# Patient Record
Sex: FEMALE | Race: WHITE | Employment: UNEMPLOYED | URBAN - METROPOLITAN AREA
[De-identification: names, ages, dates, MRNs, and addresses within clinical notes are randomized per-mention and may not be internally consistent; named-entity substitution may affect disease eponyms.]

---

## 2017-07-26 ENCOUNTER — TELEPHONE (OUTPATIENT)
Dept: ORTHOPEDIC SURGERY | Age: 32
End: 2017-07-26

## 2017-07-26 ENCOUNTER — OFFICE VISIT (OUTPATIENT)
Dept: ORTHOPEDIC SURGERY | Age: 32
End: 2017-07-26

## 2017-07-26 VITALS
SYSTOLIC BLOOD PRESSURE: 125 MMHG | HEART RATE: 102 BPM | HEIGHT: 68 IN | BODY MASS INDEX: 44.41 KG/M2 | DIASTOLIC BLOOD PRESSURE: 75 MMHG | WEIGHT: 293 LBS

## 2017-07-26 DIAGNOSIS — M75.21 BICEPS TENDONITIS ON RIGHT: Primary | ICD-10-CM

## 2017-07-26 DIAGNOSIS — S46.911A RIGHT SHOULDER STRAIN, INITIAL ENCOUNTER: ICD-10-CM

## 2017-07-26 PROCEDURE — 99204 OFFICE O/P NEW MOD 45 MIN: CPT | Performed by: ORTHOPAEDIC SURGERY

## 2017-07-26 RX ORDER — PREDNISONE 10 MG/1
TABLET ORAL
Qty: 14 TABLET | Refills: 0 | Status: SHIPPED | OUTPATIENT
Start: 2017-07-26 | End: 2017-08-12 | Stop reason: ALTCHOICE

## 2017-08-03 ENCOUNTER — HOSPITAL ENCOUNTER (OUTPATIENT)
Dept: PHYSICAL THERAPY | Age: 32
Discharge: HOME | End: 2017-08-03
Admitting: ORTHOPAEDIC SURGERY

## 2017-08-03 ENCOUNTER — HOSPITAL ENCOUNTER (OUTPATIENT)
Dept: PHYSICAL THERAPY | Age: 32
Discharge: HOME | End: 2017-07-31
Admitting: ORTHOPAEDIC SURGERY

## 2017-08-09 DIAGNOSIS — S46.911D RIGHT SHOULDER STRAIN, SUBSEQUENT ENCOUNTER: Primary | ICD-10-CM

## 2017-08-10 ENCOUNTER — HOSPITAL ENCOUNTER (OUTPATIENT)
Dept: PHYSICAL THERAPY | Age: 32
Discharge: HOME | End: 2017-08-10
Admitting: ORTHOPAEDIC SURGERY

## 2017-08-21 ENCOUNTER — HOSPITAL ENCOUNTER (OUTPATIENT)
Dept: MRI IMAGING | Age: 32
Discharge: HOME | End: 2017-08-21
Attending: ORTHOPAEDIC SURGERY | Admitting: ORTHOPAEDIC SURGERY

## 2017-08-21 DIAGNOSIS — S46.911D STRAIN OF UNSPECIFIED MUSCLE, FASCIA AND TENDON AT SHOULDER AND UPPER ARM LEVEL, RIGHT ARM, SUBSEQUENT ENCOUNTER: ICD-10-CM

## 2017-08-21 DIAGNOSIS — S46.911D RIGHT SHOULDER STRAIN, SUBSEQUENT ENCOUNTER: ICD-10-CM

## 2017-08-24 ENCOUNTER — HOSPITAL ENCOUNTER (OUTPATIENT)
Dept: PHYSICAL THERAPY | Age: 32
Discharge: HOME | End: 2017-08-24
Admitting: ORTHOPAEDIC SURGERY

## 2017-08-29 ENCOUNTER — TELEPHONE (OUTPATIENT)
Dept: ORTHOPEDIC SURGERY | Age: 32
End: 2017-08-29

## 2017-09-07 ENCOUNTER — HOSPITAL ENCOUNTER (OUTPATIENT)
Dept: OTHER | Age: 32
Discharge: HOME | End: 2017-09-07
Attending: FAMILY MEDICINE | Admitting: FAMILY MEDICINE

## 2017-09-07 DIAGNOSIS — M79.642 LEFT HAND PAIN: ICD-10-CM

## 2018-10-16 ENCOUNTER — APPOINTMENT (OUTPATIENT)
Dept: GENERAL RADIOLOGY | Age: 33
End: 2018-10-16
Payer: MEDICAID

## 2018-10-16 ENCOUNTER — HOSPITAL ENCOUNTER (EMERGENCY)
Age: 33
Discharge: HOME | End: 2018-10-17
Attending: EMERGENCY MEDICINE
Payer: MEDICAID

## 2018-10-16 VITALS
RESPIRATION RATE: 16 BRPM | DIASTOLIC BLOOD PRESSURE: 77 MMHG | HEART RATE: 107 BPM | OXYGEN SATURATION: 97 % | BODY MASS INDEX: 44.41 KG/M2 | SYSTOLIC BLOOD PRESSURE: 147 MMHG | WEIGHT: 293 LBS | TEMPERATURE: 97.4 F | HEIGHT: 68 IN

## 2018-10-16 DIAGNOSIS — N30.00 ACUTE CYSTITIS WITHOUT HEMATURIA: ICD-10-CM

## 2018-10-16 DIAGNOSIS — M54.6 ACUTE MIDLINE THORACIC BACK PAIN: Primary | ICD-10-CM

## 2018-10-16 LAB
BILIRUBIN URINE: NEGATIVE
BLOOD, URINE: ABNORMAL
CLARITY: ABNORMAL
COLOR: YELLOW
GLUCOSE URINE: NEGATIVE MG/DL
HCG(URINE) PREGNANCY TEST: NEGATIVE
KETONES, URINE: NEGATIVE MG/DL
LEUKOCYTE ESTERASE, URINE: ABNORMAL
MICROSCOPIC EXAMINATION: YES
NITRITE, URINE: NEGATIVE
PH UA: 6
PROTEIN UA: NEGATIVE MG/DL
SPECIFIC GRAVITY UA: >=1.03
URINE TYPE: ABNORMAL
UROBILINOGEN, URINE: 0.2 E.U./DL

## 2018-10-16 PROCEDURE — 84703 CHORIONIC GONADOTROPIN ASSAY: CPT

## 2018-10-16 PROCEDURE — 99283 EMERGENCY DEPT VISIT LOW MDM: CPT

## 2018-10-16 PROCEDURE — 96372 THER/PROPH/DIAG INJ SC/IM: CPT

## 2018-10-16 PROCEDURE — 6360000002 HC RX W HCPCS: Performed by: NURSE PRACTITIONER

## 2018-10-16 PROCEDURE — 81001 URINALYSIS AUTO W/SCOPE: CPT

## 2018-10-16 PROCEDURE — 6370000000 HC RX 637 (ALT 250 FOR IP): Performed by: NURSE PRACTITIONER

## 2018-10-16 PROCEDURE — 72072 X-RAY EXAM THORAC SPINE 3VWS: CPT

## 2018-10-16 RX ORDER — LIDOCAINE 50 MG/G
1 PATCH TOPICAL ONCE
Status: DISCONTINUED | OUTPATIENT
Start: 2018-10-16 | End: 2018-10-17 | Stop reason: HOSPADM

## 2018-10-16 RX ORDER — KETOROLAC TROMETHAMINE 30 MG/ML
30 INJECTION, SOLUTION INTRAMUSCULAR; INTRAVENOUS ONCE
Status: COMPLETED | OUTPATIENT
Start: 2018-10-16 | End: 2018-10-16

## 2018-10-16 RX ADMIN — KETOROLAC TROMETHAMINE 30 MG: 30 INJECTION, SOLUTION INTRAMUSCULAR at 23:45

## 2018-10-16 ASSESSMENT — PAIN DESCRIPTION - ORIENTATION: ORIENTATION: MID

## 2018-10-16 ASSESSMENT — PAIN SCALES - GENERAL
PAINLEVEL_OUTOF10: 8
PAINLEVEL_OUTOF10: 8

## 2018-10-16 ASSESSMENT — PAIN DESCRIPTION - LOCATION: LOCATION: BACK

## 2018-10-16 ASSESSMENT — ENCOUNTER SYMPTOMS
SHORTNESS OF BREATH: 0
COUGH: 0
ABDOMINAL PAIN: 0
BACK PAIN: 1

## 2018-10-16 ASSESSMENT — PAIN DESCRIPTION - PAIN TYPE: TYPE: ACUTE PAIN

## 2018-10-17 LAB
BACTERIA: ABNORMAL /HPF
EPITHELIAL CELLS, UA: ABNORMAL /HPF
RBC UA: ABNORMAL /HPF (ref 0–2)
WBC UA: ABNORMAL /HPF (ref 0–5)

## 2018-10-17 RX ORDER — LIDOCAINE 50 MG/G
1 PATCH TOPICAL DAILY
Qty: 30 PATCH | Refills: 0 | Status: SHIPPED | OUTPATIENT
Start: 2018-10-17 | End: 2019-05-04

## 2018-10-17 RX ORDER — METHOCARBAMOL 750 MG/1
750 TABLET, FILM COATED ORAL 4 TIMES DAILY
Qty: 40 TABLET | Refills: 0 | Status: SHIPPED | OUTPATIENT
Start: 2018-10-17 | End: 2018-10-27

## 2018-10-17 RX ORDER — NITROFURANTOIN 25; 75 MG/1; MG/1
100 CAPSULE ORAL 2 TIMES DAILY
Qty: 10 CAPSULE | Refills: 0 | Status: SHIPPED | OUTPATIENT
Start: 2018-10-17 | End: 2018-10-22

## 2018-10-17 NOTE — ED PROVIDER NOTES
activity: Yes     Partners: Male     Other Topics Concern    None     Social History Narrative    None       SCREENINGS             PHYSICAL EXAM    (up to 7 for level 4, 8 or more for level 5)     ED Triage Vitals [10/16/18 2305]   BP Temp Temp Source Pulse Resp SpO2 Height Weight   (!) 147/77 97.4 °F (36.3 °C) Oral 107 16 97 % 5' 8\" (1.727 m) (!) 385 lb (174.6 kg)       Physical Exam   Constitutional: She appears well-developed and well-nourished. HENT:   Head: Normocephalic and atraumatic. Right Ear: External ear normal.   Left Ear: External ear normal.   Nose: Nose normal.   Eyes: Conjunctivae are normal.   Neck: Normal range of motion. Cardiovascular: Normal rate and regular rhythm. Pulmonary/Chest: Effort normal and breath sounds normal.   Abdominal: She exhibits no distension. Musculoskeletal:        Thoracic back: She exhibits tenderness, bony tenderness and pain. She exhibits normal range of motion, no spasm and normal pulse. Back:    Neurological: She is alert. She has normal strength. No sensory deficit. Reflex Scores:       Patellar reflexes are 2+ on the right side and 2+ on the left side. Skin: Skin is warm and dry. Psychiatric: She has a normal mood and affect. Her behavior is normal.   Nursing note and vitals reviewed.       DIAGNOSTIC RESULTS   LABS:    Labs Reviewed   URINALYSIS - Abnormal; Notable for the following:        Result Value    Clarity, UA CLOUDY (*)     Blood, Urine SMALL (*)     Leukocyte Esterase, Urine SMALL (*)     All other components within normal limits    Narrative:     Performed at:  [facility] [address],  [place], [facility]   Phone [phone]   MICROSCOPIC URINALYSIS - Abnormal; Notable for the following:     WBC, UA 6-10 (*)     RBC, UA 3-5 (*)     Bacteria, UA 1+ (*)     All other components within normal limits    Narrative:     Performed at:  [facility] [address] [facility],  [place], [facility]   Phone [phone]   PREGNANCY, URINE    Narrative:     Performed at:  [facility]) [facility]  [address],  [place], [facility]   Phone [phone]       All other labs were within normal range or not returned as of this dictation. EKG: All EKG's are interpreted by the Emergency Department Physician who either signs orCo-signs this chart in the absence of a cardiologist.  Please see their note for interpretation of EKG. RADIOLOGY:   Non-plain film images such as CT, Ultrasound and MRI are read by the radiologist. Plain radiographic images are visualized andpreliminarily interpreted by the  ED Provider with the below findings:    Interpretation per theRadiologist below, if available at the time of this note:    XR THORACIC SPINE (3 VIEWS)   Final Result   No definite evidence for fracture. No results found. PROCEDURES   Unless otherwise noted below, none     Procedures    CRITICAL CARE TIME   N/A    CONSULTS:  None      EMERGENCY DEPARTMENT COURSE and DIFFERENTIALDIAGNOSIS/MDM:   Vitals:    Vitals:    10/16/18 2305   BP: (!) 147/77   Pulse: 107   Resp: 16   Temp: 97.4 °F (36.3 °C)   TempSrc: Oral   SpO2: 97%   Weight: (!) 385 lb (174.6 kg)   Height: 5' 8\" (1.727 m)       Patient was given thefollowing medications:  Medications   lidocaine (LIDODERM) 5 % 1 patch (1 patch Transdermal Patch Applied 10/16/18 2345)   ketorolac (TORADOL) injection 30 mg (30 mg Intramuscular Given 10/16/18 2345)       Patient presented to the emergency department with complaints of thoracic back pain that started about 2 PM when she was getting up from a sitting position. She also reported some urinary urgency. Physical exam did reveal bony tenderness to her thoracic spine. She had no red flag symptoms. X-ray T-spine was obtained and revealed no definite evidence for fracture.   UA was also obtained and showed 6-10 to be PVCs, 3-5 RBCs, 5-10 epithelial

## 2019-03-26 ENCOUNTER — HOSPITAL ENCOUNTER (EMERGENCY)
Age: 34
Discharge: HOME | End: 2019-03-26
Payer: MEDICAID

## 2019-03-26 VITALS
HEART RATE: 89 BPM | OXYGEN SATURATION: 98 % | SYSTOLIC BLOOD PRESSURE: 138 MMHG | HEIGHT: 67 IN | RESPIRATION RATE: 16 BRPM | WEIGHT: 293 LBS | DIASTOLIC BLOOD PRESSURE: 70 MMHG | BODY MASS INDEX: 45.99 KG/M2 | TEMPERATURE: 97.5 F

## 2019-03-26 DIAGNOSIS — L02.91 ABSCESS: Primary | ICD-10-CM

## 2019-03-26 PROCEDURE — 6370000000 HC RX 637 (ALT 250 FOR IP): Performed by: NURSE PRACTITIONER

## 2019-03-26 PROCEDURE — 99283 EMERGENCY DEPT VISIT LOW MDM: CPT

## 2019-03-26 PROCEDURE — 4500000023 HC ED LEVEL 3 PROCEDURE

## 2019-03-26 RX ORDER — DOXYCYCLINE HYCLATE 100 MG
100 TABLET ORAL ONCE
Status: COMPLETED | OUTPATIENT
Start: 2019-03-26 | End: 2019-03-26

## 2019-03-26 RX ORDER — DOXYCYCLINE 100 MG/1
100 TABLET ORAL 2 TIMES DAILY
Qty: 20 TABLET | Refills: 0 | Status: SHIPPED | OUTPATIENT
Start: 2019-03-26 | End: 2019-04-05

## 2019-03-26 RX ADMIN — DOXYCYCLINE HYCLATE 100 MG: 100 TABLET, COATED ORAL at 16:47

## 2019-03-26 ASSESSMENT — PAIN DESCRIPTION - LOCATION: LOCATION: ABDOMEN

## 2019-03-26 ASSESSMENT — ENCOUNTER SYMPTOMS
SHORTNESS OF BREATH: 0
ABDOMINAL PAIN: 0

## 2019-03-26 ASSESSMENT — PAIN DESCRIPTION - FREQUENCY: FREQUENCY: CONTINUOUS

## 2019-03-26 ASSESSMENT — PAIN DESCRIPTION - ORIENTATION: ORIENTATION: RIGHT;LOWER

## 2019-03-26 ASSESSMENT — PAIN DESCRIPTION - DESCRIPTORS: DESCRIPTORS: ACHING

## 2019-03-26 ASSESSMENT — PAIN SCALES - GENERAL: PAINLEVEL_OUTOF10: 4

## 2019-05-04 ENCOUNTER — HOSPITAL ENCOUNTER (EMERGENCY)
Age: 34
Discharge: HOME | End: 2019-05-04
Payer: MEDICAID

## 2019-05-04 ENCOUNTER — APPOINTMENT (OUTPATIENT)
Dept: CT IMAGING | Age: 34
End: 2019-05-04
Payer: MEDICAID

## 2019-05-04 VITALS
SYSTOLIC BLOOD PRESSURE: 126 MMHG | OXYGEN SATURATION: 95 % | TEMPERATURE: 98.2 F | WEIGHT: 293 LBS | HEART RATE: 96 BPM | BODY MASS INDEX: 45.99 KG/M2 | RESPIRATION RATE: 16 BRPM | HEIGHT: 67 IN | DIASTOLIC BLOOD PRESSURE: 76 MMHG

## 2019-05-04 DIAGNOSIS — K76.0 FATTY LIVER: ICD-10-CM

## 2019-05-04 DIAGNOSIS — R10.9 RIGHT SIDED ABDOMINAL PAIN: Primary | ICD-10-CM

## 2019-05-04 LAB
A/G RATIO: 1.4 (ref 1.1–2.2)
ALBUMIN SERPL-MCNC: 4.3 G/DL (ref 3.4–5)
ALP BLD-CCNC: 66 U/L (ref 40–129)
ALT SERPL-CCNC: 49 U/L (ref 10–40)
ANION GAP SERPL CALCULATED.3IONS-SCNC: 12 MMOL/L (ref 3–16)
AST SERPL-CCNC: 40 U/L (ref 15–37)
BASOPHILS ABSOLUTE: 0.1 K/UL (ref 0–0.2)
BASOPHILS RELATIVE PERCENT: 1 %
BILIRUB SERPL-MCNC: <0.2 MG/DL (ref 0–1)
BILIRUBIN URINE: NEGATIVE
BLOOD, URINE: ABNORMAL
BUN BLDV-MCNC: 11 MG/DL (ref 7–20)
CALCIUM SERPL-MCNC: 9.3 MG/DL (ref 8.3–10.6)
CHLORIDE BLD-SCNC: 104 MMOL/L (ref 99–110)
CLARITY: ABNORMAL
CO2: 25 MMOL/L (ref 21–32)
COLOR: YELLOW
CREAT SERPL-MCNC: 0.9 MG/DL (ref 0.6–1.1)
EOSINOPHILS ABSOLUTE: 0.2 K/UL (ref 0–0.6)
EOSINOPHILS RELATIVE PERCENT: 2.1 %
EPITHELIAL CELLS, UA: ABNORMAL /HPF
GFR AFRICAN AMERICAN: >60
GFR NON-AFRICAN AMERICAN: >60
GLOBULIN: 3.1 G/DL
GLUCOSE BLD-MCNC: 96 MG/DL (ref 70–99)
GLUCOSE URINE: NEGATIVE MG/DL
HCG(URINE) PREGNANCY TEST: NEGATIVE
HCT VFR BLD CALC: 40.3 % (ref 36–48)
HEMOGLOBIN: 13.6 G/DL (ref 12–16)
KETONES, URINE: NEGATIVE MG/DL
LEUKOCYTE ESTERASE, URINE: NEGATIVE
LIPASE: 47 U/L (ref 13–60)
LYMPHOCYTES ABSOLUTE: 3 K/UL (ref 1–5.1)
LYMPHOCYTES RELATIVE PERCENT: 28.9 %
MCH RBC QN AUTO: 31.5 PG (ref 26–34)
MCHC RBC AUTO-ENTMCNC: 33.8 G/DL (ref 31–36)
MCV RBC AUTO: 93 FL (ref 80–100)
MICROSCOPIC EXAMINATION: YES
MONOCYTES ABSOLUTE: 0.8 K/UL (ref 0–1.3)
MONOCYTES RELATIVE PERCENT: 8.3 %
NEUTROPHILS ABSOLUTE: 6.1 K/UL (ref 1.7–7.7)
NEUTROPHILS RELATIVE PERCENT: 59.7 %
NITRITE, URINE: NEGATIVE
PDW BLD-RTO: 14.4 % (ref 12.4–15.4)
PH UA: 6 (ref 5–8)
PLATELET # BLD: 281 K/UL (ref 135–450)
PMV BLD AUTO: 9.5 FL (ref 5–10.5)
POTASSIUM SERPL-SCNC: 4.3 MMOL/L (ref 3.5–5.1)
PROTEIN UA: ABNORMAL MG/DL
RBC # BLD: 4.33 M/UL (ref 4–5.2)
RBC UA: ABNORMAL /HPF (ref 0–2)
SODIUM BLD-SCNC: 141 MMOL/L (ref 136–145)
SPECIFIC GRAVITY UA: 1.02 (ref 1–1.03)
TOTAL PROTEIN: 7.4 G/DL (ref 6.4–8.2)
URINE TYPE: ABNORMAL
UROBILINOGEN, URINE: 0.2 E.U./DL
WBC # BLD: 10.2 K/UL (ref 4–11)
WBC UA: ABNORMAL /HPF (ref 0–5)

## 2019-05-04 PROCEDURE — 80053 COMPREHEN METABOLIC PANEL: CPT

## 2019-05-04 PROCEDURE — 81001 URINALYSIS AUTO W/SCOPE: CPT

## 2019-05-04 PROCEDURE — 99284 EMERGENCY DEPT VISIT MOD MDM: CPT

## 2019-05-04 PROCEDURE — 6360000002 HC RX W HCPCS: Performed by: PHYSICIAN ASSISTANT

## 2019-05-04 PROCEDURE — 6360000004 HC RX CONTRAST MEDICATION: Performed by: PHYSICIAN ASSISTANT

## 2019-05-04 PROCEDURE — 2580000003 HC RX 258: Performed by: PHYSICIAN ASSISTANT

## 2019-05-04 PROCEDURE — 83690 ASSAY OF LIPASE: CPT

## 2019-05-04 PROCEDURE — 96374 THER/PROPH/DIAG INJ IV PUSH: CPT

## 2019-05-04 PROCEDURE — 96375 TX/PRO/DX INJ NEW DRUG ADDON: CPT

## 2019-05-04 PROCEDURE — 74177 CT ABD & PELVIS W/CONTRAST: CPT

## 2019-05-04 PROCEDURE — 85025 COMPLETE CBC W/AUTO DIFF WBC: CPT

## 2019-05-04 PROCEDURE — 96361 HYDRATE IV INFUSION ADD-ON: CPT

## 2019-05-04 PROCEDURE — 84703 CHORIONIC GONADOTROPIN ASSAY: CPT

## 2019-05-04 RX ORDER — ONDANSETRON 2 MG/ML
4 INJECTION INTRAMUSCULAR; INTRAVENOUS EVERY 6 HOURS PRN
Status: DISCONTINUED | OUTPATIENT
Start: 2019-05-04 | End: 2019-05-04 | Stop reason: HOSPADM

## 2019-05-04 RX ORDER — KETOROLAC TROMETHAMINE 30 MG/ML
30 INJECTION, SOLUTION INTRAMUSCULAR; INTRAVENOUS EVERY 6 HOURS PRN
Status: DISCONTINUED | OUTPATIENT
Start: 2019-05-04 | End: 2019-05-04 | Stop reason: HOSPADM

## 2019-05-04 RX ORDER — ONDANSETRON 4 MG/1
4 TABLET, ORALLY DISINTEGRATING ORAL EVERY 8 HOURS PRN
Qty: 20 TABLET | Refills: 0 | Status: SHIPPED | OUTPATIENT
Start: 2019-05-04 | End: 2019-08-12 | Stop reason: ALTCHOICE

## 2019-05-04 RX ORDER — 0.9 % SODIUM CHLORIDE 0.9 %
1000 INTRAVENOUS SOLUTION INTRAVENOUS ONCE
Status: COMPLETED | OUTPATIENT
Start: 2019-05-04 | End: 2019-05-04

## 2019-05-04 RX ORDER — FAMOTIDINE 20 MG/1
20 TABLET, FILM COATED ORAL 2 TIMES DAILY
Qty: 30 TABLET | Refills: 0 | Status: SHIPPED | OUTPATIENT
Start: 2019-05-04 | End: 2019-08-12 | Stop reason: ALTCHOICE

## 2019-05-04 RX ORDER — METHOCARBAMOL 500 MG/1
500 TABLET, FILM COATED ORAL 3 TIMES DAILY
Qty: 15 TABLET | Refills: 0 | Status: SHIPPED | OUTPATIENT
Start: 2019-05-04 | End: 2019-08-12 | Stop reason: ALTCHOICE

## 2019-05-04 RX ADMIN — KETOROLAC TROMETHAMINE 30 MG: 30 INJECTION, SOLUTION INTRAMUSCULAR at 17:48

## 2019-05-04 RX ADMIN — SODIUM CHLORIDE 1000 ML: 9 INJECTION, SOLUTION INTRAVENOUS at 17:45

## 2019-05-04 RX ADMIN — ONDANSETRON 4 MG: 2 INJECTION INTRAMUSCULAR; INTRAVENOUS at 17:45

## 2019-05-04 RX ADMIN — IOPAMIDOL 80 ML: 755 INJECTION, SOLUTION INTRAVENOUS at 18:29

## 2019-05-04 ASSESSMENT — PAIN SCALES - GENERAL: PAINLEVEL_OUTOF10: 5

## 2019-05-04 ASSESSMENT — PAIN DESCRIPTION - DESCRIPTORS: DESCRIPTORS: BURNING;STABBING

## 2019-05-04 ASSESSMENT — PAIN DESCRIPTION - ORIENTATION: ORIENTATION: RIGHT

## 2019-05-04 ASSESSMENT — PAIN DESCRIPTION - LOCATION: LOCATION: FLANK

## 2019-05-04 NOTE — ED PROVIDER NOTES
[facility] Emergency Department    CHIEF COMPLAINT  Flank Pain (right side. hurts when breaths. pt states that it hurts after she poops and wipes. pain in side burning and stabbing. pain for the past few weeks); Diarrhea; Emesis; and Vaginal Bleeding (on period )      HISTORY OF PRESENT ILLNESS  Anne-Marie Martines is a 34 y.o. female who presents to the ED complaining of one-week history of right-sided flank pain. Patient observed lying in bed, appears nontoxic and in no acute distress at this time. She drove herself in today for evaluation. Patient reports a constant right-sided flank pain rated at a 5 out of 10 at rest.  Reports that with movement pain become sharp and stabbing and increases to 8-9 out of 10. Alleviated with anti-inflammatories. Has had nausea without vomiting. No diarrhea or constipation. She denies any chest pain or shortness of breath. Nonsmoker. No cough or congestion. No headaches, lightheadedness, dizziness or confusion. History of PCOS currently on period. Has had increased urinary frequency without dysuria. No fevers or chills. No other complaints, modifying factors or associated symptoms. Nursing notes reviewed. Past Medical History:   Diagnosis Date    Asthma     Bipolar 1 disorder ([facility]     Depression     Diabetes mellitus ([facility]     Obesity     OCD (obsessive compulsive disorder)     PCOS (polycystic ovarian syndrome)      Past Surgical History:   Procedure Laterality Date    ANKLE SURGERY      MOUTH SURGERY      OTHER SURGICAL HISTORY Left 12/10/14    Excision of Cyst Left Upper Posterior Ankle    OTHER SURGICAL HISTORY Right 12/29/2016    Laparotomy with Right SO    OVARY REMOVAL  12/28/2016    unsure of side     History reviewed. No pertinent family history.   Social History     Socioeconomic History    Marital status:      Spouse name: Not on file    Number of children: Not on file    Years of education: Not on file    Highest education level: Not on file   Occupational History    Not on file   Social Needs    Financial resource strain: Not on file    Food insecurity:     Worry: Not on file     Inability: Not on file    Transportation needs:     Medical: Not on file     Non-medical: Not on file   Tobacco Use    Smoking status: Never Smoker    Smokeless tobacco: Never Used   Substance and Sexual Activity    Alcohol use:  Yes     Alcohol/week: 0.6 oz     Types: 1 Glasses of wine per week     Comment: social    Drug use: No    Sexual activity: Yes     Partners: Male   Lifestyle    Physical activity:     Days per week: Not on file     Minutes per session: Not on file    Stress: Not on file   Relationships    Social connections:     Talks on phone: Not on file     Gets together: Not on file     Attends Hoahaoism service: Not on file     Active member of club or organization: Not on file     Attends meetings of clubs or organizations: Not on file     Relationship status: Not on file    Intimate partner violence:     Fear of current or ex partner: Not on file     Emotionally abused: Not on file     Physically abused: Not on file     Forced sexual activity: Not on file   Other Topics Concern    Not on file   Social History Narrative    Not on file     Current Facility-Administered Medications   Medication Dose Route Frequency Provider Last Rate Last Dose    0.9 % sodium chloride bolus  1,000 mL Intravenous Once Sim Burgos        ondansetron [facility]) injection 4 mg  4 mg Intravenous Q6H PRN ANAY Burgos        ketorolac (TORADOL) injection 30 mg  30 mg Intravenous Q6H PRN ANAY Burgos         Current Outpatient Medications   Medication Sig Dispense Refill    Cholecalciferol (VITAMIN D PO) Take by mouth      Levothyroxine Sodium (SYNTHROID PO) Take by mouth      ondansetron (ZOFRAN ODT) 4 MG disintegrating tablet Take 1 tablet by mouth every 8 hours as needed for Nausea 15 tablet 0    5/4/2019  EXAMINATION: CT OF THE ABDOMEN AND PELVIS WITH CONTRAST 5/4/2019 6:27 pm TECHNIQUE: CT of the abdomen and pelvis was performed with the administration of intravenous contrast. Multiplanar reformatted images are provided for review. Dose modulation, iterative reconstruction, and/or weight based adjustment of the mA/kV was utilized to reduce the radiation dose to as low as reasonably achievable. COMPARISON: May 5, 2016 HISTORY: ORDERING SYSTEM PROVIDED HISTORY: right abd pain TECHNOLOGIST PROVIDED HISTORY: Additional Contrast?->None Ordering Physician Provided Reason for Exam: rt side abdo pain Acuity: Acute Type of Exam: Ongoing Additional signs and symptoms: pt c/o rt side abdo pain for a few weeds, diarrhea, n/v preg=neg FINDINGS: Lower Chest: Unremarkable Organs: Fatty liver. Gallbladder, pancreas, and spleen are unremarkable. GI/Bowel: No bowel obstruction. Appendix is normal. Pelvis: No bladder stone. No pelvic fluid collection. No adenopathy. Peritoneum/Retroperitoneum: No abdominal aortic aneurysm. Adrenal glands unremarkable. Kidneys are unremarkable. Bones/Soft Tissues: No acute bony abnormality. No acute findings. Fatty liver. ED COURSE   I have evaluated this patient. Attending physician was available for consultation. Patient received Toradol and Zofran IV for pain and nausea, with good relief. Triage vitals stable. Given a 1 L IV fluid bolus. Urine pregnancy was negative. Urinalysis with large blood although patient is again currently. .  No evidence for infectious process. CBC is without leukocytosis or anemia. CMP unremarkable. Mildly elevated ALT and AST. Lipase normal.    CT abdomen and pelvis without acute findings demonstrated. Incidentally noted was what appeared to be findings consistent with fatty liver disease.   This was discussed with patient who was provided with GI referral.  Majority of pain appears to be musculoskeletal in nature given reproducible right thoracic back pain which may involve some thoracic radiculopathy given radiation around abdominal wall and no other acute findings otherwise. Will be discharged with recommendations for continuation of anti-inflammatories with additional muscle relaxant. Also be sent with antiemetics. .  I discussed return precautions as well and patient in agreement and comfortable discharge. A discussion was had with  Mrs. Magallanes regarding   Flank pain, nausea, ED findings, recommendations for follow-up. All questions were answered. Patient will follow up with    PCP and GI within 1 week for further evaluation/treatment. Patient will return to ED for new/worsening symptoms. Patient was sent home with a prescription for  Zofran, Pepcid, and Robaxin.     MDM  Results for orders placed or performed during the hospital encounter of 05/04/19   CBC auto differential   Result Value Ref Range    WBC 10.2 4.0 - 11.0 K/uL    RBC 4.33 4.00 - 5.20 M/uL    Hemoglobin 13.6 12.0 - 16.0 g/dL    Hematocrit 40.3 36.0 - 48.0 %    MCV 93.0 80.0 - 100.0 fL    MCH 31.5 26.0 - 34.0 pg    MCHC 33.8 31.0 - 36.0 g/dL    RDW 14.4 12.4 - 15.4 %    Platelets 574 099 - 549 K/uL    MPV 9.5 5.0 - 10.5 fL    Neutrophils % 59.7 %    Lymphocytes % 28.9 %    Monocytes % 8.3 %    Eosinophils % 2.1 %    Basophils % 1.0 %    Neutrophils # 6.1 1.7 - 7.7 K/uL    Lymphocytes # 3.0 1.0 - 5.1 K/uL    Monocytes # 0.8 0.0 - 1.3 K/uL    Eosinophils # 0.2 0.0 - 0.6 K/uL    Basophils # 0.1 0.0 - 0.2 K/uL   Comprehensive metabolic panel   Result Value Ref Range    Sodium 141 136 - 145 mmol/L    Potassium 4.3 3.5 - 5.1 mmol/L    Chloride 104 99 - 110 mmol/L    CO2 25 21 - 32 mmol/L    Anion Gap 12 3 - 16    Glucose 96 70 - 99 mg/dL    BUN 11 7 - 20 mg/dL    CREATININE 0.9 0.6 - 1.1 mg/dL    GFR Non-African American >60 >60    GFR African American >60 >60    Calcium 9.3 8.3 - 10.6 mg/dL    Total Protein 7.4 6.4 - 8.2 g/dL    Alb 4.3 3.4 - 5.0 g/dL    Albumin/Globulin

## 2019-05-21 ENCOUNTER — TELEPHONE (OUTPATIENT)
Dept: BARIATRICS/WEIGHT MGMT | Age: 34
End: 2019-05-21

## 2019-05-28 ENCOUNTER — OFFICE VISIT (OUTPATIENT)
Dept: BARIATRICS/WEIGHT MGMT | Age: 34
End: 2019-05-28
Payer: MEDICAID

## 2019-05-28 VITALS
RESPIRATION RATE: 18 BRPM | DIASTOLIC BLOOD PRESSURE: 76 MMHG | HEIGHT: 67 IN | HEART RATE: 97 BPM | BODY MASS INDEX: 45.99 KG/M2 | SYSTOLIC BLOOD PRESSURE: 122 MMHG | WEIGHT: 293 LBS | OXYGEN SATURATION: 98 %

## 2019-05-28 DIAGNOSIS — E66.9 DIABETES MELLITUS TYPE 2 IN OBESE (HCC): ICD-10-CM

## 2019-05-28 DIAGNOSIS — E66.01 MORBID OBESITY WITH BMI OF 60.0-69.9, ADULT (HCC): Primary | ICD-10-CM

## 2019-05-28 DIAGNOSIS — E11.69 DIABETES MELLITUS TYPE 2 IN OBESE (HCC): ICD-10-CM

## 2019-05-28 DIAGNOSIS — R06.83 SNORING: ICD-10-CM

## 2019-05-28 DIAGNOSIS — K21.9 CHRONIC GERD: ICD-10-CM

## 2019-05-28 DIAGNOSIS — Z01.818 PREOPERATIVE CLEARANCE: ICD-10-CM

## 2019-05-28 DIAGNOSIS — K76.0 FATTY LIVER: ICD-10-CM

## 2019-05-28 PROCEDURE — 2022F DILAT RTA XM EVC RTNOPTHY: CPT | Performed by: SURGERY

## 2019-05-28 PROCEDURE — 3046F HEMOGLOBIN A1C LEVEL >9.0%: CPT | Performed by: SURGERY

## 2019-05-28 PROCEDURE — 99205 OFFICE O/P NEW HI 60 MIN: CPT | Performed by: SURGERY

## 2019-05-28 PROCEDURE — 1036F TOBACCO NON-USER: CPT | Performed by: SURGERY

## 2019-05-28 PROCEDURE — G8427 DOCREV CUR MEDS BY ELIG CLIN: HCPCS | Performed by: SURGERY

## 2019-05-28 PROCEDURE — G8417 CALC BMI ABV UP PARAM F/U: HCPCS | Performed by: SURGERY

## 2019-05-28 NOTE — LETTER
[address] [facility] [employer]  [address] [place] [address]  [address] [address] [place]. [address]  Phone: [phone]  Fax: [phone]    Domitila Sanford MD        May 28, 2019     Isidro Bradshaw[address] [place] [address]    Patient: Otis Paris  MR Number: [id number]  YOB: 1985  Date of Visit: 5/28/2019    Dear Dr. Isidro Bradshaw: Thank you for the request for consultation for Marcelino Mclaughlin to me for the evaluation of obesity. Below are the relevant portions of my assessment and plan of care. Patient Instructions   Patient received dietary handouts and education. Labs ordered. Psych Evaluation. Sleep Study & CPAP Compliance. EGD (Upper Endoscopy). Support Group. PCP Letter. Quit Smoking,  Alcohol, Caffeine and Carbonated Drinks  Weight History 2 yrs. F/U in 4 weeks. Psychiatrist /Therapist Clearance. Patient advised that its their responsibility to follow up for studies, referrals and/or labs ordered today. If you have questions, please do not hesitate to call me. I look forward to following [place] along with you.     Sincerely,        Domitila Sanford MD

## 2019-05-28 NOTE — PATIENT INSTRUCTIONS
Patient received dietary handouts and education. Labs ordered. Psych Evaluation. Sleep Study & CPAP Compliance. EGD (Upper Endoscopy). Support Group. PCP Letter. Quit Smoking,  Alcohol, Caffeine and Carbonated Drinks  Weight History 2 yrs. F/U in 4 weeks. Psychiatrist /Therapist Clearance. Patient advised that its their responsibility to follow up for studies, referrals and/or labs ordered today.

## 2019-05-28 NOTE — PROGRESS NOTES
[facility]) Physicians   [facility]  Carlos Cross MD, [facility], [address] [place] [address]    [id number] . Phone: [phone]  Fax: [phone]       Chief Complaint   Patient presents with    Bariatric, Initial Visit     NP Kristin Naylor           HPI:    Parisa Lee is a very pleasant 34 y.o. obese female ,   Body mass index is 62.02 kg/m². And multiple medical problems who is presenting for weight loss surgery evaluation and consultation by Dr. Elva Olivia. Patient has been struggling for several years now with obesity. Patient feels the weight is an obstacle to achieve and perform things in daily living as well risk on health. Tries to diet, and exercise but can't keep the weight off. Patient tried Atkins Diet, low carb, low fat, and kcal restriction and other regimens, but with no sustainable weight loss. Patient  is very determined to lose weight and be healthy, and is interested in surgical weight loss to achieve this goal.    Otherwise patient denies any nausea, vomiting, fevers, chills, shortness of breath, chest pain, constipation or urinary symptoms.         Pain Assessment   Denies any abdominal pain     Past Medical History:   Diagnosis Date    Asthma     Back pain     Bipolar 1 disorder (HCC)     Depression     Diabetes mellitus (HCC)     Liver disease     fatty liver    Obesity     OCD (obsessive compulsive disorder)     PCOS (polycystic ovarian syndrome)     PCOS (polycystic ovarian syndrome)      Past Surgical History:   Procedure Laterality Date    ANKLE SURGERY      MOUTH SURGERY      OTHER SURGICAL HISTORY Left 12/10/14    Excision of Cyst Left Upper Posterior Ankle    OTHER SURGICAL HISTORY Right 12/29/2016    Laparotomy with Right SO    OVARY REMOVAL  12/28/2016    unsure of side     Family History   Problem Relation Age of Onset    Asthma Mother     Arthritis Mother     Hypertension Father     Elevated Lipids Father    [place] ROS: negative  Neurological ROS: negative  ENT ROS: negative  Allergy and Immunology ROS: negative  Hematological and Lymphatic ROS: negative  Endocrine ROS: overweight  Breast ROS: negative  Respiratory ROS: negative  Cardiovascular ROS: negative  Gastrointestinal ROS:negative  Genito-Urinary ROS: negative  Musculoskeletal ROS: weight effects on joints  Skin ROS: negative    Physical Exam   Constitutional: Patient is oriented to person, place, and time. Vital signs are normal. Patient  appears well-developed and well-nourished. Patient  is active and cooperative. Non-toxic appearance. No distress. HENT:   Head: Normocephalic and atraumatic. Head is without laceration. Right Ear: External ear normal. No lacerations. No drainage, swelling or tenderness. Left Ear: External ear normal. No lacerations. No drainage, swelling or tenderness. Nose: Nose normal. No nose lacerations or nasal deformity. Mouth/Throat: Uvula is midline, oropharynx is clear and moist and mucous membranes are normal. No oropharyngeal exudate. Eyes: Conjunctivae, EOM and lids are normal. Pupils are equal, round, and reactive to light. Right eye exhibits no discharge. No foreign body present in the right eye. Left eye exhibits no discharge. No foreign body present in the left eye. No scleral icterus. Neck: Trachea normal and normal range of motion. Neck supple. No JVD present. No tracheal tenderness present. Carotid bruit is not present. No rigidity. No tracheal deviation and no edema present. No thyromegaly present. Cardiovascular: Normal rate, regular rhythm, normal heart sounds, intact distal pulses and normal pulses. Pulmonary/Chest: Effort normal and breath sounds normal. No stridor. No respiratory distress. Patient  has no wheezes. Patient has no rales. Patient exhibits no tenderness and no crepitus. Abdominal: Soft.  Normal appearance and bowel sounds are normal. Patient exhibits no distension, no abdominal bruit, no ascites and no mass. There is no hepatosplenomegaly. There is no tenderness. There is no rigidity, no rebound, no guarding and no CVA tenderness. No hernia. Hernia confirmed negative in the ventral area. Musculoskeletal: Normal range of motion. Patient exhibits no edema or tenderness. Lymphadenopathy:        Head (right side): No submental, no submandibular, no preauricular, no posterior auricular and no occipital adenopathy present. Head (left side): No submental, no submandibular, no preauricular, no posterior auricular and no occipital adenopathy present. Patient  has no cervical adenopathy. Right: No supraclavicular adenopathy present. Left: No supraclavicular adenopathy present. Neurological: Patient is alert and oriented to person, place, and time. Patient has normal strength. Coordination and gait normal. GCS eye subscore is 4. GCS verbal subscore is 5. GCS motor subscore is 6. Skin: Skin is warm and dry. No abrasion and no rash noted. Patient  is not diaphoretic. No cyanosis or erythema. Psychiatric: Patient has a normal mood and affect. speech is normal and behavior is normal. Cognition and memory are normal.         La Nena was seen today for bariatric, initial visit. Diagnoses and all orders for this visit:    Morbid obesity with BMI of 60.0-69.9, adult ([facility] [address]  -     Cancel: Ambulatory referral to Cardiology  -     CBC Auto Differential; Future  -     Comprehensive Metabolic Panel; Future  -     Hemoglobin A1C; Future  -     Iron and TIBC; Future  -     Lipid Panel; Future  -     TSH with Reflex; Future  -     Cancel: US Gallbladder Ruq; Future  -     Vitamin A; Future  -     Vitamin B1, Whole Blood; Future  -     Vitamin B12 & Folate; Future  -     Vitamin D 25 Hydroxy;  Future  -     Vitamin E; Future  -     Vitamin K1; Future  -     Ambulatory referral to Sleep Medicine    Preoperative clearance  -     Cancel: Ambulatory referral to Cardiology  -     CBC Auto Differential; Future  -     Comprehensive Metabolic Panel; Future  -     Hemoglobin A1C; Future  -     Iron and TIBC; Future  -     Lipid Panel; Future  -     TSH with Reflex; Future  -     Cancel: US Gallbladder Ruq; Future  -     Vitamin A; Future  -     Vitamin B1, Whole Blood; Future  -     Vitamin B12 & Folate; Future  -     Vitamin D 25 Hydroxy; Future  -     Vitamin E; Future  -     Vitamin K1; Future  -     Ambulatory referral to Sleep Medicine    Diabetes mellitus type 2 in obese ([address]  -     Cancel: Ambulatory referral to Cardiology  -     CBC Auto Differential; Future  -     Comprehensive Metabolic Panel; Future  -     Hemoglobin A1C; Future  -     Iron and TIBC; Future  -     Lipid Panel; Future  -     TSH with Reflex; Future  -     Cancel: US Gallbladder Ruq; Future  -     Vitamin A; Future  -     Vitamin B1, Whole Blood; Future  -     Vitamin B12 & Folate; Future  -     Vitamin D 25 Hydroxy; Future  -     Vitamin E; Future  -     Vitamin K1; Future  -     Ambulatory referral to Sleep Medicine    Chronic GERD  -     Cancel: Ambulatory referral to Cardiology  -     CBC Auto Differential; Future  -     Comprehensive Metabolic Panel; Future  -     Hemoglobin A1C; Future  -     Iron and TIBC; Future  -     Lipid Panel; Future  -     TSH with Reflex; Future  -     Cancel: US Gallbladder Ruq; Future  -     Vitamin A; Future  -     Vitamin B1, Whole Blood; Future  -     Vitamin B12 & Folate; Future  -     Vitamin D 25 Hydroxy; Future  -     Vitamin E; Future  -     Vitamin K1; Future  -     Ambulatory referral to Sleep Medicine    Snoring  -     Cancel: Ambulatory referral to Cardiology  -     CBC Auto Differential; Future  -     Comprehensive Metabolic Panel; Future  -     Hemoglobin A1C; Future  -     Iron and TIBC; Future  -     Lipid Panel; Future  -     TSH with Reflex; Future  -     Cancel: US Gallbladder Ruq; Future  -     Vitamin A; Future  -     Vitamin B1, Whole Blood;  Future  -     Vitamin B12 & Folate; Future  -     Vitamin D 25 Hydroxy; Future  -     Vitamin E; Future  -     Vitamin K1; Future  -     Ambulatory referral to Sleep Medicine    Fatty liver  -     Cancel: Ambulatory referral to Cardiology  -     CBC Auto Differential; Future  -     Comprehensive Metabolic Panel; Future  -     Hemoglobin A1C; Future  -     Iron and TIBC; Future  -     Lipid Panel; Future  -     TSH with Reflex; Future  -     Cancel: US Gallbladder Ruq; Future  -     Vitamin A; Future  -     Vitamin B1, Whole Blood; Future  -     Vitamin B12 & Folate; Future  -     Vitamin D 25 Hydroxy; Future  -     Vitamin E; Future  -     Vitamin K1; Future  -     Ambulatory referral to Sleep Medicine          A/P  Logan David is a very pleasant 34 y.o. female with Obesity,  Body mass index is 62.02 kg/m². and multiple obesity related co-morbidities. Logan David is very motivated to lose weight and being more healthy. We discussed how her weight affects her overall health including:  Patient Active Problem List   Diagnosis    Pelvic mass    Biceps tendonitis on right    Right shoulder strain    Preoperative clearance    Morbid obesity with BMI of 60.0-69.9, adult ([address]    Diabetes mellitus type 2 in obese ([facility] [address]    Chronic GERD    Snoring      The patient underwent 30 minutes of dietary counseling. I have reviewed, discussed and agree with the dietary plan. Medical weight loss and different surgical options were discussed in details with patient. La Nena is interested in surgical weight loss. Patient is interested in Laparoscopic Sleeve Gastrectomy, which I believe is an excellent option. We will proceed with pre-operative work up labs and studies. Will also petition patient's  insurance for approval for this procedure. I advised the patient that we can't guarantee final insurance approval.    Patient received dietary handouts and education.    Patient advised that its their responsibility to follow up for studies, referrals and/or labs ordered today. Also discussed in details the importance of follow up, as well following the recommendations and completing the whole program to improve outcomes when it comes to healthier lifestyle as well weight loss. Patient also advised about risks and benefits being on a strict dietary regimen as well using supplements. Patient agrees and wants to proceed with weight loss planning       Patient Instructions   Patient received dietary handouts and education. Labs ordered. Psych Evaluation. Sleep Study & CPAP Compliance. EGD (Upper Endoscopy). Support Group. PCP Letter. Quit Smoking,  Alcohol, Caffeine and Carbonated Drinks  Weight History 2 yrs. F/U in 4 weeks. Psychiatrist /Therapist Clearance. Patient advised that its their responsibility to follow up for studies, referrals and/or labs ordered today.

## 2019-05-28 NOTE — PROGRESS NOTES
Gunjan Conroy is a 34 y.o. female with a date of birth of 1985. Vitals:    05/28/19 1128   BP: 122/76   Pulse: 97   Resp: 18   SpO2: 98%    BMI: Body mass index is 62.02 kg/m². Obesity Classification: Class III    Weight History: Wt Readings from Last 3 Encounters:   05/28/19 (!) 396 lb (179.6 kg)   05/04/19 (!) 395 lb (179.2 kg)   03/26/19 (!) 390 lb (176.9 kg)       Patient's lowest adult weight was 280 lbs at age 27. Patient's highest adult weight was 396 lbs at age 34. Patient has participated in the following weight loss programs: Atkins Diet, low carb, low fat, and kcal restriction. Patient has not participated in meal replacement/liquid diets. Patient has not participated in weight loss medications. Patient is  lactose intolerant - MOST LACTOSE CONTAINING FOODS. Patient does not have Yarsanism/cultural food concerns. Patient does not have food allergies. Does patient tolerate artificial sweeteners Yes. Patient dines out to a sit down restaurant 1 times per month. Patient dines out to a fast food restaurant 3 times per week. 24 hour recall/food frequency chart: PT STATES SHE OFTEN FORGETS TO EAT  Breakfast: no. Skips most of the time OR toast OR fruit  Snack: no.   Lunch: yes. Fruit OR leftovers OR McDonalds - 2 McDoubles, 1 large suero, 1 large sweet tea OR skips  Snack: no.   Dinner: Chicken OR hot pockets/pizza rolls OR jeanie sticks OR spaghetti with meat balls and sauce   Snack: no. OR hot pocket x 2   Drinks throughout the day: soda but not much, sweet tea, gatorade, water, [place] rx energy  Do you drink alcohol? Yes. How often/how much alcohol do you drink: 1-2 rumchata  per month. Patient does meet the criteria for binge eating disorder. Patient does have grazing. Patient does not have night eating. Patient does have a history of emotional eating or eating out of boredom. It does take an unusually large amount of food for the patient to feel comfortably full. Most days the patient eats until she is satisfied. Patient describes level of activity as light - has started using treadmill every other day - 60 min on treadmill or Just Dance. Surgery  Patient's greatest concern about having surgery is: none. Patient describes the motivation for weight loss surgery to be: better health and QOL. Patient does feel confident in her ability to make these changes. The patient's expectations of post-surgical eating habits realistic. Patient states she does understand the consequences of not complying with post-op food guidelines. Patient states she understands the long term changes in food intake that will be necessary for all occasions after surgery for the rest of her life. she does understand the long term food changes. Patient is deemed nutritionally appropriate to proceed. Goals  Weight: 250  Health Improvement: DM under control for good, better health for good    Assessment  Nutritional Needs: RMR=(9.99 x 179.6) + (6.25 x 170.2) - (4.92 x 33 y.o.) -161= 2544 kcal x 1.4 (sedentary activity factor)= 3562 kcal - 1000 (for 2 lb weight loss/week)= 2562 kcal.    Plan  Surgical procedure desired: Sleeve    Plan/Recommendations: General weight loss/lifestyle modification strategies discussed (elicit support from others; identify saboteurs; non-food rewards, etc). Goals:   -Eat 4-5 times daily  -Avoid high fat and high sugar foods  -Include protein with all meals and snacks  -Avoid carbonation and caffeine  -Avoid calorie containing beverages  -Increase physical activity as tolerated    PES Statement:  Overweight/Obesity related to lack of exercise, sedentary lifestyle, unhealthy eating habits, and unsuccessful diet attempts as evidenced by BMI. Body mass index is 62.02 kg/m². .    Will follow up as necessary.     Morris Ramirez

## 2019-05-29 ENCOUNTER — HOSPITAL ENCOUNTER (OUTPATIENT)
Age: 34
Discharge: HOME | End: 2019-05-29
Payer: MEDICAID

## 2019-05-29 DIAGNOSIS — E66.9 DIABETES MELLITUS TYPE 2 IN OBESE (HCC): ICD-10-CM

## 2019-05-29 DIAGNOSIS — E11.69 DIABETES MELLITUS TYPE 2 IN OBESE (HCC): ICD-10-CM

## 2019-05-29 DIAGNOSIS — E66.01 MORBID OBESITY WITH BMI OF 60.0-69.9, ADULT (HCC): ICD-10-CM

## 2019-05-29 DIAGNOSIS — K76.0 FATTY LIVER: ICD-10-CM

## 2019-05-29 DIAGNOSIS — R06.83 SNORING: ICD-10-CM

## 2019-05-29 DIAGNOSIS — K21.9 CHRONIC GERD: ICD-10-CM

## 2019-05-29 DIAGNOSIS — Z01.818 PREOPERATIVE CLEARANCE: ICD-10-CM

## 2019-05-29 LAB
A/G RATIO: 1.2 (ref 1.1–2.2)
ALBUMIN SERPL-MCNC: 4.1 G/DL (ref 3.4–5)
ALP BLD-CCNC: 80 U/L (ref 40–129)
ALT SERPL-CCNC: 37 U/L (ref 10–40)
ANION GAP SERPL CALCULATED.3IONS-SCNC: 13 MMOL/L (ref 3–16)
AST SERPL-CCNC: 37 U/L (ref 15–37)
BASOPHILS ABSOLUTE: 0 K/UL (ref 0–0.2)
BASOPHILS RELATIVE PERCENT: 0.5 %
BILIRUB SERPL-MCNC: 0.7 MG/DL (ref 0–1)
BUN BLDV-MCNC: 7 MG/DL (ref 7–20)
CALCIUM SERPL-MCNC: 9 MG/DL (ref 8.3–10.6)
CHLORIDE BLD-SCNC: 101 MMOL/L (ref 99–110)
CHOLESTEROL, TOTAL: 136 MG/DL (ref 0–199)
CO2: 25 MMOL/L (ref 21–32)
CREAT SERPL-MCNC: 0.7 MG/DL (ref 0.6–1.1)
EOSINOPHILS ABSOLUTE: 0.1 K/UL (ref 0–0.6)
EOSINOPHILS RELATIVE PERCENT: 0.9 %
FOLATE: >20 NG/ML (ref 4.78–24.2)
GFR AFRICAN AMERICAN: >60
GFR NON-AFRICAN AMERICAN: >60
GLOBULIN: 3.4 G/DL
GLUCOSE BLD-MCNC: 101 MG/DL (ref 70–99)
HCT VFR BLD CALC: 41.3 % (ref 36–48)
HDLC SERPL-MCNC: 39 MG/DL (ref 40–60)
HEMOGLOBIN: 13.8 G/DL (ref 12–16)
IRON SATURATION: 42 % (ref 15–50)
IRON: 117 UG/DL (ref 37–145)
LDL CHOLESTEROL CALCULATED: 80 MG/DL
LYMPHOCYTES ABSOLUTE: 1.6 K/UL (ref 1–5.1)
LYMPHOCYTES RELATIVE PERCENT: 23.9 %
MCH RBC QN AUTO: 31.2 PG (ref 26–34)
MCHC RBC AUTO-ENTMCNC: 33.5 G/DL (ref 31–36)
MCV RBC AUTO: 93.2 FL (ref 80–100)
MONOCYTES ABSOLUTE: 0.5 K/UL (ref 0–1.3)
MONOCYTES RELATIVE PERCENT: 8.1 %
NEUTROPHILS ABSOLUTE: 4.5 K/UL (ref 1.7–7.7)
NEUTROPHILS RELATIVE PERCENT: 66.6 %
PDW BLD-RTO: 14.2 % (ref 12.4–15.4)
PLATELET # BLD: 258 K/UL (ref 135–450)
PMV BLD AUTO: 10 FL (ref 5–10.5)
POTASSIUM SERPL-SCNC: 4.1 MMOL/L (ref 3.5–5.1)
RBC # BLD: 4.43 M/UL (ref 4–5.2)
SODIUM BLD-SCNC: 139 MMOL/L (ref 136–145)
TOTAL IRON BINDING CAPACITY: 278 UG/DL (ref 260–445)
TOTAL PROTEIN: 7.5 G/DL (ref 6.4–8.2)
TRIGL SERPL-MCNC: 84 MG/DL (ref 0–150)
TSH REFLEX: 1.1 UIU/ML (ref 0.27–4.2)
VITAMIN B-12: 442 PG/ML (ref 211–911)
VITAMIN D 25-HYDROXY: 36.3 NG/ML
VLDLC SERPL CALC-MCNC: 17 MG/DL
WBC # BLD: 6.7 K/UL (ref 4–11)

## 2019-05-29 PROCEDURE — 84443 ASSAY THYROID STIM HORMONE: CPT

## 2019-05-29 PROCEDURE — 83550 IRON BINDING TEST: CPT

## 2019-05-29 PROCEDURE — 84597 ASSAY OF VITAMIN K: CPT

## 2019-05-29 PROCEDURE — 36415 COLL VENOUS BLD VENIPUNCTURE: CPT

## 2019-05-29 PROCEDURE — 82607 VITAMIN B-12: CPT

## 2019-05-29 PROCEDURE — 84446 ASSAY OF VITAMIN E: CPT

## 2019-05-29 PROCEDURE — 84425 ASSAY OF VITAMIN B-1: CPT

## 2019-05-29 PROCEDURE — 82306 VITAMIN D 25 HYDROXY: CPT

## 2019-05-29 PROCEDURE — 83036 HEMOGLOBIN GLYCOSYLATED A1C: CPT

## 2019-05-29 PROCEDURE — 85025 COMPLETE CBC W/AUTO DIFF WBC: CPT

## 2019-05-29 PROCEDURE — 83540 ASSAY OF IRON: CPT

## 2019-05-29 PROCEDURE — 80061 LIPID PANEL: CPT

## 2019-05-29 PROCEDURE — 84590 ASSAY OF VITAMIN A: CPT

## 2019-05-29 PROCEDURE — 80053 COMPREHEN METABOLIC PANEL: CPT

## 2019-05-29 PROCEDURE — 82746 ASSAY OF FOLIC ACID SERUM: CPT

## 2019-05-30 LAB
ESTIMATED AVERAGE GLUCOSE: 131.2 MG/DL
HBA1C MFR BLD: 6.2 %

## 2019-06-01 LAB
ALPHA-TOCOPHEROL: 7 MG/L (ref 5.5–18)
GAMMA-TOCOPHEROL: 2.7 MG/L (ref 0–6)
RETINYL PALMITATE: <0.02 MG/L (ref 0–0.1)
VITAMIN A LEVEL: 0.25 MG/L (ref 0.3–1.2)
VITAMIN A, INTERP: ABNORMAL
VITAMIN B1 WHOLE BLOOD: 118 NMOL/L (ref 70–180)
VITAMIN K1: 0.57 NMOL/L (ref 0.22–4.88)

## 2019-06-13 ENCOUNTER — OFFICE VISIT (OUTPATIENT)
Dept: BARIATRICS/WEIGHT MGMT | Age: 34
End: 2019-06-13
Payer: MEDICAID

## 2019-06-13 VITALS
WEIGHT: 293 LBS | BODY MASS INDEX: 45.99 KG/M2 | HEART RATE: 94 BPM | RESPIRATION RATE: 12 BRPM | HEIGHT: 67 IN | DIASTOLIC BLOOD PRESSURE: 76 MMHG | OXYGEN SATURATION: 99 % | SYSTOLIC BLOOD PRESSURE: 116 MMHG

## 2019-06-13 DIAGNOSIS — R06.83 SNORING: ICD-10-CM

## 2019-06-13 DIAGNOSIS — E66.9 DIABETES MELLITUS TYPE 2 IN OBESE (HCC): ICD-10-CM

## 2019-06-13 DIAGNOSIS — E11.69 DIABETES MELLITUS TYPE 2 IN OBESE (HCC): ICD-10-CM

## 2019-06-13 DIAGNOSIS — E66.01 MORBID OBESITY WITH BMI OF 50.0-59.9, ADULT (HCC): Primary | ICD-10-CM

## 2019-06-13 DIAGNOSIS — K21.9 CHRONIC GERD: ICD-10-CM

## 2019-06-13 PROCEDURE — G8417 CALC BMI ABV UP PARAM F/U: HCPCS | Performed by: NURSE PRACTITIONER

## 2019-06-13 PROCEDURE — G8427 DOCREV CUR MEDS BY ELIG CLIN: HCPCS | Performed by: NURSE PRACTITIONER

## 2019-06-13 PROCEDURE — 3044F HG A1C LEVEL LT 7.0%: CPT | Performed by: NURSE PRACTITIONER

## 2019-06-13 PROCEDURE — 2022F DILAT RTA XM EVC RTNOPTHY: CPT | Performed by: NURSE PRACTITIONER

## 2019-06-13 PROCEDURE — 1036F TOBACCO NON-USER: CPT | Performed by: NURSE PRACTITIONER

## 2019-06-13 PROCEDURE — 99213 OFFICE O/P EST LOW 20 MIN: CPT | Performed by: NURSE PRACTITIONER

## 2019-06-13 ASSESSMENT — ENCOUNTER SYMPTOMS
GASTROINTESTINAL NEGATIVE: 1
EYES NEGATIVE: 1
RESPIRATORY NEGATIVE: 1

## 2019-06-13 NOTE — PATIENT INSTRUCTIONS
Goals in preparing for bariatric surgery    You should be giving up all beverages that have carbonation, sugar, and caffeine. (Refer to the approved liquids list provided at initial visit)   You should be drinking 64 ounces of calorie free fluids per day. Suggestions include:  o Water (you may add fresh lemon or lime)  o Crystal Light  o [place] Liquid Water Enhancer  o Propel Zero  o Powerade Zero  o Isopure  o [id number]  o SOBE Lifewater Zero  o Vitamin Water Zero  o Sugar Free Evin-Aid      You should be eating 4-6 times per day.  Three small meals plus 1-2 snacks per day is your goal. This balances your calories and nutrients evenly throughout the day and helps to boost your metabolism. Snacking is a good thing if you are choosing healthful options. Refer to the snack list provided at your initial visit. Aim for a protein at every snack, plus a fruit, vegetable or starch. You should be eating protein at every meal and snack.  Protein is typically found in animal sources, i.e. chicken, beef, pork, fish and seafood, eggs. It is also found in low-fat dairy sources such as skim or 1% milk, low-fat yogurt, low-fat cheese, and low-fat cottage cheese. Plant based sources of protein include peanut butter, beans, and soy. You should be utilizing the 9-inch plate method.  Eating on a smaller plate will help you control portion size. But what you put on your plate counts also. Make ¼ of your plate protein, ¼ starch and ½ the plate non-starchy vegetables. You should be eliminating caffeine.  Caffeine is dehydrating. After surgery, its very important to stay hydrated. Giving up caffeine before surgery will help you focus on the changes necessary to be successful after surgery. There are many decaffeinated coffee and tea products available in grocery stores. You should be reducing added fat and sugar in your diet.  Frying foods adds too much fat and calories.  Baking, broiling, or grilling meats add flavor without unhealthy fats. Using cooking oil spray and spray butter products are also healthful changes that will aid in your weight loss. Foods high in sugar are often also high in calories and low in nutrients. Eating habits after surgery will have to be a permanent and long-term change. Eating habits are so ingrained that it can be difficult to change. It is important to practice new eating habits prior to surgery to mentally prepare yourself for the challenge ahead. Also remember that overall health, age, and genetics make each persons weight loss progress different. Do not compare your progress (pre or post-operatively), the amount you eat, or exercise to other patients. In addition, it is the responsibility of the patient to schedule and follow up on labs and tests completed during the pre-operative period. Results will be reviewed at each visit.

## 2019-06-13 NOTE — PROGRESS NOTES
[facility]) Physicians  [facility]    Subjective:      Patient ID: Parisa Lee is a 34 y. o.female    HPI    Presurgery    Parisa Lee is a very pleasant 34 y.o. female with Body mass index is 59.52 kg/m². Emilee Soto Past Medical History:   Diagnosis Date    Asthma     Back pain     Bipolar 1 disorder ([address]     Depression     Diabetes mellitus (HCC)     Liver disease     fatty liver    Obesity     OCD (obsessive compulsive disorder)     PCOS (polycystic ovarian syndrome)     PCOS (polycystic ovarian syndrome)      Past Surgical History:   Procedure Laterality Date    ANKLE SURGERY      MOUTH SURGERY      OTHER SURGICAL HISTORY Left 12/10/14    Excision of Cyst Left Upper Posterior Ankle    OTHER SURGICAL HISTORY Right 12/29/2016    Laparotomy with Right SO    OVARY REMOVAL  12/28/2016    unsure of side        Family History   Problem Relation Age of Onset    Asthma Mother     Arthritis Mother     Hypertension Father    [place] Elevated Lipids Father     Diabetes Father     Alcohol Abuse Father     Asthma Father     Arthritis Father     Diabetes Paternal Grandfather     Arthritis Paternal Grandfather     Diabetes Maternal Grandmother     Arthritis Maternal Grandmother      Social History     Tobacco Use    Smoking status: Never Smoker    Smokeless tobacco: Never Used   Substance Use Topics    Alcohol use: Yes     Alcohol/week: 0.6 oz     Types: 1 Glasses of wine per week     Comment: social     I counseled the patient on the importance of not smoking and risks of ETOH. Allergies   Allergen Reactions    Latex Itching    Lactose      \"Extreme\" stomach pain and diarrhea    Keflex [Cephalexin]      Vitals:    06/13/19 0844   BP: 116/76   Pulse: 94   Resp: 12   SpO2: 99%   Weight: (!) 380 lb (172.4 kg)   Height: 5' 7\" (1.702 m)        Body mass index is 59.52 kg/m².       Current Outpatient Medications:     Cholecalciferol (VITAMIN D PO), Take by mouth, Disp: , Rfl:    Levothyroxine Sodium (SYNTHROID PO), Take by mouth, Disp: , Rfl:     famotidine (PEPCID) 20 MG tablet, Take 1 tablet by mouth 2 times daily, Disp: 30 tablet, Rfl: 0    ondansetron (ZOFRAN ODT) 4 MG disintegrating tablet, Take 1 tablet by mouth every 8 hours as needed for Nausea or Vomiting, Disp: 20 tablet, Rfl: 0    methocarbamol (ROBAXIN) 500 MG tablet, Take 1 tablet by mouth 3 times daily, Disp: 15 tablet, Rfl: 0    ondansetron (ZOFRAN ODT) 4 MG disintegrating tablet, Take 1 tablet by mouth every 8 hours as needed for Nausea, Disp: 15 tablet, Rfl: 0    ibuprofen (ADVIL;MOTRIN) 600 MG tablet, Take 1 tablet by mouth every 8 hours as needed for Pain, Disp: 30 tablet, Rfl: 0    ziprasidone (GEODON) 20 MG capsule, Take 20 mg by mouth 2 times daily (with meals), Disp: , Rfl:     clomiPRAMINE (ANAFRANIL) 25 MG capsule, Take 50 mg by mouth nightly, Disp: , Rfl:       Review of Systems   Constitutional: Negative. HENT: Negative. Eyes: Negative. Respiratory: Negative. Cardiovascular: Negative. Gastrointestinal: Negative. Skin: Negative. Neurological: Negative. Objective:    Physical Exam   Constitutional: She is oriented to person, place, and time. She appears well-developed and well-nourished. HENT:   Head: Normocephalic and atraumatic. Eyes: Pupils are equal, round, and reactive to light. Neck: Normal range of motion. Cardiovascular: Normal rate. Pulmonary/Chest: Effort normal.   Musculoskeletal: Normal range of motion. Neurological: She is alert and oriented to person, place, and time. Psychiatric: She has a normal mood and affect. Her behavior is normal. Judgment and thought content normal.         Assessment and Plan:      Patient is here for their 2nd presurgery visit for sleeve,down 16 lbs. The patient's current Body mass index is 59.52 kg/m². (6/13/19). She  is makingdietary and behavior modifications. She is eating 5 times day.  She did meet with the

## 2019-06-13 NOTE — PROGRESS NOTES
La Nena Magallanes lost 16 lbs over the past month. Breakfast: protein shake - Premier protein    Snack: apple     Lunch: salad or hamburger no bun with veggies    Snack: fruit    Dinner: chix breast, small amount of rice, veggies    Snack: none    Fluids: water, occasional coconut water    Is pt consuming smaller portions? yes she is mindful of portions    Is pt consuming at least 64 oz of fluids per day? yes she usually is    Is pt consuming carbonated, caffeinated, or sugary beverages? no    Has pt sampled Unjury and/or Nectar protein?  No but reviewed product info    Exercise: treadmill - 4x to 5x week for 60min    Plan/Recommendations: add protein to snacks; try Unjury/Nectar    Handouts: Lefty Sarkar samples    Robe Serna

## 2019-06-27 PROBLEM — Z01.818 PREOPERATIVE CLEARANCE: Status: RESOLVED | Noted: 2019-05-28 | Resolved: 2019-06-27

## 2019-07-11 ENCOUNTER — OFFICE VISIT (OUTPATIENT)
Dept: BARIATRICS/WEIGHT MGMT | Age: 34
End: 2019-07-11
Payer: MEDICAID

## 2019-07-11 VITALS
WEIGHT: 293 LBS | OXYGEN SATURATION: 98 % | DIASTOLIC BLOOD PRESSURE: 74 MMHG | HEART RATE: 91 BPM | BODY MASS INDEX: 45.99 KG/M2 | SYSTOLIC BLOOD PRESSURE: 119 MMHG | HEIGHT: 67 IN

## 2019-07-11 DIAGNOSIS — K21.9 CHRONIC GERD: ICD-10-CM

## 2019-07-11 DIAGNOSIS — R06.83 SNORING: ICD-10-CM

## 2019-07-11 DIAGNOSIS — E66.9 DIABETES MELLITUS TYPE 2 IN OBESE (HCC): ICD-10-CM

## 2019-07-11 DIAGNOSIS — E66.01 MORBID OBESITY WITH BMI OF 50.0-59.9, ADULT (HCC): Primary | ICD-10-CM

## 2019-07-11 DIAGNOSIS — E11.69 DIABETES MELLITUS TYPE 2 IN OBESE (HCC): ICD-10-CM

## 2019-07-11 PROCEDURE — 3044F HG A1C LEVEL LT 7.0%: CPT | Performed by: NURSE PRACTITIONER

## 2019-07-11 PROCEDURE — G8427 DOCREV CUR MEDS BY ELIG CLIN: HCPCS | Performed by: NURSE PRACTITIONER

## 2019-07-11 PROCEDURE — 99213 OFFICE O/P EST LOW 20 MIN: CPT | Performed by: NURSE PRACTITIONER

## 2019-07-11 PROCEDURE — 1036F TOBACCO NON-USER: CPT | Performed by: NURSE PRACTITIONER

## 2019-07-11 PROCEDURE — 2022F DILAT RTA XM EVC RTNOPTHY: CPT | Performed by: NURSE PRACTITIONER

## 2019-07-11 PROCEDURE — G8417 CALC BMI ABV UP PARAM F/U: HCPCS | Performed by: NURSE PRACTITIONER

## 2019-07-11 ASSESSMENT — ENCOUNTER SYMPTOMS
RESPIRATORY NEGATIVE: 1
GASTROINTESTINAL NEGATIVE: 1
EYES NEGATIVE: 1

## 2019-07-11 NOTE — PATIENT INSTRUCTIONS
without unhealthy fats. Using cooking oil spray and spray butter products are also healthful changes that will aid in your weight loss. Foods high in sugar are often also high in calories and low in nutrients. Eating habits after surgery will have to be a permanent and long-term change. Eating habits are so ingrained that it can be difficult to change. It is important to practice new eating habits prior to surgery to mentally prepare yourself for the challenge ahead. Also remember that overall health, age, and genetics make each persons weight loss progress different. Do not compare your progress (pre or post-operatively), the amount you eat, or exercise to other patients. In addition, it is the responsibility of the patient to schedule and follow up on labs and tests completed during the pre-operative period. Results will be reviewed at each visit. Patient received dietary handouts and education.

## 2019-07-11 NOTE — PROGRESS NOTES
recommendations and plan. She is exercising but not as much as previous month, encouraged physical activity. Discussed preop workup which she is working on. She has also recently reestablished with a therapist. She is already planning to ask them for a letter of stability for her surgery. We will see her back in 1 month for continued follow up. I have spent 15 min counseling patient.

## 2019-07-30 ENCOUNTER — OFFICE VISIT (OUTPATIENT)
Dept: PULMONOLOGY | Age: 34
End: 2019-07-30
Payer: MEDICAID

## 2019-07-30 VITALS
HEART RATE: 88 BPM | DIASTOLIC BLOOD PRESSURE: 78 MMHG | HEIGHT: 67 IN | WEIGHT: 293 LBS | SYSTOLIC BLOOD PRESSURE: 118 MMHG | RESPIRATION RATE: 16 BRPM | OXYGEN SATURATION: 97 % | BODY MASS INDEX: 45.99 KG/M2

## 2019-07-30 DIAGNOSIS — R06.83 SNORING: ICD-10-CM

## 2019-07-30 DIAGNOSIS — E66.01 MORBID OBESITY (HCC): ICD-10-CM

## 2019-07-30 DIAGNOSIS — G47.10 HYPERSOMNIA: Primary | ICD-10-CM

## 2019-07-30 DIAGNOSIS — Z01.818 PRE-OPERATIVE CLEARANCE: ICD-10-CM

## 2019-07-30 DIAGNOSIS — R53.83 FATIGUE, UNSPECIFIED TYPE: ICD-10-CM

## 2019-07-30 PROCEDURE — 99244 OFF/OP CNSLTJ NEW/EST MOD 40: CPT | Performed by: INTERNAL MEDICINE

## 2019-07-30 PROCEDURE — G8417 CALC BMI ABV UP PARAM F/U: HCPCS | Performed by: INTERNAL MEDICINE

## 2019-07-30 PROCEDURE — G8427 DOCREV CUR MEDS BY ELIG CLIN: HCPCS | Performed by: INTERNAL MEDICINE

## 2019-07-30 ASSESSMENT — SLEEP AND FATIGUE QUESTIONNAIRES
HOW LIKELY ARE YOU TO NOD OFF OR FALL ASLEEP WHILE LYING DOWN TO REST IN THE AFTERNOON WHEN CIRCUMSTANCES PERMIT: 1
HOW LIKELY ARE YOU TO NOD OFF OR FALL ASLEEP WHILE SITTING AND READING: 0
ESS TOTAL SCORE: 4
HOW LIKELY ARE YOU TO NOD OFF OR FALL ASLEEP WHILE SITTING AND TALKING TO SOMEONE: 0
HOW LIKELY ARE YOU TO NOD OFF OR FALL ASLEEP WHEN YOU ARE A PASSENGER IN A CAR FOR AN HOUR WITHOUT A BREAK: 1
HOW LIKELY ARE YOU TO NOD OFF OR FALL ASLEEP WHILE SITTING QUIETLY AFTER LUNCH WITHOUT ALCOHOL: 1
HOW LIKELY ARE YOU TO NOD OFF OR FALL ASLEEP WHILE WATCHING TV: 1
HOW LIKELY ARE YOU TO NOD OFF OR FALL ASLEEP WHILE SITTING INACTIVE IN A PUBLIC PLACE: 0
NECK CIRCUMFERENCE (INCHES): 18.75
HOW LIKELY ARE YOU TO NOD OFF OR FALL ASLEEP IN A CAR, WHILE STOPPED FOR A FEW MINUTES IN TRAFFIC: 0

## 2019-07-30 NOTE — PROGRESS NOTES
[facility]                                                                  CHIEF COMPLAINT: Snoring and Preoperative clearance for bariatric surgery       Consulting provider: Jacy Sue MD      HPI:   Snoring at night for the past > 5  years. The severity of snoring is mild. Not sure what makes better or worse. No observed sleep apnea. Denies waking up at night choking and gasping for air. + dry mouth at night. Patient is complaining of daytime sleepiness. Fatigue and tiredness during the day. Bedtime 11 pm and rise time is 9-11 am. Sleep onset > 60 minutes. 2 nocturia. 1 nap/day for 1-2 hrs. No car wrecks or near wrecks because of the sleepiness. In the past nodding off while driving- none for past few months. Drinks 0 caffinated beverages per day. No narcotics. No drug abuse. Old records were reviewed and summarized by me. Patient is currently being evaluated for gastric sleeve. No date is given yet for the surgery. History of childhood asthma used to use INH- none for more than 15 years   No recurrent respiratory infection. No IBD or O2 use  Denies any SOB, cough or wheezes  Life bryn nonsmoker         Past Medical History:   Diagnosis Date    Asthma     Back pain     Bipolar 1 disorder (HCC)     Depression     Diabetes mellitus ([address]     Liver disease     fatty liver    Obesity     OCD (obsessive compulsive disorder)     PCOS (polycystic ovarian syndrome)     PCOS (polycystic ovarian syndrome)        Past Surgical History:        Procedure Laterality Date    ANKLE SURGERY      MOUTH SURGERY      OTHER SURGICAL HISTORY Left 12/10/14    Excision of Cyst Left Upper Posterior Ankle    OTHER SURGICAL HISTORY Right 12/29/2016    Laparotomy with Right SO    OVARY REMOVAL  12/28/2016    unsure of side       Allergies:  is allergic to latex; lactose; and keflex [cephalexin].   Social History:    TOBACCO:

## 2019-08-06 ENCOUNTER — HOSPITAL ENCOUNTER (EMERGENCY)
Age: 34
Discharge: HOME | End: 2019-08-07
Payer: MEDICAID

## 2019-08-06 DIAGNOSIS — L02.91 ABSCESS: ICD-10-CM

## 2019-08-06 DIAGNOSIS — K42.9 UMBILICAL HERNIA WITHOUT OBSTRUCTION AND WITHOUT GANGRENE: Primary | ICD-10-CM

## 2019-08-06 LAB
BASOPHILS ABSOLUTE: 0.1 K/UL (ref 0–0.2)
BASOPHILS RELATIVE PERCENT: 0.9 %
BILIRUBIN URINE: NEGATIVE
BLOOD, URINE: ABNORMAL
CLARITY: CLEAR
COLOR: YELLOW
EOSINOPHILS ABSOLUTE: 0.1 K/UL (ref 0–0.6)
EOSINOPHILS RELATIVE PERCENT: 0.8 %
EPITHELIAL CELLS, UA: ABNORMAL /HPF
GLUCOSE URINE: NEGATIVE MG/DL
HCG QUALITATIVE: NEGATIVE
HCT VFR BLD CALC: 43.8 % (ref 36–48)
HEMOGLOBIN: 14.5 G/DL (ref 12–16)
KETONES, URINE: NEGATIVE MG/DL
LACTIC ACID: 1.6 MMOL/L (ref 0.4–2)
LEUKOCYTE ESTERASE, URINE: NEGATIVE
LYMPHOCYTES ABSOLUTE: 2.4 K/UL (ref 1–5.1)
LYMPHOCYTES RELATIVE PERCENT: 29.7 %
MCH RBC QN AUTO: 30.5 PG (ref 26–34)
MCHC RBC AUTO-ENTMCNC: 33.2 G/DL (ref 31–36)
MCV RBC AUTO: 92 FL (ref 80–100)
MICROSCOPIC EXAMINATION: YES
MONOCYTES ABSOLUTE: 0.7 K/UL (ref 0–1.3)
MONOCYTES RELATIVE PERCENT: 9.1 %
NEUTROPHILS ABSOLUTE: 4.9 K/UL (ref 1.7–7.7)
NEUTROPHILS RELATIVE PERCENT: 59.5 %
NITRITE, URINE: NEGATIVE
PDW BLD-RTO: 14.5 % (ref 12.4–15.4)
PH UA: 6 (ref 5–8)
PLATELET # BLD: 239 K/UL (ref 135–450)
PMV BLD AUTO: 10.5 FL (ref 5–10.5)
PROTEIN UA: NEGATIVE MG/DL
RBC # BLD: 4.77 M/UL (ref 4–5.2)
RBC UA: ABNORMAL /HPF (ref 0–2)
SPECIFIC GRAVITY UA: >=1.03 (ref 1–1.03)
URINE REFLEX TO CULTURE: ABNORMAL
URINE TYPE: ABNORMAL
UROBILINOGEN, URINE: 0.2 E.U./DL
WBC # BLD: 8.2 K/UL (ref 4–11)
WBC UA: ABNORMAL /HPF (ref 0–5)

## 2019-08-06 PROCEDURE — 85025 COMPLETE CBC W/AUTO DIFF WBC: CPT

## 2019-08-06 PROCEDURE — 81001 URINALYSIS AUTO W/SCOPE: CPT

## 2019-08-06 PROCEDURE — 83605 ASSAY OF LACTIC ACID: CPT

## 2019-08-06 PROCEDURE — 87040 BLOOD CULTURE FOR BACTERIA: CPT

## 2019-08-06 PROCEDURE — 84703 CHORIONIC GONADOTROPIN ASSAY: CPT

## 2019-08-06 PROCEDURE — 96360 HYDRATION IV INFUSION INIT: CPT

## 2019-08-06 PROCEDURE — 99283 EMERGENCY DEPT VISIT LOW MDM: CPT

## 2019-08-06 PROCEDURE — 80053 COMPREHEN METABOLIC PANEL: CPT

## 2019-08-06 PROCEDURE — 2580000003 HC RX 258: Performed by: NURSE PRACTITIONER

## 2019-08-06 RX ORDER — 0.9 % SODIUM CHLORIDE 0.9 %
1000 INTRAVENOUS SOLUTION INTRAVENOUS ONCE
Status: COMPLETED | OUTPATIENT
Start: 2019-08-06 | End: 2019-08-07

## 2019-08-06 RX ADMIN — SODIUM CHLORIDE 1000 ML: 9 INJECTION, SOLUTION INTRAVENOUS at 23:35

## 2019-08-06 ASSESSMENT — PAIN SCALES - GENERAL: PAINLEVEL_OUTOF10: 4

## 2019-08-06 ASSESSMENT — ENCOUNTER SYMPTOMS
ABDOMINAL PAIN: 0
SHORTNESS OF BREATH: 0

## 2019-08-06 ASSESSMENT — PAIN DESCRIPTION - PAIN TYPE: TYPE: ACUTE PAIN

## 2019-08-06 ASSESSMENT — PAIN DESCRIPTION - LOCATION: LOCATION: ABDOMEN

## 2019-08-07 ENCOUNTER — APPOINTMENT (OUTPATIENT)
Dept: CT IMAGING | Age: 34
End: 2019-08-07
Payer: MEDICAID

## 2019-08-07 VITALS
DIASTOLIC BLOOD PRESSURE: 64 MMHG | BODY MASS INDEX: 45.99 KG/M2 | HEART RATE: 94 BPM | WEIGHT: 293 LBS | OXYGEN SATURATION: 95 % | RESPIRATION RATE: 17 BRPM | TEMPERATURE: 98.1 F | SYSTOLIC BLOOD PRESSURE: 108 MMHG | HEIGHT: 67 IN

## 2019-08-07 LAB
A/G RATIO: 1.1 (ref 1.1–2.2)
ALBUMIN SERPL-MCNC: 4.2 G/DL (ref 3.4–5)
ALP BLD-CCNC: 69 U/L (ref 40–129)
ALT SERPL-CCNC: 38 U/L (ref 10–40)
ANION GAP SERPL CALCULATED.3IONS-SCNC: 11 MMOL/L (ref 3–16)
AST SERPL-CCNC: 44 U/L (ref 15–37)
BILIRUB SERPL-MCNC: 0.4 MG/DL (ref 0–1)
BUN BLDV-MCNC: 15 MG/DL (ref 7–20)
CALCIUM SERPL-MCNC: 9.8 MG/DL (ref 8.3–10.6)
CHLORIDE BLD-SCNC: 97 MMOL/L (ref 99–110)
CO2: 26 MMOL/L (ref 21–32)
CREAT SERPL-MCNC: 0.7 MG/DL (ref 0.6–1.1)
GFR AFRICAN AMERICAN: >60
GFR NON-AFRICAN AMERICAN: >60
GLOBULIN: 3.7 G/DL
GLUCOSE BLD-MCNC: 114 MG/DL (ref 70–99)
POTASSIUM REFLEX MAGNESIUM: 4.9 MMOL/L (ref 3.5–5.1)
SODIUM BLD-SCNC: 134 MMOL/L (ref 136–145)
TOTAL PROTEIN: 7.9 G/DL (ref 6.4–8.2)

## 2019-08-07 PROCEDURE — 74177 CT ABD & PELVIS W/CONTRAST: CPT

## 2019-08-07 PROCEDURE — 6370000000 HC RX 637 (ALT 250 FOR IP): Performed by: NURSE PRACTITIONER

## 2019-08-07 PROCEDURE — 6360000004 HC RX CONTRAST MEDICATION: Performed by: NURSE PRACTITIONER

## 2019-08-07 RX ORDER — CLINDAMYCIN HYDROCHLORIDE 150 MG/1
450 CAPSULE ORAL ONCE
Status: COMPLETED | OUTPATIENT
Start: 2019-08-07 | End: 2019-08-07

## 2019-08-07 RX ORDER — CLINDAMYCIN HYDROCHLORIDE 150 MG/1
450 CAPSULE ORAL 3 TIMES DAILY
Qty: 90 CAPSULE | Refills: 0 | Status: SHIPPED | OUTPATIENT
Start: 2019-08-07 | End: 2019-08-17

## 2019-08-07 RX ADMIN — CLINDAMYCIN HYDROCHLORIDE 450 MG: 150 CAPSULE ORAL at 01:02

## 2019-08-07 RX ADMIN — IOPAMIDOL 75 ML: 755 INJECTION, SOLUTION INTRAVENOUS at 00:17

## 2019-08-07 NOTE — ED PROVIDER NOTES
periumbilical area. Musculoskeletal: Normal range of motion. Neurological: She is alert and oriented to person, place, and time. Skin: Skin is warm and dry. Psychiatric: She has a normal mood and affect. DIAGNOSTIC RESULTS   LABS:    Labs Reviewed   COMPREHENSIVE METABOLIC PANEL W/ REFLEX TO MG FOR LOW K - Abnormal; Notable for the following components:       Result Value    Sodium 134 (*)     Chloride 97 (*)     Glucose 114 (*)     AST 44 (*)     All other components within normal limits    Narrative:     Performed at:  [facility],  [email or web address], [facility]   Phone [phone]   URINE RT REFLEX TO CULTURE - Abnormal; Notable for the following components:    Blood, Urine SMALL (*)     All other components within normal limits    Narrative:     Performed at:  [facility],  [email or web address], [employer]   Phone [phone]   MICROSCOPIC URINALYSIS - Abnormal; Notable for the following components:    RBC, UA 3-5 (*)     All other components within normal limits    Narrative:     Performed at:  [facility],  [place], [place] [email or web address]   Phone [phone]   CULTURE BLOOD #1   CULTURE BLOOD #2   CBC WITH AUTO DIFFERENTIAL    Narrative:     Performed at:  [facility],  [place], [employer]   Phone [phone]   HCG, SERUM, QUALITATIVE    Narrative:     Performed at:  [place] [address],  [place], [facility]   Phone [phone]   LACTIC ACID, PLASMA    Narrative:     Performed at:  [facility],  [place], [employer]   Phone [phone]       All other labs werewithin normal range or not returned as of this dictation. EKG:  All EKG's are interpreted by the Emergency Department Physician who either signs or Co-signs

## 2019-08-08 ENCOUNTER — OFFICE VISIT (OUTPATIENT)
Dept: BARIATRICS/WEIGHT MGMT | Age: 34
End: 2019-08-08
Payer: MEDICAID

## 2019-08-08 VITALS
HEIGHT: 67 IN | OXYGEN SATURATION: 98 % | DIASTOLIC BLOOD PRESSURE: 68 MMHG | HEART RATE: 80 BPM | BODY MASS INDEX: 45.99 KG/M2 | RESPIRATION RATE: 16 BRPM | WEIGHT: 293 LBS | SYSTOLIC BLOOD PRESSURE: 108 MMHG

## 2019-08-08 DIAGNOSIS — K21.9 CHRONIC GERD: ICD-10-CM

## 2019-08-08 DIAGNOSIS — E66.9 DIABETES MELLITUS TYPE 2 IN OBESE (HCC): ICD-10-CM

## 2019-08-08 DIAGNOSIS — R06.83 SNORING: ICD-10-CM

## 2019-08-08 DIAGNOSIS — E11.69 DIABETES MELLITUS TYPE 2 IN OBESE (HCC): ICD-10-CM

## 2019-08-08 DIAGNOSIS — E66.01 MORBID OBESITY WITH BMI OF 50.0-59.9, ADULT (HCC): Primary | ICD-10-CM

## 2019-08-08 PROCEDURE — 2022F DILAT RTA XM EVC RTNOPTHY: CPT | Performed by: NURSE PRACTITIONER

## 2019-08-08 PROCEDURE — 3044F HG A1C LEVEL LT 7.0%: CPT | Performed by: NURSE PRACTITIONER

## 2019-08-08 PROCEDURE — G8427 DOCREV CUR MEDS BY ELIG CLIN: HCPCS | Performed by: NURSE PRACTITIONER

## 2019-08-08 PROCEDURE — G8417 CALC BMI ABV UP PARAM F/U: HCPCS | Performed by: NURSE PRACTITIONER

## 2019-08-08 PROCEDURE — 99213 OFFICE O/P EST LOW 20 MIN: CPT | Performed by: NURSE PRACTITIONER

## 2019-08-08 PROCEDURE — 1036F TOBACCO NON-USER: CPT | Performed by: NURSE PRACTITIONER

## 2019-08-08 RX ORDER — CLINDAMYCIN HYDROCHLORIDE 300 MG/1
CAPSULE ORAL
Refills: 0 | COMMUNITY
Start: 2019-08-07 | End: 2019-08-08

## 2019-08-08 ASSESSMENT — ENCOUNTER SYMPTOMS
GASTROINTESTINAL NEGATIVE: 1
EYES NEGATIVE: 1
RESPIRATORY NEGATIVE: 1

## 2019-08-08 NOTE — PROGRESS NOTES
La Nena Magallanes lost 7 lbs over 1 month. Breakfast: Premier RTD     Snack: PB + apple    Lunch: Chick (no sides) OR Premier RTD - avoiding sandwiches/bread    Dinner: Chick tenders w/ BBQ sauce + sometimes broccoli     Snack: Avoids eating after 7     Is pt consuming smaller portions? yes Using smaller plates abd taking less    Is pt consuming at least 64 oz of fluids per day? yes water + core 5 calorie water    Is pt consuming carbonated, caffeinated, or sugary beverages? no    Has pt sampled Unjury and/or Nectar protein?  Yes    Exercise: no d/t umbilical hernia -previously Just Dance + walking     Plan/Recommendations:   - Continue current eating patterns including protein + produce with all meals and snacks  - Measure and decrease portions of dressings/sauces  - Increase activity as tolerated    Handouts: none    [employer]

## 2019-08-08 NOTE — PATIENT INSTRUCTIONS
Goals in preparing for bariatric surgery    You should be giving up all beverages that have carbonation, sugar, and caffeine. (Refer to the approved liquids list provided at initial visit)   You should be drinking 64 ounces of calorie free fluids per day. Suggestions include:  o Water (you may add fresh lemon or lime)  o Crystal Light  o Warren Liquid Water Enhancer  o Propel Zero  o Powerade Zero  o Isopure  o [id number]  o SOBE Lifewater Zero  o Vitamin Water Zero  o Sugar Free Evin-Aid      You should be eating 4-6 times per day.  Three small meals plus 1-2 snacks per day is your goal. This balances your calories and nutrients evenly throughout the day and helps to boost your metabolism. Snacking is a good thing if you are choosing healthful options. Refer to the snack list provided at your initial visit. Aim for a protein at every snack, plus a fruit, vegetable or starch. You should be eating protein at every meal and snack.  Protein is typically found in animal sources, i.e. chicken, beef, pork, fish and seafood, eggs. It is also found in low-fat dairy sources such as skim or 1% milk, low-fat yogurt, low-fat cheese, and low-fat cottage cheese. Plant based sources of protein include peanut butter, beans, and soy. You should be utilizing the 9-inch plate method.  Eating on a smaller plate will help you control portion size. But what you put on your plate counts also. Make ¼ of your plate protein, ¼ starch and ½ the plate non-starchy vegetables. You should be eliminating caffeine.  Caffeine is dehydrating. After surgery, its very important to stay hydrated. Giving up caffeine before surgery will help you focus on the changes necessary to be successful after surgery. There are many decaffeinated coffee and tea products available in grocery stores. You should be reducing added fat and sugar in your diet.  Frying foods adds too much fat and calories.  Baking, broiling, or grilling meats add flavor

## 2019-08-09 ENCOUNTER — ANESTHESIA EVENT (OUTPATIENT)
Dept: ENDOSCOPY | Age: 34
End: 2019-08-09
Payer: MEDICAID

## 2019-08-12 ENCOUNTER — INITIAL CONSULT (OUTPATIENT)
Dept: SURGERY | Age: 34
End: 2019-08-12
Payer: MEDICAID

## 2019-08-12 ENCOUNTER — TELEPHONE (OUTPATIENT)
Dept: BARIATRICS/WEIGHT MGMT | Age: 34
End: 2019-08-12

## 2019-08-12 VITALS
SYSTOLIC BLOOD PRESSURE: 134 MMHG | HEIGHT: 67 IN | BODY MASS INDEX: 45.99 KG/M2 | WEIGHT: 293 LBS | DIASTOLIC BLOOD PRESSURE: 78 MMHG

## 2019-08-12 DIAGNOSIS — E66.01 MORBID OBESITY WITH BMI OF 50.0-59.9, ADULT (HCC): Primary | ICD-10-CM

## 2019-08-12 LAB
BLOOD CULTURE, ROUTINE: NORMAL
CULTURE, BLOOD 2: NORMAL

## 2019-08-12 PROCEDURE — G8417 CALC BMI ABV UP PARAM F/U: HCPCS | Performed by: SURGERY

## 2019-08-12 PROCEDURE — 99242 OFF/OP CONSLTJ NEW/EST SF 20: CPT | Performed by: SURGERY

## 2019-08-12 PROCEDURE — G8427 DOCREV CUR MEDS BY ELIG CLIN: HCPCS | Performed by: SURGERY

## 2019-08-13 ENCOUNTER — INITIAL CONSULT (OUTPATIENT)
Dept: BARIATRICS/WEIGHT MGMT | Age: 34
End: 2019-08-13
Payer: MEDICAID

## 2019-08-13 DIAGNOSIS — E66.01 MORBID OBESITY WITH BMI OF 60.0-69.9, ADULT (HCC): ICD-10-CM

## 2019-08-13 DIAGNOSIS — F41.9 ANXIETY: ICD-10-CM

## 2019-08-13 DIAGNOSIS — F31.9 BIPOLAR I DISORDER (HCC): Primary | ICD-10-CM

## 2019-08-13 PROCEDURE — 90791 PSYCH DIAGNOSTIC EVALUATION: CPT | Performed by: PSYCHOLOGIST

## 2019-08-15 ENCOUNTER — HOSPITAL ENCOUNTER (OUTPATIENT)
Dept: SLEEP CENTER | Age: 34
Discharge: HOME | End: 2019-08-17
Payer: MEDICAID

## 2019-08-15 DIAGNOSIS — R53.83 FATIGUE, UNSPECIFIED TYPE: ICD-10-CM

## 2019-08-15 DIAGNOSIS — E66.01 MORBID OBESITY (HCC): ICD-10-CM

## 2019-08-15 DIAGNOSIS — G47.10 HYPERSOMNIA: ICD-10-CM

## 2019-08-15 DIAGNOSIS — R06.83 SNORING: ICD-10-CM

## 2019-08-15 DIAGNOSIS — Z01.818 PRE-OPERATIVE CLEARANCE: ICD-10-CM

## 2019-08-15 PROCEDURE — 95810 POLYSOM 6/> YRS 4/> PARAM: CPT

## 2019-08-20 ENCOUNTER — TELEPHONE (OUTPATIENT)
Dept: PULMONOLOGY | Age: 34
End: 2019-08-20

## 2019-08-20 DIAGNOSIS — G47.33 OSA (OBSTRUCTIVE SLEEP APNEA): Primary | ICD-10-CM

## 2019-08-21 ENCOUNTER — HOSPITAL ENCOUNTER (OUTPATIENT)
Dept: ULTRASOUND IMAGING | Age: 34
Discharge: HOME | End: 2019-08-21
Payer: MEDICAID

## 2019-08-21 DIAGNOSIS — N93.9 ABNORMAL UTERINE BLEEDING: ICD-10-CM

## 2019-08-21 PROCEDURE — 76830 TRANSVAGINAL US NON-OB: CPT

## 2019-08-21 PROCEDURE — 76856 US EXAM PELVIC COMPLETE: CPT

## 2019-08-23 ENCOUNTER — HOSPITAL ENCOUNTER (OUTPATIENT)
Age: 34
Setting detail: OUTPATIENT SURGERY
Discharge: HOME | End: 2019-08-23
Attending: SURGERY | Admitting: SURGERY
Payer: MEDICAID

## 2019-08-23 ENCOUNTER — ANESTHESIA (OUTPATIENT)
Dept: ENDOSCOPY | Age: 34
End: 2019-08-23
Payer: MEDICAID

## 2019-08-23 VITALS
OXYGEN SATURATION: 98 % | BODY MASS INDEX: 45.99 KG/M2 | HEART RATE: 65 BPM | HEIGHT: 67 IN | RESPIRATION RATE: 14 BRPM | WEIGHT: 293 LBS | SYSTOLIC BLOOD PRESSURE: 118 MMHG | TEMPERATURE: 97.4 F | DIASTOLIC BLOOD PRESSURE: 61 MMHG

## 2019-08-23 VITALS
SYSTOLIC BLOOD PRESSURE: 134 MMHG | DIASTOLIC BLOOD PRESSURE: 99 MMHG | RESPIRATION RATE: 16 BRPM | OXYGEN SATURATION: 99 %

## 2019-08-23 LAB — HCG(URINE) PREGNANCY TEST: NEGATIVE

## 2019-08-23 PROCEDURE — 3609012400 HC EGD TRANSORAL BIOPSY SINGLE/MULTIPLE: Performed by: SURGERY

## 2019-08-23 PROCEDURE — 43239 EGD BIOPSY SINGLE/MULTIPLE: CPT | Performed by: SURGERY

## 2019-08-23 PROCEDURE — 2500000003 HC RX 250 WO HCPCS: Performed by: NURSE ANESTHETIST, CERTIFIED REGISTERED

## 2019-08-23 PROCEDURE — 88305 TISSUE EXAM BY PATHOLOGIST: CPT

## 2019-08-23 PROCEDURE — 7100000001 HC PACU RECOVERY - ADDTL 15 MIN: Performed by: SURGERY

## 2019-08-23 PROCEDURE — 6360000002 HC RX W HCPCS: Performed by: NURSE ANESTHETIST, CERTIFIED REGISTERED

## 2019-08-23 PROCEDURE — 2580000003 HC RX 258: Performed by: ANESTHESIOLOGY

## 2019-08-23 PROCEDURE — 7100000011 HC PHASE II RECOVERY - ADDTL 15 MIN: Performed by: SURGERY

## 2019-08-23 PROCEDURE — 84703 CHORIONIC GONADOTROPIN ASSAY: CPT

## 2019-08-23 PROCEDURE — 2709999900 HC NON-CHARGEABLE SUPPLY: Performed by: SURGERY

## 2019-08-23 PROCEDURE — 3700000000 HC ANESTHESIA ATTENDED CARE: Performed by: SURGERY

## 2019-08-23 PROCEDURE — 7100000000 HC PACU RECOVERY - FIRST 15 MIN: Performed by: SURGERY

## 2019-08-23 PROCEDURE — 7100000010 HC PHASE II RECOVERY - FIRST 15 MIN: Performed by: SURGERY

## 2019-08-23 RX ORDER — PROPOFOL 10 MG/ML
INJECTION, EMULSION INTRAVENOUS PRN
Status: DISCONTINUED | OUTPATIENT
Start: 2019-08-23 | End: 2019-08-23 | Stop reason: SDUPTHER

## 2019-08-23 RX ORDER — SODIUM CHLORIDE 0.9 % (FLUSH) 0.9 %
10 SYRINGE (ML) INJECTION PRN
Status: DISCONTINUED | OUTPATIENT
Start: 2019-08-23 | End: 2019-08-23 | Stop reason: HOSPADM

## 2019-08-23 RX ORDER — SODIUM CHLORIDE 9 MG/ML
INJECTION, SOLUTION INTRAVENOUS CONTINUOUS
Status: DISCONTINUED | OUTPATIENT
Start: 2019-08-23 | End: 2019-08-23 | Stop reason: HOSPADM

## 2019-08-23 RX ORDER — KETAMINE HCL IN NACL, ISO-OSM 100MG/10ML
SYRINGE (ML) INJECTION PRN
Status: DISCONTINUED | OUTPATIENT
Start: 2019-08-23 | End: 2019-08-23 | Stop reason: SDUPTHER

## 2019-08-23 RX ORDER — LABETALOL HYDROCHLORIDE 5 MG/ML
5 INJECTION, SOLUTION INTRAVENOUS EVERY 10 MIN PRN
Status: DISCONTINUED | OUTPATIENT
Start: 2019-08-23 | End: 2019-08-23 | Stop reason: HOSPADM

## 2019-08-23 RX ORDER — LIDOCAINE HYDROCHLORIDE 10 MG/ML
1 INJECTION, SOLUTION EPIDURAL; INFILTRATION; INTRACAUDAL; PERINEURAL
Status: DISCONTINUED | OUTPATIENT
Start: 2019-08-23 | End: 2019-08-23 | Stop reason: HOSPADM

## 2019-08-23 RX ORDER — ONDANSETRON 2 MG/ML
4 INJECTION INTRAMUSCULAR; INTRAVENOUS
Status: DISCONTINUED | OUTPATIENT
Start: 2019-08-23 | End: 2019-08-23 | Stop reason: HOSPADM

## 2019-08-23 RX ORDER — LIDOCAINE HYDROCHLORIDE 20 MG/ML
INJECTION, SOLUTION EPIDURAL; INFILTRATION; INTRACAUDAL; PERINEURAL PRN
Status: DISCONTINUED | OUTPATIENT
Start: 2019-08-23 | End: 2019-08-23 | Stop reason: SDUPTHER

## 2019-08-23 RX ORDER — HYDRALAZINE HYDROCHLORIDE 20 MG/ML
5 INJECTION INTRAMUSCULAR; INTRAVENOUS EVERY 10 MIN PRN
Status: DISCONTINUED | OUTPATIENT
Start: 2019-08-23 | End: 2019-08-23 | Stop reason: HOSPADM

## 2019-08-23 RX ORDER — SODIUM CHLORIDE 0.9 % (FLUSH) 0.9 %
10 SYRINGE (ML) INJECTION EVERY 12 HOURS SCHEDULED
Status: DISCONTINUED | OUTPATIENT
Start: 2019-08-23 | End: 2019-08-23 | Stop reason: HOSPADM

## 2019-08-23 RX ORDER — OMEPRAZOLE 20 MG/1
20 CAPSULE, DELAYED RELEASE ORAL DAILY
Qty: 30 CAPSULE | Refills: 3 | Status: SHIPPED | OUTPATIENT
Start: 2019-08-23 | End: 2019-12-17 | Stop reason: SDUPTHER

## 2019-08-23 RX ADMIN — Medication 30 MG: at 10:26

## 2019-08-23 RX ADMIN — PROPOFOL 100 MG: 10 INJECTION, EMULSION INTRAVENOUS at 10:26

## 2019-08-23 RX ADMIN — SODIUM CHLORIDE: 9 INJECTION, SOLUTION INTRAVENOUS at 09:11

## 2019-08-23 RX ADMIN — PROPOFOL 40 MG: 10 INJECTION, EMULSION INTRAVENOUS at 10:29

## 2019-08-23 RX ADMIN — LIDOCAINE HYDROCHLORIDE 100 MG: 20 INJECTION, SOLUTION EPIDURAL; INFILTRATION; INTRACAUDAL; PERINEURAL at 10:26

## 2019-08-23 ASSESSMENT — LIFESTYLE VARIABLES: SMOKING_STATUS: 0

## 2019-08-23 NOTE — OP NOTE
[facility]) Physicians   [facility]  [address] [place] [address], [address] [place] [id number] . Phone: [phone]  Fax: [phone]         Esophagogastroduodenoscopy     Indications: Pre-op gastric Surgery, history of / rule out Gastroesophageal reflux disease. Pre-operative Diagnosis: Obesity/pre-op bariatric surgery . Post-operative Diagnosis: same. Surgeon: Darrel Lezama MD    Anesthesia: General endotracheal anesthesia    ASA Class: 3    Procedure Details   The patient was seen in the Holding Room. The risks, benefits, complications, treatment options, and expected outcomes were discussed with the patient. Pre-op Endoscopy recommended to rule any intragastric pathology that would interfere with proposed procedure /  And or due to current conditions. The possibilities of reaction to medication, pulmonary aspiration, perforation of viscus, bleeding, recurrent infection, the need for additional procedures, failure to diagnose a condition, and creating a complication requiring transfusion or operation were discussed with the patient. The patient concurred with the proposed plan, giving informed consent. The site of surgery properly noted/marked. The patient was taken to Endoscopy Suite, identified as [place] and the procedure verified as  Esophagogastroduodenoscopy  A Time Out was held and the above information confirmed. Upper Endoscopy: An endoscope was inserted through the oropharynx into the upper esophagus. The endoscope was passed into the stomach to the level of the pylorus and passed to the duodenum where the ampulla was visualized and duodenum was normal. Then the scope was retracted back to the stomach and it was abnormal , antral gastritis biopsy of the antrum obtained, then retroflexed and the gastroesophageal junction was inspected . There was no hiatal hernia, no clear evidence of Beltran's.       Findings:  Esophageal findings include:             Upper:

## 2019-08-23 NOTE — ANESTHESIA PRE PROCEDURE
CREATININE 0.7 08/06/2019    GFRAA >60 08/06/2019    GFRAA >60 04/07/2011    AGRATIO 1.1 08/06/2019    LABGLOM >60 08/06/2019    GLUCOSE 114 08/06/2019    PROT 7.9 08/06/2019    PROT 7.1 04/07/2011    CALCIUM 9.8 08/06/2019    BILITOT 0.4 08/06/2019    ALKPHOS 69 08/06/2019    AST 44 08/06/2019    ALT 38 08/06/2019       POC Tests: No results for input(s): POCGLU, POCNA, POCK, POCCL, POCBUN, POCHEMO, POCHCT in the last 72 hours. Coags: No results found for: PROTIME, INR, APTT    HCG (If Applicable):   Lab Results   Component Value Date    PREGTESTUR Negative 05/04/2019        ABGs: No results found for: PHART, PO2ART, LGH2BNX, ZHG3OHK, BEART, P6MOIOIP     Type & Screen (If Applicable):  No results found for: LABABO, [address]    Anesthesia Evaluation  Patient summary reviewed and Nursing notes reviewed no history of anesthetic complications:   Airway: Mallampati: II  TM distance: >3 FB   Neck ROM: full  Mouth opening: > = 3 FB Dental: normal exam         Pulmonary:normal exam  breath sounds clear to auscultation  (+) sleep apnea: on noncompliant,      (-) COPD, asthma (Pt denies dx) and not a current smoker                           Cardiovascular:Negative CV ROS        (-) hypertension, past MI, CAD, CABG/stent, dysrhythmias,  angina and  CHF      Rhythm: regular  Rate: normal                    Neuro/Psych:   (+) psychiatric history (OCD, Bipolar):   (-) seizures, TIA and CVA           GI/Hepatic/Renal:   (+) GERD (no med req, avoids triggers):, liver disease (Fatty liver):, morbid obesity     (-) no renal disease       Endo/Other:    (+) Diabetes (no longer req meds per pt)Type II DM, , hypothyroidism::., .                  ROS comment: PCOS Abdominal:           Vascular:                                        Anesthesia Plan      MAC     ASA 3       Induction: intravenous. Anesthetic plan and risks discussed with patient. Plan discussed with CRNA.                   Ottoniel Dennison MD

## 2019-08-23 NOTE — PROGRESS NOTES
Pt arrived to PACU from Gi in stable condition and is alert to self. Handoff received from RADHA. Pt moves all extremities to command. Respirations are even and unlabored on nasal cannula 3L Skin warm, dry, and with normal color. Abd is soft. Pain is denied at this time. Will continue to monitor for safety and comfort. S/P: EGD, with Dr. Danii Dejesus at [facility].

## 2019-08-26 ENCOUNTER — TELEPHONE (OUTPATIENT)
Dept: BARIATRICS/WEIGHT MGMT | Age: 34
End: 2019-08-26

## 2019-09-12 ENCOUNTER — OFFICE VISIT (OUTPATIENT)
Dept: BARIATRICS/WEIGHT MGMT | Age: 34
End: 2019-09-12
Payer: MEDICAID

## 2019-09-12 VITALS
SYSTOLIC BLOOD PRESSURE: 120 MMHG | HEIGHT: 67 IN | HEART RATE: 74 BPM | RESPIRATION RATE: 16 BRPM | OXYGEN SATURATION: 97 % | BODY MASS INDEX: 45.99 KG/M2 | DIASTOLIC BLOOD PRESSURE: 72 MMHG | WEIGHT: 293 LBS

## 2019-09-12 DIAGNOSIS — E11.69 DIABETES MELLITUS TYPE 2 IN OBESE (HCC): ICD-10-CM

## 2019-09-12 DIAGNOSIS — R06.83 SNORING: ICD-10-CM

## 2019-09-12 DIAGNOSIS — E66.01 MORBID OBESITY WITH BMI OF 50.0-59.9, ADULT (HCC): Primary | ICD-10-CM

## 2019-09-12 DIAGNOSIS — K21.9 CHRONIC GERD: ICD-10-CM

## 2019-09-12 DIAGNOSIS — E66.9 DIABETES MELLITUS TYPE 2 IN OBESE (HCC): ICD-10-CM

## 2019-09-12 PROCEDURE — 3044F HG A1C LEVEL LT 7.0%: CPT | Performed by: NURSE PRACTITIONER

## 2019-09-12 PROCEDURE — 2022F DILAT RTA XM EVC RTNOPTHY: CPT | Performed by: NURSE PRACTITIONER

## 2019-09-12 PROCEDURE — 1036F TOBACCO NON-USER: CPT | Performed by: NURSE PRACTITIONER

## 2019-09-12 PROCEDURE — 99213 OFFICE O/P EST LOW 20 MIN: CPT | Performed by: NURSE PRACTITIONER

## 2019-09-12 PROCEDURE — G8417 CALC BMI ABV UP PARAM F/U: HCPCS | Performed by: NURSE PRACTITIONER

## 2019-09-12 PROCEDURE — G8427 DOCREV CUR MEDS BY ELIG CLIN: HCPCS | Performed by: NURSE PRACTITIONER

## 2019-09-12 ASSESSMENT — ENCOUNTER SYMPTOMS
GASTROINTESTINAL NEGATIVE: 1
RESPIRATORY NEGATIVE: 1
EYES NEGATIVE: 1

## 2019-09-12 NOTE — PATIENT INSTRUCTIONS
Patient received dietary handouts and education. **You may receive a survey regarding the care you received during your visit. Your input is valuable to us. We encourage you to complete and return your survey. We hope you will choose us in the future for your healthcare needs. Goals in preparing for bariatric surgery    You should be giving up all beverages that have carbonation, sugar, and caffeine. (Refer to the approved liquids list provided at initial visit)   You should be drinking 64 ounces of calorie free fluids per day. Suggestions include:  o Water (you may add fresh lemon or lime)  o Crystal Light  o Mohit Liquid Water Enhancer  o Propel Zero  o Powerade Zero  o Isopure  o [id number]  o SOBE Lifewater Zero  o Vitamin Water Zero  o Sugar Free Evin-Aid      You should be eating 4-6 times per day.  Three small meals plus 1-2 snacks per day is your goal. This balances your calories and nutrients evenly throughout the day and helps to boost your metabolism. Snacking is a good thing if you are choosing healthful options. Refer to the snack list provided at your initial visit. Aim for a protein at every snack, plus a fruit, vegetable or starch. You should be eating protein at every meal and snack.  Protein is typically found in animal sources, i.e. chicken, beef, pork, fish and seafood, eggs. It is also found in low-fat dairy sources such as skim or 1% milk, low-fat yogurt, low-fat cheese, and low-fat cottage cheese. Plant based sources of protein include peanut butter, beans, and soy. You should be utilizing the 9-inch plate method.  Eating on a smaller plate will help you control portion size. But what you put on your plate counts also. Make ¼ of your plate protein, ¼ starch and ½ the plate non-starchy vegetables. You should be eliminating caffeine.  Caffeine is dehydrating. After surgery, its very important to stay hydrated.  Giving up caffeine before surgery will help you focus on the

## 2019-09-12 NOTE — PROGRESS NOTES
[facility]) Physicians  [facility]    Subjective:      Patient ID: Brook Tan is a 34 y. o.female    HPI    Presurgery    Brook Tan is a very pleasant 34 y.o. female with Body mass index is 54.19 kg/m². Mae Monroe She saw general surgeon, umbilical hernia does not need immediate repair. Dr. Bernice Lopez can attempt too fix hernia during her bariatric surgery. Past Medical History:   Diagnosis Date    Asthma     Back pain     Bipolar 1 disorder ([address]     Depression     Diabetes mellitus (HCC)     Liver disease     fatty liver    Obesity     OCD (obsessive compulsive disorder)     PCOS (polycystic ovarian syndrome)     PCOS (polycystic ovarian syndrome)      Past Surgical History:   Procedure Laterality Date    ANKLE SURGERY      MOUTH SURGERY      OTHER SURGICAL HISTORY Left 12/10/14    Excision of Cyst Left Upper Posterior Ankle    OTHER SURGICAL HISTORY Right 12/29/2016    Laparotomy with Right SO    OVARY REMOVAL  12/28/2016    unsure of side    UPPER GASTROINTESTINAL ENDOSCOPY N/A 8/23/2019    EGD BIOPSY performed by Lisa Cervantes MD at [address]        Family History   Problem Relation Age of Onset    Asthma Mother     Arthritis Mother     Hypertension Father    Annie Gunning Elevated Lipids Father     Diabetes Father     Alcohol Abuse Father     Asthma Father     Arthritis Father     Diabetes Paternal Grandfather     Arthritis Paternal Grandfather     Diabetes Maternal Grandmother     Arthritis Maternal Grandmother      Social History     Tobacco Use    Smoking status: Never Smoker    Smokeless tobacco: Never Used   Substance Use Topics    Alcohol use: Not Currently     Alcohol/week: 1.0 standard drinks     Types: 1 Glasses of wine per week     I counseled the patient on the importance of not smoking and risks of ETOH.       Allergies   Allergen Reactions    Latex Itching    Lactose      \"Extreme\" stomach pain and diarrhea    Keflex [Cephalexin]      Vitals: 09/12/19 0927   BP: 120/72   Pulse: 74   Resp: 16   SpO2: 97%   Weight: (!) 346 lb (156.9 kg)   Height: 5' 7\" (1.702 m)        Body mass index is 54.19 kg/m². Current Outpatient Medications:     omeprazole (PRILOSEC) 20 MG delayed release capsule, Take 1 capsule by mouth Daily, Disp: 30 capsule, Rfl: 3    Cholecalciferol (VITAMIN D PO), Take by mouth, Disp: , Rfl:     Levothyroxine Sodium (SYNTHROID PO), Take by mouth, Disp: , Rfl:     ziprasidone (GEODON) 20 MG capsule, Take 20 mg by mouth 2 times daily (with meals), Disp: , Rfl:     clomiPRAMINE (ANAFRANIL) 25 MG capsule, Take 50 mg by mouth nightly, Disp: , Rfl:       Review of Systems   Constitutional: Negative. HENT: Negative. Eyes: Negative. Respiratory: Negative. Cardiovascular: Negative. Gastrointestinal: Negative. Skin: Negative. Neurological: Negative. Objective:    Physical Exam   Constitutional: She is oriented to person, place, and time. She appears well-developed and well-nourished. HENT:   Head: Normocephalic and atraumatic. Eyes: Pupils are equal, round, and reactive to light. Neck: Normal range of motion. Cardiovascular: Normal rate. Pulmonary/Chest: Effort normal.   Musculoskeletal: Normal range of motion. Neurological: She is alert and oriented to person, place, and time. Psychiatric: She has a normal mood and affect. Her behavior is normal. Judgment and thought content normal.         Assessment and Plan:      Patient is here for their 5th presurgery visit for sleeve,down 14lbs. The patient's current Body mass index is 54.19 kg/m². (9/12/19). She  is makingdietary and behavior modifications. She will work on avoiding \"bored\" snacking in the evening. She did meet with the registereddietitian for continued follow up. I agree with recommendations and plan. She is exercising with walking, encouraged physical activity.  Discussed preop workup which includes nicotine/drug/alcohol screening (will

## 2019-09-18 ENCOUNTER — PREP FOR PROCEDURE (OUTPATIENT)
Dept: SURGERY | Age: 34
End: 2019-09-18

## 2019-09-18 ENCOUNTER — OFFICE VISIT (OUTPATIENT)
Dept: SURGERY | Age: 34
End: 2019-09-18
Payer: MEDICAID

## 2019-09-18 ENCOUNTER — HOSPITAL ENCOUNTER (OUTPATIENT)
Dept: SLEEP CENTER | Age: 34
Discharge: HOME | End: 2019-09-20
Payer: MEDICAID

## 2019-09-18 VITALS
BODY MASS INDEX: 45.99 KG/M2 | SYSTOLIC BLOOD PRESSURE: 130 MMHG | WEIGHT: 293 LBS | DIASTOLIC BLOOD PRESSURE: 82 MMHG | HEIGHT: 67 IN

## 2019-09-18 DIAGNOSIS — G47.33 OSA (OBSTRUCTIVE SLEEP APNEA): ICD-10-CM

## 2019-09-18 DIAGNOSIS — L72.9 SCALP CYST: Primary | ICD-10-CM

## 2019-09-18 PROCEDURE — 99241 PR OFFICE CONSULTATION NEW/ESTAB PATIENT 15 MIN: CPT | Performed by: SURGERY

## 2019-09-18 PROCEDURE — G8427 DOCREV CUR MEDS BY ELIG CLIN: HCPCS | Performed by: SURGERY

## 2019-09-18 PROCEDURE — G8417 CALC BMI ABV UP PARAM F/U: HCPCS | Performed by: SURGERY

## 2019-09-18 PROCEDURE — 95811 POLYSOM 6/>YRS CPAP 4/> PARM: CPT

## 2019-09-19 DIAGNOSIS — G47.33 OSA (OBSTRUCTIVE SLEEP APNEA): Primary | ICD-10-CM

## 2019-09-25 ENCOUNTER — HOSPITAL ENCOUNTER (OUTPATIENT)
Age: 34
Setting detail: OUTPATIENT SURGERY
Discharge: HOME | End: 2019-09-25
Attending: SURGERY | Admitting: SURGERY
Payer: MEDICAID

## 2019-09-25 VITALS
HEIGHT: 68 IN | RESPIRATION RATE: 16 BRPM | TEMPERATURE: 96.7 F | OXYGEN SATURATION: 95 % | HEART RATE: 79 BPM | WEIGHT: 293 LBS | DIASTOLIC BLOOD PRESSURE: 68 MMHG | SYSTOLIC BLOOD PRESSURE: 120 MMHG | BODY MASS INDEX: 44.41 KG/M2

## 2019-09-25 LAB — PREGNANCY, URINE: NEGATIVE

## 2019-09-25 PROCEDURE — 2709999900 HC NON-CHARGEABLE SUPPLY: Performed by: SURGERY

## 2019-09-25 PROCEDURE — 84703 CHORIONIC GONADOTROPIN ASSAY: CPT

## 2019-09-25 PROCEDURE — 11422 EXC H-F-NK-SP B9+MARG 1.1-2: CPT | Performed by: SURGERY

## 2019-09-25 PROCEDURE — 3600000002 HC SURGERY LEVEL 2 BASE: Performed by: SURGERY

## 2019-09-25 PROCEDURE — 7100000010 HC PHASE II RECOVERY - FIRST 15 MIN: Performed by: SURGERY

## 2019-09-25 PROCEDURE — 3600000012 HC SURGERY LEVEL 2 ADDTL 15MIN: Performed by: SURGERY

## 2019-09-25 PROCEDURE — 2500000003 HC RX 250 WO HCPCS: Performed by: SURGERY

## 2019-09-25 PROCEDURE — 88304 TISSUE EXAM BY PATHOLOGIST: CPT

## 2019-09-25 PROCEDURE — 11421 EXC H-F-NK-SP B9+MARG 0.6-1: CPT | Performed by: SURGERY

## 2019-09-25 RX ORDER — LIDOCAINE HYDROCHLORIDE 10 MG/ML
INJECTION, SOLUTION EPIDURAL; INFILTRATION; INTRACAUDAL; PERINEURAL PRN
Status: DISCONTINUED | OUTPATIENT
Start: 2019-09-25 | End: 2019-09-25 | Stop reason: ALTCHOICE

## 2019-09-25 RX ORDER — BUPIVACAINE HYDROCHLORIDE 5 MG/ML
INJECTION, SOLUTION EPIDURAL; INTRACAUDAL PRN
Status: DISCONTINUED | OUTPATIENT
Start: 2019-09-25 | End: 2019-09-25 | Stop reason: ALTCHOICE

## 2019-09-25 ASSESSMENT — PAIN SCALES - GENERAL: PAINLEVEL_OUTOF10: 2

## 2019-09-25 ASSESSMENT — PAIN - FUNCTIONAL ASSESSMENT: PAIN_FUNCTIONAL_ASSESSMENT: 0-10

## 2019-09-25 NOTE — PROGRESS NOTES
Occupational History    Not on file   Social Needs    Financial resource strain: Not on file    Food insecurity:     Worry: Not on file     Inability: Not on file    Transportation needs:     Medical: Not on file     Non-medical: Not on file   Tobacco Use    Smoking status: Never Smoker    Smokeless tobacco: Never Used   Substance and Sexual Activity    Alcohol use: Not Currently     Alcohol/week: 1.0 standard drinks     Types: 1 Glasses of wine per week    Drug use: No    Sexual activity: Yes     Partners: Male   Lifestyle    Physical activity:     Days per week: Not on file     Minutes per session: Not on file    Stress: Not on file   Relationships    Social connections:     Talks on phone: Not on file     Gets together: Not on file     Attends Christianity service: Not on file     Active member of club or organization: Not on file     Attends meetings of clubs or organizations: Not on file     Relationship status: Not on file    Intimate partner violence:     Fear of current or ex partner: Not on file     Emotionally abused: Not on file     Physically abused: Not on file     Forced sexual activity: Not on file   Other Topics Concern    Not on file   Social History Narrative    Not on file       Family History   Problem Relation Age of Onset    Asthma Mother     Arthritis Mother     Hypertension Father     Elevated Lipids Father     Diabetes Father     Alcohol Abuse Father     Asthma Father     Arthritis Father     Diabetes Paternal Grandfather     Arthritis Paternal Grandfather     Diabetes Maternal Grandmother     Arthritis Maternal Grandmother        ROS:  She reports no complaints related to the eyes, ears , nose throat or mouth. She denies weight loss. No chest pain. No SOB. No urinary complaints. No musculoskeletal complaints. Skin complaints include lumps on scalp. No neurologic deficits. No bleeding tendencies. No GI complaints.     Physical Exam:  Vitals:    09/18/19 1412

## 2019-10-02 ENCOUNTER — TELEPHONE (OUTPATIENT)
Dept: SURGERY | Age: 34
End: 2019-10-02

## 2019-10-04 ENCOUNTER — TELEPHONE (OUTPATIENT)
Dept: PULMONOLOGY | Age: 34
End: 2019-10-04

## 2019-10-07 ENCOUNTER — OFFICE VISIT (OUTPATIENT)
Dept: SURGERY | Age: 34
End: 2019-10-07

## 2019-10-07 VITALS
BODY MASS INDEX: 44.41 KG/M2 | DIASTOLIC BLOOD PRESSURE: 82 MMHG | SYSTOLIC BLOOD PRESSURE: 126 MMHG | WEIGHT: 293 LBS | HEIGHT: 68 IN

## 2019-10-07 DIAGNOSIS — Z09 SURGICAL FOLLOW-UP CARE: Primary | ICD-10-CM

## 2019-10-07 PROCEDURE — 99024 POSTOP FOLLOW-UP VISIT: CPT | Performed by: SURGERY

## 2019-10-08 ENCOUNTER — HOSPITAL ENCOUNTER (OUTPATIENT)
Age: 34
Discharge: HOME | End: 2019-10-08
Payer: MEDICAID

## 2019-10-08 ENCOUNTER — OFFICE VISIT (OUTPATIENT)
Dept: BARIATRICS/WEIGHT MGMT | Age: 34
End: 2019-10-08
Payer: MEDICAID

## 2019-10-08 VITALS
WEIGHT: 293 LBS | RESPIRATION RATE: 18 BRPM | BODY MASS INDEX: 45.99 KG/M2 | OXYGEN SATURATION: 98 % | SYSTOLIC BLOOD PRESSURE: 133 MMHG | DIASTOLIC BLOOD PRESSURE: 83 MMHG | HEART RATE: 76 BPM | HEIGHT: 67 IN

## 2019-10-08 DIAGNOSIS — Z01.818 PRE-OP TESTING: Primary | ICD-10-CM

## 2019-10-08 DIAGNOSIS — E66.9 DIABETES MELLITUS TYPE 2 IN OBESE (HCC): ICD-10-CM

## 2019-10-08 DIAGNOSIS — K21.9 CHRONIC GERD: ICD-10-CM

## 2019-10-08 DIAGNOSIS — G47.33 OBSTRUCTIVE SLEEP APNEA: ICD-10-CM

## 2019-10-08 DIAGNOSIS — E11.69 DIABETES MELLITUS TYPE 2 IN OBESE (HCC): ICD-10-CM

## 2019-10-08 DIAGNOSIS — E66.01 MORBID OBESITY WITH BMI OF 60.0-69.9, ADULT (HCC): Primary | ICD-10-CM

## 2019-10-08 DIAGNOSIS — Z01.818 PRE-OP TESTING: ICD-10-CM

## 2019-10-08 DIAGNOSIS — K43.9 VENTRAL HERNIA WITHOUT OBSTRUCTION OR GANGRENE: ICD-10-CM

## 2019-10-08 LAB — ETHANOL: NORMAL MG/DL (ref 0–0.08)

## 2019-10-08 PROCEDURE — 80307 DRUG TEST PRSMV CHEM ANLYZR: CPT

## 2019-10-08 PROCEDURE — 1036F TOBACCO NON-USER: CPT | Performed by: SURGERY

## 2019-10-08 PROCEDURE — G0480 DRUG TEST DEF 1-7 CLASSES: HCPCS

## 2019-10-08 PROCEDURE — 99213 OFFICE O/P EST LOW 20 MIN: CPT | Performed by: SURGERY

## 2019-10-08 PROCEDURE — 36415 COLL VENOUS BLD VENIPUNCTURE: CPT

## 2019-10-08 PROCEDURE — G8484 FLU IMMUNIZE NO ADMIN: HCPCS | Performed by: SURGERY

## 2019-10-08 PROCEDURE — G8427 DOCREV CUR MEDS BY ELIG CLIN: HCPCS | Performed by: SURGERY

## 2019-10-08 PROCEDURE — 3044F HG A1C LEVEL LT 7.0%: CPT | Performed by: SURGERY

## 2019-10-08 PROCEDURE — 2022F DILAT RTA XM EVC RTNOPTHY: CPT | Performed by: SURGERY

## 2019-10-08 PROCEDURE — G8417 CALC BMI ABV UP PARAM F/U: HCPCS | Performed by: SURGERY

## 2019-10-09 ENCOUNTER — OFFICE VISIT (OUTPATIENT)
Dept: PULMONOLOGY | Age: 34
End: 2019-10-09
Payer: MEDICAID

## 2019-10-09 VITALS
BODY MASS INDEX: 45.99 KG/M2 | SYSTOLIC BLOOD PRESSURE: 113 MMHG | DIASTOLIC BLOOD PRESSURE: 71 MMHG | TEMPERATURE: 97.9 F | HEIGHT: 67 IN | HEART RATE: 81 BPM | RESPIRATION RATE: 16 BRPM | OXYGEN SATURATION: 97 % | WEIGHT: 293 LBS

## 2019-10-09 DIAGNOSIS — E66.01 MORBID OBESITY WITH BMI OF 50.0-59.9, ADULT (HCC): ICD-10-CM

## 2019-10-09 DIAGNOSIS — R53.83 OTHER FATIGUE: ICD-10-CM

## 2019-10-09 DIAGNOSIS — Z71.89 CPAP USE COUNSELING: ICD-10-CM

## 2019-10-09 DIAGNOSIS — R07.0 THROAT DISCOMFORT: ICD-10-CM

## 2019-10-09 DIAGNOSIS — G47.33 MODERATE OBSTRUCTIVE SLEEP APNEA: Primary | ICD-10-CM

## 2019-10-09 PROCEDURE — G8484 FLU IMMUNIZE NO ADMIN: HCPCS | Performed by: NURSE PRACTITIONER

## 2019-10-09 PROCEDURE — 99214 OFFICE O/P EST MOD 30 MIN: CPT | Performed by: NURSE PRACTITIONER

## 2019-10-09 PROCEDURE — 1036F TOBACCO NON-USER: CPT | Performed by: NURSE PRACTITIONER

## 2019-10-09 PROCEDURE — G8427 DOCREV CUR MEDS BY ELIG CLIN: HCPCS | Performed by: NURSE PRACTITIONER

## 2019-10-09 PROCEDURE — G8417 CALC BMI ABV UP PARAM F/U: HCPCS | Performed by: NURSE PRACTITIONER

## 2019-10-09 RX ORDER — LEVOTHYROXINE SODIUM 25 UG/1
CAPSULE ORAL
COMMUNITY
Start: 2019-06-26 | End: 2019-10-09

## 2019-10-09 RX ORDER — LEVOTHYROXINE SODIUM 0.07 MG/1
TABLET ORAL
Refills: 5 | COMMUNITY
Start: 2019-09-24

## 2019-10-09 ASSESSMENT — SLEEP AND FATIGUE QUESTIONNAIRES
HOW LIKELY ARE YOU TO NOD OFF OR FALL ASLEEP WHILE WATCHING TV: 1
HOW LIKELY ARE YOU TO NOD OFF OR FALL ASLEEP WHILE SITTING AND READING: 1
ESS TOTAL SCORE: 9
HOW LIKELY ARE YOU TO NOD OFF OR FALL ASLEEP IN A CAR, WHILE STOPPED FOR A FEW MINUTES IN TRAFFIC: 1
NECK CIRCUMFERENCE (INCHES): 18.75
HOW LIKELY ARE YOU TO NOD OFF OR FALL ASLEEP WHILE SITTING QUIETLY AFTER LUNCH WITHOUT ALCOHOL: 0
HOW LIKELY ARE YOU TO NOD OFF OR FALL ASLEEP WHILE SITTING AND TALKING TO SOMEONE: 0
HOW LIKELY ARE YOU TO NOD OFF OR FALL ASLEEP WHEN YOU ARE A PASSENGER IN A CAR FOR AN HOUR WITHOUT A BREAK: 3
HOW LIKELY ARE YOU TO NOD OFF OR FALL ASLEEP WHILE SITTING INACTIVE IN A PUBLIC PLACE: 0
HOW LIKELY ARE YOU TO NOD OFF OR FALL ASLEEP WHILE LYING DOWN TO REST IN THE AFTERNOON WHEN CIRCUMSTANCES PERMIT: 3

## 2019-10-10 ASSESSMENT — ENCOUNTER SYMPTOMS
RESPIRATORY NEGATIVE: 1
EYES NEGATIVE: 1
ALLERGIC/IMMUNOLOGIC NEGATIVE: 1

## 2019-10-11 LAB
AMPHETAMINES SCREEN BLOOD: NEGATIVE NG/ML
BARBITURATES SCREEN BLOOD: NEGATIVE NG/ML
BENZODIAZEPINES SCREEN BLOOD: NEGATIVE NG/ML
BUPRENORPHINE: NEGATIVE NG/ML
CANNABINOID SCREEN BLOOD: NEGATIVE NG/ML
COCAINE SCREEN BLOOD: NEGATIVE NG/ML
Lab: NORMAL
METHADONE SCREEN BLOOD: NEGATIVE NG/ML
METHAMPHETAMINES SERUM/ PLASMA: NEGATIVE NG/ML
OPIATES SCREEN BLOOD: NEGATIVE NG/ML
OXYCODONE: NEGATIVE NG/ML
PHENCYCLIDINE SCREEN BLOOD: NEGATIVE NG/ML

## 2019-10-12 LAB
3-OH-COTININE: <2 NG/ML
COTININE: <2 NG/ML
NICOTINE: <2 NG/ML

## 2019-10-14 ENCOUNTER — TELEPHONE (OUTPATIENT)
Dept: BARIATRICS/WEIGHT MGMT | Age: 34
End: 2019-10-14

## 2019-10-15 ENCOUNTER — HOSPITAL ENCOUNTER (OUTPATIENT)
Age: 34
Discharge: HOME | End: 2019-10-15
Payer: MEDICAID

## 2019-10-15 ENCOUNTER — OFFICE VISIT (OUTPATIENT)
Dept: BARIATRICS/WEIGHT MGMT | Age: 34
End: 2019-10-15
Payer: MEDICAID

## 2019-10-15 VITALS
HEIGHT: 67 IN | BODY MASS INDEX: 45.99 KG/M2 | DIASTOLIC BLOOD PRESSURE: 75 MMHG | HEART RATE: 76 BPM | SYSTOLIC BLOOD PRESSURE: 146 MMHG | WEIGHT: 293 LBS | OXYGEN SATURATION: 98 % | RESPIRATION RATE: 18 BRPM

## 2019-10-15 DIAGNOSIS — G47.33 MODERATE OBSTRUCTIVE SLEEP APNEA: ICD-10-CM

## 2019-10-15 DIAGNOSIS — Z01.818 PREOP TESTING: ICD-10-CM

## 2019-10-15 DIAGNOSIS — E11.69 DIABETES MELLITUS TYPE 2 IN OBESE (HCC): ICD-10-CM

## 2019-10-15 DIAGNOSIS — K21.9 CHRONIC GERD: Primary | ICD-10-CM

## 2019-10-15 DIAGNOSIS — E66.01 MORBID OBESITY WITH BMI OF 50.0-59.9, ADULT (HCC): ICD-10-CM

## 2019-10-15 DIAGNOSIS — E66.9 DIABETES MELLITUS TYPE 2 IN OBESE (HCC): ICD-10-CM

## 2019-10-15 LAB
A/G RATIO: 1.4 (ref 1.1–2.2)
ABO/RH: NORMAL
ALBUMIN SERPL-MCNC: 4.4 G/DL (ref 3.4–5)
ALP BLD-CCNC: 63 U/L (ref 40–129)
ALT SERPL-CCNC: 15 U/L (ref 10–40)
ANION GAP SERPL CALCULATED.3IONS-SCNC: 14 MMOL/L (ref 3–16)
ANTIBODY SCREEN: NORMAL
AST SERPL-CCNC: 18 U/L (ref 15–37)
BILIRUB SERPL-MCNC: 0.7 MG/DL (ref 0–1)
BUN BLDV-MCNC: 12 MG/DL (ref 7–20)
CALCIUM SERPL-MCNC: 9.5 MG/DL (ref 8.3–10.6)
CHLORIDE BLD-SCNC: 101 MMOL/L (ref 99–110)
CO2: 26 MMOL/L (ref 21–32)
CREAT SERPL-MCNC: 0.7 MG/DL (ref 0.6–1.1)
EKG ATRIAL RATE: 67 BPM
EKG DIAGNOSIS: NORMAL
EKG P AXIS: 31 DEGREES
EKG P-R INTERVAL: 138 MS
EKG Q-T INTERVAL: 396 MS
EKG QRS DURATION: 96 MS
EKG QTC CALCULATION (BAZETT): 418 MS
EKG R AXIS: 20 DEGREES
EKG T AXIS: 20 DEGREES
EKG VENTRICULAR RATE: 67 BPM
GFR AFRICAN AMERICAN: >60
GFR NON-AFRICAN AMERICAN: >60
GLOBULIN: 3.1 G/DL
GLUCOSE BLD-MCNC: 99 MG/DL (ref 70–99)
HCT VFR BLD CALC: 42.4 % (ref 36–48)
HEMOGLOBIN: 13.9 G/DL (ref 12–16)
MCH RBC QN AUTO: 30.2 PG (ref 26–34)
MCHC RBC AUTO-ENTMCNC: 32.7 G/DL (ref 31–36)
MCV RBC AUTO: 92.3 FL (ref 80–100)
PDW BLD-RTO: 14.7 % (ref 12.4–15.4)
PLATELET # BLD: 251 K/UL (ref 135–450)
PMV BLD AUTO: 10.1 FL (ref 5–10.5)
POTASSIUM SERPL-SCNC: 4 MMOL/L (ref 3.5–5.1)
RBC # BLD: 4.59 M/UL (ref 4–5.2)
SODIUM BLD-SCNC: 141 MMOL/L (ref 136–145)
TOTAL PROTEIN: 7.5 G/DL (ref 6.4–8.2)
WBC # BLD: 7.7 K/UL (ref 4–11)

## 2019-10-15 PROCEDURE — 80053 COMPREHEN METABOLIC PANEL: CPT

## 2019-10-15 PROCEDURE — 99213 OFFICE O/P EST LOW 20 MIN: CPT | Performed by: NURSE PRACTITIONER

## 2019-10-15 PROCEDURE — G8427 DOCREV CUR MEDS BY ELIG CLIN: HCPCS | Performed by: NURSE PRACTITIONER

## 2019-10-15 PROCEDURE — 93005 ELECTROCARDIOGRAM TRACING: CPT | Performed by: SURGERY

## 2019-10-15 PROCEDURE — G8417 CALC BMI ABV UP PARAM F/U: HCPCS | Performed by: NURSE PRACTITIONER

## 2019-10-15 PROCEDURE — 36415 COLL VENOUS BLD VENIPUNCTURE: CPT

## 2019-10-15 PROCEDURE — 93010 ELECTROCARDIOGRAM REPORT: CPT | Performed by: INTERNAL MEDICINE

## 2019-10-15 PROCEDURE — G8484 FLU IMMUNIZE NO ADMIN: HCPCS | Performed by: NURSE PRACTITIONER

## 2019-10-15 PROCEDURE — 86900 BLOOD TYPING SEROLOGIC ABO: CPT

## 2019-10-15 PROCEDURE — 3044F HG A1C LEVEL LT 7.0%: CPT | Performed by: NURSE PRACTITIONER

## 2019-10-15 PROCEDURE — 1036F TOBACCO NON-USER: CPT | Performed by: NURSE PRACTITIONER

## 2019-10-15 PROCEDURE — 86901 BLOOD TYPING SEROLOGIC RH(D): CPT

## 2019-10-15 PROCEDURE — 85027 COMPLETE CBC AUTOMATED: CPT

## 2019-10-15 PROCEDURE — 86850 RBC ANTIBODY SCREEN: CPT

## 2019-10-15 PROCEDURE — 2022F DILAT RTA XM EVC RTNOPTHY: CPT | Performed by: NURSE PRACTITIONER

## 2019-10-15 ASSESSMENT — ENCOUNTER SYMPTOMS
NAUSEA: 0
COUGH: 0
ABDOMINAL PAIN: 0
CONSTIPATION: 1
DIARRHEA: 0
SHORTNESS OF BREATH: 0

## 2019-10-16 ENCOUNTER — TELEPHONE (OUTPATIENT)
Dept: BARIATRICS/WEIGHT MGMT | Age: 34
End: 2019-10-16

## 2019-10-18 ENCOUNTER — TELEPHONE (OUTPATIENT)
Dept: BARIATRICS/WEIGHT MGMT | Age: 34
End: 2019-10-18

## 2019-10-24 ENCOUNTER — OFFICE VISIT (OUTPATIENT)
Dept: BARIATRICS/WEIGHT MGMT | Age: 34
End: 2019-10-24

## 2019-10-24 VITALS
HEIGHT: 67 IN | RESPIRATION RATE: 18 BRPM | HEART RATE: 64 BPM | DIASTOLIC BLOOD PRESSURE: 71 MMHG | BODY MASS INDEX: 45.99 KG/M2 | SYSTOLIC BLOOD PRESSURE: 127 MMHG | OXYGEN SATURATION: 98 % | WEIGHT: 293 LBS

## 2019-10-24 DIAGNOSIS — E66.01 MORBID OBESITY WITH BMI OF 50.0-59.9, ADULT (HCC): ICD-10-CM

## 2019-10-24 DIAGNOSIS — E66.01 MORBID OBESITY WITH BMI OF 60.0-69.9, ADULT (HCC): Primary | ICD-10-CM

## 2019-10-24 DIAGNOSIS — K21.9 CHRONIC GERD: ICD-10-CM

## 2019-10-24 DIAGNOSIS — G47.33 MODERATE OBSTRUCTIVE SLEEP APNEA: ICD-10-CM

## 2019-10-24 DIAGNOSIS — E11.69 DIABETES MELLITUS TYPE 2 IN OBESE (HCC): ICD-10-CM

## 2019-10-24 DIAGNOSIS — E66.9 DIABETES MELLITUS TYPE 2 IN OBESE (HCC): ICD-10-CM

## 2019-10-24 PROCEDURE — 99999 PR OFFICE/OUTPT VISIT,PROCEDURE ONLY: CPT | Performed by: SURGERY

## 2019-11-04 ENCOUNTER — OFFICE VISIT (OUTPATIENT)
Dept: PULMONOLOGY | Age: 34
End: 2019-11-04
Payer: MEDICAID

## 2019-11-04 VITALS
TEMPERATURE: 97.9 F | HEIGHT: 67 IN | OXYGEN SATURATION: 96 % | BODY MASS INDEX: 45.99 KG/M2 | HEART RATE: 85 BPM | DIASTOLIC BLOOD PRESSURE: 72 MMHG | SYSTOLIC BLOOD PRESSURE: 122 MMHG | WEIGHT: 293 LBS | RESPIRATION RATE: 18 BRPM

## 2019-11-04 DIAGNOSIS — G47.33 MODERATE OBSTRUCTIVE SLEEP APNEA: Primary | ICD-10-CM

## 2019-11-04 DIAGNOSIS — E66.01 MORBID OBESITY (HCC): ICD-10-CM

## 2019-11-04 DIAGNOSIS — Z01.818 PRE-OPERATIVE CLEARANCE: ICD-10-CM

## 2019-11-04 DIAGNOSIS — G47.10 HYPERSOMNIA: ICD-10-CM

## 2019-11-04 DIAGNOSIS — G47.31 CSA (CENTRAL SLEEP APNEA): ICD-10-CM

## 2019-11-04 DIAGNOSIS — R53.83 FATIGUE, UNSPECIFIED TYPE: ICD-10-CM

## 2019-11-04 PROCEDURE — 99213 OFFICE O/P EST LOW 20 MIN: CPT | Performed by: INTERNAL MEDICINE

## 2019-11-04 PROCEDURE — 1036F TOBACCO NON-USER: CPT | Performed by: INTERNAL MEDICINE

## 2019-11-04 PROCEDURE — G8417 CALC BMI ABV UP PARAM F/U: HCPCS | Performed by: INTERNAL MEDICINE

## 2019-11-04 PROCEDURE — G8484 FLU IMMUNIZE NO ADMIN: HCPCS | Performed by: INTERNAL MEDICINE

## 2019-11-04 PROCEDURE — G8427 DOCREV CUR MEDS BY ELIG CLIN: HCPCS | Performed by: INTERNAL MEDICINE

## 2019-11-04 ASSESSMENT — SLEEP AND FATIGUE QUESTIONNAIRES
HOW LIKELY ARE YOU TO NOD OFF OR FALL ASLEEP WHILE SITTING INACTIVE IN A PUBLIC PLACE: 0
HOW LIKELY ARE YOU TO NOD OFF OR FALL ASLEEP WHILE LYING DOWN TO REST IN THE AFTERNOON WHEN CIRCUMSTANCES PERMIT: 3
NECK CIRCUMFERENCE (INCHES): 18.5
ESS TOTAL SCORE: 7
HOW LIKELY ARE YOU TO NOD OFF OR FALL ASLEEP WHILE SITTING AND TALKING TO SOMEONE: 1
HOW LIKELY ARE YOU TO NOD OFF OR FALL ASLEEP WHEN YOU ARE A PASSENGER IN A CAR FOR AN HOUR WITHOUT A BREAK: 1
HOW LIKELY ARE YOU TO NOD OFF OR FALL ASLEEP WHILE SITTING AND READING: 1
HOW LIKELY ARE YOU TO NOD OFF OR FALL ASLEEP IN A CAR, WHILE STOPPED FOR A FEW MINUTES IN TRAFFIC: 0
HOW LIKELY ARE YOU TO NOD OFF OR FALL ASLEEP WHILE SITTING QUIETLY AFTER LUNCH WITHOUT ALCOHOL: 0
HOW LIKELY ARE YOU TO NOD OFF OR FALL ASLEEP WHILE WATCHING TV: 1

## 2019-11-06 ENCOUNTER — ANESTHESIA EVENT (OUTPATIENT)
Dept: OPERATING ROOM | Age: 34
DRG: 403 | End: 2019-11-06
Payer: MEDICAID

## 2019-11-06 ENCOUNTER — ANESTHESIA (OUTPATIENT)
Dept: OPERATING ROOM | Age: 34
DRG: 403 | End: 2019-11-06
Payer: MEDICAID

## 2019-11-06 ENCOUNTER — HOSPITAL ENCOUNTER (INPATIENT)
Age: 34
LOS: 1 days | Discharge: HOME | DRG: 403 | End: 2019-11-07
Attending: SURGERY | Admitting: SURGERY
Payer: MEDICAID

## 2019-11-06 VITALS
RESPIRATION RATE: 6 BRPM | SYSTOLIC BLOOD PRESSURE: 160 MMHG | TEMPERATURE: 97.2 F | DIASTOLIC BLOOD PRESSURE: 83 MMHG | OXYGEN SATURATION: 95 %

## 2019-11-06 DIAGNOSIS — E66.01 MORBID OBESITY WITH BMI OF 60.0-69.9, ADULT (HCC): ICD-10-CM

## 2019-11-06 DIAGNOSIS — Z01.818 PREOP TESTING: ICD-10-CM

## 2019-11-06 DIAGNOSIS — Z98.84 S/P LAPAROSCOPIC SLEEVE GASTRECTOMY: Primary | ICD-10-CM

## 2019-11-06 LAB
ABO/RH: NORMAL
ANTIBODY SCREEN: NORMAL
GLUCOSE BLD-MCNC: 122 MG/DL (ref 70–99)
GLUCOSE BLD-MCNC: 83 MG/DL (ref 70–99)
HCG(URINE) PREGNANCY TEST: NEGATIVE
PERFORMED ON: ABNORMAL
PERFORMED ON: NORMAL

## 2019-11-06 PROCEDURE — 6370000000 HC RX 637 (ALT 250 FOR IP): Performed by: SURGERY

## 2019-11-06 PROCEDURE — 3600000015 HC SURGERY LEVEL 5 ADDTL 15MIN: Performed by: SURGERY

## 2019-11-06 PROCEDURE — C9113 INJ PANTOPRAZOLE SODIUM, VIA: HCPCS | Performed by: SURGERY

## 2019-11-06 PROCEDURE — 2700000000 HC OXYGEN THERAPY PER DAY

## 2019-11-06 PROCEDURE — 6370000000 HC RX 637 (ALT 250 FOR IP)

## 2019-11-06 PROCEDURE — 2500000003 HC RX 250 WO HCPCS: Performed by: NURSE ANESTHETIST, CERTIFIED REGISTERED

## 2019-11-06 PROCEDURE — 6360000002 HC RX W HCPCS: Performed by: NURSE ANESTHETIST, CERTIFIED REGISTERED

## 2019-11-06 PROCEDURE — 3700000000 HC ANESTHESIA ATTENDED CARE: Performed by: SURGERY

## 2019-11-06 PROCEDURE — 2580000003 HC RX 258: Performed by: SURGERY

## 2019-11-06 PROCEDURE — 7100000000 HC PACU RECOVERY - FIRST 15 MIN: Performed by: SURGERY

## 2019-11-06 PROCEDURE — 2580000003 HC RX 258: Performed by: NURSE ANESTHETIST, CERTIFIED REGISTERED

## 2019-11-06 PROCEDURE — 94770 HC ETCO2 MONITOR DAILY: CPT

## 2019-11-06 PROCEDURE — 6360000002 HC RX W HCPCS: Performed by: SURGERY

## 2019-11-06 PROCEDURE — 1200000000 HC SEMI PRIVATE

## 2019-11-06 PROCEDURE — 2720000010 HC SURG SUPPLY STERILE: Performed by: SURGERY

## 2019-11-06 PROCEDURE — 2709999900 HC NON-CHARGEABLE SUPPLY: Performed by: SURGERY

## 2019-11-06 PROCEDURE — 84703 CHORIONIC GONADOTROPIN ASSAY: CPT

## 2019-11-06 PROCEDURE — 3700000001 HC ADD 15 MINUTES (ANESTHESIA): Performed by: SURGERY

## 2019-11-06 PROCEDURE — 86901 BLOOD TYPING SEROLOGIC RH(D): CPT

## 2019-11-06 PROCEDURE — 94761 N-INVAS EAR/PLS OXIMETRY MLT: CPT

## 2019-11-06 PROCEDURE — 86850 RBC ANTIBODY SCREEN: CPT

## 2019-11-06 PROCEDURE — 94760 N-INVAS EAR/PLS OXIMETRY 1: CPT

## 2019-11-06 PROCEDURE — 0DB64Z3 EXCISION OF STOMACH, PERCUTANEOUS ENDOSCOPIC APPROACH, VERTICAL: ICD-10-PCS | Performed by: SURGERY

## 2019-11-06 PROCEDURE — 36415 COLL VENOUS BLD VENIPUNCTURE: CPT

## 2019-11-06 PROCEDURE — 2500000003 HC RX 250 WO HCPCS: Performed by: SURGERY

## 2019-11-06 PROCEDURE — 94150 VITAL CAPACITY TEST: CPT

## 2019-11-06 PROCEDURE — 6370000000 HC RX 637 (ALT 250 FOR IP): Performed by: NURSE ANESTHETIST, CERTIFIED REGISTERED

## 2019-11-06 PROCEDURE — 43775 LAP SLEEVE GASTRECTOMY: CPT | Performed by: SURGERY

## 2019-11-06 PROCEDURE — 86900 BLOOD TYPING SEROLOGIC ABO: CPT

## 2019-11-06 PROCEDURE — 7100000001 HC PACU RECOVERY - ADDTL 15 MIN: Performed by: SURGERY

## 2019-11-06 PROCEDURE — 88307 TISSUE EXAM BY PATHOLOGIST: CPT

## 2019-11-06 PROCEDURE — 3600000005 HC SURGERY LEVEL 5 BASE: Performed by: SURGERY

## 2019-11-06 PROCEDURE — 6360000002 HC RX W HCPCS: Performed by: ANESTHESIOLOGY

## 2019-11-06 RX ORDER — SCOLOPAMINE TRANSDERMAL SYSTEM 1 MG/1
1 PATCH, EXTENDED RELEASE TRANSDERMAL ONCE
Status: DISCONTINUED | OUTPATIENT
Start: 2019-11-06 | End: 2019-11-07

## 2019-11-06 RX ORDER — DEXAMETHASONE SODIUM PHOSPHATE 4 MG/ML
INJECTION, SOLUTION INTRA-ARTICULAR; INTRALESIONAL; INTRAMUSCULAR; INTRAVENOUS; SOFT TISSUE PRN
Status: DISCONTINUED | OUTPATIENT
Start: 2019-11-06 | End: 2019-11-06 | Stop reason: SDUPTHER

## 2019-11-06 RX ORDER — SUCCINYLCHOLINE CHLORIDE 20 MG/ML
INJECTION INTRAMUSCULAR; INTRAVENOUS PRN
Status: DISCONTINUED | OUTPATIENT
Start: 2019-11-06 | End: 2019-11-06 | Stop reason: SDUPTHER

## 2019-11-06 RX ORDER — HYDROMORPHONE HCL 110MG/55ML
0.5 PATIENT CONTROLLED ANALGESIA SYRINGE INTRAVENOUS EVERY 5 MIN PRN
Status: DISCONTINUED | OUTPATIENT
Start: 2019-11-06 | End: 2019-11-06 | Stop reason: HOSPADM

## 2019-11-06 RX ORDER — SCOLOPAMINE TRANSDERMAL SYSTEM 1 MG/1
1 PATCH, EXTENDED RELEASE TRANSDERMAL
Status: DISCONTINUED | OUTPATIENT
Start: 2019-11-09 | End: 2019-11-07 | Stop reason: HOSPADM

## 2019-11-06 RX ORDER — METOCLOPRAMIDE HYDROCHLORIDE 5 MG/ML
10 INJECTION INTRAMUSCULAR; INTRAVENOUS EVERY 6 HOURS PRN
Status: DISCONTINUED | OUTPATIENT
Start: 2019-11-06 | End: 2019-11-07 | Stop reason: HOSPADM

## 2019-11-06 RX ORDER — LIDOCAINE 4 G/G
1 PATCH TOPICAL DAILY
Status: DISCONTINUED | OUTPATIENT
Start: 2019-11-06 | End: 2019-11-07 | Stop reason: HOSPADM

## 2019-11-06 RX ORDER — SODIUM CHLORIDE 0.9 % (FLUSH) 0.9 %
10 SYRINGE (ML) INJECTION EVERY 12 HOURS SCHEDULED
Status: DISCONTINUED | OUTPATIENT
Start: 2019-11-06 | End: 2019-11-07 | Stop reason: HOSPADM

## 2019-11-06 RX ORDER — VECURONIUM BROMIDE 1 MG/ML
INJECTION, POWDER, LYOPHILIZED, FOR SOLUTION INTRAVENOUS PRN
Status: DISCONTINUED | OUTPATIENT
Start: 2019-11-06 | End: 2019-11-06 | Stop reason: SDUPTHER

## 2019-11-06 RX ORDER — BUPIVACAINE HYDROCHLORIDE AND EPINEPHRINE 2.5; 5 MG/ML; UG/ML
INJECTION, SOLUTION EPIDURAL; INFILTRATION; INTRACAUDAL; PERINEURAL
Status: COMPLETED | OUTPATIENT
Start: 2019-11-06 | End: 2019-11-06

## 2019-11-06 RX ORDER — SODIUM CHLORIDE 9 MG/ML
INJECTION, SOLUTION INTRAVENOUS CONTINUOUS
Status: DISCONTINUED | OUTPATIENT
Start: 2019-11-06 | End: 2019-11-06

## 2019-11-06 RX ORDER — PROMETHAZINE HYDROCHLORIDE 25 MG/ML
6.25 INJECTION, SOLUTION INTRAMUSCULAR; INTRAVENOUS EVERY 6 HOURS PRN
Status: DISCONTINUED | OUTPATIENT
Start: 2019-11-06 | End: 2019-11-07 | Stop reason: HOSPADM

## 2019-11-06 RX ORDER — APREPITANT 80 MG/1
80 CAPSULE ORAL ONCE
Status: COMPLETED | OUTPATIENT
Start: 2019-11-06 | End: 2019-11-06

## 2019-11-06 RX ORDER — LABETALOL HYDROCHLORIDE 5 MG/ML
10 INJECTION, SOLUTION INTRAVENOUS
Status: DISCONTINUED | OUTPATIENT
Start: 2019-11-06 | End: 2019-11-07 | Stop reason: HOSPADM

## 2019-11-06 RX ORDER — NALOXONE HYDROCHLORIDE 0.4 MG/ML
0.4 INJECTION, SOLUTION INTRAMUSCULAR; INTRAVENOUS; SUBCUTANEOUS PRN
Status: DISCONTINUED | OUTPATIENT
Start: 2019-11-06 | End: 2019-11-07

## 2019-11-06 RX ORDER — HYDROMORPHONE HYDROCHLORIDE 1 MG/ML
0.5 INJECTION, SOLUTION INTRAMUSCULAR; INTRAVENOUS; SUBCUTANEOUS
Status: DISCONTINUED | OUTPATIENT
Start: 2019-11-06 | End: 2019-11-07 | Stop reason: HOSPADM

## 2019-11-06 RX ORDER — PANTOPRAZOLE SODIUM 40 MG/10ML
40 INJECTION, POWDER, LYOPHILIZED, FOR SOLUTION INTRAVENOUS DAILY
Status: DISCONTINUED | OUTPATIENT
Start: 2019-11-06 | End: 2019-11-07 | Stop reason: HOSPADM

## 2019-11-06 RX ORDER — MAGNESIUM HYDROXIDE 1200 MG/15ML
LIQUID ORAL CONTINUOUS PRN
Status: COMPLETED | OUTPATIENT
Start: 2019-11-06 | End: 2019-11-06

## 2019-11-06 RX ORDER — HYDRALAZINE HYDROCHLORIDE 20 MG/ML
10 INJECTION INTRAMUSCULAR; INTRAVENOUS
Status: DISCONTINUED | OUTPATIENT
Start: 2019-11-06 | End: 2019-11-07 | Stop reason: HOSPADM

## 2019-11-06 RX ORDER — ACETAMINOPHEN 10 MG/ML
1000 INJECTION, SOLUTION INTRAVENOUS EVERY 8 HOURS
Status: COMPLETED | OUTPATIENT
Start: 2019-11-06 | End: 2019-11-07

## 2019-11-06 RX ORDER — SODIUM CHLORIDE 0.9 % (FLUSH) 0.9 %
10 SYRINGE (ML) INJECTION PRN
Status: DISCONTINUED | OUTPATIENT
Start: 2019-11-06 | End: 2019-11-07 | Stop reason: HOSPADM

## 2019-11-06 RX ORDER — SODIUM CHLORIDE 9 MG/ML
10 INJECTION INTRAVENOUS DAILY
Status: DISCONTINUED | OUTPATIENT
Start: 2019-11-06 | End: 2019-11-07 | Stop reason: HOSPADM

## 2019-11-06 RX ORDER — FENTANYL CITRATE 50 UG/ML
INJECTION, SOLUTION INTRAMUSCULAR; INTRAVENOUS PRN
Status: DISCONTINUED | OUTPATIENT
Start: 2019-11-06 | End: 2019-11-06 | Stop reason: SDUPTHER

## 2019-11-06 RX ORDER — LABETALOL HYDROCHLORIDE 5 MG/ML
5 INJECTION, SOLUTION INTRAVENOUS EVERY 10 MIN PRN
Status: DISCONTINUED | OUTPATIENT
Start: 2019-11-06 | End: 2019-11-06 | Stop reason: HOSPADM

## 2019-11-06 RX ORDER — FENTANYL CITRATE 50 UG/ML
25 INJECTION, SOLUTION INTRAMUSCULAR; INTRAVENOUS EVERY 5 MIN PRN
Status: DISCONTINUED | OUTPATIENT
Start: 2019-11-06 | End: 2019-11-06 | Stop reason: HOSPADM

## 2019-11-06 RX ORDER — MIDAZOLAM HYDROCHLORIDE 1 MG/ML
INJECTION INTRAMUSCULAR; INTRAVENOUS PRN
Status: DISCONTINUED | OUTPATIENT
Start: 2019-11-06 | End: 2019-11-06 | Stop reason: SDUPTHER

## 2019-11-06 RX ORDER — NEOSTIGMINE METHYLSULFATE 5 MG/5 ML
SYRINGE (ML) INTRAVENOUS PRN
Status: DISCONTINUED | OUTPATIENT
Start: 2019-11-06 | End: 2019-11-06 | Stop reason: SDUPTHER

## 2019-11-06 RX ORDER — METHYLENE BLUE 10 MG/ML
INJECTION INTRAVENOUS
Status: COMPLETED | OUTPATIENT
Start: 2019-11-06 | End: 2019-11-06

## 2019-11-06 RX ORDER — SODIUM CHLORIDE 9 MG/ML
INJECTION, SOLUTION INTRAVENOUS CONTINUOUS
Status: DISCONTINUED | OUTPATIENT
Start: 2019-11-06 | End: 2019-11-07 | Stop reason: HOSPADM

## 2019-11-06 RX ORDER — PROPOFOL 10 MG/ML
INJECTION, EMULSION INTRAVENOUS PRN
Status: DISCONTINUED | OUTPATIENT
Start: 2019-11-06 | End: 2019-11-06 | Stop reason: SDUPTHER

## 2019-11-06 RX ORDER — ONDANSETRON 2 MG/ML
4 INJECTION INTRAMUSCULAR; INTRAVENOUS EVERY 6 HOURS PRN
Status: DISCONTINUED | OUTPATIENT
Start: 2019-11-06 | End: 2019-11-07 | Stop reason: HOSPADM

## 2019-11-06 RX ORDER — PROMETHAZINE HYDROCHLORIDE 25 MG/ML
6.25 INJECTION, SOLUTION INTRAMUSCULAR; INTRAVENOUS
Status: COMPLETED | OUTPATIENT
Start: 2019-11-06 | End: 2019-11-06

## 2019-11-06 RX ORDER — LIDOCAINE HYDROCHLORIDE 20 MG/ML
INJECTION, SOLUTION INFILTRATION; PERINEURAL PRN
Status: DISCONTINUED | OUTPATIENT
Start: 2019-11-06 | End: 2019-11-06 | Stop reason: SDUPTHER

## 2019-11-06 RX ORDER — DIPHENHYDRAMINE HYDROCHLORIDE 50 MG/ML
12.5 INJECTION INTRAMUSCULAR; INTRAVENOUS EVERY 6 HOURS PRN
Status: DISCONTINUED | OUTPATIENT
Start: 2019-11-06 | End: 2019-11-07 | Stop reason: HOSPADM

## 2019-11-06 RX ORDER — SODIUM CHLORIDE, SODIUM LACTATE, POTASSIUM CHLORIDE, CALCIUM CHLORIDE 600; 310; 30; 20 MG/100ML; MG/100ML; MG/100ML; MG/100ML
INJECTION, SOLUTION INTRAVENOUS CONTINUOUS
Status: DISCONTINUED | OUTPATIENT
Start: 2019-11-06 | End: 2019-11-07 | Stop reason: HOSPADM

## 2019-11-06 RX ORDER — LIDOCAINE HYDROCHLORIDE 10 MG/ML
0.5 INJECTION, SOLUTION EPIDURAL; INFILTRATION; INTRACAUDAL; PERINEURAL ONCE
Status: DISCONTINUED | OUTPATIENT
Start: 2019-11-06 | End: 2019-11-06 | Stop reason: HOSPADM

## 2019-11-06 RX ORDER — ONDANSETRON 2 MG/ML
INJECTION INTRAMUSCULAR; INTRAVENOUS PRN
Status: DISCONTINUED | OUTPATIENT
Start: 2019-11-06 | End: 2019-11-06 | Stop reason: SDUPTHER

## 2019-11-06 RX ORDER — GLYCOPYRROLATE 0.2 MG/ML
INJECTION INTRAMUSCULAR; INTRAVENOUS PRN
Status: DISCONTINUED | OUTPATIENT
Start: 2019-11-06 | End: 2019-11-06 | Stop reason: SDUPTHER

## 2019-11-06 RX ORDER — MAGNESIUM SULFATE HEPTAHYDRATE 500 MG/ML
INJECTION, SOLUTION INTRAMUSCULAR; INTRAVENOUS PRN
Status: DISCONTINUED | OUTPATIENT
Start: 2019-11-06 | End: 2019-11-06 | Stop reason: SDUPTHER

## 2019-11-06 RX ORDER — KETAMINE HYDROCHLORIDE 10 MG/ML
INJECTION, SOLUTION INTRAMUSCULAR; INTRAVENOUS PRN
Status: DISCONTINUED | OUTPATIENT
Start: 2019-11-06 | End: 2019-11-06 | Stop reason: SDUPTHER

## 2019-11-06 RX ORDER — SODIUM CHLORIDE 9 MG/ML
INJECTION INTRAVENOUS PRN
Status: DISCONTINUED | OUTPATIENT
Start: 2019-11-06 | End: 2019-11-06 | Stop reason: SDUPTHER

## 2019-11-06 RX ORDER — LIDOCAINE HYDROCHLORIDE 40 MG/ML
SOLUTION TOPICAL PRN
Status: DISCONTINUED | OUTPATIENT
Start: 2019-11-06 | End: 2019-11-06 | Stop reason: SDUPTHER

## 2019-11-06 RX ADMIN — GLYCOPYRROLATE 1 MG: 0.2 INJECTION INTRAMUSCULAR; INTRAVENOUS at 13:05

## 2019-11-06 RX ADMIN — Medication 10 MG: at 14:35

## 2019-11-06 RX ADMIN — SUGAMMADEX 200 MG: 100 INJECTION, SOLUTION INTRAVENOUS at 13:09

## 2019-11-06 RX ADMIN — MIDAZOLAM 2 MG: 1 INJECTION INTRAMUSCULAR; INTRAVENOUS at 11:55

## 2019-11-06 RX ADMIN — ONDANSETRON 4 MG: 2 INJECTION INTRAMUSCULAR; INTRAVENOUS at 12:18

## 2019-11-06 RX ADMIN — PANTOPRAZOLE SODIUM 40 MG: 40 INJECTION, POWDER, LYOPHILIZED, FOR SOLUTION INTRAVENOUS at 16:13

## 2019-11-06 RX ADMIN — MAGNESIUM SULFATE HEPTAHYDRATE 1 G: 500 INJECTION, SOLUTION INTRAMUSCULAR; INTRAVENOUS at 12:17

## 2019-11-06 RX ADMIN — VECURONIUM BROMIDE 10 MG: 1 INJECTION, POWDER, LYOPHILIZED, FOR SOLUTION INTRAVENOUS at 12:08

## 2019-11-06 RX ADMIN — PROMETHAZINE HYDROCHLORIDE 6.25 MG: 25 INJECTION INTRAMUSCULAR; INTRAVENOUS at 14:12

## 2019-11-06 RX ADMIN — Medication 3 G: at 11:53

## 2019-11-06 RX ADMIN — SUCCINYLCHOLINE CHLORIDE 160 MG: 20 INJECTION, SOLUTION INTRAMUSCULAR; INTRAVENOUS at 12:00

## 2019-11-06 RX ADMIN — APREPITANT 80 MG: 80 CAPSULE ORAL at 08:50

## 2019-11-06 RX ADMIN — ENOXAPARIN SODIUM 40 MG: 40 INJECTION SUBCUTANEOUS at 08:50

## 2019-11-06 RX ADMIN — DEXAMETHASONE SODIUM PHOSPHATE 10 MG: 4 INJECTION, SOLUTION INTRAMUSCULAR; INTRAVENOUS at 12:10

## 2019-11-06 RX ADMIN — Medication 10 ML: at 16:13

## 2019-11-06 RX ADMIN — FENTANYL CITRATE 50 MCG: 50 INJECTION, SOLUTION INTRAMUSCULAR; INTRAVENOUS at 11:57

## 2019-11-06 RX ADMIN — CEFAZOLIN SODIUM 3 G: 10 INJECTION, POWDER, FOR SOLUTION INTRAVENOUS at 20:08

## 2019-11-06 RX ADMIN — Medication 10 ML: at 20:08

## 2019-11-06 RX ADMIN — SODIUM CHLORIDE, POTASSIUM CHLORIDE, SODIUM LACTATE AND CALCIUM CHLORIDE: 600; 310; 30; 20 INJECTION, SOLUTION INTRAVENOUS at 20:08

## 2019-11-06 RX ADMIN — FENTANYL CITRATE 50 MCG: 50 INJECTION, SOLUTION INTRAMUSCULAR; INTRAVENOUS at 12:16

## 2019-11-06 RX ADMIN — ACETAMINOPHEN 1000 MG: 10 INJECTION, SOLUTION INTRAVENOUS at 16:12

## 2019-11-06 RX ADMIN — SODIUM CHLORIDE 10 ML: 9 INJECTION INTRAMUSCULAR; INTRAVENOUS; SUBCUTANEOUS at 12:02

## 2019-11-06 RX ADMIN — Medication 5 MG: at 13:08

## 2019-11-06 RX ADMIN — HYDROMORPHONE HYDROCHLORIDE 0.5 MG: 2 INJECTION INTRAMUSCULAR; INTRAVENOUS; SUBCUTANEOUS at 14:22

## 2019-11-06 RX ADMIN — SODIUM CHLORIDE, POTASSIUM CHLORIDE, SODIUM LACTATE AND CALCIUM CHLORIDE: 600; 310; 30; 20 INJECTION, SOLUTION INTRAVENOUS at 14:33

## 2019-11-06 RX ADMIN — LIDOCAINE HYDROCHLORIDE 100 MG: 20 INJECTION, SOLUTION INFILTRATION; PERINEURAL at 12:00

## 2019-11-06 RX ADMIN — SODIUM CHLORIDE: 9 INJECTION, SOLUTION INTRAVENOUS at 08:49

## 2019-11-06 RX ADMIN — PROPOFOL 200 MG: 10 INJECTION, EMULSION INTRAVENOUS at 12:00

## 2019-11-06 RX ADMIN — LIDOCAINE HYDROCHLORIDE 4 ML: 40 SOLUTION TOPICAL at 12:02

## 2019-11-06 RX ADMIN — KETAMINE HYDROCHLORIDE 30 MG: 10 INJECTION, SOLUTION INTRAMUSCULAR; INTRAVENOUS at 11:57

## 2019-11-06 ASSESSMENT — PULMONARY FUNCTION TESTS
PIF_VALUE: 31
PIF_VALUE: 35
PIF_VALUE: 31
PIF_VALUE: 27
PIF_VALUE: 26
PIF_VALUE: 31
PIF_VALUE: 26
PIF_VALUE: 28
PIF_VALUE: 26
PIF_VALUE: 4
PIF_VALUE: 31
PIF_VALUE: 33
PIF_VALUE: 31
PIF_VALUE: 29
PIF_VALUE: 4
PIF_VALUE: 1
PIF_VALUE: 1
PIF_VALUE: 4
PIF_VALUE: 31
PIF_VALUE: 26
PIF_VALUE: 31
PIF_VALUE: 31
PIF_VALUE: 26
PIF_VALUE: 31
PIF_VALUE: 4
PIF_VALUE: 32
PIF_VALUE: 28
PIF_VALUE: 16
PIF_VALUE: 31
PIF_VALUE: 30
PIF_VALUE: 31
PIF_VALUE: 28
PIF_VALUE: 3
PIF_VALUE: 2
PIF_VALUE: 26
PIF_VALUE: 23
PIF_VALUE: 31
PIF_VALUE: 24
PIF_VALUE: 32
PIF_VALUE: 31
PIF_VALUE: 32
PIF_VALUE: 28
PIF_VALUE: 31
PIF_VALUE: 0
PIF_VALUE: 27
PIF_VALUE: 27
PIF_VALUE: 26
PIF_VALUE: 26
PIF_VALUE: 28
PIF_VALUE: 31
PIF_VALUE: 31
PIF_VALUE: 30
PIF_VALUE: 34
PIF_VALUE: 25
PIF_VALUE: 26
PIF_VALUE: 3
PIF_VALUE: 1
PIF_VALUE: 26
PIF_VALUE: 31
PIF_VALUE: 4
PIF_VALUE: 31
PIF_VALUE: 32
PIF_VALUE: 0
PIF_VALUE: 31
PIF_VALUE: 1
PIF_VALUE: 3
PIF_VALUE: 31
PIF_VALUE: 34
PIF_VALUE: 31
PIF_VALUE: 7
PIF_VALUE: 31
PIF_VALUE: 26
PIF_VALUE: 26
PIF_VALUE: 34
PIF_VALUE: 24
PIF_VALUE: 32
PIF_VALUE: 26
PIF_VALUE: 5
PIF_VALUE: 31
PIF_VALUE: 32
PIF_VALUE: 5
PIF_VALUE: 24

## 2019-11-06 ASSESSMENT — PAIN DESCRIPTION - LOCATION
LOCATION: ABDOMEN

## 2019-11-06 ASSESSMENT — PAIN SCALES - GENERAL
PAINLEVEL_OUTOF10: 4
PAINLEVEL_OUTOF10: 5
PAINLEVEL_OUTOF10: 4
PAINLEVEL_OUTOF10: 0
PAINLEVEL_OUTOF10: 8
PAINLEVEL_OUTOF10: 7
PAINLEVEL_OUTOF10: 4

## 2019-11-06 ASSESSMENT — PAIN DESCRIPTION - PAIN TYPE
TYPE: SURGICAL PAIN

## 2019-11-06 ASSESSMENT — PAIN - FUNCTIONAL ASSESSMENT: PAIN_FUNCTIONAL_ASSESSMENT: 0-10

## 2019-11-07 ENCOUNTER — APPOINTMENT (OUTPATIENT)
Dept: GENERAL RADIOLOGY | Age: 34
DRG: 403 | End: 2019-11-07
Attending: SURGERY
Payer: MEDICAID

## 2019-11-07 VITALS
TEMPERATURE: 98.2 F | OXYGEN SATURATION: 96 % | SYSTOLIC BLOOD PRESSURE: 135 MMHG | WEIGHT: 293 LBS | HEART RATE: 67 BPM | DIASTOLIC BLOOD PRESSURE: 72 MMHG | BODY MASS INDEX: 45.99 KG/M2 | HEIGHT: 67 IN | RESPIRATION RATE: 16 BRPM

## 2019-11-07 LAB
ANION GAP SERPL CALCULATED.3IONS-SCNC: 16 MMOL/L (ref 3–16)
BUN BLDV-MCNC: 6 MG/DL (ref 7–20)
CALCIUM SERPL-MCNC: 9.1 MG/DL (ref 8.3–10.6)
CHLORIDE BLD-SCNC: 101 MMOL/L (ref 99–110)
CO2: 20 MMOL/L (ref 21–32)
CREAT SERPL-MCNC: 0.7 MG/DL (ref 0.6–1.1)
GFR AFRICAN AMERICAN: >60
GFR NON-AFRICAN AMERICAN: >60
GLUCOSE BLD-MCNC: 129 MG/DL (ref 70–99)
HCT VFR BLD CALC: 40.1 % (ref 36–48)
HEMOGLOBIN: 13.1 G/DL (ref 12–16)
MCH RBC QN AUTO: 30.2 PG (ref 26–34)
MCHC RBC AUTO-ENTMCNC: 32.6 G/DL (ref 31–36)
MCV RBC AUTO: 92.7 FL (ref 80–100)
PDW BLD-RTO: 14.5 % (ref 12.4–15.4)
PLATELET # BLD: 182 K/UL (ref 135–450)
PMV BLD AUTO: 11 FL (ref 5–10.5)
POTASSIUM REFLEX MAGNESIUM: 4.2 MMOL/L (ref 3.5–5.1)
RBC # BLD: 4.32 M/UL (ref 4–5.2)
SODIUM BLD-SCNC: 137 MMOL/L (ref 136–145)
WBC # BLD: 13.2 K/UL (ref 4–11)

## 2019-11-07 PROCEDURE — 2580000003 HC RX 258: Performed by: SURGERY

## 2019-11-07 PROCEDURE — C9113 INJ PANTOPRAZOLE SODIUM, VIA: HCPCS | Performed by: SURGERY

## 2019-11-07 PROCEDURE — 85027 COMPLETE CBC AUTOMATED: CPT

## 2019-11-07 PROCEDURE — 80048 BASIC METABOLIC PNL TOTAL CA: CPT

## 2019-11-07 PROCEDURE — 6370000000 HC RX 637 (ALT 250 FOR IP): Performed by: SURGERY

## 2019-11-07 PROCEDURE — 6360000004 HC RX CONTRAST MEDICATION

## 2019-11-07 PROCEDURE — 36415 COLL VENOUS BLD VENIPUNCTURE: CPT

## 2019-11-07 PROCEDURE — 74240 X-RAY XM UPR GI TRC 1CNTRST: CPT

## 2019-11-07 PROCEDURE — 94760 N-INVAS EAR/PLS OXIMETRY 1: CPT

## 2019-11-07 PROCEDURE — 6360000002 HC RX W HCPCS: Performed by: SURGERY

## 2019-11-07 RX ORDER — OXYCODONE HYDROCHLORIDE AND ACETAMINOPHEN 5; 325 MG/1; MG/1
1 TABLET ORAL EVERY 6 HOURS PRN
Status: DISCONTINUED | OUTPATIENT
Start: 2019-11-07 | End: 2019-11-07 | Stop reason: HOSPADM

## 2019-11-07 RX ORDER — ONDANSETRON 8 MG/1
8 TABLET, ORALLY DISINTEGRATING ORAL EVERY 8 HOURS PRN
Qty: 30 TABLET | Refills: 2 | Status: SHIPPED | OUTPATIENT
Start: 2019-11-07 | End: 2019-12-18 | Stop reason: ALTCHOICE

## 2019-11-07 RX ORDER — ONDANSETRON 8 MG/1
8 TABLET, ORALLY DISINTEGRATING ORAL EVERY 8 HOURS PRN
Status: DISCONTINUED | OUTPATIENT
Start: 2019-11-07 | End: 2019-11-07 | Stop reason: HOSPADM

## 2019-11-07 RX ORDER — OXYCODONE HYDROCHLORIDE AND ACETAMINOPHEN 5; 325 MG/1; MG/1
1 TABLET ORAL EVERY 6 HOURS PRN
Qty: 28 TABLET | Refills: 0 | Status: SHIPPED | OUTPATIENT
Start: 2019-11-07 | End: 2019-11-14

## 2019-11-07 RX ORDER — PROMETHAZINE HYDROCHLORIDE 25 MG/1
12.5 TABLET ORAL EVERY 4 HOURS PRN
Status: DISCONTINUED | OUTPATIENT
Start: 2019-11-07 | End: 2019-11-07 | Stop reason: HOSPADM

## 2019-11-07 RX ADMIN — SODIUM CHLORIDE, POTASSIUM CHLORIDE, SODIUM LACTATE AND CALCIUM CHLORIDE: 600; 310; 30; 20 INJECTION, SOLUTION INTRAVENOUS at 04:20

## 2019-11-07 RX ADMIN — ACETAMINOPHEN 1000 MG: 10 INJECTION, SOLUTION INTRAVENOUS at 00:25

## 2019-11-07 RX ADMIN — Medication 10 ML: at 09:50

## 2019-11-07 RX ADMIN — Medication 10 ML: at 09:51

## 2019-11-07 RX ADMIN — SODIUM CHLORIDE, POTASSIUM CHLORIDE, SODIUM LACTATE AND CALCIUM CHLORIDE: 600; 310; 30; 20 INJECTION, SOLUTION INTRAVENOUS at 11:46

## 2019-11-07 RX ADMIN — IOHEXOL 50 ML: 350 INJECTION, SOLUTION INTRAVENOUS at 08:44

## 2019-11-07 RX ADMIN — PANTOPRAZOLE SODIUM 40 MG: 40 INJECTION, POWDER, LYOPHILIZED, FOR SOLUTION INTRAVENOUS at 09:50

## 2019-11-07 RX ADMIN — ENOXAPARIN SODIUM 40 MG: 40 INJECTION SUBCUTANEOUS at 02:26

## 2019-11-07 RX ADMIN — ACETAMINOPHEN 1000 MG: 10 INJECTION, SOLUTION INTRAVENOUS at 09:50

## 2019-11-07 RX ADMIN — ENOXAPARIN SODIUM 40 MG: 40 INJECTION SUBCUTANEOUS at 09:50

## 2019-11-07 RX ADMIN — CEFAZOLIN SODIUM 3 G: 10 INJECTION, POWDER, FOR SOLUTION INTRAVENOUS at 04:20

## 2019-11-07 ASSESSMENT — PAIN DESCRIPTION - PAIN TYPE: TYPE: SURGICAL PAIN

## 2019-11-07 ASSESSMENT — PAIN SCALES - GENERAL
PAINLEVEL_OUTOF10: 2
PAINLEVEL_OUTOF10: 4

## 2019-11-07 ASSESSMENT — PAIN DESCRIPTION - LOCATION: LOCATION: ABDOMEN

## 2019-11-13 ENCOUNTER — TELEPHONE (OUTPATIENT)
Dept: BARIATRICS/WEIGHT MGMT | Age: 34
End: 2019-11-13

## 2019-11-19 ENCOUNTER — OFFICE VISIT (OUTPATIENT)
Dept: BARIATRICS/WEIGHT MGMT | Age: 34
End: 2019-11-19

## 2019-11-19 VITALS
HEIGHT: 67 IN | OXYGEN SATURATION: 98 % | SYSTOLIC BLOOD PRESSURE: 108 MMHG | RESPIRATION RATE: 18 BRPM | BODY MASS INDEX: 45.99 KG/M2 | DIASTOLIC BLOOD PRESSURE: 66 MMHG | HEART RATE: 80 BPM | WEIGHT: 293 LBS

## 2019-11-19 DIAGNOSIS — K21.9 CHRONIC GERD: ICD-10-CM

## 2019-11-19 DIAGNOSIS — G47.33 MODERATE OBSTRUCTIVE SLEEP APNEA: ICD-10-CM

## 2019-11-19 DIAGNOSIS — E66.01 MORBID OBESITY WITH BMI OF 50.0-59.9, ADULT (HCC): ICD-10-CM

## 2019-11-19 DIAGNOSIS — E11.69 DIABETES MELLITUS TYPE 2 IN OBESE (HCC): ICD-10-CM

## 2019-11-19 DIAGNOSIS — E66.9 DIABETES MELLITUS TYPE 2 IN OBESE (HCC): ICD-10-CM

## 2019-11-19 DIAGNOSIS — Z98.84 S/P LAPAROSCOPIC SLEEVE GASTRECTOMY: Primary | ICD-10-CM

## 2019-11-19 DIAGNOSIS — E66.01 MORBID OBESITY WITH BMI OF 60.0-69.9, ADULT (HCC): ICD-10-CM

## 2019-11-19 PROCEDURE — 99024 POSTOP FOLLOW-UP VISIT: CPT | Performed by: SURGERY

## 2019-12-06 ENCOUNTER — TELEPHONE (OUTPATIENT)
Dept: SURGERY | Age: 34
End: 2019-12-06

## 2019-12-09 ENCOUNTER — OFFICE VISIT (OUTPATIENT)
Dept: SURGERY | Age: 34
End: 2019-12-09

## 2019-12-09 VITALS
SYSTOLIC BLOOD PRESSURE: 118 MMHG | WEIGHT: 293 LBS | DIASTOLIC BLOOD PRESSURE: 72 MMHG | HEIGHT: 67 IN | BODY MASS INDEX: 45.99 KG/M2

## 2019-12-09 DIAGNOSIS — L72.9 SCALP CYST: Primary | ICD-10-CM

## 2019-12-09 PROCEDURE — 99024 POSTOP FOLLOW-UP VISIT: CPT | Performed by: SURGERY

## 2019-12-13 PROBLEM — E66.01 MORBID OBESITY WITH BMI OF 45.0-49.9, ADULT (HCC): Status: ACTIVE | Noted: 2019-12-13

## 2019-12-13 ASSESSMENT — ENCOUNTER SYMPTOMS
RESPIRATORY NEGATIVE: 1
GASTROINTESTINAL NEGATIVE: 1
ALLERGIC/IMMUNOLOGIC NEGATIVE: 1
EYES NEGATIVE: 1
ROS SKIN COMMENTS: ABDOMINAL SURGICAL INCISIONS.

## 2019-12-18 ENCOUNTER — OFFICE VISIT (OUTPATIENT)
Dept: BARIATRICS/WEIGHT MGMT | Age: 34
End: 2019-12-18

## 2019-12-18 VITALS
WEIGHT: 293 LBS | DIASTOLIC BLOOD PRESSURE: 66 MMHG | RESPIRATION RATE: 16 BRPM | SYSTOLIC BLOOD PRESSURE: 110 MMHG | HEIGHT: 67 IN | BODY MASS INDEX: 45.99 KG/M2 | HEART RATE: 78 BPM

## 2019-12-18 DIAGNOSIS — E66.9 DIABETES MELLITUS TYPE 2 IN OBESE (HCC): Primary | ICD-10-CM

## 2019-12-18 DIAGNOSIS — Z98.84 S/P LAPAROSCOPIC SLEEVE GASTRECTOMY: ICD-10-CM

## 2019-12-18 DIAGNOSIS — K21.9 CHRONIC GERD: ICD-10-CM

## 2019-12-18 DIAGNOSIS — E11.69 DIABETES MELLITUS TYPE 2 IN OBESE (HCC): Primary | ICD-10-CM

## 2019-12-18 DIAGNOSIS — E66.01 MORBID OBESITY WITH BMI OF 45.0-49.9, ADULT (HCC): ICD-10-CM

## 2019-12-18 PROCEDURE — 99024 POSTOP FOLLOW-UP VISIT: CPT | Performed by: NURSE PRACTITIONER

## 2019-12-18 RX ORDER — OMEPRAZOLE 20 MG/1
20 CAPSULE, DELAYED RELEASE ORAL DAILY
Qty: 30 CAPSULE | Refills: 0 | Status: SHIPPED | OUTPATIENT
Start: 2019-12-18 | End: 2020-04-09 | Stop reason: ALTCHOICE

## 2020-01-30 ENCOUNTER — OFFICE VISIT (OUTPATIENT)
Dept: BARIATRICS/WEIGHT MGMT | Age: 35
End: 2020-01-30

## 2020-01-30 VITALS
SYSTOLIC BLOOD PRESSURE: 112 MMHG | BODY MASS INDEX: 45.99 KG/M2 | WEIGHT: 293 LBS | HEIGHT: 67 IN | DIASTOLIC BLOOD PRESSURE: 69 MMHG | HEART RATE: 79 BPM

## 2020-01-30 PROCEDURE — APPSS15 APP SPLIT SHARED TIME 0-15 MINUTES: Performed by: NURSE PRACTITIONER

## 2020-01-30 PROCEDURE — 99024 POSTOP FOLLOW-UP VISIT: CPT | Performed by: NURSE PRACTITIONER

## 2020-01-30 ASSESSMENT — ENCOUNTER SYMPTOMS
EYES NEGATIVE: 1
GASTROINTESTINAL NEGATIVE: 1
RESPIRATORY NEGATIVE: 1

## 2020-01-30 NOTE — PROGRESS NOTES
Dietary Assessment Note    Vitals:   Vitals:    01/30/20 1441   BP: 112/69   Pulse: 79   Weight: 296 lb (134.3 kg)   Height: 5' 7\" (1.702 m)    Patient lost 12 lbs over the past 6 weeks. Total Weight Loss: 100 lbs    Labs reviewed: no lab studies available for review at time of visit    Protein intake: 60-80 grams/day     Fluid intake: 48-64 oz/day; water, crystal lite    Multivitamin/mineral intake: 4 Fusion per day    Calcium intake: n/a    Other: n/a    Exercise: Yes. How much: 2x week. What kind: just started with [employer]    Nutrition Assessment: Elijah is protein shake (Premier) or Premier protein bar; fiber 1 bar for snack; lunch and dinner are  chix breast or meatballs OR greek yogurt; she is also eating apples, oranges. She is eating 2 fiber bars per day.     Amount able to eat per sitting: 3-4oz protein, 1/2 cup veggies    Following 30/30/30 rule: yes    Food Intolerances/issues: none    Client Concerns: pt had been constipated but has improved - encouraged fruits and veg    Goals: regular and consistent exercise              Focus on fruits and veg    Plan: follow up as needed    Keaton Astudillo

## 2020-01-30 NOTE — PROGRESS NOTES
week     I counseled the patient on the importance of not smoking and risks of ETOH. Allergies   Allergen Reactions    Latex Itching    Lactose      \"Extreme\" stomach pain and diarrhea    Adhesive Tape      Rips off skin    Keflex [Cephalexin] Nausea And Vomiting     Projectile vomitting     Vitals:    01/30/20 1441   BP: 112/69   Pulse: 79   Weight: 296 lb (134.3 kg)   Height: 5' 7\" (1.702 m)       Body mass index is 46.36 kg/m². Current Outpatient Medications:     omeprazole (PRILOSEC) 20 MG delayed release capsule, TAKE 1 CAPSULE BY MOUTH DAILY, Disp: 30 capsule, Rfl: 0    Multiple Vitamins-Minerals (BARIATRIC FUSION) CHEW, Take 4 tablets by mouth daily, Disp: , Rfl:     levothyroxine (SYNTHROID) 75 MCG tablet, TAKE 1 TABLET BY MOUTH EVERY DAY, Disp: , Rfl: 5    Cholecalciferol (VITAMIN D PO), Take by mouth, Disp: , Rfl:     ziprasidone (GEODON) 20 MG capsule, Take 20 mg by mouth 2 times daily (with meals), Disp: , Rfl:     clomiPRAMINE (ANAFRANIL) 25 MG capsule, Take 50 mg by mouth nightly, Disp: , Rfl:         Review of Systems   Constitutional: Negative. HENT: Negative. Eyes: Negative. Respiratory: Negative. Cardiovascular: Negative. Gastrointestinal: Negative. Skin: Negative. Neurological: Negative. Objective:    Physical Exam  Constitutional:       Appearance: She is well-developed. HENT:      Head: Normocephalic and atraumatic. Eyes:      Pupils: Pupils are equal, round, and reactive to light. Neck:      Musculoskeletal: Normal range of motion. Cardiovascular:      Rate and Rhythm: Normal rate. Pulmonary:      Effort: Pulmonary effort is normal.   Abdominal:      Palpations: Abdomen is soft. Tenderness: There is no abdominal tenderness. Musculoskeletal: Normal range of motion. Skin:     Comments: Well healed surgical incisions on abdomen   Neurological:      Mental Status: She is alert and oriented to person, place, and time.    Psychiatric: Behavior: Behavior normal.         Thought Content: Thought content normal.         Judgment: Judgment normal.           Assessment and Plan:   Patient is 3 months s/p sleeve gastrectomy, down 12lbs with a total weight loss of 100lbs. The patient's current Body mass index is 46.36 kg/m². (1/30/20). She is doingwell, denies n/v/dysphagia or reflux. She  is tolerating diet, getting adequate fluids and protein. She recently started exercising. Encouraged continued physical activity. Sheis taking vitamins as instructed. She  did meet with the registered dietitian for continued follow up. I agree with recommendations and plan. We will see her back in 3 months for continued follow up. I have spent 15 min counseling patient.

## 2020-02-01 ENCOUNTER — APPOINTMENT (OUTPATIENT)
Dept: GENERAL RADIOLOGY | Age: 35
End: 2020-02-01
Payer: MEDICAID

## 2020-02-01 ENCOUNTER — HOSPITAL ENCOUNTER (EMERGENCY)
Age: 35
Discharge: HOME | End: 2020-02-01
Attending: EMERGENCY MEDICINE
Payer: MEDICAID

## 2020-02-01 VITALS
HEART RATE: 61 BPM | SYSTOLIC BLOOD PRESSURE: 115 MMHG | TEMPERATURE: 97.8 F | RESPIRATION RATE: 27 BRPM | OXYGEN SATURATION: 99 % | DIASTOLIC BLOOD PRESSURE: 67 MMHG | WEIGHT: 293 LBS | HEIGHT: 67 IN | BODY MASS INDEX: 45.99 KG/M2

## 2020-02-01 LAB
A/G RATIO: 1.3 (ref 1.1–2.2)
ALBUMIN SERPL-MCNC: 4.2 G/DL (ref 3.4–5)
ALP BLD-CCNC: 77 U/L (ref 40–129)
ALT SERPL-CCNC: 25 U/L (ref 10–40)
ANION GAP SERPL CALCULATED.3IONS-SCNC: 11 MMOL/L (ref 3–16)
AST SERPL-CCNC: 27 U/L (ref 15–37)
BASOPHILS ABSOLUTE: 0 K/UL (ref 0–0.2)
BASOPHILS RELATIVE PERCENT: 0.6 %
BILIRUB SERPL-MCNC: 0.4 MG/DL (ref 0–1)
BUN BLDV-MCNC: 11 MG/DL (ref 7–20)
CALCIUM SERPL-MCNC: 9.7 MG/DL (ref 8.3–10.6)
CHLORIDE BLD-SCNC: 99 MMOL/L (ref 99–110)
CO2: 27 MMOL/L (ref 21–32)
CREAT SERPL-MCNC: 0.8 MG/DL (ref 0.6–1.1)
EKG ATRIAL RATE: 70 BPM
EKG DIAGNOSIS: NORMAL
EKG P AXIS: 19 DEGREES
EKG P-R INTERVAL: 122 MS
EKG Q-T INTERVAL: 410 MS
EKG QRS DURATION: 86 MS
EKG QTC CALCULATION (BAZETT): 442 MS
EKG R AXIS: 25 DEGREES
EKG T AXIS: 31 DEGREES
EKG VENTRICULAR RATE: 70 BPM
EOSINOPHILS ABSOLUTE: 0.1 K/UL (ref 0–0.6)
EOSINOPHILS RELATIVE PERCENT: 1.6 %
GFR AFRICAN AMERICAN: >60
GFR NON-AFRICAN AMERICAN: >60
GLOBULIN: 3.2 G/DL
GLUCOSE BLD-MCNC: 99 MG/DL (ref 70–99)
HCG QUALITATIVE: NEGATIVE
HCT VFR BLD CALC: 43.3 % (ref 36–48)
HEMOGLOBIN: 14.2 G/DL (ref 12–16)
LYMPHOCYTES ABSOLUTE: 2.6 K/UL (ref 1–5.1)
LYMPHOCYTES RELATIVE PERCENT: 31.8 %
MCH RBC QN AUTO: 30.9 PG (ref 26–34)
MCHC RBC AUTO-ENTMCNC: 32.8 G/DL (ref 31–36)
MCV RBC AUTO: 94.3 FL (ref 80–100)
MONOCYTES ABSOLUTE: 0.7 K/UL (ref 0–1.3)
MONOCYTES RELATIVE PERCENT: 8.9 %
NEUTROPHILS ABSOLUTE: 4.7 K/UL (ref 1.7–7.7)
NEUTROPHILS RELATIVE PERCENT: 57.1 %
PDW BLD-RTO: 14.8 % (ref 12.4–15.4)
PLATELET # BLD: 217 K/UL (ref 135–450)
PMV BLD AUTO: 10.2 FL (ref 5–10.5)
POTASSIUM REFLEX MAGNESIUM: 3.6 MMOL/L (ref 3.5–5.1)
RBC # BLD: 4.59 M/UL (ref 4–5.2)
SODIUM BLD-SCNC: 137 MMOL/L (ref 136–145)
TOTAL PROTEIN: 7.4 G/DL (ref 6.4–8.2)
TROPONIN: <0.01 NG/ML
WBC # BLD: 8.2 K/UL (ref 4–11)

## 2020-02-01 PROCEDURE — 93005 ELECTROCARDIOGRAM TRACING: CPT | Performed by: EMERGENCY MEDICINE

## 2020-02-01 PROCEDURE — 99285 EMERGENCY DEPT VISIT HI MDM: CPT

## 2020-02-01 PROCEDURE — 85025 COMPLETE CBC W/AUTO DIFF WBC: CPT

## 2020-02-01 PROCEDURE — 80053 COMPREHEN METABOLIC PANEL: CPT

## 2020-02-01 PROCEDURE — 84484 ASSAY OF TROPONIN QUANT: CPT

## 2020-02-01 PROCEDURE — 84703 CHORIONIC GONADOTROPIN ASSAY: CPT

## 2020-02-01 PROCEDURE — 93010 ELECTROCARDIOGRAM REPORT: CPT | Performed by: INTERNAL MEDICINE

## 2020-02-01 PROCEDURE — 71046 X-RAY EXAM CHEST 2 VIEWS: CPT

## 2020-02-01 RX ORDER — HYDROXYZINE PAMOATE 25 MG/1
25-50 CAPSULE ORAL 3 TIMES DAILY PRN
Qty: 30 CAPSULE | Refills: 0 | Status: SHIPPED | OUTPATIENT
Start: 2020-02-01 | End: 2020-02-15

## 2020-02-01 RX ORDER — IBUPROFEN 600 MG/1
600 TABLET ORAL EVERY 6 HOURS PRN
Qty: 30 TABLET | Refills: 0 | Status: SHIPPED | OUTPATIENT
Start: 2020-02-01 | End: 2020-04-30

## 2020-02-01 ASSESSMENT — PAIN DESCRIPTION - PAIN TYPE: TYPE: ACUTE PAIN

## 2020-02-01 ASSESSMENT — PAIN SCALES - GENERAL: PAINLEVEL_OUTOF10: 3

## 2020-02-01 ASSESSMENT — PAIN DESCRIPTION - ORIENTATION: ORIENTATION: LEFT

## 2020-02-01 ASSESSMENT — PAIN DESCRIPTION - FREQUENCY: FREQUENCY: CONTINUOUS

## 2020-02-01 ASSESSMENT — PAIN DESCRIPTION - LOCATION: LOCATION: CHEST

## 2020-02-01 ASSESSMENT — PAIN DESCRIPTION - DESCRIPTORS: DESCRIPTORS: ACHING

## 2020-02-01 NOTE — ED PROVIDER NOTES
Emergency Physician Note    Chief Complaint  Chest Pain (pt comes in with chest pain that started approx 0300 this morning after receiving news that her  was cheating on her. Pt reports hx of anxiety also. )       History of Present Illness  Alex Schneider is a 30 y.o. female who presents to the ED for chest pain. Patient states today at 3 AM when she had received some bad news she has had sudden onset of chest pain, the chest pain of the left sternal border, she describes it as a squeezing sensation initially did radiate to her left shoulder but it no longer radiates at this time. It was not associated with nausea, vomiting, diarrhea, shortness of breath, lightheadedness, dizziness, vertigo, diaphoresis. At this time the pain has improved but has not completely resolved. Denies fever, chills, malaise,   shortness of breath, cough, abdominal pain, nausea, vomiting, diarrhea, headache, sore throat, dysuria, back pain, rash. No palliative/provocative factors. Unless otherwise stated in this report or unable to obtain because of the patient's clinical or mental status as evidenced by the medical record, this patient's positive and negative responses for review of systems, constitutional, psych, eyes, ENT, cardiovascular, respiratory, gastrointestinal, neurological, genitourinary, musculoskeletal, integument systems and systems related to the presenting problem are either stated in the preceding paragraph or were not pertinent or were negative for the symptoms and/or complaints related to the medical problem. I have reviewed the following from the nursing documentation:      Prior to Admission medications    Medication Sig Start Date End Date Taking?  Authorizing Provider   hydrOXYzine (VISTARIL) 25 MG capsule Take 1-2 capsules by mouth 3 times daily as needed for Anxiety 2/1/20 2/15/20 Yes General Sneha MD   ibuprofen (ADVIL;MOTRIN) 600 MG tablet Take 1 tablet by mouth every 6 hours as Bilirubin 0.4 0.0 - 1.0 mg/dL    Alkaline Phosphatase 77 40 - 129 U/L    ALT 25 10 - 40 U/L    AST 27 15 - 37 U/L    Globulin 3.2 g/dL   Troponin   Result Value Ref Range    Troponin <0.01 <0.01 ng/mL   HCG Qualitative, Serum   Result Value Ref Range    hCG Qual Negative Detects HCG level >10 MIU/mL       PROCEDURES      MEDICAL DECISION MAKING    Procedures/interventions/images ordered for this visit  Orders Placed This Encounter   Procedures    XR CHEST STANDARD (2 VW)    CBC Auto Differential    Comprehensive Metabolic Panel w/ Reflex to MG    Troponin    HCG Qualitative, Serum    Initiate Oxygen Therapy Protocol    EKG 12 Lead    Saline lock IV       Medications ordered for this visit  Orders Placed This Encounter   Medications    hydrOXYzine (VISTARIL) 25 MG capsule     Sig: Take 1-2 capsules by mouth 3 times daily as needed for Anxiety     Dispense:  30 capsule     Refill:  0    ibuprofen (ADVIL;MOTRIN) 600 MG tablet     Sig: Take 1 tablet by mouth every 6 hours as needed for Pain     Dispense:  30 tablet     Refill:  0       ED course notes for this visit           - Patient seen and evaluated in room 7    Wells Criteria: To assess patient for likelihood of a pulmonary embolism. Physical findings suggestive of DVT (unilateral leg swelling, calf or thigh tenderness):+0 No  No alternative diagnosis better explains the illness:+0 No  Tachycardia with pulse > 100:+0 No  Immobilization (?3 days) or surgery in the previous four weeks:+0 No  Prior history of DVT or pulmonary embolism:+0 No  Presence of hemoptysis:+0 No  Presence of malignancy:+0 No    Pulmonary embolism risk score interpretation: 0. This falls under the following category: Score of < 2, which indicates a low probability    PERC Rule:  Applicable in this patient who has low clinical suspicion for pulmonary embolism.   Age < 48years old: Yes  Heart rate < 100 bpm: Yes  Oxygen saturation > 95%: Yes  Hemoptysis: No  Exogenous estrogen for ACS is less than 2% or 1%, respectively. Although the risk of ACS has not been eliminated, the risks of further testing or hospitalization likely exceed the benefit, and the patient declines further emergent evaluation or hospitalization for ACS. Patient decided not to stay for second troponin    This is a very pleasant patient with chest pain. On physical exam, patient does not have any abnormal heart or lung sounds. The work up in the ED indicates patient is without significant evidence of acute coronary syndrome, pulmonary embolism, thoracic aortic dissection, pericarditis, pneumothorax, esophageal rupture, pneumonia, toxicity, shock, sepsis, unstable arrhythmia, hemodynamic or cardiopulmonary instability, herpes zoster, or any disease process requiring other immediate medical or surgical intervention at this time. It is understood that if the patient is not improving as expected or if other new symptoms or signs of concern develop, other etiologies or diagnoses may need to be considered requiring other tests, treatments, consultations, and/or admission. The diagnosis, plan, expected course, follow-up, and return precautions were discussed and all questions were answered. Final Impression    1. Chest pain, unspecified type        Blood pressure 115/67, pulse 61, temperature 97.8 °F (36.6 °C), temperature source Oral, resp. rate 27, height 5' 7\" (1.702 m), weight 297 lb (134.7 kg), last menstrual period 02/01/2020, SpO2 99 %, not currently breastfeeding. Disposition  At this point I do not feel the patient requires further work up and it is reasonable to discharge the patient. I had a discussion with the patient and/or their surrogate regarding diagnosis, diagnostic testing results, treatment/ plan of care, and follow up. Patient and/or companions verbalized understanding of the ED workup, any relevant findings as well as any incidental findings, and the disposition and plan.   There was shared

## 2020-03-22 ENCOUNTER — HOSPITAL ENCOUNTER (EMERGENCY)
Age: 35
Discharge: HOME | End: 2020-03-22
Payer: MEDICAID

## 2020-03-22 VITALS
HEART RATE: 88 BPM | DIASTOLIC BLOOD PRESSURE: 86 MMHG | RESPIRATION RATE: 16 BRPM | TEMPERATURE: 98.3 F | SYSTOLIC BLOOD PRESSURE: 112 MMHG | OXYGEN SATURATION: 98 %

## 2020-03-22 LAB
BACTERIA WET PREP: ABNORMAL
BACTERIA: ABNORMAL /HPF
BILIRUBIN URINE: NEGATIVE
BLOOD, URINE: ABNORMAL
CLARITY: CLEAR
CLUE CELLS: ABNORMAL
COLOR: YELLOW
EPITHELIAL CELLS WET PREP: ABNORMAL
EPITHELIAL CELLS, UA: ABNORMAL /HPF (ref 0–5)
GLUCOSE URINE: NEGATIVE MG/DL
HCG(URINE) PREGNANCY TEST: NEGATIVE
KETONES, URINE: NEGATIVE MG/DL
LEUKOCYTE ESTERASE, URINE: NEGATIVE
MICROSCOPIC EXAMINATION: YES
MUCUS: ABNORMAL /LPF
NITRITE, URINE: NEGATIVE
PH UA: 6.5 (ref 5–8)
PROTEIN UA: NEGATIVE MG/DL
RBC UA: ABNORMAL /HPF (ref 0–4)
RBC WET PREP: ABNORMAL
SOURCE WET PREP: ABNORMAL
SPECIFIC GRAVITY UA: 1.02 (ref 1–1.03)
TRICHOMONAS PREP: ABNORMAL
URINE REFLEX TO CULTURE: ABNORMAL
URINE TYPE: ABNORMAL
UROBILINOGEN, URINE: 1 E.U./DL
WBC UA: ABNORMAL /HPF (ref 0–5)
WBC WET PREP: ABNORMAL
YEAST WET PREP: ABNORMAL

## 2020-03-22 PROCEDURE — 84703 CHORIONIC GONADOTROPIN ASSAY: CPT

## 2020-03-22 PROCEDURE — 81001 URINALYSIS AUTO W/SCOPE: CPT

## 2020-03-22 PROCEDURE — 87210 SMEAR WET MOUNT SALINE/INK: CPT

## 2020-03-22 PROCEDURE — 6370000000 HC RX 637 (ALT 250 FOR IP): Performed by: PHYSICIAN ASSISTANT

## 2020-03-22 PROCEDURE — 99284 EMERGENCY DEPT VISIT MOD MDM: CPT

## 2020-03-22 RX ORDER — METRONIDAZOLE 250 MG/1
2000 TABLET ORAL ONCE
Status: COMPLETED | OUTPATIENT
Start: 2020-03-22 | End: 2020-03-22

## 2020-03-22 RX ADMIN — METRONIDAZOLE 2000 MG: 250 TABLET ORAL at 20:00

## 2020-03-22 ASSESSMENT — ENCOUNTER SYMPTOMS
COUGH: 0
SHORTNESS OF BREATH: 0
EYE DISCHARGE: 0
SORE THROAT: 0
CONSTIPATION: 0
ABDOMINAL PAIN: 0
NAUSEA: 0
RHINORRHEA: 0
EYE REDNESS: 0
SINUS PAIN: 0
DIARRHEA: 0
CHEST TIGHTNESS: 0
VOMITING: 0
SINUS PRESSURE: 0

## 2020-03-22 ASSESSMENT — PAIN DESCRIPTION - ORIENTATION: ORIENTATION: RIGHT;LOWER

## 2020-03-22 ASSESSMENT — PAIN DESCRIPTION - LOCATION: LOCATION: ABDOMEN

## 2020-03-22 ASSESSMENT — PAIN SCALES - GENERAL: PAINLEVEL_OUTOF10: 4

## 2020-03-22 ASSESSMENT — PAIN DESCRIPTION - PAIN TYPE: TYPE: ACUTE PAIN

## 2020-03-23 NOTE — ED PROVIDER NOTES
weakness, numbness and headaches. Psychiatric/Behavioral: Negative. All other systems reviewed and are negative. Positivesand Pertinent negatives as per HPI. Except as noted above in the ROS, all other systems were reviewed and negative.        PAST MEDICAL HISTORY     Past Medical History:   Diagnosis Date    Asthma     as child    Back pain     [facility]     Depression     Diabetes mellitus ([facility]     resolved per patient    Liver disease     fatty liver    Obesity     OCD (obsessive compulsive disorder)     PCOS (polycystic ovarian syndrome)     PCOS (polycystic ovarian syndrome)     Sleep apnea     cpap         SURGICAL HISTORY       Past Surgical History:   Procedure Laterality Date    ANKLE SURGERY      MOUTH SURGERY      OTHER SURGICAL HISTORY Left 12/10/14    Excision of Cyst Left Upper Posterior Ankle    OTHER SURGICAL HISTORY Right 12/29/2016    Laparotomy with Right SO    OVARY REMOVAL  12/28/2016    unsure of side    SLEEVE GASTRECTOMY N/A 11/6/2019    LAPAROSCOPIC SLEEVE GASTRECTOMY - ETHICON performed by Kelley Pinto MD at [address] [place] [address] 8/23/2019    [facility]       Discharge Medication List as of 3/22/2020 11:24 PM      CONTINUE these medications which have NOT CHANGED    Details   ibuprofen (ADVIL;MOTRIN) 600 MG tablet Take 1 tablet by mouth every 6 hours as needed for Pain, Disp-30 tablet, R-0Print      omeprazole (PRILOSEC) 20 MG delayed release capsule TAKE 1 CAPSULE BY MOUTH DAILY, Disp-30 capsule, R-0Normal      Multiple Vitamins-Minerals (BARIATRIC FUSION) CHEW Take 4 tablets by mouth dailyHistorical Med      levothyroxine (SYNTHROID) 75 MCG tablet TAKE 1 TABLET BY MOUTH EVERY DAY, R-5Historical Med      Cholecalciferol (VITAMIN D PO) Take by mouth daily Historical Med      ziprasidone (GEODON) 20 MG capsule Take 20 mg by mouth 2 times Scale  Eye Opening: Spontaneous  Best Verbal Response: Oriented  Best Motor Response: Obeys commands  Lamin Coma Scale Score: 15    [facility]     Heart Score         PHYSICAL EXAM    (up to 7 for level 4, 8 ormore for level 5)     ED Triage Vitals [03/22/20 2210]   BP Temp Temp Source Pulse Resp SpO2 Height Weight   115/89 98.3 °F (36.8 °C) Oral 91 18 100 % -- --       Physical Exam  Vitals signs and nursing note reviewed. Exam conducted with a chaperone present Jose Elias Gomez RN). Constitutional:       Appearance: She is well-developed. She is obese. She is not diaphoretic. HENT:      Head: Normocephalic. Nose: Nose normal.      Mouth/Throat:      Pharynx: No oropharyngeal exudate. Eyes:      General:         Right eye: No discharge. Left eye: No discharge. Conjunctiva/sclera: Conjunctivae normal.      Pupils: Pupils are equal, round, and reactive to light. Neck:      Musculoskeletal: Normal range of motion. Cardiovascular:      Rate and Rhythm: Normal rate and regular rhythm. Heart sounds: Normal heart sounds. No murmur. No friction rub. No gallop. Pulmonary:      Effort: Pulmonary effort is normal. No respiratory distress. Breath sounds: Normal breath sounds. No wheezing. Abdominal:      General: Bowel sounds are normal. There is no distension. Palpations: Abdomen is soft. Tenderness: There is no abdominal tenderness. Genitourinary:     Exam position: Supine. Vagina: Normal.      Cervix: Cervical bleeding present. No cervical motion tenderness, discharge, friability or erythema. Uterus: Normal.       Adnexa: Right adnexa normal and left adnexa normal.      Rectum: Normal.      Comments: +cervix with IUD strings visible   Musculoskeletal: Normal range of motion. Skin:     General: Skin is warm and dry. Capillary Refill: Capillary refill takes less than 2 seconds. Neurological:      General: No focal deficit present.       Mental Status: She is alert and oriented to person, place, and time. Psychiatric:         Behavior: Behavior normal.         DIAGNOSTIC RESULTS   LABS:    Labs Reviewed   WET PREP, GENITAL - Abnormal; Notable for the following components:       Result Value    Clue Cells, Wet Prep <1+ (*)     All other components within normal limits    Narrative:     Performed at:  [facility],  [place]   Phone [phone]   URINE RT REFLEX TO CULTURE - Abnormal; Notable for the following components:    Blood, Urine SMALL (*)     All other components within normal limits    Narrative:     Performed at:  [facility],  [place], [place]   Phone [phone]   MICROSCOPIC URINALYSIS - Abnormal; Notable for the following components:    Mucus, UA 2+ (*)     Epithelial Cells, UA 11-20 (*)     Bacteria, UA 2+ (*)     All other components within normal limits    Narrative:     Performed at:  [facility],  [employer]   Phone [phone]   PREGNANCY, URINE    Narrative:     Performed at:  [facility],  [place], [place]   Phone [phone]       All other labs were within normal range or notreturned as of this dictation. EKG: All EKG's are interpreted by the Emergency Department Physician who either signs or Co-signs this chart in the absence of a cardiologist.  Please see their note for interpretation of EKG. RADIOLOGY:         Interpretation per the Radiologist below, if available at the time of this note:    No orders to display     No results found.       PROCEDURES   Unless otherwise noted below, none     Procedures    CRITICAL CARE TIME   N/A    CONSULTS:  None      EMERGENCY DEPARTMENT COURSE and DIFFERENTIAL DIAGNOSIS/MDM:   Vitals:    Vitals:    03/22/20 2210 03/22/20 2332   BP: 115/89 112/86   Pulse: 91 88 Resp: 18 16   Temp: 98.3 °F (36.8 °C) 98.3 °F (36.8 °C)   TempSrc: Oral Oral   SpO2: 100% 98%       Patient was given the following medications:  Medications   metroNIDAZOLE (FLAGYL) tablet 2,000 mg (2,000 mg Oral Given 3/22/20 2000)         Afebrile, stable, patient presents to the ED for evaluation. Non toxic, NAD, SPo2 on room air of 98%, not hypoxic. Pelvic exam confirms IUD strings are in place. Very mild vaginal discharge. No abd pain. No adenexal tenderness. Wet prep positive for clue cells. Treated with flagyl. Advised follow up with OBGYN. All questions are answered. Indications for return to the ED are discussed. Patient is advised if any new or worsening symptoms arise they should immediately return to the emergency room. Follow-up with primary care in 1-2 days. The patient tolerated their visit well. The patient and / or the family were informed of the results of any tests, a time was given to answer questions, a plan was proposed and they agreed Amado Hamming.     Results for orders placed or performed during the hospital encounter of 03/22/20   Wet prep, genital   Result Value Ref Range    Trichomonas Prep None Seen     Yeast, Wet Prep None Seen     Clue Cells, Wet Prep <1+ (A)     WBC, Wet Prep None Seen     RBC, Wet Prep <1+     Epi Cells 1+     Bacteria 1+     Source Wet Prep Vaginal    Urine, reflex to culture   Result Value Ref Range    Color, UA Yellow Straw/Yellow    Clarity, UA Clear Clear    Glucose, Ur Negative Negative mg/dL    Bilirubin Urine Negative Negative    Ketones, Urine Negative Negative mg/dL    Specific Gravity, UA 1.025 1.005 - 1.030    Blood, Urine SMALL (A) Negative    pH, UA 6.5 5.0 - 8.0    Protein, UA Negative Negative mg/dL    Urobilinogen, Urine 1.0 <2.0 E.U./dL    Nitrite, Urine Negative Negative    Leukocyte Esterase, Urine Negative Negative    Microscopic Examination YES     Urine Type NotGiven     Urine Reflex to Culture Not Indicated    Pregnancy, Urine   Result Value Ref Range    HCG(Urine) Pregnancy Test Negative Detects HCG level >20 MIU/mL   Microscopic Urinalysis   Result Value Ref Range    Mucus, UA 2+ (A) None Seen /LPF    WBC, UA None seen 0 - 5 /HPF    RBC, UA 3-4 0 - 4 /HPF    Epithelial Cells, UA 11-20 (A) 0 - 5 /HPF    Bacteria, UA 2+ (A) None Seen /HPF       I estimate there is LOW risk for ACUTE APPENDICITIS, BOWEL OBSTRUCTION, CHOLECYSTITIS, DIVERTICULITIS, INCARCERATED HERNIA, PANCREATITIS, PELVIC INFLAMMATORY DISEASE, PERFORATED BOWEL or ULCER, PREGNANCY, or TUBO-OVARIAN ABSCESS, thus I consider the discharge disposition reasonable. Also, there is no evidence or peritonitis, sepsis, or toxicity. Milon Epley and I have discussed the diagnosis and risks, and we agree with discharging home to follow-up with their primary doctor. We also discussed returning to the Emergency Department immediately if new or worsening symptoms occur. We have discussed the symptoms which are most concerning (e.g., bloody stool, fever, changing or worsening pain, vomiting) that necessitate immediate return. Final Impression    1. Bacterial vaginosis    2. Vaginal bleeding        Blood pressure 112/86, pulse 88, temperature 98.3 °F (36.8 °C), temperature source Oral, resp. rate 16, last menstrual period 02/25/2020, SpO2 98 %, not currently breastfeeding. FINAL IMPRESSION      1. Bacterial vaginosis    2.  Vaginal bleeding          DISPOSITION/PLAN   DISPOSITION Decision To Discharge 03/22/2020 11:12:06 PM      PATIENT REFERRED TO:  [facility]  [address] [place] [address]  [phone]    Schedule an appointment as soon as possible for a visit   for a recheck in 2  days      DISCHARGE MEDICATIONS:  Discharge Medication List as of 3/22/2020 11:24 PM          DISCONTINUED MEDICATIONS:  Discharge Medication List as of 3/22/2020 11:24 PM

## 2020-03-23 NOTE — ED TRIAGE NOTES
Patient complains of vaginal bleeding after intercourse last night. States bleeding is minimal at this time.

## 2020-03-27 ENCOUNTER — TELEPHONE (OUTPATIENT)
Dept: BARIATRICS/WEIGHT MGMT | Age: 35
End: 2020-03-27

## 2020-03-27 NOTE — TELEPHONE ENCOUNTER
Please advise patient to withhold from strenuous activity until improves. She can try ice, use her abdominal binder for support and/or take tylenol. Her incisions should be well healed as she is almost 5 months out from surgery. If does not improve she can see her PCP or go to the ED.

## 2020-03-27 NOTE — TELEPHONE ENCOUNTER
Pt calling in s/p sleeve 11/6/19, stated that she had \"rough\" intercourse last night, woke up this morning with abdominal pain, pain around belly button radiates up to left side and ribs. Burning by incisions and diarrhea. Pt not sure if she did anything. . please advise

## 2020-04-06 ENCOUNTER — TELEPHONE (OUTPATIENT)
Dept: BARIATRICS/WEIGHT MGMT | Age: 35
End: 2020-04-06

## 2020-04-06 PROBLEM — R10.11 RUQ PAIN: Status: ACTIVE | Noted: 2020-04-06

## 2020-04-06 NOTE — TELEPHONE ENCOUNTER
Rena Mora,  Not sure what is meant by ruled other stuff out as I do not see any notes in her chart or results other then the ED a few weeks ago. If she has not had a gallbladder ultrasound then I can order that as most of her symptoms could be related to her gallbladder. Could you please call patient and ask her if they completed a GBUS. If not I will order and she can schedule to have it completed.   Thank you,  Orestes Willis

## 2020-04-07 ENCOUNTER — TELEPHONE (OUTPATIENT)
Dept: BARIATRICS/WEIGHT MGMT | Age: 35
End: 2020-04-07

## 2020-04-07 ENCOUNTER — HOSPITAL ENCOUNTER (OUTPATIENT)
Dept: ULTRASOUND IMAGING | Age: 35
Discharge: HOME | End: 2020-04-07
Payer: MEDICAID

## 2020-04-07 PROCEDURE — 76705 ECHO EXAM OF ABDOMEN: CPT

## 2020-04-07 NOTE — TELEPHONE ENCOUNTER
Rosalio Dawn,  Please let patient know that her GBUS did show gallstones, but no wall thickening or acute cholecystitis (inflammation). The remainder of the ultrasound was negative. This could explain a lot of her symptoms as she had a normal gallbladder on her CT on 8/7/2019. Lets get her set up to have a visit with Dr. Ramone Gan to evaluate. Also, let patient know to stick with bland foods and avoid any high fat/spicey/fried foods. Thank you.

## 2020-04-08 ENCOUNTER — TELEPHONE (OUTPATIENT)
Dept: BARIATRICS/WEIGHT MGMT | Age: 35
End: 2020-04-08

## 2020-04-08 NOTE — TELEPHONE ENCOUNTER
Agreed  Would recommend staying hydrated and remain on clear liquid diet until pain resolves  Can take tylenol motrin 400 mg every 6 hours as needed for pain    Ultrasound just shows gallstones and no active inflammation or infection    Would delay surgery for removal of gallbladder due to COVID-19 risk

## 2020-04-09 ENCOUNTER — TELEMEDICINE (OUTPATIENT)
Dept: SURGERY | Age: 35
End: 2020-04-09
Payer: MEDICAID

## 2020-04-09 PROCEDURE — G8428 CUR MEDS NOT DOCUMENT: HCPCS | Performed by: SURGERY

## 2020-04-09 PROCEDURE — 1036F TOBACCO NON-USER: CPT | Performed by: SURGERY

## 2020-04-09 PROCEDURE — G8417 CALC BMI ABV UP PARAM F/U: HCPCS | Performed by: SURGERY

## 2020-04-09 PROCEDURE — 99214 OFFICE O/P EST MOD 30 MIN: CPT | Performed by: SURGERY

## 2020-04-09 RX ORDER — OMEPRAZOLE 40 MG/1
40 CAPSULE, DELAYED RELEASE ORAL DAILY
Qty: 30 CAPSULE | Refills: 2 | Status: SHIPPED | OUTPATIENT
Start: 2020-04-09 | End: 2020-07-07

## 2020-04-11 ENCOUNTER — HOSPITAL ENCOUNTER (EMERGENCY)
Age: 35
Discharge: HOME | End: 2020-04-12
Attending: EMERGENCY MEDICINE
Payer: MEDICAID

## 2020-04-11 LAB
A/G RATIO: 1 (ref 1.1–2.2)
ALBUMIN SERPL-MCNC: 4 G/DL (ref 3.4–5)
ALP BLD-CCNC: 79 U/L (ref 40–129)
ALT SERPL-CCNC: 28 U/L (ref 10–40)
ANION GAP SERPL CALCULATED.3IONS-SCNC: 13 MMOL/L (ref 3–16)
AST SERPL-CCNC: 30 U/L (ref 15–37)
BILIRUB SERPL-MCNC: 0.5 MG/DL (ref 0–1)
BUN BLDV-MCNC: 11 MG/DL (ref 7–20)
CALCIUM SERPL-MCNC: 9.2 MG/DL (ref 8.3–10.6)
CHLORIDE BLD-SCNC: 99 MMOL/L (ref 99–110)
CO2: 24 MMOL/L (ref 21–32)
CREAT SERPL-MCNC: 0.6 MG/DL (ref 0.6–1.1)
GFR AFRICAN AMERICAN: >60
GFR NON-AFRICAN AMERICAN: >60
GLOBULIN: 3.9 G/DL
GLUCOSE BLD-MCNC: 92 MG/DL (ref 70–99)
HCG QUALITATIVE: NEGATIVE
LIPASE: 28 U/L (ref 13–60)
POTASSIUM REFLEX MAGNESIUM: 3.8 MMOL/L (ref 3.5–5.1)
SODIUM BLD-SCNC: 136 MMOL/L (ref 136–145)
TOTAL PROTEIN: 7.9 G/DL (ref 6.4–8.2)

## 2020-04-11 PROCEDURE — 6360000002 HC RX W HCPCS: Performed by: EMERGENCY MEDICINE

## 2020-04-11 PROCEDURE — 96374 THER/PROPH/DIAG INJ IV PUSH: CPT

## 2020-04-11 PROCEDURE — 83690 ASSAY OF LIPASE: CPT

## 2020-04-11 PROCEDURE — 93005 ELECTROCARDIOGRAM TRACING: CPT | Performed by: EMERGENCY MEDICINE

## 2020-04-11 PROCEDURE — 84703 CHORIONIC GONADOTROPIN ASSAY: CPT

## 2020-04-11 PROCEDURE — 85025 COMPLETE CBC W/AUTO DIFF WBC: CPT

## 2020-04-11 PROCEDURE — 99284 EMERGENCY DEPT VISIT MOD MDM: CPT

## 2020-04-11 PROCEDURE — 96375 TX/PRO/DX INJ NEW DRUG ADDON: CPT

## 2020-04-11 PROCEDURE — 2580000003 HC RX 258: Performed by: EMERGENCY MEDICINE

## 2020-04-11 PROCEDURE — 80053 COMPREHEN METABOLIC PANEL: CPT

## 2020-04-11 RX ORDER — DIPHENHYDRAMINE HYDROCHLORIDE 50 MG/ML
25 INJECTION INTRAMUSCULAR; INTRAVENOUS ONCE
Status: COMPLETED | OUTPATIENT
Start: 2020-04-11 | End: 2020-04-11

## 2020-04-11 RX ORDER — PROCHLORPERAZINE EDISYLATE 5 MG/ML
10 INJECTION INTRAMUSCULAR; INTRAVENOUS ONCE
Status: COMPLETED | OUTPATIENT
Start: 2020-04-11 | End: 2020-04-11

## 2020-04-11 RX ORDER — 0.9 % SODIUM CHLORIDE 0.9 %
1000 INTRAVENOUS SOLUTION INTRAVENOUS ONCE
Status: COMPLETED | OUTPATIENT
Start: 2020-04-11 | End: 2020-04-12

## 2020-04-11 RX ADMIN — PROCHLORPERAZINE EDISYLATE 10 MG: 5 INJECTION INTRAMUSCULAR; INTRAVENOUS at 23:46

## 2020-04-11 RX ADMIN — DIPHENHYDRAMINE HYDROCHLORIDE 25 MG: 50 INJECTION, SOLUTION INTRAMUSCULAR; INTRAVENOUS at 23:48

## 2020-04-11 RX ADMIN — SODIUM CHLORIDE 1000 ML: 9 INJECTION, SOLUTION INTRAVENOUS at 23:44

## 2020-04-11 ASSESSMENT — PAIN SCALES - GENERAL: PAINLEVEL_OUTOF10: 9

## 2020-04-11 ASSESSMENT — PAIN DESCRIPTION - LOCATION: LOCATION: ABDOMEN

## 2020-04-12 ENCOUNTER — APPOINTMENT (OUTPATIENT)
Dept: CT IMAGING | Age: 35
End: 2020-04-12
Payer: MEDICAID

## 2020-04-12 VITALS
DIASTOLIC BLOOD PRESSURE: 67 MMHG | TEMPERATURE: 98.2 F | HEART RATE: 61 BPM | OXYGEN SATURATION: 97 % | HEIGHT: 67 IN | SYSTOLIC BLOOD PRESSURE: 113 MMHG | BODY MASS INDEX: 44.89 KG/M2 | RESPIRATION RATE: 14 BRPM | WEIGHT: 286 LBS

## 2020-04-12 LAB
AMORPHOUS: ABNORMAL /HPF
BASOPHILS ABSOLUTE: 0.2 K/UL (ref 0–0.2)
BASOPHILS RELATIVE PERCENT: 2.9 %
BILIRUBIN URINE: NEGATIVE
BLOOD, URINE: ABNORMAL
CLARITY: CLEAR
COLOR: YELLOW
EOSINOPHILS ABSOLUTE: 0.1 K/UL (ref 0–0.6)
EOSINOPHILS RELATIVE PERCENT: 1.3 %
EPITHELIAL CELLS, UA: ABNORMAL /HPF (ref 0–5)
GLUCOSE URINE: NEGATIVE MG/DL
HCT VFR BLD CALC: 44.7 % (ref 36–48)
HEMOGLOBIN: 14.7 G/DL (ref 12–16)
KETONES, URINE: NEGATIVE MG/DL
LEUKOCYTE ESTERASE, URINE: NEGATIVE
LYMPHOCYTES ABSOLUTE: 1.8 K/UL (ref 1–5.1)
LYMPHOCYTES RELATIVE PERCENT: 22.8 %
MCH RBC QN AUTO: 31.5 PG (ref 26–34)
MCHC RBC AUTO-ENTMCNC: 32.9 G/DL (ref 31–36)
MCV RBC AUTO: 95.8 FL (ref 80–100)
MICROSCOPIC EXAMINATION: YES
MONOCYTES ABSOLUTE: 0.4 K/UL (ref 0–1.3)
MONOCYTES RELATIVE PERCENT: 5.4 %
NEUTROPHILS ABSOLUTE: 5.4 K/UL (ref 1.7–7.7)
NEUTROPHILS RELATIVE PERCENT: 67.6 %
NITRITE, URINE: NEGATIVE
PDW BLD-RTO: 14.1 % (ref 12.4–15.4)
PH UA: 7 (ref 5–8)
PLATELET # BLD: 247 K/UL (ref 135–450)
PMV BLD AUTO: 10 FL (ref 5–10.5)
PROTEIN UA: NEGATIVE MG/DL
RAPID INFLUENZA  B AGN: NEGATIVE
RAPID INFLUENZA A AGN: NEGATIVE
RBC # BLD: 4.66 M/UL (ref 4–5.2)
RBC UA: ABNORMAL /HPF (ref 0–4)
S PYO AG THROAT QL: NEGATIVE
SARS-COV-2, PCR: NOT DETECTED
SPECIFIC GRAVITY UA: 1.01 (ref 1–1.03)
URINE REFLEX TO CULTURE: ABNORMAL
URINE TYPE: ABNORMAL
UROBILINOGEN, URINE: 0.2 E.U./DL
WBC # BLD: 7.9 K/UL (ref 4–11)
WBC UA: ABNORMAL /HPF (ref 0–5)

## 2020-04-12 PROCEDURE — 87081 CULTURE SCREEN ONLY: CPT

## 2020-04-12 PROCEDURE — 6360000004 HC RX CONTRAST MEDICATION: Performed by: EMERGENCY MEDICINE

## 2020-04-12 PROCEDURE — 74177 CT ABD & PELVIS W/CONTRAST: CPT

## 2020-04-12 PROCEDURE — 81001 URINALYSIS AUTO W/SCOPE: CPT

## 2020-04-12 PROCEDURE — U0002 COVID-19 LAB TEST NON-CDC: HCPCS

## 2020-04-12 PROCEDURE — 87804 INFLUENZA ASSAY W/OPTIC: CPT

## 2020-04-12 PROCEDURE — 87880 STREP A ASSAY W/OPTIC: CPT

## 2020-04-12 RX ORDER — ONDANSETRON 4 MG/1
4 TABLET, ORALLY DISINTEGRATING ORAL EVERY 8 HOURS PRN
Qty: 16 TABLET | Refills: 0 | Status: ON HOLD | OUTPATIENT
Start: 2020-04-12 | End: 2020-04-19 | Stop reason: ALTCHOICE

## 2020-04-12 RX ORDER — PROMETHAZINE HYDROCHLORIDE 25 MG/1
25 TABLET ORAL EVERY 6 HOURS PRN
Qty: 20 TABLET | Refills: 0 | Status: ON HOLD | OUTPATIENT
Start: 2020-04-12 | End: 2020-04-19 | Stop reason: ALTCHOICE

## 2020-04-12 RX ORDER — BUTALBITAL, ACETAMINOPHEN AND CAFFEINE 50; 325; 40 MG/1; MG/1; MG/1
1 TABLET ORAL EVERY 4 HOURS PRN
Qty: 180 TABLET | Refills: 3 | Status: SHIPPED | OUTPATIENT
Start: 2020-04-12 | End: 2020-04-30

## 2020-04-12 RX ADMIN — IOPAMIDOL 75 ML: 755 INJECTION, SOLUTION INTRAVENOUS at 01:19

## 2020-04-12 NOTE — ED PROVIDER NOTES
CHIEF COMPLAINT  Abdominal Pain (pt has multi complaints; pt states that approx 10 days ago started to feel stomach pain) and Fever (also c/o of nausa and diarrhea and chills and weakness and dizziness; pt is also c/o of a severe headache; had a sorethroat 2 weeks ago)      HISTORY OF PRESENT ILLNESS  Lacy Garrison is a 30 y.o. female presents to the ED with right upper abdominal pain, started approximately 10 days ago, low-grade fevers, T-max 100.1, nausea but no vomiting, also with nonbloody diarrhea, a couple times a day, loose, watery, no known C. difficile exposure, states this occurs right after she eats, shortly after, admittance chills, feels fatigue, generalized weakness, and lightheadedness with standing, thinks she may be dehydrated, also complaining of right-sided headache behind her eye, tried Tylenol without much improvement, has a sore throat a couple weeks ago but not significant currently, has been having intermittent earache bilaterally, states are bothering her a little right now, no urinary symptoms, no dysuria/hematuria, states she did have some vaginal bleeding, her period was 2 weeks late, reports she has ultrasound last week for gallbladder which showed stones but no obvious infection or inflammation, had gastric sleeve surgery in the past and was following up for potential cholecystectomy, but they were waiting until June due to coronavirus pandemic, no chest pain or shortness of breath. She does work in a nursing home, [email or web address], but has been off for a week or 2 due to the symptoms, they did not want her to come back ill, no vision changes, no numbness or weakness, she was concerned that there could be something wrong with her IUD, or her appendix, wanted further evaluation, but she does still have one ovary, but no pelvic pain, other was removed due to torsion, she had a telehealth visit with Dr. Alexis Winter, 4/9/2020, no other complaints, modifying factors or associated symptoms.      I have reviewed the following from the nursing documentation.     Past Medical History:   Diagnosis Date    Asthma     as child    Back pain     [facility] [address] [facility]     Depression     Diabetes mellitus ([facility] [address] [facility]     resolved per patient    Liver disease     fatty liver    Obesity     OCD (obsessive compulsive disorder)     PCOS (polycystic ovarian syndrome)     PCOS (polycystic ovarian syndrome)     Sleep apnea     cpap     Past Surgical History:   Procedure Laterality Date    ANKLE SURGERY      MOUTH SURGERY      OTHER SURGICAL HISTORY Left 12/10/14    Excision of Cyst Left Upper Posterior Ankle    OTHER SURGICAL HISTORY Right 12/29/2016    Laparotomy with Right SO    OVARY REMOVAL  12/28/2016    unsure of side    SLEEVE GASTRECTOMY N/A 11/6/2019    LAPAROSCOPIC SLEEVE GASTRECTOMY - ETHICON performed by Gwen Horne MD at [facility] N/A 8/23/2019    EGD BIOPSY performed by Gwen Horne MD at [address] [place] [address]     Family History   Problem Relation Age of Onset    Asthma Mother     Arthritis Mother     Hypertension Father    [place] Elevated Lipids Father     Diabetes Father     Alcohol Abuse Father     Asthma Father     Arthritis Father     Diabetes Paternal Grandfather     Arthritis Paternal Grandfather     Diabetes Maternal Grandmother     Arthritis Maternal Grandmother      Social History     Socioeconomic History    Marital status:      Spouse name: Not on file    Number of children: Not on file    Years of education: Not on file    Highest education level: Not on file   Occupational History    Not on file   Social Needs    Financial resource strain: Not on file    Food insecurity     Worry: Not on file     Inability: Not on file    Transportation needs     Medical: Not on file     Non-medical: Not on file   Tobacco Use    Smoking status: Never Smoker    Smokeless tobacco: Never Used   Substance and Sexual Activity    Alcohol use: Not Currently     Alcohol/week: 1.0 standard drinks     Types: 1 Glasses of wine per week    Drug use: No    Sexual activity: Yes     Partners: Male   Lifestyle    Physical activity     Days per week: Not on file     Minutes per session: Not on file    Stress: Not on file   Relationships    Social connections     Talks on phone: Not on file     Gets together: Not on file     Attends Buddhism service: Not on file     Active member of club or organization: Not on file     Attends meetings of clubs or organizations: Not on file     Relationship status: Not on file    Intimate partner violence     Fear of current or ex partner: Not on file     Emotionally abused: Not on file     Physically abused: Not on file     Forced sexual activity: Not on file   Other Topics Concern    Not on file   Social History Narrative    Not on file     No current facility-administered medications for this encounter.       Current Outpatient Medications   Medication Sig Dispense Refill    butalbital-acetaminophen-caffeine (FIORICET, ESGIC) -40 MG per tablet Take 1 tablet by mouth every 4 hours as needed for Headaches 180 tablet 3    ondansetron (ZOFRAN ODT) 4 MG disintegrating tablet Take 1 tablet by mouth every 8 hours as needed for Nausea or Vomiting 16 tablet 0    promethazine (PHENERGAN) 25 MG tablet Take 1 tablet by mouth every 6 hours as needed for Nausea (vomiting) 20 tablet 0    NONFORMULARY Take 1 tablet by mouth 2 times daily antibotic doesn't know what the name of it is; states that she took the last one this evening      omeprazole (PRILOSEC) 40 MG delayed release capsule Take 1 capsule by mouth daily 30 capsule 2    Multiple Vitamins-Minerals (BARIATRIC FUSION) CHEW Take 4 tablets by mouth daily      levothyroxine (SYNTHROID) 75 MCG tablet TAKE 1 TABLET BY MOUTH EVERY DAY  5    Cholecalciferol (VITAMIN D PO) Take by mouth daily       ziprasidone (GEODON) 20 MG capsule Take 20 mg by mouth 2 times daily (with meals)      clomiPRAMINE (ANAFRANIL) 25 MG capsule Take 50 mg by mouth nightly      ibuprofen (ADVIL;MOTRIN) 600 MG tablet Take 1 tablet by mouth every 6 hours as needed for Pain 30 tablet 0     Allergies   Allergen Reactions    Latex Itching    Lactose      \"Extreme\" stomach pain and diarrhea    Adhesive Tape      Rips off skin    Keflex [Cephalexin] Nausea And Vomiting     Projectile vomitting       REVIEW OF SYSTEMS  10 systems reviewed, pertinent positives per HPI otherwise noted to be negative. PHYSICAL EXAM  /67   Pulse 61   Temp 98.2 °F (36.8 °C) (Oral)   Resp 14   Ht 5' 7\" (1.702 m)   Wt 286 lb (129.7 kg)   SpO2 97%   BMI 44.79 kg/m²   GENERAL APPEARANCE: Awake and alert. Cooperative. No acute distress ,morbidly obese  HEAD: Normocephalic. Atraumatic. EYES: PERRL. EOM's grossly intact. No Conjunctival injection  ENT: Mucous membranes are moist.  TMs without bulging/erythema/edema bilaterally, intact, no tonsillar exudates, midline uvula, airway patent, no oral ulcerations/lesions  NECK: Supple. No rigidity, normal range of motion  HEART: RRR. No murmurs  LUNGS: Respirations nonlabored. Lungs are clear to auscultation bilaterally. ABDOMEN: Soft. Non-distended. Mild right upper quadrant/epigastric tenderness, no CVA tenderness but slight flank tenderness on that side as well, negative Conner sign, negative McBurney's point tenderness or Rovsing sign, no lower abdominal tenderness. No guarding or rebound. Normal bowel sounds. Well-healed surgical scars   EXTREMITIES: No peripheral edema. No calf tenderness, moves all extremities equally. SKIN: Warm and dry. No acute rashes. sLightly pale  NEUROLOGICAL: Alert and oriented. No gross facial drooping. Normal speech  PSYCHIATRIC: Normal mood and affect. LABS  I have reviewed all labs for this visit.    Results for orders placed or performed during the hospital encounter of 04/11/20   Rapid influenza A/B antigens   Result Value UA Negative Negative mg/dL    Urobilinogen, Urine 0.2 <2.0 E.U./dL    Nitrite, Urine Negative Negative    Leukocyte Esterase, Urine Negative Negative    Microscopic Examination YES     Urine Type NotGiven     Urine Reflex to Culture Not Indicated    HCG Qualitative, Serum   Result Value Ref Range    hCG Qual Negative Detects HCG level >10 MIU/mL   Microscopic Urinalysis   Result Value Ref Range    WBC, UA None seen 0 - 5 /HPF    RBC, UA 3-4 0 - 4 /HPF    Epithelial Cells, UA 6-10 (A) 0 - 5 /HPF    Amorphous, UA Rare /HPF     RADIOLOGY  Us Gallbladder Ruq    Result Date: 4/7/2020  EXAMINATION: RIGHT UPPER QUADRANT ULTRASOUND 4/7/2020 8:57 am COMPARISON: CT abdomen pelvis 08/07/2019 HISTORY: ORDERING SYSTEM PROVIDED HISTORY: RUQ pain TECHNOLOGIST PROVIDED HISTORY: Reason for exam:->RUQ pain, nausea and diarrhea. s/p sleeve FINDINGS: Sonographic visualization of the intra-abdominal organs was suboptimal due to body habitus and overlying bowel gas. LIVER: The liver demonstrates normal echogenicity without evidence of intrahepatic biliary ductal dilatation. BILIARY SYSTEM:  Gallstones are identified. There is no evidence of gallbladder wall thickening or pericholecystic fluid. The sonographic Conner's sign was reported as negative. Common bile duct is within normal limits measuring 5 mm. RIGHT KIDNEY: The right kidney is grossly unremarkable without evidence of hydronephrosis. PANCREAS:  The pancreas was not well visualized sonographically. OTHER: No evidence of right upper quadrant ascites. Cholelithiasis without sonographic evidence of acute cholecystitis. CT ABDOMEN PELVIS W IV CONTRAST Additional Contrast? None (Final result)   Result time 04/12/20 01:30:20   Final result by Feliciano Kent DO (04/12/20 01:30:20)                Impression:    1    1. No acute findings within the abdomen or pelvis. 2. IUD position within the fundal portion of the endometrial cavity. 3. Prior gastric bypass surgery  4. Cortical cyst formation noted on the kidneys bilaterally  5. Scattered colonic diverticula, without evidence of diverticulitis                  ED COURSE/MDM  Patient seen and evaluated. Old records reviewed. Labs and imaging reviewed and results discussed with patient. 24-year-old female with multiple symptoms, I have low suspicion for coronavirus at this time, but she does work in a nursing home and given that she does have several of the symptoms, I performed screening covid19 testing, no lymphopenia, visualized.   Lungs without infiltrate/groundglass opacities on imaging, she is not complaining of cough currently, would not want her to go back to work if this could be positive, she states she did not need a work excuse and would be off until notified, labs unremarkable, I do not feel that she warrants antibiotics at this time, normal vital signs, EKG within normal limits, strep and flu were negative, no evidence of acute intra-abdominal process, there was not even visualization of the cholelithiasis that was noted on recent ultrasound, we did discuss potential for biliary colic, and her symptoms did seem consistent with gallbladder disease, recommended she follow back up with her surgeon as they have been discussing performing elective cholecystectomy but due to the pandemic had not planned on doing this until June, but explained she should discuss the worsening symptoms, but did give her nausea medication to help control this at home, medications here did improve her nausea and headache, she did request something for home for headache, limited due to unable to take NSAIDs due to gastric sleeve surgery in the past, no acute process noted related to this on imaging, IUD appeared to be in place, she is not having any lower abdominal pain, no UTI on UA, no evidence of appendicitis, no evidence of acute cholecystitis on CT, and normal LFTs and lipase, patient is nontoxic-appearing, I feel that there may be some

## 2020-04-13 ENCOUNTER — CARE COORDINATION (OUTPATIENT)
Dept: CARE COORDINATION | Age: 35
End: 2020-04-13

## 2020-04-13 ENCOUNTER — TELEPHONE (OUTPATIENT)
Dept: BARIATRICS/WEIGHT MGMT | Age: 35
End: 2020-04-13

## 2020-04-13 LAB
EKG ATRIAL RATE: 63 BPM
EKG DIAGNOSIS: NORMAL
EKG P AXIS: 26 DEGREES
EKG P-R INTERVAL: 138 MS
EKG Q-T INTERVAL: 432 MS
EKG QRS DURATION: 88 MS
EKG QTC CALCULATION (BAZETT): 442 MS
EKG R AXIS: 22 DEGREES
EKG T AXIS: 25 DEGREES
EKG VENTRICULAR RATE: 63 BPM

## 2020-04-13 PROCEDURE — 93010 ELECTROCARDIOGRAM REPORT: CPT | Performed by: INTERNAL MEDICINE

## 2020-04-14 LAB — S PYO THROAT QL CULT: NORMAL

## 2020-04-19 ENCOUNTER — HOSPITAL ENCOUNTER (INPATIENT)
Age: 35
LOS: 1 days | Discharge: HOME | DRG: 263 | End: 2020-04-20
Attending: EMERGENCY MEDICINE | Admitting: SURGERY
Payer: MEDICAID

## 2020-04-19 PROBLEM — K80.20 SYMPTOMATIC CHOLELITHIASIS: Status: ACTIVE | Noted: 2020-04-19

## 2020-04-19 LAB
ABO/RH: NORMAL
ALBUMIN SERPL-MCNC: 4.1 G/DL (ref 3.4–5)
ALP BLD-CCNC: 69 U/L (ref 40–129)
ALT SERPL-CCNC: 22 U/L (ref 10–40)
ANION GAP SERPL CALCULATED.3IONS-SCNC: 14 MMOL/L (ref 3–16)
ANTIBODY SCREEN: NORMAL
AST SERPL-CCNC: 27 U/L (ref 15–37)
BASOPHILS ABSOLUTE: 0 K/UL (ref 0–0.2)
BASOPHILS RELATIVE PERCENT: 0.7 %
BILIRUB SERPL-MCNC: 0.6 MG/DL (ref 0–1)
BILIRUBIN DIRECT: <0.2 MG/DL (ref 0–0.3)
BILIRUBIN URINE: NEGATIVE
BILIRUBIN, INDIRECT: NORMAL MG/DL (ref 0–1)
BLOOD, URINE: NEGATIVE
BUN BLDV-MCNC: 7 MG/DL (ref 7–20)
CALCIUM SERPL-MCNC: 9.6 MG/DL (ref 8.3–10.6)
CHLORIDE BLD-SCNC: 99 MMOL/L (ref 99–110)
CLARITY: CLEAR
CO2: 26 MMOL/L (ref 21–32)
COLOR: YELLOW
CREAT SERPL-MCNC: 0.8 MG/DL (ref 0.6–1.1)
EOSINOPHILS ABSOLUTE: 0.1 K/UL (ref 0–0.6)
EOSINOPHILS RELATIVE PERCENT: 1.4 %
GFR AFRICAN AMERICAN: >60
GFR NON-AFRICAN AMERICAN: >60
GLUCOSE BLD-MCNC: 98 MG/DL (ref 70–99)
GLUCOSE URINE: NEGATIVE MG/DL
HCG(URINE) PREGNANCY TEST: NEGATIVE
HCT VFR BLD CALC: 43.9 % (ref 36–48)
HEMOGLOBIN: 14.8 G/DL (ref 12–16)
KETONES, URINE: 15 MG/DL
LEUKOCYTE ESTERASE, URINE: NEGATIVE
LIPASE: 33 U/L (ref 13–60)
LYMPHOCYTES ABSOLUTE: 1.7 K/UL (ref 1–5.1)
LYMPHOCYTES RELATIVE PERCENT: 25.4 %
MAGNESIUM: 2 MG/DL (ref 1.8–2.4)
MCH RBC QN AUTO: 31.9 PG (ref 26–34)
MCHC RBC AUTO-ENTMCNC: 33.6 G/DL (ref 31–36)
MCV RBC AUTO: 94.9 FL (ref 80–100)
MICROSCOPIC EXAMINATION: ABNORMAL
MONOCYTES ABSOLUTE: 0.5 K/UL (ref 0–1.3)
MONOCYTES RELATIVE PERCENT: 7.8 %
NEUTROPHILS ABSOLUTE: 4.4 K/UL (ref 1.7–7.7)
NEUTROPHILS RELATIVE PERCENT: 64.7 %
NITRITE, URINE: NEGATIVE
PDW BLD-RTO: 13.9 % (ref 12.4–15.4)
PH UA: 7 (ref 5–8)
PHOSPHORUS: 3.5 MG/DL (ref 2.5–4.9)
PLATELET # BLD: 245 K/UL (ref 135–450)
PMV BLD AUTO: 9.8 FL (ref 5–10.5)
POTASSIUM SERPL-SCNC: 3.4 MMOL/L (ref 3.5–5.1)
PROTEIN UA: NEGATIVE MG/DL
RBC # BLD: 4.63 M/UL (ref 4–5.2)
SODIUM BLD-SCNC: 139 MMOL/L (ref 136–145)
SPECIFIC GRAVITY UA: 1.01 (ref 1–1.03)
TOTAL PROTEIN: 7.4 G/DL (ref 6.4–8.2)
URINE TYPE: ABNORMAL
UROBILINOGEN, URINE: 0.2 E.U./DL
WBC # BLD: 6.7 K/UL (ref 4–11)

## 2020-04-19 PROCEDURE — 1200000000 HC SEMI PRIVATE

## 2020-04-19 PROCEDURE — 86900 BLOOD TYPING SEROLOGIC ABO: CPT

## 2020-04-19 PROCEDURE — 36415 COLL VENOUS BLD VENIPUNCTURE: CPT

## 2020-04-19 PROCEDURE — 83735 ASSAY OF MAGNESIUM: CPT

## 2020-04-19 PROCEDURE — 94761 N-INVAS EAR/PLS OXIMETRY MLT: CPT

## 2020-04-19 PROCEDURE — C9113 INJ PANTOPRAZOLE SODIUM, VIA: HCPCS | Performed by: STUDENT IN AN ORGANIZED HEALTH CARE EDUCATION/TRAINING PROGRAM

## 2020-04-19 PROCEDURE — 86850 RBC ANTIBODY SCREEN: CPT

## 2020-04-19 PROCEDURE — 6360000002 HC RX W HCPCS: Performed by: STUDENT IN AN ORGANIZED HEALTH CARE EDUCATION/TRAINING PROGRAM

## 2020-04-19 PROCEDURE — 2500000003 HC RX 250 WO HCPCS: Performed by: STUDENT IN AN ORGANIZED HEALTH CARE EDUCATION/TRAINING PROGRAM

## 2020-04-19 PROCEDURE — 2580000003 HC RX 258

## 2020-04-19 PROCEDURE — 6370000000 HC RX 637 (ALT 250 FOR IP): Performed by: STUDENT IN AN ORGANIZED HEALTH CARE EDUCATION/TRAINING PROGRAM

## 2020-04-19 PROCEDURE — 80076 HEPATIC FUNCTION PANEL: CPT

## 2020-04-19 PROCEDURE — 85025 COMPLETE CBC W/AUTO DIFF WBC: CPT

## 2020-04-19 PROCEDURE — 2580000003 HC RX 258: Performed by: STUDENT IN AN ORGANIZED HEALTH CARE EDUCATION/TRAINING PROGRAM

## 2020-04-19 PROCEDURE — 99284 EMERGENCY DEPT VISIT MOD MDM: CPT

## 2020-04-19 PROCEDURE — 86901 BLOOD TYPING SEROLOGIC RH(D): CPT

## 2020-04-19 PROCEDURE — 80069 RENAL FUNCTION PANEL: CPT

## 2020-04-19 PROCEDURE — 81003 URINALYSIS AUTO W/O SCOPE: CPT

## 2020-04-19 PROCEDURE — 99223 1ST HOSP IP/OBS HIGH 75: CPT | Performed by: SURGERY

## 2020-04-19 PROCEDURE — 83690 ASSAY OF LIPASE: CPT

## 2020-04-19 PROCEDURE — 84703 CHORIONIC GONADOTROPIN ASSAY: CPT

## 2020-04-19 RX ORDER — SODIUM CHLORIDE, SODIUM LACTATE, POTASSIUM CHLORIDE, AND CALCIUM CHLORIDE .6; .31; .03; .02 G/100ML; G/100ML; G/100ML; G/100ML
1000 INJECTION, SOLUTION INTRAVENOUS ONCE
Status: COMPLETED | OUTPATIENT
Start: 2020-04-19 | End: 2020-04-19

## 2020-04-19 RX ORDER — SODIUM CHLORIDE, SODIUM LACTATE, POTASSIUM CHLORIDE, CALCIUM CHLORIDE 600; 310; 30; 20 MG/100ML; MG/100ML; MG/100ML; MG/100ML
INJECTION, SOLUTION INTRAVENOUS CONTINUOUS
Status: DISCONTINUED | OUTPATIENT
Start: 2020-04-20 | End: 2020-04-19

## 2020-04-19 RX ORDER — LORATADINE 10 MG/1
10 TABLET ORAL DAILY
COMMUNITY
Start: 2020-04-09

## 2020-04-19 RX ORDER — ACETAMINOPHEN 500 MG
1000 TABLET ORAL EVERY 6 HOURS PRN
Status: DISCONTINUED | OUTPATIENT
Start: 2020-04-19 | End: 2020-04-20 | Stop reason: HOSPADM

## 2020-04-19 RX ORDER — SODIUM CHLORIDE, SODIUM LACTATE, POTASSIUM CHLORIDE, CALCIUM CHLORIDE 600; 310; 30; 20 MG/100ML; MG/100ML; MG/100ML; MG/100ML
INJECTION, SOLUTION INTRAVENOUS CONTINUOUS
Status: DISCONTINUED | OUTPATIENT
Start: 2020-04-19 | End: 2020-04-20

## 2020-04-19 RX ORDER — OXYCODONE HYDROCHLORIDE 5 MG/1
10 TABLET ORAL EVERY 4 HOURS PRN
Status: DISCONTINUED | OUTPATIENT
Start: 2020-04-19 | End: 2020-04-19

## 2020-04-19 RX ORDER — SODIUM CHLORIDE 0.9 % (FLUSH) 0.9 %
10 SYRINGE (ML) INJECTION PRN
Status: DISCONTINUED | OUTPATIENT
Start: 2020-04-19 | End: 2020-04-20

## 2020-04-19 RX ORDER — PANTOPRAZOLE SODIUM 40 MG/10ML
40 INJECTION, POWDER, LYOPHILIZED, FOR SOLUTION INTRAVENOUS DAILY
Status: DISCONTINUED | OUTPATIENT
Start: 2020-04-19 | End: 2020-04-20 | Stop reason: HOSPADM

## 2020-04-19 RX ORDER — OXYCODONE HYDROCHLORIDE 5 MG/1
5 TABLET ORAL EVERY 4 HOURS PRN
Status: DISCONTINUED | OUTPATIENT
Start: 2020-04-19 | End: 2020-04-19

## 2020-04-19 RX ORDER — SODIUM CHLORIDE, SODIUM LACTATE, POTASSIUM CHLORIDE, CALCIUM CHLORIDE 600; 310; 30; 20 MG/100ML; MG/100ML; MG/100ML; MG/100ML
INJECTION, SOLUTION INTRAVENOUS
Status: COMPLETED
Start: 2020-04-19 | End: 2020-04-19

## 2020-04-19 RX ORDER — SODIUM CHLORIDE 0.9 % (FLUSH) 0.9 %
10 SYRINGE (ML) INJECTION EVERY 12 HOURS SCHEDULED
Status: DISCONTINUED | OUTPATIENT
Start: 2020-04-19 | End: 2020-04-20

## 2020-04-19 RX ADMIN — SODIUM CHLORIDE, POTASSIUM CHLORIDE, SODIUM LACTATE AND CALCIUM CHLORIDE: 600; 310; 30; 20 INJECTION, SOLUTION INTRAVENOUS at 15:30

## 2020-04-19 RX ADMIN — SODIUM CHLORIDE, POTASSIUM CHLORIDE, SODIUM LACTATE AND CALCIUM CHLORIDE 1000 ML: 600; 310; 30; 20 INJECTION, SOLUTION INTRAVENOUS at 18:02

## 2020-04-19 RX ADMIN — FOLIC ACID: 5 INJECTION, SOLUTION INTRAMUSCULAR; INTRAVENOUS; SUBCUTANEOUS at 18:02

## 2020-04-19 RX ADMIN — SODIUM CHLORIDE, SODIUM LACTATE, POTASSIUM CHLORIDE, AND CALCIUM CHLORIDE: .6; .31; .03; .02 INJECTION, SOLUTION INTRAVENOUS at 16:40

## 2020-04-19 RX ADMIN — Medication 10 ML: at 20:06

## 2020-04-19 RX ADMIN — ACETAMINOPHEN 1000 MG: 500 TABLET ORAL at 18:50

## 2020-04-19 RX ADMIN — PANTOPRAZOLE SODIUM 40 MG: 40 INJECTION, POWDER, FOR SOLUTION INTRAVENOUS at 18:02

## 2020-04-19 ASSESSMENT — ENCOUNTER SYMPTOMS
BACK PAIN: 1
DIARRHEA: 0
ABDOMINAL PAIN: 1
COUGH: 0
CONSTIPATION: 1
EYE REDNESS: 0
NAUSEA: 1
VOMITING: 1
SHORTNESS OF BREATH: 0

## 2020-04-19 ASSESSMENT — PAIN DESCRIPTION - PAIN TYPE: TYPE: ACUTE PAIN

## 2020-04-19 ASSESSMENT — PAIN SCALES - GENERAL
PAINLEVEL_OUTOF10: 4
PAINLEVEL_OUTOF10: 4
PAINLEVEL_OUTOF10: 2

## 2020-04-19 ASSESSMENT — PAIN DESCRIPTION - ORIENTATION: ORIENTATION: LEFT;RIGHT

## 2020-04-19 ASSESSMENT — PAIN DESCRIPTION - LOCATION: LOCATION: ABDOMEN;BACK

## 2020-04-19 NOTE — ED NOTES
Bed: A03-03  Expected date:   Expected time:   Means of arrival:   Comments:  [address], RN  04/19/20 [id number]

## 2020-04-19 NOTE — ED PROVIDER NOTES
ED Attending Attestation Note     Date of evaluation: 4/19/2020    This patient was seen by the advance practice provider. I have seen and examined the patient, agree with the workup, evaluation, management and diagnosis. The care plan has been discussed. My assessment reveals patient with RUQ abd pain and known gallstones. Sent in by general surgery for admission for surgery.      Raysa Camejo MD  04/19/20 9937

## 2020-04-19 NOTE — ED PROVIDER NOTES
[address] ENCOUNTER          PHYSICIAN ASSISTANT NOTE       Date of evaluation: 4/19/2020    Chief Complaint     Abdominal Pain and Back Pain      History of Present Illness     Carlitos Almeida is a 30 y.o. female with PMH of Diabetes, Obesity, GERD, S/p Gastric Sleeve and R oophorectomy for torsion who presents with Abdominal pain. Patient reports that she has been experiencing right upper quadrant abdominal discomfort for the past 10 days. She states this is been constant. Describes it as burning. There are no palliative or provocative factors. It is unchanged with eating. She states that she has been having worsening symptoms since yesterday. She also has associated bilateral back/flank pain and nausea with vomiting since yesterday. She states that when this initially began, she also had low grade fever, congestion, sore throat, and ear pain. She reports she had negative COVID-19 testing and was treated with abx for URI. She reports these symptoms have resolved and she has had no further fevers. She was previously evaluated for this pain in the ED - u/s revealed cholelithiasis without cholecystitis and a CT showed diverticula and cyst on kidney. Patient states that she has gallbladder removal surgery scheduled in a month with Dr. Pepper Stakes but was instructed to come to the ED today due to her worsening symptoms. She denies any urinary complaints. She is currently on her menstrual cycle. She has not had a BM in several days but reports passing gas. Review of Systems     Review of Systems   Constitutional: Negative for chills and fever. HENT: Negative for congestion. Eyes: Negative for redness. Respiratory: Negative for cough and shortness of breath. Cardiovascular: Negative for chest pain. Gastrointestinal: Positive for abdominal pain, constipation, nausea and vomiting. Negative for diarrhea. Genitourinary: Negative for difficulty urinating and dysuria. what the name of it is; states that she took the last one this evening    OMEPRAZOLE (PRILOSEC) 40 MG DELAYED RELEASE CAPSULE    Take 1 capsule by mouth daily    ONDANSETRON (ZOFRAN ODT) 4 MG DISINTEGRATING TABLET    Take 1 tablet by mouth every 8 hours as needed for Nausea or Vomiting    PROMETHAZINE (PHENERGAN) 25 MG TABLET    Take 1 tablet by mouth every 6 hours as needed for Nausea (vomiting)    ZIPRASIDONE (GEODON) 20 MG CAPSULE    Take 20 mg by mouth 2 times daily (with meals)       Allergies     She is allergic to latex; lactose; adhesive tape; and keflex [cephalexin]. Physical Exam     INITIAL VITALS: BP: 123/76,Temp: 98 °F (36.7 °C), Pulse: 86,  , SpO2: 100 %   Physical Exam  Vitals signs and nursing note reviewed. Constitutional:       General: She is not in acute distress. Appearance: She is obese. HENT:      Head: Normocephalic and atraumatic. Mouth/Throat:      Mouth: Mucous membranes are moist.      Pharynx: Oropharynx is clear. Eyes:      Extraocular Movements: Extraocular movements intact. Conjunctiva/sclera: Conjunctivae normal.   Neck:      Musculoskeletal: Neck supple. Cardiovascular:      Rate and Rhythm: Normal rate and regular rhythm. Pulmonary:      Effort: Pulmonary effort is normal. No respiratory distress. Breath sounds: Normal breath sounds. No wheezing, rhonchi or rales. Abdominal:      General: Bowel sounds are normal. There is no distension. Palpations: Abdomen is soft. Tenderness: There is abdominal tenderness in the right upper quadrant and epigastric area. There is no guarding or rebound. Negative signs include Conner's sign. Comments: Tender over bilateral flank without true CVA tenderness. Musculoskeletal:         General: No deformity. Skin:     General: Skin is warm and dry. Neurological:      Mental Status: She is alert and oriented to person, place, and time.    Psychiatric:         Mood and Affect: Mood normal.

## 2020-04-19 NOTE — PROGRESS NOTES
Admission Progress Note  4/19/2020 3:06 PM     Data: Patient to room 5528 from ED. See doc flow sheets for assessments and screenings. Action: Patient oriented to room and call light. Patient informed of plan of care. Reviewed the patient education folder with the patient and/or family. Patient instructed to call nurse with any needs or concerns. Response:  Patient verbalized understanding of plan of care and all instructions. Bed locked and in lowest position. Call light in reach. Will continue to monitor patient per plan of care.     Mikayla Zhao RN Electronically signed by Mikayla Zhao RN on 4/19/2020 at 3:06 PM    ________________________________________________________________________

## 2020-04-20 ENCOUNTER — ANESTHESIA EVENT (OUTPATIENT)
Dept: OPERATING ROOM | Age: 35
DRG: 263 | End: 2020-04-20
Payer: MEDICAID

## 2020-04-20 ENCOUNTER — ANESTHESIA (OUTPATIENT)
Dept: OPERATING ROOM | Age: 35
DRG: 263 | End: 2020-04-20
Payer: MEDICAID

## 2020-04-20 VITALS
SYSTOLIC BLOOD PRESSURE: 153 MMHG | RESPIRATION RATE: 14 BRPM | HEART RATE: 92 BPM | BODY MASS INDEX: 47.65 KG/M2 | HEIGHT: 65 IN | WEIGHT: 286 LBS | DIASTOLIC BLOOD PRESSURE: 69 MMHG | TEMPERATURE: 97.8 F | OXYGEN SATURATION: 94 %

## 2020-04-20 VITALS — SYSTOLIC BLOOD PRESSURE: 131 MMHG | TEMPERATURE: 94.8 F | DIASTOLIC BLOOD PRESSURE: 69 MMHG | OXYGEN SATURATION: 100 %

## 2020-04-20 PROBLEM — K80.00 ACUTE CHOLECYSTITIS DUE TO BILIARY CALCULUS: Status: ACTIVE | Noted: 2020-04-19

## 2020-04-20 LAB
ALBUMIN SERPL-MCNC: 4 G/DL (ref 3.4–5)
ALP BLD-CCNC: 61 U/L (ref 40–129)
ALT SERPL-CCNC: 20 U/L (ref 10–40)
ANION GAP SERPL CALCULATED.3IONS-SCNC: 9 MMOL/L (ref 3–16)
AST SERPL-CCNC: 23 U/L (ref 15–37)
BASOPHILS ABSOLUTE: 0 K/UL (ref 0–0.2)
BASOPHILS RELATIVE PERCENT: 0.6 %
BILIRUB SERPL-MCNC: 0.7 MG/DL (ref 0–1)
BILIRUBIN DIRECT: <0.2 MG/DL (ref 0–0.3)
BILIRUBIN, INDIRECT: NORMAL MG/DL (ref 0–1)
BUN BLDV-MCNC: 5 MG/DL (ref 7–20)
CALCIUM SERPL-MCNC: 9.4 MG/DL (ref 8.3–10.6)
CHLORIDE BLD-SCNC: 101 MMOL/L (ref 99–110)
CO2: 29 MMOL/L (ref 21–32)
CREAT SERPL-MCNC: 0.7 MG/DL (ref 0.6–1.1)
EOSINOPHILS ABSOLUTE: 0.1 K/UL (ref 0–0.6)
EOSINOPHILS RELATIVE PERCENT: 1.8 %
GFR AFRICAN AMERICAN: >60
GFR NON-AFRICAN AMERICAN: >60
GLUCOSE BLD-MCNC: 91 MG/DL (ref 70–99)
HCT VFR BLD CALC: 41.2 % (ref 36–48)
HEMOGLOBIN: 13.7 G/DL (ref 12–16)
LYMPHOCYTES ABSOLUTE: 2.3 K/UL (ref 1–5.1)
LYMPHOCYTES RELATIVE PERCENT: 45.8 %
MAGNESIUM: 1.8 MG/DL (ref 1.8–2.4)
MCH RBC QN AUTO: 31.7 PG (ref 26–34)
MCHC RBC AUTO-ENTMCNC: 33.2 G/DL (ref 31–36)
MCV RBC AUTO: 95.5 FL (ref 80–100)
MONOCYTES ABSOLUTE: 0.4 K/UL (ref 0–1.3)
MONOCYTES RELATIVE PERCENT: 8 %
NEUTROPHILS ABSOLUTE: 2.2 K/UL (ref 1.7–7.7)
NEUTROPHILS RELATIVE PERCENT: 43.8 %
PDW BLD-RTO: 13.7 % (ref 12.4–15.4)
PHOSPHORUS: 3.4 MG/DL (ref 2.5–4.9)
PLATELET # BLD: 223 K/UL (ref 135–450)
PMV BLD AUTO: 9.8 FL (ref 5–10.5)
POTASSIUM SERPL-SCNC: 3.6 MMOL/L (ref 3.5–5.1)
RBC # BLD: 4.31 M/UL (ref 4–5.2)
SODIUM BLD-SCNC: 139 MMOL/L (ref 136–145)
TOTAL PROTEIN: 6.6 G/DL (ref 6.4–8.2)
WBC # BLD: 5 K/UL (ref 4–11)

## 2020-04-20 PROCEDURE — 2500000003 HC RX 250 WO HCPCS: Performed by: SURGERY

## 2020-04-20 PROCEDURE — 2500000003 HC RX 250 WO HCPCS: Performed by: NURSE ANESTHETIST, CERTIFIED REGISTERED

## 2020-04-20 PROCEDURE — 3700000000 HC ANESTHESIA ATTENDED CARE: Performed by: SURGERY

## 2020-04-20 PROCEDURE — 3600000004 HC SURGERY LEVEL 4 BASE: Performed by: SURGERY

## 2020-04-20 PROCEDURE — 6360000002 HC RX W HCPCS: Performed by: STUDENT IN AN ORGANIZED HEALTH CARE EDUCATION/TRAINING PROGRAM

## 2020-04-20 PROCEDURE — 83735 ASSAY OF MAGNESIUM: CPT

## 2020-04-20 PROCEDURE — 80076 HEPATIC FUNCTION PANEL: CPT

## 2020-04-20 PROCEDURE — 3700000001 HC ADD 15 MINUTES (ANESTHESIA): Performed by: SURGERY

## 2020-04-20 PROCEDURE — 88304 TISSUE EXAM BY PATHOLOGIST: CPT

## 2020-04-20 PROCEDURE — 2580000003 HC RX 258: Performed by: STUDENT IN AN ORGANIZED HEALTH CARE EDUCATION/TRAINING PROGRAM

## 2020-04-20 PROCEDURE — 80069 RENAL FUNCTION PANEL: CPT

## 2020-04-20 PROCEDURE — 2580000003 HC RX 258

## 2020-04-20 PROCEDURE — 6360000002 HC RX W HCPCS: Performed by: ANESTHESIOLOGY

## 2020-04-20 PROCEDURE — 7100000000 HC PACU RECOVERY - FIRST 15 MIN: Performed by: SURGERY

## 2020-04-20 PROCEDURE — 3600000014 HC SURGERY LEVEL 4 ADDTL 15MIN: Performed by: SURGERY

## 2020-04-20 PROCEDURE — 47562 LAPAROSCOPIC CHOLECYSTECTOMY: CPT | Performed by: SURGERY

## 2020-04-20 PROCEDURE — 36415 COLL VENOUS BLD VENIPUNCTURE: CPT

## 2020-04-20 PROCEDURE — 7100000001 HC PACU RECOVERY - ADDTL 15 MIN: Performed by: SURGERY

## 2020-04-20 PROCEDURE — 85025 COMPLETE CBC W/AUTO DIFF WBC: CPT

## 2020-04-20 PROCEDURE — 2709999900 HC NON-CHARGEABLE SUPPLY: Performed by: SURGERY

## 2020-04-20 PROCEDURE — 2580000003 HC RX 258: Performed by: SURGERY

## 2020-04-20 PROCEDURE — 6360000002 HC RX W HCPCS: Performed by: NURSE ANESTHETIST, CERTIFIED REGISTERED

## 2020-04-20 PROCEDURE — 2720000010 HC SURG SUPPLY STERILE: Performed by: SURGERY

## 2020-04-20 PROCEDURE — 0FT44ZZ RESECTION OF GALLBLADDER, PERCUTANEOUS ENDOSCOPIC APPROACH: ICD-10-PCS | Performed by: SURGERY

## 2020-04-20 RX ORDER — DOCUSATE SODIUM 100 MG/1
100 CAPSULE, LIQUID FILLED ORAL 2 TIMES DAILY
Qty: 30 CAPSULE | Refills: 0 | Status: SHIPPED | OUTPATIENT
Start: 2020-04-20 | End: 2020-04-30

## 2020-04-20 RX ORDER — ONDANSETRON 2 MG/ML
4 INJECTION INTRAMUSCULAR; INTRAVENOUS
Status: DISCONTINUED | OUTPATIENT
Start: 2020-04-20 | End: 2020-04-20 | Stop reason: HOSPADM

## 2020-04-20 RX ORDER — PROPOFOL 10 MG/ML
INJECTION, EMULSION INTRAVENOUS PRN
Status: DISCONTINUED | OUTPATIENT
Start: 2020-04-20 | End: 2020-04-20 | Stop reason: SDUPTHER

## 2020-04-20 RX ORDER — OXYCODONE HYDROCHLORIDE AND ACETAMINOPHEN 5; 325 MG/1; MG/1
1 TABLET ORAL EVERY 6 HOURS PRN
Qty: 30 TABLET | Refills: 0 | Status: SHIPPED | OUTPATIENT
Start: 2020-04-20 | End: 2020-04-27

## 2020-04-20 RX ORDER — HYDROMORPHONE HCL 110MG/55ML
PATIENT CONTROLLED ANALGESIA SYRINGE INTRAVENOUS PRN
Status: DISCONTINUED | OUTPATIENT
Start: 2020-04-20 | End: 2020-04-20 | Stop reason: SDUPTHER

## 2020-04-20 RX ORDER — POTASSIUM CHLORIDE 7.45 MG/ML
10 INJECTION INTRAVENOUS
Status: DISCONTINUED | OUTPATIENT
Start: 2020-04-20 | End: 2020-04-20

## 2020-04-20 RX ORDER — SODIUM CHLORIDE 9 MG/ML
INJECTION, SOLUTION INTRAVENOUS
Status: COMPLETED
Start: 2020-04-20 | End: 2020-04-20

## 2020-04-20 RX ORDER — ONDANSETRON 2 MG/ML
INJECTION INTRAMUSCULAR; INTRAVENOUS PRN
Status: DISCONTINUED | OUTPATIENT
Start: 2020-04-20 | End: 2020-04-20 | Stop reason: SDUPTHER

## 2020-04-20 RX ORDER — DEXAMETHASONE SODIUM PHOSPHATE 4 MG/ML
4 INJECTION, SOLUTION INTRA-ARTICULAR; INTRALESIONAL; INTRAMUSCULAR; INTRAVENOUS; SOFT TISSUE
Status: DISCONTINUED | OUTPATIENT
Start: 2020-04-20 | End: 2020-04-20 | Stop reason: HOSPADM

## 2020-04-20 RX ORDER — FENTANYL CITRATE 50 UG/ML
25 INJECTION, SOLUTION INTRAMUSCULAR; INTRAVENOUS EVERY 5 MIN PRN
Status: DISCONTINUED | OUTPATIENT
Start: 2020-04-20 | End: 2020-04-20 | Stop reason: HOSPADM

## 2020-04-20 RX ORDER — NEOSTIGMINE METHYLSULFATE 5 MG/5 ML
SYRINGE (ML) INTRAVENOUS PRN
Status: DISCONTINUED | OUTPATIENT
Start: 2020-04-20 | End: 2020-04-20 | Stop reason: SDUPTHER

## 2020-04-20 RX ORDER — SODIUM CHLORIDE, SODIUM LACTATE, POTASSIUM CHLORIDE, AND CALCIUM CHLORIDE .6; .31; .03; .02 G/100ML; G/100ML; G/100ML; G/100ML
IRRIGANT IRRIGATION PRN
Status: DISCONTINUED | OUTPATIENT
Start: 2020-04-20 | End: 2020-04-20 | Stop reason: HOSPADM

## 2020-04-20 RX ORDER — MAGNESIUM SULFATE IN WATER 40 MG/ML
2 INJECTION, SOLUTION INTRAVENOUS ONCE
Status: DISCONTINUED | OUTPATIENT
Start: 2020-04-20 | End: 2020-04-20 | Stop reason: HOSPADM

## 2020-04-20 RX ORDER — DEXAMETHASONE SODIUM PHOSPHATE 4 MG/ML
INJECTION, SOLUTION INTRA-ARTICULAR; INTRALESIONAL; INTRAMUSCULAR; INTRAVENOUS; SOFT TISSUE PRN
Status: DISCONTINUED | OUTPATIENT
Start: 2020-04-20 | End: 2020-04-20 | Stop reason: SDUPTHER

## 2020-04-20 RX ORDER — MAGNESIUM HYDROXIDE 1200 MG/15ML
LIQUID ORAL CONTINUOUS PRN
Status: COMPLETED | OUTPATIENT
Start: 2020-04-20 | End: 2020-04-20

## 2020-04-20 RX ORDER — EPHEDRINE SULFATE 50 MG/ML
INJECTION INTRAVENOUS PRN
Status: DISCONTINUED | OUTPATIENT
Start: 2020-04-20 | End: 2020-04-20 | Stop reason: SDUPTHER

## 2020-04-20 RX ORDER — ROCURONIUM BROMIDE 10 MG/ML
INJECTION, SOLUTION INTRAVENOUS PRN
Status: DISCONTINUED | OUTPATIENT
Start: 2020-04-20 | End: 2020-04-20 | Stop reason: SDUPTHER

## 2020-04-20 RX ORDER — KETOROLAC TROMETHAMINE 30 MG/ML
INJECTION, SOLUTION INTRAMUSCULAR; INTRAVENOUS PRN
Status: DISCONTINUED | OUTPATIENT
Start: 2020-04-20 | End: 2020-04-20 | Stop reason: SDUPTHER

## 2020-04-20 RX ORDER — LIDOCAINE HYDROCHLORIDE 20 MG/ML
INJECTION, SOLUTION INTRAVENOUS PRN
Status: DISCONTINUED | OUTPATIENT
Start: 2020-04-20 | End: 2020-04-20 | Stop reason: SDUPTHER

## 2020-04-20 RX ORDER — SUCCINYLCHOLINE/SOD CL,ISO/PF 200MG/10ML
SYRINGE (ML) INTRAVENOUS PRN
Status: DISCONTINUED | OUTPATIENT
Start: 2020-04-20 | End: 2020-04-20 | Stop reason: SDUPTHER

## 2020-04-20 RX ORDER — 0.9 % SODIUM CHLORIDE 0.9 %
500 INTRAVENOUS SOLUTION INTRAVENOUS
Status: DISCONTINUED | OUTPATIENT
Start: 2020-04-20 | End: 2020-04-20 | Stop reason: HOSPADM

## 2020-04-20 RX ORDER — GLYCOPYRROLATE 1 MG/5 ML
SYRINGE (ML) INTRAVENOUS PRN
Status: DISCONTINUED | OUTPATIENT
Start: 2020-04-20 | End: 2020-04-20 | Stop reason: SDUPTHER

## 2020-04-20 RX ORDER — BUPIVACAINE HYDROCHLORIDE 5 MG/ML
INJECTION, SOLUTION EPIDURAL; INTRACAUDAL PRN
Status: DISCONTINUED | OUTPATIENT
Start: 2020-04-20 | End: 2020-04-20 | Stop reason: HOSPADM

## 2020-04-20 RX ADMIN — Medication 0.2 MG: at 08:06

## 2020-04-20 RX ADMIN — SODIUM CHLORIDE 250 ML: 9 INJECTION, SOLUTION INTRAVENOUS at 06:20

## 2020-04-20 RX ADMIN — FENTANYL CITRATE 25 MCG: 50 INJECTION INTRAMUSCULAR; INTRAVENOUS at 09:56

## 2020-04-20 RX ADMIN — SODIUM CHLORIDE, SODIUM LACTATE, POTASSIUM CHLORIDE, AND CALCIUM CHLORIDE: .6; .31; .03; .02 INJECTION, SOLUTION INTRAVENOUS at 07:26

## 2020-04-20 RX ADMIN — PROPOFOL 50 MG: 10 INJECTION, EMULSION INTRAVENOUS at 07:40

## 2020-04-20 RX ADMIN — EPHEDRINE SULFATE 15 MG: 50 INJECTION INTRAVENOUS at 08:08

## 2020-04-20 RX ADMIN — HYDROMORPHONE HYDROCHLORIDE 1 MG: 2 INJECTION, SOLUTION INTRAMUSCULAR; INTRAVENOUS; SUBCUTANEOUS at 07:34

## 2020-04-20 RX ADMIN — LIDOCAINE HYDROCHLORIDE 100 MG: 20 INJECTION, SOLUTION INTRAVENOUS at 07:38

## 2020-04-20 RX ADMIN — DEXAMETHASONE SODIUM PHOSPHATE 8 MG: 4 INJECTION, SOLUTION INTRAMUSCULAR; INTRAVENOUS at 07:50

## 2020-04-20 RX ADMIN — ROCURONIUM BROMIDE 10 MG: 10 INJECTION, SOLUTION INTRAVENOUS at 08:24

## 2020-04-20 RX ADMIN — PIPERACILLIN AND TAZOBACTAM 3.38 G: 3; .375 INJECTION, POWDER, LYOPHILIZED, FOR SOLUTION INTRAVENOUS at 07:40

## 2020-04-20 RX ADMIN — ONDANSETRON 8 MG: 2 INJECTION INTRAMUSCULAR; INTRAVENOUS at 07:50

## 2020-04-20 RX ADMIN — KETOROLAC TROMETHAMINE 30 MG: 30 INJECTION, SOLUTION INTRAMUSCULAR; INTRAVENOUS at 09:00

## 2020-04-20 RX ADMIN — ROCURONIUM BROMIDE 25 MG: 10 INJECTION, SOLUTION INTRAVENOUS at 07:50

## 2020-04-20 RX ADMIN — SODIUM CHLORIDE, SODIUM LACTATE, POTASSIUM CHLORIDE, AND CALCIUM CHLORIDE: .6; .31; .03; .02 INJECTION, SOLUTION INTRAVENOUS at 08:40

## 2020-04-20 RX ADMIN — Medication 140 MG: at 07:38

## 2020-04-20 RX ADMIN — FENTANYL CITRATE 25 MCG: 50 INJECTION INTRAMUSCULAR; INTRAVENOUS at 10:01

## 2020-04-20 RX ADMIN — ROCURONIUM BROMIDE 15 MG: 10 INJECTION, SOLUTION INTRAVENOUS at 07:38

## 2020-04-20 RX ADMIN — PROPOFOL 200 MG: 10 INJECTION, EMULSION INTRAVENOUS at 07:38

## 2020-04-20 RX ADMIN — Medication 0.6 MG: at 09:05

## 2020-04-20 RX ADMIN — Medication 5 MG: at 09:05

## 2020-04-20 ASSESSMENT — PULMONARY FUNCTION TESTS
PIF_VALUE: 3
PIF_VALUE: 29
PIF_VALUE: 28
PIF_VALUE: 29
PIF_VALUE: 6
PIF_VALUE: 27
PIF_VALUE: 29
PIF_VALUE: 29
PIF_VALUE: 30
PIF_VALUE: 5
PIF_VALUE: 1
PIF_VALUE: 27
PIF_VALUE: 20
PIF_VALUE: 28
PIF_VALUE: 28
PIF_VALUE: 3
PIF_VALUE: 28
PIF_VALUE: 28
PIF_VALUE: 23
PIF_VALUE: 29
PIF_VALUE: 28
PIF_VALUE: 27
PIF_VALUE: 28
PIF_VALUE: 27
PIF_VALUE: 20
PIF_VALUE: 24
PIF_VALUE: 27
PIF_VALUE: 24
PIF_VALUE: 29
PIF_VALUE: 28
PIF_VALUE: 25
PIF_VALUE: 1
PIF_VALUE: 27
PIF_VALUE: 29
PIF_VALUE: 29
PIF_VALUE: 24
PIF_VALUE: 28
PIF_VALUE: 27
PIF_VALUE: 28
PIF_VALUE: 29
PIF_VALUE: 17
PIF_VALUE: 27
PIF_VALUE: 28
PIF_VALUE: 24
PIF_VALUE: 28
PIF_VALUE: 26
PIF_VALUE: 28
PIF_VALUE: 23
PIF_VALUE: 28
PIF_VALUE: 7
PIF_VALUE: 28
PIF_VALUE: 5
PIF_VALUE: 23
PIF_VALUE: 17
PIF_VALUE: 29
PIF_VALUE: 15
PIF_VALUE: 28
PIF_VALUE: 27
PIF_VALUE: 24
PIF_VALUE: 26
PIF_VALUE: 23
PIF_VALUE: 23
PIF_VALUE: 0
PIF_VALUE: 27
PIF_VALUE: 26
PIF_VALUE: 31
PIF_VALUE: 20
PIF_VALUE: 27
PIF_VALUE: 27
PIF_VALUE: 24
PIF_VALUE: 20
PIF_VALUE: 25
PIF_VALUE: 29
PIF_VALUE: 28
PIF_VALUE: 28
PIF_VALUE: 0
PIF_VALUE: 28
PIF_VALUE: 28
PIF_VALUE: 5
PIF_VALUE: 1
PIF_VALUE: 28
PIF_VALUE: 29
PIF_VALUE: 28
PIF_VALUE: 27
PIF_VALUE: 4
PIF_VALUE: 29
PIF_VALUE: 27
PIF_VALUE: 33
PIF_VALUE: 24
PIF_VALUE: 27
PIF_VALUE: 25
PIF_VALUE: 23
PIF_VALUE: 27
PIF_VALUE: 29
PIF_VALUE: 29
PIF_VALUE: 27
PIF_VALUE: 23
PIF_VALUE: 28
PIF_VALUE: 27
PIF_VALUE: 29
PIF_VALUE: 25
PIF_VALUE: 24
PIF_VALUE: 15
PIF_VALUE: 24
PIF_VALUE: 24
PIF_VALUE: 30

## 2020-04-20 ASSESSMENT — PAIN SCALES - GENERAL
PAINLEVEL_OUTOF10: 4
PAINLEVEL_OUTOF10: 0
PAINLEVEL_OUTOF10: 4
PAINLEVEL_OUTOF10: 0
PAINLEVEL_OUTOF10: 3
PAINLEVEL_OUTOF10: 5

## 2020-04-20 ASSESSMENT — PAIN DESCRIPTION - LOCATION
LOCATION: ABDOMEN
LOCATION: ABDOMEN

## 2020-04-20 ASSESSMENT — PAIN DESCRIPTION - DESCRIPTORS
DESCRIPTORS: SORE
DESCRIPTORS: SORE

## 2020-04-20 ASSESSMENT — PAIN DESCRIPTION - PAIN TYPE
TYPE: SURGICAL PAIN
TYPE: SURGICAL PAIN

## 2020-04-20 ASSESSMENT — PAIN DESCRIPTION - FREQUENCY
FREQUENCY: CONTINUOUS
FREQUENCY: CONTINUOUS

## 2020-04-20 NOTE — OP NOTE
vision. The wound was thoroughly irrigated the skin was then closed with 4.0 vicryl in subcuticular fashion, and dermabond was applied. Instrument, sponge, and needle counts were correct at closure and at the conclusion of the case.      Findings:  Cholecystitis with Cholelithiasis      Estimated Blood Loss: Minimal      Drains: none      Total IV Fluids: 1000 ml      Specimens: Gallbladder        Complications:  None; patient tolerated the procedure well.      Disposition: PACU - hemodynamically stable.      Condition: stable     Attending Attestation: I was present and scrubbed for the entire procedure.

## 2020-04-20 NOTE — ANESTHESIA POSTPROCEDURE EVALUATION
Department of Anesthesiology  Postprocedure Note    Patient: Almaz Otoole  MRN: [id number]  YOB: 1985  Date of evaluation: 4/20/2020  Time:  10:08 AM     Procedure Summary     Date:  04/20/20 Room / Location:  [address] [place] [address]    Anesthesia Start:  6890 Anesthesia Stop:  0699    Procedure:  LAPAROSCOPIC CHOLECYSTECTOMY (N/A ) Diagnosis:  (SYMPTOMATIC CHOLELITHIASIS)    Surgeon: Coy Chiang DO Responsible Provider:  Yunior Triplett DO    Anesthesia Type:  general ASA Status:  3          Anesthesia Type: general    Ilya Phase I: Liya Score: 9    Liya Phase II:      Last vitals: Reviewed and per EMR flowsheets.        Anesthesia Post Evaluation    Patient location during evaluation: PACU  Patient participation: complete - patient participated  Level of consciousness: awake and alert  Pain score: 2  Airway patency: patent  Nausea & Vomiting: no nausea and no vomiting  Complications: no  Cardiovascular status: blood pressure returned to baseline  Respiratory status: acceptable  Hydration status: euvolemic

## 2020-04-20 NOTE — DISCHARGE SUMMARY
Discharge Summary      Patient:  Vaishali Red Date: 4/19/2020 12:52 PM    Discharge Date: 4/20/2020    Admitting Physician: Abhijit Medina DO     Discharge Physician: same    Admitting Diagnosis:  Acute cholecystitis due to biliary calculus     Discharge Diagnosis: same     Past Medical History:   Diagnosis Date    Asthma     as child    Back pain     [facility] [address]     Depression     Diabetes mellitus ([facility] [address]     resolved per patient    Liver disease     fatty liver    Obesity     OCD (obsessive compulsive disorder)     PCOS (polycystic ovarian syndrome)     PCOS (polycystic ovarian syndrome)     Sleep apnea     cpap        Indication for Admission: This is a 30 y.o. female with Hx of asthma, bipolar disorder, depression, DM, fatty liver disease, morbid obesity s/p sleeve gastrectomy (11/2019), and PCOS who presents to the ED with recurrent RUQ abdominal pain likely acute on chronic cholecystitis. Hospital Course: On HD#1, patient was taken to OR for laparoscopic cholecystectomy. Post-operatively, she was doing well. Pain controlled. She was stable for discharge home from the PACU on pain meds and stool softener.      Procedures:  Laparoscopic cholecystectomy    Consulting services:  None    Discharge physical exam:  General appearance: alert, no acute distress, grooming appropriate  Eyes: EOMI, no scleral icterus  Neck: trachea midline, no JVD  Chest/Lungs: no wheezes, normal effort without accessory muscle use, on RA  Cardiovascular: RRR  Abdomen: soft, morbidly obese, moderately tender in the RUQ, non-distended, no guarding/rigidity, old surgical scars and new incisions with skin glue in place  Skin: warm and dry, no rashes  Extremities: no edema, no cyanosis  Neuro: A&Ox3, no focal deficits, sensation intact    Disposition:  home    Condition at discharge:  Stable    Discharge Instructions:  See separate form    Patient Instructions:      Medication List      START taking

## 2020-04-20 NOTE — PROGRESS NOTES
PRE-OP NOTE  Department of Bariatric Surgery  Resident Note     Procedure: Laparoscopic cholecystectomy     Consent: Informed consent signed and in the chart     Labs      Recent Labs     04/19/20  1333 04/20/20  0502   WBC 6.7 5.0   HGB 14.8 13.7   HCT 43.9 41.2   MCV 94.9 95.5    223        Recent Labs     04/19/20  1333 04/20/20  0502    139   K 3.4* 3.6   CL 99 101   CO2 26 29   PHOS 3.5 3.4   BUN 7 5*   CREATININE 0.8 0.7        Recent Labs     04/19/20  1333 04/20/20  0502   AST 27 23   ALT 22 20   BILIDIR <0.2 <0.2   BILITOT 0.6 0.7   ALKPHOS 69 61        Recent Labs     04/19/20  1330   LIPASE 33.0        Recent Labs     04/19/20  1333 04/20/20  0502   PROT 7.4 6.6      No results for input(s): CKTOTAL, CKMB, CKMBINDEX, TROPONINI in the last 72 hours.     EKG: Completed 4/11/2020    Orders:   · Diet: NPO with LR @ 150  · Pre op Medications:  Zosyn OCTOR  · EKG- completed 4/11/2020  · Anesthesia to see patient  · Pregnancy test negative 4/19 at 17:25    Aysha Candelario MD  4/20/2020  5:59 AM  [phone]

## 2020-04-20 NOTE — ANESTHESIA PRE PROCEDURE
Department of Anesthesiology  Preprocedure Note       Name:  Liane Moctezuma   Age:  30 y.o.  :  1985                                          MRN:  [id number]         Date:  2020      Surgeon: Willy Mreino): Joon Candelario DO    Procedure: LAPAROSCOPIC CHOLECYSTECTOMY (N/A )    Medications prior to admission:   Prior to Admission medications    Medication Sig Start Date End Date Taking?  Authorizing Provider   omeprazole (PRILOSEC) 40 MG delayed release capsule Take 1 capsule by mouth daily 20  Yes Joon Candelario DO   Multiple Vitamins-Minerals (BARIATRIC FUSION) CHEW Take 4 tablets by mouth daily   Yes Historical Provider, MD   levothyroxine (SYNTHROID) 75 MCG tablet TAKE 1 TABLET BY MOUTH EVERY DAY 19  Yes Historical Provider, MD   Cholecalciferol (VITAMIN D PO) Take by mouth daily    Yes Historical Provider, MD   ziprasidone (GEODON) 20 MG capsule Take 20 mg by mouth 2 times daily (with meals)   Yes Historical Provider, MD   clomiPRAMINE (ANAFRANIL) 25 MG capsule Take 50 mg by mouth nightly   Yes Historical Provider, MD   loratadine (CLARITIN) 10 MG tablet Take 10 mg by mouth daily 20   Historical Provider, MD   butalbital-acetaminophen-caffeine (FIORICET, ESGIC) -40 MG per tablet Take 1 tablet by mouth every 4 hours as needed for Headaches 20   Otilia Hanley DO   NONFORMULARY Take 1 tablet by mouth 2 times daily antibotic doesn't know what the name of it is; states that she took the last one this evening    Historical Provider, MD   ibuprofen (ADVIL;MOTRIN) 600 MG tablet Take 1 tablet by mouth every 6 hours as needed for Pain 20  Bin Padgett MD       Current medications:    Current Facility-Administered Medications   Medication Dose Route Frequency Provider Last Rate Last Dose    sodium chloride flush 0.9 % injection 10 mL  10 mL Intravenous 2 times per day Janine Aguilar DO   10 mL at 20    sodium chloride flush 0.9 % injection 10 mL  10 mL Intravenous PRN Taqueria Blunt, DO        acetaminophen (TYLENOL) tablet 1,000 mg  1,000 mg Oral Q6H PRN Taqueria Blunt, DO   1,000 mg at 04/19/20 1850    HYDROmorphone (DILAUDID) injection 0.25 mg  0.25 mg Intravenous Q3H PRN Taqueria Blunt, DO        Or    HYDROmorphone (DILAUDID) injection 0.5 mg  0.5 mg Intravenous Q3H PRN Taqueria Blunt, DO        piperacillin-tazobactam (ZOSYN) 3.375 g in dextrose 5 % 100 mL IVPB (mini-bag)  3.375 g Intravenous [facility]        enoxaparin (LOVENOX) injection 30 mg  30 mg Subcutaneous BID Víctor Mondragon MD        lactated ringers infusion   Intravenous Continuous Víctor Mondragon  mL/hr at 04/19/20 1640      pantoprazole (PROTONIX) injection 40 mg  40 mg Intravenous Daily Víctor Mondragon MD   40 mg at 04/19/20 1802       Allergies:     Allergies   Allergen Reactions    Latex Itching    Lactose      \"Extreme\" stomach pain and diarrhea    Adhesive Tape      Rips off skin    Keflex [Cephalexin] Nausea And Vomiting     Projectile vomitting       Problem List:    Patient Active Problem List   Diagnosis Code    Pelvic mass R19.00    Biceps tendonitis on right M75.21    Right shoulder strain R81.608M    Morbid obesity with BMI of 60.0-69.9, adult ([facility]) E66.01, Z68.44    Diabetes mellitus type 2 in obese ([facility]) E11.69, E66.9    Chronic GERD K21.9    Moderate obstructive sleep apnea G47.33    Scalp cyst L72.9    Ventral hernia without obstruction or gangrene K43.9    Morbid obesity with BMI of 50.0-59.9, adult ([facility]) E66.01, Z68.43    S/P laparoscopic sleeve gastrectomy Z98.84    Morbid obesity with BMI of 45.0-49.9, adult ([facility]) E66.01, Z68.42    RUQ pain R10.11    Symptomatic cholelithiasis K80.20       Past Medical History:        Diagnosis Date    Asthma     as child    Back pain     Bipolar [facility] disorder [facility]     Depression     [facility]     resolved per patient    Liver disease     fatty liver    Obesity    

## 2020-04-20 NOTE — PROGRESS NOTES
From OR to [facility] 10 accompanied by po myers and monitors applied. intraop meds discussed. Pt arrives able to state needs and denies pain. At 0935 pt had urge to urinate, urinated via bedpan without difficulty. Pt voided 650ml clear yellow urine. Pt on menstrual cycle, peripad replaced with mesh panties.

## 2020-04-21 ENCOUNTER — CARE COORDINATION (OUTPATIENT)
Dept: CARE COORDINATION | Age: 35
End: 2020-04-21

## 2020-04-21 NOTE — CARE COORDINATION
First phone attempt for Hospital FU. Patient was not available. Plan  Make second phone call    Shannan Jennings.  Dillon Hawk, RN, BSN, [address] [place] [address] Care  [phone]

## 2020-04-22 ENCOUNTER — CARE COORDINATION (OUTPATIENT)
Dept: CARE COORDINATION | Age: 35
End: 2020-04-22

## 2020-04-22 NOTE — CARE COORDINATION
Patient contacted regarding recent discharge and COVID-19 risk   Care Transition Nurse/ Ambulatory Care Manager contacted the patient by telephone to perform post discharge assessment. Verified name and  with patient as identifiers. Patient has following risk factors of: asthma. CTN/ACM reviewed discharge instructions, medical action plan and red flags related to discharge diagnosis. Reviewed and educated them on any new and changed medications related to discharge diagnosis. Advised obtaining a 90-day supply of all daily and as-needed medications. Education provided regarding infection prevention, and signs and symptoms of COVID-19 and when to seek medical attention with patient who verbalized understanding. Discussed exposure protocols and quarantine from [address] you at higher risk for severe illness  and given an opportunity for questions and concerns. The patient agrees to contact the COVID-19 hotline [phone] or PCP office for questions related to their healthcare. CTN/ACM provided contact information for future reference. From CDC: Are you at higher risk for severe illness?  Wash your hands often.  Avoid close contact (6 feet, which is about two arm lengths) with people who are sick.  Put distance between yourself and other people if COVID-19 is spreading in your community.  Clean and disinfect frequently touched surfaces.  Avoid all cruise travel and non-essential air travel.  Call your healthcare professional if you have concerns about COVID-19 and your underlying condition or if you are sick. For more information on steps you can take to protect yourself, see CDC's How to [id number] [place] [address] for follow-up call in 7-14 days based on severity of symptoms and risk factors. Spoke to patient over the phone. She says she was doing well. Denies complaints of nausea. Surgical post op pain is well managed. She is getting up often.   Patient is following the corona virus safety precautions and verbalizes understanding her hospital discharge instructions. Plan  Hospital FU in 2 weeks    Terrell Hatfield.  Maura Tang RN, BSN, [address] [place] [address]  [phone]

## 2020-04-27 ENCOUNTER — CARE COORDINATION (OUTPATIENT)
Dept: CARE COORDINATION | Age: 35
End: 2020-04-27

## 2020-04-27 NOTE — CARE COORDINATION
You Patient resolved from the Care Transitions episode on 4/13/20  Patient/family has been provided the following resources and education related to COVID-19:                         Signs, symptoms and red flags related to COVID-19            CDC exposure and quarantine guidelines            [employer] exposure contact - [phone]            Contact for their local Department of Health                 Patient currently reports that the following symptoms have improved:  cholecystectomy incision has healed. Denies any current symptoms. No further outreach scheduled with this CTN/ACM. Episode of Care resolved. Patient has this CTN/ACM contact information if future needs arise. Dakotah Heredia RN, BSN, [address] [place] [address] Care  [phone]

## 2020-04-30 ENCOUNTER — TELEMEDICINE (OUTPATIENT)
Dept: BARIATRICS/WEIGHT MGMT | Age: 35
End: 2020-04-30
Payer: MEDICAID

## 2020-04-30 VITALS — BODY MASS INDEX: 46.59 KG/M2 | WEIGHT: 280 LBS

## 2020-04-30 PROCEDURE — 99213 OFFICE O/P EST LOW 20 MIN: CPT | Performed by: NURSE PRACTITIONER

## 2020-04-30 PROCEDURE — G8427 DOCREV CUR MEDS BY ELIG CLIN: HCPCS | Performed by: NURSE PRACTITIONER

## 2020-04-30 PROCEDURE — 3046F HEMOGLOBIN A1C LEVEL >9.0%: CPT | Performed by: NURSE PRACTITIONER

## 2020-04-30 PROCEDURE — 1111F DSCHRG MED/CURRENT MED MERGE: CPT | Performed by: NURSE PRACTITIONER

## 2020-04-30 PROCEDURE — 2022F DILAT RTA XM EVC RTNOPTHY: CPT | Performed by: NURSE PRACTITIONER

## 2020-04-30 ASSESSMENT — ENCOUNTER SYMPTOMS
GASTROINTESTINAL NEGATIVE: 1
RESPIRATORY NEGATIVE: 1
EYES NEGATIVE: 1

## 2020-04-30 NOTE — PROGRESS NOTES
and awake  [x] Oriented to person/place/time [x]Able to follow commands      Eyes:  EOM    [x]  Normal  [] Abnormal-  Sclera  [x]  Normal  [] Abnormal -         Discharge [x]  None visible  [] Abnormal -    HENT:   [x] Normocephalic, atraumatic. [] Abnormal   [x] Mouth/Throat: Mucous membranes are moist.     External Ears [x] Normal  [] Abnormal-     Neck: [x] No visualized mass     Pulmonary/Chest: [x] Respiratory effort normal.  [x] No visualized signs of difficulty breathing or respiratory distress        [] Abnormal-      Musculoskeletal:   [] Normal gait with no signs of ataxia         [x] Normal range of motion of neck        [] Abnormal-     Neurological:        [x] No Facial Asymmetry (Cranial nerve 7 motor function) (limited exam to video visit)          [x] No gaze palsy        [] Abnormal-         Skin:        [x] No significant exanthematous lesions or discoloration noted on facial skin         [] Abnormal-            Psychiatric:       [x] Normal Affect [x] No Hallucinations        [] Abnormal-     Other pertinent observable physical exam findings-     Due to this being a TeleHealth encounter, evaluation of the following organ systems is limited: Vitals/Constitutional/EENT/Resp/CV/GI//MS/Neuro/Skin/Heme-Lymph-Imm. Assessment and Plan:   Patient is here via telemedicine and is 6 months s/p sleeve gastrectomy, down 16lbs with a total weight loss of 116lbs. The patient's current Body mass index is 46.59 kg/m². (4/30/20). She is doing well, denies n/v/dysphagia or reflux. She is tolerating diet, getting adequate fluids and protein. She does sometimes miss a protein with her snack, encouraged to include protein in every meal/snack. she is on track with postsurgical dietary guidelines. she is eating 4 times a day and including protein in each. she is monitoring his portions and choosing low fat/low sugared items. She is following the 30-30-30 rule.   she is drinking at least 64 ounces of fluid, and not having any liquids that contain carbonation, caffeine. She occasionally has a sugary beverage, she was encouraged to avoid this. Her diet has been altered somewhat recently due to her recent surgeries. We discussed making sure she gets back to all of her post surgical dietary guidelines once she is fully recovered. She is not currently exercising due to recent surgeries. Encouraged physical activity once she is cleared to do so. She is taking vitamins as instructed. She was advised to call our office to speak with a  Dietician once she is back to her normal routine if any questions or concerns arise about her eating habits. Labs were ordered at this visit. Patient understands it is their responsibility to have these completed and to obtain results from our office. She was advised to wait to have these completed untiul she is recovered from her recent surgeries and it is safe to get out in the community to do so. We will see her back in 2 months for continued follow up or via telemedicine depending on COVID-19 restrictions at the time of their next appointment. A total of 15 minutes was spent conversing with the patient and over half of that time was spent counseling the patient on proper dietary behaviors, exercise and post-op progress. An electronic signature was used to authenticate this note. Pursuant to the emergency declaration under the [facility], 1135 waiver authority and the "[employer]" and [employer] Act, this Virtual  Visit was conducted, with patient's consent, to reduce the patient's risk of exposure to COVID-19 and provide continuity of care for an established patient. Services were provided through a video synchronous discussion virtually to substitute for in-person clinic visit.

## 2020-05-04 ENCOUNTER — TELEPHONE (OUTPATIENT)
Dept: PULMONOLOGY | Age: 35
End: 2020-05-04

## 2020-05-04 NOTE — TELEPHONE ENCOUNTER
the provision of medical care and treatment via Telehealth and the Service and you may not be able to receive diagnosis and/or treatment through the Service for every condition for which you seek diagnosis and/or treatment. 11. There are potential risks to the use of Telehealth, including but not limited to the risks described in this Telehealth Consent. 12. Your Provider(s) have discussed the use of Telehealth and the Service with you, including the benefits and risks of such and you have provided oral consent to your Provider(s) for the use of Telehealth and the Service. 15. You understand that it is your duty to provide your Provider(s) truthful, accurate and complete information, including all relevant information regarding care that you may have received or may be receiving from other healthcare providers outside of the Service. 14. You understand that each of your Provider(s) may determine in his or sole discretion that your condition is not suitable for diagnosis and/or treatment using the Service, and that you may need to seek medical care and treatment a specialist or other healthcare provider, outside of the Service. 15. You understand that you are fully responsible for payment for all services provided by Provider(s) or through use of the Service and that you may not be able to use third-party insurance. 16. You represent that (a) you have read this Telehealth Consent carefully, (b) you understand the risks and benefits of the Service and the use of Telehealth in the medical care and treatment provided to you by Provider(s) using the Service, and (c) you have the legal capacity and authority to provide this consent for yourself and/or the minor for which you are consenting under applicable federal and state laws, including laws relating to the age of [de-identified] and/or parental/guardian consent.    17. You give your informed consent to the use of Telehealth by Provider(s) using the Service under the terms described in the Terms of Service and this Telehealth Consent. The patient was read the following statement and has consented to the visit as of 5/4/20. The patient has been scheduled for their first telehealth visit on 5/7/2020 with Dr Belkis Che.

## 2020-05-07 ENCOUNTER — VIRTUAL VISIT (OUTPATIENT)
Dept: PULMONOLOGY | Age: 35
End: 2020-05-07
Payer: MEDICAID

## 2020-05-07 VITALS — HEIGHT: 68 IN | WEIGHT: 280 LBS | BODY MASS INDEX: 42.44 KG/M2

## 2020-05-07 PROCEDURE — 99214 OFFICE O/P EST MOD 30 MIN: CPT | Performed by: INTERNAL MEDICINE

## 2020-05-07 ASSESSMENT — SLEEP AND FATIGUE QUESTIONNAIRES
HOW LIKELY ARE YOU TO NOD OFF OR FALL ASLEEP WHILE SITTING INACTIVE IN A PUBLIC PLACE: 0
HOW LIKELY ARE YOU TO NOD OFF OR FALL ASLEEP WHILE SITTING AND TALKING TO SOMEONE: 0
ESS TOTAL SCORE: 7
HOW LIKELY ARE YOU TO NOD OFF OR FALL ASLEEP WHILE SITTING QUIETLY AFTER LUNCH WITHOUT ALCOHOL: 1
HOW LIKELY ARE YOU TO NOD OFF OR FALL ASLEEP WHEN YOU ARE A PASSENGER IN A CAR FOR AN HOUR WITHOUT A BREAK: 2
HOW LIKELY ARE YOU TO NOD OFF OR FALL ASLEEP IN A CAR, WHILE STOPPED FOR A FEW MINUTES IN TRAFFIC: 0
HOW LIKELY ARE YOU TO NOD OFF OR FALL ASLEEP WHILE LYING DOWN TO REST IN THE AFTERNOON WHEN CIRCUMSTANCES PERMIT: 2
HOW LIKELY ARE YOU TO NOD OFF OR FALL ASLEEP WHILE WATCHING TV: 1
HOW LIKELY ARE YOU TO NOD OFF OR FALL ASLEEP WHILE SITTING AND READING: 1

## 2020-05-07 NOTE — PROGRESS NOTES
[facility], [address]174 waiver authority and the [employer] and [employer] Act, this Virtual Visit was conducted with patient's (and/or legal guardian's) consent, to reduce the patient's risk of exposure to COVID-19 and provide necessary medical care. The patient (and/or legal guardian) has also been advised to contact this office for worsening conditions or problems, and seek emergency medical treatment and/or call 911 if deemed necessary. Services were provided through a video synchronous discussion virtually to substitute for in-person clinic visit. Patient was located in her home, provider was located in his office. --Nhung Buchanan MD on 5/7/2020 at 9:02 AM    An electronic signature was used to authenticate this note.

## 2020-06-30 NOTE — PROGRESS NOTES
Posterior Ankle    OTHER SURGICAL HISTORY Right 12/29/2016    Laparotomy with Right SO    OVARY REMOVAL  12/28/2016    unsure of side    SLEEVE GASTRECTOMY N/A 11/6/2019    LAPAROSCOPIC SLEEVE GASTRECTOMY - ETHICON performed by Tianna Swan MD at [facility] N/A 8/23/2019    EGD BIOPSY performed by Tianna Swan MD at [facility]     Family History   Problem Relation Age of Onset    Asthma Mother     Arthritis Mother     Lung Cancer Mother     Hypertension Father     Elevated Lipids Father     Diabetes Father     Alcohol Abuse Father     Asthma Father     Arthritis Father     Diabetes Paternal Grandfather     Arthritis Paternal Grandfather     Diabetes Maternal Grandmother     Arthritis Maternal Grandmother      Social History     Tobacco Use    Smoking status: Never Smoker    Smokeless tobacco: Never Used   Substance Use Topics    Alcohol use: Yes     Alcohol/week: 1.0 standard drinks     Types: 1 Glasses of wine per week     Comment: has had a few drinks since surgery     I counseled the patient on the importance of not smoking and risks of ETOH. Allergies   Allergen Reactions    Latex Itching    Lactose      \"Extreme\" stomach pain and diarrhea    Adhesive Tape      Rips off skin    Keflex [Cephalexin] Nausea And Vomiting     Projectile vomitting     There were no vitals filed for this visit. There is no height or weight on file to calculate BMI.     Current Outpatient Medications:     loratadine (CLARITIN) 10 MG tablet, Take 10 mg by mouth daily, Disp: , Rfl:     omeprazole (PRILOSEC) 40 MG delayed release capsule, Take 1 capsule by mouth daily, Disp: 30 capsule, Rfl: 2    Multiple Vitamins-Minerals (BARIATRIC FUSION) CHEW, Take 4 tablets by mouth daily, Disp: , Rfl:     levothyroxine (SYNTHROID) 75 MCG tablet, TAKE 1 TABLET BY MOUTH EVERY DAY, Disp: , Rfl: 5    Cholecalciferol (VITAMIN D PO), Take by mouth daily , Disp: , Rfl:    Gastrointestinal: Negative. Skin: Negative. Neurological: Negative. PHYSICAL EXAMINATION:    Constitutional: [x] Appears well-developed and well-nourished [x] No apparent distress      [] Abnormal-   Mental status  [x] Alert and awake  [x] Oriented to person/place/time [x]Able to follow commands      Eyes:  EOM    [x]  Normal  [] Abnormal-  Sclera  [x]  Normal  [] Abnormal -         Discharge [x]  None visible  [] Abnormal -    HENT:   [x] Normocephalic, atraumatic. [] Abnormal   [x] Mouth/Throat: Mucous membranes are moist.     External Ears [x] Normal  [] Abnormal-     Neck: [x] No visualized mass     Pulmonary/Chest: [x] Respiratory effort normal.  [x] No visualized signs of difficulty breathing or respiratory distress        [] Abnormal-      Musculoskeletal:   [] Normal gait with no signs of ataxia         [x] Normal range of motion of neck        [] Abnormal-     Neurological:        [x] No Facial Asymmetry (Cranial nerve 7 motor function) (limited exam to video visit)          [x] No gaze palsy        [] Abnormal-         Skin:        [x] No significant exanthematous lesions or discoloration noted on facial skin         [] Abnormal-            Psychiatric:       [x] Normal Affect [x] No Hallucinations        [] Abnormal-     Other pertinent observable physical exam findings-     Due to this being a TeleHealth encounter, evaluation of the following organ systems is limited: Vitals/Constitutional/EENT/Resp/CV/GI//MS/Neuro/Skin/Heme-Lymph-Imm. Assessment and Plan:   Patient is here via telemedicine and is 8 months s/p sleeve gastrectomy, down 6lbs with a total weight loss of 122lbs. She is doing well, denies n/v/dysphagia or reflux. She is tolerating diet, getting adequate fluids and protein. She will work on eating 4 times over the course of 8 hours. She will also reduce portions to 1 cup. She will continue to track to 1200 calories a day. She is getting back into exercising.  Encouraged continued physical activity. She is taking vitamins as instructed. She did speak with the registered dietitian for continued follow up. I agree with recommendations and plan. She was reminded to have labs completed (ordered at last office visit) once she feels comfortable being out in the community to do so. We will see her back in 2 months for continued follow up or via telemedicine depending on COVID-19 restrictions at the time of their next appointment. A total of 15 minutes was spent conversing with the patient and over half of that time was spent counseling the patient on proper dietary behaviors, exercise and post-op progress. An electronic signature was used to authenticate this note. Pursuant to the emergency declaration under the [facility], 1135 waiver authority and the [employer] and [employer] Act, this Virtual  Visit was conducted, with patient's consent, to reduce the patient's risk of exposure to COVID-19 and provide continuity of care for an established patient. Services were provided through a video synchronous discussion virtually to substitute for in-person clinic visit.

## 2020-06-30 NOTE — PATIENT INSTRUCTIONS
Diet tips to help make you successful postoperatively  Eating habits after surgery will have to be a permanent and long-term change. Eating habits are so ingrained that it can be difficult to change. It is important to maintain these new eating habits after surgery. Also remember that overall health, age, and genetics make each persons weight loss progress different. Do not compare your progress, the amount you eat, or exercise to other patients.  Protein first at every meal- Eat the protein portion of your meal first. Eating protein helps the body feel full and sends a signal to stop eating. Protein is very important in building tissue in the body.  Eat at least 4 times per day- This includes protein supplements and small meals with a high amount of protein   Chewing your food thoroughly- Eating too quickly and improper chewing can cause pain and vomiting after surgery.  Slowing down the speed at which you eat- Refill your fork only after you swallow. Adopt a new pattern of eating by taking a bite of food and putting your utensil down between bites. This will help to reduce the feeling of food being stuck.    Drink water and start drinking fluids slowly- Drink at least 48 ounces per day minimum. Sip fluids as if they were hot beverages. If you find it difficult to stop gulping liquids, try using a sippy cup or a sport top water bottle.  Make sure you are eating meals without drinking fluids- After surgery you will not be allowed to drink fluids 30 minutes before, during, or 30 minutes after your meal (30/30/30 rule). This will be a life-long behavior change. The reason for the rule is to keep food from passing through your smaller stomach more rapidly. This will cause you to feel hungry shortly after your meal.   Continue to avoid caffeine and carbonated beverages- Caffeine acts as a diuretic and can be dehydrating as well as irritating to the lining of the stomach.  Carbonated beverages release gas and can expand the stomach.  Continue to keep temptation from your kitchen- Keep your pantry and kitchen cabinets cleaned out of those dangerous foods that might tempt you after surgery (chips, cookies, candy, etc.).  Continue to increase your exercise program- Increase your daily physical activity. Aim for 5-6 days per week for 30 minutes. Walking is an easy way to get started with exercising. Exercise is going to be a regular part of your life after surgery.  Make sure you have a good support system- There will be many changes and adjustments to make after surgery. It is important to have a supportive friend, family member or co-worker, etc. with whom you can talk. Continue to attend [facility]) Weight Management support groups as they can be helpful in maintaining behaviors. In addition, it is the responsibility of the patient to schedule and follow up on labs and tests completed after surgery. Results will be reviewed at each visit. Patient received dietary handouts and education.     Goals:   Do Just Dance 4x/wk at least   Spread eating over 8 hours  Continue to journal intake, aiming for 1200 calories  Limit food to 1 cup volume

## 2020-07-01 ENCOUNTER — TELEMEDICINE (OUTPATIENT)
Dept: BARIATRICS/WEIGHT MGMT | Age: 35
End: 2020-07-01
Payer: MEDICAID

## 2020-07-01 PROCEDURE — G8427 DOCREV CUR MEDS BY ELIG CLIN: HCPCS | Performed by: NURSE PRACTITIONER

## 2020-07-01 PROCEDURE — 2022F DILAT RTA XM EVC RTNOPTHY: CPT | Performed by: NURSE PRACTITIONER

## 2020-07-01 PROCEDURE — 99213 OFFICE O/P EST LOW 20 MIN: CPT | Performed by: NURSE PRACTITIONER

## 2020-07-01 PROCEDURE — 3046F HEMOGLOBIN A1C LEVEL >9.0%: CPT | Performed by: NURSE PRACTITIONER

## 2020-07-01 RX ORDER — BUSPIRONE HYDROCHLORIDE 5 MG/1
5 TABLET ORAL 3 TIMES DAILY
Status: ON HOLD | COMMUNITY
End: 2022-09-26 | Stop reason: HOSPADM

## 2020-07-01 ASSESSMENT — ENCOUNTER SYMPTOMS
EYES NEGATIVE: 1
RESPIRATORY NEGATIVE: 1
GASTROINTESTINAL NEGATIVE: 1

## 2020-07-01 NOTE — PROGRESS NOTES
Dietary Assessment Note      Vitals: CW: 274 lb    Patient lost 6 lbs over past ~2 months. Total Weight Loss: 122 lbs    Labs reviewed: no new labs    Protein intake: 60-80 grams/day     Fluid intake: 48-64 oz/day - water / decaf tea sometimes w/ sugar    Multivitamin/mineral intake: yes - 4 fusion per day    Calcium intake: no    Other: collagen     Exercise: yes - just getting back into it / Just Dance & treadmill 2x/wk (this week)    Nutrition Assessment: 8 month post-op visit. Journals intake. Getting ~1140 to 1250 terry    3p- protein shake OR protein bar  4p- yogurt   430-5p- chicken OR hormel complete meal (180-270)  730-8p- cheese cubes    Amount able to eat per sittin-2 cups volume    Following  rule: states yes    Food Intolerances/issues: greasy food    Client Concerns: needs to get back on track    Goals:   Do Just Dance 4x/wk at least   Spread eating over 8 hours  Continue to journal intake, aiming for 1200 calories  Limit food to 1 cup volume    Plan: f/u as directed    Due to the COVID-19 restrictions on close contact interactions the patient's visit was conducted via telephone in magdalena of a face to face visit. The patient is here through telemedicine for their 8 month post-op visit.     El Boyer

## 2020-07-07 RX ORDER — OMEPRAZOLE 40 MG/1
40 CAPSULE, DELAYED RELEASE ORAL DAILY
Qty: 30 CAPSULE | Refills: 0 | Status: SHIPPED | OUTPATIENT
Start: 2020-07-07 | End: 2020-08-03 | Stop reason: SDUPTHER

## 2020-07-14 ENCOUNTER — HOSPITAL ENCOUNTER (OUTPATIENT)
Age: 35
Discharge: HOME | End: 2020-07-14
Payer: MEDICAID

## 2020-07-14 LAB
A/G RATIO: 1.4 (ref 1.1–2.2)
ALBUMIN SERPL-MCNC: 4.2 G/DL (ref 3.4–5)
ALP BLD-CCNC: 62 U/L (ref 40–129)
ALT SERPL-CCNC: 20 U/L (ref 10–40)
ANION GAP SERPL CALCULATED.3IONS-SCNC: 12 MMOL/L (ref 3–16)
AST SERPL-CCNC: 22 U/L (ref 15–37)
BASOPHILS ABSOLUTE: 0 K/UL (ref 0–0.2)
BASOPHILS RELATIVE PERCENT: 0.6 %
BILIRUB SERPL-MCNC: 0.4 MG/DL (ref 0–1)
BUN BLDV-MCNC: 10 MG/DL (ref 7–20)
CALCIUM SERPL-MCNC: 9.2 MG/DL (ref 8.3–10.6)
CHLORIDE BLD-SCNC: 101 MMOL/L (ref 99–110)
CHOLESTEROL, TOTAL: 113 MG/DL (ref 0–199)
CO2: 26 MMOL/L (ref 21–32)
CREAT SERPL-MCNC: 0.7 MG/DL (ref 0.6–1.1)
EOSINOPHILS ABSOLUTE: 0 K/UL (ref 0–0.6)
EOSINOPHILS RELATIVE PERCENT: 0.5 %
FOLATE: >20 NG/ML (ref 4.78–24.2)
GFR AFRICAN AMERICAN: >60
GFR NON-AFRICAN AMERICAN: >60
GLOBULIN: 2.9 G/DL
GLUCOSE BLD-MCNC: 94 MG/DL (ref 70–99)
HCT VFR BLD CALC: 41.6 % (ref 36–48)
HDLC SERPL-MCNC: 46 MG/DL (ref 40–60)
HEMOGLOBIN: 13.9 G/DL (ref 12–16)
IRON SATURATION: 30 % (ref 15–50)
IRON: 88 UG/DL (ref 37–145)
LDL CHOLESTEROL CALCULATED: 56 MG/DL
LYMPHOCYTES ABSOLUTE: 1.7 K/UL (ref 1–5.1)
LYMPHOCYTES RELATIVE PERCENT: 27.6 %
MCH RBC QN AUTO: 32 PG (ref 26–34)
MCHC RBC AUTO-ENTMCNC: 33.4 G/DL (ref 31–36)
MCV RBC AUTO: 95.8 FL (ref 80–100)
MONOCYTES ABSOLUTE: 0.5 K/UL (ref 0–1.3)
MONOCYTES RELATIVE PERCENT: 7.7 %
NEUTROPHILS ABSOLUTE: 3.8 K/UL (ref 1.7–7.7)
NEUTROPHILS RELATIVE PERCENT: 63.6 %
PDW BLD-RTO: 13.4 % (ref 12.4–15.4)
PLATELET # BLD: 196 K/UL (ref 135–450)
PMV BLD AUTO: 10.7 FL (ref 5–10.5)
POTASSIUM SERPL-SCNC: 4.4 MMOL/L (ref 3.5–5.1)
RBC # BLD: 4.35 M/UL (ref 4–5.2)
SODIUM BLD-SCNC: 139 MMOL/L (ref 136–145)
TOTAL IRON BINDING CAPACITY: 291 UG/DL (ref 260–445)
TOTAL PROTEIN: 7.1 G/DL (ref 6.4–8.2)
TRIGL SERPL-MCNC: 56 MG/DL (ref 0–150)
TSH REFLEX: 1.27 UIU/ML (ref 0.27–4.2)
VITAMIN B-12: 749 PG/ML (ref 211–911)
VITAMIN D 25-HYDROXY: 73.2 NG/ML
VLDLC SERPL CALC-MCNC: 11 MG/DL
WBC # BLD: 6 K/UL (ref 4–11)

## 2020-07-14 PROCEDURE — 84443 ASSAY THYROID STIM HORMONE: CPT

## 2020-07-14 PROCEDURE — 82746 ASSAY OF FOLIC ACID SERUM: CPT

## 2020-07-14 PROCEDURE — 83550 IRON BINDING TEST: CPT

## 2020-07-14 PROCEDURE — 83036 HEMOGLOBIN GLYCOSYLATED A1C: CPT

## 2020-07-14 PROCEDURE — 82607 VITAMIN B-12: CPT

## 2020-07-14 PROCEDURE — 80061 LIPID PANEL: CPT

## 2020-07-14 PROCEDURE — 80053 COMPREHEN METABOLIC PANEL: CPT

## 2020-07-14 PROCEDURE — 84597 ASSAY OF VITAMIN K: CPT

## 2020-07-14 PROCEDURE — 84425 ASSAY OF VITAMIN B-1: CPT

## 2020-07-14 PROCEDURE — 84446 ASSAY OF VITAMIN E: CPT

## 2020-07-14 PROCEDURE — 83540 ASSAY OF IRON: CPT

## 2020-07-14 PROCEDURE — 84590 ASSAY OF VITAMIN A: CPT

## 2020-07-14 PROCEDURE — 85025 COMPLETE CBC W/AUTO DIFF WBC: CPT

## 2020-07-14 PROCEDURE — 82306 VITAMIN D 25 HYDROXY: CPT

## 2020-07-15 LAB
ESTIMATED AVERAGE GLUCOSE: 88.2 MG/DL
HBA1C MFR BLD: 4.7 %

## 2020-07-17 LAB — VITAMIN K1: 0.78 NMOL/L (ref 0.22–4.88)

## 2020-07-18 LAB
ALPHA-TOCOPHEROL: 8.2 MG/L (ref 5.5–18)
GAMMA-TOCOPHEROL: 1.2 MG/L (ref 0–6)
RETINYL PALMITATE: <0.02 MG/L (ref 0–0.1)
VITAMIN A LEVEL: 0.28 MG/L (ref 0.3–1.2)
VITAMIN A, INTERP: ABNORMAL
VITAMIN B1 WHOLE BLOOD: 180 NMOL/L (ref 70–180)

## 2020-07-28 ENCOUNTER — HOSPITAL ENCOUNTER (EMERGENCY)
Age: 35
Discharge: HOME | End: 2020-07-28
Attending: EMERGENCY MEDICINE
Payer: MEDICAID

## 2020-07-28 ENCOUNTER — PATIENT MESSAGE (OUTPATIENT)
Dept: BARIATRICS/WEIGHT MGMT | Age: 35
End: 2020-07-28

## 2020-07-28 ENCOUNTER — TELEPHONE (OUTPATIENT)
Dept: BARIATRICS/WEIGHT MGMT | Age: 35
End: 2020-07-28

## 2020-07-28 VITALS
SYSTOLIC BLOOD PRESSURE: 118 MMHG | TEMPERATURE: 98.6 F | WEIGHT: 270 LBS | HEART RATE: 75 BPM | RESPIRATION RATE: 18 BRPM | HEIGHT: 67 IN | DIASTOLIC BLOOD PRESSURE: 79 MMHG | OXYGEN SATURATION: 97 % | BODY MASS INDEX: 42.38 KG/M2

## 2020-07-28 LAB
A/G RATIO: 1.8 (ref 1.1–2.2)
ALBUMIN SERPL-MCNC: 4.4 G/DL (ref 3.4–5)
ALP BLD-CCNC: 68 U/L (ref 40–129)
ALT SERPL-CCNC: 19 U/L (ref 10–40)
ANION GAP SERPL CALCULATED.3IONS-SCNC: 12 MMOL/L (ref 3–16)
AST SERPL-CCNC: 25 U/L (ref 15–37)
BASOPHILS ABSOLUTE: 0.1 K/UL (ref 0–0.2)
BASOPHILS RELATIVE PERCENT: 1.5 %
BILIRUB SERPL-MCNC: 0.3 MG/DL (ref 0–1)
BILIRUBIN URINE: NEGATIVE
BLOOD, URINE: NEGATIVE
BUN BLDV-MCNC: 16 MG/DL (ref 7–20)
CALCIUM SERPL-MCNC: 9 MG/DL (ref 8.3–10.6)
CHLORIDE BLD-SCNC: 101 MMOL/L (ref 99–110)
CLARITY: CLEAR
CO2: 23 MMOL/L (ref 21–32)
COLOR: YELLOW
CREAT SERPL-MCNC: 0.7 MG/DL (ref 0.6–1.1)
EOSINOPHILS ABSOLUTE: 0.1 K/UL (ref 0–0.6)
EOSINOPHILS RELATIVE PERCENT: 0.8 %
GFR AFRICAN AMERICAN: >60
GFR NON-AFRICAN AMERICAN: >60
GLOBULIN: 2.5 G/DL
GLUCOSE BLD-MCNC: 101 MG/DL (ref 70–99)
GLUCOSE URINE: NEGATIVE MG/DL
HCT VFR BLD CALC: 42.7 % (ref 36–48)
HEMOGLOBIN: 14.2 G/DL (ref 12–16)
KETONES, URINE: NEGATIVE MG/DL
LEUKOCYTE ESTERASE, URINE: NEGATIVE
LIPASE: 43 U/L (ref 13–60)
LYMPHOCYTES ABSOLUTE: 2.4 K/UL (ref 1–5.1)
LYMPHOCYTES RELATIVE PERCENT: 28.4 %
MCH RBC QN AUTO: 32.1 PG (ref 26–34)
MCHC RBC AUTO-ENTMCNC: 33.3 G/DL (ref 31–36)
MCV RBC AUTO: 96.3 FL (ref 80–100)
MICROSCOPIC EXAMINATION: NORMAL
MONOCYTES ABSOLUTE: 0.6 K/UL (ref 0–1.3)
MONOCYTES RELATIVE PERCENT: 7 %
NEUTROPHILS ABSOLUTE: 5.3 K/UL (ref 1.7–7.7)
NEUTROPHILS RELATIVE PERCENT: 62.3 %
NITRITE, URINE: NEGATIVE
PDW BLD-RTO: 13.4 % (ref 12.4–15.4)
PH UA: 6 (ref 5–8)
PLATELET # BLD: 242 K/UL (ref 135–450)
PMV BLD AUTO: 10 FL (ref 5–10.5)
POTASSIUM REFLEX MAGNESIUM: 4 MMOL/L (ref 3.5–5.1)
PROTEIN UA: NEGATIVE MG/DL
RBC # BLD: 4.43 M/UL (ref 4–5.2)
SODIUM BLD-SCNC: 136 MMOL/L (ref 136–145)
SPECIFIC GRAVITY UA: 1.02 (ref 1–1.03)
TOTAL PROTEIN: 6.9 G/DL (ref 6.4–8.2)
URINE TYPE: NORMAL
UROBILINOGEN, URINE: 0.2 E.U./DL
WBC # BLD: 8.6 K/UL (ref 4–11)

## 2020-07-28 PROCEDURE — 99284 EMERGENCY DEPT VISIT MOD MDM: CPT

## 2020-07-28 PROCEDURE — 80053 COMPREHEN METABOLIC PANEL: CPT

## 2020-07-28 PROCEDURE — 6370000000 HC RX 637 (ALT 250 FOR IP): Performed by: EMERGENCY MEDICINE

## 2020-07-28 PROCEDURE — 83690 ASSAY OF LIPASE: CPT

## 2020-07-28 PROCEDURE — 85025 COMPLETE CBC W/AUTO DIFF WBC: CPT

## 2020-07-28 PROCEDURE — 81003 URINALYSIS AUTO W/O SCOPE: CPT

## 2020-07-28 RX ORDER — ONDANSETRON 4 MG/1
4 TABLET, ORALLY DISINTEGRATING ORAL ONCE
Status: COMPLETED | OUTPATIENT
Start: 2020-07-28 | End: 2020-07-28

## 2020-07-28 RX ADMIN — ONDANSETRON 4 MG: 4 TABLET, ORALLY DISINTEGRATING ORAL at 03:25

## 2020-07-28 ASSESSMENT — PAIN SCALES - GENERAL: PAINLEVEL_OUTOF10: 7

## 2020-07-28 NOTE — TELEPHONE ENCOUNTER
Patient called about mychart message she sent early this morning and states she is still having the burning sensation.

## 2020-07-28 NOTE — TELEPHONE ENCOUNTER
Attempted to reach patient in regards to this concern, left VM for her to return our call. She is 8 months post op sleeve, 3 months s/p mynor.

## 2020-07-28 NOTE — ED PROVIDER NOTES
[address] ED    CHIEF COMPLAINT  Flank Pain (bilateral flank pain and \"burning\" that radiates to abdomen and groin x 2-3 days. )       HISTORY OF PRESENT ILLNESS  Savana Daniels is a 30 y.o. female who presents to the ED with bilateral flank pain, abdominal pain, groin pain. Symptoms started two days ago. Initially, just with bilateral flank pain. Burning sensation. Had similar pain before underwent cholecystectomy. 5/10 intensity. Worse with movement, better with rest. Associated with nausea. Pain now radiates to abdomen across epigastric region. Also with burning near the urethra. Denies vaginal bleeding. Complains of vaginal discharge. Recently underwent removal of a \"nodule of skin in the vagina\" at [facility] this past Thursday. Was told to expect some discharge after the procedure. Sexually active, 1 partner, stable relationship, isn't concerned for STI. Denies fever, chest pain, SOB. Denies emesis. Loose stool, common for. Stools are nonbloody. No other complaints, modifying factors or associated symptoms.      I have reviewed the following from the nursing documentation:    Past Medical History:   Diagnosis Date    Asthma     as child    Back pain     Bipolar 1 disorder ([address]     Depression     Diabetes mellitus [facility] [address]     resolved per patient    H/O: hysterectomy 04/29/2020    Liver disease     fatty liver    Obesity     OCD (obsessive compulsive disorder)     PCOS (polycystic ovarian syndrome)     PCOS (polycystic ovarian syndrome)     Sleep apnea     cpap     Past Surgical History:   Procedure Laterality Date    ANKLE SURGERY      CHOLECYSTECTOMY, LAPAROSCOPIC N/A 4/20/2020    LAPAROSCOPIC CHOLECYSTECTOMY performed by Kathy Santillan, DO at [address]      OTHER SURGICAL HISTORY Left 12/10/14    Excision of Cyst Left Upper Posterior Ankle    OTHER SURGICAL HISTORY Right 12/29/2016    Laparotomy with Right SO    OVARY REMOVAL  12/28/2016    unsure of side    SLEEVE GASTRECTOMY N/A 11/6/2019    LAPAROSCOPIC SLEEVE GASTRECTOMY - ETHICON performed by Carol Wilson MD at [address] N/A 8/23/2019    EGD BIOPSY performed by Carol Wilson MD at [address] [place] [address]     Family History   Problem Relation Age of Onset    Asthma Mother     Arthritis Mother     Lung Cancer Mother     Hypertension Father     Elevated Lipids Father     Diabetes Father     Alcohol Abuse Father     Asthma Father     Arthritis Father     Diabetes Paternal Grandfather     Arthritis Paternal Grandfather     Diabetes Maternal Grandmother     Arthritis Maternal Grandmother      Social History     Socioeconomic History    Marital status:      Spouse name: Not on file    Number of children: Not on file    Years of education: Not on file    Highest education level: Not on file   Occupational History    Not on file   Social Needs    Financial resource strain: Not on file    Food insecurity     Worry: Not on file     Inability: Not on file    Transportation needs     Medical: Not on file     Non-medical: Not on file   Tobacco Use    Smoking status: Never Smoker    Smokeless tobacco: Never Used   Substance and Sexual Activity    Alcohol use:  Yes     Alcohol/week: 1.0 standard drinks     Types: 1 Glasses of wine per week     Comment: has had a few drinks since surgery    Drug use: No    Sexual activity: Yes     Partners: Male   Lifestyle    Physical activity     Days per week: Not on file     Minutes per session: Not on file    Stress: Not on file   Relationships    Social connections     Talks on phone: Not on file     Gets together: Not on file     Attends Mormonism service: Not on file     Active member of club or organization: Not on file     Attends meetings of clubs or organizations: Not on file     Relationship status: Not on file    Intimate partner violence     Fear of current or ex partner: Not on file     Emotionally abused: Not on file     Physically abused: Not on file     Forced sexual activity: Not on file   Other Topics Concern    Not on file   Social History Narrative    Not on file     No current facility-administered medications for this encounter. Current Outpatient Medications   Medication Sig Dispense Refill    omeprazole (PRILOSEC) 40 MG delayed release capsule TAKE 1 CAPSULE BY MOUTH DAILY 30 capsule 0    busPIRone (BUSPAR) 5 MG tablet Take 5 mg by mouth 3 times daily      loratadine (CLARITIN) 10 MG tablet Take 10 mg by mouth daily      Multiple Vitamins-Minerals (BARIATRIC FUSION) CHEW Take 4 tablets by mouth daily      levothyroxine (SYNTHROID) 75 MCG tablet TAKE 1 TABLET BY MOUTH EVERY DAY  5    Cholecalciferol (VITAMIN D PO) Take by mouth daily       ziprasidone (GEODON) 20 MG capsule Take 20 mg by mouth 2 times daily (with meals)      clomiPRAMINE (ANAFRANIL) 25 MG capsule Take 50 mg by mouth nightly       Allergies   Allergen Reactions    Latex Itching    Lactose      \"Extreme\" stomach pain and diarrhea    Adhesive Tape      Rips off skin    Keflex [Cephalexin] Nausea And Vomiting     Projectile vomitting       REVIEW OF SYSTEMS  10 systems reviewed, pertinent positives and negatives per HPI, otherwise noted to be negative. PHYSICAL EXAM  ED Triage Vitals [07/28/20 0247]   Enc Vitals Group      BP (!) 140/99      Pulse 93      Resp 18      Temp 98.6 °F (37 °C)      Temp Source Oral      SpO2 98 %      Weight 270 lb (122.5 kg)      Height 5' 7\" (1.702 m)      Head Circumference       Peak Flow       Pain Score       Pain Loc       Pain Edu? Excl. in [address]? General appearance: Awake and alert. Cooperative. No acute distress. HENT: Normocephalic. Atraumatic. Mucous membranes are moist.  Neck: Supple. Eyes: PERRL. EOMI. Heart/Chest: RRR. No murmurs. Lungs: Respirations unlabored. CTAB. Good air exchange. Speaking comfortably in full sentences. Abdomen: Soft. Non-tender. Color, UA Yellow Straw/Yellow    Clarity, UA Clear Clear    Glucose, Ur Negative Negative mg/dL    Bilirubin Urine Negative Negative    Ketones, Urine Negative Negative mg/dL    Specific Gravity, UA 1.025 1.005 - 1.030    Blood, Urine Negative Negative    pH, UA 6.0 5.0 - 8.0    Protein, UA Negative Negative mg/dL    Urobilinogen, Urine 0.2 <2.0 E.U./dL    Nitrite, Urine Negative Negative    Leukocyte Esterase, Urine Negative Negative    Microscopic Examination Not Indicated     Urine Type NotGiven    Lipase   Result Value Ref Range    Lipase 43.0 13.0 - 60.0 U/L       RADIOLOGY  I have reviewed all radiographic studies for this visit. No orders to display        ED COURSE/MDM  Patient seen and evaluated. Old records reviewed. Labs and imaging reviewed and results discussed with patient/family to extent possible. 37-year-old female presents with bilateral flank pain, abdominal pain, and genitourinary discomfort. On arrival the patient is afebrile and nontoxic in appearance with otherwise reassuring vital signs. Abdominal exam is benign. The patient complains of pain around the urethra but defers a genitourinary examination. This is in the setting of a recent minor intravaginal procedure for which the patient states that she was expecting to have some small amount of discharge. Suspicion for STI is low given a better explanation by the recent procedure and given patient in a monogamous long-term relationship. CBC shows no leukocytosis or anemia. Renal panel shows no significant electrolyte abnormalities or creatinine elevation. LFTs and lipase are reassuring. Urinalysis not concerning for infection    I had a long conversation with the patient about the risks and benefits of cross-sectional imaging of the abdomen and pelvis.   Patient has had many CT scans in the past and, with a reassuring exam and overall reassuring diagnostic studies I believe the risk of additional radiation exposure is not

## 2020-07-28 NOTE — TELEPHONE ENCOUNTER
Sound like gastritis (inflammation inside stomach)  Needs to take a PPI twice daily (Prilosec 40 mg bid) as well as pepcid 20 mg daily  Also focus on diet and foods that cause GERD (citrus foods, tomatoes, spicy foods, caffeine, carbonation, avoid eating within 2 hours of bed, elevate head of bed)    Should get better within 14 days.  If not, I would recommend an EGD with Dr. Jaki Marquez to rule out ulcer

## 2020-07-28 NOTE — TELEPHONE ENCOUNTER
Left message for patient in reference to reviewing her  lab results and also to address concern that she had (see My Chart Message). If patient calls back please verify the best number to reach her at and if they approve for me to leave a detailed voicemail if I am unable to reach them. Thanks.

## 2020-07-29 NOTE — TELEPHONE ENCOUNTER
Left message for patient in reference to reviewing her  lab results, okay per phone message. I left all instructions from Dr. Peter Burns regarding her stomach pain on the VM (see message dated 7/29/20). I instructed her to take the recommended medications for 1 week and to call us at 1 week if symptoms were continuing to get further instruction on medication. Also discussed that is symptoms persisted past 2 weeks, she can call us to schedule the EGD. I also left her lab results on the . I stated that her Vitamin A was still slightly low but improved. She should continue her regular post-op vitamins and focus on Vitamin A rich foods such as red peppers, spinach, sweet potatoes, carrots and eggs. I recommended she call us in 6 months to get an order for this level to be rechecked. I advised her to call our office if she would like to discuss any of this further or has any questions.

## 2020-07-29 NOTE — TELEPHONE ENCOUNTER
Thanks Miriam Hamlin. I also just left her a message with this information as well as her lab results. I did instruct her that Pepcid is available OTC.

## 2020-07-29 NOTE — TELEPHONE ENCOUNTER
I called and left a message for the patient to call the office and confirm her pharmacy as she most likely does not have enough Prilosec to take it twice a day and Pepcid is not on her medication list currently so will need to order it.   Thank you,  Venkat Gaspar

## 2020-08-03 ENCOUNTER — TELEPHONE (OUTPATIENT)
Dept: BARIATRICS/WEIGHT MGMT | Age: 35
End: 2020-08-03

## 2020-08-03 RX ORDER — OMEPRAZOLE 40 MG/1
40 CAPSULE, DELAYED RELEASE ORAL 2 TIMES DAILY
Qty: 60 CAPSULE | Refills: 0 | Status: SHIPPED | OUTPATIENT
Start: 2020-08-03 | End: 2020-09-10

## 2020-08-03 NOTE — TELEPHONE ENCOUNTER
Pt is s/p sleeve 11/06/2019. Pt called today with concerns. Last week she called in and was having some burning in her stomach. Pt states the burning has gone away. Now, pt states that she is having twinges of pain in abdomen every couple of hours. Pt is also extremely fatigued, has no energy. States she wakes up in the morning and wants to go back to bed. Pt was tested for covid-19 recently, and the results came back negative.  Pt phone # is [phone]

## 2020-08-03 NOTE — TELEPHONE ENCOUNTER
Called and spoke with patient. Discussed limiting the amount of processed foods she is eating as that may be causing some of her issues. She had sleeve 11/6/2019, cholecystectomy on 4/20/2020 and a hysterectomy on 4/29/2020. Portions at her last appointment were 1-2 cups and the dietitian discussed reducing them. Patient still continues to be well over a cup and stated that is going to be a hard one for her. Explained that she needs to continue to spread the size of her meals out over the day and reduce portions. Also patient needs to focus on more protein based foods to help with feeling full and her energy level. Feel that the increased amount of carbs she is consuming is affecting her ability to sustain energy and could be causing some of the abdominal pain. The burning that was a problem has improved with doing the Prilosec twice a day. Will continue that for the next month and Laurie Crane can reevaluate it at her next appointment 9/9/2020 to see if we can reduce down to daily again. Patient verbalized understanding and said she will try.

## 2020-08-08 ENCOUNTER — HOSPITAL ENCOUNTER (EMERGENCY)
Age: 35
Discharge: HOME | End: 2020-08-09
Attending: STUDENT IN AN ORGANIZED HEALTH CARE EDUCATION/TRAINING PROGRAM
Payer: MEDICAID

## 2020-08-08 PROCEDURE — 99284 EMERGENCY DEPT VISIT MOD MDM: CPT

## 2020-08-08 ASSESSMENT — PAIN SCALES - GENERAL: PAINLEVEL_OUTOF10: 4

## 2020-08-09 ENCOUNTER — APPOINTMENT (OUTPATIENT)
Dept: CT IMAGING | Age: 35
End: 2020-08-09
Payer: MEDICAID

## 2020-08-09 VITALS
WEIGHT: 268 LBS | TEMPERATURE: 97.3 F | SYSTOLIC BLOOD PRESSURE: 99 MMHG | HEIGHT: 67 IN | HEART RATE: 89 BPM | BODY MASS INDEX: 42.06 KG/M2 | OXYGEN SATURATION: 94 % | RESPIRATION RATE: 16 BRPM | DIASTOLIC BLOOD PRESSURE: 58 MMHG

## 2020-08-09 LAB
A/G RATIO: 1.3 (ref 1.1–2.2)
ALBUMIN SERPL-MCNC: 4.1 G/DL (ref 3.4–5)
ALP BLD-CCNC: 57 U/L (ref 40–129)
ALT SERPL-CCNC: 22 U/L (ref 10–40)
AMPHETAMINE SCREEN, URINE: NORMAL
ANION GAP SERPL CALCULATED.3IONS-SCNC: 7 MMOL/L (ref 3–16)
ANION GAP SERPL CALCULATED.3IONS-SCNC: 8 MMOL/L (ref 3–16)
AST SERPL-CCNC: 40 U/L (ref 15–37)
BARBITURATE SCREEN URINE: NORMAL
BASOPHILS ABSOLUTE: 0 K/UL (ref 0–0.2)
BASOPHILS RELATIVE PERCENT: 0.6 %
BENZODIAZEPINE SCREEN, URINE: NORMAL
BILIRUB SERPL-MCNC: 0.3 MG/DL (ref 0–1)
BILIRUBIN URINE: NEGATIVE
BLOOD, URINE: NEGATIVE
BUN BLDV-MCNC: 13 MG/DL (ref 7–20)
BUN BLDV-MCNC: 15 MG/DL (ref 7–20)
CALCIUM SERPL-MCNC: 8.5 MG/DL (ref 8.3–10.6)
CALCIUM SERPL-MCNC: 9.2 MG/DL (ref 8.3–10.6)
CANNABINOID SCREEN URINE: NORMAL
CHLORIDE BLD-SCNC: 102 MMOL/L (ref 99–110)
CHLORIDE BLD-SCNC: 98 MMOL/L (ref 99–110)
CLARITY: CLEAR
CO2: 27 MMOL/L (ref 21–32)
CO2: 27 MMOL/L (ref 21–32)
COCAINE METABOLITE SCREEN URINE: NORMAL
COLOR: YELLOW
CREAT SERPL-MCNC: 0.6 MG/DL (ref 0.6–1.1)
CREAT SERPL-MCNC: <0.5 MG/DL (ref 0.6–1.1)
EOSINOPHILS ABSOLUTE: 0 K/UL (ref 0–0.6)
EOSINOPHILS RELATIVE PERCENT: 0.5 %
GFR AFRICAN AMERICAN: >60
GFR AFRICAN AMERICAN: >60
GFR NON-AFRICAN AMERICAN: >60
GFR NON-AFRICAN AMERICAN: >60
GLOBULIN: 3.2 G/DL
GLUCOSE BLD-MCNC: 85 MG/DL (ref 70–99)
GLUCOSE BLD-MCNC: 98 MG/DL (ref 70–99)
GLUCOSE URINE: NEGATIVE MG/DL
HCT VFR BLD CALC: 42.1 % (ref 36–48)
HEMOGLOBIN: 14 G/DL (ref 12–16)
KETONES, URINE: NEGATIVE MG/DL
LEUKOCYTE ESTERASE, URINE: NEGATIVE
LYMPHOCYTES ABSOLUTE: 1.7 K/UL (ref 1–5.1)
LYMPHOCYTES RELATIVE PERCENT: 23.6 %
Lab: NORMAL
MAGNESIUM: 2 MG/DL (ref 1.8–2.4)
MAGNESIUM: 2.2 MG/DL (ref 1.8–2.4)
MCH RBC QN AUTO: 31.9 PG (ref 26–34)
MCHC RBC AUTO-ENTMCNC: 33.2 G/DL (ref 31–36)
MCV RBC AUTO: 96.2 FL (ref 80–100)
METHADONE SCREEN, URINE: NORMAL
MICROSCOPIC EXAMINATION: NORMAL
MONOCYTES ABSOLUTE: 0.6 K/UL (ref 0–1.3)
MONOCYTES RELATIVE PERCENT: 8.4 %
NEUTROPHILS ABSOLUTE: 4.9 K/UL (ref 1.7–7.7)
NEUTROPHILS RELATIVE PERCENT: 66.9 %
NITRITE, URINE: NEGATIVE
OPIATE SCREEN URINE: NORMAL
OXYCODONE URINE: NORMAL
PDW BLD-RTO: 13.2 % (ref 12.4–15.4)
PH UA: 6.5
PH UA: 6.5 (ref 5–8)
PHENCYCLIDINE SCREEN URINE: NORMAL
PHOSPHORUS: 3.9 MG/DL (ref 2.5–4.9)
PLATELET # BLD: 221 K/UL (ref 135–450)
PMV BLD AUTO: 10.3 FL (ref 5–10.5)
POTASSIUM REFLEX MAGNESIUM: 3.5 MMOL/L (ref 3.5–5.1)
POTASSIUM REFLEX MAGNESIUM: 5.3 MMOL/L (ref 3.5–5.1)
PROPOXYPHENE SCREEN: NORMAL
PROTEIN UA: NEGATIVE MG/DL
RBC # BLD: 4.38 M/UL (ref 4–5.2)
SODIUM BLD-SCNC: 133 MMOL/L (ref 136–145)
SODIUM BLD-SCNC: 136 MMOL/L (ref 136–145)
SPECIFIC GRAVITY UA: 1.02 (ref 1–1.03)
T3 FREE: 2.6 PG/ML (ref 2.3–4.2)
T4 FREE: 1.4 NG/DL (ref 0.9–1.8)
TOTAL PROTEIN: 7.3 G/DL (ref 6.4–8.2)
TSH REFLEX: 1.76 UIU/ML (ref 0.27–4.2)
URINE TYPE: NORMAL
UROBILINOGEN, URINE: 0.2 E.U./DL
WBC # BLD: 7.3 K/UL (ref 4–11)

## 2020-08-09 PROCEDURE — 80307 DRUG TEST PRSMV CHEM ANLYZR: CPT

## 2020-08-09 PROCEDURE — 96365 THER/PROPH/DIAG IV INF INIT: CPT

## 2020-08-09 PROCEDURE — 83735 ASSAY OF MAGNESIUM: CPT

## 2020-08-09 PROCEDURE — 80053 COMPREHEN METABOLIC PANEL: CPT

## 2020-08-09 PROCEDURE — 84100 ASSAY OF PHOSPHORUS: CPT

## 2020-08-09 PROCEDURE — 6360000004 HC RX CONTRAST MEDICATION: Performed by: STUDENT IN AN ORGANIZED HEALTH CARE EDUCATION/TRAINING PROGRAM

## 2020-08-09 PROCEDURE — 6360000002 HC RX W HCPCS: Performed by: STUDENT IN AN ORGANIZED HEALTH CARE EDUCATION/TRAINING PROGRAM

## 2020-08-09 PROCEDURE — 74177 CT ABD & PELVIS W/CONTRAST: CPT

## 2020-08-09 PROCEDURE — 84439 ASSAY OF FREE THYROXINE: CPT

## 2020-08-09 PROCEDURE — 2580000003 HC RX 258: Performed by: STUDENT IN AN ORGANIZED HEALTH CARE EDUCATION/TRAINING PROGRAM

## 2020-08-09 PROCEDURE — 85025 COMPLETE CBC W/AUTO DIFF WBC: CPT

## 2020-08-09 PROCEDURE — 84481 FREE ASSAY (FT-3): CPT

## 2020-08-09 PROCEDURE — 81001 URINALYSIS AUTO W/SCOPE: CPT

## 2020-08-09 PROCEDURE — 84443 ASSAY THYROID STIM HORMONE: CPT

## 2020-08-09 RX ORDER — 0.9 % SODIUM CHLORIDE 0.9 %
1000 INTRAVENOUS SOLUTION INTRAVENOUS ONCE
Status: COMPLETED | OUTPATIENT
Start: 2020-08-09 | End: 2020-08-09

## 2020-08-09 RX ORDER — CEFDINIR 300 MG/1
300 CAPSULE ORAL 2 TIMES DAILY
Qty: 20 CAPSULE | Refills: 0 | Status: SHIPPED | OUTPATIENT
Start: 2020-08-09 | End: 2020-08-19

## 2020-08-09 RX ADMIN — SODIUM CHLORIDE 1000 ML: 9 INJECTION, SOLUTION INTRAVENOUS at 02:37

## 2020-08-09 RX ADMIN — CEFTRIAXONE SODIUM 1 G: 1 INJECTION, POWDER, FOR SOLUTION INTRAMUSCULAR; INTRAVENOUS at 02:37

## 2020-08-09 RX ADMIN — IOPAMIDOL 75 ML: 755 INJECTION, SOLUTION INTRAVENOUS at 01:27

## 2020-08-09 NOTE — ED PROVIDER NOTES
Primary Care Physician: ROSE Peres - CNP   Attending Physician: No att. providers found     History   Chief Complaint   Patient presents with    Fatigue     pt reports increased fatigue and leathergy at home. pt reports she checked with PCP and got blood drawn and nothing came back abnormal. pt reports back pain        HPI   Terese Barnes is a 30 y.o. female history of asthma, bipolar, depression, diabetes, Obstructive compulsive disorder, PCOS, presenting this morning and more sleepiness than usual.  States that she was seen here a few days ago with similar complaints and at that time work-up was done without imaging. Now her symptoms have persisted and now she is having some flank pain on both sides. Denies fevers, chills, nausea, vomiting chest pain or shortness of breath. Exposures to persons infective coronavirus at this time.     Past Medical History:   Diagnosis Date    Asthma     as child    Back pain     Bipolar 1 disorder ([facility] [facility]     Depression     Diabetes mellitus ([facility] [facility]     resolved per patient    H/O: hysterectomy 04/29/2020    Liver disease     fatty liver    Obesity     OCD (obsessive compulsive disorder)     PCOS (polycystic ovarian syndrome)     PCOS (polycystic ovarian syndrome)     Sleep apnea     cpap        Past Surgical History:   Procedure Laterality Date    ANKLE SURGERY      CHOLECYSTECTOMY, LAPAROSCOPIC N/A 4/20/2020    LAPAROSCOPIC [facility] [address]      OTHER SURGICAL HISTORY Left 12/10/14    Excision of Cyst Left Upper Posterior Ankle    OTHER SURGICAL HISTORY Right 12/29/2016    Laparotomy with Right SO    OVARY REMOVAL  12/28/2016    unsure of side    SLEEVE GASTRECTOMY N/A 11/6/2019    LAPAROSCOPIC SLEEVE GASTRECTOMY - ETHICON performed by Carla Tran MD at [facility] 8/23/2019    EGD BIOPSY performed by Carla Tran MD at [address] [facility] ENDOSCOPY        Family History   Problem Relation Age of Onset    Asthma Mother     Arthritis Mother     Lung Cancer Mother     Hypertension Father     Elevated Lipids Father     Diabetes Father     Alcohol Abuse Father     Asthma Father     Arthritis Father     Diabetes Paternal Grandfather     Arthritis Paternal Grandfather     Diabetes Maternal Grandmother     Arthritis Maternal Grandmother         Social History     Socioeconomic History    Marital status:      Spouse name: None    Number of children: None    Years of education: None    Highest education level: None   Occupational History    None   Social Needs    Financial resource strain: None    Food insecurity     Worry: None     Inability: None    Transportation needs     Medical: None     Non-medical: None   Tobacco Use    Smoking status: Never Smoker    Smokeless tobacco: Never Used   Substance and Sexual Activity    Alcohol use:  Yes     Alcohol/week: 1.0 standard drinks     Types: 1 Glasses of wine per week     Comment: has had a few drinks since surgery    Drug use: No    Sexual activity: Yes     Partners: Male   Lifestyle    Physical activity     Days per week: None     Minutes per session: None    Stress: None   Relationships    Social connections     Talks on phone: None     Gets together: None     Attends Sikh service: None     Active member of club or organization: None     Attends meetings of clubs or organizations: None     Relationship status: None    Intimate partner violence     Fear of current or ex partner: None     Emotionally abused: None     Physically abused: None     Forced sexual activity: None   Other Topics Concern    None   Social History Narrative    None        Review of Systems   10 total systems reviewed and found to be negative unless otherwise noted in HPI     Physical Exam   BP (!) 99/58   Pulse 89   Temp 97.3 °F (36.3 °C) (Oral)   Resp 16   Ht 5' 7\" (1.702 m)   Wt 268 lb reduce the radiation dose to as low as reasonably achievable. COMPARISON: 04/12/2020 CT HISTORY: ORDERING SYSTEM PROVIDED HISTORY: R/O pyelo TECHNOLOGIST PROVIDED HISTORY: Reason for exam:->R/O pyelo Additional Contrast?->None Reason for Exam: abdo pain/back pain/fatigue Acuity: Acute Type of Exam: Initial Additional signs and symptoms: abdo pain/back pain for two weeks, ha, nausea, hysterectomy FINDINGS: Lower Chest:  The lung bases are clear. The base of the heart is normal. Organs: The gallbladder is absent. The liver, biliary ducts, pancreas and spleen are normal.  The adrenal glands are normal.  Heterogeneous decreased attenuation in the mid and lower right kidney. No focal lesion. No hydronephrosis. No perinephric inflammation. Incidental 1.6 cm cyst in the left kidney. GI/Bowel: Postsurgical change of sleeve gastrectomy. Duodenum and small bowel are normal.  A normal appendix is visualized. The colon is normal. Pelvis: The bladder is incompletely distended. No obvious mucosal abnormality. The uterus is absent. Follicle in the left ovary, physiologic. Peritoneum/Retroperitoneum: The aorta tapers normally. No lymph node enlargement. Bones/Soft Tissues: No significant skeletal abnormalities. 1. Heterogeneous decreased attenuation in the right kidney suspected to represent changes of early pyelonephritis. Correlate with clinical workup. 2. The bladder is incompletely distended. No obvious abnormality detected. PROCEDURES:   Procedures    ASSESSMENT AND PLAN:  Bernie Vee is a 30 y.o. female scented with complaint of fatigue as well as bilateral flank pain. On exam patient is nontoxic in no acute distress flat affect with some CVA tenderness both sides. When her complaints I did obtain labs as well as a CT of the abdomen pelvis to rule out kidney infection. Labs are unremarkable but a CT of the abdomen pelvis was concerning for Fly which could be the cause of her bilateral hip pain. She was given a dose of ceftriaxone here and was discharged home with STEFFANIE ARCINIEGA for her pyelonephritis. ClINICAL IMPRESSION:  1. Pyelonephritis          PATIENT REFERRED TO:  ROSE Fuller - [facility]  [id number] [facility] [address]   [phone]    Schedule an appointment as soon as possible for a visit in 2 days        DISCHARGE MEDICATIONS:  Discharge Medication List as of 8/9/2020  4:23 AM      START taking these medications    Details   cefdinir (OMNICEF) 300 MG capsule Take 1 capsule by mouth 2 times daily for 10 days, Disp-20 capsule,R-0Print           DISCONTINUED MEDICATIONS:  Discharge Medication List as of 8/9/2020  4:23 AM        DISPOSITION Decision To Discharge 08/09/2020 04:17:56 AM  -We have instructed the patient, Cha Hull) to return to the ED or call her PCP if her pain/symptoms worsen. -Findings and recommendations explained to patient. She expressed understanding and agreed with the plan. Clif Sampson MD (electronically signed)  8/10/2020  _________________________________________________________________________________________  _________________________________________________________________________________________  This record is transcribed utilizing voice recognition technology. There are inherent limitations in this technology. In addition, there may be limitations in editing of this report. If there are any discrepancies, please contact me directly.         Clif Sampson MD  08/10/20 2863

## 2020-08-10 ENCOUNTER — CARE COORDINATION (OUTPATIENT)
Dept: CARE COORDINATION | Age: 35
End: 2020-08-10

## 2020-08-10 NOTE — CARE COORDINATION
Patient contacted regarding recent discharge and COVID-19 risk. Discussed COVID-19 related testing which was not done at this time. Test results were not done. Patient informed of results, if available N/A     Care Transition Nurse/ Ambulatory Care Manager contacted the patient by telephone to perform post discharge assessment. Verified name and  with patient as identifiers. Patient has following risk factors of: diabetes. CTN/ACM reviewed discharge instructions, medical action plan and red flags related to discharge diagnosis. Reviewed and educated them on any new and changed medications related to discharge diagnosis. Advised obtaining a 90-day supply of all daily and as-needed medications. Education provided regarding infection prevention, and signs and symptoms of COVID-19 and when to seek medical attention with patient who verbalized understanding. Discussed exposure protocols and quarantine from [address] you at higher risk for severe illness  and given an opportunity for questions and concerns. The patient agrees to contact the COVID-19 hotline [phone] or PCP office for questions related to their healthcare. CTN/ACM provided contact information for future reference. From CDC: Are you at higher risk for severe illness?  Wash your hands often.  Avoid close contact (6 feet, which is about two arm lengths) with people who are sick.  Put distance between yourself and other people if COVID-19 is spreading in your community.  Clean and disinfect frequently touched surfaces.  Avoid all cruise travel and non-essential air travel.  Call your healthcare professional if you have concerns about COVID-19 and your underlying condition or if you are sick.     For more information on steps you can take to protect yourself, see CDC's How to [address] with pt by phone this date, states she has started taking her antibiotics and is starting to feel slightly better,

## 2020-08-25 ENCOUNTER — HOSPITAL ENCOUNTER (EMERGENCY)
Age: 35
Discharge: HOME | End: 2020-08-25
Payer: MEDICAID

## 2020-08-25 VITALS
TEMPERATURE: 98.1 F | BODY MASS INDEX: 41.91 KG/M2 | OXYGEN SATURATION: 97 % | SYSTOLIC BLOOD PRESSURE: 118 MMHG | RESPIRATION RATE: 16 BRPM | HEIGHT: 67 IN | DIASTOLIC BLOOD PRESSURE: 76 MMHG | WEIGHT: 267 LBS | HEART RATE: 86 BPM

## 2020-08-25 LAB
A/G RATIO: 1.4 (ref 1.1–2.2)
ALBUMIN SERPL-MCNC: 4 G/DL (ref 3.4–5)
ALP BLD-CCNC: 67 U/L (ref 40–129)
ALT SERPL-CCNC: 25 U/L (ref 10–40)
ANION GAP SERPL CALCULATED.3IONS-SCNC: 10 MMOL/L (ref 3–16)
AST SERPL-CCNC: 29 U/L (ref 15–37)
BASOPHILS ABSOLUTE: 0.1 K/UL (ref 0–0.2)
BASOPHILS RELATIVE PERCENT: 1 %
BILIRUB SERPL-MCNC: <0.2 MG/DL (ref 0–1)
BILIRUBIN URINE: NEGATIVE
BLOOD, URINE: NEGATIVE
BUN BLDV-MCNC: 12 MG/DL (ref 7–20)
CALCIUM SERPL-MCNC: 9.4 MG/DL (ref 8.3–10.6)
CHLORIDE BLD-SCNC: 102 MMOL/L (ref 99–110)
CLARITY: CLEAR
CO2: 28 MMOL/L (ref 21–32)
COLOR: YELLOW
CREAT SERPL-MCNC: 0.6 MG/DL (ref 0.6–1.1)
EOSINOPHILS ABSOLUTE: 0.1 K/UL (ref 0–0.6)
EOSINOPHILS RELATIVE PERCENT: 1.8 %
GFR AFRICAN AMERICAN: >60
GFR NON-AFRICAN AMERICAN: >60
GLOBULIN: 2.9 G/DL
GLUCOSE BLD-MCNC: 87 MG/DL (ref 70–99)
GLUCOSE URINE: NEGATIVE MG/DL
HCG QUALITATIVE: NEGATIVE
HCT VFR BLD CALC: 39.6 % (ref 36–48)
HEMOGLOBIN: 13.3 G/DL (ref 12–16)
KETONES, URINE: NEGATIVE MG/DL
LACTIC ACID: 1.3 MMOL/L (ref 0.4–2)
LEUKOCYTE ESTERASE, URINE: NEGATIVE
LIPASE: 34 U/L (ref 13–60)
LYMPHOCYTES ABSOLUTE: 2.3 K/UL (ref 1–5.1)
LYMPHOCYTES RELATIVE PERCENT: 35.7 %
MCH RBC QN AUTO: 32.2 PG (ref 26–34)
MCHC RBC AUTO-ENTMCNC: 33.5 G/DL (ref 31–36)
MCV RBC AUTO: 96.1 FL (ref 80–100)
MICROSCOPIC EXAMINATION: NORMAL
MONOCYTES ABSOLUTE: 0.7 K/UL (ref 0–1.3)
MONOCYTES RELATIVE PERCENT: 10.8 %
NEUTROPHILS ABSOLUTE: 3.2 K/UL (ref 1.7–7.7)
NEUTROPHILS RELATIVE PERCENT: 50.7 %
NITRITE, URINE: NEGATIVE
PDW BLD-RTO: 13.2 % (ref 12.4–15.4)
PH UA: 7 (ref 5–8)
PLATELET # BLD: 216 K/UL (ref 135–450)
PMV BLD AUTO: 9.6 FL (ref 5–10.5)
POTASSIUM SERPL-SCNC: 4 MMOL/L (ref 3.5–5.1)
PROTEIN UA: NEGATIVE MG/DL
RBC # BLD: 4.12 M/UL (ref 4–5.2)
SODIUM BLD-SCNC: 140 MMOL/L (ref 136–145)
SPECIFIC GRAVITY UA: 1.02 (ref 1–1.03)
TOTAL PROTEIN: 6.9 G/DL (ref 6.4–8.2)
URINE TYPE: NORMAL
UROBILINOGEN, URINE: 0.2 E.U./DL
WBC # BLD: 6.3 K/UL (ref 4–11)

## 2020-08-25 PROCEDURE — 81003 URINALYSIS AUTO W/O SCOPE: CPT

## 2020-08-25 PROCEDURE — 84703 CHORIONIC GONADOTROPIN ASSAY: CPT

## 2020-08-25 PROCEDURE — 83605 ASSAY OF LACTIC ACID: CPT

## 2020-08-25 PROCEDURE — 99284 EMERGENCY DEPT VISIT MOD MDM: CPT

## 2020-08-25 PROCEDURE — 83690 ASSAY OF LIPASE: CPT

## 2020-08-25 PROCEDURE — 80053 COMPREHEN METABOLIC PANEL: CPT

## 2020-08-25 PROCEDURE — 85025 COMPLETE CBC W/AUTO DIFF WBC: CPT

## 2020-08-25 ASSESSMENT — PAIN SCALES - GENERAL: PAINLEVEL_OUTOF10: 7

## 2020-08-25 ASSESSMENT — PAIN DESCRIPTION - PAIN TYPE: TYPE: ACUTE PAIN

## 2020-08-26 NOTE — ED PROVIDER NOTES
Evaluated by [id number] [facility] Provider    [address] [facility]  ED    CHIEF COMPLAINT  Flank Pain (Here for continued bilateral flank pain. Reports she has been seen in ER and by PCP and prescribed abx without symptoms relief)    HISTORY OF PRESENT ILLNESS  Selene Clement is a 30 y.o. female who presents to the ED complaining of flank pain. About 1 month ago had extreme fatigue and burning in her back. She knows she has cysts on her kidneys. She went to ER and wouldn't check kidneys. 2 weeks later she went back and had scan, this showed pyelonephritis and bladder was distended. For the past month, sleeping all day, every day nausea, HA. Burning in her back on both sides but worse on the left. Denies dysuria except when she holds her pee to much and that makes her kidneys hurt. Has had gastric bypass, had vitamins checked and those were fine. No one can figure out what is going on, tested for mono and covid in addition to the above. Last night burning was bad, drinking water all day today but she reports the pain is burning bad. Reports fevers-minor ones in the 99s. Reports lots of chills, no sweats. Reports nausea, no vomiting, some diarrhea. Denies SOB, CP. Denies unusual vaginal charge. When it first started her vagina was really swollen, the antibiotics gave her seemed to help, she was having weird discharge then but this all resolved with the antibiotics she was given. The patient is currently rating their pain as 7/10 and describes it as an aching type of pain. Treatments tried prior to arrival in the ED: not allowed to take ibuprofen anymore due to gastric sleeve, ran out of tyelnol. The patient denies other complaints, modifying factors or associated symptoms. The patient arrived to the ED via private car.     PAST MEDICAL HISTORY    Past Medical History:   Diagnosis Date    Asthma     as child    Back pain     Bipolar 1 disorder ([address]     Depression     Diabetes mellitus ([address] resolved per patient    H/O: hysterectomy 04/29/2020    Liver disease     fatty liver    Obesity     OCD (obsessive compulsive disorder)     PCOS (polycystic ovarian syndrome)     PCOS (polycystic ovarian syndrome)     Sleep apnea     cpap       SURGICAL HISTORY    Past Surgical History:   Procedure Laterality Date    ANKLE SURGERY      CHOLECYSTECTOMY, LAPAROSCOPIC N/A 4/20/2020    LAPAROSCOPIC CHOLECYSTECTOMY performed by Emilee Klinefelter, DO at [facility] [address]      OTHER SURGICAL HISTORY Left 12/10/14    Excision of Cyst Left Upper Posterior Ankle    OTHER SURGICAL HISTORY Right 12/29/2016    Laparotomy with Right SO    OVARY REMOVAL  12/28/2016    unsure of side    SLEEVE GASTRECTOMY N/A 11/6/2019    LAPAROSCOPIC SLEEVE GASTRECTOMY - ETHICON performed by Judy Morgan MD at [facility] 8/23/2019    EGD BIOPSY performed by uJdy Morgan MD at [facility]    Current Outpatient Rx   Medication Sig Dispense Refill    omeprazole (PRILOSEC) 40 MG delayed release capsule Take 1 capsule by mouth 2 times daily 60 capsule 0    busPIRone (BUSPAR) 5 MG tablet Take 5 mg by mouth 3 times daily      loratadine (CLARITIN) 10 MG tablet Take 10 mg by mouth daily      Multiple Vitamins-Minerals (BARIATRIC FUSION) CHEW Take 4 tablets by mouth daily      levothyroxine (SYNTHROID) 75 MCG tablet TAKE 1 TABLET BY MOUTH EVERY DAY  5    Cholecalciferol (VITAMIN D PO) Take by mouth daily       ziprasidone (GEODON) 20 MG capsule Take 20 mg by mouth 2 times daily (with meals)      clomiPRAMINE (ANAFRANIL) 25 MG capsule Take 50 mg by mouth nightly         ALLERGIES    Allergies   Allergen Reactions    Latex Itching    Lactose      \"Extreme\" stomach pain and diarrhea    Adhesive Tape      Rips off skin    Keflex [Cephalexin] Nausea And Vomiting     Projectile vomitting       FAMILY HISTORY    Family History   Problem Relation Age of Onset    Asthma Mother     Arthritis Mother     Lung Cancer Mother     Hypertension Father     Elevated Lipids Father     Diabetes Father     Alcohol Abuse Father     Asthma Father     Arthritis Father     Diabetes Paternal Grandfather     Arthritis Paternal Grandfather     Diabetes Maternal Grandmother     Arthritis Maternal Grandmother        SOCIAL HISTORY    Social History     Socioeconomic History    Marital status:      Spouse name: None    Number of children: None    Years of education: None    Highest education level: None   Occupational History    None   Social Needs    Financial resource strain: None    Food insecurity     Worry: None     Inability: None    Transportation needs     Medical: None     Non-medical: None   Tobacco Use    Smoking status: Never Smoker    Smokeless tobacco: Never Used   Substance and Sexual Activity    Alcohol use: Yes     Alcohol/week: 1.0 standard drinks     Types: 1 Glasses of wine per week     Comment: has had a few drinks since surgery    Drug use: No    Sexual activity: Yes     Partners: Male   Lifestyle    Physical activity     Days per week: None     Minutes per session: None    Stress: None   Relationships    Social connections     Talks on phone: None     Gets together: None     Attends Yazidism service: None     Active member of club or organization: None     Attends meetings of clubs or organizations: None     Relationship status: None    Intimate partner violence     Fear of current or ex partner: None     Emotionally abused: None     Physically abused: None     Forced sexual activity: None   Other Topics Concern    None   Social History Narrative    None       REVIEW OFSYSTEMS    10systems reviewed, pertinent positives per HPI otherwise noted to be negative.     PHYSICAL EXAM  Physical Exam  Vitals:    08/25/20 2115   BP: 122/83   Pulse: 90   Resp: 19   Temp: 98.1 °F (36.7 °C)   SpO2: 96%     GENERAL: Patient is well-developed, well-nourished. Awake andalert. Cooperative. Resting in bed. No apparent distress. HEENT:  Normocephalic, atraumatic. Conjunctivaappear normal. Sclera is non-icteric. External ears are normal.    NECK: Supple with normal ROM. Tracheamidline  LUNGS: Equal and symmetric chest rise. Breathing is unlabored. Speaking comfortably in fullsentences. Lungs are clear bilaterally to auscultation. Without wheezing, rales, or rhonchi. CADIOVASCULAR:  Regular rate and rhythm. Normal S1-S2 sounds. No murmurs, rubs, or gallops. GI: Soft, bilateral flank tenderness to palpation, nondistended with positive bowel sounds. No rebound tenderness, guarding or rigidity. NEgative Conner's sign. Negative Rovsing's sign. Bilateral CVAT to palpation. No masses or hepatosplenomegaly to palpation. MUSCULOSKELETAL:  No gross deformities or trauma noted. Moving all extremities equally and appropriately. Normal ROM. SKIN: Warm/dry. Skin is intact. No rashes or lesions noted. PSYCHIATRIC: Mood and affect appropriate. Speech is clear and articulate. NEUROLOGIC: Alert and oriented. No focal motor or sensory deficits.      LABS   Results for orders placed or performed during the hospital encounter of 08/25/20   CBC Auto Differential   Result Value Ref Range    WBC 6.3 4.0 - 11.0 K/uL    RBC 4.12 4.00 - 5.20 M/uL    Hemoglobin 13.3 12.0 - 16.0 g/dL    Hematocrit 39.6 36.0 - 48.0 %    MCV 96.1 80.0 - 100.0 fL    MCH 32.2 26.0 - 34.0 pg    MCHC 33.5 31.0 - 36.0 g/dL    RDW 13.2 12.4 - 15.4 %    Platelets 634 369 - 022 K/uL    MPV 9.6 5.0 - 10.5 fL    Neutrophils % 50.7 %    Lymphocytes % 35.7 %    Monocytes % 10.8 %    Eosinophils % 1.8 %    Basophils % 1.0 %    Neutrophils Absolute 3.2 1.7 - 7.7 K/uL    Lymphocytes Absolute 2.3 1.0 - 5.1 K/uL    Monocytes Absolute 0.7 0.0 - 1.3 K/uL    Eosinophils Absolute 0.1 0.0 - 0.6 K/uL    Basophils Absolute 0.1 0.0 - 0.2 K/uL   Comprehensive Metabolic Panel   Result Value Ref Range systemic infection, no anemia, no evidence of acute kidney injury or transaminitis. No electrolyte abnormality. UA is without evidence of infection, no blood to suggest kidney stone. Pregnancy test is negative. Lactic acid level is normal at 1.3. Lipase normal at 34. I do not feel the patient needs any further testing or imaging at this time. Patient's vital signs are grossly normal.  She does not have a single abnormal laboratory finding. Given this I doubt that imaging would show abnormalities. I did discuss this with the patient. I told her that if she really wanted to have a CT I could order it however I felt the risk of radiation from this test far outweigh the benefits as above I do not feel that the test would be beneficial or change the treatment plan. I advised the patient that she should follow-up with her primary care provider for further discussion, evaluation, and management of her current complaints. At this point I do not feel the patient requires further work upand it is reasonable to discharge the patient. Please refer to AVS for further details regarding discharge instructions. A discussion was had with the patient regarding diagnosis, diagnostic testing results,treatment/ plan of care, and follow up. All questions were answered. Patient will follow up as directed for further evaluation/treatment. The patient was given strict return precautions as we discussed symptoms that wouldnecessitate return to the ED. Patient will return to ED for new/worsening symptoms. The patient verbalized their understanding and agreement with the above plan. Patient was sent home with a prescription for below medication/s. I did Douglas patient on appropriate use of these medication.   New Prescriptions    No medications on file       I estimatethere is LOW risk for ACUTE APPENDICITIS, BOWEL OBSTRUCTION, CHOLECYSTITIS, DIVERTICULITIS, INCARCERATED HERNIA, PANCREATITIS, or PERFORATED BOWEL or ULCER, thus I consider the discharge disposition reasonable. Also, thereis no evidence or peritonitis, sepsis, or toxicity. Sheridan Golden and I have discussed the diagnosis and risks, and we agree with discharging home to follow-up with their primary doctor. We also discussed returning to the EmergencyDepartment immediately if new or worsening symptoms occur. We have discussed the symptoms which are most concerning (e.g., bloody stool, fever, changing or worsening pain, vomiting) that necessitate immediate return. CLINICAL IMPRESSION    1. Flank pain    2. Fatigue, unspecified type           Discharge Vitals:  Blood pressure 122/83, pulse 90, temperature 98.1 °F (36.7 °C), temperature source Oral, resp. rate 19, height 5' 7\" (1.702 m), weight 267 lb (121.1 kg), last menstrual period 04/13/2020, SpO2 96 %, not currently breastfeeding. FOLLOW UP  [place] [place] [address]  [phone]    Call in 1 day  For further evaluation    [facility] [address] [phone]  Go to   If symptoms worsen      DISPOSITION  Patient was discharged to home in good condition. Comment: Pleasenote this report has been produced using speech recognition software and may contain errors related to that system including errors in grammar, punctuation, and spelling, as well as words and phrases that may beinappropriate. If there are any questions or concerns please feel free to contact the dictating provider for clarification.        ROSE Hopkins CNP  08/25/20 4287

## 2020-08-31 ENCOUNTER — HOSPITAL ENCOUNTER (OUTPATIENT)
Dept: CT IMAGING | Age: 35
Discharge: HOME | End: 2020-08-31
Payer: MEDICAID

## 2020-08-31 PROCEDURE — 6360000004 HC RX CONTRAST MEDICATION: Performed by: NURSE PRACTITIONER

## 2020-08-31 PROCEDURE — 74176 CT ABD & PELVIS W/O CONTRAST: CPT

## 2020-08-31 RX ADMIN — IOHEXOL 50 ML: 240 INJECTION, SOLUTION INTRATHECAL; INTRAVASCULAR; INTRAVENOUS; ORAL at 17:19

## 2020-09-08 NOTE — PROGRESS NOTES
[facility]) Physicians   [employer]    9/9/2020    TELEHEALTH EVALUATION -- Audio/Visual (During PUCDP-07 public health emergency)    Subjective:      Patient ID: Blas Arellano is a 30 y.o. female    HPI    5 months s/p sleeve gastrectomy    Blas Arellano is a 30 y.o. obese female , current BMI of 41.8, current weight of 267 pounds. Due to the COVID-19 restrictions on close contact interactions the patient's visit was conducted via audio/video in magdalena of a face to face visit. Patient has consented to have this visit conducted via audio/video. The patient is here through telemedicine for their post op bariatric surgery visit. Patient denies any nausea, vomiting, fevers, chills, shortness of breath, chest pain, constipation or urinary symptoms. Denies any heartburn nor dysphagia. She was quite sick with an ongoing kidney infection, completed multiple courses of antibiotics and has now been feeling better for about a week.     Past Medical History:   Diagnosis Date    Asthma     as child    Back pain     Bipolar 1 disorder ([address]     Depression     Diabetes mellitus ([facility] [address]     resolved per patient    H/O: hysterectomy 04/29/2020    Liver disease     fatty liver    Obesity     OCD (obsessive compulsive disorder)     PCOS (polycystic ovarian syndrome)     PCOS (polycystic ovarian syndrome)     Sleep apnea     cpap     Past Surgical History:   Procedure Laterality Date    ANKLE SURGERY      CHOLECYSTECTOMY, LAPAROSCOPIC N/A 4/20/2020    LAPAROSCOPIC CHOLECYSTECTOMY performed by Judith Richard DO at [facility] [address]      OTHER SURGICAL HISTORY Left 12/10/14    Excision of Cyst Left Upper Posterior Ankle    OTHER SURGICAL HISTORY Right 12/29/2016    Laparotomy with Right SO    OVARY REMOVAL  12/28/2016    unsure of side    SLEEVE GASTRECTOMY N/A 11/6/2019    LAPAROSCOPIC SLEEVE GASTRECTOMY - ETHICON performed by Dominga Cueva MD at [address] [place] [address] GASTROINTESTINAL ENDOSCOPY N/A 8/23/2019    EGD BIOPSY performed by Judy Morgan MD at [facility]     Family History   Problem Relation Age of Onset    Asthma Mother     Arthritis Mother     Lung Cancer Mother     Hypertension Father     Elevated Lipids Father     Diabetes Father     Alcohol Abuse Father     Asthma Father     Arthritis Father     Diabetes Paternal Grandfather     Arthritis Paternal Grandfather     Diabetes Maternal Grandmother     Arthritis Maternal Grandmother      Social History     Tobacco Use    Smoking status: Never Smoker    Smokeless tobacco: Never Used   Substance Use Topics    Alcohol use: Yes     Alcohol/week: 1.0 standard drinks     Types: 1 Glasses of wine per week     Comment: has had a few drinks since surgery     I counseled the patient on the importance of not smoking and risks of ETOH. Allergies   Allergen Reactions    Latex Itching    Lactose      \"Extreme\" stomach pain and diarrhea    Adhesive Tape      Rips off skin    Keflex [Cephalexin] Nausea And Vomiting     Projectile vomitting     There were no vitals filed for this visit. There is no height or weight on file to calculate BMI.     Current Outpatient Medications:     omeprazole (PRILOSEC) 40 MG delayed release capsule, Take 1 capsule by mouth 2 times daily, Disp: 60 capsule, Rfl: 0    busPIRone (BUSPAR) 5 MG tablet, Take 5 mg by mouth 3 times daily, Disp: , Rfl:     loratadine (CLARITIN) 10 MG tablet, Take 10 mg by mouth daily, Disp: , Rfl:     Multiple Vitamins-Minerals (BARIATRIC FUSION) CHEW, Take 4 tablets by mouth daily, Disp: , Rfl:     levothyroxine (SYNTHROID) 75 MCG tablet, TAKE 1 TABLET BY MOUTH EVERY DAY, Disp: , Rfl: 5    Cholecalciferol (VITAMIN D PO), Take by mouth daily , Disp: , Rfl:     ziprasidone (GEODON) 20 MG capsule, Take 20 mg by mouth 2 times daily (with meals), Disp: , Rfl:     clomiPRAMINE (ANAFRANIL) 25 MG capsule, Take 50 mg by mouth nightly, Disp: , Rfl: [] Abnormal-   Mental status  [x] Alert and awake  [x] Oriented to person/place/time [x]Able to follow commands      Eyes:  EOM    [x]  Normal  [] Abnormal-  Sclera  [x]  Normal  [] Abnormal -         Discharge [x]  None visible  [] Abnormal -    HENT:   [x] Normocephalic, atraumatic. [] Abnormal   [x] Mouth/Throat: Mucous membranes are moist.     External Ears [x] Normal  [] Abnormal-     Neck: [x] No visualized mass     Pulmonary/Chest: [x] Respiratory effort normal.  [x] No visualized signs of difficulty breathing or respiratory distress        [] Abnormal-      Musculoskeletal:   [] Normal gait with no signs of ataxia         [x] Normal range of motion of neck        [] Abnormal-     Neurological:        [x] No Facial Asymmetry (Cranial nerve 7 motor function) (limited exam to video visit)          [x] No gaze palsy        [] Abnormal-         Skin:        [x] No significant exanthematous lesions or discoloration noted on facial skin         [] Abnormal-            Psychiatric:       [x] Normal Affect [x] No Hallucinations        [] Abnormal-     Other pertinent observable physical exam findings-     Due to this being a TeleHealth encounter, evaluation of the following organ systems is limited: Vitals/Constitutional/EENT/Resp/CV/GI//MS/Neuro/Skin/Heme-Lymph-Imm. Assessment and Plan:   Patient is here via telemedicine and is 10 months s/p sleeve gastrectomy, down 7lbs with a total weight loss of 129lbs. She is doing well, denies n/v/dysphagia or reflux. She is tolerating diet, getting adequate fluids and protein. We discussed importance of tracking and maintaining proper portions. We will provide her with the 1200 calorie post op meal plan for more guidance. She reports lots of grazing behaviors, she feels she may eat ou of boredom. She is open to seeing our behaviorist to help her with other coping mechanisms, we will set her up with a virtual appt.  She is getting back into exercising, starting to walk again. Encouraged continued physical activity. We discussed long term goal of 30 minutes, 5 days a week. She is taking vitamins as instructed. Reiterated need for increase Vitamin A rich foods based on recent labs. Will plan to repeat labs at her 1 year appt. She did speak with the registered dietitian for continued follow up. I agree with recommendations and plan. We will see her back in 2 months for continued follow up or via telemedicine depending on COVID-19 restrictions at the time of their next appointment. A total of 15 minutes was spent conversing with the patient and over half of that time was spent counseling the patient on proper dietary behaviors, exercise and post-op progress. An electronic signature was used to authenticate this note. Pursuant to the emergency declaration under the [facility] waiver authority and the [employer] Act, this Virtual  Visit was conducted, with patient's consent, to reduce the patient's risk of exposure to COVID-19 and provide continuity of care for an established patient. Services were provided through a video synchronous discussion virtually to substitute for in-person clinic visit.

## 2020-09-08 NOTE — PATIENT INSTRUCTIONS
Diet tips to help make you successful postoperatively  Eating habits after surgery will have to be a permanent and long-term change. Eating habits are so ingrained that it can be difficult to change. It is important to maintain these new eating habits after surgery. Also remember that overall health, age, and genetics make each persons weight loss progress different. Do not compare your progress, the amount you eat, or exercise to other patients.  Protein first at every meal- Eat the protein portion of your meal first. Eating protein helps the body feel full and sends a signal to stop eating. Protein is very important in building tissue in the body.  Eat at least 4 times per day- This includes protein supplements and small meals with a high amount of protein   Chewing your food thoroughly- Eating too quickly and improper chewing can cause pain and vomiting after surgery.  Slowing down the speed at which you eat- Refill your fork only after you swallow. Adopt a new pattern of eating by taking a bite of food and putting your utensil down between bites. This will help to reduce the feeling of food being stuck.    Drink water and start drinking fluids slowly- Drink at least 48 ounces per day minimum. Sip fluids as if they were hot beverages. If you find it difficult to stop gulping liquids, try using a sippy cup or a sport top water bottle.  Make sure you are eating meals without drinking fluids- After surgery you will not be allowed to drink fluids 30 minutes before, during, or 30 minutes after your meal (30/30/30 rule). This will be a life-long behavior change. The reason for the rule is to keep food from passing through your smaller stomach more rapidly. This will cause you to feel hungry shortly after your meal.   Continue to avoid caffeine and carbonated beverages- Caffeine acts as a diuretic and can be dehydrating as well as irritating to the lining of the stomach.  Carbonated beverages release gas and can expand the stomach.  Continue to keep temptation from your kitchen- Keep your pantry and kitchen cabinets cleaned out of those dangerous foods that might tempt you after surgery (chips, cookies, candy, etc.).  Continue to increase your exercise program- Increase your daily physical activity. Aim for 5-6 days per week for 30 minutes. Walking is an easy way to get started with exercising. Exercise is going to be a regular part of your life after surgery.  Make sure you have a good support system- There will be many changes and adjustments to make after surgery. It is important to have a supportive friend, family member or co-worker, etc. with whom you can talk. Continue to attend [facility]) Weight Management support groups as they can be helpful in maintaining behaviors. In addition, it is the responsibility of the patient to schedule and follow up on labs and tests completed after surgery. Results will be reviewed at each visit. Patient received dietary handouts and education.

## 2020-09-09 ENCOUNTER — TELEMEDICINE (OUTPATIENT)
Dept: BARIATRICS/WEIGHT MGMT | Age: 35
End: 2020-09-09
Payer: MEDICAID

## 2020-09-09 PROCEDURE — 3044F HG A1C LEVEL LT 7.0%: CPT | Performed by: NURSE PRACTITIONER

## 2020-09-09 PROCEDURE — 2022F DILAT RTA XM EVC RTNOPTHY: CPT | Performed by: NURSE PRACTITIONER

## 2020-09-09 PROCEDURE — G8427 DOCREV CUR MEDS BY ELIG CLIN: HCPCS | Performed by: NURSE PRACTITIONER

## 2020-09-09 PROCEDURE — 99213 OFFICE O/P EST LOW 20 MIN: CPT | Performed by: NURSE PRACTITIONER

## 2020-09-09 ASSESSMENT — ENCOUNTER SYMPTOMS
RESPIRATORY NEGATIVE: 1
GASTROINTESTINAL NEGATIVE: 1
EYES NEGATIVE: 1

## 2020-09-09 NOTE — PROGRESS NOTES
Dietary Assessment Note      Vitals: There were no vitals filed for this visit. Patient lost 7 lbs over past 2 months. Total Weight Loss: 129 lbs    Due to the COVID-19 restrictions on close contact interactions the patient's visit was conducted via telephone in magdalena of a face to face visit. The patient is here through telemedicine for their post op visit. Labs reviewed:     Results for Stephon Mondragon (MRN [id number]) as of 9/9/2020 07:37   Ref. Range 8/25/2020 21:40   Sodium Latest Ref Range: 136 - 145 mmol/L 140   Potassium Latest Ref Range: 3.5 - 5.1 mmol/L 4.0   Chloride Latest Ref Range: 99 - 110 mmol/L 102   CO2 Latest Ref Range: 21 - 32 mmol/L 28   BUN Latest Ref Range: 7 - 20 mg/dL 12   Creatinine Latest Ref Range: 0.6 - 1.1 mg/dL 0.6   Anion Gap Latest Ref Range: 3 - 16  10   GFR Non- Latest Ref Range: >60  >60   GFR  Latest Ref Range: >60  >60   Lactic Acid Latest Ref Range: 0.4 - 2.0 mmol/L 1.3   Glucose Latest Ref Range: 70 - 99 mg/dL 87   Calcium Latest Ref Range: 8.3 - 10.6 mg/dL 9.4   Total Protein Latest Ref Range: 6.4 - 8.2 g/dL 6.9   Results for Stephon Mondragon (MRN [id number]) as of 9/9/2020 07:37   Ref. Range 8/25/2020 21:40   Hemoglobin Quant Latest Ref Range: 12.0 - 16.0 g/dL 13.3   Hematocrit Latest Ref Range: 36.0 - 48.0 % 39.6   MCV Latest Ref Range: 80.0 - 100.0 fL 96.1   MCH Latest Ref Range: 26.0 - 34.0 pg 32.2     Protein intake: ~60 g/pro     Fluid intake: >64 oz/day    Multivitamin/mineral intake: 4 fusion    Calcium intake: none    Other: collagen     Exercise: walking on the treadmill - but it is inconsistent. Gets 6-7K/day     Nutrition Assessment: 10 mo post-op visit. Tracking inconsistently but reports eating over 1200 calories. Feels she is grazing more with her snacks.      Breakfast: protein bar     Snack: pretzels OR string cheese OR cookies     Lunch: cheese and crackers OR apples and PB OR salami/pepperoni/turkey sandwich with cheese with

## 2020-09-10 RX ORDER — OMEPRAZOLE 40 MG/1
40 CAPSULE, DELAYED RELEASE ORAL 2 TIMES DAILY
Qty: 60 CAPSULE | Refills: 0 | Status: SHIPPED | OUTPATIENT
Start: 2020-09-10 | End: 2020-10-05

## 2020-09-24 ENCOUNTER — PATIENT MESSAGE (OUTPATIENT)
Dept: BARIATRICS/WEIGHT MGMT | Age: 35
End: 2020-09-24

## 2020-09-29 ENCOUNTER — HOSPITAL ENCOUNTER (EMERGENCY)
Age: 35
Discharge: HOME | End: 2020-09-30
Payer: MEDICAID

## 2020-09-29 ENCOUNTER — APPOINTMENT (OUTPATIENT)
Dept: GENERAL RADIOLOGY | Age: 35
End: 2020-09-29
Payer: MEDICAID

## 2020-09-29 PROCEDURE — 71046 X-RAY EXAM CHEST 2 VIEWS: CPT

## 2020-09-29 PROCEDURE — 99284 EMERGENCY DEPT VISIT MOD MDM: CPT

## 2020-09-29 ASSESSMENT — PAIN SCALES - GENERAL: PAINLEVEL_OUTOF10: 6

## 2020-09-30 VITALS
BODY MASS INDEX: 41.44 KG/M2 | SYSTOLIC BLOOD PRESSURE: 118 MMHG | HEIGHT: 67 IN | TEMPERATURE: 98.4 F | DIASTOLIC BLOOD PRESSURE: 78 MMHG | WEIGHT: 264 LBS | RESPIRATION RATE: 18 BRPM | OXYGEN SATURATION: 99 % | HEART RATE: 89 BPM

## 2020-09-30 LAB
RAPID INFLUENZA  B AGN: NEGATIVE
RAPID INFLUENZA A AGN: NEGATIVE
S PYO AG THROAT QL: NEGATIVE
SARS-COV-2, PCR: NOT DETECTED

## 2020-09-30 PROCEDURE — 87804 INFLUENZA ASSAY W/OPTIC: CPT

## 2020-09-30 PROCEDURE — 87880 STREP A ASSAY W/OPTIC: CPT

## 2020-09-30 PROCEDURE — U0003 INFECTIOUS AGENT DETECTION BY NUCLEIC ACID (DNA OR RNA); SEVERE ACUTE RESPIRATORY SYNDROME CORONAVIRUS 2 (SARS-COV-2) (CORONAVIRUS DISEASE [COVID-19]), AMPLIFIED PROBE TECHNIQUE, MAKING USE OF HIGH THROUGHPUT TECHNOLOGIES AS DESCRIBED BY CMS-2020-01-R: HCPCS

## 2020-09-30 PROCEDURE — 87081 CULTURE SCREEN ONLY: CPT

## 2020-09-30 RX ORDER — BROMPHENIRAMINE MALEATE, PSEUDOEPHEDRINE HYDROCHLORIDE, AND DEXTROMETHORPHAN HYDROBROMIDE 2; 30; 10 MG/5ML; MG/5ML; MG/5ML
5 SYRUP ORAL 4 TIMES DAILY PRN
Qty: 1 BOTTLE | Refills: 0 | Status: SHIPPED | OUTPATIENT
Start: 2020-09-30 | End: 2020-10-05

## 2020-09-30 RX ORDER — GREEN TEA/HOODIA GORDONII 315-12.5MG
1 CAPSULE ORAL 2 TIMES DAILY
Qty: 60 TABLET | Refills: 0 | Status: SHIPPED | OUTPATIENT
Start: 2020-09-30 | End: 2020-10-30

## 2020-09-30 NOTE — ED PROVIDER NOTES
[address] ED  EMERGENCY DEPARTMENT ENCOUNTER        Pt Name: Lizzette Colunga  MRN: [id number]  [place] 1985  Date of evaluation: 9/29/2020  Provider: ANAY Leo  PCP: ROSE Rodrigez - CNP    This patient was not seen and evaluated by the attending physician No att. providers found. I have evaluated this patient. My supervising physician was available for consultation. CHIEF COMPLAINT       Chief Complaint   Patient presents with    Cough     Pt here with multiple Sx that started 1 week ago. PCP sent pt for COVID testing    Fever    Abdominal Pain    Pharyngitis    Diarrhea       HISTORY OF PRESENT ILLNESS   (Location/Symptom, Timing/Onset, Context/Setting, Quality, Duration, Modifying Factors, Severity)  Note limiting factors. Lizzette Colunga is a 30 y.o. female who presents via private vehicle from her home for  Cough, low grade fevers, abdominal pain, sore throat and diarrhea. Onset was last Tuesday, one week ago. Duration has been since the onset. Context includes she started with nausea and vomiting x 1 day last week with frequent and looser stools. No melena or hematochezia. She has had low grade fevers, <100, intermittently. She endorses intermittent generalized achy abd pain that is just before a BM and is relieved by a BM. No dysuria, hematuria, frequency or urgency. She denies any recent N/V or sharp abdominal pain. A couple of days ago she developed URI symptoms with intermittent nonproductive cough, runny nose, nasal congestion and sore throat. She has had mild intermittent headaches, denies worst headache of her lift or thunderclap etiology. No neck pain. No chest pain, but endorses burning epigastric pain intermittently. 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 No known recent sick contacts. Nothing makes it better or worse.      Nursing Notes were all reviewed and agreed with or any disagreements were addressed in the HPI. REVIEW OF SYSTEMS    (2-9 systems for level 4, 10 or more for level 5)     Review of Systems    Positives and Pertinent negatives as per HPI. Except as noted abovein the ROS, all other systems were reviewed and negative.        PAST MEDICAL HISTORY     Past Medical History:   Diagnosis Date    Asthma     as child    Back pain     [facility]     Depression     Diabetes mellitus ([facility]     resolved per patient    H/O: hysterectomy 04/29/2020    Liver disease     fatty liver    Obesity     OCD (obsessive compulsive disorder)     PCOS (polycystic ovarian syndrome)     PCOS (polycystic ovarian syndrome)     Sleep apnea     cpap         SURGICAL HISTORY     Past Surgical History:   Procedure Laterality Date    ANKLE SURGERY      CHOLECYSTECTOMY, LAPAROSCOPIC N/A 4/20/2020    LAPAROSCOPIC CHOLECYSTECTOMY performed by Kaushal Garcia DO at [address]      OTHER SURGICAL HISTORY Left 12/10/14    Excision of Cyst Left Upper Posterior Ankle    OTHER SURGICAL HISTORY Right 12/29/2016    Laparotomy with Right SO    OVARY REMOVAL  12/28/2016    unsure of side    SLEEVE GASTRECTOMY N/A 11/6/2019    LAPAROSCOPIC SLEEVE GASTRECTOMY - ETHICON performed by Ryan Martel MD at [address] N/A 8/23/2019    EGD BIOPSY performed by Ryan Martel MD at [address]       Discharge Medication List as of 9/30/2020  1:32 AM      CONTINUE these medications which have NOT CHANGED    Details   omeprazole (PRILOSEC) 40 MG delayed release capsule TAKE 1 CAPSULE BY MOUTH 2 TIMES DAILY, Disp-60 capsule,R-0Normal      busPIRone (BUSPAR) 5 MG tablet Take 5 mg by mouth 3 times dailyHistorical Med      loratadine (CLARITIN) 10 MG tablet Take 10 mg by mouth dailyHistorical Med      Multiple Vitamins-Minerals (BARIATRIC FUSION) CHEW Take 4 tablets by mouth dailyHistorical Med      levothyroxine (SYNTHROID) 75 MCG tablet TAKE 1 TABLET BY MOUTH EVERY DAY, R-5Historical Med      Cholecalciferol (VITAMIN D PO) Take by mouth daily Historical Med      ziprasidone (GEODON) 20 MG capsule Take 20 mg by mouth 2 times daily (with meals)Historical Med      clomiPRAMINE (ANAFRANIL) 25 MG capsule Take 50 mg by mouth nightlyHistorical Med               ALLERGIES     Latex; Lactose; Adhesive tape; and Keflex [cephalexin]    FAMILYHISTORY       Family History   Problem Relation Age of Onset    Asthma Mother     Arthritis Mother     Lung Cancer Mother     Hypertension Father     Elevated Lipids Father     Diabetes Father     Alcohol Abuse Father     Asthma Father     Arthritis Father     Diabetes Paternal Grandfather     Arthritis Paternal Grandfather     Diabetes Maternal Grandmother     Arthritis Maternal Grandmother           SOCIAL HISTORY       Social History     Socioeconomic History    Marital status:      Spouse name: None    Number of children: None    Years of education: None    Highest education level: None   Occupational History    None   Social Needs    Financial resource strain: None    Food insecurity     Worry: None     Inability: None    Transportation needs     Medical: None     Non-medical: None   Tobacco Use    Smoking status: Never Smoker    Smokeless tobacco: Never Used   Substance and Sexual Activity    Alcohol use:  Yes     Alcohol/week: 1.0 standard drinks     Types: 1 Glasses of wine per week     Comment: has had a few drinks since surgery    Drug use: No    Sexual activity: Yes     Partners: Male   Lifestyle    Physical activity     Days per week: None     Minutes per session: None    Stress: None   Relationships    Social connections     Talks on phone: None     Gets together: None     Attends Zoroastrian service: None     Active member of club or organization: None     Attends meetings of clubs or organizations: None     Relationship status: None    Intimate partner violence Fear of current or ex partner: None     Emotionally abused: None     Physically abused: None     Forced sexual activity: None   Other Topics Concern    None   Social History Narrative    None       SCREENINGS             PHYSICAL EXAM    (up to 7 for level 4, 8 or more for level 5)     ED Triage Vitals [09/29/20 2309]   BP Temp Temp Source Pulse Resp SpO2 Height Weight   117/81 98.4 °F (36.9 °C) Temporal 99 16 95 % 5' 7\" (1.702 m) 264 lb (119.7 kg)       Physical Exam  Vitals signs and nursing note reviewed. Constitutional:       General: She is awake. She is not in acute distress. Appearance: She is well-developed and overweight. She is not ill-appearing, toxic-appearing or diaphoretic. HENT:      Head: Normocephalic and atraumatic. Jaw: There is normal jaw occlusion. Right Ear: Hearing, tympanic membrane, ear canal and external ear normal. No mastoid tenderness. Left Ear: Hearing and external ear normal. No mastoid tenderness. Tympanic membrane is injected, erythematous and bulging. Nose: Congestion present. No rhinorrhea. Right Sinus: No maxillary sinus tenderness or frontal sinus tenderness. Left Sinus: No maxillary sinus tenderness or frontal sinus tenderness. Mouth/Throat:      Lips: Pink. Mouth: Mucous membranes are moist.      Pharynx: Uvula midline. Posterior oropharyngeal erythema present. No pharyngeal swelling, oropharyngeal exudate or uvula swelling. Tonsils: No tonsillar exudate or tonsillar abscesses. 2+ on the right. 2+ on the left. Eyes:      General:         Right eye: No discharge. Left eye: No discharge. Extraocular Movements: Extraocular movements intact. Conjunctiva/sclera: Conjunctivae normal.      Pupils: Pupils are equal, round, and reactive to light. Neck:      Musculoskeletal: Normal range of motion and neck supple. No neck rigidity or muscular tenderness.       Comments: No meningismus     Cardiovascular: Rate and Rhythm: Normal rate and regular rhythm. Pulses: Normal pulses. Radial pulses are 2+ on the right side and 2+ on the left side. Posterior tibial pulses are 2+ on the right side and 2+ on the left side. Heart sounds: Normal heart sounds. No murmur. No friction rub. No gallop. Pulmonary:      Effort: Pulmonary effort is normal. No tachypnea, bradypnea, accessory muscle usage or respiratory distress. Breath sounds: Normal breath sounds and air entry. No decreased breath sounds, wheezing, rhonchi or rales. Abdominal:      General: Bowel sounds are normal. There is no distension. Palpations: Abdomen is soft. Tenderness: There is generalized abdominal tenderness. There is no right CVA tenderness, left CVA tenderness, guarding or rebound. Negative signs include Conner's sign and McBurney's sign. Hernia: No hernia is present. Musculoskeletal:      Right lower leg: No edema. Left lower leg: No edema. Lymphadenopathy:      Cervical: No cervical adenopathy. Skin:     General: Skin is warm and dry. Findings: No rash. Neurological:      General: No focal deficit present. Mental Status: She is alert, oriented to person, place, and time and easily aroused. Sensory: No sensory deficit. Motor: No weakness. Psychiatric:         Behavior: Behavior normal. Behavior is cooperative.            DIAGNOSTIC RESULTS   LABS:    Labs Reviewed   STREP SCREEN GROUP A THROAT    Narrative:     Performed at:  [facility],  [place], [facility]   Phone [phone]   RAPID INFLUENZA A/B ANTIGENS    Narrative:     Performed at:  [facility],  [place], [facility]   Phone [phone]   CULTURE, BETA STREP CONFIRM PLATES    Narrative:     ORDER#: [id number]                          ORDERED BY: Rubens Munoz  SOURCE: Throat COLLECTED:  09/30/20 00:35  ANTIBIOTICS AT AUGIE.:                      RECEIVED :  09/30/20 07:11  Performed at:  [facility]  [address] [place] [address]   Phone [phone]    Narrative:     Performed at:  [facility]  [address] [place], [place] [address]   Phone [phone]       All other labs were within normal range or not returned as of this dictation. EKG: All EKG's are interpreted by the Emergency Department Physician who either signs orCo-signs this chart in the absence of a cardiologist.  Please see their note for interpretation of EKG. RADIOLOGY:   Non-plain film images such as CT, Ultrasound and MRI are read by the radiologist. Domenica Harding radiographic images are visualized andpreliminarily interpreted by the  ED Provider with the below findings:        Interpretation perthe Radiologist below, if available at the time of this note:    XR CHEST (2 VW)   Final Result   No acute cardiopulmonary process           Xr Chest (2 Vw)    Result Date: 9/30/2020  EXAMINATION: TWO XRAY VIEWS OF THE CHEST 9/29/2020 11:54 pm COMPARISON: 02/01/2020 HISTORY: ORDERING SYSTEM PROVIDED HISTORY: uri TECHNOLOGIST PROVIDED HISTORY: Reason for exam:->uri Reason for Exam: thinks she got the covids Acuity: Acute Type of Exam: Initial FINDINGS: The cardiomediastinal silhouette is normal in size and contour. The lungs are clear. No pleural effusion or pneumothorax is present.      No acute cardiopulmonary process          PROCEDURES   Unless otherwise noted below, none     Procedures    CRITICAL CARE TIME   N/A    CONSULTS:  None      EMERGENCY DEPARTMENT COURSE and DIFFERENTIALDIAGNOSIS/MDM:   Vitals:    Vitals:    09/29/20 2309 09/30/20 0132   BP: 117/81 118/78   Pulse: 99 89   Resp: 16 18   Temp: 98.4 °F (36.9 °C)    TempSrc: Temporal    SpO2: 95% 99%   Weight: 264 lb (119.7 kg)    Height: 5' 7\" (1.702 m)        Patient was given thefollowing medications:  Medications - No data to display    PDMP Monitoring:    Last PDMP Doreen Gaspar as Reviewed [facility]):  Review User Review Instant Review Result          Last Controlled Substance Monitoring Documentation      ED from 10/21/2016 in [facility] ED   Periodic Controlled Substance Monitoring  No signs of potential drug abuse or diversion identified. filed at 10/21/2016 1932        Urine Drug Screenings (1 yr)     Drugs of abuse 9 serum w/ reflex  Collected: 10/8/2019 12:42 PM (Final result)    Narrative:  Referred out by:  [facility],  [place]   Phone [phone]    Complete Results          Drug screen multi urine  Collected: 8/9/2020 12:37 AM (Final result)    Narrative:  Performed at:  [facility],  [place]   Phone [phone]    Complete Results              Medication Contract and Consent for Opioid Use Documents Filed      No documents found                MDM:   Patient seen and evaluated. Old records reviewed. Diagnostic testing reviewed and results discussed. I have independently evaluated this patient based upon my scope of practice. Supervising physician was in the department for consultation as needed. Pt is a 29 yo female who presents for evalution of multiple symptoms. On exam she is alert, oriented, afebrile, well perfused, HD stable, well hydraed and non toxic in appearance. She has mild generalized abdominal tenderness without focal process. She has nasal congestion and evicdence of post nasal drip. Tonsils are mildly edematous but no evidence of trismus or angioedema, I have low concern for deep neck space infection. Lungs overall CTABL. Rapid strep is negative. Rapid flu is negative. She was recently tested for covid at work and her CXR is without focal process, will not test today as she does not wish to proceed with this. At this time I believe pt is suffering from likely viral process. Will treat her symptomatically. Based on patient's clinical history and clinical findings I currently estimate there is low probability for: viral URI, nasal congestion, bacterial sinusitis, viral/strep pharyngitis, otitis media, otitis externa, RPA, PTA. Additionally, we discussed signs and symptoms that would be concerning for prompt re-evaluation such as fevers, vomiting, worsening headache, SOB, chest pain, new or worsening dizziness. The patient is low risk for mortality from covid-19 based on demographic, history and clinical factors. Given the best available information and clinical assessment, I estimate the risk of hospitalization to be greater than the risk of treatment at home. I have explained to the patient that the risk could rapidly change, given precautions for return and instructions on how to minimize being contagious. Pt instructed to follow up for new/worsening symptoms such as SOB, lightheadedness, syncope, chest pain, hemoptysis, worsening fever, or SpO2<90%     At this time I have low concern for ARDS, septicemia, PE. Pt is in agreement with the current plan and all questions were addressed. Discharge Time out:  CC Reviewed Yes   Test Results Yes     Vitals:    09/30/20 0132   BP: 118/78   Pulse: 89   Resp: 18   Temp:    SpO2: 99%        FINAL IMPRESSION      1. Diarrhea, unspecified type    2.  Acute upper respiratory infection          DISPOSITION/PLAN   DISPOSITION Decision To Discharge 09/30/2020 01:27:18 AM      PATIENT REFERREDTO:  ROSE Bhakta CNP  [id number] [place] [address]  [phone]    Schedule an appointment as soon as possible for a visit   For a recheck in  3-7  days, sooner if no improvement or worsening of symptoms    [facility] [place] [facility]  [address] [facility]  [phone]  Go to   If symptoms worsen      DISCHARGE MEDICATIONS:  Discharge Medication List as of 9/30/2020  1:32 AM      START taking these medications    Details   Probiotic Acidophilus (FLORANEX) TABS Take 1 tablet by mouth 2 times daily, Disp-60 tablet,R-0Print      brompheniramine-pseudoephedrine-DM (BROMFED DM) 2-30-10 MG/5ML syrup Take 5 mLs by mouth 4 times daily as needed for Congestion or Cough, Disp-1 Bottle,R-0Print             DISCONTINUED MEDICATIONS:  Discharge Medication List as of 9/30/2020  1:32 AM                 (Please note that portions ofthis note were completed with a voice recognition program.  Efforts were made to edit the dictations but occasionally words are mis-transcribed.)    ANAY Clark (electronically signed)       ANAY Clark  10/08/20 0001

## 2020-10-02 LAB — S PYO THROAT QL CULT: NORMAL

## 2020-10-05 RX ORDER — OMEPRAZOLE 40 MG/1
CAPSULE, DELAYED RELEASE ORAL
Qty: 60 CAPSULE | Refills: 0 | Status: SHIPPED | OUTPATIENT
Start: 2020-10-05 | End: 2020-11-06

## 2020-11-03 ENCOUNTER — OFFICE VISIT (OUTPATIENT)
Dept: BARIATRICS/WEIGHT MGMT | Age: 35
End: 2020-11-03
Payer: MEDICAID

## 2020-11-03 VITALS
DIASTOLIC BLOOD PRESSURE: 75 MMHG | HEIGHT: 67 IN | TEMPERATURE: 97.4 F | BODY MASS INDEX: 42.06 KG/M2 | RESPIRATION RATE: 18 BRPM | WEIGHT: 268 LBS | SYSTOLIC BLOOD PRESSURE: 126 MMHG | OXYGEN SATURATION: 98 % | HEART RATE: 91 BPM

## 2020-11-03 PROCEDURE — 99214 OFFICE O/P EST MOD 30 MIN: CPT | Performed by: SURGERY

## 2020-11-03 PROCEDURE — G8484 FLU IMMUNIZE NO ADMIN: HCPCS | Performed by: SURGERY

## 2020-11-03 PROCEDURE — G8417 CALC BMI ABV UP PARAM F/U: HCPCS | Performed by: SURGERY

## 2020-11-03 PROCEDURE — G8427 DOCREV CUR MEDS BY ELIG CLIN: HCPCS | Performed by: SURGERY

## 2020-11-03 PROCEDURE — 1036F TOBACCO NON-USER: CPT | Performed by: SURGERY

## 2020-11-03 PROCEDURE — 2022F DILAT RTA XM EVC RTNOPTHY: CPT | Performed by: SURGERY

## 2020-11-03 PROCEDURE — 3044F HG A1C LEVEL LT 7.0%: CPT | Performed by: SURGERY

## 2020-11-03 NOTE — PROGRESS NOTES
[facility]) Physicians   [employer] [facility] [employer]  Nivia Wang MD, [address] [facility], [address] [place] [address]    [place]  [address] . Phone: [phone]  Fax: [phone]            Chief Complaint   Patient presents with   [facility] Post Op Follow Up     1yr s/p sleeve 11/16/19           HPI:    Sam Hernandez is a very pleasant 37 y.o.  female , Body mass index is 41.97 kg/m². Jovan Luo And multiple medical problems who is presenting for bariatric follow up care. La Nena is s/p laparoscopic sleeve gastrectomy by me 11/2019. Initial Weight: 396 lbs, Weight Loss: 128 lbs. Comes today to the clinic without any complaints. Patient denies any nausea, vomiting, fevers, chills, shortness of breath, chest pain, constipation or urinary symptoms. Denies any heartburn nor dysphagia. La Nena is feeling very well, and is very active. Patient is very pleased with the weight loss and resolution of co-morbid conditions.   However lately have been feeling somewhat tired      Pain Assessment   Denies any abdominal pain     Past Medical History:   Diagnosis Date    Asthma     as child    Back pain     [facility]ipolar 1 disorder [facility] [address]     Depression     Diabetes mellitus ([facility] [address]     resolved per patient    H/O: hysterectomy 04/29/2020    Liver disease     fatty liver    Obesity     OCD (obsessive compulsive disorder)     PCOS (polycystic ovarian syndrome)     PCOS (polycystic ovarian syndrome)     Sleep apnea     cpap     Past Surgical History:   Procedure Laterality Date    ANKLE SURGERY      CHOLECYSTECTOMY, LAPAROSCOPIC N/A 4/20/2020    LAPAROSCOPIC CHOLECYSTECTOMY performed by Ronny Felix DO at [facility] [address]      OTHER SURGICAL HISTORY Left 12/10/14    Excision of Cyst Left Upper Posterior Ankle    OTHER SURGICAL HISTORY Right 12/29/2016    Laparotomy with Right SO    OVARY REMOVAL  12/28/2016    unsure of side    SLEEVE GASTRECTOMY N/A 11/6/2019    LAPAROSCOPIC SLEEVE GASTRECTOMY - ANIONGAP 10 08/25/2020    GLUCOSE 87 08/25/2020    BUN 12 08/25/2020    CREATININE 0.6 08/25/2020    LABGLOM >60 08/25/2020    GFRAA >60 08/25/2020    GFRAA >60 04/07/2011    CALCIUM 9.4 08/25/2020    PROT 6.9 08/25/2020    PROT 7.1 04/07/2011    LABALBU 4.0 08/25/2020    AGRATIO 1.4 08/25/2020    BILITOT <0.2 08/25/2020    ALKPHOS 67 08/25/2020    ALT 25 08/25/2020    AST 29 08/25/2020    GLOB 2.9 08/25/2020     Lab Results   Component Value Date    CHOL 113 07/14/2020    TRIG 56 07/14/2020    HDL 46 07/14/2020    LDLCALC 56 07/14/2020    LABVLDL 11 07/14/2020     Lab Results   Component Value Date    TSHREFLEX 1.76 08/09/2020     Lab Results   Component Value Date    IRON 88 07/14/2020    TIBC 291 07/14/2020    LABIRON 30 07/14/2020     Lab Results   Component Value Date    KRZJLVNS34 749 07/14/2020    FOLATE >20.00 07/14/2020     Lab Results   Component Value Date    VITD25 73.2 07/14/2020     Lab Results   Component Value Date    LABA1C 4.7 07/14/2020    EAG 88.2 07/14/2020         Current Outpatient Medications:     omeprazole (PRILOSEC) 40 MG delayed release capsule, TAKE 1 CAPSULE (40MG) BY MOUTH 2 TIMES DAILY, Disp: 60 capsule, Rfl: 0    busPIRone (BUSPAR) 5 MG tablet, Take 5 mg by mouth 3 times daily, Disp: , Rfl:     loratadine (CLARITIN) 10 MG tablet, Take 10 mg by mouth daily, Disp: , Rfl:     Multiple Vitamins-Minerals (BARIATRIC FUSION) CHEW, Take 4 tablets by mouth daily, Disp: , Rfl:     levothyroxine (SYNTHROID) 75 MCG tablet, TAKE 1 TABLET BY MOUTH EVERY DAY, Disp: , Rfl: 5    Cholecalciferol (VITAMIN D PO), Take by mouth daily , Disp: , Rfl:     ziprasidone (GEODON) 20 MG capsule, Take 20 mg by mouth 2 times daily (with meals), Disp: , Rfl:     clomiPRAMINE (ANAFRANIL) 25 MG capsule, Take 50 mg by mouth nightly, Disp: , Rfl:       Review of Systems - History obtained from the patient  General ROS: Feels tired  Psychological ROS: negative  Ophthalmic ROS: negative  Neurological ROS: negative  ENT ROS: negative  Allergy and Immunology ROS: negative  Hematological and Lymphatic ROS: negative  Endocrine ROS: negative  Breast ROS: negative  Respiratory ROS: negative  Cardiovascular ROS: negative  Gastrointestinal ROS:negative  Genito-Urinary ROS: negative  Musculoskeletal ROS: negative   Skin ROS: negative    Physical Exam   CONSTITUTIONAL:  awake, alert, cooperative, no apparent distress, and appears stated age  EYES:  Lids and lashes normal, pupils equal, round , extra ocular muscles intact, sclera clear, conjunctiva normal  ENT:  Normocephalic, without obvious abnormality, atraumatic, external ears without lesions. NECK:  Supple, symmetrical, normal range of motion, skin normal  HEMATOLOGIC/LYMPHATICS:  no cervical lymphadenopathy, no supraclavicular lymphadenopathy and no inguinal lymphadenopathy  BACK:  Symmetric, no curvature. LUNGS:  No increased work of breathing, good air exchange, no stridor , accessory muscle use or wheezing  CARDIOVASCULAR:  Normal apical impulse, regular rate and rhythm, no LE edema, intact distal pulses. ABDOMEN:   soft, non-distended, appropriately tender, no masses palpated, no hepatosplenomegaly, incision site with no drainage or erythema. .  MUSCULOSKELETAL:  There is no redness, warmth, or swelling of the joints. Full range of motion noted. Motor strength is 5 out of 5 all extremities bilaterally. Tone is normal.  NEUROLOGIC:  Awake, alert, oriented to name, place and time. Cranial nerves II-XII are grossly intact. Motor and  Sensory is intact. SKIN:  no bruising or bleeding, normal skin color, texture, turgor, no redness, warmth, or swelling and stable incisions. One of her incisions and the lower abdomen possibly from her hysterectomy having minor erythema however no induration nor drainage. A/P:    La Nena was seen today for bariatrics post op follow up.     Diagnoses and all orders for this visit:    S/P laparoscopic sleeve gastrectomy  -     CBC Auto Differential; Future  -     Comprehensive Metabolic Panel; Future  -     Hemoglobin A1C; Future  -     Iron and TIBC; Future  -     Lipid Panel; Future  -     TSH with Reflex; Future  -     Vitamin A; Future  -     Vitamin B1, Whole Blood; Future  -     Vitamin B12 & Folate; Future  -     Vitamin D 25 Hydroxy; Future  -     Vitamin E; Future  -     Protime-INR; Future    Diabetes mellitus type 2 in obese (HCC)  -     CBC Auto Differential; Future  -     Comprehensive Metabolic Panel; Future  -     Hemoglobin A1C; Future  -     Iron and TIBC; Future  -     Lipid Panel; Future  -     TSH with Reflex; Future  -     Vitamin A; Future  -     Vitamin B1, Whole Blood; Future  -     Vitamin B12 & Folate; Future  -     Vitamin D 25 Hydroxy; Future  -     Vitamin E; Future  -     Protime-INR; Future    Chronic GERD  -     CBC Auto Differential; Future  -     Comprehensive Metabolic Panel; Future  -     Hemoglobin A1C; Future  -     Iron and TIBC; Future  -     Lipid Panel; Future  -     TSH with Reflex; Future  -     Vitamin A; Future  -     Vitamin B1, Whole Blood; Future  -     Vitamin B12 & Folate; Future  -     Vitamin D 25 Hydroxy; Future  -     Vitamin E; Future  -     Protime-INR; Future    Moderate obstructive sleep apnea  -     CBC Auto Differential; Future  -     Comprehensive Metabolic Panel; Future  -     Hemoglobin A1C; Future  -     Iron and TIBC; Future  -     Lipid Panel; Future  -     TSH with Reflex; Future  -     Vitamin A; Future  -     Vitamin B1, Whole Blood; Future  -     Vitamin B12 & Folate; Future  -     Vitamin D 25 Hydroxy; Future  -     Vitamin E; Future  -     Protime-INR; Future    Ventral hernia without obstruction or gangrene  -     CBC Auto Differential; Future  -     Comprehensive Metabolic Panel; Future  -     Hemoglobin A1C; Future  -     Iron and TIBC; Future  -     Lipid Panel; Future  -     TSH with Reflex; Future  -     Vitamin A; Future  -     Vitamin B1, Whole Blood;  Future  - Vitamin B12 & Folate; Future  -     Vitamin D 25 Hydroxy; Future  -     Vitamin E; Future  -     Protime-INR; Future    Morbid obesity with BMI of 40.0-44.9, adult ([facility])  -     CBC Auto Differential; Future  -     Comprehensive Metabolic Panel; Future  -     Hemoglobin A1C; Future  -     Iron and TIBC; Future  -     Lipid Panel; Future  -     TSH with Reflex; Future  -     Vitamin A; Future  -     Vitamin B1, Whole Blood; Future  -     Vitamin B12 & Folate; Future  -     Vitamin D 25 Hydroxy; Future  -     Vitamin E; Future  -     Protime-INR; Future          Valentina Koenig is 37 y.o. female , now with Body mass index is 41.97 kg/m². s/p Sleeve gastrectomy, has lost 0 lbs since last visit, total of 128 lbs weight loss. The patient underwent dietary counseling with myself &/or registered dietician. I have reviewed, discussed and agree with the dietary plan. Patient is trying hard to keep good dietary and behavior modifications. Patient is monitoring portion sizes, food choices and liquid calories. Patient is trying to exercise regularly. Patient pleased with the surgery outcomes. We discussed how her weight affects her overall health including:  Patient Active Problem List   Diagnosis    Pelvic mass    Biceps tendonitis on right    Right shoulder strain    Morbid obesity with BMI of 60.0-69.9, adult ([address]    Diabetes mellitus type 2 in obese ([address]    Chronic GERD    Moderate obstructive sleep apnea    Scalp cyst    Ventral hernia without obstruction or gangrene    Morbid obesity with BMI of 50.0-59.9, adult ([address]    S/P laparoscopic sleeve gastrectomy    Morbid obesity with BMI of 45.0-49.9, adult ([facility] [address]    RUQ pain    Acute cholecystitis due to biliary calculus    and importance of weight loss to alleviate those co morbid conditions. I encouraged the patient to continue exercise and keeping healthy eating habits. Also counseled the patient extensively on post surgery care.      I spent a total of 25 minutes face to face with the patient and greater than 50% of the time was spent in counseling, answering questions, addressing concerns, reviewing labs and/or other studies with the patient as well as coordinating care and discussed dietary plan with the dietitian. La Nena is 1 year out from her sleeve gastrectomy BMI is down to 41. She lost a total of 128 pounds. Some strategies discussed today like 30/30/30 minutes rule, food diary, avoid fast food and packing/planing ahead,  increasing exercise. ..etc.    To help patient stay on track and continue with her weight loss. Polysporin given to the patient to apply on the incision that seems to be bothering her at this point and advised her to follow-up with her gynecologist if it does not improve. Advised the patient that we will check some blood work to make sure nutritionally there is no deficiency that could be contributing to her symptoms. RTC in 3 months  Healthy Diet and Exercise   Nutrition labs  Follow-up with the behaviorist     Patient advised that its their responsibility to follow up for care, studies, referrals and/or labs ordered today. Please note that some or all of this report was generated using voice recognition software. Please notify me in case of any questions about the content of this document, as some errors in transcription may have occurred .

## 2020-11-03 NOTE — PROGRESS NOTES
Dietary Assessment Note      Vitals:   Vitals:    11/03/20 1248   BP: 126/75   Pulse: 91   Resp: 18   Temp: 97.4 °F (36.3 °C)   SpO2: 98%   Weight: 268 lb (121.6 kg)   Height: 5' 7\" (1.702 m)    Patient gained 1 lbs over 2 months. Total Weight Loss: 128 lbs    Labs reviewed: no lab studies available for review at time of visit    Protein intake: 60-80 grams/day     Fluid intake: 48-64 oz/day    Multivitamin/mineral intake: Yes, 4 Fusion    Calcium intake: no    Other: Vitamin D3    Exercise: struggling to be consistent, does walk more with job    Nutrition Assessment: 1 year s/p gastric sleeve post-op visit. Pt reports struggling with snacking and making good food choices.   B - sometimes - Protein Shake (Premier) / apple with PB  L - Roast beef / turkey sandwich   S - Fruit / Bagel with cream cheese / yogurt / sometimes sweets  D - Taco meat / meatloaf with mashed potatoes / pizza sometimes    Amount able to eat per sitting: 3-4oz protein + 1/2-3/4c sides    Following 30/30/30 rule: Yes, waiting to drink, eating 15-20min to eat    Food Intolerances/issues: none    Client Concerns: surgery scar    Goals:   - Eat 4-5x/day, focusing on protein foods first  - Consistent with food journal 3-4x/day   - Increase physical activity as tolerated    Handout: 1200kcal post op MP    Plan: F/U annually and per Provider    Neha Yancey

## 2020-11-06 RX ORDER — OMEPRAZOLE 40 MG/1
CAPSULE, DELAYED RELEASE ORAL
Qty: 60 CAPSULE | Refills: 0 | Status: SHIPPED | OUTPATIENT
Start: 2020-11-06 | End: 2020-12-08

## 2020-11-25 ENCOUNTER — HOSPITAL ENCOUNTER (OUTPATIENT)
Age: 35
Discharge: HOME | End: 2020-11-25
Payer: COMMERCIAL

## 2020-11-25 LAB
A/G RATIO: 1.4 (ref 1.1–2.2)
ALBUMIN SERPL-MCNC: 4.1 G/DL (ref 3.4–5)
ALP BLD-CCNC: 61 U/L (ref 40–129)
ALT SERPL-CCNC: 20 U/L (ref 10–40)
ANION GAP SERPL CALCULATED.3IONS-SCNC: 9 MMOL/L (ref 3–16)
AST SERPL-CCNC: 21 U/L (ref 15–37)
BASOPHILS ABSOLUTE: 0 K/UL (ref 0–0.2)
BASOPHILS RELATIVE PERCENT: 0.4 %
BILIRUB SERPL-MCNC: 0.5 MG/DL (ref 0–1)
BUN BLDV-MCNC: 9 MG/DL (ref 7–20)
CALCIUM SERPL-MCNC: 9.3 MG/DL (ref 8.3–10.6)
CHLORIDE BLD-SCNC: 101 MMOL/L (ref 99–110)
CHOLESTEROL, TOTAL: 136 MG/DL (ref 0–199)
CO2: 29 MMOL/L (ref 21–32)
CREAT SERPL-MCNC: 0.6 MG/DL (ref 0.6–1.1)
EOSINOPHILS ABSOLUTE: 0 K/UL (ref 0–0.6)
EOSINOPHILS RELATIVE PERCENT: 0.8 %
FOLATE: >20 NG/ML (ref 4.78–24.2)
GFR AFRICAN AMERICAN: >60
GFR NON-AFRICAN AMERICAN: >60
GLOBULIN: 2.9 G/DL
GLUCOSE BLD-MCNC: 88 MG/DL (ref 70–99)
HCT VFR BLD CALC: 40.8 % (ref 36–48)
HDLC SERPL-MCNC: 57 MG/DL (ref 40–60)
HEMOGLOBIN: 13.6 G/DL (ref 12–16)
INR BLD: 1.12 (ref 0.86–1.14)
IRON SATURATION: 40 % (ref 15–50)
IRON: 124 UG/DL (ref 37–145)
LDL CHOLESTEROL CALCULATED: 68 MG/DL
LYMPHOCYTES ABSOLUTE: 1.6 K/UL (ref 1–5.1)
LYMPHOCYTES RELATIVE PERCENT: 32.8 %
MCH RBC QN AUTO: 31.3 PG (ref 26–34)
MCHC RBC AUTO-ENTMCNC: 33.3 G/DL (ref 31–36)
MCV RBC AUTO: 94 FL (ref 80–100)
MONOCYTES ABSOLUTE: 0.3 K/UL (ref 0–1.3)
MONOCYTES RELATIVE PERCENT: 7.2 %
NEUTROPHILS ABSOLUTE: 2.8 K/UL (ref 1.7–7.7)
NEUTROPHILS RELATIVE PERCENT: 58.8 %
PDW BLD-RTO: 13.6 % (ref 12.4–15.4)
PLATELET # BLD: 233 K/UL (ref 135–450)
PMV BLD AUTO: 9.5 FL (ref 5–10.5)
POTASSIUM SERPL-SCNC: 4 MMOL/L (ref 3.5–5.1)
PROTHROMBIN TIME: 13 SEC (ref 10–13.2)
RBC # BLD: 4.34 M/UL (ref 4–5.2)
SODIUM BLD-SCNC: 139 MMOL/L (ref 136–145)
TOTAL IRON BINDING CAPACITY: 309 UG/DL (ref 260–445)
TOTAL PROTEIN: 7 G/DL (ref 6.4–8.2)
TRIGL SERPL-MCNC: 54 MG/DL (ref 0–150)
TSH REFLEX: 1.11 UIU/ML (ref 0.27–4.2)
VITAMIN B-12: 905 PG/ML (ref 211–911)
VITAMIN D 25-HYDROXY: 60.6 NG/ML
VLDLC SERPL CALC-MCNC: 11 MG/DL
WBC # BLD: 4.8 K/UL (ref 4–11)

## 2020-11-25 PROCEDURE — 80061 LIPID PANEL: CPT

## 2020-11-25 PROCEDURE — 83550 IRON BINDING TEST: CPT

## 2020-11-25 PROCEDURE — 82306 VITAMIN D 25 HYDROXY: CPT

## 2020-11-25 PROCEDURE — 84446 ASSAY OF VITAMIN E: CPT

## 2020-11-25 PROCEDURE — 80053 COMPREHEN METABOLIC PANEL: CPT

## 2020-11-25 PROCEDURE — 85610 PROTHROMBIN TIME: CPT

## 2020-11-25 PROCEDURE — 83036 HEMOGLOBIN GLYCOSYLATED A1C: CPT

## 2020-11-25 PROCEDURE — 84443 ASSAY THYROID STIM HORMONE: CPT

## 2020-11-25 PROCEDURE — 82607 VITAMIN B-12: CPT

## 2020-11-25 PROCEDURE — 84425 ASSAY OF VITAMIN B-1: CPT

## 2020-11-25 PROCEDURE — 83540 ASSAY OF IRON: CPT

## 2020-11-25 PROCEDURE — 84590 ASSAY OF VITAMIN A: CPT

## 2020-11-25 PROCEDURE — 85025 COMPLETE CBC W/AUTO DIFF WBC: CPT

## 2020-11-25 PROCEDURE — 82746 ASSAY OF FOLIC ACID SERUM: CPT

## 2020-11-26 LAB
ESTIMATED AVERAGE GLUCOSE: 96.8 MG/DL
HBA1C MFR BLD: 5 %

## 2020-11-29 LAB
ALPHA-TOCOPHEROL: 10.3 MG/L (ref 5.5–18)
GAMMA-TOCOPHEROL: 1.3 MG/L (ref 0–6)
RETINYL PALMITATE: <0.02 MG/L (ref 0–0.1)
VITAMIN A LEVEL: 0.32 MG/L (ref 0.3–1.2)
VITAMIN A, INTERP: NORMAL
VITAMIN B1 WHOLE BLOOD: 167 NMOL/L (ref 70–180)

## 2020-12-03 ENCOUNTER — PATIENT MESSAGE (OUTPATIENT)
Dept: BARIATRICS/WEIGHT MGMT | Age: 35
End: 2020-12-03

## 2020-12-03 NOTE — TELEPHONE ENCOUNTER
Patient is over 1 year out. I reviewed all her labs with her that she done last month and they were all within normal limits. She is getting enough fluids. Asked her that she may try to drink some Gatorade or Powerade 0 if she wishes however her electrolytes based on the metabolic panel was absolutely normal.  Her vitamin profile is also completely normal.  I encouraged her to follow-up with the PCP if condition persist to make sure this is not restless leg syndrome. Or other condition.

## 2020-12-08 ENCOUNTER — HOSPITAL ENCOUNTER (OUTPATIENT)
Age: 35
Discharge: HOME | End: 2020-12-08
Payer: MEDICAID

## 2020-12-08 ENCOUNTER — HOSPITAL ENCOUNTER (OUTPATIENT)
Dept: GENERAL RADIOLOGY | Age: 35
Discharge: HOME | End: 2020-12-08
Payer: MEDICAID

## 2020-12-08 ENCOUNTER — PATIENT MESSAGE (OUTPATIENT)
Dept: BARIATRICS/WEIGHT MGMT | Age: 35
End: 2020-12-08

## 2020-12-08 PROCEDURE — 74018 RADEX ABDOMEN 1 VIEW: CPT

## 2020-12-08 RX ORDER — OMEPRAZOLE 40 MG/1
CAPSULE, DELAYED RELEASE ORAL
Qty: 60 CAPSULE | Refills: 0 | Status: SHIPPED | OUTPATIENT
Start: 2020-12-08 | End: 2021-01-11

## 2020-12-08 NOTE — TELEPHONE ENCOUNTER
I spoke with patient in regards to this discomfort. She has seen Urologist today and they do not feel it is urology related, although they are starting her on an antibiotic to see if that helps. She reports pain started Friday very low in her mid abdomen near her bladder area but now goes all the way up to under her ribs. She vomited twice, once on Friday and once on Saturday. She does report chills, temperature was normal at urologist. She has had some diarrhea since Saturday. She is meeting her fluid needs, she is eating 3-4 times a day. She is tolerating her foods overall. From the description of her symptoms I do not feel it is related to her sleeve procedure. It sounds infectious in nature, possible GI bug? I advised her to reach out to her PCP today. She will also start antibiotic from urology to see if that helps. I did advise her to go to ER if pain worsens or is accompanied by fever. She will call us back in 1-2 days to update us on how she is doing.

## 2020-12-08 NOTE — TELEPHONE ENCOUNTER
From: Loletta Boxer  To: ROSE Izaguirre - CNP  Sent: 12/8/2020 12:06 AM EST  Subject: Non-Urgent Medical Question    I've had this odd svitlana burning pain in my stomach last two to three days right above my belly button. Feels svitlana like the feeling you get with icy hot but muted, a little crampy. Not sure if I may have pulled something it doesn't feel like when I pulled any muscles before. Svitlana all day. Have had some diarrhea, I did have some lower abdomen pain last week that im talking with a urologist about, but this is different and I am pretty sure unrelated.

## 2020-12-09 NOTE — TELEPHONE ENCOUNTER
Elizabeth Johnson,  It looks like the patient talked with Nilton Bingham yesterday and Dr. Montez Co agreed with recommedndations. At this point I would make sure the patient follows up with her PCP, as instructed, as it does not appear these symptoms are related to her surgery or if continues to proceed to the ED for evaluation. It is hard to say if it is GERD symptoms and it looks like from her chart she is already taking Prilosec 40 mg twice a day. Please let patient know.   Thank you,  Merline Stands

## 2020-12-09 NOTE — TELEPHONE ENCOUNTER
From: Sage Antonio  To: Luann Doan MD  Sent: 9/24/2020 9:42 PM EDT  Subject: Non-Urgent Medical Question    I've been sick past two or so days horrible pain in head tiredness turned into vomiting and diarrhea all day today feeling super dried out and worried about dehydrating I remember you said it was very easy for gastric sleeve patients to do so. Trying to keep down what i can, contacted my regular Dr too but figured advice from you towards what I should do as i have the sleeve wouldn't hurt.

## 2020-12-14 ENCOUNTER — PATIENT MESSAGE (OUTPATIENT)
Dept: BARIATRICS/WEIGHT MGMT | Age: 35
End: 2020-12-14

## 2020-12-15 NOTE — TELEPHONE ENCOUNTER
From: Bharati Early  To: Carlos Isaacs APRN - CNP  Sent: 12/14/2020 11:47 PM EST  Subject: Non-Urgent Medical Question    Update, you said to let you know how the situation turned out. Its about a week and a half since the pain in my abdomen started its now in the whole abdomen radiating to the sides and back, worse on the right side wrapping around and in the middle of my abdomen like right where id crunch if exercising. Along with pain in my chest after I eat or drink like 50% of the time if its anything other then water that feels like a horse is kicking me in the sternum. Some lightheadedness lately, other then that random burning along the abdomen and right side into the back. Thank God my galbladder is already gone. I went to the dr she was going to check my appendix but then she talked to another dr who came in poked me a bunch mistook my ovary removal scar for a hysterectomy scar tried to blame it for the pain when its 10years old, then sent me for an xray that showed nothing. No solutions other then the chest pain part of it might be acid reflux related and that I need to contact a gi because the medicine im on shouldn't allow that to happen.

## 2020-12-16 ENCOUNTER — HOSPITAL ENCOUNTER (OUTPATIENT)
Dept: CT IMAGING | Age: 35
Discharge: HOME | End: 2020-12-16
Payer: MEDICAID

## 2020-12-16 PROCEDURE — 74177 CT ABD & PELVIS W/CONTRAST: CPT

## 2020-12-16 PROCEDURE — 6360000004 HC RX CONTRAST MEDICATION: Performed by: SURGERY

## 2020-12-16 RX ADMIN — IOHEXOL 50 ML: 240 INJECTION, SOLUTION INTRATHECAL; INTRAVASCULAR; INTRAVENOUS; ORAL at 12:00

## 2020-12-16 RX ADMIN — IOPAMIDOL 75 ML: 755 INJECTION, SOLUTION INTRAVENOUS at 12:00

## 2020-12-17 ENCOUNTER — TELEPHONE (OUTPATIENT)
Dept: BARIATRICS/WEIGHT MGMT | Age: 35
End: 2020-12-17

## 2020-12-17 NOTE — TELEPHONE ENCOUNTER
CT looks completely normal  Sleeve looks great  Contrast makes it all the way down her intestines  No other obvious abnormalities  Possibly some gastritis (inflammation) in stomach but that is mostly related to diet if she is already on a acid reflux medications    Please have RD call her to go through dietary recall to discuss foods and beverages to be avoided

## 2020-12-17 NOTE — TELEPHONE ENCOUNTER
Pt calling in requesting CT results from 12/17/20. Did let her know provider had not read them yet. Once he reviews will be able to get results to you. Pt understood.    Please advise

## 2020-12-17 NOTE — TELEPHONE ENCOUNTER
Called pt reviewed symptoms and diet. Pt states she has a constant burning sensation at the base of her sternum that radiates to the right side and around her back. Pt is not sure what is causing the pain. States she has pain mid sternum with eating / drinking at times. Discussed that gastritis can cause burning. Diet is heavy at night w/ high fat food. She also is grazing at night. Eating ~1.5 cups volume per sitting. Reports following 30-30-30. Does not track consistently as she knows she is over her calories towards the end of day. Plan:  track consistently, limit/avoid high fat foods like: hot dogs / nuts / buttery popcorn / 70/30 ground beef (especially at night prior to laying down). Portions should be ~1 cup volume (stopped when satisfied, do not eat till she is full/stuffed). Have more satisfying lean protein during the day to suppress nighttime snacking. Embrace 30-30-30. Pt voiced understanding but states she isn't sure why this is constant. Advised pt to work on dietary modifications as discussed to help alleviate symptoms. Please advise for any other direction.        Recall:  B- premier protein shake  S- none  L- greek yogurt  S- sometimes: sandwich OR crackers OR apple & PB OR protein bar  D- pot roast w/ vegetables OR pigs in a blanket OR taco meat on lettuce (70/30)  S- grazing: fruit cup / cheese stick / cashews / sometimes buttered or unbuttered popcorn

## 2020-12-20 ENCOUNTER — HOSPITAL ENCOUNTER (EMERGENCY)
Age: 35
Discharge: HOME | End: 2020-12-20
Payer: MEDICAID

## 2020-12-20 ENCOUNTER — APPOINTMENT (OUTPATIENT)
Dept: GENERAL RADIOLOGY | Age: 35
End: 2020-12-20
Payer: MEDICAID

## 2020-12-20 VITALS
DIASTOLIC BLOOD PRESSURE: 80 MMHG | WEIGHT: 260 LBS | OXYGEN SATURATION: 98 % | TEMPERATURE: 97.7 F | BODY MASS INDEX: 40.81 KG/M2 | HEART RATE: 88 BPM | SYSTOLIC BLOOD PRESSURE: 121 MMHG | RESPIRATION RATE: 16 BRPM | HEIGHT: 67 IN

## 2020-12-20 PROCEDURE — 73130 X-RAY EXAM OF HAND: CPT

## 2020-12-20 PROCEDURE — 99283 EMERGENCY DEPT VISIT LOW MDM: CPT

## 2020-12-20 ASSESSMENT — PAIN DESCRIPTION - ORIENTATION: ORIENTATION: LEFT

## 2020-12-20 ASSESSMENT — PAIN DESCRIPTION - LOCATION: LOCATION: HAND

## 2020-12-20 ASSESSMENT — PAIN SCALES - GENERAL: PAINLEVEL_OUTOF10: 5

## 2020-12-21 NOTE — ED PROVIDER NOTES
other systems were reviewed and negative.        PAST MEDICAL HISTORY     Past Medical History:   Diagnosis Date    Asthma     as child    Back pain     [facility] [facility]     Depression     Diabetes mellitus ([facility] [address]     resolved per patient    H/O: hysterectomy 04/29/2020    Liver disease     fatty liver    Obesity     OCD (obsessive compulsive disorder)     PCOS (polycystic ovarian syndrome)     PCOS (polycystic ovarian syndrome)     Sleep apnea     cpap         SURGICAL HISTORY     Past Surgical History:   Procedure Laterality Date    ANKLE SURGERY      CHOLECYSTECTOMY, LAPAROSCOPIC N/A 4/20/2020    LAPAROSCOPIC CHOLECYSTECTOMY performed by Nhung Phipps DO at [facility]      OTHER SURGICAL HISTORY Left 12/10/14    Excision of Cyst Left Upper Posterior Ankle    OTHER SURGICAL HISTORY Right 12/29/2016    Laparotomy with Right SO    OVARY REMOVAL  12/28/2016    unsure of side    SLEEVE GASTRECTOMY N/A 11/6/2019    LAPAROSCOPIC SLEEVE GASTRECTOMY - ETHICON performed by Joy Aguayo MD at [address] [place] [address] 8/23/2019    EGD BIOPSY performed by Joy Aguayo MD at [address]       Current Discharge Medication List      CONTINUE these medications which have NOT CHANGED    Details   omeprazole (PRILOSEC) 40 MG delayed release capsule TAKE 1 CAPSULE (40MG) BY MOUTH 2 TIMES DAILY  Qty: 60 capsule, Refills: 0    Associated Diagnoses: Chronic GERD      busPIRone (BUSPAR) 5 MG tablet Take 5 mg by mouth 3 times daily      loratadine (CLARITIN) 10 MG tablet Take 10 mg by mouth daily      Multiple Vitamins-Minerals (BARIATRIC FUSION) CHEW Take 4 tablets by mouth daily      levothyroxine (SYNTHROID) 75 MCG tablet TAKE 1 TABLET BY MOUTH EVERY DAY  Refills: 5      Cholecalciferol (VITAMIN D PO) Take by mouth daily       ziprasidone (GEODON) 20 MG capsule Take 20 mg by mouth 2 times daily (with meals) clomiPRAMINE (ANAFRANIL) 25 MG capsule Take 50 mg by mouth nightly               ALLERGIES     Latex, Lactose, Adhesive tape, and Keflex [cephalexin]    FAMILYHISTORY       Family History   Problem Relation Age of Onset    Asthma Mother     Arthritis Mother     Lung Cancer Mother     Hypertension Father     Elevated Lipids Father     Diabetes Father     Alcohol Abuse Father     Asthma Father     Arthritis Father     Diabetes Paternal Grandfather     Arthritis Paternal Grandfather     Diabetes Maternal Grandmother     Arthritis Maternal Grandmother           SOCIAL HISTORY       Social History     Tobacco Use    Smoking status: Never Smoker    Smokeless tobacco: Never Used   Substance Use Topics    Alcohol use: Yes     Alcohol/week: 1.0 standard drinks     Types: 1 Glasses of wine per week     Comment: weekly    Drug use: No       SCREENINGS             PHYSICAL EXAM    (up to 7 for level 4, 8 or more for level 5)     ED Triage Vitals [12/20/20 1922]   BP Temp Temp Source Pulse Resp SpO2 Height Weight   121/80 97.7 °F (36.5 °C) Oral 88 16 98 % 5' 7\" (1.702 m) 260 lb (117.9 kg)       Physical Exam  Vitals signs and nursing note reviewed. Constitutional:       Appearance: She is well-developed. She is not diaphoretic. HENT:      Head: Normocephalic and atraumatic. Nose: Nose normal.   Eyes:      General:         Right eye: No discharge. Left eye: No discharge. Neck:      Musculoskeletal: Normal range of motion and neck supple. Pulmonary:      Effort: Pulmonary effort is normal. No respiratory distress. Musculoskeletal: Normal range of motion. General: No deformity. Left wrist: Normal. She exhibits normal range of motion, no tenderness, no bony tenderness, no swelling, no effusion, no crepitus, no deformity and no laceration. Comments: Neurovascularly intact. Negative snuffbox tenderness. No skin changes, color streaking or ecchymosis noted.   No bony Visualized portions of the lung bases are clear. Organs: Elongated left hepatic lobe is identified such that portion of lateral segment extends into the lateral aspect of the left upper quadrant. No focal abnormality of the liver is identified. Status post cholecystectomy. Spleen measures approximately 13 cm in length with no evidence of focal abnormality, unchanged. Pancreas and adrenal glands are unremarkable. Kidneys were image during nephrogram phase. Low-attenuation lesions about both kidneys are again identified and not significantly changed. Findings likely related to small cortical cysts. GI/Bowel: Again noted are postoperative changes related to prior sleeve gastrectomy. There is mild prominence of the mural thickness of the pyloric antrum which may be related to incomplete distention. True mild mural thickening including changes of subtle gastritis not excluded. Small bowel normal in caliber with no findings to suggest presence of obstruction. Appendix is unremarkable in appearance. Left colon is relatively decompressed. There are colonic diverticula with no focal finding to suggest changes of diverticulitis. No focal fluid collection to suggest presence of abscess. No evidence of intraperitoneal free air. Pelvis: Status post hysterectomy. No significant adnexal lesion is identified. No evidence of free fluid within the pelvis. Peritoneum/Retroperitoneum: No new abdominal/pelvic lymphadenopathy is identified. Abdominal aorta normal in caliber with no evidence of aneurysm. Incidental note made of presence of circumaortic left renal vein. Bones/Soft Tissues: There are multilevel degenerative changes of the spine with no evidence of acute osseous abnormality. There are degenerative changes of the sacroiliac joints. Very tiny umbilical hernia containing fat is identified. 1. Minimal prominence of mural thickness of pyloric antrum in this patient status post prior sleeve gastrectomy. Finding may be related to incomplete distention. True minimal mural thickening including changes of subtle gastritis cannot totally be excluded based on this examination. 2. No evidence of bowel obstruction, intraperitoneal free air, or abscess. 3. Status post cholecystectomy. 4. Status post hysterectomy. PROCEDURES   Unless otherwise noted below, none     Procedures    CRITICAL CARE TIME   N/A    CONSULTS:  None      EMERGENCY DEPARTMENT COURSE and DIFFERENTIAL DIAGNOSIS/MDM:   Vitals:    Vitals:    12/20/20 1922   BP: 121/80   Pulse: 88   Resp: 16   Temp: 97.7 °F (36.5 °C)   TempSrc: Oral   SpO2: 98%   Weight: 260 lb (117.9 kg)   Height: 5' 7\" (1.702 m)       Patient was given the following medications:  Medications - No data to display        Patient brought in today by private vehicle for complaints of left hand pain. On exam patient is alert oriented afebrile breathing on room air satting at 98%. Nontoxic no acute respiratory distress. Old labs and records reviewed at this time. Patient seen by myself and my attending was available for consultation. Patient did not want anything here for pain control. X-ray of the left hand reveals no fracture or dislocation. No focal destructive osseous lesion. No radiopaque foreign body noted. Plan at this time will be to discharge. Patient instructed to continue with Tylenol for pain control as well as icing for symptomatic relief. Patient given follow-up for orthopedics as needed. Told return immediately to the ED with any new or worsening symptoms and educated when to return. Patient verbalized understanding of this plan was comfortable and stable at time of discharge. I did feel comfortable sending this patient home with close follow-up instructions and strict return precautions. Patient stable at time of discharge. FINAL IMPRESSION      1.  Left hand pain          DISPOSITION/PLAN   DISPOSITION Decision To Discharge 12/20/2020 09:11:32 PM      PATIENT REFERREDTO:  [facility]  [address] [address] Thuan Maganavard [address]  Schedule an appointment as soon as possible for a visit   As needed, If symptoms worsen    ROSE Elias [facility]  [address] [facility] [address]  [phone]    Schedule an appointment as soon as possible for a visit   As needed, If symptoms worsen      DISCHARGE MEDICATIONS:  Current Discharge Medication List          DISCONTINUED MEDICATIONS:  Current Discharge Medication List                 (Please note that portions of this note were completed with a voice recognition program.  Efforts were made to edit the dictations but occasionally words are mis-transcribed.)    Natalya House PA-C (electronically signed)            Natalya House PA-C  12/20/20 0197

## 2021-01-07 DIAGNOSIS — K21.9 CHRONIC GERD: ICD-10-CM

## 2021-01-11 RX ORDER — OMEPRAZOLE 40 MG/1
CAPSULE, DELAYED RELEASE ORAL
Qty: 60 CAPSULE | Refills: 0 | Status: SHIPPED | OUTPATIENT
Start: 2021-01-11 | End: 2021-02-05

## 2021-01-17 ENCOUNTER — HOSPITAL ENCOUNTER (EMERGENCY)
Age: 36
Discharge: HOME | End: 2021-01-18
Attending: STUDENT IN AN ORGANIZED HEALTH CARE EDUCATION/TRAINING PROGRAM
Payer: MEDICAID

## 2021-01-17 DIAGNOSIS — M25.552 ACUTE PAIN OF BOTH HIPS: Primary | ICD-10-CM

## 2021-01-17 DIAGNOSIS — R30.0 DYSURIA: ICD-10-CM

## 2021-01-17 DIAGNOSIS — M25.551 ACUTE PAIN OF BOTH HIPS: Primary | ICD-10-CM

## 2021-01-17 PROCEDURE — 99285 EMERGENCY DEPT VISIT HI MDM: CPT

## 2021-01-18 ENCOUNTER — APPOINTMENT (OUTPATIENT)
Dept: GENERAL RADIOLOGY | Age: 36
End: 2021-01-18
Payer: MEDICAID

## 2021-01-18 VITALS
BODY MASS INDEX: 40.81 KG/M2 | OXYGEN SATURATION: 100 % | TEMPERATURE: 98.4 F | RESPIRATION RATE: 16 BRPM | HEART RATE: 92 BPM | WEIGHT: 260 LBS | SYSTOLIC BLOOD PRESSURE: 111 MMHG | HEIGHT: 67 IN | DIASTOLIC BLOOD PRESSURE: 77 MMHG

## 2021-01-18 LAB
BILIRUBIN URINE: NEGATIVE
BLOOD, URINE: NEGATIVE
CLARITY: CLEAR
COLOR: YELLOW
GLUCOSE URINE: NEGATIVE MG/DL
KETONES, URINE: NEGATIVE MG/DL
LEUKOCYTE ESTERASE, URINE: NEGATIVE
MICROSCOPIC EXAMINATION: NORMAL
NITRITE, URINE: NEGATIVE
PH UA: 6 (ref 5–8)
PROTEIN UA: NEGATIVE MG/DL
SPECIFIC GRAVITY UA: 1.02 (ref 1–1.03)
URINE REFLEX TO CULTURE: NORMAL
URINE TYPE: NORMAL
UROBILINOGEN, URINE: 0.2 E.U./DL

## 2021-01-18 PROCEDURE — 72170 X-RAY EXAM OF PELVIS: CPT

## 2021-01-18 PROCEDURE — 6370000000 HC RX 637 (ALT 250 FOR IP): Performed by: STUDENT IN AN ORGANIZED HEALTH CARE EDUCATION/TRAINING PROGRAM

## 2021-01-18 PROCEDURE — 81003 URINALYSIS AUTO W/O SCOPE: CPT

## 2021-01-18 RX ORDER — ACETAMINOPHEN 500 MG
1000 TABLET ORAL ONCE
Status: COMPLETED | OUTPATIENT
Start: 2021-01-18 | End: 2021-01-18

## 2021-01-18 RX ADMIN — ACETAMINOPHEN 1000 MG: 500 TABLET ORAL at 02:23

## 2021-01-18 ASSESSMENT — PAIN DESCRIPTION - FREQUENCY: FREQUENCY: CONTINUOUS

## 2021-01-18 ASSESSMENT — PAIN DESCRIPTION - ORIENTATION: ORIENTATION: RIGHT;LEFT

## 2021-01-18 ASSESSMENT — PAIN SCALES - GENERAL: PAINLEVEL_OUTOF10: 8

## 2021-01-18 ASSESSMENT — PAIN DESCRIPTION - PROGRESSION: CLINICAL_PROGRESSION: GRADUALLY WORSENING

## 2021-01-18 ASSESSMENT — PAIN DESCRIPTION - LOCATION: LOCATION: HIP

## 2021-01-18 ASSESSMENT — PAIN DESCRIPTION - ONSET: ONSET: GRADUAL

## 2021-01-18 NOTE — ED NOTES
Bed: 06  Expected date:   Expected time:   Means of arrival:   Comments:  Needs CATHY Santamaria RN  01/18/21 0004

## 2021-01-18 NOTE — ED PROVIDER NOTES
[address] ED      CHIEF COMPLAINT  Hip Pain (pt in with c/o bilat hip pain shooting into her buttocks for the past few hours. Pt dx with COVID today. Pt denies n/v/fevers.)       HISTORY OF PRESENT ILLNESS  Isak Browning is a 37 y.o. female with past medical history of diabetes, hernia, chronic back pain who presents to the ED complaining of bilateral hip pain. Diagnosed wih COVID today. Patient complains of bilateral flank pain that radiates down into her hips. It has been present for the last couple hours. She reports it is an aching pain, worse with movement. No palliative factors. She also reports a burning sensation making her feel that she needs to pee. Denies any vaginal bleeding or discharge. She has had low-grade fevers as well as some nausea without vomiting. Denies any constipation or diarrhea. She denies any midline spinal tenderness. No palliative or provocative factors. No injury. Lower extremity edema, coolness, and weakness are Absent. Numbness and tingling are Absent. Red Flags:   IV drug abuse? No   Fever without source? No   Systemic illness? No   Immunosuppressed? No   Recent surgery/procedure? No   Incontinence or retention? No   Indwelling ferreira? No   Alcohol abuse? No      Diabetes? Yes +1   Night pain? No   3rd visit in 20 days? No   Renal failure? No   Total: 1       No other complaints, modifying factors or associated symptoms. I have reviewed the following from the nursing documentation.     Past Medical History:   Diagnosis Date    Asthma     as child    Back pain     B[facility] 1 disorder ([facility]     Depression     Diabetes mellitus ([facility]     resolved per patient    H/O: hysterectomy 04/29/2020    Liver disease     fatty liver    Obesity     OCD (obsessive compulsive disorder)     PCOS (polycystic ovarian syndrome)     PCOS (polycystic ovarian syndrome)     Sleep apnea     cpap     Past Surgical History:   Procedure Laterality Date    ANKLE SURGERY      CHOLECYSTECTOMY, LAPAROSCOPIC N/A 4/20/2020    LAPAROSCOPIC CHOLECYSTECTOMY performed by Josh Hidalgo DO at [address] OTHER SURGICAL HISTORY Left 12/10/14    Excision of Cyst Left Upper Posterior Ankle    OTHER SURGICAL HISTORY Right 12/29/2016    Laparotomy with Right SO    OVARY REMOVAL  12/28/2016    unsure of side    SLEEVE GASTRECTOMY N/A 11/6/2019    LAPAROSCOPIC SLEEVE GASTRECTOMY - ETHICON performed by Opal Gunter MD at [address] N/A 8/23/2019    EGD BIOPSY performed by Opal Gunter MD at [address]     Family History   Problem Relation Age of Onset    Asthma Mother     Arthritis Mother     Lung Cancer Mother     Hypertension Father     Elevated Lipids Father     Diabetes Father     Alcohol Abuse Father     Asthma Father     Arthritis Father     Diabetes Paternal Grandfather     Arthritis Paternal Grandfather     Diabetes Maternal Grandmother     Arthritis Maternal Grandmother      Social History     Socioeconomic History    Marital status:      Spouse name: Not on file    Number of children: Not on file    Years of education: Not on file    Highest education level: Not on file   Occupational History    Not on file   Social Needs    Financial resource strain: Not on file    Food insecurity     Worry: Not on file     Inability: Not on file    Transportation needs     Medical: Not on file     Non-medical: Not on file   Tobacco Use    Smoking status: Never Smoker    Smokeless tobacco: Never Used   Substance and Sexual Activity    Alcohol use:  Yes     Alcohol/week: 1.0 standard drinks     Types: 1 Glasses of wine per week     Comment: weekly    Drug use: No    Sexual activity: Yes     Partners: Male   Lifestyle    Physical activity     Days per week: Not on file     Minutes per session: Not on file    Stress: Not on file   Relationships    Social connections     Talks on phone: Not on file     Gets together: Not on file     Attends [employer] service: Not on file     Active member of club or organization: Not on file     Attends meetings of clubs or organizations: Not on file     Relationship status: Not on file    Intimate partner violence     Fear of current or ex partner: Not on file     Emotionally abused: Not on file     Physically abused: Not on file     Forced sexual activity: Not on file   Other Topics Concern    Not on file   Social History Narrative    Not on file     No current facility-administered medications for this encounter. Current Outpatient Medications   Medication Sig Dispense Refill    omeprazole (PRILOSEC) 40 MG delayed release capsule TAKE 1 CAPSULE (40MG) BY MOUTH 2 TIMES DAILY 60 capsule 0    busPIRone (BUSPAR) 5 MG tablet Take 5 mg by mouth 3 times daily      loratadine (CLARITIN) 10 MG tablet Take 10 mg by mouth daily      Multiple Vitamins-Minerals (BARIATRIC FUSION) CHEW Take 4 tablets by mouth daily      levothyroxine (SYNTHROID) 75 MCG tablet TAKE 1 TABLET BY MOUTH EVERY DAY  5    Cholecalciferol (VITAMIN D PO) Take by mouth daily       ziprasidone (GEODON) 20 MG capsule Take 20 mg by mouth 2 times daily (with meals)      clomiPRAMINE (ANAFRANIL) 25 MG capsule Take 50 mg by mouth nightly       Allergies   Allergen Reactions    Latex Itching    Lactose      \"Extreme\" stomach pain and diarrhea    Adhesive Tape      Rips off skin    Keflex [Cephalexin] Nausea And Vomiting     Projectile vomitting       REVIEW OF SYSTEMS  10 systems reviewed, pertinent positives per HPI otherwise noted to be negative. PHYSICAL EXAM  /88   Pulse 92   Temp 98.4 °F (36.9 °C) (Oral)   Resp 18   Ht 5' 7\" (1.702 m)   Wt 260 lb (117.9 kg)   LMP 04/13/2020   SpO2 98%   BMI 40.72 kg/m²    GENERAL APPEARANCE: Awake and alert. Cooperative. no distress. HENT: Normocephalic. Atraumatic.  Mucous membranes are moist  NECK: Supple. EYES: PERRL. EOM's grossly intact. HEART/CHEST: RRR. No murmurs. LUNGS: Respirations unlabored. CTAB. Good air exchange. Speaking comfortably in full sentences. ABDOMEN: No tenderness. Soft. Non-distended. No masses. No organomegaly. No guarding or rebound. MUSCULOSKELETAL: Paraspinal tenderness bilaterally. No midline spinal tenderness. Bilateral Medial malleolus is not tender to palpation. Lateral malleolus is not tender to palpation. Navicular is not tender to palpation. 5th metatarsal head is not tender to palpation. Proximal fibula is not tender to palpation. Patella is not tender to palpation. The calves are not tender to palpation. Active range of motion of the joints without ligamentous laxity. There is no obvious joint or bony deformity. negative joint effusions. NEUROLOGICAL: Awake, alert and oriented x 3. Power intact at the knees, ankles, and toes. Sensation is intact to light touch in the lower extremities. IMMUNOLOGICAL: No palpable lymphadenopathy or lymphatic streaking. DERMATOLOGIC: No petechiae or rashes. There are not ecchymoses. Theres no cyanosis, erythema, or pallor. There is no edema. Skin temperature is normal. No lacerations or abrasions. No evidence of foreign bodies. PSYCHIATRIC: Normal mood and affect. LABS  I have reviewed all labs for this visit.    Results for orders placed or performed during the hospital encounter of 01/17/21   Urinalysis Reflex to Culture   Result Value Ref Range    Color, UA Yellow Straw/Yellow    Clarity, UA Clear Clear    Glucose, Ur Negative Negative mg/dL    Bilirubin Urine Negative Negative    Ketones, Urine Negative Negative mg/dL    Specific Gravity, UA 1.020 1.005 - 1.030    Blood, Urine Negative Negative    pH, UA 6.0 5.0 - 8.0    Protein, UA Negative Negative mg/dL    Urobilinogen, Urine 0.2 <2.0 E.U./dL    Nitrite, Urine Negative Negative    Leukocyte Esterase, Urine Negative Negative    Microscopic Examination Not Indicated     Urine Type NotGiven     Urine Reflex to Culture Not Indicated        RADIOLOGY    XR PELVIS (1-2 VIEWS)   Final Result   Minimal bilateral osteoarthrosis without fracture or malalignment. ED COURSE / MDM  Patient seen and evaluated. Old records reviewed. Labs and imaging reviewed and results discussed with patient. Overall well appearing patient, in no acute distress, presenting for bilateral flank and hip pain. Physical exam unremarkable. Differential diagnosis includes but is not limited to: musculoskeletal pain, fracture, muscular strain, ligamentous sprain, UTI, kidney stone, joint effusion    Work-up showed:    ED Course as of Jan 18 0517   Mon Jan 18, 2021   0118 Pelvic XR: IMPRESSION:  Minimal bilateral osteoarthrosis without fracture or malalignment. [ER]   0205 Urinalysis shows no evidence of blood or infection.    [ER]      ED Course User Index  [ER] Ning Eugene MD        During the patient's ED course, the patient was given:  Medications   acetaminophen (TYLENOL) tablet 1,000 mg (1,000 mg Oral Given 1/18/21 0223)      Reporting pain in her bilateral hips. No trauma. Patient is able to ambulate without difficulty. She does have mild tenderness to the bilateral hips. Pelvic x-ray obtained and shows evidence of osteoarthritis. Suspect that patient's pain may be increased from baseline related to arthritis due to her coronavirus causing body aches. Urinalysis obtained, it does not show evidence of blood or infection. Do not suspect urinary tract infection, pyelonephritis, or kidney stone. Physical exam otherwise reassuring. Patient does not have any midline spinal tenderness, do not feel that patient requires spinal imaging at this time. Physical exam is neurovascularly intact. The evidence indicates that the patient is very low risk for an acute spinal emergency and this is consistent with my clinical intuition.  The risk of further workup is higher than the likelihood of the patient having a spinal epidural abscess or other dangerous emergency spinal condition. It is, therefore, in the patients best interest not to do additional emergent testing at this time. I have discussed with the patient my clinical impression and the result of an evidence-based clinical evaluation to screen for spinal epidural abscess and other spinal emergencies, as well as the risk of further testing and hospitalization. The patient agrees with not pursuing further emergent evaluation for causes of back pain at this time. At this time, feel the patient is appropriate to be discharged to follow-up with her primary care doctor. Patient is in agreement for discharge at this time. Encouraged PCP follow-up in 2 days. Strict return precautions given. Patient discharged in stable condition. I estimate there is LOW risk for COMPARTMENT SYNDROME, DEEP VENOUS THROMBOSIS, TENDON OR NEUROVASCULAR INJURY, RAPIDLY EXPANDING OR RUPTURED AAA, AORTIC DISSECTION, CAUDA EQUINA SYNDROME, EPIDURAL MASS LESION, EPIDURAL ABSCESS, SPINAL STENOSIS, OR HERNIATED DISK CAUSING SEVERE STENOSIS thus I consider the discharge disposition reasonable. Mercy Michael and I have discussed the diagnosis and risks, and we agree with discharging home to follow-up with their primary doctor or the referral orthopedist. We also discussed returning to the Emergency Department immediately if new or worsening symptoms occur. We have discussed the symptoms which are most concerning (e.g., changing or worsening pain, numbness, weakness) that necessitate immediate return. CLINICAL IMPRESSION  1. Acute pain of both hips    2. Dysuria        Blood pressure 111/77, pulse 92, temperature 98.4 °F (36.9 °C), temperature source Oral, resp. rate 16, height 5' 7\" (1.702 m), weight 260 lb (117.9 kg), last menstrual period 04/13/2020, SpO2 100 %, not currently breastfeeding.     Leola Cabot was discharged to home in stable condition. Patient was given scripts for the following medications. I counseled patient how to take these medications. Discharge Medication List as of 1/18/2021  2:21 AM          Follow-up with:  ROSE Dodd - [facility]  [id number] [facility]  [phone]    Call in 2 days      [facility] ED  [address] [facility]  [phone]  Go to   As needed, If symptoms worsen      DISCLAIMER: This chart was created using Dragon dictation software. Efforts were made by me to ensure accuracy, however some errors may be present due to limitations of this technology and occasionally words are not transcribed correctly.       Justin Mcgregor MD  01/18/21 5479

## 2021-01-18 NOTE — ED TRIAGE NOTES
Chief Complaint   Patient presents with    Hip Pain     pt in with c/o bilat hip pain shooting into her buttocks for the past few hours. Pt dx with COVID today. Pt denies n/v/fevers.

## 2021-01-26 ENCOUNTER — HOSPITAL ENCOUNTER (OUTPATIENT)
Dept: GENERAL RADIOLOGY | Age: 36
Discharge: HOME | End: 2021-01-26
Payer: MEDICAID

## 2021-01-26 ENCOUNTER — HOSPITAL ENCOUNTER (OUTPATIENT)
Age: 36
Discharge: HOME | End: 2021-01-26
Payer: MEDICAID

## 2021-01-26 DIAGNOSIS — M54.9 BACK PAIN, UNSPECIFIED BACK LOCATION, UNSPECIFIED BACK PAIN LATERALITY, UNSPECIFIED CHRONICITY: ICD-10-CM

## 2021-01-26 PROCEDURE — 72100 X-RAY EXAM L-S SPINE 2/3 VWS: CPT

## 2021-01-26 PROCEDURE — 72070 X-RAY EXAM THORAC SPINE 2VWS: CPT

## 2021-02-01 ENCOUNTER — TELEPHONE (OUTPATIENT)
Dept: BARIATRICS/WEIGHT MGMT | Age: 36
End: 2021-02-01

## 2021-02-04 DIAGNOSIS — K21.9 CHRONIC GERD: ICD-10-CM

## 2021-02-04 PROBLEM — E66.01 MORBID OBESITY WITH BMI OF 45.0-49.9, ADULT (HCC): Status: RESOLVED | Noted: 2019-12-13 | Resolved: 2021-02-04

## 2021-02-04 PROBLEM — E66.01 MORBID OBESITY WITH BMI OF 60.0-69.9, ADULT (HCC): Status: RESOLVED | Noted: 2019-05-28 | Resolved: 2021-02-04

## 2021-02-04 PROBLEM — E66.01 MORBID OBESITY WITH BMI OF 50.0-59.9, ADULT (HCC): Status: RESOLVED | Noted: 2019-10-09 | Resolved: 2021-02-04

## 2021-02-05 ENCOUNTER — PATIENT MESSAGE (OUTPATIENT)
Dept: BARIATRICS/WEIGHT MGMT | Age: 36
End: 2021-02-05

## 2021-02-05 RX ORDER — OMEPRAZOLE 40 MG/1
CAPSULE, DELAYED RELEASE ORAL
Qty: 60 CAPSULE | Refills: 0 | Status: SHIPPED | OUTPATIENT
Start: 2021-02-05 | End: 2021-03-05

## 2021-02-08 NOTE — TELEPHONE ENCOUNTER
From: Sloane Jay  To: Laurengilbert Toth, APRN - CNP  Sent: 2/5/2021 8:41 PM EST  Subject: Non-Urgent Medical Question    Update on my pain issue since around August. I went thru my primary she has finally referred me out to nuerology and such to see if its nerve issues or fibromyalgia, but austin noticed it seems to get a but worse right after I eat burning wise. I was worried it might be like chronic pancreatitus but all the imagining to my knowledge didn't show anything wrong with it. I wanted to touch base with you guys. As a side note I still haven't lost hardly any weight its been about 5 months now with barely any progress. I know bad habits and cravings are getting me a bit that I go over my calories. I did finally get my portion sizes back down. I know it's carbs probably bogging me down cause when I do fail to a craving its usually something starchy. . Still having issues motivating myself to work out. Additionally I got diagnosed with ptsd for my last personal experiences and issues and current mental problems, as well as approved for medical Marijuana so I wanted to make sure to let you guys know about that as I move forward trying to finish my weight loss. I keep having fasting and heavy weight lifting recommended to me and that isn't what I want to do at all I know I won't be able to stay motivated doing it.

## 2021-02-09 ENCOUNTER — TELEMEDICINE (OUTPATIENT)
Dept: BARIATRICS/WEIGHT MGMT | Age: 36
End: 2021-02-09
Payer: MEDICAID

## 2021-02-09 DIAGNOSIS — E66.01 MORBID OBESITY WITH BMI OF 40.0-44.9, ADULT (HCC): ICD-10-CM

## 2021-02-09 DIAGNOSIS — Z98.84 S/P LAPAROSCOPIC SLEEVE GASTRECTOMY: Primary | ICD-10-CM

## 2021-02-09 DIAGNOSIS — K21.9 CHRONIC GERD: ICD-10-CM

## 2021-02-09 DIAGNOSIS — E11.69 DIABETES MELLITUS TYPE 2 IN OBESE (HCC): ICD-10-CM

## 2021-02-09 DIAGNOSIS — E66.9 DIABETES MELLITUS TYPE 2 IN OBESE (HCC): ICD-10-CM

## 2021-02-09 PROCEDURE — G8427 DOCREV CUR MEDS BY ELIG CLIN: HCPCS | Performed by: NURSE PRACTITIONER

## 2021-02-09 PROCEDURE — 3046F HEMOGLOBIN A1C LEVEL >9.0%: CPT | Performed by: NURSE PRACTITIONER

## 2021-02-09 PROCEDURE — 2022F DILAT RTA XM EVC RTNOPTHY: CPT | Performed by: NURSE PRACTITIONER

## 2021-02-09 PROCEDURE — 99213 OFFICE O/P EST LOW 20 MIN: CPT | Performed by: NURSE PRACTITIONER

## 2021-02-09 ASSESSMENT — ENCOUNTER SYMPTOMS
BACK PAIN: 1
RESPIRATORY NEGATIVE: 1
EYES NEGATIVE: 1
GASTROINTESTINAL NEGATIVE: 1

## 2021-02-09 NOTE — PROGRESS NOTES
[facility]) Physicians   [employer] [facility] [employer]    2/9/2021    TELEHEALTH EVALUATION -- Audio/Visual (During NFUPL-49 public health emergency)    Subjective:      Patient ID: Lay Barton is a 37 y.o. female    HPI    1 year 3 months s/p sleeve gastrectomy    Lay Barton is a 37 y.o. female , current BMI of 41.7, current weight of 266 pounds. Due to the COVID-19 restrictions on close contact interactions the patient's visit was conducted via audio/video in magdalena of a face to face visit. Patient has consented to have this visit conducted via audio/video. I am conducting this visit from home. The patient is here through telemedicine for their post op bariatric surgery visit. Patient denies any nausea, vomiting, fevers, chills, shortness of breath, chest pain, constipation or urinary symptoms. Denies any heartburn nor dysphagia. She is continuing to struggle with low back and right flank pain. She has seen neurologist and discussed possible fibromyalgia. She is following with PCP. She was referred to GI but has not yet made appt, she plans to do so soon.        Past Medical History:   Diagnosis Date    Asthma     as child    Back pain     Bipolar 1 disorder ([address]     Depression     Diabetes mellitus [address] [address]     resolved per patient    H/O: hysterectomy 04/29/2020    Liver disease     fatty liver    Obesity     OCD (obsessive compulsive disorder)     PCOS (polycystic ovarian syndrome)     PCOS (polycystic ovarian syndrome)     Sleep apnea     cpap     Past Surgical History:   Procedure Laterality Date    ANKLE SURGERY      CHOLECYSTECTOMY, LAPAROSCOPIC N/A 4/20/2020    [facility]      OTHER SURGICAL HISTORY Left 12/10/14    Excision of Cyst Left Upper Posterior Ankle    OTHER SURGICAL HISTORY Right 12/29/2016    Laparotomy with Right SO    OVARY REMOVAL  12/28/2016    unsure of side   ziprasidone (GEODON) 20 MG capsule, Take 20 mg by mouth 2 times daily (with meals), Disp: , Rfl:     clomiPRAMINE (ANAFRANIL) 25 MG capsule, Take 50 mg by mouth nightly, Disp: , Rfl:     Lab Results   Component Value Date    WBC 4.8 11/25/2020    RBC 4.34 11/25/2020    HGB 13.6 11/25/2020    HCT 40.8 11/25/2020    MCV 94.0 11/25/2020    MCH 31.3 11/25/2020    MCHC 33.3 11/25/2020    MPV 9.5 11/25/2020    NEUTOPHILPCT 58.8 11/25/2020    LYMPHOPCT 32.8 11/25/2020    MONOPCT 7.2 11/25/2020    EOSRELPCT 0.8 11/25/2020    BASOPCT 0.4 11/25/2020    NEUTROABS 2.8 11/25/2020    LYMPHSABS 1.6 11/25/2020    MONOSABS 0.3 11/25/2020    EOSABS 0.0 11/25/2020     Lab Results   Component Value Date     11/25/2020    K 4.0 11/25/2020    K 3.5 08/09/2020     11/25/2020    CO2 29 11/25/2020    ANIONGAP 9 11/25/2020    GLUCOSE 88 11/25/2020    BUN 9 11/25/2020    CREATININE 0.6 11/25/2020    LABGLOM >60 11/25/2020    GFRAA >60 11/25/2020    GFRAA >60 04/07/2011    CALCIUM 9.3 11/25/2020    PROT 7.0 11/25/2020    PROT 7.1 04/07/2011    LABALBU 4.1 11/25/2020    AGRATIO 1.4 11/25/2020    BILITOT 0.5 11/25/2020    ALKPHOS 61 11/25/2020    ALT 20 11/25/2020    AST 21 11/25/2020    GLOB 2.9 11/25/2020     Lab Results   Component Value Date    CHOL 136 11/25/2020    TRIG 54 11/25/2020    HDL 57 11/25/2020    LDLCALC 68 11/25/2020    LABVLDL 11 11/25/2020     Lab Results   Component Value Date    TSHREFLEX 1.11 11/25/2020     Lab Results   Component Value Date    IRON 124 11/25/2020    TIBC 309 11/25/2020    LABIRON 40 11/25/2020     Lab Results   Component Value Date    JSBSQUOQ61 905 11/25/2020    FOLATE >20.00 11/25/2020     Lab Results   Component Value Date    VITD25 60.6 11/25/2020     Lab Results   Component Value Date    LABA1C 5.0 11/25/2020    EAG 96.8 11/25/2020       Review of Systems   Constitutional: Negative. HENT: Negative. Eyes: Negative. Respiratory: Negative. Cardiovascular: Negative. Gastrointestinal: Negative. Musculoskeletal: Positive for back pain. Right flank pain   Skin: Negative. Neurological: Negative. PHYSICAL EXAMINATION:    Constitutional: [x] Appears well-developed and well-nourished [x] No apparent distress      [] Abnormal-   Mental status  [x] Alert and awake  [x] Oriented to person/place/time [x]Able to follow commands      Eyes:  EOM    [x]  Normal  [] Abnormal-  Sclera  [x]  Normal  [] Abnormal -         Discharge [x]  None visible  [] Abnormal -    HENT:   [x] Normocephalic, atraumatic. [] Abnormal   [x] Mouth/Throat: Mucous membranes are moist.     External Ears [x] Normal  [] Abnormal-     Neck: [x] No visualized mass     Pulmonary/Chest: [x] Respiratory effort normal.  [x] No visualized signs of difficulty breathing or respiratory distress        [] Abnormal-      Musculoskeletal:   [] Normal gait with no signs of ataxia         [x] Normal range of motion of neck        [] Abnormal-     Neurological:        [x] No Facial Asymmetry (Cranial nerve 7 motor function) (limited exam to video visit)          [x] No gaze palsy        [] Abnormal-         Skin:        [x] No significant exanthematous lesions or discoloration noted on facial skin         [] Abnormal-            Psychiatric:       [x] Normal Affect [x] No Hallucinations        [] Abnormal-     Other pertinent observable physical exam findings-     Due to this being a TeleHealth encounter, evaluation of the following organ systems is limited: Vitals/Constitutional/EENT/Resp/CV/GI//MS/Neuro/Skin/Heme-Lymph-Imm.     Assessment and Plan: Patient is here via telemedicine and is 1 year 3 months s/p sleeve gastrectomy, down 2lbs with a total weight loss of 130lbs. She is doing well, denies n/v/dysphagia or reflux. She is tolerating diet, getting adequate fluids and protein. She was encouraged to track her intakes, sounds like her calories exceed 1200/day most days. She was given 1200 calorie post op meal plan to guide food choices. She also is seeing our behaviorist tomorrow which I believe she will find helpful. She is exercising, discussed goal of 30 minutes 5-7 days a week. Encouraged continued physical activity. She is taking vitamins as instructed. She did speak with the registered dietitian for continued follow up. I agree with recommendations and plan. She will continue to follow up with PCP and specialist regarding her back and flank pain. We will see her back in 3 months for continued follow up or via telemedicine depending on COVID-19 restrictions at the time of their next appointment. Total encounter time: 20 minutes, including any number of the following: Bariatric Pre/Post operative work up/protocols, review of labs, imaging, provider notes, outside hospital records, performing examination/evaluation, counseling patient and/or family, ordering medications/tests, placing referrals and communication with referring physicians, coordination of care; discussing dietary plan/recall with the patient as well with registered dietitian and documentation in the EHR. Of note, the above was done during same day of the actual patient encounter. An electronic signature was used to authenticate this note.

## 2021-02-09 NOTE — PATIENT INSTRUCTIONS
Diet tips to help make you successful postoperatively  Eating habits after surgery will have to be a permanent and long-term change. Eating habits are so ingrained that it can be difficult to change. It is important to maintain these new eating habits after surgery. Also remember that overall health, age, and genetics make each persons weight loss progress different. Do not compare your progress, the amount you eat, or exercise to other patients. ? Protein first at every meal- Eat the protein portion of your meal first. Eating protein helps the body feel full and sends a signal to stop eating. Protein is very important in building tissue in the body. ? Eat at least 4 times per day- This includes protein supplements and small meals with a high amount of protein  ? Chewing your food thoroughly- Eating too quickly and improper chewing can cause pain and vomiting after surgery. ? Slowing down the speed at which you eat- Refill your fork only after you swallow. Adopt a new pattern of eating by taking a bite of food and putting your utensil down between bites. This will help to reduce the feeling of food being stuck.   ? Drink water and start drinking fluids slowly- Drink at least 48 ounces per day minimum. Sip fluids as if they were hot beverages. If you find it difficult to stop gulping liquids, try using a sippy cup or a sport top water bottle. ? Make sure you are eating meals without drinking fluids- After surgery you will not be allowed to drink fluids 30 minutes before, during, or 30 minutes after your meal (30/30/30 rule). This will be a life-long behavior change. The reason for the rule is to keep food from passing through your smaller stomach more rapidly.  This will cause you to feel hungry shortly after your meal. ? Continue to avoid caffeine and carbonated beverages- Caffeine acts as a diuretic and can be dehydrating as well as irritating to the lining of the stomach. Carbonated beverages release gas and can expand the stomach. ? Continue to keep temptation from your kitchen- Keep your pantry and kitchen cabinets cleaned out of those dangerous foods that might tempt you after surgery (chips, cookies, candy, etc.). ? Continue to increase your exercise program- Increase your daily physical activity. Aim for 5-6 days per week for 30 minutes. Walking is an easy way to get started with exercising. Exercise is going to be a regular part of your life after surgery. ? Make sure you have a good support system- There will be many changes and adjustments to make after surgery. It is important to have a supportive friend, family member or co-worker, etc. with whom you can talk. Continue to attend [facility]) Weight Management support groups as they can be helpful in maintaining behaviors. In addition, it is the responsibility of the patient to schedule and follow up on labs and tests completed after surgery. Results will be reviewed at each visit. Patient received dietary handouts and education.

## 2021-02-09 NOTE — PROGRESS NOTES
Dietary Assessment Note      Vitals: There were no vitals filed for this visit. Patient lost 2 lbs over past 3 months. Due to the COVID-19 restrictions on close contact interactions the patient's visit was conducted via telephone in magdalena of a face to face visit. The patient is here through telemedicine for their post op visit. Total Weight Loss: 130 lbs    Labs reviewed: No new nutrition related labs     Protein intake: 60-80 grams/day     Fluid intake: 48-64 oz/day    Multivitamin/mineral intake: 4 fusion     Calcium intake: none     Other: Vit D3    Exercise: treadmill/just dance for 2 hours 1-2 x week     Nutrition Assessment: 1 year 3 mo s/p sleeve post-op visit. Pt does not have a consistent eating routine - feels she is eating 6+ times per day. Pt to see behaviorist tomorrow. Pt reports sometimes has a hard time following meal plan as she feels it is not consistent with family food choices. Tracks intakes inconsistently with pen and paper, feels she is ~ 2000 calories/day. Breakfast: Protein shake     Snack: nothing     Lunch: greek yogurt     Snack: turkey sandwich - 2 slices     Dinner: spaghetti with meat sauce with hawaiian bread     Snack: starchy foods     Fluids:  Mostly water, tea with truvia     Amount able to eat per sittin.5 cups down from 2 cups     Following 30 rule: Yes    Food Intolerances/issues: none - has burning sensation when eating but reports no specific foods irritate her. Client Concerns: having burning sensation at all times, especially when eating    Goals:   Track intakes 3 days/week - discussed [employer] bridget.    Start 1200 kcal post op meal plan - emailed to pt - encouraged pt to use macronutrient goals in bridget to compare against meal plan if meal plan is to hard to follow  Decrease portion of CHO and add in veggies       Plan: F/U at 1.5 years and prn     Camila Del Rio

## 2021-02-10 ENCOUNTER — TELEMEDICINE (OUTPATIENT)
Dept: BARIATRICS/WEIGHT MGMT | Age: 36
End: 2021-02-10
Payer: MEDICAID

## 2021-02-10 DIAGNOSIS — Z71.89 INDIVIDUAL COUNSELING ENCOUNTER: Primary | ICD-10-CM

## 2021-02-10 PROCEDURE — 96156 HLTH BHV ASSMT/REASSESSMENT: CPT | Performed by: SOCIAL WORKER

## 2021-02-10 NOTE — PROGRESS NOTES
She and/or health care decision maker is aware that that she may receive a bill for this telephone service, depending on her insurance coverage, and has provided verbal consent to proceed: Yes      I affirm this is a Patient Initiated Episode with an Established Patient who has not had a related appointment within my department in the past 7 days or scheduled within the next 24 hours.     Total Time: minutes: 11-20 minutes    Note: not billable if this call serves to triage the patient into an appointment for the relevant concern      Marlena Jackman Principal [facility]

## 2021-02-18 ENCOUNTER — APPOINTMENT (OUTPATIENT)
Dept: GENERAL RADIOLOGY | Age: 36
End: 2021-02-18
Payer: MEDICAID

## 2021-02-18 ENCOUNTER — HOSPITAL ENCOUNTER (EMERGENCY)
Age: 36
Discharge: HOME | End: 2021-02-18
Payer: MEDICAID

## 2021-02-18 ENCOUNTER — APPOINTMENT (OUTPATIENT)
Dept: CT IMAGING | Age: 36
End: 2021-02-18
Payer: MEDICAID

## 2021-02-18 VITALS
DIASTOLIC BLOOD PRESSURE: 60 MMHG | TEMPERATURE: 98.1 F | WEIGHT: 263 LBS | HEART RATE: 56 BPM | SYSTOLIC BLOOD PRESSURE: 98 MMHG | HEIGHT: 68 IN | RESPIRATION RATE: 16 BRPM | BODY MASS INDEX: 39.86 KG/M2 | OXYGEN SATURATION: 100 %

## 2021-02-18 DIAGNOSIS — R10.9 FLANK PAIN: ICD-10-CM

## 2021-02-18 DIAGNOSIS — R11.0 NAUSEA: Primary | ICD-10-CM

## 2021-02-18 LAB
A/G RATIO: 1.3 (ref 1.1–2.2)
ALBUMIN SERPL-MCNC: 4 G/DL (ref 3.4–5)
ALP BLD-CCNC: 58 U/L (ref 40–129)
ALT SERPL-CCNC: 16 U/L (ref 10–40)
ANION GAP SERPL CALCULATED.3IONS-SCNC: 6 MMOL/L (ref 3–16)
AST SERPL-CCNC: 20 U/L (ref 15–37)
BASOPHILS ABSOLUTE: 0 K/UL (ref 0–0.2)
BASOPHILS RELATIVE PERCENT: 0.6 %
BILIRUB SERPL-MCNC: 0.5 MG/DL (ref 0–1)
BILIRUBIN URINE: NEGATIVE
BLOOD, URINE: NEGATIVE
BUN BLDV-MCNC: 7 MG/DL (ref 7–20)
CALCIUM SERPL-MCNC: 9.1 MG/DL (ref 8.3–10.6)
CHLORIDE BLD-SCNC: 105 MMOL/L (ref 99–110)
CLARITY: CLEAR
CO2: 29 MMOL/L (ref 21–32)
COLOR: YELLOW
CREAT SERPL-MCNC: 0.7 MG/DL (ref 0.6–1.1)
EOSINOPHILS ABSOLUTE: 0.1 K/UL (ref 0–0.6)
EOSINOPHILS RELATIVE PERCENT: 1.4 %
GFR AFRICAN AMERICAN: >60
GFR NON-AFRICAN AMERICAN: >60
GLOBULIN: 3.1 G/DL
GLUCOSE BLD-MCNC: 81 MG/DL (ref 70–99)
GLUCOSE URINE: NEGATIVE MG/DL
HCT VFR BLD CALC: 40.4 % (ref 36–48)
HEMOGLOBIN: 13.4 G/DL (ref 12–16)
KETONES, URINE: NEGATIVE MG/DL
LEUKOCYTE ESTERASE, URINE: NEGATIVE
LIPASE: 23 U/L (ref 13–60)
LYMPHOCYTES ABSOLUTE: 1.7 K/UL (ref 1–5.1)
LYMPHOCYTES RELATIVE PERCENT: 37.7 %
MCH RBC QN AUTO: 31.4 PG (ref 26–34)
MCHC RBC AUTO-ENTMCNC: 33.2 G/DL (ref 31–36)
MCV RBC AUTO: 94.5 FL (ref 80–100)
MICROSCOPIC EXAMINATION: NORMAL
MONOCYTES ABSOLUTE: 0.4 K/UL (ref 0–1.3)
MONOCYTES RELATIVE PERCENT: 9.6 %
NEUTROPHILS ABSOLUTE: 2.3 K/UL (ref 1.7–7.7)
NEUTROPHILS RELATIVE PERCENT: 50.7 %
NITRITE, URINE: NEGATIVE
PDW BLD-RTO: 13.9 % (ref 12.4–15.4)
PH UA: 7 (ref 5–8)
PLATELET # BLD: 187 K/UL (ref 135–450)
PMV BLD AUTO: 9.5 FL (ref 5–10.5)
POTASSIUM REFLEX MAGNESIUM: 4.1 MMOL/L (ref 3.5–5.1)
PROTEIN UA: NEGATIVE MG/DL
RBC # BLD: 4.28 M/UL (ref 4–5.2)
SODIUM BLD-SCNC: 140 MMOL/L (ref 136–145)
SPECIFIC GRAVITY UA: 1.01 (ref 1–1.03)
TOTAL PROTEIN: 7.1 G/DL (ref 6.4–8.2)
URINE REFLEX TO CULTURE: NORMAL
URINE TYPE: NORMAL
UROBILINOGEN, URINE: 0.2 E.U./DL
WBC # BLD: 4.5 K/UL (ref 4–11)

## 2021-02-18 PROCEDURE — 83690 ASSAY OF LIPASE: CPT

## 2021-02-18 PROCEDURE — 71046 X-RAY EXAM CHEST 2 VIEWS: CPT

## 2021-02-18 PROCEDURE — 99284 EMERGENCY DEPT VISIT MOD MDM: CPT

## 2021-02-18 PROCEDURE — 6360000002 HC RX W HCPCS: Performed by: NURSE PRACTITIONER

## 2021-02-18 PROCEDURE — 96374 THER/PROPH/DIAG INJ IV PUSH: CPT

## 2021-02-18 PROCEDURE — 74177 CT ABD & PELVIS W/CONTRAST: CPT

## 2021-02-18 PROCEDURE — 2580000003 HC RX 258: Performed by: NURSE PRACTITIONER

## 2021-02-18 PROCEDURE — 85025 COMPLETE CBC W/AUTO DIFF WBC: CPT

## 2021-02-18 PROCEDURE — 81003 URINALYSIS AUTO W/O SCOPE: CPT

## 2021-02-18 PROCEDURE — 6360000004 HC RX CONTRAST MEDICATION: Performed by: NURSE PRACTITIONER

## 2021-02-18 PROCEDURE — 80053 COMPREHEN METABOLIC PANEL: CPT

## 2021-02-18 RX ORDER — 0.9 % SODIUM CHLORIDE 0.9 %
1000 INTRAVENOUS SOLUTION INTRAVENOUS ONCE
Status: COMPLETED | OUTPATIENT
Start: 2021-02-18 | End: 2021-02-18

## 2021-02-18 RX ORDER — ONDANSETRON 2 MG/ML
4 INJECTION INTRAMUSCULAR; INTRAVENOUS ONCE
Status: COMPLETED | OUTPATIENT
Start: 2021-02-18 | End: 2021-02-18

## 2021-02-18 RX ORDER — ONDANSETRON 4 MG/1
4 TABLET, FILM COATED ORAL EVERY 8 HOURS PRN
Qty: 20 TABLET | Refills: 0 | Status: SHIPPED | OUTPATIENT
Start: 2021-02-18 | End: 2021-02-25 | Stop reason: ALTCHOICE

## 2021-02-18 RX ORDER — DICYCLOMINE HYDROCHLORIDE 10 MG/1
10 CAPSULE ORAL
Qty: 120 CAPSULE | Refills: 3 | Status: SHIPPED | OUTPATIENT
Start: 2021-02-18 | End: 2021-02-25 | Stop reason: ALTCHOICE

## 2021-02-18 RX ORDER — SUCRALFATE 1 G/1
1 TABLET ORAL 4 TIMES DAILY
Qty: 120 TABLET | Refills: 3 | Status: SHIPPED | OUTPATIENT
Start: 2021-02-18 | End: 2021-02-25 | Stop reason: ALTCHOICE

## 2021-02-18 RX ADMIN — ONDANSETRON HYDROCHLORIDE 4 MG: 2 INJECTION, SOLUTION INTRAMUSCULAR; INTRAVENOUS at 16:08

## 2021-02-18 RX ADMIN — IOPAMIDOL 75 ML: 755 INJECTION, SOLUTION INTRAVENOUS at 17:23

## 2021-02-18 RX ADMIN — SODIUM CHLORIDE 1000 ML: 9 INJECTION, SOLUTION INTRAVENOUS at 16:14

## 2021-02-18 ASSESSMENT — ENCOUNTER SYMPTOMS
SORE THROAT: 0
ABDOMINAL PAIN: 1
COLOR CHANGE: 0
RHINORRHEA: 0
SHORTNESS OF BREATH: 0

## 2021-02-18 ASSESSMENT — PAIN DESCRIPTION - DESCRIPTORS: DESCRIPTORS: ACHING

## 2021-02-18 NOTE — ED PROVIDER NOTES
Evaluated by [id number] [facility] Provider          [facility]  [place]        Pt Name: Kaykay Thomas  MRN: [id number]  [place] 1985  Dateof evaluation: 2/18/2021  Provider: ROSE Springer CNP  PCP: ROSE Tobin CNP  ED Attending: No att. providers found    [address] [place] [address]       Chief Complaint   Patient presents with    Nausea    Flank Pain     right side and a little on left        HISTORY OF PRESENTILLNESS   (Location/Symptom, Timing/Onset, Context/Setting, Quality, Duration, Modifying Factors, Severity)  Note limiting factors. Kaykay Thomas is a 37 y.o. female for right upper quadrant pain. Onset was months. Context includes patient states that she started with upper abdominal pain for the last few months. Patient states that she has had nausea as well. Patient reports that she has had a cholecystectomy already but still has her appendix. Patient denies any fevers nausea or vomiting. Patient denies any alcohol use. Alleviating factors include nothing. Aggravating factors include nothing. Pain is 7/10. Nothing has been used for pain today. Nursing Notes were all reviewed and agreed with or any disagreements were addressed  in the HPI. REVIEW OF SYSTEMS    (2-9 systems for level 4, 10 or more for level 5)     Review of Systems   Constitutional: Negative for fever. HENT: Negative for congestion, rhinorrhea and sore throat. Respiratory: Negative for shortness of breath. Cardiovascular: Negative for chest pain. Gastrointestinal: Positive for abdominal pain. Genitourinary: Negative for decreased urine volume and difficulty urinating. Musculoskeletal: Negative for arthralgias and myalgias. Skin: Negative for color change and rash. Neurological: Negative for dizziness and light-headedness. Psychiatric/Behavioral: Negative for agitation. All other systems reviewed and are negative.       Positives and Pertinent negatives as per HPI. Except as noted above in the ROS, all other systems were reviewed and negative. PAST MEDICAL HISTORY     Past Medical History:   Diagnosis Date    Asthma     as child    Back pain     Bipolar [facility]     Depression     Diabetes mellitus ([facility]     resolved per patient    H/O: hysterectomy 04/29/2020    Liver disease     fatty liver    Obesity     OCD (obsessive compulsive disorder)     PCOS (polycystic ovarian syndrome)     PCOS (polycystic ovarian syndrome)     Sleep apnea     cpap         SURGICAL HISTORY       Past Surgical History:   Procedure Laterality Date    ANKLE SURGERY      CHOLECYSTECTOMY, LAPAROSCOPIC N/A 4/20/2020    [facility]      OTHER SURGICAL HISTORY Left 12/10/14    Excision of Cyst Left Upper Posterior Ankle    OTHER SURGICAL HISTORY Right 12/29/2016    Laparotomy with Right SO    OVARY REMOVAL  12/28/2016    unsure of side    SLEEVE GASTRECTOMY N/A 11/6/2019    LAPAROSCOPIC SLEEVE GASTRECTOMY - ETHICON performed by Steffen Robertson MD at [facility] 8/23/2019    EGD BIOPSY performed by Steffen Robertson MD at [address]       Previous Medications    BUSPIRONE (BUSPAR) 5 MG TABLET    Take 5 mg by mouth 3 times daily    CHOLECALCIFEROL (VITAMIN D PO)    Take by mouth daily     CLOMIPRAMINE (ANAFRANIL) 25 MG CAPSULE    Take 50 mg by mouth nightly    LEVOTHYROXINE (SYNTHROID) 75 MCG TABLET    TAKE 1 TABLET BY MOUTH EVERY DAY    LORATADINE (CLARITIN) 10 MG TABLET    Take 10 mg by mouth daily    MEDICAL MARIJUANA    Take by mouth as needed.     MULTIPLE VITAMINS-MINERALS (BARIATRIC FUSION) CHEW    Take 4 tablets by mouth daily    OMEPRAZOLE (PRILOSEC) 40 MG DELAYED RELEASE CAPSULE    TAKE 1 CAPSULE (40MG) BY MOUTH 2 TIMES DAILY    ZIPRASIDONE (GEODON) 20 MG CAPSULE    Take 20 mg by mouth 2 times daily (with meals)         ALLERGIES     Latex, Lactose, Adhesive tape, and Keflex [cephalexin]    FAMILY HISTORY       Family History   Problem Relation Age of Onset    Asthma Mother     Arthritis Mother     Lung Cancer Mother     Hypertension Father     Elevated Lipids Father     Diabetes Father     Alcohol Abuse Father     Asthma Father     Arthritis Father     Diabetes Paternal Grandfather     Arthritis Paternal Grandfather     Diabetes Maternal Grandmother     Arthritis Maternal Grandmother           SOCIAL HISTORY       Social History     Socioeconomic History    Marital status:      Spouse name: None    Number of children: None    Years of education: None    Highest education level: None   Occupational History    None   Social Needs    Financial resource strain: None    Food insecurity     Worry: None     Inability: None    Transportation needs     Medical: None     Non-medical: None   Tobacco Use    Smoking status: Never Smoker    Smokeless tobacco: Never Used   Substance and Sexual Activity    Alcohol use:  Yes     Alcohol/week: 1.0 standard drinks     Types: 1 Glasses of wine per week     Comment: weekly    Drug use: Yes     Types: Marijuana    Sexual activity: Yes     Partners: Male   Lifestyle    Physical activity     Days per week: None     Minutes per session: None    Stress: None   Relationships    Social connections     Talks on phone: None     Gets together: None     Attends Protestant service: None     Active member of club or organization: None     Attends meetings of clubs or organizations: None     Relationship status: None    Intimate partner violence     Fear of current or ex partner: None     Emotionally abused: None     Physically abused: None     Forced sexual activity: None   Other Topics Concern    None   Social History Narrative    None       SCREENINGS    Lamin Coma Scale  Eye Opening: Spontaneous  Best Verbal Response: Oriented  Best Motor Response: Obeys commands  Lamin Coma Scale Score: 15        PHYSICAL EXAM  (up to 7 for level 4, 8 or more for level 5)     ED Triage Vitals [02/18/21 1426]   BP Temp Temp Source Pulse Resp SpO2 Height Weight   110/78 97.4 °F (36.3 °C) Oral 80 16 99 % 5' 7.5\" (1.715 m) 263 lb (119.3 kg)       Physical Exam  Constitutional:       Appearance: She is well-developed. HENT:      Head: Normocephalic and atraumatic. Neck:      Musculoskeletal: Normal range of motion. Cardiovascular:      Rate and Rhythm: Normal rate. Pulmonary:      Effort: Pulmonary effort is normal. No respiratory distress. Abdominal:      General: Bowel sounds are decreased. There is no distension. Palpations: Abdomen is soft. Tenderness: There is abdominal tenderness in the right upper quadrant. Musculoskeletal: Normal range of motion. Skin:     General: Skin is warm and dry. Neurological:      Mental Status: She is alert and oriented to person, place, and time. DIAGNOSTIC RESULTS   LABS:    Labs Reviewed   CBC WITH AUTO DIFFERENTIAL    Narrative:     Performed at:  [place] [address],  [place], [facility]   Phone [phone]   COMPREHENSIVE METABOLIC PANEL W/ REFLEX TO MG FOR LOW K    Narrative:     Performed at:  [place] [address],  [place], [facility]   Phone [phone]   URINE RT REFLEX TO CULTURE    Narrative:     Performed at:  [place] [address],  [place], [facility]   Phone [phone]   LIPASE    Narrative:     Performed at:  [facility]) [facility],  [place], [facility]   Phone [phone]       All other labs werewithin normal range or not returned as of this dictation. EKG:  All EKG's are interpreted by the Emergency Department Physician who either signs or Co-signs this chart in the absence of a cardiologist.  Please see their note for interpretation of EKG. RADIOLOGY:     Abdominal CT with IV contrast interpreted by radiologist for  Impression:    1. Status post sleeve gastrectomy. Dennis Jaimesals is stable mural thickening along   the right lateral wall of the gastric antrum.  This may be accentuated by   incomplete distention.  Consider direct visualization if there is continued   clinical concern. 2. No other acute findings within the abdomen or pelvis.  Status post   cholecystectomy and hysterectomy. Chest x-ray interpreted by radiologist for stable chronic changes with no acute abnormality seen. Interpretation per the Radiologist below, if available at the time of this note:    CT ABDOMEN PELVIS W IV CONTRAST Additional Contrast? None   Preliminary Result   1. Status post sleeve gastrectomy. There is stable mural thickening along   the right lateral wall of the gastric antrum. This may be accentuated by   incomplete distention. Consider direct visualization if there is continued   clinical concern. 2. No other acute findings within the abdomen or pelvis. Status post   cholecystectomy and hysterectomy. XR CHEST (2 VW)   Final Result   Stable chronic changes with no acute abnormality seen. No results found.       PROCEDURES   Unless otherwise noted below, none     Procedures     CRITICAL CARE TIME   N/A    CONSULTS:  None      EMERGENCYDEPARTMENT COURSE and DIFFERENTIAL DIAGNOSIS/MDM:   Vitals:    Vitals:    02/18/21 1636 02/18/21 1706 02/18/21 1736 02/18/21 1835   BP: 109/68 111/69 102/61    Pulse:       Resp:       Temp:       TempSrc:       SpO2: 100% 100% 99% 100%   Weight:       Height:           Patient was given the following medications:  Medications   0.9 % sodium chloride bolus (0 mLs Intravenous Stopped 2/18/21 1835)   ondansetron (ZOFRAN) injection 4 mg (4 mg Intravenous Given 2/18/21 1608)   iopamidol (ISOVUE-370) 76 % injection 75 mL (75 mLs Intravenous Given 2/18/21 1723)       Patient was seen and evaluated by myself. Patient here for concerns for right upper quadrant pain. Patient states that she has had right upper quadrant pain since August.  She is concerned that she has pancreatitis however her primary care doctor says that there is no lab markings for pancreatitis. Patient does not drink alcohol. She does complain of nausea. Patient has had gastric sleeve in the past.  On exam she is awake and alert hemodynamically stable nontoxic in appearance. Lab values have been reviewed and interpreted. Abdominal CT was negative for any concerns for pancreatitis or acute findings. Patient was provided with IV fluids and Zofran. Patient reports that she has an appointment scheduled with gastroenterology in the upcoming weeks. She was encouraged to keep this appointment. Patient will be discharged with instructions to follow-up with her primary care doctor the next few days and to return to the ED for worsening symptoms. Patient was given prescriptions for Zofran Bentyl and Carafate. Patient was ultimately discharged home with all questions answered. The patient tolerated their visit well. I have evaluated this patient. My supervising physician was available for consultation. The patient and / or the family were informed of the results of any tests, a time was given to answer questions, a plan was proposed and they agreed with plan. FINAL IMPRESSION      1. Nausea    2.  Flank pain          DISPOSITION/PLAN   DISPOSITION Decision To Discharge 02/18/2021 06:43:33 PM      PATIENT REFERRED TO:  Bettye Chu, APRN - CNP  [id number] [facility] [address] [phone]    Schedule an appointment as soon as possible for a visit   for re-evaluation    [place] [facility] [place] [facility]  [address] [place]  [phone]    If symptoms worsen    Dot Face, DO  [address] [facility] [address]  [phone]    Call   as instructed      DISCHARGE MEDICATIONS:  New Prescriptions    DICYCLOMINE (BENTYL) 10 MG CAPSULE    Take 1 capsule by mouth 4 times daily (before meals and nightly)    ONDANSETRON (ZOFRAN) 4 MG TABLET    Take 1 tablet by mouth every 8 hours as needed for Nausea    SUCRALFATE (CARAFATE) 1 GM TABLET    Take 1 tablet by mouth 4 times daily       DISCONTINUED MEDICATIONS:  Discontinued Medications    No medications on file              (Please note that portions of this note were completed with a voice recognition program.  Efforts were made to edit the dictations but occasionally words are mis-transcribed.)    ROSE Gardiner CNP (electronically signed)         ROSE Gardiner CNP  02/18/21 [id number]

## 2021-02-19 ENCOUNTER — PATIENT MESSAGE (OUTPATIENT)
Dept: BARIATRICS/WEIGHT MGMT | Age: 36
End: 2021-02-19

## 2021-02-19 NOTE — TELEPHONE ENCOUNTER
From: Sara Fernando  To: ROSE Bob CNP  Sent: 2/19/2021 2:34 AM EST  Subject: Test Results Question    Having some major issues concerning the pain I've had since August ish, was getting dizzy, nasuea, diarrhea, and bad pain last night and the day before so I went to the er. Like everything else so far they couldn't find anything wrong. The only thing they saw is mentioned in the CT results, but I don't understand it. Figured since they are mentioning my sleeve I should probably let you guys know. Following up with a gi dr this week. The part under impressions talking about my sleeve and a thickening on the right?

## 2021-02-20 ENCOUNTER — PATIENT MESSAGE (OUTPATIENT)
Dept: BARIATRICS/WEIGHT MGMT | Age: 36
End: 2021-02-20

## 2021-02-22 NOTE — TELEPHONE ENCOUNTER
From: Michael Browne  To: Veronique Mcdaniel, ROSE - CNP  Sent: 2/20/2021 12:44 AM EST  Subject: Non-Urgent Medical Question    It was suggested to me by another gastric sleeve patient that this may be what's happening to me, but that lots of tests would be needed to rule things out to figure out what exactly is causing it.     Post-cholecystectomy syndrome (PCS) is the term used to describe the persistence of biliary colic or right upper quadrant abdominal pain with a variety of gastrointestinal symptoms
Additional Complaints
None known

## 2021-02-24 ENCOUNTER — TELEPHONE (OUTPATIENT)
Dept: GASTROENTEROLOGY | Age: 36
End: 2021-02-24

## 2021-02-24 ENCOUNTER — VIRTUAL VISIT (OUTPATIENT)
Dept: GASTROENTEROLOGY | Age: 36
End: 2021-02-24
Payer: MEDICAID

## 2021-02-24 DIAGNOSIS — R93.3 ABNORMAL CT SCAN, STOMACH: ICD-10-CM

## 2021-02-24 DIAGNOSIS — R10.11 RUQ PAIN: Primary | ICD-10-CM

## 2021-02-24 PROCEDURE — 99422 OL DIG E/M SVC 11-20 MIN: CPT | Performed by: INTERNAL MEDICINE

## 2021-02-24 NOTE — PROGRESS NOTES
 Liver disease     fatty liver    Obesity     OCD (obsessive compulsive disorder)     PCOS (polycystic ovarian syndrome)     PCOS (polycystic ovarian syndrome)     Sleep apnea     cpap     FAMILY HISTORY     Family History   Problem Relation Age of Onset    Asthma Mother     Arthritis Mother     Lung Cancer Mother     Hypertension Father     Elevated Lipids Father     Diabetes Father     Alcohol Abuse Father     Asthma Father     Arthritis Father     Diabetes Paternal Grandfather     Arthritis Paternal Grandfather     Diabetes Maternal Grandmother     Arthritis Maternal Grandmother      SOCIAL HISTORY     Social History     Socioeconomic History    Marital status:      Spouse name: Not on file    Number of children: Not on file    Years of education: Not on file    Highest education level: Not on file   Occupational History    Not on file   Social Needs    Financial resource strain: Not on file    Food insecurity     Worry: Not on file     Inability: Not on file    Transportation needs     Medical: Not on file     Non-medical: Not on file   Tobacco Use    Smoking status: Never Smoker    Smokeless tobacco: Never Used   Substance and Sexual Activity    Alcohol use:  Yes     Alcohol/week: 1.0 standard drinks     Types: 1 Glasses of wine per week     Comment: weekly    Drug use: Yes     Types: Marijuana    Sexual activity: Yes     Partners: Male   Lifestyle    Physical activity     Days per week: Not on file     Minutes per session: Not on file    Stress: Not on file   Relationships    Social connections     Talks on phone: Not on file     Gets together: Not on file     Attends Denominational service: Not on file     Active member of club or organization: Not on file     Attends meetings of clubs or organizations: Not on file     Relationship status: Not on file    Intimate partner violence     Fear of current or ex partner: Not on file     Emotionally abused: Not on file Physically abused: Not on file     Forced sexual activity: Not on file   Other Topics Concern    Not on file   Social History Narrative    Not on file     SURGICAL HISTORY     Past Surgical History:   Procedure Laterality Date    [address], LAPAROSCOPIC N/A 4/20/2020    LAPAROSCOPIC CHOLECYSTECTOMY performed by Boy Buckley DO at [facility] OTHER SURGICAL HISTORY Left 12/10/14    Excision of Cyst Left Upper Posterior Ankle    OTHER SURGICAL HISTORY Right 12/29/2016    Laparotomy with Right SO    OVARY REMOVAL  12/28/2016    unsure of side    SLEEVE GASTRECTOMY N/A 11/6/2019    LAPAROSCOPIC SLEEVE GASTRECTOMY - ETHICON performed by Alfreda Arriola MD at [facility] 8/23/2019    EGD BIOPSY performed by Alfreda Arriola MD at [address]   (This list may include medications prescribed during this encounter as epic can not insert only the list prior to this encounter.)  Current Outpatient Rx   Medication Sig Dispense Refill    ondansetron (ZOFRAN) 4 MG tablet Take 1 tablet by mouth every 8 hours as needed for Nausea 20 tablet 0    dicyclomine (BENTYL) 10 MG capsule Take 1 capsule by mouth 4 times daily (before meals and nightly) 120 capsule 3    sucralfate (CARAFATE) 1 GM tablet Take 1 tablet by mouth 4 times daily 120 tablet 3    medical marijuana Take by mouth as needed.       omeprazole (PRILOSEC) 40 MG delayed release capsule TAKE 1 CAPSULE (40MG) BY MOUTH 2 TIMES DAILY 60 capsule 0    busPIRone (BUSPAR) 5 MG tablet Take 5 mg by mouth 3 times daily      loratadine (CLARITIN) 10 MG tablet Take 10 mg by mouth daily      Multiple Vitamins-Minerals (BARIATRIC FUSION) CHEW Take 4 tablets by mouth daily      levothyroxine (SYNTHROID) 75 MCG tablet TAKE 1 TABLET BY MOUTH EVERY DAY  5    Cholecalciferol (VITAMIN D PO) Take by mouth daily  ziprasidone (GEODON) 20 MG capsule Take 20 mg by mouth 2 times daily (with meals)      clomiPRAMINE (ANAFRANIL) 25 MG capsule Take 50 mg by mouth nightly        ALLERGIES     Allergies   Allergen Reactions    Latex Itching    Lactose      \"Extreme\" stomach pain and diarrhea    Adhesive Tape      Rips off skin    Keflex [Cephalexin] Nausea And Vomiting     Projectile vomitting     IMMUNIZATIONS     Immunization History   Administered Date(s) Administered    DTaP (Infanrix) 04/27/1988, 06/23/1988, 12/08/1988, 09/15/1992, 03/25/1993    Hepatitis B 02/26/2020, 03/25/2020    Hib PRP-OMP (PedvaxHIB) 09/15/1992    Influenza Vaccine, unspecified formulation 11/08/2017, 10/08/2018    Influenza Virus Vaccine 10/08/2018    Influenza, Rondevaughn Carp, IM, PF (6 mo and older Fluzone, Flulaval, Fluarix, and 3 yrs and older Afluria) 12/30/2016, 11/08/2017, 10/08/2018, 10/30/2019    MMR 06/29/1989, 03/04/2020    Polio OPV 04/27/1988, 06/23/1988, 09/15/1992, 12/09/1992    Td (Adult), 5 Lf Tetanus Toxoid, Pf (Tenivac, Decavac) 09/10/2003    Tdap (Boostrix, Adacel) 11/08/2017     REVIEW OF SYSTEMS     Constitutional: denies fever and unexpected weight change. HENT: Negative for ear pain, hearing loss and nosebleeds. Eyes: Negative for pain and visual disturbance. Respiratory: Negative for cough, shortness of breath and wheezing. Cardiovascular: Negative for chest pain, palpitations and leg swelling. Gastrointestinal: see HPI for details. Endocrine: Negative for polydipsia, polyphagia and polyuria. Genitourinary: Negative for difficulty urinating, dysuria, hematuria and urgency. Musculoskeletal: Positive for arthralgias and back pain. Skin: Negative for pallor and rash. Allergic/Immunologic: Negative for environmental allergies and immunocompromised state. Neurological: Negative for seizures, syncope. Hematological: Negative for adenopathy. Does not bruise/bleed easily. Psychiatric/Behavioral: Negative for agitation, confusion, hallucinations. PHYSICAL EXAM   LMP 04/13/2020   Wt Readings from Last 3 Encounters:   02/18/21 263 lb (119.3 kg)   01/18/21 260 lb (117.9 kg)   12/20/20 260 lb (117.9 kg)       Exam done through [address] visit and is limited  Constitutional: AAO3, No acute distress  HEENT: no pallor or icterus. Neck: no visible masses  Respiratory: normal effort, no visualized signs of respiratory distress  Psych: normal affect, no hallucinations    FINAL IMPRESSION     Right-sided abdominal pain, has had a cholecystectomy last year, CT scan showed gastric wall thickening but this could perhaps be related to her gastric sleeve surgery  Would recommend an upper endoscopy to investigate the CT scan finding rule out peptic ulcer disease neoplasia or other significant upper GI pathology  Consider MRCP to rule out any bile duct abnormalities or pancreatic abnormalities causing her pain  Of note her liver function tests have been checked multiple times over the last few months and have been normal    Time of visit: 20 minutes  Physician location: physician office  Patient location: patients home. Pursuant to the emergency declaration under the [facility], [address] waiver authority and the [employer] Act, this Virtual  Visit was conducted, with patient's consent, to reduce the patient's risk of exposure to COVID-19 and provide continuity of care for an established patient. Services were provided through a video synchronous discussion virtually to substitute for in-person clinic visit.

## 2021-02-25 ENCOUNTER — PATIENT MESSAGE (OUTPATIENT)
Dept: BARIATRICS/WEIGHT MGMT | Age: 36
End: 2021-02-25

## 2021-02-25 ENCOUNTER — TELEPHONE (OUTPATIENT)
Dept: GASTROENTEROLOGY | Age: 36
End: 2021-02-25

## 2021-02-25 ENCOUNTER — APPOINTMENT (OUTPATIENT)
Dept: VASCULAR LAB | Age: 36
End: 2021-02-25
Payer: MEDICAID

## 2021-02-25 ENCOUNTER — HOSPITAL ENCOUNTER (EMERGENCY)
Age: 36
Discharge: HOME | End: 2021-02-25
Attending: EMERGENCY MEDICINE
Payer: MEDICAID

## 2021-02-25 VITALS
RESPIRATION RATE: 18 BRPM | HEIGHT: 67 IN | TEMPERATURE: 97.8 F | HEART RATE: 84 BPM | OXYGEN SATURATION: 100 % | DIASTOLIC BLOOD PRESSURE: 66 MMHG | SYSTOLIC BLOOD PRESSURE: 115 MMHG | WEIGHT: 263 LBS | BODY MASS INDEX: 41.28 KG/M2

## 2021-02-25 DIAGNOSIS — R10.11 ABDOMINAL PAIN, RIGHT UPPER QUADRANT: Primary | ICD-10-CM

## 2021-02-25 DIAGNOSIS — M79.604 PAIN OF RIGHT LOWER EXTREMITY: Primary | ICD-10-CM

## 2021-02-25 LAB
A/G RATIO: 1.5 (ref 1.1–2.2)
ALBUMIN SERPL-MCNC: 4.5 G/DL (ref 3.4–5)
ALP BLD-CCNC: 61 U/L (ref 40–129)
ALT SERPL-CCNC: 18 U/L (ref 10–40)
ANION GAP SERPL CALCULATED.3IONS-SCNC: 5 MMOL/L (ref 3–16)
AST SERPL-CCNC: 16 U/L (ref 15–37)
BASOPHILS ABSOLUTE: 0 K/UL (ref 0–0.2)
BASOPHILS RELATIVE PERCENT: 0.5 %
BILIRUB SERPL-MCNC: 0.5 MG/DL (ref 0–1)
BUN BLDV-MCNC: 13 MG/DL (ref 7–20)
CALCIUM SERPL-MCNC: 9.7 MG/DL (ref 8.3–10.6)
CHLORIDE BLD-SCNC: 101 MMOL/L (ref 99–110)
CO2: 31 MMOL/L (ref 21–32)
CREAT SERPL-MCNC: 0.7 MG/DL (ref 0.6–1.1)
EOSINOPHILS ABSOLUTE: 0.1 K/UL (ref 0–0.6)
EOSINOPHILS RELATIVE PERCENT: 1.1 %
GFR AFRICAN AMERICAN: >60
GFR NON-AFRICAN AMERICAN: >60
GLOBULIN: 3.1 G/DL
GLUCOSE BLD-MCNC: 94 MG/DL (ref 70–99)
HCT VFR BLD CALC: 42.8 % (ref 36–48)
HEMOGLOBIN: 15 G/DL (ref 12–16)
LYMPHOCYTES ABSOLUTE: 2 K/UL (ref 1–5.1)
LYMPHOCYTES RELATIVE PERCENT: 40.4 %
MCH RBC QN AUTO: 32.6 PG (ref 26–34)
MCHC RBC AUTO-ENTMCNC: 35 G/DL (ref 31–36)
MCV RBC AUTO: 93.3 FL (ref 80–100)
MONOCYTES ABSOLUTE: 0.4 K/UL (ref 0–1.3)
MONOCYTES RELATIVE PERCENT: 8.9 %
NEUTROPHILS ABSOLUTE: 2.4 K/UL (ref 1.7–7.7)
NEUTROPHILS RELATIVE PERCENT: 49.1 %
PDW BLD-RTO: 13.8 % (ref 12.4–15.4)
PLATELET # BLD: 216 K/UL (ref 135–450)
PMV BLD AUTO: 9.5 FL (ref 5–10.5)
POTASSIUM REFLEX MAGNESIUM: 3.7 MMOL/L (ref 3.5–5.1)
RBC # BLD: 4.59 M/UL (ref 4–5.2)
SODIUM BLD-SCNC: 137 MMOL/L (ref 136–145)
TOTAL PROTEIN: 7.6 G/DL (ref 6.4–8.2)
WBC # BLD: 4.9 K/UL (ref 4–11)

## 2021-02-25 PROCEDURE — 93971 EXTREMITY STUDY: CPT

## 2021-02-25 PROCEDURE — 85025 COMPLETE CBC W/AUTO DIFF WBC: CPT

## 2021-02-25 PROCEDURE — 80053 COMPREHEN METABOLIC PANEL: CPT

## 2021-02-25 PROCEDURE — 99284 EMERGENCY DEPT VISIT MOD MDM: CPT

## 2021-02-25 RX ORDER — ACETAMINOPHEN 325 MG/1
650 TABLET ORAL ONCE
Status: DISCONTINUED | OUTPATIENT
Start: 2021-02-25 | End: 2021-02-25 | Stop reason: HOSPADM

## 2021-02-25 RX ORDER — MIRTAZAPINE 15 MG/1
15 TABLET, FILM COATED ORAL NIGHTLY
COMMUNITY
End: 2021-12-22 | Stop reason: ALTCHOICE

## 2021-02-25 ASSESSMENT — PAIN DESCRIPTION - LOCATION: LOCATION: LEG

## 2021-02-25 ASSESSMENT — PAIN SCALES - GENERAL: PAINLEVEL_OUTOF10: 5

## 2021-02-25 ASSESSMENT — PAIN DESCRIPTION - ORIENTATION: ORIENTATION: RIGHT

## 2021-02-25 ASSESSMENT — PAIN DESCRIPTION - DESCRIPTORS: DESCRIPTORS: BURNING;PRESSURE

## 2021-02-25 NOTE — LETTER
- If you currently take Aggrenox, Dipyridamole, Persantine, Fondaparinux sodium (Arixtra), Dalteparin sodium Wiliam Font), Warfarin (Coumadin), Clopidogrel (Plavix), Prasugrel (Effient), Enoxaparin, Lovenox, or Heparin, Cilostazol (Pletal), Rivoroxaban (Xarelto), Desirudin (Iprivask), Cyclopentyltrazolopyrimide (Ticagrelor or Brilinta), Cangrelor (Wadie Meléndez), Dabigatran (Pradaxa), Apixaban (Eliquis), Edoxaban (Savaysa)you should have received instructions regarding if and when to discontinue the medication. If you have not, or do not clearly understand the instructions, please call the office for clarification. MORNING OF PROCEDURE  1. Take your Blood pressure, Heart and Seizure medication the morning of the procedure with sips of water. 2. Bring inhalers with you. 3. Do not take your Diabetic medication the morning of procedure. Dear Patient,      Trujillo Altokath Harrison will receive a call from the [facility] pre-registration department prior to your GI procedure. This will help streamline your check-in process on the day of service. During this call a skilled associate will review your demographic and insurance benefits information. The associate will answer any question pertaining to your insurance contract, such as deductible/co-insurance/ co-pay or any other financial concerns. They may offer an amount that you could pay on the day of surgery but this is not a requirement. Thank you for allowing [facility] to be part of your [address] [place] [address] 55, Kelli [address] [address] Is located at the intersection of [address] [place] [address] and [facility], next to [facility]. From 275: Exit at [address]. Travel on  [address] past [address] and turn right onto [address]. Then turn left into the main driveway facing the [facility], bare to the right of the driveway and look for the building to the right of the hospital.    If you need further directions, pleae call our main number at [facility]                                        [address] [facility] [address] [facility],[address] [place] [address] [phone]  f [phone]               Becca Hanley MD                        [address] [place] [address] 21    9:20 AM    Facility:     [facility]                                                     Procedure Date & Time:  3/10/21 1:30pm           Pt arrival: 12:30pm                                                                                      Patient Name:  Filipe Sprague     :  1985 PCP:  Jessica Parker, [address] [facility] Ph:    [phone] (home)           SSN:                                         PROCEDURE:                              EGD                                                          [id number]    DIAGNOSIS:      ICD-10-CM    1.  Abdominal pain, right upper quadrant  R10.11 COVID-19 Ambulatory       Anesthesia: __MAC__  Time Needed: 30 minutes  Pt Position:  lateral, right side up         Outpatient _X_                                    _X__PREP:  NPO                           _____Cardiac Clearance by; ___________           Medications to be stopped 5 days before procedure: _________  Additional / Special Orders:                                                                                                   Insurance: Adelina Recio               ID # [id number]                Ph #     (secondary ?)                                  _x___E-mailed 21     _x__ Francis Marie to Pt / Spouse La Nena Magallanes    1985                                                    Endoscopy Order   IN ACCORDANCE WITH OUR FORMULARY SYSTEM, A GENERIC EQUIVALENT DRUG MAY BE DISPNSED AND ADMINISTERED UNLESS D. A. W. IS WRITTEN WITH THE MEDICATION ORDER.   DATE HOUR PHYSICIAN:  RECORD DATE, HOUR AND SIGN EACH ENTRY   3/10/21 1:30pm 1)  Admit for:   []Colonoscopy  [x]EGD     [x]Anesthesia/MAC        []ERCP     []Upper EUS     []Lower EUS                             2)  Diagnosis: R10.11     3)  Establish IV access     Solution:  []0.9 Normal Saline   [x]Lactated Ringers    []Other:                            Rate:   [x]KVO      []Other:     4)  Check blood sugar on all diabetic patients       Urine Pregnancy test on all menstruating females     5)  Labs:       []PT/INR         []Platelet       []Other:____________     6)  Procedure Medications:     []Fentanyl ______micrograms IV   []Demerol ________mg IV     []Versed _______mg IV                          []Other:     7) [x]Dinemap      [x]Pulse Oximetry    [x]Cardiac Monitor     8)  Post Procedure:       [x]Titrate O2 to keep O2 Sat.  > 90%     [x]Discharge instructions per                                                                     Endoscopy Protocol     [x]Discharge home when awake and vital signs stable                                                                          Signature:          Date:2/25/21               Time: 9:20 AM   PHYSICIANS ORDERS

## 2021-02-25 NOTE — ED PROVIDER NOTES
[address] ED  EMERGENCY DEPARTMENT ENCOUNTER      Pt Name: Navin Peace  MRN: [id number]  [place] 1985  Date of evaluation: 2/25/2021  Provider: Velma Gutierrez MD    CHIEF COMPLAINT       Chief Complaint   Patient presents with    Leg Pain     pt woke up around 0430 with burnng sensation/pressure in right leg. sensation worse behind knee. pt called PCP told to come to ED for ultrasound. HISTORY OF PRESENT ILLNESS   (Location/Symptom, Timing/Onset, Context/Setting, Quality, Duration, Modifying Factors, Severity)  Note limiting factors. Navin Peace is a 37 y.o. female with past medical history of bipolar disorder, PCOS, diabetes here today with right leg pain. Patient states she woke up from sleep tonight with a burning pain posterior to her right knee. She states it radiated up and down her leg. No swelling of the leg. No chest pain or shortness of breath. No redness. No pain with range of motion of the knee. No direct injury or trauma. Pain moderate. No alleviating factors. She is never had this pain in the past.  She did contact her doctor this morning who referred her to the ER for a venous duplex of the leg. No prior history of DVT/PE. HPI    Nursing Notes were reviewed. REVIEW OF SYSTEMS    (2-9 systems for level 4, 10 or more for level 5)     Review of Systems    Please see HPI for pertinent positive and negative review of system findings. A full 10 system ROS was performed and otherwise negative.         PAST MEDICAL HISTORY     Past Medical History:   Diagnosis Date    Asthma     as child    Back pain     Bipolar 1 disorder ([facility]     Depression     Diabetes mellitus ([facility]     resolved per patient    H/O: hysterectomy 04/29/2020    Liver disease     fatty liver    Obesity     OCD (obsessive compulsive disorder)     PCOS (polycystic ovarian syndrome)     PCOS (polycystic ovarian syndrome)     Sleep apnea     cpap         SURGICAL HISTORY Past Surgical History:   Procedure Laterality Date    ANKLE SURGERY      CHOLECYSTECTOMY, LAPAROSCOPIC N/A 4/20/2020    LAPAROSCOPIC CHOLECYSTECTOMY performed by Milton Lui DO at [address]      OTHER SURGICAL HISTORY Left 12/10/14    Excision of Cyst Left Upper Posterior Ankle    OTHER SURGICAL HISTORY Right 12/29/2016    Laparotomy with Right SO    OVARY REMOVAL  12/28/2016    unsure of side    SLEEVE GASTRECTOMY N/A 11/6/2019    LAPAROSCOPIC SLEEVE GASTRECTOMY - ETHICON performed by Mary Henriquez MD at [address] 8/23/2019    EGD BIOPSY performed by Mary Henriquez MD at [address] [facility]       Previous Medications    BUSPIRONE (BUSPAR) 5 MG TABLET    Take 5 mg by mouth 3 times daily    CHOLECALCIFEROL (VITAMIN D PO)    Take by mouth daily     CLOMIPRAMINE (ANAFRANIL) 25 MG CAPSULE    Take 50 mg by mouth nightly    LEVOTHYROXINE (SYNTHROID) 75 MCG TABLET    TAKE 1 TABLET BY MOUTH EVERY DAY    LORATADINE (CLARITIN) 10 MG TABLET    Take 10 mg by mouth daily    MEDICAL MARIJUANA    Take by mouth as needed.     MIRTAZAPINE (REMERON) 15 MG TABLET    Take 15 mg by mouth nightly    MULTIPLE VITAMINS-MINERALS (BARIATRIC FUSION) CHEW    Take 4 tablets by mouth daily    OMEPRAZOLE (PRILOSEC) 40 MG DELAYED RELEASE CAPSULE    TAKE 1 CAPSULE (40MG) BY MOUTH 2 TIMES DAILY    ZIPRASIDONE (GEODON) 20 MG CAPSULE    Take 20 mg by mouth 2 times daily (with meals)       ALLERGIES     Latex, Lactose, Adhesive tape, and Keflex [cephalexin]    FAMILY HISTORY       Family History   Problem Relation Age of Onset    Asthma Mother    Paul Abdi Arthritis Mother     Lung Cancer Mother     Hypertension Father     Elevated Lipids Father     Diabetes Father     Alcohol Abuse Father     Asthma Father     Arthritis Father     Diabetes Paternal Grandfather     Arthritis Paternal Grandfather     Diabetes Maternal Grandmother     Arthritis Maternal Grandmother           SOCIAL HISTORY       Social History     Socioeconomic History    Marital status:      Spouse name: None    Number of children: None    Years of education: None    Highest education level: None   Occupational History    None   Social Needs    Financial resource strain: None    Food insecurity     Worry: None     Inability: None    Transportation needs     Medical: None     Non-medical: None   Tobacco Use    Smoking status: Never Smoker    Smokeless tobacco: Never Used   Substance and Sexual Activity    Alcohol use: Yes     Alcohol/week: 1.0 standard drinks     Types: 1 Glasses of wine per week     Comment: weekly    Drug use: Yes     Types: Marijuana    Sexual activity: Yes     Partners: Male   Lifestyle    Physical activity     Days per week: None     Minutes per session: None    Stress: None   Relationships    Social connections     Talks on phone: None     Gets together: None     Attends Buddhism service: None     Active member of club or organization: None     Attends meetings of clubs or organizations: None     Relationship status: None    Intimate partner violence     Fear of current or ex partner: None     Emotionally abused: None     Physically abused: None     Forced sexual activity: None   Other Topics Concern    None   Social History Narrative    None       SCREENINGS    [place] Coma Scale  Eye Opening: Spontaneous  Best Verbal Response: Oriented  Best Motor Response: Obeys commands  [place] Coma Scale Score: 15          PHYSICAL EXAM    (up to 7 for level 4, 8 or more for level 5)     ED Triage Vitals [02/25/21 0630]   BP Temp Temp Source Pulse Resp SpO2 Height Weight   121/73 97.8 °F (36.6 °C) Oral 86 16 100 % 5' 7\" (1.702 m) 263 lb (119.3 kg)       Physical Exam      General appearance:  Cooperative. No acute distress. Skin:  Warm. Dry. Ears, nose, mouth and throat:  Oral mucosa moist,  Perfusion:  intact.   2+ bilateral dorsalis pedis pulses. Respiratory: Respirations nonlabored. Neurological:  Alert. Moves all extremities spontaneously. Intact sensation throughout the bilateral lower extremities with normal strength. Musculoskeletal:   Normal ROM, no deformities. No erythema, warmth or edema to the lower extremities. No focal tenderness to palpation. Normal flexion, extension, internal and external rotation of the bilateral hips. Normal flexion extension of the bilateral knees and ankles. No pain with range of motion of the joints. Psychiatric:  Normal mood      DIAGNOSTIC RESULTS       Labs Reviewed   CBC WITH AUTO DIFFERENTIAL    Narrative:     Performed at:  [facility],  [place], [facility]   Phone [phone]   COMPREHENSIVE METABOLIC PANEL W/ REFLEX TO MG FOR LOW K    Narrative:     Performed at:  [facility],  [place], [facility]   Phone [phone]       Interpretation per the Radiologist below, if obtained/available at the time of this note:    VL Extremity Venous Right             All other labs/imaging were within normal range or not returned as of this dictation. EMERGENCY DEPARTMENT COURSE and DIFFERENTIAL DIAGNOSIS/MDM:   Vitals:    Vitals:    02/25/21 0630   BP: 121/73   Pulse: 86   Resp: 16   Temp: 97.8 °F (36.6 °C)   TempSrc: Oral   SpO2: 100%   Weight: 263 lb (119.3 kg)   Height: 5' 7\" (1.702 m)       Patient presents with pain posterior to the right knee. She has good strength. There is no redness, swelling or edema. Good pulses distally. Normal range of motion of all her joints. No concern for septic joint.   My overall suspicion for deep venous thrombosis was low, but given the patient's concern and at the request of her primary care physician the venous duplex was performed which was also normal.  Suspect simple musculoskeletal pain versus possible radiculopathy feel that she may be treated with Tylenol as an outpatient. MDM    CONSULTS     None    Critical Care:   None    REASSESSMENT          PROCEDURE     Unless otherwise noted below, none     Procedures      FINAL IMPRESSION      1. Pain of right lower extremity            DISPOSITION/PLAN   DISPOSITION Decision To Discharge 02/25/2021 08:21:52 AM        PATIENT REFERRED TO:  [facility] [place] [facility]  [address] [address]  [phone]    Schedule an appointment as soon as possible for a visit         DISCHARGE MEDICATIONS:  New Prescriptions    No medications on file     Controlled Substances Monitoring:     RX Monitoring 10/21/2016   Periodic Controlled Substance Monitoring No signs of potential drug abuse or diversion identified.        (Please note that portions of this note were completed with a voice recognition program.  Efforts were made to edit the dictations but occasionally words are mis-transcribed.)    Dillon Waterman MD (electronically signed)  Attending Emergency Physician            Og West MD  02/25/21 [id number]

## 2021-02-26 NOTE — TELEPHONE ENCOUNTER
Called pt to discuss diet & concerns. Pt is concerned with potential diagnosis on Hashimotos and looking for a diet plan. Reviewed foods that may increase inflammation: sugar / too many carbs / processed foods / night shade vegetables. Advised pt to focus on fresh foods, lean meats. Advised pt to continue to focus on wt loss goals. Encouraged pt to track intake with calorie goal of 1200, keeping carbs to 75g or less. Pt to track intake and monitor symptoms to help identify foods that trigger flare ups. Pt voiced understanding.

## 2021-02-26 NOTE — TELEPHONE ENCOUNTER
From: Isak Browning  To: Frankie Dance, APRN - CNP  Sent: 2/25/2021 4:47 PM EST  Subject: Visit Follow-Up Question    I found out after seeing an endocrinologist that my family dr never told me but I have hashimotos disease. ...so I've been researching like crazy kept running across thing I shouldn't eat with it to lose weight. Attached them, I wanted to talk to you about it cause I've been eating a lot of it especially fruits and veggies it says to avoid.

## 2021-03-01 NOTE — TELEPHONE ENCOUNTER
At this point , I agree with the dietary recommendations and she needs to continue f/u with her endocrinologist.    Proper diet and exercise should continue to help her with weight loss

## 2021-03-03 ENCOUNTER — PATIENT MESSAGE (OUTPATIENT)
Dept: BARIATRICS/WEIGHT MGMT | Age: 36
End: 2021-03-03

## 2021-03-03 NOTE — PROGRESS NOTES
Preoperative Screening for Elective Surgery/Invasive Procedures While COVID-19 present in the community     Have you had any of the following symptoms? No  o Fever, chills  o Cough  o Shortness of breath  o Muscle aches/pain  o Diarrhea  o Abdominal pain, nausea, vomiting  o Loss or decrease in taste and / or smell   Risk of Exposure  o Have you recently been hospitalized for COVID-19 or flu-like illness, if so when? No  o Recently diagnosed with COVID-19, if so when? No  o Recently tested for COVID-19, if so when?   o Have you been in close contact with a person or family member who currently has or recently had COVID-23? If yes, when and in what context? No  o Do you live with anybody who in the last 14 days has had fever, chills, shortness of breath, muscle aches, flu-like illness? No  o Do you have any close contacts or family members who are currently in the hospital for COVID-19 or flu-like illness? No  If yes, assess recent close contact with this person. Indicate if the patient has a positive screen by answering yes to one or more of the above questions. Patients who test positive or screen positive prior to surgery or on the day of surgery should be evaluated in conjunction with the surgeon/proceduralist/anesthesiologist to determine the urgency of the procedure.

## 2021-03-03 NOTE — TELEPHONE ENCOUNTER
From: Oumar Rivera  To: Naresh Khalil, RO[facility] - CNP  Sent: 3/3/2021 12:11 AM EST  Subject: Non-Urgent Medical Question    Results came back that I do not have hashimotos so no worries on food now, they are checking me for cushings just letting you know.  Don't think it affects my eating though palmer much about it

## 2021-03-04 DIAGNOSIS — K21.9 CHRONIC GERD: ICD-10-CM

## 2021-03-05 RX ORDER — OMEPRAZOLE 40 MG/1
CAPSULE, DELAYED RELEASE ORAL
Qty: 60 CAPSULE | Refills: 0 | Status: SHIPPED | OUTPATIENT
Start: 2021-03-05 | End: 2021-03-22

## 2021-03-10 ENCOUNTER — HOSPITAL ENCOUNTER (OUTPATIENT)
Age: 36
Setting detail: OUTPATIENT SURGERY
Discharge: HOME | End: 2021-03-10
Attending: INTERNAL MEDICINE | Admitting: INTERNAL MEDICINE
Payer: MEDICAID

## 2021-03-10 ENCOUNTER — ANESTHESIA (OUTPATIENT)
Dept: ENDOSCOPY | Age: 36
End: 2021-03-10
Payer: MEDICAID

## 2021-03-10 ENCOUNTER — ANESTHESIA EVENT (OUTPATIENT)
Dept: ENDOSCOPY | Age: 36
End: 2021-03-10
Payer: MEDICAID

## 2021-03-10 VITALS
BODY MASS INDEX: 40.16 KG/M2 | OXYGEN SATURATION: 100 % | WEIGHT: 265 LBS | HEIGHT: 68 IN | RESPIRATION RATE: 18 BRPM | DIASTOLIC BLOOD PRESSURE: 70 MMHG | HEART RATE: 60 BPM | SYSTOLIC BLOOD PRESSURE: 122 MMHG | TEMPERATURE: 99 F

## 2021-03-10 VITALS — SYSTOLIC BLOOD PRESSURE: 137 MMHG | DIASTOLIC BLOOD PRESSURE: 74 MMHG | OXYGEN SATURATION: 99 %

## 2021-03-10 DIAGNOSIS — R10.11 ABDOMINAL PAIN, RIGHT UPPER QUADRANT: ICD-10-CM

## 2021-03-10 PROCEDURE — 2580000003 HC RX 258: Performed by: INTERNAL MEDICINE

## 2021-03-10 PROCEDURE — 3700000000 HC ANESTHESIA ATTENDED CARE: Performed by: INTERNAL MEDICINE

## 2021-03-10 PROCEDURE — 7100000010 HC PHASE II RECOVERY - FIRST 15 MIN: Performed by: INTERNAL MEDICINE

## 2021-03-10 PROCEDURE — 2580000003 HC RX 258: Performed by: NURSE ANESTHETIST, CERTIFIED REGISTERED

## 2021-03-10 PROCEDURE — 2500000003 HC RX 250 WO HCPCS: Performed by: NURSE ANESTHETIST, CERTIFIED REGISTERED

## 2021-03-10 PROCEDURE — 88342 IMHCHEM/IMCYTCHM 1ST ANTB: CPT

## 2021-03-10 PROCEDURE — 3609012400 HC EGD TRANSORAL BIOPSY SINGLE/MULTIPLE: Performed by: INTERNAL MEDICINE

## 2021-03-10 PROCEDURE — 2709999900 HC NON-CHARGEABLE SUPPLY: Performed by: INTERNAL MEDICINE

## 2021-03-10 PROCEDURE — 88305 TISSUE EXAM BY PATHOLOGIST: CPT

## 2021-03-10 PROCEDURE — 6360000002 HC RX W HCPCS: Performed by: NURSE ANESTHETIST, CERTIFIED REGISTERED

## 2021-03-10 PROCEDURE — 7100000011 HC PHASE II RECOVERY - ADDTL 15 MIN: Performed by: INTERNAL MEDICINE

## 2021-03-10 PROCEDURE — 43239 EGD BIOPSY SINGLE/MULTIPLE: CPT | Performed by: INTERNAL MEDICINE

## 2021-03-10 RX ORDER — OXYCODONE HYDROCHLORIDE AND ACETAMINOPHEN 5; 325 MG/1; MG/1
1 TABLET ORAL PRN
Status: DISCONTINUED | OUTPATIENT
Start: 2021-03-10 | End: 2021-03-10 | Stop reason: HOSPADM

## 2021-03-10 RX ORDER — MORPHINE SULFATE 2 MG/ML
2 INJECTION, SOLUTION INTRAMUSCULAR; INTRAVENOUS EVERY 5 MIN PRN
Status: DISCONTINUED | OUTPATIENT
Start: 2021-03-10 | End: 2021-03-10 | Stop reason: HOSPADM

## 2021-03-10 RX ORDER — HYDRALAZINE HYDROCHLORIDE 20 MG/ML
5 INJECTION INTRAMUSCULAR; INTRAVENOUS EVERY 10 MIN PRN
Status: DISCONTINUED | OUTPATIENT
Start: 2021-03-10 | End: 2021-03-10 | Stop reason: HOSPADM

## 2021-03-10 RX ORDER — PROMETHAZINE HYDROCHLORIDE 25 MG/ML
6.25 INJECTION, SOLUTION INTRAMUSCULAR; INTRAVENOUS
Status: DISCONTINUED | OUTPATIENT
Start: 2021-03-10 | End: 2021-03-10 | Stop reason: HOSPADM

## 2021-03-10 RX ORDER — MEPERIDINE HYDROCHLORIDE 50 MG/ML
12.5 INJECTION INTRAMUSCULAR; INTRAVENOUS; SUBCUTANEOUS EVERY 5 MIN PRN
Status: DISCONTINUED | OUTPATIENT
Start: 2021-03-10 | End: 2021-03-10 | Stop reason: HOSPADM

## 2021-03-10 RX ORDER — SODIUM CHLORIDE, SODIUM LACTATE, POTASSIUM CHLORIDE, CALCIUM CHLORIDE 600; 310; 30; 20 MG/100ML; MG/100ML; MG/100ML; MG/100ML
INJECTION, SOLUTION INTRAVENOUS CONTINUOUS PRN
Status: DISCONTINUED | OUTPATIENT
Start: 2021-03-10 | End: 2021-03-10 | Stop reason: SDUPTHER

## 2021-03-10 RX ORDER — PROPOFOL 10 MG/ML
INJECTION, EMULSION INTRAVENOUS PRN
Status: DISCONTINUED | OUTPATIENT
Start: 2021-03-10 | End: 2021-03-10 | Stop reason: SDUPTHER

## 2021-03-10 RX ORDER — OXYCODONE HYDROCHLORIDE AND ACETAMINOPHEN 5; 325 MG/1; MG/1
2 TABLET ORAL PRN
Status: DISCONTINUED | OUTPATIENT
Start: 2021-03-10 | End: 2021-03-10 | Stop reason: HOSPADM

## 2021-03-10 RX ORDER — LIDOCAINE HYDROCHLORIDE 20 MG/ML
INJECTION, SOLUTION INFILTRATION; PERINEURAL PRN
Status: DISCONTINUED | OUTPATIENT
Start: 2021-03-10 | End: 2021-03-10 | Stop reason: SDUPTHER

## 2021-03-10 RX ORDER — SODIUM CHLORIDE, SODIUM LACTATE, POTASSIUM CHLORIDE, CALCIUM CHLORIDE 600; 310; 30; 20 MG/100ML; MG/100ML; MG/100ML; MG/100ML
INJECTION, SOLUTION INTRAVENOUS ONCE
Status: COMPLETED | OUTPATIENT
Start: 2021-03-10 | End: 2021-03-10

## 2021-03-10 RX ORDER — MORPHINE SULFATE 2 MG/ML
1 INJECTION, SOLUTION INTRAMUSCULAR; INTRAVENOUS EVERY 5 MIN PRN
Status: DISCONTINUED | OUTPATIENT
Start: 2021-03-10 | End: 2021-03-10 | Stop reason: HOSPADM

## 2021-03-10 RX ORDER — ONDANSETRON 2 MG/ML
4 INJECTION INTRAMUSCULAR; INTRAVENOUS PRN
Status: DISCONTINUED | OUTPATIENT
Start: 2021-03-10 | End: 2021-03-10 | Stop reason: HOSPADM

## 2021-03-10 RX ORDER — DIPHENHYDRAMINE HYDROCHLORIDE 50 MG/ML
12.5 INJECTION INTRAMUSCULAR; INTRAVENOUS
Status: DISCONTINUED | OUTPATIENT
Start: 2021-03-10 | End: 2021-03-10 | Stop reason: HOSPADM

## 2021-03-10 RX ORDER — LABETALOL HYDROCHLORIDE 5 MG/ML
5 INJECTION, SOLUTION INTRAVENOUS EVERY 10 MIN PRN
Status: DISCONTINUED | OUTPATIENT
Start: 2021-03-10 | End: 2021-03-10 | Stop reason: HOSPADM

## 2021-03-10 RX ADMIN — PROPOFOL 100 MG: 10 INJECTION, EMULSION INTRAVENOUS at 13:37

## 2021-03-10 RX ADMIN — PROPOFOL 100 MG: 10 INJECTION, EMULSION INTRAVENOUS at 13:34

## 2021-03-10 RX ADMIN — LIDOCAINE HYDROCHLORIDE 80 MG: 20 INJECTION, SOLUTION INFILTRATION; PERINEURAL at 13:34

## 2021-03-10 RX ADMIN — SODIUM CHLORIDE, SODIUM LACTATE, POTASSIUM CHLORIDE, AND CALCIUM CHLORIDE: .6; .31; .03; .02 INJECTION, SOLUTION INTRAVENOUS at 13:25

## 2021-03-10 RX ADMIN — SODIUM CHLORIDE, POTASSIUM CHLORIDE, SODIUM LACTATE AND CALCIUM CHLORIDE: 600; 310; 30; 20 INJECTION, SOLUTION INTRAVENOUS at 12:51

## 2021-03-10 ASSESSMENT — PAIN - FUNCTIONAL ASSESSMENT: PAIN_FUNCTIONAL_ASSESSMENT: 0-10

## 2021-03-10 ASSESSMENT — PAIN SCALES - GENERAL
PAINLEVEL_OUTOF10: 0

## 2021-03-10 NOTE — ANESTHESIA POSTPROCEDURE EVALUATION
02/25/2021 06:43 AM        CO2                      31                  02/25/2021 06:43 AM        BUN                      13                  02/25/2021 06:43 AM        CREATININE               0.7                 02/25/2021 06:43 AM        GLUCOSE                  94                  02/25/2021 06:43 AM   COAGS  Lab Results       Component                Value               Date/Time                  PROTIME                  13.0                11/25/2020 02:42 PM        INR                      1.12                11/25/2020 02:42 PM     Intake & Output:  @24CWUW@    Nausea & Vomiting:  No    Level of Consciousness:  Awake    Pain Assessment:  Adequate analgesia    Anesthesia Complications:  No apparent anesthetic complications    SUMMARY      Vital signs stable  OK to discharge from Stage I post anesthesia care.   Care transferred from Anesthesiology department on discharge from perioperative area

## 2021-03-10 NOTE — H&P
Chief Complaint   Patient presents with    New Patient       right quad pain, [facility] august         HPI:     Thony Burger is a 37 y.o. female who presents for right UQ pain. History of PTSD, fibromyalgia, gastric sleeve surgery 11/2019, prior cholecystectomy 4/2020. Recent ED visit for abdominal pain 2/18/2021, labs CBC, LFTs normal. LFTs have been normal multiple occasions last year and 2/18/21. Lipase was normal.  She has been having right-sided abdominal pain since August of last year, the pain is felt over the right upper and middle quadrants and radiates to the right flank and back, feels like a burning pain, occurs randomly on and off, sometimes mild and sometimes severe in intensity, sometimes she feels this is worse with food, pain can last several hours and sometimes a whole day at times. She does not have any vomiting, she has had a gastric sleeve surgery  She has 2 loose bowel movements a day and generally is not constipated  Seeing Neurology for concerns of possible neuropathy pain  CT abdomen and pelvis 2/18/21   1. Status post sleeve gastrectomy. Jerome Shannon is stable mural thickening along   the right lateral wall of the gastric antrum.  This may be accentuated by   incomplete distention.  Consider direct visualization if there is continued   clinical concern.    2. No other acute findings within the abdomen or pelvis.  Status post   cholecystectomy and hysterectomy.      PAST MEDICAL HISTORY      Past Medical History        Past Medical History:   Diagnosis Date    Asthma       as child    Back pain      Bipolar 1 disorder ([address]      Depression      Diabetes mellitus ([address]       resolved per patient    H/O: hysterectomy 04/29/2020    Liver disease       fatty liver    Obesity      OCD (obsessive compulsive disorder)      PCOS (polycystic ovarian syndrome)      PCOS (polycystic ovarian syndrome)      Sleep apnea       cpap         FAMILY HISTORY      Family History         Family History Problem Relation Age of Onset    Asthma Mother     [place] Arthritis Mother      Lung Cancer Mother      Hypertension Father      Elevated Lipids Father      Diabetes Father      Alcohol Abuse Father      Asthma Father      Arthritis Father      Diabetes Paternal Grandfather      Arthritis Paternal Grandfather      Diabetes Maternal Grandmother      Arthritis Maternal Grandmother           SOCIAL HISTORY      Social History               Socioeconomic History    Marital status:        Spouse name: Not on file    Number of children: Not on file    Years of education: Not on file    Highest education level: Not on file   Occupational History    Not on file   Social Needs    Financial resource strain: Not on file    Food insecurity       Worry: Not on file       Inability: Not on file    Transportation needs       Medical: Not on file       Non-medical: Not on file   Tobacco Use    Smoking status: Never Smoker    Smokeless tobacco: Never Used   Substance and Sexual Activity    Alcohol use: Yes       Alcohol/week: 1.0 standard drinks       Types: 1 Glasses of wine per week       Comment: weekly    Drug use:  Yes       Types: Marijuana    Sexual activity: Yes       Partners: Male   Lifestyle    Physical activity       Days per week: Not on file       Minutes per session: Not on file    Stress: Not on file   Relationships    Social connections       Talks on phone: Not on file       Gets together: Not on file       Attends Confucianism service: Not on file       Active member of club or organization: Not on file       Attends meetings of clubs or organizations: Not on file       Relationship status: Not on file    Intimate partner violence       Fear of current or ex partner: Not on file       Emotionally abused: Not on file       Physically abused: Not on file       Forced sexual activity: Not on file   Other Topics Concern    Not on file   Social History Narrative    Not on file    SURGICAL HISTORY      Past Surgical History         Past Surgical History:   Procedure Laterality Date    ANKLE SURGERY        CHOLECYSTECTOMY, LAPAROSCOPIC N/A 4/20/2020     LAPAROSCOPIC CHOLECYSTECTOMY performed by Yo Penaloza DO [facility]        OTHER SURGICAL HISTORY Left 12/10/14     Excision of Cyst Left Upper Posterior Ankle    OTHER SURGICAL HISTORY Right 12/29/2016     Laparotomy with Right SO    OVARY REMOVAL   12/28/2016     unsure of side    SLEEVE GASTRECTOMY N/A 11/6/2019     LAPAROSCOPIC SLEEVE GASTRECTOMY - ETHICON performed by Bertin Katz MD at [facility] 8/23/2019     EGD BIOPSY performed by Bertin Katz MD at [place] [address]   (This list may include medications prescribed during this encounter as epic can not insert only the list prior to this encounter.)  Current Outpatient Rx          Current Outpatient Rx   Medication Sig Dispense Refill    ondansetron (ZOFRAN) 4 MG tablet Take 1 tablet by mouth every 8 hours as needed for Nausea 20 tablet 0    dicyclomine (BENTYL) 10 MG capsule Take 1 capsule by mouth 4 times daily (before meals and nightly) 120 capsule 3    sucralfate (CARAFATE) 1 GM tablet Take 1 tablet by mouth 4 times daily 120 tablet 3    medical marijuana Take by mouth as needed.        omeprazole (PRILOSEC) 40 MG delayed release capsule TAKE 1 CAPSULE (40MG) BY MOUTH 2 TIMES DAILY 60 capsule 0    busPIRone (BUSPAR) 5 MG tablet Take 5 mg by mouth 3 times daily        loratadine (CLARITIN) 10 MG tablet Take 10 mg by mouth daily        Multiple Vitamins-Minerals (BARIATRIC FUSION) CHEW Take 4 tablets by mouth daily        levothyroxine (SYNTHROID) 75 MCG tablet TAKE 1 TABLET BY MOUTH EVERY DAY   5    Cholecalciferol (VITAMIN D PO) Take by mouth daily         ziprasidone (GEODON) 20 MG capsule Take 20 mg by mouth 2 times daily (with meals)        clomiPRAMINE (ANAFRANIL) 25 MG capsule Take 50 mg by mouth nightly             ALLERGIES            Allergies   Allergen Reactions    Latex Itching    Lactose         \"Extreme\" stomach pain and diarrhea    Adhesive Tape         Rips off skin    Keflex [Cephalexin] Nausea And Vomiting       Projectile vomitting      IMMUNIZATIONS           Immunization History   Administered Date(s) Administered    DTaP (Infanrix) 04/27/1988, 06/23/1988, 12/08/1988, 09/15/1992, 03/25/1993    Hepatitis B 02/26/2020, 03/25/2020    Hib PRP-OMP (PedvaxHIB) 09/15/1992    Influenza Vaccine, unspecified formulation 11/08/2017, 10/08/2018    Influenza Virus Vaccine 10/08/2018    Influenza, Sigmund Prier, IM, PF (6 mo and older Fluzone, Flulaval, Fluarix, and 3 yrs and older Afluria) 12/30/2016, 11/08/2017, 10/08/2018, 10/30/2019    MMR 06/29/1989, 03/04/2020    Polio OPV 04/27/1988, 06/23/1988, 09/15/1992, 12/09/1992    Td (Adult), 5 Lf Tetanus Toxoid, Pf (Tenivac, Decavac) 09/10/2003    Tdap (Boostrix, Adacel) 11/08/2017      REVIEW OF SYSTEMS      Constitutional: denies fever and unexpected weight change. HENT: Negative for ear pain, hearing loss and nosebleeds. Eyes: Negative for pain and visual disturbance. Respiratory: Negative for cough, shortness of breath and wheezing. Cardiovascular: Negative for chest pain, palpitations and leg swelling. Gastrointestinal: see HPI for details. Endocrine: Negative for polydipsia, polyphagia and polyuria. Genitourinary: Negative for difficulty urinating, dysuria, hematuria and urgency. Musculoskeletal: Positive for arthralgias and back pain. Skin: Negative for pallor and rash. Allergic/Immunologic: Negative for environmental allergies and immunocompromised state. Neurological: Negative for seizures, syncope. Hematological: Negative for adenopathy. Does not bruise/bleed easily.    Psychiatric/Behavioral: Negative for agitation, confusion, hallucinations.     PHYSICAL EXAM LMP 04/13/2020       Wt Readings from Last 3 Encounters:   02/18/21 263 lb (119.3 kg)   01/18/21 260 lb (117.9 kg)   12/20/20 260 lb (117.9 kg)     Ht 5' 7\" (1.702 m)   Wt 263 lb (119.3 kg)   LMP 04/13/2020   BMI 41.19 kg/m²     Awake, NAD  Pallor+  Coarse breath sounds bilaterally  S1S2 RRR  GI: soft, nontender, bowel sounds present, no guarding or rebound    FINAL IMPRESSION      Right-sided abdominal pain, has had a cholecystectomy last year, CT scan showed gastric wall thickening but this could perhaps be related to her gastric sleeve surgery  Would recommend an upper endoscopy to investigate the CT scan finding rule out peptic ulcer disease neoplasia or other significant upper GI pathology

## 2021-03-10 NOTE — OP NOTE
[address] ,  [address] [facility]  Phone: [phone]    EGD Procedure Note    Patient: Magali Gomez  : 1985    Procedure: Esophagogastroduodenoscopy with biopsy    Date:  3/10/2021     Endoscopist:  Chris Lezama MD    Referring Physician:  ROSE Sinclair CNP    Preoperative Diagnosis:  Abdominal pain, right upper quadrant [R10.11]    Postoperative Diagnosis:  Abdominal pain, right upper quadrant [R10.11]    Anesthesia: Anesthesia: MAC  Sedation: see anesthesia notes for details. Start Time: 13:34  Stop Time: 13:39  ASA Class: 2  Mallampati: II (soft palate, uvula, fauces visible)    Indications: This is a 37y.o. year old female who presents today with right UQ pain, abnormal CT showing gastric thickening, history of gastric sleeve surgery for weight loss, here for EGD. Procedure Details  Informed consent was obtained for the procedure, including conscious sedation. Risks of pancreatitis, infection, perforation, hemorrhage, adverse drug reaction and aspiration were discussed. The patient was placed in the left lateral decubitus position. Based on the pre-procedure assessment, including review of the patient's medical history, medications, allergies, and review of systems, she had been deemed to be an appropriate candidate for conscious sedation; she was therefore sedated with the medications listed above. She was monitored continuously with ECG tracing, pulse oximetry, blood pressure monitoring, and direct observation. A gastroscope was inserted into the mouth and advanced under direct vision to second portion of the duodenum. A careful inspection was made as the gastroscope was withdrawn, including a retroflexed view of the proximal stomach; findings and interventions are described below. Appropriate photodocumentation was obtained.   If photos taken, they were ordered to be scanned into the medical record. Findings: -The esophagus appears normal, Z line at 40 cms. Mild antral gastritis, biopsies done to rule out H pylori. Evidence of gastric sleeve surgery in the stomach. Normal duodenum to the second portion. Specimens: Was Obtained: bottle A, gastritis, gastric body and antrum, biopsies, r/o H pylori    Complications: None    Disposition:   PACU - hemodynamically stable. Estimated Blood loss:  minimal    Impression:   -The esophagus appears normal, Z line at 40 cms. Mild antral gastritis, biopsies done to rule out H pylori. Evidence of gastric sleeve surgery in the stomach. Normal duodenum to the second portion. Recommendations:  -No major pathology on EGD, biopsies pending.  -She says her right flank and UQ pain (is now more to the right flank and has been radiating to the right leg). Perhaps this is a neuropathic pain. She will be following back with her PCP.         Elio Jefferson 3/10/21 1:33 PM EST

## 2021-03-10 NOTE — ANESTHESIA PRE PROCEDURE
Department of Anesthesiology  Preprocedure Note       Name:  Lay Barton   Age:  37 y.o.  :  1985                                          MRN:  [id number]         Date:  3/10/2021      Surgeon: Martin Meléndez):  Annabella Garcia MD    Procedure: Procedure(s):  EGD WITH ANESTHESIA -SLEEP APNEA-    Medications prior to admission:   Prior to Admission medications    Medication Sig Start Date End Date Taking? Authorizing Provider   omeprazole (PRILOSEC) 40 MG delayed release capsule TAKE 1 CAPSULE (40MG) BY MOUTH 2 TIMES DAILY 3/5/21  Yes Opal Gunter MD   mirtazapine (REMERON) 15 MG tablet Take 15 mg by mouth nightly   Yes Historical Provider, MD   medical marijuana Take by mouth as needed. Yes Historical Provider, MD   busPIRone (BUSPAR) 5 MG tablet Take 5 mg by mouth 3 times daily   Yes Historical Provider, MD   loratadine (CLARITIN) 10 MG tablet Take 10 mg by mouth daily 20  Yes Historical Provider, MD   Multiple Vitamins-Minerals (BARIATRIC FUSION) CHEW Take 4 tablets by mouth daily   Yes Historical Provider, MD   levothyroxine (SYNTHROID) 75 MCG tablet TAKE 1 TABLET BY MOUTH EVERY DAY 19  Yes Historical Provider, MD   Cholecalciferol (VITAMIN D PO) Take by mouth daily    Yes Historical Provider, MD   ziprasidone (GEODON) 20 MG capsule Take 20 mg by mouth 2 times daily (with meals)    Historical Provider, MD   clomiPRAMINE (ANAFRANIL) 25 MG capsule Take 50 mg by mouth nightly    Historical Provider, MD       Current medications:    No current facility-administered medications for this encounter. Allergies:     Allergies   Allergen Reactions    Latex Itching    Lactose      \"Extreme\" stomach pain and diarrhea    Adhesive Tape      Rips off skin    Keflex [Cephalexin] Nausea And Vomiting     Projectile vomitting       Problem List:    Patient Active Problem List   Diagnosis Code    Pelvic mass R19.00    Biceps tendonitis on right M75.21    Right shoulder strain D03.775E    Diabetes mellitus type 2 in obese (HCC) E11.69, E66.9    Chronic GERD K21.9    Moderate obstructive sleep apnea G47.33    Scalp cyst L72.9    Ventral hernia without obstruction or gangrene K43.9    S/P laparoscopic sleeve gastrectomy Z98.84    Morbid obesity with BMI of 40.0-44.9, adult (HCC) E66.01, Z68.41    RUQ pain R10.11    Acute cholecystitis due to biliary calculus K80.00    Abnormal CT scan, stomach R93.3       Past Medical History:        Diagnosis Date    Asthma     as child    Back pain     [address] disorder [address]     Depression     Diabetes mellitus ([facility]     Liver disease     fatty liver    Obesity     OCD (obsessive compulsive disorder)     PCOS (polycystic ovarian syndrome)     PCOS (polycystic ovarian syndrome)     Sleep apnea     cpap       Past Surgical History:        Procedure Laterality Date    CHOLECYSTECTOMY, LAPAROSCOPIC N/A 4/20/2020    LAPAROSCOPIC CHOLECYSTECTOMY performed by Sharon Vega DO at [address] OTHER SURGICAL HISTORY Left 12/10/14    Excision of Cyst Left Upper Posterior Ankle    OTHER SURGICAL HISTORY Right 12/29/2016    Laparotomy with Right SO    OVARY REMOVAL  12/28/2016    unsure of side    SLEEVE GASTRECTOMY N/A 11/6/2019    LAPAROSCOPIC SLEEVE GASTRECTOMY - ETHICON performed by Tracy Arriaga MD at [facility] N/A 8/23/2019    EGD BIOPSY performed by Tracy Arriaga MD at [address] [place] [address]         Social History:    Social History     Tobacco Use    Smoking status: Never Smoker    Smokeless tobacco: Never Used   Substance Use Topics    Alcohol use: Yes     Comment: 1 drink per week                                Counseling given: Not Answered      Vital Signs (Current):   Vitals:    03/03/21 1140 03/10/21 1242   BP:  103/64   Pulse:  75   Resp:  18   Temp:  99 °F (37.2 °C)   TempSrc:  Temporal   SpO2:  97%   Weight: 263 lb (119.3 kg) 265 lb (120.2 kg)   Height: 5' 7\" (1.702 m) 5' 7.5\" (1.715 m)                                              BP Readings from Last 3 Encounters:   03/10/21 103/64   02/25/21 115/66   02/18/21 98/60       NPO Status: Time of last liquid consumption: 2300                        Time of last solid consumption: 2300                        Date of last liquid consumption: 03/09/21                        Date of last solid food consumption: 03/09/21    BMI:   Wt Readings from Last 3 Encounters:   03/10/21 265 lb (120.2 kg)   02/25/21 263 lb (119.3 kg)   02/18/21 263 lb (119.3 kg)     Body mass index is 40.89 kg/m². CBC:   Lab Results   Component Value Date    WBC 4.9 02/25/2021    RBC 4.59 02/25/2021    HGB 15.0 02/25/2021    HCT 42.8 02/25/2021    MCV 93.3 02/25/2021    RDW 13.8 02/25/2021     02/25/2021       CMP:   Lab Results   Component Value Date     02/25/2021    K 3.7 02/25/2021     02/25/2021    CO2 31 02/25/2021    BUN 13 02/25/2021    CREATININE 0.7 02/25/2021    GFRAA >60 02/25/2021    GFRAA >60 04/07/2011    AGRATIO 1.5 02/25/2021    LABGLOM >60 02/25/2021    GLUCOSE 94 02/25/2021    PROT 7.6 02/25/2021    PROT 7.1 04/07/2011    CALCIUM 9.7 02/25/2021    BILITOT 0.5 02/25/2021    ALKPHOS 61 02/25/2021    AST 16 02/25/2021    ALT 18 02/25/2021       POC Tests: No results for input(s): POCGLU, POCNA, POCK, POCCL, POCBUN, POCHEMO, POCHCT in the last 72 hours.     Coags:   Lab Results   Component Value Date    PROTIME 13.0 11/25/2020    INR 1.12 11/25/2020       HCG (If Applicable):   Lab Results   Component Value Date    PREGTESTUR Negative 04/19/2020        ABGs: No results found for: PHART, PO2ART, QNJ0YEF, QSB0FJO, BEART, M4FNRVMZ     Type & Screen (If Applicable):  No results found for: LABABO, LABRH    Drug/Infectious Status (If Applicable):  No results found for: HIV, HEPCAB    COVID-19 Screening (If Applicable):   Lab Results   Component Value Date    COVID19 Not Detected 09/30/2020         Anesthesia Evaluation Patient summary reviewed no history of anesthetic complications:   Airway: Mallampati: I  TM distance: >3 FB   Neck ROM: full  Mouth opening: > = 3 FB Dental: normal exam         Pulmonary:Negative Pulmonary ROS and normal exam  breath sounds clear to auscultation  (+) sleep apnea:                             Cardiovascular:Negative CV ROS  Exercise tolerance: good (>4 METS),           Rhythm: regular  Rate: normal                    Neuro/Psych:   Negative Neuro/Psych ROS  (+) psychiatric history:            GI/Hepatic/Renal: Neg GI/Hepatic/Renal ROS  (+) GERD: well controlled, liver disease:, morbid obesity          Endo/Other: Negative Endo/Other ROS   (+) Diabetes, . Abdominal:           Vascular: negative vascular ROS. Pre-Operative Diagnosis: Abdominal pain, right upper quadrant [R10.11]    37 y.o.   BMI:  Body mass index is 40.89 kg/m².      Vitals:    03/03/21 1140 03/10/21 1242   BP:  103/64   Pulse:  75   Resp:  18   Temp:  99 °F (37.2 °C)   TempSrc:  Temporal   SpO2:  97%   Weight: 263 lb (119.3 kg) 265 lb (120.2 kg)   Height: 5' 7\" (1.702 m) 5' 7.5\" (1.715 m)       Allergies   Allergen Reactions    Latex Itching    Lactose      \"Extreme\" stomach pain and diarrhea    Adhesive Tape      Rips off skin    Keflex [Cephalexin] Nausea And Vomiting     Projectile vomitting       Social History     Tobacco Use    Smoking status: Never Smoker    Smokeless tobacco: Never Used   Substance Use Topics    Alcohol use: Yes     Comment: 1 drink per week       LABS:    CBC  Lab Results   Component Value Date/Time    WBC 4.9 02/25/2021 06:43 AM    HGB 15.0 02/25/2021 06:43 AM    HCT 42.8 02/25/2021 06:43 AM     02/25/2021 06:43 AM     RENAL  Lab Results   Component Value Date/Time     02/25/2021 06:43 AM    K 3.7 02/25/2021 06:43 AM     02/25/2021 06:43 AM    CO2 31 02/25/2021 06:43 AM    BUN 13 02/25/2021 06:43 AM    CREATININE 0.7 02/25/2021 06:43 AM    GLUCOSE 94 02/25/2021 06:43 AM     COAGS  Lab Results   Component Value Date/Time    PROTIME 13.0 11/25/2020 02:42 PM    INR 1.12 11/25/2020 02:42 PM     4/11/20 EKG  Normal sinus rhythmNormal ECGNo previous ECGs availableConfirmed by Oralia Do ([id number]) on 4/13/2020 7:34:35 AM       Anesthesia Plan      general     ASA 3     (I discussed with the patient the risks and benefits of PIV, anesthesia, IV Narcotics, PACU. All questions were answered the patient agrees with the plan and wishes to proceed)  Induction: intravenous.                           Miryam Armendariz MD   3/10/2021

## 2021-03-10 NOTE — PROGRESS NOTES
Dr. Bridgette Covington at bedside to discuss procedure with patient and called  to update on phone.

## 2021-03-17 DIAGNOSIS — R10.11 ABDOMINAL PAIN, RIGHT UPPER QUADRANT: ICD-10-CM

## 2021-03-17 DIAGNOSIS — R10.11 RUQ PAIN: Primary | ICD-10-CM

## 2021-03-18 DIAGNOSIS — K21.9 CHRONIC GERD: ICD-10-CM

## 2021-03-22 RX ORDER — OMEPRAZOLE 40 MG/1
CAPSULE, DELAYED RELEASE ORAL
Qty: 60 CAPSULE | Refills: 0 | Status: SHIPPED | OUTPATIENT
Start: 2021-03-22 | End: 2021-04-05

## 2021-03-25 ENCOUNTER — HOSPITAL ENCOUNTER (OUTPATIENT)
Dept: MRI IMAGING | Age: 36
Discharge: HOME | End: 2021-03-25
Payer: MEDICAID

## 2021-03-25 DIAGNOSIS — R10.11 RUQ PAIN: ICD-10-CM

## 2021-03-25 PROCEDURE — 74181 MRI ABDOMEN W/O CONTRAST: CPT

## 2021-03-26 RX ORDER — SUCRALFATE 1 G/1
1 TABLET ORAL 3 TIMES DAILY
Qty: 90 TABLET | Refills: 3 | Status: SHIPPED | OUTPATIENT
Start: 2021-03-26 | End: 2021-05-11 | Stop reason: ALTCHOICE

## 2021-04-01 ENCOUNTER — PATIENT MESSAGE (OUTPATIENT)
Dept: GASTROENTEROLOGY | Age: 36
End: 2021-04-01

## 2021-04-01 NOTE — TELEPHONE ENCOUNTER
Spoke with pt re: recommendations  Patient states that when she has a bm, her vagina hurts. Also, when she sits down, her vagina hurts. Patient has already contacted her PCP for advice, patient awaiting their advice. Also, recommended patient to call her GYN to address.

## 2021-04-01 NOTE — TELEPHONE ENCOUNTER
From: Andreas Horner  To: Sumit Wetzel MD  Sent: 4/1/2021 11:19 AM EDT  Subject: Non-Urgent Medical Question    Would it be your office id talk to about bad pain during and after I poop? Sometimes after pee too but not during.  Not sure what type of specialist would talk to me about that

## 2021-04-05 DIAGNOSIS — K21.9 CHRONIC GERD: ICD-10-CM

## 2021-04-05 RX ORDER — OMEPRAZOLE 40 MG/1
CAPSULE, DELAYED RELEASE ORAL
Qty: 60 CAPSULE | Refills: 0 | Status: SHIPPED | OUTPATIENT
Start: 2021-04-05 | End: 2021-05-03

## 2021-04-22 ENCOUNTER — OFFICE VISIT (OUTPATIENT)
Dept: ORTHOPEDIC SURGERY | Age: 36
End: 2021-04-22
Payer: MEDICAID

## 2021-04-22 VITALS — HEIGHT: 68 IN | WEIGHT: 265 LBS | BODY MASS INDEX: 40.16 KG/M2 | RESPIRATION RATE: 12 BRPM

## 2021-04-22 DIAGNOSIS — M25.562 CHRONIC PAIN OF LEFT KNEE: ICD-10-CM

## 2021-04-22 DIAGNOSIS — G89.29 CHRONIC PAIN OF LEFT KNEE: ICD-10-CM

## 2021-04-22 DIAGNOSIS — M23.92 PATELLAR MALALIGNMENT SYNDROME OF LEFT KNEE: Primary | ICD-10-CM

## 2021-04-22 PROCEDURE — 1036F TOBACCO NON-USER: CPT | Performed by: ORTHOPAEDIC SURGERY

## 2021-04-22 PROCEDURE — 20610 DRAIN/INJ JOINT/BURSA W/O US: CPT | Performed by: ORTHOPAEDIC SURGERY

## 2021-04-22 PROCEDURE — G8427 DOCREV CUR MEDS BY ELIG CLIN: HCPCS | Performed by: ORTHOPAEDIC SURGERY

## 2021-04-22 PROCEDURE — 99204 OFFICE O/P NEW MOD 45 MIN: CPT | Performed by: ORTHOPAEDIC SURGERY

## 2021-04-22 PROCEDURE — G8417 CALC BMI ABV UP PARAM F/U: HCPCS | Performed by: ORTHOPAEDIC SURGERY

## 2021-04-22 RX ORDER — METHYLPREDNISOLONE ACETATE 40 MG/ML
40 INJECTION, SUSPENSION INTRA-ARTICULAR; INTRALESIONAL; INTRAMUSCULAR; SOFT TISSUE ONCE
Status: COMPLETED | OUTPATIENT
Start: 2021-04-22 | End: 2021-04-22

## 2021-04-22 RX ORDER — DULOXETIN HYDROCHLORIDE 30 MG/1
CAPSULE, DELAYED RELEASE ORAL
COMMUNITY
Start: 2021-04-02 | End: 2021-08-31 | Stop reason: ALTCHOICE

## 2021-04-22 RX ADMIN — METHYLPREDNISOLONE ACETATE 40 MG: 40 INJECTION, SUSPENSION INTRA-ARTICULAR; INTRALESIONAL; INTRAMUSCULAR; SOFT TISSUE at 11:51

## 2021-04-22 NOTE — PROGRESS NOTES
Chief Complaint  Knee Pain (NP, Left knee pain. Notes having pain many years, dating back to her senior year of HS. Recent flare up of pain over the past 1-2 months. No recent tx. No hx of left knee sx.  )      History of Present Illness:  Denis Corbett is a pleasant 37 y.o. female having anterior knee pain for the past 1 to 2 months. This has been dating back to many years since senior high school. Of note she underwent gastric sleeve surgery and lost about 130 pounds in the past couple years. She has tried to remain active and has a [employer] setup at home has been doing some crunches and squats and weight training. In the past week or 2 it seems to have aggravated some anterior knee pain. Its worse with prolonged sitting climbing stairs and sometimes prolonged walking. Its feeling like a pinching disorder behind her kneecap. She has not tried much for this besides an icy hot cream.  No medicines. Medical History:  Patient's medications, allergies, past medical, surgical, social and family histories were reviewed and updated as appropriate. Pain Assessment  Location of Pain: Knee  Location Modifiers: Left  Severity of Pain: 1  Quality of Pain: Dull, Aching, Throbbing, Buckling  Duration of Pain: Persistent  Frequency of Pain: Intermittent  Aggravating Factors: Stairs, Bending  Limiting Behavior: Yes  Relieving Factors: Rest  Result of Injury: No  Work-Related Injury: No  Are there other pain locations you wish to document?: No  ROS: Review of systems reviewed from Patient History Form completed today and available in the patient's chart under the Media tab. Pertinent items are noted in HPI  Review of systems reviewed from Patient History Form completed today and available in the patient's chart under the Media tab.        Vital Signs:  Resp 12   Ht 5' 7.5\" (1.715 m)   Wt 265 lb (120.2 kg)   LMP 04/13/2020   BMI 40.89 kg/m²         Neuro: Alert & oriented x 3,  normal,  no focal deficits noted. Normal affect. Eyes: sclera clear  Ears: Normal external ear  Mouth:  No perioral lesions  Pulm: Respirations unlabored and regular  Pulse: Extremities well perfused. 2+ peripheral pulses. Skin: Warm. No ulcerations. Constitutional: The physical examination finds the patient to be well-developed and well-nourished. The patient is alert and oriented x3 and was cooperative throughout the visit. left knee exam    Gait: No use of assistive devices. No antalgic gait. Alignment: normal alignment. Inspection/skin: Skin is intact without erythema or ecchymosis. No gross deformity. Palpation: mild to moderate crepitus. no joint line tenderness present. lateral patellar facet tenderness    Range of Motion: There is full range of motion. Strength: Normal quadriceps development. Effusion: No effusion or swelling present. Ligamentous stability: No cruciate or collateral ligament instability. Neurologic and vascular: Skin is warm and well-perfused. Sensation is intact to light-touch. Special tests: Negative Vincent sign. Radiology:       Pertinent imaging reviewed, images only - no report available. Radiographs were obtained and reviewed in the office; 4 views: bilateral PA, bilateral Richard, bilateral Merchants AND LEFT lateral    Impression: no acute findings, no major joint space narrowing. Assessment: Patient is a 37 y.o. 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 Impression:  Visit Diagnoses       Codes    Chronic pain of left knee    -  Primary M25.562, G89.29          Office Procedures:  No orders of the defined types were placed in this encounter.     Orders Placed This Encounter   Procedures    XR KNEE LEFT (MIN 4 VIEWS)     Standing Status:   Future     Number of Occurrences:   1     Standing Expiration Date:   4/22/2022    XR KNEE RIGHT (3 VIEWS)     Standing Status:   Future     Number of Occurrences:   1 dictation was performed with a verbal recognition program (DRAGON) and it was checked for errors. It is possible that there are still dictated errors within this office note. If so, please bring any errors to my attention for an addendum. All efforts were made to ensure that this office note is accurate.

## 2021-04-22 NOTE — PROGRESS NOTES
4/22/21  11:36 AM        NDC: 6947-4073-43   -   Ropivacaine 0.5 %    LOT: [id number]    COMMENT: LEFT KNEE CORTISONE INJECTION

## 2021-04-22 NOTE — LETTER
PRESCRIPTION FOR PHYSICAL THERAPY      Patient Name: Janie Mccracken MRN: [id number]  DOS: 4/22/2021     Diagnosis:   1. Patellar malalignment syndrome of left knee    2. Chronic pain of left knee                                                     Goal:  [x]Decrease Pain and/or Swelling [x]Increase ROM and/or Flexibility     [x]Increase Function   [x]Increase Strength and/or Endurance   []Other     Evaluation:  [x]Evaluation and Treatment []KT-1000 []Isokinetic Exam []Preoperative Eval    Recommended Modalities:  [x]Modalities of Choice      []HCVS            []Electrical Stimulation     [] Remove Dressing  []Ultrasound        []TENS/TNS    [] Lumbar Traction           [] Cervical Traction  []Phonophoresis         []Hot Pack/Cold Pack   []PT Treatment, Unlisted []Other:    Therapeutic Exercises:    []Isometrics    [x]Range of Motion []Progressive Exer. [x]Balance Coordination   []Flexibility  []ROM Limited  []Total Hip Replacement   []Passive  []ROM Full   []Total Knee Replacement   []Active Assisted    []Shoulder Impingement Prog  []Active   []Tennis Elbow Program   []Capsular Shift Regular        []Isokinetics                     []Spine Program   [x]Straight Leg Raises  [] Gait  []Fixation    [] Supine    [] Running    [] Extension    [] Prone   [] Throwing   [] Stabilization   [] AB    [] Swiss Christen Blank    [] AD      [] Spine Eval   [] Cervical Eval  [] Conditioning   [] Lumbar    [] Stationary Bike   [] Lumbar Exer.   [] Stairmaster   [] Functional Cap   [] Sanders Track   [] Return to work   [] Treadmill  [x]Other :     [] Aquatic Prog.     QUADRICEPS AND VMO STRENGTHENING EXERCISES     Treatment Program:  Frequency: [] 1x  [x] 2x  [] 3x  [] 4x  [] 5x week  Duration: [x] 1  [] 2  [] 3  [] 4  [] 5 month   Weight Bearing: [] Non  [] 1/4  [] 1/2  [] 3/4  [] Full  ROM: [] Restricted  [] Full  [] Follow established:        [] Other:

## 2021-04-28 ENCOUNTER — HOSPITAL ENCOUNTER (OUTPATIENT)
Dept: PHYSICAL THERAPY | Age: 36
Setting detail: THERAPIES SERIES
Discharge: HOME | End: 2021-04-28
Payer: MEDICAID

## 2021-04-28 PROCEDURE — 97140 MANUAL THERAPY 1/> REGIONS: CPT | Performed by: SPECIALIST

## 2021-04-28 PROCEDURE — 97016 VASOPNEUMATIC DEVICE THERAPY: CPT | Performed by: SPECIALIST

## 2021-04-28 PROCEDURE — 97110 THERAPEUTIC EXERCISES: CPT | Performed by: SPECIALIST

## 2021-04-28 PROCEDURE — 97161 PT EVAL LOW COMPLEX 20 MIN: CPT | Performed by: SPECIALIST

## 2021-04-28 NOTE — FLOWSHEET NOTE
[address] [place] [address] and [facility], [address] [place] [address] [address]  Phone: [phone]   Fax:     [phone]      Physical Therapy Treatment Note/ Progress Report:     Date:  2021    Patient Name:  Reggie Cox    :  1985  MRN: [id number]  Restrictions/Precautions:    Medical/Treatment Diagnosis Information:      M25.562, G89.29 (ICD-10-CM) - Chronic pain of left knee   M23.92 (ICD-10-CM) - Patellar malalignment syndrome of left knee     ·   ·    Insurance/Certification information:   Alvaro Miguel  Physician Information:   Dr Vera Otoole  Has the plan of care been signed (Y/N):        []  Yes  [x]  No     Date of Patient follow up with Physician: NS    Is this a Progress Report:     []  Yes  [x]  No      If Yes:  Date Range for reporting period:  Beginnin21 ------------ Endin21    Progress report will be due (10 Rx or 30 days whichever is less):        Recertification will be due (POC Duration  / 90 days whichever is less): 21        Visit # Insurance Allowable Auth Required   In Person 1 30 []  Yes     []  No    Tele Health 0  []  Yes     []  No    Total 1       Functional Scale: LEFS 61%   Date assessed:  21      Latex Allergy:  [x]NO      []YES  Preferred Language for Healthcare:   [x]English       []other:    Pain level:  4-10/10     SUBJECTIVE:  See eval    OBJECTIVE: See eval   Observation:    Test measurements:      RESTRICTIONS/PRECAUTIONS: none    Exercises/Interventions:   Therapeutic Ex (10434) Sets/sec Reps Notes/CUES HEP   Heel slides  10x  x   Piriformis stretch 30 2x  x   Piriformis cross over 30 2x  x   SLR 5 10x  x                                                           Manual Intervention (20631)       Pat mob/ knee ROM  8 min                                        NMR re-education (82146)   CUES NEEDED    Stand calf stretch 30 2x  x   HR/TR  15x  x   Wall squat  10x  x   PB supine clamshells GR 10x  x   bridges  10x  x                               Therapeutic Activity (84263)                     Vaso L knee  10 min                   Medbridge access code: [email or web address]             Therapeutic Exercise and NMR EXR  [x] (85142) Provided verbal/tactile cueing for activities related to strengthening, flexibility, endurance, ROM for improvements in LE, proximal hip, and core control with self care, mobility, lifting, ambulation. [x] (75452) Provided verbal/tactile cueing for activities related to improving balance, coordination, kinesthetic sense, posture, motor skill, proprioception to assist with LE, proximal hip, and core control in self-care, mobility, lifting, ambulation and eccentric single leg control.      NMR and Therapeutic Activities:    [x] (04698 or 43031) Provided verbal/tactile cueing for activities related to improving balance, coordination, kinesthetic sense, posture, motor skill, proprioception and motor activation to allow for proper function of core, proximal hip and LE with self-care and ADLs and functional mobility.   [] (19182) Gait Re-education- Provided training and instruction to the patient for proper LE, core and proximal hip recruitment and positioning and eccentric body weight control with ambulation re-education including up and down stairs     Home Exercise Program:    [x] (71602) Reviewed/Progressed HEP activities related to strengthening, flexibility, endurance, ROM of core, proximal hip and LE for functional self-care, mobility, lifting and ambulation/stair navigation   [] (36115) Reviewed/Progressed HEP activities related to improving balance, coordination, kinesthetic sense, posture, motor skill, proprioception of core, proximal hip and LE for self-care, mobility, lifting, and ambulation/stair navigation      Manual Treatments:  PROM / STM / Oscillations-Mobs:  G-I, II, III, IV (PA's, Inf., Post.)  [x] (60980) Provided manual therapy to mobilize LE, proximal hip and/or LS spine soft tissue/joints for the purpose of modulating pain, promoting relaxation, increasing ROM, reducing/eliminating soft tissue swelling/inflammation/restriction, improving soft tissue extensibility and allowing for proper ROM for normal function with self-care, mobility, lifting and ambulation. Modalities:     [x] GAME READY (VASO)- for significant edema, swelling, pain control. Charges:  Timed Code Treatment Minutes: 25   Total Treatment Minutes:  55   BWC:  TE TIME:  NMR TIME:  MANUAL TIME:  UNTIMED MINUTES:  Medicare Total:         [x] EVAL (LOW) 06261 (typically 20 minutes face-to-face)  [] EVAL (MOD) 77588 (typically 30 minutes face-to-face)  [] EVAL (HIGH) 52920 (typically 45 minutes face-to-face)  [] RE-EVAL     [x] GV(81254) x  1   [] IONTO  [] NMR (63330) x     [x] VASO  [x] Manual (97933) x 1    [] Other:  [] TA x      [] Mech Traction (50211)  [] ES(attended) (10161)      [] ES (un) (53116):    ASSESSMENT:  See eval    GOALS:      Patient stated goal: walk/ stairs     Therapist goals for Patient:   Short Term Goals: To be achieved in: 2 weeks  1. Independent in HEP and progression per patient tolerance, in order to prevent re-injury. [x] Progressing: [] Met: [] Not Met: [] Adjusted     2. Patient will have a decrease in pain to facilitate improvement in movement, function, and ADLs as indicated by Functional Deficits. [x] Progressing: [] Met: [] Not Met: [] Adjusted     Long Term Goals: To be achieved in: 6-8 weeks  1. Disability index score of 30% or less for the LEFS to assist with reaching prior level of function. [x] Progressing: [] Met: [] Not Met: [] Adjusted     2. Patient will demonstrate increased AROM to [facility] to allow for proper joint functioning as indicated by patients Functional Deficits. [x] Progressing: [] Met: [] Not Met: [] Adjusted     3.  Patient will demonstrate an increase in Strength to good proximal hip strength and control, within 5lb HHD in LE to allow for proper functional mobility as indicated by patients Functional Deficits. [x] Progressing: [] Met: [] Not Met: [] Adjusted     4. Patient will return to [facility] for  functional activities without increased symptoms or restriction. [x] Progressing: [] Met: [] Not Met: [] Adjusted     5. Stairs/ walk with min to no limitations (patient specific functional goal)    [x] Progressing: [] Met: [] Not Met: [] Adjusted              Overall Progression Towards Functional goals/ Treatment Progress Update:  [] Patient is progressing as expected towards functional goals listed. [] Progression is slowed due to complexities/Impairments listed. [] Progression has been slowed due to co-morbidities.   [x] Plan just implemented, too soon to assess goals progression <30days   [] Goals require adjustment due to lack of progress  [] Patient is not progressing as expected and requires additional follow up with physician  [] Other    Prognosis for POC: [x] Good [] Fair  [] Poor      Patient requires continued skilled intervention: [x] Yes  [] No    Treatment/Activity Tolerance:  [x] Patient able to complete treatment  [] Patient limited by fatigue  [] Patient limited by pain    [] Patient limited by other medical complications  [] Other:     Return to Play: (if applicable)   [x]  Stage 1: Intro to Strength   []  Stage 2: Return to Run and Strength   []  Stage 3: Return to Jump and Strength   []  Stage 4: Dynamic Strength and Agility   []  Stage 5: Sport Specific Training     []  Ready to Return to Play, Meets All Above Stages   []  Not Ready for Return to Sports   Comments:                          PLAN: See eval  [] Continue per plan of care [] Alter current plan (see comments above)  [x] Plan of care initiated [] Hold pending MD visit [] Discharge    Electronically signed by:  Jewell Jaramillo PT    Note: If patient does not return for scheduled/ recommended follow up visits, this note will serve as a discharge from care along with most recent update on progress.

## 2021-04-28 NOTE — PLAN OF CARE
[facility] [place] [address] [facility] Ramy Cole, [address] [facility] [address]  Phone: [phone]   Fax:     [phone]       [place] Blood    Dear  Dr Abdirahman Banegas,    We had the pleasure of evaluating the following patient for physical therapy services at [address] [place] [address]. A summary of our findings can be found in the initial assessment below. This includes our plan of care. If you have any questions or concerns regarding these findings, please do not hesitate to contact me at the office phone number checked above. Thank you for the referral.       Physician Signature:_______________________________Date:__________________  By signing above (or electronic signature), therapists plan is approved by physician      Patient: oRla Serrano   : 1985   MRN: [id number]  Referring Physician:        Evaluation Date: 2021      Medical Diagnosis Information:      M25.562, G89.29 (ICD-10-CM) - Chronic pain of left knee   M23.92 (ICD-10-CM) - Patellar malalignment syndrome of left knee                                                  Insurance information:  Maria E Jeong     Precautions/ Contra-indications:  none      C-SSRS Triggered by Intake questionnaire (Past 2 wk assessment):   [x] No, Questionnaire did not trigger screening.   [] Yes, Patient intake triggered further evaluation      [] C-SSRS Screening completed  [] PCP notified via Plan of Care  [] Emergency services notified     Latex Allergy:  [x]NO      []YES  Preferred Language for Healthcare:   [x]English       []other:    SUBJECTIVE: Patient states hit knee on sink about 13 years ago without treatment. Cortizone shot which has helped recently. Currently doing treadmill  And total gym at home.     Relevant Medical History: DM, anxiety depression  Functional Disability Index:     Pain Scale: 4-10/10  Easing factors: rest  Provocative factors: stairs walk     Type: []Constant   []Intermittent  []Radiating []Localized []other:     Numbness/Tingling: B LE at night     Occupation/School: student college     Living Status/Prior Level of Function: Independent with ADLs and IADLs,     OBJECTIVE:     ROM LEFT RIGHT   HIP Flex     HIP Abd     HIP Ext     HIP IR     HIP ER     Knee ext     Knee Flex 125 126   Ankle PF     Ankle DF     Ankle In     Ankle Ev     Strength  LEFT RIGHT   HIP Flexors 5- 5-   HIP Abductors     HIP Ext     Hip ER     Knee EXT (quad) 5- 5-   Knee Flex (HS) 5 5   Ankle DF     Ankle PF     Ankle Inv     Ankle EV          Circumference  Mid apex  7 cm prox             Reflexes/Sensation:    [x]Dermatomes/Myotomes intact    [x]Reflexes equal and normal bilaterally   []Other:    Joint mobility:    [x]Normal    []Hypo   []Hyper    Palpation: Tenderness L medial knee and superior/inferior patella     Functional Mobility/Transfers: WFL    Posture: WFL    Bandages/Dressings/Incisions: intact    Gait: (include devices/WB status) antalgic    Orthopedic Special Tests: grind + left  McMurrays + left                       [x] Patient history, allergies, meds reviewed. Medical chart reviewed. See intake form. Review Of Systems (ROS):  [x]Performed Review of systems (Integumentary, CardioPulmonary, Neurological) by intake and observation. Intake form has been scanned into medical record. Patient has been instructed to contact their primary care physician regarding ROS issues if not already being addressed at this time.       Co-morbidities/Complexities (which will affect course of rehabilitation):   []None           Arthritic conditions   []Rheumatoid arthritis (M05.9)  []Osteoarthritis (M19.91)   Cardiovascular conditions   []Hypertension (I10)  []Hyperlipidemia (E78.5)  []Angina pectoris (I20)  []Atherosclerosis (I70)   Musculoskeletal conditions   []Disc pathology   []Congenital spine pathologies   []Prior surgical intervention  []Osteoporosis (M81.8)  []Osteopenia (M85.8)   Endocrine conditions   []Hypothyroid (E03.9)  []Hyperthyroid Gastrointestinal conditions   []Constipation (E89.02)   Metabolic conditions   []Morbid obesity (E66.01)  [x]Diabetes type 1(E10.65) or 2 (E11.65)   []Neuropathy (G60.9)     Pulmonary conditions   []Asthma (J45)  []Coughing   []COPD (J44.9)   Psychological Disorders  [x]Anxiety (F41.9)  [x]Depression (F32.9)   []Other:   []Other:          Barriers to/and or personal factors that will affect rehab potential:              []Age  []Sex              []Motivation/Lack of Motivation                        [x]Co-Morbidities              []Cognitive Function, education/learning barriers              []Environmental, home barriers              []profession/work barriers  []past PT/medical experience  []other:  Justification:     Falls Risk Assessment (30 days):   [x] Falls Risk assessed and no intervention required.   [] Falls Risk assessed and Patient requires intervention due to being higher risk   TUG score (>12s at risk):     [] Falls education provided, including       G-Codes:       ASSESSMENT:   Functional Impairments:     []Noted lumbar/proximal hip/LE joint hypomobility   [x]Decreased LE functional ROM   [x]Decreased core/proximal hip strength and neuromuscular control   [x]Decreased LE functional strength   [x]Reduced balance/proprioceptive control   []other:      Functional Activity Limitations (from functional questionnaire and intake)   [x]Reduced ability to tolerate prolonged functional positions   [x]Reduced ability or difficulty with changes of positions or transfers between positions   []Reduced ability to maintain good posture and demonstrate good body mechanics with sitting, bending, and lifting   [x]Reduced ability to sleep   [] Reduced ability or tolerance with driving and/or computer work   [x]Reduced ability to perform lifting, carrying tasks   [x]Reduced ability to squat   []Reduced ability to forward bend   [x]Reduced ability to ambulate prolonged functional periods/distances/surfaces   [x]Reduced ability to ascend/descend stairs   [x]Reduced ability to run, hop, cut or jump   []other:    Participation Restrictions   [x]Reduced participation in self care activities   [x]Reduced participation in home management activities   [x]Reduced participation in work activities   []Reduced participation in social activities. []Reduced participation in sport/recreation activities. Classification :    []Signs/symptoms consistent with post-surgical status including decreased ROM, strength and function.    []Signs/symptoms consistent with joint sprain/strain   [x]Signs/symptoms consistent with patella-femoral syndrome   []Signs/symptoms consistent with knee OA/hip OA   []Signs/symptoms consistent with internal derangement of knee/Hip   []Signs/symptoms consistent with functional hip weakness/NMR control      []Signs/symptoms consistent with tendinitis/tendinosis    []signs/symptoms consistent with pathology which may benefit from Dry needling      []other:      Prognosis/Rehab Potential:      []Excellent   [x]Good    []Fair   []Poor    Tolerance of evaluation/treatment:    []Excellent   [x]Good    []Fair   []Poor    Physical Therapy Evaluation Complexity Justification  [x] A history of present problem with:  [] no personal factors and/or comorbidities that impact the plan of care;  [x]1-2 personal factors and/or comorbidities that impact the plan of care  []3 personal factors and/or comorbidities that impact the plan of care  [x] An examination of body systems using standardized tests and measures addressing any of the following: body structures and functions (impairments), activity limitations, and/or participation restrictions;:  [x] a total of 1-2 or more elements   [] a total of 3 or more elements   [] a total of 4 or more elements   [x] A clinical presentation with:  [x] stable and/or uncomplicated characteristics   [] evolving clinical presentation with changing characteristics  [] unstable and unpredictable characteristics;   [x] Clinical decision making of [x] low, [] moderate, [] high complexity using standardized patient assessment instrument and/or measurable assessment of functional outcome. [x] EVAL (LOW) 98354 (typically 20 minutes face-to-face)  [] EVAL (MOD) 83333 (typically 30 minutes face-to-face)  [] EVAL (HIGH) 77226 (typically 45 minutes face-to-face)  [] RE-EVAL     PLAN:   Frequency/Duration:  1-2 days per week for 6-8 Weeks:  Interventions:  []  Therapeutic exercise including: strength training, ROM, for Lower extremity and core   [x]  NMR activation and proprioception for LE, Glutes and Core   [x]  Manual therapy as indicated for LE, Hip and spine to include: Dry Needling/IASTM, STM, PROM, Gr I-IV mobilizations, manipulation. [x] Modalities as needed that may include: thermal agents, E-stim, Biofeedback, US, iontophoresis as indicated  [x] Patient education on joint protection, postural re-education, activity modification, progression of HEP. HEP instruction: Refer to [address] [place] [address] access code and exercises on the 1st visit treatment note    GOALS:  Patient stated goal: walk/ stairs     Therapist goals for Patient:   Short Term Goals: To be achieved in: 2 weeks  1. Independent in HEP and progression per patient tolerance, in order to prevent re-injury. [x] Progressing: [] Met: [] Not Met: [] Adjusted     2. Patient will have a decrease in pain to facilitate improvement in movement, function, and ADLs as indicated by Functional Deficits. [x] Progressing: [] Met: [] Not Met: [] Adjusted     Long Term Goals: To be achieved in: 6-8 weeks  1. Disability index score of 30% or less for the LEFS to assist with reaching prior level of function. [x] Progressing: [] Met: [] Not Met: [] Adjusted     2.  Patient will demonstrate increased AROM to [facility] to allow for proper joint functioning as indicated by patients Functional Deficits. [x] Progressing: [] Met: [] Not Met: [] Adjusted     3. Patient will demonstrate an increase in Strength to good proximal hip strength and control, within 5lb HHD in LE to allow for proper functional mobility as indicated by patients Functional Deficits. [x] Progressing: [] Met: [] Not Met: [] Adjusted     4. Patient will return to [facility] for  functional activities without increased symptoms or restriction. [x] Progressing: [] Met: [] Not Met: [] Adjusted     5.  Stairs/ walk with min to no limitations (patient specific functional goal)    [x] Progressing: [] Met: [] Not Met: [] Adjusted      Electronically signed by:  Henriette Bence, PT

## 2021-04-29 ENCOUNTER — HOSPITAL ENCOUNTER (EMERGENCY)
Age: 36
Discharge: HOME | End: 2021-04-29
Payer: MEDICAID

## 2021-04-29 VITALS
HEIGHT: 67 IN | TEMPERATURE: 97.8 F | OXYGEN SATURATION: 99 % | WEIGHT: 265 LBS | DIASTOLIC BLOOD PRESSURE: 72 MMHG | RESPIRATION RATE: 16 BRPM | SYSTOLIC BLOOD PRESSURE: 109 MMHG | HEART RATE: 75 BPM | BODY MASS INDEX: 41.59 KG/M2

## 2021-04-29 DIAGNOSIS — M54.31 SCIATICA OF RIGHT SIDE: Primary | ICD-10-CM

## 2021-04-29 DIAGNOSIS — R45.851 SUICIDAL IDEATIONS: ICD-10-CM

## 2021-04-29 LAB
A/G RATIO: 1.3 (ref 1.1–2.2)
ACETAMINOPHEN LEVEL: <5 UG/ML (ref 10–30)
ALBUMIN SERPL-MCNC: 4.1 G/DL (ref 3.4–5)
ALP BLD-CCNC: 63 U/L (ref 40–129)
ALT SERPL-CCNC: 17 U/L (ref 10–40)
AMPHETAMINE SCREEN, URINE: NORMAL
ANION GAP SERPL CALCULATED.3IONS-SCNC: 9 MMOL/L (ref 3–16)
AST SERPL-CCNC: 19 U/L (ref 15–37)
BARBITURATE SCREEN URINE: NORMAL
BASOPHILS ABSOLUTE: 0 K/UL (ref 0–0.2)
BASOPHILS RELATIVE PERCENT: 0.5 %
BENZODIAZEPINE SCREEN, URINE: NORMAL
BILIRUB SERPL-MCNC: 0.4 MG/DL (ref 0–1)
BILIRUBIN URINE: NEGATIVE
BLOOD, URINE: NEGATIVE
BUN BLDV-MCNC: 12 MG/DL (ref 7–20)
CALCIUM SERPL-MCNC: 9.2 MG/DL (ref 8.3–10.6)
CANNABINOID SCREEN URINE: NORMAL
CHLORIDE BLD-SCNC: 101 MMOL/L (ref 99–110)
CLARITY: CLEAR
CO2: 28 MMOL/L (ref 21–32)
COCAINE METABOLITE SCREEN URINE: NORMAL
COLOR: YELLOW
CREAT SERPL-MCNC: 0.6 MG/DL (ref 0.6–1.1)
EOSINOPHILS ABSOLUTE: 0 K/UL (ref 0–0.6)
EOSINOPHILS RELATIVE PERCENT: 0.4 %
ETHANOL: NORMAL MG/DL (ref 0–0.08)
GFR AFRICAN AMERICAN: >60
GFR NON-AFRICAN AMERICAN: >60
GLOBULIN: 3.1 G/DL
GLUCOSE BLD-MCNC: 101 MG/DL (ref 70–99)
GLUCOSE URINE: NEGATIVE MG/DL
HCG QUALITATIVE: NEGATIVE
HCT VFR BLD CALC: 40.2 % (ref 36–48)
HEMOGLOBIN: 13.5 G/DL (ref 12–16)
KETONES, URINE: NEGATIVE MG/DL
LEUKOCYTE ESTERASE, URINE: NEGATIVE
LYMPHOCYTES ABSOLUTE: 1.9 K/UL (ref 1–5.1)
LYMPHOCYTES RELATIVE PERCENT: 26.2 %
Lab: NORMAL
MCH RBC QN AUTO: 32.2 PG (ref 26–34)
MCHC RBC AUTO-ENTMCNC: 33.6 G/DL (ref 31–36)
MCV RBC AUTO: 95.9 FL (ref 80–100)
METHADONE SCREEN, URINE: NORMAL
MICROSCOPIC EXAMINATION: NORMAL
MONOCYTES ABSOLUTE: 0.5 K/UL (ref 0–1.3)
MONOCYTES RELATIVE PERCENT: 6.7 %
NEUTROPHILS ABSOLUTE: 4.8 K/UL (ref 1.7–7.7)
NEUTROPHILS RELATIVE PERCENT: 66.2 %
NITRITE, URINE: NEGATIVE
OPIATE SCREEN URINE: NORMAL
OXYCODONE URINE: NORMAL
PDW BLD-RTO: 14 % (ref 12.4–15.4)
PH UA: 6
PH UA: 6 (ref 5–8)
PHENCYCLIDINE SCREEN URINE: NORMAL
PLATELET # BLD: 218 K/UL (ref 135–450)
PMV BLD AUTO: 8.9 FL (ref 5–10.5)
POTASSIUM REFLEX MAGNESIUM: 3.7 MMOL/L (ref 3.5–5.1)
PROPOXYPHENE SCREEN: NORMAL
PROTEIN UA: NEGATIVE MG/DL
RBC # BLD: 4.19 M/UL (ref 4–5.2)
SALICYLATE, SERUM: <0.3 MG/DL (ref 15–30)
SARS-COV-2, NAAT: NOT DETECTED
SODIUM BLD-SCNC: 138 MMOL/L (ref 136–145)
SPECIFIC GRAVITY UA: <=1.005 (ref 1–1.03)
TOTAL PROTEIN: 7.2 G/DL (ref 6.4–8.2)
URINE REFLEX TO CULTURE: NORMAL
URINE TYPE: NORMAL
UROBILINOGEN, URINE: 0.2 E.U./DL
WBC # BLD: 7.3 K/UL (ref 4–11)

## 2021-04-29 PROCEDURE — 82077 ASSAY SPEC XCP UR&BREATH IA: CPT

## 2021-04-29 PROCEDURE — 84703 CHORIONIC GONADOTROPIN ASSAY: CPT

## 2021-04-29 PROCEDURE — 99284 EMERGENCY DEPT VISIT MOD MDM: CPT

## 2021-04-29 PROCEDURE — 80143 DRUG ASSAY ACETAMINOPHEN: CPT

## 2021-04-29 PROCEDURE — 87635 SARS-COV-2 COVID-19 AMP PRB: CPT

## 2021-04-29 PROCEDURE — 81003 URINALYSIS AUTO W/O SCOPE: CPT

## 2021-04-29 PROCEDURE — 80053 COMPREHEN METABOLIC PANEL: CPT

## 2021-04-29 PROCEDURE — 6370000000 HC RX 637 (ALT 250 FOR IP): Performed by: NURSE PRACTITIONER

## 2021-04-29 PROCEDURE — 85025 COMPLETE CBC W/AUTO DIFF WBC: CPT

## 2021-04-29 PROCEDURE — 80307 DRUG TEST PRSMV CHEM ANLYZR: CPT

## 2021-04-29 PROCEDURE — 80179 DRUG ASSAY SALICYLATE: CPT

## 2021-04-29 RX ORDER — PREDNISONE 20 MG/1
TABLET ORAL
Qty: 18 TABLET | Refills: 0 | Status: SHIPPED | OUTPATIENT
Start: 2021-04-29 | End: 2021-05-11 | Stop reason: ALTCHOICE

## 2021-04-29 RX ORDER — PREDNISONE 20 MG/1
TABLET ORAL
Qty: 18 TABLET | Refills: 0 | Status: SHIPPED | OUTPATIENT
Start: 2021-04-29 | End: 2021-04-29 | Stop reason: SDUPTHER

## 2021-04-29 RX ORDER — PREDNISONE 20 MG/1
60 TABLET ORAL ONCE
Status: COMPLETED | OUTPATIENT
Start: 2021-04-29 | End: 2021-04-29

## 2021-04-29 RX ADMIN — PREDNISONE 60 MG: 20 TABLET ORAL at 18:10

## 2021-04-29 ASSESSMENT — ENCOUNTER SYMPTOMS
COLOR CHANGE: 0
SORE THROAT: 0
RHINORRHEA: 0
BACK PAIN: 1
SHORTNESS OF BREATH: 0
ABDOMINAL PAIN: 0

## 2021-04-29 ASSESSMENT — PAIN DESCRIPTION - ORIENTATION: ORIENTATION: RIGHT

## 2021-04-29 ASSESSMENT — PATIENT HEALTH QUESTIONNAIRE - PHQ9: SUM OF ALL RESPONSES TO PHQ QUESTIONS 1-9: 9

## 2021-04-29 ASSESSMENT — PAIN SCALES - GENERAL: PAINLEVEL_OUTOF10: 7

## 2021-04-29 ASSESSMENT — PAIN DESCRIPTION - LOCATION: LOCATION: BUTTOCKS

## 2021-04-29 NOTE — SUICIDE SAFETY PLAN
SAFETY PLAN    A suicide Safety Plan is a document that supports someone when they are having thoughts of suicide. Warning Signs that indicate a suicidal crisis may be developing: What (situations, thoughts, feelings, body sensations, behaviors, etc.) do you experience that lets you know you are beginning to think about suicide? 1. Crying for no reason  2. Bad thoughts  3. High stress, pain    Internal Coping Strategies:  What things can I do (relaxation techniques, hobbies, physical activities, etc.) to take my mind off my problems without contacting another person? 1. Physical therapy exercises  2. Reading/writing/drawing  3. games    People and social settings that provide distraction: Who can I call or where can I go to distract me? 1. Jungle jims   2. My yard playing with puppy  3. My computer room    People whom I can ask for help: Who can I call when I need help - for example, friends, family, clergy, someone else? 1. Name: Rhonda Rossi Phone: [phone]  2. Name: Shaun Aguillon  Phone: [phone]     Professionals or [address] [place] [address] Blvd I can contact during a crisis: Who can I call for help - for example, my doctor, my psychiatrist, my psychologist, a mental health provider, a suicide hotline? 1. Suicide Prevention Lifeline: 2-371-008-TALK (9283)    Making the environment safe: How can I make my environment (house/apartment/living space) safer? For example, can I remove guns, medications, and other items? 1.  sharp things  2. Tell  when my thoughts are bad. Stay away from dangerous stuff.

## 2021-04-29 NOTE — ED PROVIDER NOTES
Evaluated by [id number] [facility] Provider          [facility] ED  [place]        Pt Name: Rola Serrano  MRN: [id number]  [place] 1985  Dateof evaluation: 4/29/2021  Provider: ROSE Bronson CNP  PCP: ROSE Garcia CNP  ED Attending: No att. providers found    [address] [place] [address]       Chief Complaint   Patient presents with   [facility]     pt is in pain since last August; pt has burning pain that runs down right leg form hip; has been to multiple specialist without answers; pt is having 'bad thoughts' of cutting wrist in bathtub       HISTORY OF PRESENTILLNESS   (Location/Symptom, Timing/Onset, Context/Setting, Quality, Duration, Modifying Factors, Severity)  Note limiting factors. Rola Serrano is a 37 y.o. female for si. Onset was months  Context includes pt states she has chronic right lower back pain that is causing her to want to harm herself. Patient reports that she is being seen by an orthopedist or neurosurgeon. Patient reports that she has been given recommendations to have pain management. Patient states that she has had any physical therapy for her back. She does not take any medications for her back. Patient denies any caudal anesthesia but does have right-sided radiculopathy. Alleviating factors include nothing. Aggravating factors include nothing. Pain is 7/10. Nothing has been used for pain today. Nursing Notes were all reviewed and agreed with or any disagreements were addressed  in the HPI. REVIEW OF SYSTEMS    (2-9 systems for level 4, 10 or more for level 5)     Review of Systems   Constitutional: Negative for fever. HENT: Negative for congestion, rhinorrhea and sore throat. Respiratory: Negative for shortness of breath. Cardiovascular: Negative for chest pain. Gastrointestinal: Negative for abdominal pain. Genitourinary: Negative for decreased urine volume and difficulty urinating. Musculoskeletal: Positive for back pain. Negative for arthralgias and myalgias. Skin: Negative for color change and rash. Neurological: Negative for dizziness and light-headedness. Psychiatric/Behavioral: Positive for suicidal ideas. Negative for agitation. All other systems reviewed and are negative. Positives and Pertinent negatives as per HPI. Except as noted above in the ROS, all other systems were reviewed and negative.        PAST MEDICAL HISTORY     Past Medical History:   Diagnosis Date    Asthma     as child    Back pain     Bipolar 1 disorder ([facility]     Depression     Diabetes mellitus ([facility]     Liver disease     fatty liver    Obesity     OCD (obsessive compulsive disorder)     PCOS (polycystic ovarian syndrome)     PCOS (polycystic ovarian syndrome)     Sleep apnea     cpap         SURGICAL HISTORY       Past Surgical History:   Procedure Laterality Date    CHOLECYSTECTOMY, LAPAROSCOPIC N/A 4/20/2020    LAPAROSCOPIC CHOLECYSTECTOMY performed by Trina Booth DO at [address] [address] ([facility]) Left 12/10/14    Excision of Cyst Left Upper Posterior Ankle    OTHER SURGICAL HISTORY Right 12/29/2016    Laparotomy with Right SO    OVARY REMOVAL  12/28/2016    unsure of side    SLEEVE GASTRECTOMY N/A 11/6/2019    LAPAROSCOPIC SLEEVE GASTRECTOMY - ETHICON performed by Wilman Alacraz MD at [facility] 8/23/2019    EGD BIOPSY performed by Wilman Alcaraz MD at [facility] 3/10/2021    EGD BIOPSY performed by Rosi Thomas MD at [facility]       Previous Medications    BUSPIRONE (BUSPAR) 5 MG TABLET    Take 5 mg by mouth 3 times daily    CHOLECALCIFEROL (VITAMIN D PO)    Take by mouth daily     CLOMIPRAMINE (ANAFRANIL) 25 MG CAPSULE    Take 50 mg by mouth nightly    DULOXETINE (CYMBALTA) 30 MG EXTENDED  Sexual activity: Yes     Partners: Male   Lifestyle    Physical activity     Days per week: None     Minutes per session: None    Stress: None   Relationships    Social connections     Talks on phone: None     Gets together: None     Attends [employer] service: None     Active member of club or organization: None     Attends meetings of clubs or organizations: None     Relationship status: None    Intimate partner violence     Fear of current or ex partner: None     Emotionally abused: None     Physically abused: None     Forced sexual activity: None   Other Topics Concern    None   Social History Narrative    None       SCREENINGS             PHYSICAL EXAM  (up to 7 for level 4, 8 or more for level 5)     ED Triage Vitals   BP Temp Temp Source Pulse Resp SpO2 Height Weight   04/29/21 1429 04/29/21 1429 04/29/21 1429 04/29/21 1429 04/29/21 1429 04/29/21 1429 04/29/21 1427 04/29/21 1427   115/75 97.9 °F (36.6 °C) Infrared 87 16 96 % 5' 7\" (1.702 m) 265 lb (120.2 kg)       Physical Exam  Constitutional:       Appearance: She is well-developed. HENT:      Head: Normocephalic and atraumatic. Neck:      Musculoskeletal: Normal range of motion. Cardiovascular:      Rate and Rhythm: Normal rate. Pulmonary:      Effort: Pulmonary effort is normal. No respiratory distress. Abdominal:      General: There is no distension. Palpations: Abdomen is soft. Tenderness: There is no abdominal tenderness. Musculoskeletal:         General: Tenderness present. No swelling, deformity or signs of injury. Comments: Decreased range of motion due to pain elicited with movement back. Tenderness with palpation to the paraspinal lumbar region. Patient with right sided radiculopathy. Patient denies any caudal anesthesia. Sensation strength and pulses intact to lower extremities. Deep tendon reflexes intact. Skin:     General: Skin is warm and dry.    Neurological:      Mental Status: She is alert and oriented to person, place, and time.          DIAGNOSTIC RESULTS   LABS:    Labs Reviewed   COMPREHENSIVE METABOLIC PANEL W/ REFLEX TO MG FOR LOW K - Abnormal; Notable for the following components:       Result Value    Glucose 101 (*)     All other components within normal limits    Narrative:     Performed at:  [facility],  [place], [place]   Phone [phone]   ACETAMINOPHEN LEVEL - Abnormal; Notable for the following components:    Acetaminophen Level <5 (*)     All other components within normal limits    Narrative:     Performed at:  [place] [address],  [place], [place] [employer] [facility]   Phone [phone]   SALICYLATE LEVEL - Abnormal; Notable for the following components:    Salicylate, Serum <2.0 (*)     All other components within normal limits    Narrative:     Performed at:  [facility],  [place] [employer]   Phone [phone], RAPID    Narrative:     Performed at:  [place] [address],  [place], [place] [employer] [facility]   Phone [phone]   CBC WITH AUTO DIFFERENTIAL    Narrative:     Performed at:  [place] [address],  [place], [place] [employer] [facility]   Phone [phone]   URINE RT REFLEX TO CULTURE    Narrative:     Performed at:  [place] [address],  [place], [facility]   Phone [phone]   URINE DRUG SCREEN    Narrative:     Performed at:  [facility]) [facility],  [place], [facility]   Phone [phone]   HCG, SERUM, QUALITATIVE    Narrative:     Performed at:  [place] [address],  [place], [employer]   Phone [phone]   ETHANOL    Narrative:     Performed at:  [facility] [address],  [place], [place] [address]   Phone [phone]       All other labs werewithin normal range or not returned as of this dictation. EKG: All EKG's are interpreted by the Emergency Department Physician who either signs or Co-signs this chart in the absence of a cardiologist.  Please see their note for interpretation of EKG. RADIOLOGY:           Interpretation per the Radiologist below, if available at the time of this note:    No orders to display     No results found. PROCEDURES   Unless otherwise noted below, none     Procedures     CRITICAL CARE TIME   N/A    CONSULTS:  None      EMERGENCYDEPARTMENT COURSE and DIFFERENTIAL DIAGNOSIS/MDM:   Vitals:    Vitals:    04/29/21 1427 04/29/21 1429   BP:  115/75   Pulse:  87   Resp:  16   Temp:  97.9 °F (36.6 °C)   TempSrc:  Infrared   SpO2:  96%   Weight: 265 lb (120.2 kg)    Height: 5' 7\" (1.702 m)        Patient was given the following medications:  Medications   predniSONE (DELTASONE) tablet 60 mg (has no administration in time range)     Patient was seen and evaluated by myself. Patient here for having suicidal thoughts. Patient reports that she has chronic pain to her lower back that radiates to her right leg. Patient states that she is been seen by Ortho and neurology and recommended for pain management. Patient reports that she is taking no medications. She states that she is tired of being in pain and wants to harm her self. On exam she is awake and alert hemodynamically stable nontoxic in appearance. Patient does have symptoms consistent with right-sided sciatica. There is no concern for infection. Lab values have been reviewed and interpreted. Patient was provided with steroids in the ED. She will be given a prescription for steroids and at this time she is medically cleared and has been consulted with behavioral health for an evaluation and assistance and final disposition. The patient tolerated their visit well. I have evaluated this patient.  My supervising physician was available for consultation. The patient and / or the family were informed of the results of any tests, a time was given to answer questions, a plan was proposed and they agreed with plan. FINAL IMPRESSION      1. Sciatica of right side    2. Suicidal ideations          DISPOSITION/PLAN   DISPOSITION Ed Observation 04/29/2021 05:03:31 PM      PATIENT REFERRED TO:  ROSE Herndon - [address]  [place] [address] [place] [address]  [phone]    Schedule an appointment as soon as possible for a visit in 2 days      [facility] ED  [address] [facility]  [phone]    If symptoms worsen    [address] [place] [address] [address] [place], [place] [address] [phone]  Call   to be re evaluated. DISCHARGE MEDICATIONS:  New Prescriptions    PREDNISONE (DELTASONE) 20 MG TABLET    Take 3 tabs orally daily for 3 days, then take 2 tabs orally for 3 days then take 1 tab orally for 3 days.        DISCONTINUED MEDICATIONS:  Discontinued Medications    No medications on file              (Please note that portions of this note were completed with a voice recognition program.  Efforts were made to edit the dictations but occasionally words are mis-transcribed.)    ROSE Chaparro CNP (electronically signed)         ROSE Chaparro CNP  04/29/21 9802
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
## 2021-04-30 DIAGNOSIS — K21.9 CHRONIC GERD: ICD-10-CM

## 2021-05-03 ENCOUNTER — TELEPHONE (OUTPATIENT)
Dept: PULMONOLOGY | Age: 36
End: 2021-05-03

## 2021-05-03 RX ORDER — OMEPRAZOLE 40 MG/1
CAPSULE, DELAYED RELEASE ORAL
Qty: 60 CAPSULE | Refills: 0 | Status: SHIPPED | OUTPATIENT
Start: 2021-05-03 | End: 2021-06-08

## 2021-05-03 NOTE — TELEPHONE ENCOUNTER
Patient cancelled appointment on 5/5/21 with Dr. Serge Berman for 1 year sleep. Reason: has court this day    Patient did not reschedule appointment. Last OV 5/7/2020    Assessment:       · Moderate JANAY. CPAP 7 cm H2O. Poor compliance upon review today    · Hypersomnia and Fatigue- better when using CPAP   · Morbid obesity Post gastric sleeve 11/6/2019 396-->280   · Life bryn nonsmoker  · Childhood asthma       Plan:    · Advised to use CPAP 6-8 hrs at night and during naps. · Replacement of mask, tubing, head straps every 3-6 months or sooner if damaged. · Follow up CPAP compliance and pressure adjustment if needed  · Sleep hygiene  · Avoid sedatives, alcohol and caffeinated drinks at bed time. · No driving motorized vehicles or operating heavy machinery while fatigue, drowsy or sleepy.    · See in 1 year

## 2021-05-04 ENCOUNTER — HOSPITAL ENCOUNTER (OUTPATIENT)
Dept: PSYCHIATRY | Age: 36
Setting detail: THERAPIES SERIES
Discharge: HOME | End: 2021-05-04
Payer: MEDICAID

## 2021-05-04 VITALS — TEMPERATURE: 97.1 F

## 2021-05-04 PROCEDURE — 90791 PSYCH DIAGNOSTIC EVALUATION: CPT

## 2021-05-04 ASSESSMENT — LIFESTYLE VARIABLES: HISTORY_ALCOHOL_USE: NO

## 2021-05-04 ASSESSMENT — ANXIETY QUESTIONNAIRES
5. BEING SO RESTLESS THAT IT IS HARD TO SIT STILL: 0-NOT AT ALL
4. TROUBLE RELAXING: 3-NEARLY EVERY DAY
2. NOT BEING ABLE TO STOP OR CONTROL WORRYING: 3-NEARLY EVERY DAY
3. WORRYING TOO MUCH ABOUT DIFFERENT THINGS: 3-NEARLY EVERY DAY
1. FEELING NERVOUS, ANXIOUS, OR ON EDGE: 1-SEVERAL DAYS

## 2021-05-04 ASSESSMENT — SLEEP AND FATIGUE QUESTIONNAIRES
DO YOU HAVE DIFFICULTY SLEEPING: YES
DO YOU USE A SLEEP AID: YES
DIFFICULTY FALLING ASLEEP: YES
DIFFICULTY STAYING ASLEEP: YES

## 2021-05-04 ASSESSMENT — PATIENT HEALTH QUESTIONNAIRE - PHQ9: SUM OF ALL RESPONSES TO PHQ QUESTIONS 1-9: 23
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
## 2021-05-06 ENCOUNTER — TELEPHONE (OUTPATIENT)
Dept: ORTHOPEDIC SURGERY | Age: 36
End: 2021-05-06

## 2021-05-06 ENCOUNTER — HOSPITAL ENCOUNTER (OUTPATIENT)
Dept: PHYSICAL THERAPY | Age: 36
Setting detail: THERAPIES SERIES
Discharge: HOME | End: 2021-05-06
Payer: MEDICAID

## 2021-05-06 PROCEDURE — 97140 MANUAL THERAPY 1/> REGIONS: CPT | Performed by: SPECIALIST

## 2021-05-06 PROCEDURE — 97016 VASOPNEUMATIC DEVICE THERAPY: CPT | Performed by: SPECIALIST

## 2021-05-06 PROCEDURE — 97112 NEUROMUSCULAR REEDUCATION: CPT | Performed by: SPECIALIST

## 2021-05-06 PROCEDURE — 97110 THERAPEUTIC EXERCISES: CPT | Performed by: SPECIALIST

## 2021-05-06 NOTE — FLOWSHEET NOTE
[facility] [facility], [address] [facility] [address]  Phone: [phone]   Fax:     [phone]      Physical Therapy Treatment Note/ Progress Report:     Date:  2021    Patient Name:  Shea Arriaga    :  1985  MRN: [id number]  Restrictions/Precautions:    Medical/Treatment Diagnosis Information:      M25.562, G89.29 (ICD-10-CM) - Chronic pain of left knee   M23.92 (ICD-10-CM) - Patellar malalignment syndrome of left knee     ·      Insurance/Certification information:   Andra Brizuela  Physician Information:   Dr Jackie Jackman  Has the plan of care been signed (Y/N):        []  Yes  [x]  No     Date of Patient follow up with Physician: NS    Is this a Progress Report:     []  Yes  [x]  No      If Yes:  Date Range for reporting period:  Beginnin21 ------------ Endin21    Progress report will be due (10 Rx or 30 days whichever is less):        Recertification will be due (POC Duration  / 90 days whichever is less): 21        Visit # Insurance Allowable Auth Required   In Person 2 30 []  Yes     []  No    Tele Health 0  []  Yes     []  No    Total 2       Functional Scale: LEFS 61%   Date assessed:  21      Latex Allergy:  [x]NO      []YES  Preferred Language for Healthcare:   [x]English       []other:    Pain level:  10     SUBJECTIVE:  Increased pain with yard work    OBJECTIVE:      Observation:    Test measurements:     : 131-0    RESTRICTIONS/PRECAUTIONS: none    Exercises/Interventions:   Therapeutic Ex (27927) Sets/sec Reps Notes/CUES HEP   Heel slides  10x  x   Piriformis stretch 30 2x  x   Piriformis cross over 30 2x  x   SLR 5 10x  x                 Slant board  1 min     LP 90# 20x     [facility]  [place]                          Manual Intervention (65564)       Pat mob/ knee ROM  8 min                                        NMR re-education (45746)   CUES NEEDED     x   HR/TR  15x  x    x   PB supine lenard GR 10x  x   bridges  15x  x   Step up and overs 2\" 15x  x   LSD 2\"  15x  x   SLS  1 min  x          Therapeutic Activity (66256)                     Vaso L knee  10 min                   Medbridge access code: [email or web address]             Therapeutic Exercise and NMR EXR  [x] (06703) Provided verbal/tactile cueing for activities related to strengthening, flexibility, endurance, ROM for improvements in LE, proximal hip, and core control with self care, mobility, lifting, ambulation. [x] (94160) Provided verbal/tactile cueing for activities related to improving balance, coordination, kinesthetic sense, posture, motor skill, proprioception to assist with LE, proximal hip, and core control in self-care, mobility, lifting, ambulation and eccentric single leg control.      NMR and Therapeutic Activities:    [x] (86440 or 04422) Provided verbal/tactile cueing for activities related to improving balance, coordination, kinesthetic sense, posture, motor skill, proprioception and motor activation to allow for proper function of core, proximal hip and LE with self-care and ADLs and functional mobility.   [] (72912) Gait Re-education- Provided training and instruction to the patient for proper LE, core and proximal hip recruitment and positioning and eccentric body weight control with ambulation re-education including up and down stairs     Home Exercise Program:    [x] (84598) Reviewed/Progressed HEP activities related to strengthening, flexibility, endurance, ROM of core, proximal hip and LE for functional self-care, mobility, lifting and ambulation/stair navigation   [] (70457) Reviewed/Progressed HEP activities related to improving balance, coordination, kinesthetic sense, posture, motor skill, proprioception of core, proximal hip and LE for self-care, mobility, lifting, and ambulation/stair navigation      Manual Treatments:  PROM / STM / Oscillations-Mobs:  G-I, II, III, IV (PA's, Inf., Post.)  [x] (81726) Provided manual therapy to mobilize LE, proximal hip and/or LS spine soft tissue/joints for the purpose of modulating pain, promoting relaxation, increasing ROM, reducing/eliminating soft tissue swelling/inflammation/restriction, improving soft tissue extensibility and allowing for proper ROM for normal function with self-care, mobility, lifting and ambulation. Modalities:     [x] GAME READY (VASO)- for significant edema, swelling, pain control. Charges:  Timed Code Treatment Minutes: 38   Total Treatment Minutes:  48   BWC:  TE TIME:  NMR TIME:  MANUAL TIME:  UNTIMED MINUTES:  Medicare Total:         [] EVAL (LOW) 82133 (typically 20 minutes face-to-face)  [] EVAL (MOD) 69599 (typically 30 minutes face-to-face)  [] EVAL (HIGH) 01551 (typically 45 minutes face-to-face)  [] RE-EVAL     [x] IP(99353) x  1   [] IONTO  [x] NMR (26973) x   1  [x] VASO  [x] Manual (64054) x 1    [] Other:  [] TA x      [] Mech Traction (61473)  [] ES(attended) (27237)      [] ES (un) (11454):    ASSESSMENT:  Good tolerance with Mary and manuals    GOALS:      Patient stated goal: walk/ stairs     Therapist goals for Patient:   Short Term Goals: To be achieved in: 2 weeks  1. Independent in HEP and progression per patient tolerance, in order to prevent re-injury. [x] Progressing: [] Met: [] Not Met: [] Adjusted     2. Patient will have a decrease in pain to facilitate improvement in movement, function, and ADLs as indicated by Functional Deficits. [x] Progressing: [] Met: [] Not Met: [] Adjusted     Long Term Goals: To be achieved in: 6-8 weeks  1. Disability index score of 30% or less for the LEFS to assist with reaching prior level of function. [x] Progressing: [] Met: [] Not Met: [] Adjusted     2. Patient will demonstrate increased AROM to [facility] to allow for proper joint functioning as indicated by patients Functional Deficits. [x] Progressing: [] Met: [] Not Met: [] Adjusted     3.  Patient will demonstrate an increase in Strength to good proximal hip strength and control, within 5lb HHD in LE to allow for proper functional mobility as indicated by patients Functional Deficits. [x] Progressing: [] Met: [] Not Met: [] Adjusted     4. Patient will return to [facility] for  functional activities without increased symptoms or restriction. [x] Progressing: [] Met: [] Not Met: [] Adjusted     5. Stairs/ walk with min to no limitations (patient specific functional goal)    [x] Progressing: [] Met: [] Not Met: [] Adjusted              Overall Progression Towards Functional goals/ Treatment Progress Update:  [x] Patient is progressing as expected towards functional goals listed. [] Progression is slowed due to complexities/Impairments listed. [] Progression has been slowed due to co-morbidities.   [] Plan just implemented, too soon to assess goals progression <30days   [] Goals require adjustment due to lack of progress  [] Patient is not progressing as expected and requires additional follow up with physician  [] Other    Prognosis for POC: [x] Good [] Fair  [] Poor      Patient requires continued skilled intervention: [x] Yes  [] No    Treatment/Activity Tolerance:  [x] Patient able to complete treatment  [] Patient limited by fatigue  [] Patient limited by pain    [] Patient limited by other medical complications  [] Other:     Return to Play: (if applicable)   [x]  Stage 1: Intro to Strength   []  Stage 2: Return to Run and Strength   []  Stage 3: Return to Jump and Strength   []  Stage 4: Dynamic Strength and Agility   []  Stage 5: Sport Specific Training     []  Ready to Return to Play, Meets All Above Stages   []  Not Ready for Return to Sports   Comments:                          PLAN:   [x] Continue per plan of care [] Alter current plan (see comments above)  [x] Plan of care initiated [] Hold pending MD visit [] Discharge    Electronically signed by:  Pantera Ramírez PT    Note: If patient does not return for scheduled/ recommended follow up visits, this note will serve as a discharge from care along with most recent update on progress.

## 2021-05-08 ENCOUNTER — PATIENT MESSAGE (OUTPATIENT)
Dept: BARIATRICS/WEIGHT MGMT | Age: 36
End: 2021-05-08

## 2021-05-10 ENCOUNTER — HOSPITAL ENCOUNTER (OUTPATIENT)
Dept: PSYCHIATRY | Age: 36
Setting detail: THERAPIES SERIES
End: 2021-05-10
Payer: MEDICAID

## 2021-05-11 ENCOUNTER — TELEMEDICINE (OUTPATIENT)
Dept: BARIATRICS/WEIGHT MGMT | Age: 36
End: 2021-05-11
Payer: MEDICAID

## 2021-05-11 ENCOUNTER — HOSPITAL ENCOUNTER (OUTPATIENT)
Age: 36
Discharge: HOME | End: 2021-05-11
Payer: MEDICAID

## 2021-05-11 DIAGNOSIS — E66.9 DIABETES MELLITUS TYPE 2 IN OBESE (HCC): ICD-10-CM

## 2021-05-11 DIAGNOSIS — Z98.84 S/P LAPAROSCOPIC SLEEVE GASTRECTOMY: Primary | ICD-10-CM

## 2021-05-11 DIAGNOSIS — E66.01 MORBID OBESITY WITH BMI OF 40.0-44.9, ADULT (HCC): ICD-10-CM

## 2021-05-11 DIAGNOSIS — E11.69 DIABETES MELLITUS TYPE 2 IN OBESE (HCC): ICD-10-CM

## 2021-05-11 DIAGNOSIS — K21.9 CHRONIC GERD: ICD-10-CM

## 2021-05-11 DIAGNOSIS — Z98.84 S/P LAPAROSCOPIC SLEEVE GASTRECTOMY: ICD-10-CM

## 2021-05-11 LAB
A/G RATIO: 1.5 (ref 1.1–2.2)
ALBUMIN SERPL-MCNC: 4.1 G/DL (ref 3.4–5)
ALP BLD-CCNC: 57 U/L (ref 40–129)
ALT SERPL-CCNC: 18 U/L (ref 10–40)
ANION GAP SERPL CALCULATED.3IONS-SCNC: 12 MMOL/L (ref 3–16)
AST SERPL-CCNC: 20 U/L (ref 15–37)
BASOPHILS ABSOLUTE: 0 K/UL (ref 0–0.2)
BASOPHILS RELATIVE PERCENT: 0.7 %
BILIRUB SERPL-MCNC: 0.7 MG/DL (ref 0–1)
BUN BLDV-MCNC: 9 MG/DL (ref 7–20)
CALCIUM SERPL-MCNC: 8.7 MG/DL (ref 8.3–10.6)
CHLORIDE BLD-SCNC: 100 MMOL/L (ref 99–110)
CHOLESTEROL, TOTAL: 123 MG/DL (ref 0–199)
CO2: 25 MMOL/L (ref 21–32)
CREAT SERPL-MCNC: 0.8 MG/DL (ref 0.6–1.1)
EOSINOPHILS ABSOLUTE: 0 K/UL (ref 0–0.6)
EOSINOPHILS RELATIVE PERCENT: 0.6 %
FOLATE: >20 NG/ML (ref 4.78–24.2)
GFR AFRICAN AMERICAN: >60
GFR NON-AFRICAN AMERICAN: >60
GLOBULIN: 2.7 G/DL
GLUCOSE BLD-MCNC: 83 MG/DL (ref 70–99)
HCT VFR BLD CALC: 40.7 % (ref 36–48)
HDLC SERPL-MCNC: 55 MG/DL (ref 40–60)
HEMOGLOBIN: 13.8 G/DL (ref 12–16)
IRON SATURATION: 51 % (ref 15–50)
IRON: 128 UG/DL (ref 37–145)
LDL CHOLESTEROL CALCULATED: 55 MG/DL
LYMPHOCYTES ABSOLUTE: 1.1 K/UL (ref 1–5.1)
LYMPHOCYTES RELATIVE PERCENT: 17.1 %
MCH RBC QN AUTO: 32.3 PG (ref 26–34)
MCHC RBC AUTO-ENTMCNC: 34 G/DL (ref 31–36)
MCV RBC AUTO: 95.1 FL (ref 80–100)
MONOCYTES ABSOLUTE: 0.5 K/UL (ref 0–1.3)
MONOCYTES RELATIVE PERCENT: 7.8 %
NEUTROPHILS ABSOLUTE: 4.8 K/UL (ref 1.7–7.7)
NEUTROPHILS RELATIVE PERCENT: 73.8 %
PDW BLD-RTO: 13.8 % (ref 12.4–15.4)
PLATELET # BLD: 197 K/UL (ref 135–450)
PMV BLD AUTO: 9.3 FL (ref 5–10.5)
POTASSIUM SERPL-SCNC: 3.6 MMOL/L (ref 3.5–5.1)
RBC # BLD: 4.28 M/UL (ref 4–5.2)
SODIUM BLD-SCNC: 137 MMOL/L (ref 136–145)
TOTAL IRON BINDING CAPACITY: 251 UG/DL (ref 260–445)
TOTAL PROTEIN: 6.8 G/DL (ref 6.4–8.2)
TRIGL SERPL-MCNC: 66 MG/DL (ref 0–150)
TSH REFLEX: 1.05 UIU/ML (ref 0.27–4.2)
VITAMIN B-12: 730 PG/ML (ref 211–911)
VITAMIN D 25-HYDROXY: 70.3 NG/ML
VLDLC SERPL CALC-MCNC: 13 MG/DL
WBC # BLD: 6.5 K/UL (ref 4–11)

## 2021-05-11 PROCEDURE — 83550 IRON BINDING TEST: CPT

## 2021-05-11 PROCEDURE — 82306 VITAMIN D 25 HYDROXY: CPT

## 2021-05-11 PROCEDURE — 2022F DILAT RTA XM EVC RTNOPTHY: CPT | Performed by: NURSE PRACTITIONER

## 2021-05-11 PROCEDURE — 3046F HEMOGLOBIN A1C LEVEL >9.0%: CPT | Performed by: NURSE PRACTITIONER

## 2021-05-11 PROCEDURE — 83540 ASSAY OF IRON: CPT

## 2021-05-11 PROCEDURE — 82607 VITAMIN B-12: CPT

## 2021-05-11 PROCEDURE — G8427 DOCREV CUR MEDS BY ELIG CLIN: HCPCS | Performed by: NURSE PRACTITIONER

## 2021-05-11 PROCEDURE — 84443 ASSAY THYROID STIM HORMONE: CPT

## 2021-05-11 PROCEDURE — 85025 COMPLETE CBC W/AUTO DIFF WBC: CPT

## 2021-05-11 PROCEDURE — 99213 OFFICE O/P EST LOW 20 MIN: CPT | Performed by: NURSE PRACTITIONER

## 2021-05-11 PROCEDURE — 36415 COLL VENOUS BLD VENIPUNCTURE: CPT

## 2021-05-11 PROCEDURE — 80053 COMPREHEN METABOLIC PANEL: CPT

## 2021-05-11 PROCEDURE — 82746 ASSAY OF FOLIC ACID SERUM: CPT

## 2021-05-11 PROCEDURE — 84590 ASSAY OF VITAMIN A: CPT

## 2021-05-11 PROCEDURE — 84425 ASSAY OF VITAMIN B-1: CPT

## 2021-05-11 PROCEDURE — 83036 HEMOGLOBIN GLYCOSYLATED A1C: CPT

## 2021-05-11 PROCEDURE — 80061 LIPID PANEL: CPT

## 2021-05-11 PROCEDURE — 84446 ASSAY OF VITAMIN E: CPT

## 2021-05-11 ASSESSMENT — ENCOUNTER SYMPTOMS
GASTROINTESTINAL NEGATIVE: 1
EYES NEGATIVE: 1
RESPIRATORY NEGATIVE: 1
BACK PAIN: 1

## 2021-05-11 NOTE — PROGRESS NOTES
SLEEVE GASTRECTOMY - ETHICON performed by Sheliah Sandhoff, MD at [address] [facility] N/A 8/23/2019    EGD BIOPSY performed by Sheliah Sandhoff, MD at [place] [address] N/A 3/10/2021    [facility]       Family History   Problem Relation Age of Onset    Asthma Mother     Arthritis Mother     Lung Cancer Mother     Hypertension Father     Elevated Lipids Father     Diabetes Father     Alcohol Abuse Father     Asthma Father     Arthritis Father     Diabetes Paternal Grandfather     Arthritis Paternal Grandfather     Diabetes Maternal Grandmother     Arthritis Maternal Grandmother      Social History     Tobacco Use    Smoking status: Never Smoker    Smokeless tobacco: Never Used   Substance Use Topics    Alcohol use: Yes     Comment: 1 drink per week     I counseled the patient on the importance of not smoking and risks of ETOH. Allergies   Allergen Reactions    Latex Itching    Lactose      \"Extreme\" stomach pain and diarrhea    Adhesive Tape      Rips off skin    Keflex [Cephalexin] Nausea And Vomiting     Projectile vomitting     There were no vitals filed for this visit. There is no height or weight on file to calculate BMI. Current Outpatient Medications:     omeprazole (PRILOSEC) 40 MG delayed release capsule, TAKE 1 CAPSULE (40MG) BY MOUTH 2 TIMES DAILY MAY, Disp: 60 capsule, Rfl: 0    metFORMIN (GLUCOPHAGE) 500 MG tablet, Take 500 mg by mouth daily (with breakfast), Disp: , Rfl:     predniSONE (DELTASONE) 20 MG tablet, Take 3 tabs orally daily for 3 days, then take 2 tabs orally for 3 days then take 1 tab orally for 3 days. , Disp: 18 tablet, Rfl: 0    DULoxetine (CYMBALTA) 30 MG extended release capsule, TAKE 1 CAPSULE BY ORAL ROUTE  EVERY DAY, Disp: , Rfl:     sucralfate (CARAFATE) 1 GM tablet, Take 1 tablet by mouth 3 times daily, 11/25/2020     Lab Results   Component Value Date    TSHREFLEX 1.11 11/25/2020     Lab Results   Component Value Date    IRON 124 11/25/2020    TIBC 309 11/25/2020    LABIRON 40 11/25/2020     Lab Results   Component Value Date    IDMGJABI66 905 11/25/2020    FOLATE >20.00 11/25/2020     Lab Results   Component Value Date    VITD25 60.6 11/25/2020     Lab Results   Component Value Date    LABA1C 5.0 11/25/2020    EAG 96.8 11/25/2020       Review of Systems   Constitutional: Negative. HENT: Negative. Eyes: Negative. Respiratory: Negative. Cardiovascular: Negative. Gastrointestinal: Negative. Musculoskeletal: Positive for back pain. Being treated for fibromylagia by her PCP   Skin: Negative. Neurological: Negative. PHYSICAL EXAMINATION:    Constitutional: [x] Appears well-developed and well-nourished [x] No apparent distress      [] Abnormal-   Mental status  [x] Alert and awake  [x] Oriented to person/place/time [x]Able to follow commands      Eyes:  EOM    [x]  Normal  [] Abnormal-  Sclera  [x]  Normal  [] Abnormal -         Discharge [x]  None visible  [] Abnormal -    HENT:   [x] Normocephalic, atraumatic.   [] Abnormal   [x] Mouth/Throat: Mucous membranes are moist.     External Ears [x] Normal  [] Abnormal-     Neck: [x] No visualized mass     Pulmonary/Chest: [x] Respiratory effort normal.  [x] No visualized signs of difficulty breathing or respiratory distress        [] Abnormal-      Musculoskeletal:   [] Normal gait with no signs of ataxia         [x] Normal range of motion of neck        [] Abnormal-     Neurological:        [x] No Facial Asymmetry (Cranial nerve 7 motor function) (limited exam to video visit)          [x] No gaze palsy        [] Abnormal-         Skin:        [x] No significant exanthematous lesions or discoloration noted on facial skin         [] Abnormal-            Psychiatric:       [x] Normal Affect [x] No Hallucinations        [] Abnormal-     Other pertinent observable physical exam findings-     Due to this being a TeleHealth encounter, evaluation of the following organ systems is limited: Vitals/Constitutional/EENT/Resp/CV/GI//MS/Neuro/Skin/Heme-Lymph-Imm. Assessment and Plan:   Patient is here via telemedicine and is 1.5 years s/p sleeve gastrectomy, down 6.6lbs with a total weight loss of 136.6lbs. She is doing well, denies n/v or reflux. She was advised to reach out to her GI doctor is she feels her difficulty swallowing has changed recently. She is tolerating diet, getting adequate fluids and protein. She is occasionally low on fluids, discussed importance of getting in at least 48 ounces a day. She does have the post op meal plan to use as a guide as well. She is exercising for about 20 minutes a day on her total gym. . Encouraged continued physical activity. She is taking vitamins as instructed. She did speak with the registered dietitian for continued follow up. I agree with recommendations and plan. In regards to her question about skin removal surgery/liposuction, I recommended a consult with Dr. Raissa Singh. She would like to lose another 80 pounds, so we will plan to refer her to Dr. Raissa Singh when she is about 20-40 pounds away from her goal weight. We discussed options for continued weight loss, she is interested in joining our [facility] program. We will set her up with a NP appt. Labs were ordered at this visit. Patient understands it is their responsibility to have these completed and to obtain results from our office. she gives me permission to leave test results on her voicemail. We will see her back in 6 months for continued follow up or via telemedicine depending on COVID-19 restrictions at the time of their next appointment.      Total encounter time: 20 minutes, including any number of the following: Bariatric Pre/Post operative work up/protocols, review of labs, imaging, provider notes, outside hospital records, performing examination/evaluation, counseling patient and/or family, ordering medications/tests, placing referrals and communication with referring physicians, coordination of care; discussing dietary plan/recall with the patient as well with registered dietitian and documentation in the EHR. Of note, the above was done during same day of the actual patient encounter. An electronic signature was used to authenticate this note. Pursuant to the emergency declaration under the [facility] waiver authority and the [employer] Act, this Virtual  Visit was conducted, with patient's consent, to reduce the patient's risk of exposure to COVID-19 and provide continuity of care for an established patient. Services were provided through a video synchronous discussion virtually to substitute for in-person clinic visit.

## 2021-05-11 NOTE — PATIENT INSTRUCTIONS
Diet tips to help make you successful postoperatively  Eating habits after surgery will have to be a permanent and long-term change. Eating habits are so ingrained that it can be difficult to change. It is important to maintain these new eating habits after surgery. Also remember that overall health, age, and genetics make each persons weight loss progress different. Do not compare your progress, the amount you eat, or exercise to other patients.  Protein first at every meal- Eat the protein portion of your meal first. Eating protein helps the body feel full and sends a signal to stop eating. Protein is very important in building tissue in the body.  Eat at least 4 times per day- This includes protein supplements and small meals with a high amount of protein   Chewing your food thoroughly- Eating too quickly and improper chewing can cause pain and vomiting after surgery.  Slowing down the speed at which you eat- Refill your fork only after you swallow. Adopt a new pattern of eating by taking a bite of food and putting your utensil down between bites. This will help to reduce the feeling of food being stuck.    Drink water and start drinking fluids slowly- Drink at least 48 ounces per day minimum. Sip fluids as if they were hot beverages. If you find it difficult to stop gulping liquids, try using a sippy cup or a sport top water bottle.  Make sure you are eating meals without drinking fluids- After surgery you will not be allowed to drink fluids 30 minutes before, during, or 30 minutes after your meal (30/30/30 rule). This will be a life-long behavior change. The reason for the rule is to keep food from passing through your smaller stomach more rapidly. This will cause you to feel hungry shortly after your meal.   Continue to avoid caffeine and carbonated beverages- Caffeine acts as a diuretic and can be dehydrating as well as irritating to the lining of the stomach.  Carbonated beverages release gas and can expand the stomach.  Continue to keep temptation from your kitchen- Keep your pantry and kitchen cabinets cleaned out of those dangerous foods that might tempt you after surgery (chips, cookies, candy, etc.).  Continue to increase your exercise program- Increase your daily physical activity. Aim for 5-6 days per week for 30 minutes. Walking is an easy way to get started with exercising. Exercise is going to be a regular part of your life after surgery.  Make sure you have a good support system- There will be many changes and adjustments to make after surgery. It is important to have a supportive friend, family member or co-worker, etc. with whom you can talk. Continue to attend [facility]) Weight Management support groups as they can be helpful in maintaining behaviors. In addition, it is the responsibility of the patient to schedule and follow up on labs and tests completed after surgery. Results will be reviewed at each visit. Patient received dietary handouts and education.

## 2021-05-11 NOTE — PROGRESS NOTES
Dietary Assessment Note      Vitals: There were no vitals filed for this visit. Patient lost 6.6 lbs over past 3 months. Total Weight Loss: 136.6 lbs    Due to the COVID-19 restrictions on close contact interactions the patient's visit was conducted via telephone in magdalena of a face to face visit. The patient is here through telemedicine for their post op visit. Labs reviewed:     Results for Dae Cruz (MRN [id number]) as of 5/11/2021 10:50   Ref. Range 4/29/2021 15:15   Sodium Latest Ref Range: 136 - 145 mmol/L 138   Potassium Latest Ref Range: 3.5 - 5.1 mmol/L 3.7   Chloride Latest Ref Range: 99 - 110 mmol/L 101   CO2 Latest Ref Range: 21 - 32 mmol/L 28   BUN Latest Ref Range: 7 - 20 mg/dL 12   Creatinine Latest Ref Range: 0.6 - 1.1 mg/dL 0.6   Anion Gap Latest Ref Range: 3 - 16  9   GFR Non- Latest Ref Range: >60  >60   GFR  Latest Ref Range: >60  >60   Glucose Latest Ref Range: 70 - 99 mg/dL 101 (H)   Calcium Latest Ref Range: 8.3 - 10.6 mg/dL 9.2   Total Protein Latest Ref Range: 6.4 - 8.2 g/dL 7.2       Protein intake: 60-80 grams/day     Fluid intake: 48-64 oz/day, sometimes under 48 oz     Multivitamin/mineral intake: 4 fusion     Calcium intake: none    Other: Vit D     Exercise: 30-40 minutes/day - using total gym most days of the week. Limited some with fibro     Nutrition Assessment: 1 year 6 mo s/p sleeve post-op visit. Having a lot of cravings for bread/pasta. Has been stress eating. Has been eating some junk food as it is in the house for the kids.       Breakfast:Premier protein shake     Snack: nothing     Lunch: greek yogurt sometimes with an apple     Snack: nothing     Dinner: chicken sometimes with veggies OR taco meat with salad     Snack: apple sometimes with PB OR protein cookie    Fluids:  Mostly water    Amount able to eat per sitting: Recently reduced portions - currently eating 1.5 cups    Following 30/30/30 rule: Yes, but sometimes forgets to separate     Food Intolerances/issues: lactose intolerant     Client Concerns: wants to lose more weight     Goals:   Track intakes with [employer] bridget 1-2 days/week   Focus on protein and veggies first and decrease CHO option/limit junk food/use post op meal plan   Follow 30-30-30 rule - avoid keeping drink at table   Limit portion to 1.5 cups/sitting   Consume at least 48 oz/day - more to stay full  Increase non-starchy veggies  Exercise as tolerated     Handout: cravings     Plan: F/U per Provider     Neeta Grove

## 2021-05-12 ENCOUNTER — APPOINTMENT (OUTPATIENT)
Dept: PSYCHIATRY | Age: 36
End: 2021-05-12
Payer: MEDICAID

## 2021-05-12 LAB
ESTIMATED AVERAGE GLUCOSE: 102.5 MG/DL
HBA1C MFR BLD: 5.2 %

## 2021-05-13 ENCOUNTER — HOSPITAL ENCOUNTER (OUTPATIENT)
Dept: PHYSICAL THERAPY | Age: 36
Setting detail: THERAPIES SERIES
Discharge: HOME | End: 2021-05-13
Payer: MEDICAID

## 2021-05-13 ENCOUNTER — HOSPITAL ENCOUNTER (EMERGENCY)
Age: 36
Discharge: HOME | End: 2021-05-14
Attending: EMERGENCY MEDICINE
Payer: MEDICAID

## 2021-05-13 DIAGNOSIS — R07.9 CHEST PAIN, UNSPECIFIED TYPE: Primary | ICD-10-CM

## 2021-05-13 DIAGNOSIS — R55 NEAR SYNCOPE: ICD-10-CM

## 2021-05-13 DIAGNOSIS — E87.6 HYPOKALEMIA: ICD-10-CM

## 2021-05-13 DIAGNOSIS — R79.89 POSITIVE D DIMER: ICD-10-CM

## 2021-05-13 PROCEDURE — 97110 THERAPEUTIC EXERCISES: CPT | Performed by: SPECIALIST

## 2021-05-13 PROCEDURE — 97140 MANUAL THERAPY 1/> REGIONS: CPT | Performed by: SPECIALIST

## 2021-05-13 PROCEDURE — 97016 VASOPNEUMATIC DEVICE THERAPY: CPT | Performed by: SPECIALIST

## 2021-05-13 PROCEDURE — 97112 NEUROMUSCULAR REEDUCATION: CPT | Performed by: SPECIALIST

## 2021-05-13 PROCEDURE — 99284 EMERGENCY DEPT VISIT MOD MDM: CPT

## 2021-05-13 NOTE — FLOWSHEET NOTE
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.92 (ICD-10-CM) - Patellar malalignment syndrome of left knee     ·      Insurance/Certification information:   Christina Burk  Physician Information:   Dr Lum Soulier  Has the plan of care been signed (Y/N):        []  Yes  [x]  No     Date of Patient follow up with Physician: NS    Is this a Progress Report:     []  Yes  [x]  No      If Yes:  Date Range for reporting period:  Beginnin21 ------------ Endin21    Progress report will be due (10 Rx or 30 days whichever is less): 4/15/80       Recertification will be due (POC Duration  / 90 days whichever is less): 21        Visit # Insurance Allowable Auth Required   In Person 3 30 []  Yes     []  No    Tele Health 0  []  Yes     []  No    Total 3       Functional Scale: LEFS 61%   Date assessed:  21      Latex Allergy:  [x]NO      []YES  Preferred Language for Healthcare:   [x]English       []other:    Pain level:  3/10     SUBJECTIVE:  Increased pain with yard work    OBJECTIVE:      Observation:    Test measurements:     : 131-0   :     RESTRICTIONS/PRECAUTIONS: none    Exercises/Interventions:   Therapeutic Ex (41183) Sets/sec Reps Notes/CUES HEP   Heel slides  10x  x   Piriformis stretch 30 2x  x   Piriformis cross over 30 2x  x   HEP x                 Slant board  1 min     LP 90# 30x     NICO B flex/ abd/ add 15# 15x     kmee machine flex/ ext  NV                   Manual Intervention (50583)       Pat mob/ knee ROM  8 min                                        NMR re-education (57780) CUES NEEDED     x   HR/TR  15x  x    x   PB supine clamshells GR 10x  x   bridges  15x  x   Step up and overs 2\" 15x  x   LSD 2\"  15x  x   SLS  1 min  x          Therapeutic Activity (99796)                     Vaso L knee  10 min                   Medbridge access code: [email or web address]             Therapeutic Exercise and NMR EXR  [x] (80033) Provided verbal/tactile cueing for activities related to strengthening, flexibility, endurance, ROM for improvements in LE, proximal hip, and core control with self care, mobility, lifting, ambulation. [x] (34071) Provided verbal/tactile cueing for activities related to improving balance, coordination, kinesthetic sense, posture, motor skill, proprioception to assist with LE, proximal hip, and core control in self-care, mobility, lifting, ambulation and eccentric single leg control.      NMR and Therapeutic Activities:    [x] (61474 or 12486) Provided verbal/tactile cueing for activities related to improving balance, coordination, kinesthetic sense, posture, motor skill, proprioception and motor activation to allow for proper function of core, proximal hip and LE with self-care and ADLs and functional mobility.   [] (82652) Gait Re-education- Provided training and instruction to the patient for proper LE, core and proximal hip recruitment and positioning and eccentric body weight control with ambulation re-education including up and down stairs     Home Exercise Program:    [x] (90945) Reviewed/Progressed HEP activities related to strengthening, flexibility, endurance, ROM of core, proximal hip and LE for functional self-care, mobility, lifting and ambulation/stair navigation   [] (32538) Reviewed/Progressed HEP activities related to improving balance, coordination, kinesthetic sense, posture, motor skill, proprioception of core, proximal hip and LE for self-care, mobility, lifting, and ambulation/stair navigation      Manual Treatments:  PROM / STM / Oscillations-Mobs:  G-I, II, III, IV (Jojo, Inf., Post.)  [x] (19352) Provided manual therapy to mobilize LE, proximal hip and/or LS spine soft tissue/joints for the purpose of modulating pain, promoting relaxation, increasing ROM, reducing/eliminating soft tissue swelling/inflammation/restriction, improving soft tissue extensibility and allowing for proper ROM for normal function with self-care, mobility, lifting and ambulation. Modalities:     [x] GAME READY (VASO)- for significant edema, swelling, pain control. Charges:  Timed Code Treatment Minutes: 38   Total Treatment Minutes:  48   BWC:  TE TIME:  NMR TIME:  MANUAL TIME:  UNTIMED MINUTES:  Medicare Total:         [] EVAL (LOW) 56388 (typically 20 minutes face-to-face)  [] EVAL (MOD) 85015 (typically 30 minutes face-to-face)  [] EVAL (HIGH) 51487 (typically 45 minutes face-to-face)  [] RE-EVAL     [x] HI(77663) x  1   [] IONTO  [x] NMR (30163) x   1  [x] VASO  [x] Manual (67970) x 1    [] Other:  [] TA x      [] Mech Traction (69905)  [] ES(attended) (53248)      [] ES (un) (65576):    ASSESSMENT:  Good tolerance with Mary and manuals    GOALS:      Patient stated goal: walk/ stairs     Therapist goals for Patient:   Short Term Goals: To be achieved in: 2 weeks  1. Independent in HEP and progression per patient tolerance, in order to prevent re-injury. [x] Progressing: [] Met: [] Not Met: [] Adjusted     2. Patient will have a decrease in pain to facilitate improvement in movement, function, and ADLs as indicated by Functional Deficits. [x] Progressing: [] Met: [] Not Met: [] Adjusted     Long Term Goals: To be achieved in: 6-8 weeks  1. Disability index score of 30% or less for the LEFS to assist with reaching prior level of function. [x] Progressing: [] Met: [] Not Met: [] Adjusted     2. Patient will demonstrate increased AROM to [facility] to allow for proper joint functioning as indicated by patients Functional Deficits. [x] Progressing: [] Met: [] Not Met: [] Adjusted     3. Patient will demonstrate an increase in Strength to good proximal hip strength and control, within 5lb HHD in LE to allow for proper functional mobility as indicated by patients Functional Deficits. [x] Progressing: [] Met: [] Not Met: [] Adjusted     4. Patient will return to [facility] for  functional activities without increased symptoms or restriction. [x] Progressing: [] Met: [] Not Met: [] Adjusted     5. Stairs/ walk with min to no limitations (patient specific functional goal)    [x] Progressing: [] Met: [] Not Met: [] Adjusted              Overall Progression Towards Functional goals/ Treatment Progress Update:  [x] Patient is progressing as expected towards functional goals listed. [] Progression is slowed due to complexities/Impairments listed. [] Progression has been slowed due to co-morbidities.   [] Plan just implemented, too soon to assess goals progression <30days   [] Goals require adjustment due to lack of progress  [] Patient is not progressing as expected and requires additional follow up with physician  [] Other    Prognosis for POC: [x] Good [] Fair  [] Poor      Patient requires continued skilled intervention: [x] Yes  [] No    Treatment/Activity Tolerance:  [x] Patient able to complete treatment  [] Patient limited by fatigue  [] Patient limited by pain    [] Patient limited by other medical complications  [] Other:     Return to Play: (if applicable)   [x]  Stage 1: Intro to Strength   []  Stage 2: Return to Run and Strength   []  Stage 3: Return to Jump and Strength   []  Stage 4: Dynamic Strength and Agility   []  Stage 5: Sport Specific Training     []  Ready to Return to Play, Meets All Above Stages   []  Not Ready for Return to Sports   Comments:                          PLAN:   [x] Continue per plan of care [] Alter current plan (see comments above)  [] Plan of care initiated [] Hold pending MD visit [] Discharge    Electronically signed by:  Michael Barton PT    Note: If patient does not return for scheduled/ recommended follow up visits, this note will serve as a discharge from care along with most recent update on progress.

## 2021-05-14 ENCOUNTER — APPOINTMENT (OUTPATIENT)
Dept: GENERAL RADIOLOGY | Age: 36
End: 2021-05-14
Payer: MEDICAID

## 2021-05-14 ENCOUNTER — APPOINTMENT (OUTPATIENT)
Dept: PSYCHIATRY | Age: 36
End: 2021-05-14
Payer: MEDICAID

## 2021-05-14 ENCOUNTER — APPOINTMENT (OUTPATIENT)
Dept: CT IMAGING | Age: 36
End: 2021-05-14
Payer: MEDICAID

## 2021-05-14 VITALS
WEIGHT: 259.4 LBS | HEART RATE: 64 BPM | BODY MASS INDEX: 40.71 KG/M2 | SYSTOLIC BLOOD PRESSURE: 111 MMHG | HEIGHT: 67 IN | TEMPERATURE: 98.4 F | DIASTOLIC BLOOD PRESSURE: 64 MMHG | RESPIRATION RATE: 14 BRPM | OXYGEN SATURATION: 100 %

## 2021-05-14 LAB
A/G RATIO: 1.3 (ref 1.1–2.2)
ALBUMIN SERPL-MCNC: 3.9 G/DL (ref 3.4–5)
ALP BLD-CCNC: 55 U/L (ref 40–129)
ALPHA-TOCOPHEROL: 8.9 MG/L (ref 5.5–18)
ALT SERPL-CCNC: 21 U/L (ref 10–40)
ANION GAP SERPL CALCULATED.3IONS-SCNC: 8 MMOL/L (ref 3–16)
AST SERPL-CCNC: 20 U/L (ref 15–37)
BASOPHILS ABSOLUTE: 0 K/UL (ref 0–0.2)
BASOPHILS RELATIVE PERCENT: 0.5 %
BILIRUB SERPL-MCNC: 0.4 MG/DL (ref 0–1)
BILIRUBIN URINE: NEGATIVE
BLOOD, URINE: NEGATIVE
BUN BLDV-MCNC: 12 MG/DL (ref 7–20)
CALCIUM SERPL-MCNC: 9.1 MG/DL (ref 8.3–10.6)
CHLORIDE BLD-SCNC: 101 MMOL/L (ref 99–110)
CLARITY: CLEAR
CO2: 28 MMOL/L (ref 21–32)
COLOR: YELLOW
CREAT SERPL-MCNC: 0.7 MG/DL (ref 0.6–1.1)
D DIMER: 524 NG/ML DDU (ref 0–229)
EKG ATRIAL RATE: 56 BPM
EKG DIAGNOSIS: NORMAL
EKG P AXIS: 56 DEGREES
EKG P-R INTERVAL: 140 MS
EKG Q-T INTERVAL: 434 MS
EKG QRS DURATION: 86 MS
EKG QTC CALCULATION (BAZETT): 418 MS
EKG R AXIS: 26 DEGREES
EKG T AXIS: 25 DEGREES
EKG VENTRICULAR RATE: 56 BPM
EOSINOPHILS ABSOLUTE: 0.1 K/UL (ref 0–0.6)
EOSINOPHILS RELATIVE PERCENT: 1.3 %
GAMMA-TOCOPHEROL: 1.2 MG/L (ref 0–6)
GFR AFRICAN AMERICAN: >60
GFR NON-AFRICAN AMERICAN: >60
GLOBULIN: 2.9 G/DL
GLUCOSE BLD-MCNC: 74 MG/DL (ref 70–99)
GLUCOSE URINE: NEGATIVE MG/DL
HCT VFR BLD CALC: 38.4 % (ref 36–48)
HEMOGLOBIN: 13 G/DL (ref 12–16)
KETONES, URINE: ABNORMAL MG/DL
LEUKOCYTE ESTERASE, URINE: NEGATIVE
LYMPHOCYTES ABSOLUTE: 2 K/UL (ref 1–5.1)
LYMPHOCYTES RELATIVE PERCENT: 29.6 %
MAGNESIUM: 2.1 MG/DL (ref 1.8–2.4)
MCH RBC QN AUTO: 32.8 PG (ref 26–34)
MCHC RBC AUTO-ENTMCNC: 33.7 G/DL (ref 31–36)
MCV RBC AUTO: 97.1 FL (ref 80–100)
MICROSCOPIC EXAMINATION: ABNORMAL
MONOCYTES ABSOLUTE: 0.6 K/UL (ref 0–1.3)
MONOCYTES RELATIVE PERCENT: 8.4 %
NEUTROPHILS ABSOLUTE: 4.1 K/UL (ref 1.7–7.7)
NEUTROPHILS RELATIVE PERCENT: 60.2 %
NITRITE, URINE: NEGATIVE
PDW BLD-RTO: 13.9 % (ref 12.4–15.4)
PH UA: 6.5 (ref 5–8)
PLATELET # BLD: 207 K/UL (ref 135–450)
PMV BLD AUTO: 9.2 FL (ref 5–10.5)
POTASSIUM SERPL-SCNC: 3.2 MMOL/L (ref 3.5–5.1)
PRO-BNP: 43 PG/ML (ref 0–124)
PROTEIN UA: NEGATIVE MG/DL
RBC # BLD: 3.96 M/UL (ref 4–5.2)
RETINYL PALMITATE: <0.02 MG/L (ref 0–0.1)
SODIUM BLD-SCNC: 137 MMOL/L (ref 136–145)
SPECIFIC GRAVITY UA: 1.02 (ref 1–1.03)
TOTAL PROTEIN: 6.8 G/DL (ref 6.4–8.2)
TROPONIN: <0.01 NG/ML
TROPONIN: <0.01 NG/ML
URINE TYPE: ABNORMAL
UROBILINOGEN, URINE: 0.2 E.U./DL
VITAMIN A LEVEL: 0.33 MG/L (ref 0.3–1.2)
VITAMIN A, INTERP: NORMAL
WBC # BLD: 6.8 K/UL (ref 4–11)

## 2021-05-14 PROCEDURE — 93010 ELECTROCARDIOGRAM REPORT: CPT | Performed by: INTERNAL MEDICINE

## 2021-05-14 PROCEDURE — 85025 COMPLETE CBC W/AUTO DIFF WBC: CPT

## 2021-05-14 PROCEDURE — 71260 CT THORAX DX C+: CPT

## 2021-05-14 PROCEDURE — 2500000003 HC RX 250 WO HCPCS: Performed by: EMERGENCY MEDICINE

## 2021-05-14 PROCEDURE — 83735 ASSAY OF MAGNESIUM: CPT

## 2021-05-14 PROCEDURE — 96374 THER/PROPH/DIAG INJ IV PUSH: CPT

## 2021-05-14 PROCEDURE — 85379 FIBRIN DEGRADATION QUANT: CPT

## 2021-05-14 PROCEDURE — 2580000003 HC RX 258: Performed by: EMERGENCY MEDICINE

## 2021-05-14 PROCEDURE — 83880 ASSAY OF NATRIURETIC PEPTIDE: CPT

## 2021-05-14 PROCEDURE — 6360000004 HC RX CONTRAST MEDICATION: Performed by: EMERGENCY MEDICINE

## 2021-05-14 PROCEDURE — 93005 ELECTROCARDIOGRAM TRACING: CPT | Performed by: EMERGENCY MEDICINE

## 2021-05-14 PROCEDURE — 81003 URINALYSIS AUTO W/O SCOPE: CPT

## 2021-05-14 PROCEDURE — 80053 COMPREHEN METABOLIC PANEL: CPT

## 2021-05-14 PROCEDURE — 71045 X-RAY EXAM CHEST 1 VIEW: CPT

## 2021-05-14 PROCEDURE — 96375 TX/PRO/DX INJ NEW DRUG ADDON: CPT

## 2021-05-14 PROCEDURE — 6370000000 HC RX 637 (ALT 250 FOR IP): Performed by: EMERGENCY MEDICINE

## 2021-05-14 PROCEDURE — 6360000002 HC RX W HCPCS: Performed by: EMERGENCY MEDICINE

## 2021-05-14 PROCEDURE — 84484 ASSAY OF TROPONIN QUANT: CPT

## 2021-05-14 RX ORDER — 0.9 % SODIUM CHLORIDE 0.9 %
500 INTRAVENOUS SOLUTION INTRAVENOUS ONCE
Status: COMPLETED | OUTPATIENT
Start: 2021-05-14 | End: 2021-05-14

## 2021-05-14 RX ORDER — ASPIRIN 81 MG/1
324 TABLET, CHEWABLE ORAL ONCE
Status: COMPLETED | OUTPATIENT
Start: 2021-05-14 | End: 2021-05-14

## 2021-05-14 RX ORDER — KETOROLAC TROMETHAMINE 30 MG/ML
15 INJECTION, SOLUTION INTRAMUSCULAR; INTRAVENOUS ONCE
Status: COMPLETED | OUTPATIENT
Start: 2021-05-14 | End: 2021-05-14

## 2021-05-14 RX ORDER — POTASSIUM CHLORIDE 20 MEQ/1
40 TABLET, EXTENDED RELEASE ORAL ONCE
Status: COMPLETED | OUTPATIENT
Start: 2021-05-14 | End: 2021-05-14

## 2021-05-14 RX ADMIN — IOPAMIDOL 85 ML: 755 INJECTION, SOLUTION INTRAVENOUS at 04:21

## 2021-05-14 RX ADMIN — NITROGLYCERIN 1 INCH: 20 OINTMENT TOPICAL at 01:45

## 2021-05-14 RX ADMIN — ASPIRIN 324 MG: 81 TABLET, CHEWABLE ORAL at 01:43

## 2021-05-14 RX ADMIN — FAMOTIDINE 20 MG: 10 INJECTION, SOLUTION INTRAVENOUS at 04:07

## 2021-05-14 RX ADMIN — KETOROLAC TROMETHAMINE 15 MG: 30 INJECTION, SOLUTION INTRAMUSCULAR; INTRAVENOUS at 04:06

## 2021-05-14 RX ADMIN — SODIUM CHLORIDE 500 ML: 9 INJECTION, SOLUTION INTRAVENOUS at 01:44

## 2021-05-14 RX ADMIN — POTASSIUM CHLORIDE 40 MEQ: 1500 TABLET, EXTENDED RELEASE ORAL at 03:02

## 2021-05-14 ASSESSMENT — ENCOUNTER SYMPTOMS
SHORTNESS OF BREATH: 0
ABDOMINAL PAIN: 0
CHEST TIGHTNESS: 0
VOMITING: 0
BACK PAIN: 1
COUGH: 0
NAUSEA: 0
STRIDOR: 0

## 2021-05-14 ASSESSMENT — PAIN DESCRIPTION - LOCATION
LOCATION: BACK;CHEST
LOCATION: BACK

## 2021-05-14 ASSESSMENT — PAIN DESCRIPTION - PAIN TYPE
TYPE: ACUTE PAIN
TYPE: ACUTE PAIN

## 2021-05-14 ASSESSMENT — PAIN SCALES - GENERAL
PAINLEVEL_OUTOF10: 0
PAINLEVEL_OUTOF10: 2
PAINLEVEL_OUTOF10: 7
PAINLEVEL_OUTOF10: 2

## 2021-05-14 ASSESSMENT — PAIN DESCRIPTION - DESCRIPTORS: DESCRIPTORS: BURNING;THROBBING

## 2021-05-14 ASSESSMENT — HEART SCORE: ECG: 0

## 2021-05-14 ASSESSMENT — PAIN DESCRIPTION - FREQUENCY: FREQUENCY: CONTINUOUS

## 2021-05-14 ASSESSMENT — PAIN DESCRIPTION - ONSET: ONSET: GRADUAL

## 2021-05-14 NOTE — ED PROVIDER NOTES
[address] ED  EMERGENCY DEPARTMENT ENCOUNTER        Pt Name: Michael Oates  MRN: [id number]  [place] 1985  Date of evaluation: 5/13/2021  Provider: Nurys Daly MD  PCP: ROSE Bullard - [facility]      CHIEF COMPLAINT       Chief Complaint   Patient presents with    Other     pt brought in by EMS. pt states \"about 2 hours ago I had a sugar attack so I went into the bathroom but I was constipated, so I stayed in there for 1 hour. I am pretty sure I threw my back out at some point in the last few hours, but it was too loud in the house so my  couldnt hear me yelling for help. \" pt denies syncope, pt did not check glucose at home. Glucose per . HISTORY OFPRESENT ILLNESS   (Location/Symptom, Timing/Onset, Context/Setting, Quality, Duration, Modifying Factors,Severity)  Note limiting factors. Michael Oates is a 37 y.o. female presenting today due to concern for feeling like her glucose dropped earlier this evening after she started developing some sweating and just overall not feeling well. She denied any chest pain or shortness of breath during this time. She ultimately went to the restroom and sat on the toilet for roughly an hour since she was constipated and had a bowel movement. After having a bowel movement, she stood up and started to feel lightheaded like she could pass out. She ultimately lowered herself to the ground and laid down and then states that she may have passed out briefly during this time but is not for sure. She denies any headache or neck pain. She does complain of chest pain directly in the center of her chest when pressing down on her sternum but does feel like it does radiate towards her back. She can press on her sternum and states that hurts. It does hurt to take a deep breath but she states it is directly at the spot of her sternum where it is hurting. No shortness of breath.   She does intermittently have leg cramps but states that has been going on for months and denies any calf pain currently. She denies any history of blood clots. She is not on any estrogen. She denies any personal history of cardiac disease although does have a family history of heart disease with her father. She denies having a history of passing out in the past.  Currently she does not feel lightheaded. No dizziness or room spinning. She does complain of some right lower back pain that radiates down the right leg that has been going on recently although no acute changes with the right leg today. No numbness or weakness in the legs. She states that sometimes she has some trouble with urination but denies any new changes over the last few weeks with this. She did state that she urinated on herself upon arriving to the ED and is unsure if that happened when she could have passed out. She denies any tongue trauma. She denies any family history of sudden cardiac death at a young age. Her main concern was the syncopal event so EMS brought her for further evaluation. Chest pain just started this evening after the reported syncopal event, but again, she denies any falls or trauma. She believes that her chest pain is related to \"costochondritis\" which she looked up on WebMD.        REVIEW OF SYSTEMS    (2-9 systems for level 4, 10 or more for level 5)     Review of Systems   Constitutional: Positive for diaphoresis (briefly prior to bowel movement when she thought her glucose was low, denied chest pain or shortness of breath at that point and it resolved). Negative for fatigue and fever. HENT: Negative for congestion. Eyes: Negative for visual disturbance. Respiratory: Negative for cough, chest tightness, shortness of breath and stridor. Cardiovascular: Positive for chest pain. Negative for palpitations and leg swelling. Gastrointestinal: Negative for abdominal pain, nausea and vomiting. Genitourinary: Negative for dysuria and flank pain. Musculoskeletal: Positive for back pain (right lower back, chronic per patient and no acute changes today). Negative for neck pain. Skin: Negative for wound. Neurological: Positive for syncope (possible) and light-headedness. Negative for dizziness, speech difficulty, weakness, numbness and headaches. Psychiatric/Behavioral: Negative for confusion and suicidal ideas. The patient is not nervous/anxious. Positives and Pertinent negatives as per HPI.       PASTMEDICAL HISTORY     Past Medical History:   Diagnosis Date    Asthma     as child    Back pain     [facility]     Depression     Diabetes mellitus ([facility]     Liver disease     fatty liver    Obesity     OCD (obsessive compulsive disorder)     PCOS (polycystic ovarian syndrome)     PCOS (polycystic ovarian syndrome)     Sleep apnea     cpap         SURGICAL HISTORY       Past Surgical History:   Procedure Laterality Date    CHOLECYSTECTOMY, LAPAROSCOPIC N/A 4/20/2020    LAPAROSCOPIC CHOLECYSTECTOMY performed by Kristel Olivo DO at [address] ([facility]) Left 12/10/14    Excision of Cyst Left Upper Posterior Ankle    OTHER SURGICAL HISTORY Right 12/29/2016    Laparotomy with Right SO    OVARY REMOVAL  12/28/2016    unsure of side    SLEEVE GASTRECTOMY N/A 11/6/2019    LAPAROSCOPIC SLEEVE GASTRECTOMY - ETHICON performed by Montse Angulo MD at [address] [facility] 8/23/2019    EGD BIOPSY performed by Montse Angulo MD at [facility] 3/10/2021    EGD BIOPSY performed by Megan Carbone MD at [address]       Discharge Medication List as of 5/14/2021  5:33 AM      CONTINUE these medications which have NOT CHANGED    Details   omeprazole (PRILOSEC) 40 MG delayed release capsule TAKE 1 CAPSULE (40MG) BY MOUTH 2 TIMES DAILY MAY, Disp-60 capsule, R-0Normal      metFORMIN (GLUCOPHAGE) 500 MG tablet Take 500 mg by mouth daily (with breakfast)Historical Med      DULoxetine (CYMBALTA) 30 MG extended release capsule TAKE 1 CAPSULE BY ORAL ROUTE  EVERY DAYHistorical Med      mirtazapine (REMERON) 15 MG tablet Take 15 mg by mouth nightlyHistorical Med      medical marijuana Take by mouth as needed. Historical Med      busPIRone (BUSPAR) 5 MG tablet Take 5 mg by mouth 3 times dailyHistorical Med      loratadine (CLARITIN) 10 MG tablet Take 10 mg by mouth dailyHistorical Med      Multiple Vitamins-Minerals (BARIATRIC FUSION) CHEW Take 4 tablets by mouth dailyHistorical Med      levothyroxine (SYNTHROID) 75 MCG tablet TAKE 1 TABLET BY MOUTH EVERY DAY, R-5Historical Med      Cholecalciferol (VITAMIN D PO) Take by mouth daily Historical Med      ziprasidone (GEODON) 20 MG capsule Take 20 mg by mouth 2 times daily (with meals)Historical Med      clomiPRAMINE (ANAFRANIL) 25 MG capsule Take 50 mg by mouth nightlyHistorical Med             ALLERGIES     Latex, Lactose, Adhesive tape, and Keflex [cephalexin]    FAMILY HISTORY       Family History   Problem Relation Age of Onset    Asthma Mother    Natty Mons Arthritis Mother     Lung Cancer Mother     Hypertension Father     Elevated Lipids Father     Diabetes Father     Alcohol Abuse Father     Asthma Father     Arthritis Father     Diabetes Paternal Grandfather     Arthritis Paternal Grandfather     Diabetes Maternal Grandmother     Arthritis Maternal Grandmother           SOCIAL HISTORY       Social History     Socioeconomic History    Marital status:      Spouse name: Jhony Ruiz Number of children: 0    Years of education: 12    Highest education level: None   Occupational History    None   Social Needs    Financial resource strain: None    Food insecurity     Worry: None     Inability: None    Transportation needs     Medical: None     Non-medical: None   Tobacco Use    Smoking status: Never Smoker    Smokeless tobacco: Never Used   Substance and Sexual Activity    Alcohol use: Yes     Comment: 1 drink per week    Drug use: Yes     Frequency: 2.0 times per week     Types: Marijuana     Comment: medical marijuana    Sexual activity: Yes     Partners: Male   Lifestyle    Physical activity     Days per week: None     Minutes per session: None    Stress: None   Relationships    Social connections     Talks on phone: None     Gets together: None     Attends Sabianism service: None     Active member of club or organization: None     Attends meetings of clubs or organizations: None     Relationship status: None    Intimate partner violence     Fear of current or ex partner: None     Emotionally abused: None     Physically abused: None     Forced sexual activity: None   Other Topics Concern    None   Social History Narrative    None       SCREENINGS      Heart Score for chest pain patients  History: Slightly Suspicious  ECG: Normal  Patient Age: < 45 years  *Risk factors for Atherosclerotic disease: Obesity, Diabetes Mellitus, Positive family History  Risk Factors: > 3 Risk factors or history of atherosclerotic disease*  Troponin: < 1X normal limit  Heart Score Total: 2    History: 0  EC  Patient Age: 2  *Risk factors for Atherosclerotic disease: Obesity;Diabetes Mellitus; Positive family History  Risk Factors: 2  Troponin: 0  Heart Score Total: 2      PHYSICAL EXAM    (up to 7 for level 4, 8 or more for level 5)     ED Triage Vitals   BP Temp Temp src Pulse Resp SpO2 Height Weight   -- -- -- -- -- -- -- --       Physical Exam  Vitals signs and nursing note reviewed. Constitutional:       General: She is awake. She is not in acute distress. Appearance: Normal appearance. She is well-developed and well-groomed. She is morbidly obese. She is not ill-appearing, toxic-appearing or diaphoretic. Interventions: She is not intubated. HENT:      Head: Normocephalic and atraumatic.       Right Ear: External ear normal.      Left Ear: External ear normal.      Nose: Nose normal.      Mouth/Throat:      Mouth: Mucous membranes are moist.   Eyes:      General: Lids are normal.         Right eye: No discharge. Left eye: No discharge. Extraocular Movements: Extraocular movements intact. Right eye: Normal extraocular motion and no nystagmus. Left eye: Normal extraocular motion and no nystagmus. Pupils: Pupils are equal, round, and reactive to light. Pupils are equal.   Neck:      Musculoskeletal: Full passive range of motion without pain, normal range of motion and neck supple. Normal range of motion. No edema, erythema, neck rigidity, crepitus, injury, pain with movement, torticollis, spinous process tenderness or muscular tenderness. Trachea: Trachea and phonation normal. No tracheal deviation. Cardiovascular:      Rate and Rhythm: Normal rate and regular rhythm. Pulses: Normal pulses. Heart sounds: Normal heart sounds. Pulmonary:      Effort: Pulmonary effort is normal. No tachypnea, bradypnea, accessory muscle usage, prolonged expiration, respiratory distress or retractions. She is not intubated. Breath sounds: Normal breath sounds and air entry. No stridor, decreased air movement or transmitted upper airway sounds. No decreased breath sounds, wheezing, rhonchi or rales. Chest:      Chest wall: No tenderness. Abdominal:      General: Abdomen is flat. Bowel sounds are normal. There is no distension. Palpations: Abdomen is soft. Abdomen is not rigid. Tenderness: There is no abdominal tenderness. There is no right CVA tenderness, left CVA tenderness, guarding or rebound. Negative signs include Conner's sign and McBurney's sign. Musculoskeletal: Normal range of motion. General: No swelling, tenderness, deformity or signs of injury. Cervical back: She exhibits normal range of motion, no tenderness and no bony tenderness.       Thoracic back: She exhibits normal range of motion, no tenderness and no bony tenderness. Lumbar back: She exhibits normal range of motion, no tenderness and no bony tenderness. Comments: MSK: Normal range of motion of bilateral shoulders, elbows, wrists, hips, knees, ankles and nontender to palpation of all joints      Skin:     General: Skin is warm and dry. Coloration: Skin is not jaundiced or pale. Findings: No bruising, erythema, lesion or rash. Neurological:      General: No focal deficit present. Mental Status: She is alert and oriented to person, place, and time. Mental status is at baseline. GCS: GCS eye subscore is 4. GCS verbal subscore is 5. GCS motor subscore is 6. Cranial Nerves: No dysarthria or facial asymmetry. Sensory: Sensation is intact. No sensory deficit. Motor: Motor function is intact. No weakness, tremor, atrophy, abnormal muscle tone, seizure activity or pronator drift. Psychiatric:         Attention and Perception: Attention normal.         Mood and Affect: Mood and affect normal.         Speech: Speech normal.         Behavior: Behavior normal. Behavior is cooperative. Thought Content: Thought content normal. Thought content does not include suicidal ideation. Thought content does not include suicidal plan.          Cognition and Memory: Cognition normal.             DIAGNOSTIC RESULTS   :    Labs Reviewed   CBC WITH AUTO DIFFERENTIAL - Abnormal; Notable for the following components:       Result Value    RBC 3.96 (*)     All other components within normal limits    Narrative:     Performed at:  [facility],  [place], [facility]   Phone [phone]   COMPREHENSIVE METABOLIC PANEL - Abnormal; Notable for the following components:    Potassium 3.2 (*)     All other components within normal limits    Narrative:     Performed at:  [facility]) - [facility], [place], [facility]   Phone [phone]   URINALYSIS - Abnormal; Notable for the following components:    Ketones, Urine TRACE (*)     All other components within normal limits    Narrative:     Performed at:  [facility] [address],  [place], [facility]   Phone [phone]   D-DIMER, QUANTITATIVE - Abnormal; Notable for the following components:    D-Dimer, Quant 524 (*)     All other components within normal limits    Narrative:     Performed at:  [facility] [address],  [place], [facility]   Phone [phone]   TROPONIN    Narrative:     Performed at:  [address] [address],  [place], [facility]   Phone [phone]   MAGNESIUM    Narrative:     Performed at:  [facility] [address],  [place], [facility]   Phone [phone]   BRAIN NATRIURETIC PEPTIDE    Narrative:     Performed at:  [place] [address],  [place], [facility]   Phone [phone]   TROPONIN    Narrative:     Performed at:  [facility]) - [facility] [address],  [place], [facility]   Phone [phone]       All other labs were within normal range or not returned asof this dictation. EKG: All EKG's are interpreted by the Emergency Department Physician who either signs or Co-signs this chart in the absence of a cardiologist.    The Ekg interpreted by me shows  sinus bradycardia, rate=56   Axis is   Normal  QTc is  normal  Intervals and Durations are unremarkable.       ST Segments: no acute change and normal  No significant change from prior EKG dated - 4/11/20  No STEMI  Repeat EKG with no acute changes           RADIOLOGY:   Non-plain film images such as CT, Ultrasound and MRI are read by the radiologist. La Nena Chaves images are visualized and preliminarily interpreted by the  ED Provider with the belowfindings:        Interpretation per the Radiologist below, if available at the time of this note:    CT CHEST PULMONARY EMBOLISM W CONTRAST   Final Result   No evidence of pulmonary embolism or acute pulmonary abnormality. XR CHEST PORTABLE   Final Result   No acute process. PROCEDURES   Unless otherwise noted below, none     Procedures    CRITICAL CARE TIME   N/A    CONSULTS:  None    EMERGENCY DEPARTMENT COURSE and DIFFERENTIAL DIAGNOSIS/MDM:   Vitals:    Vitals:    05/14/21 0145 05/14/21 0200 05/14/21 0235 05/14/21 0532   BP:  114/80 119/80 111/64   Pulse: 64 74 70 64   Resp: 12 22 25 14   Temp:       TempSrc:       SpO2: 100% 100% 100%    Weight:       Height:           Patient was given the following medications:  Medications   aspirin chewable tablet 324 mg (324 mg Oral Given 5/14/21 0143)   nitroglycerin (NITRO-BID) 2 % ointment 1 inch (1 inch Topical Given 5/14/21 0145)   0.9 % sodium chloride bolus (0 mLs Intravenous Stopped 5/14/21 0308)   potassium chloride (KLOR-CON M) extended release tablet 40 mEq (40 mEq Oral Given 5/14/21 0302)   ketorolac (TORADOL) injection 15 mg (15 mg Intravenous Given 5/14/21 0406)   famotidine (PEPCID) injection 20 mg (20 mg Intravenous Given 5/14/21 0407)   iopamidol (ISOVUE-370) 76 % injection 85 mL (85 mLs Intravenous Given 5/14/21 0421)     Patient was evaluated due to concern for initially feeling like her glucose was low earlier in the evening but ultimately trying to have a bowel movement and shortly after this standing up and becoming lightheaded and ultimately lowering herself to the ground and having a possible brief syncopal event versus near syncope. She did complain of some chest pain once the episode occurred mainly near her sternum and worse with breathing. She denies any history of cardiac issues although does have a family history. Heart score equals 2.   D-dimer was evaluated due to concern for pain with breathing and was positive repeat evaluation    Critical access hospital [facility]  [address] [facility]  [phone]    Call         DISCHARGEMEDICATIONS:  Discharge Medication List as of 5/14/2021  5:33 AM          DISCONTINUED MEDICATIONS:  Discharge Medication List as of 5/14/2021  5:33 AM                 (Please note that portions of this note were completed with a voicerecognition program.  Efforts were made to edit the dictations but occasionally words are mis-transcribed.)    Mario Boo MD (electronically signed)            Mario Boo MD  05/14/21 0510

## 2021-05-14 NOTE — ED NOTES
Bed: 07  Expected date: 5/13/21  Expected time: 11:46 PM  Means of arrival:   Comments:  rosy Betancourt  [birth year] [id number]

## 2021-05-15 LAB — VITAMIN B1 WHOLE BLOOD: 153 NMOL/L (ref 70–180)

## 2021-05-17 ENCOUNTER — APPOINTMENT (OUTPATIENT)
Dept: PSYCHIATRY | Age: 36
End: 2021-05-17
Payer: MEDICAID

## 2021-05-18 ENCOUNTER — TELEPHONE (OUTPATIENT)
Dept: BARIATRICS/WEIGHT MGMT | Age: 36
End: 2021-05-18

## 2021-05-18 ENCOUNTER — OFFICE VISIT (OUTPATIENT)
Dept: ORTHOPEDIC SURGERY | Age: 36
End: 2021-05-18
Payer: MEDICAID

## 2021-05-18 VITALS — HEIGHT: 67 IN | WEIGHT: 260 LBS | BODY MASS INDEX: 40.81 KG/M2

## 2021-05-18 DIAGNOSIS — M79.642 PAIN OF LEFT HAND: Primary | ICD-10-CM

## 2021-05-18 PROCEDURE — G8417 CALC BMI ABV UP PARAM F/U: HCPCS | Performed by: ORTHOPAEDIC SURGERY

## 2021-05-18 PROCEDURE — L3908 WHO COCK-UP NONMOLDE PRE OTS: HCPCS | Performed by: ORTHOPAEDIC SURGERY

## 2021-05-18 PROCEDURE — G8428 CUR MEDS NOT DOCUMENT: HCPCS | Performed by: ORTHOPAEDIC SURGERY

## 2021-05-18 PROCEDURE — 99243 OFF/OP CNSLTJ NEW/EST LOW 30: CPT | Performed by: ORTHOPAEDIC SURGERY

## 2021-05-18 NOTE — PROGRESS NOTES
\"Extreme\" stomach pain and diarrhea    Adhesive Tape      Rips off skin    Keflex [Cephalexin] Nausea And Vomiting     Projectile vomitting       Meds    Current Outpatient Medications   Medication Sig Dispense Refill    omeprazole (PRILOSEC) 40 MG delayed release capsule TAKE 1 CAPSULE (40MG) BY MOUTH 2 TIMES DAILY MAY 60 capsule 0    metFORMIN (GLUCOPHAGE) 500 MG tablet Take 500 mg by mouth daily (with breakfast)      DULoxetine (CYMBALTA) 30 MG extended release capsule TAKE 1 CAPSULE BY ORAL ROUTE  EVERY DAY      mirtazapine (REMERON) 15 MG tablet Take 15 mg by mouth nightly      medical marijuana Take by mouth as needed.  busPIRone (BUSPAR) 5 MG tablet Take 5 mg by mouth 3 times daily      loratadine (CLARITIN) 10 MG tablet Take 10 mg by mouth daily      Multiple Vitamins-Minerals (BARIATRIC FUSION) CHEW Take 4 tablets by mouth daily      levothyroxine (SYNTHROID) 75 MCG tablet TAKE 1 TABLET BY MOUTH EVERY DAY  5    Cholecalciferol (VITAMIN D PO) Take by mouth daily       ziprasidone (GEODON) 20 MG capsule Take 20 mg by mouth 2 times daily (with meals)      clomiPRAMINE (ANAFRANIL) 25 MG capsule Take 50 mg by mouth nightly       No current facility-administered medications for this visit.        Social    Social History     Socioeconomic History    Marital status:      Spouse name: Oc Bray Number of children: 0    Years of education: 12    Highest education level: Not on file   Occupational History    Not on file   Tobacco Use    Smoking status: Never Smoker    Smokeless tobacco: Never Used   Vaping Use    Vaping Use: Never used   Substance and Sexual Activity    Alcohol use: Yes     Comment: 1 drink per week    Drug use: Yes     Frequency: 2.0 times per week     Types: Marijuana     Comment: medical marijuana    Sexual activity: Yes     Partners: Male   Other Topics Concern    Not on file   Social History Narrative    Not on file     Social Determinants of Health Financial Resource Strain:     Difficulty of Paying Living Expenses:    Food Insecurity:     Worried About Running Out of Food in the Last Year:     [address] in the Last Year:    Transportation Needs:     Lack of Transportation (Medical):  Lack of Transportation (Non-Medical):    Physical Activity:     Days of Exercise per Week:     Minutes of Exercise per Session:    Stress:     Feeling of Stress :    Social Connections:     Frequency of Communication with Friends and Family:     Frequency of Social Gatherings with Friends and Family:     Attends Yarsanism Services:     Active Member of Clubs or Organizations:     Attends Club or Organization Meetings:     Marital Status:    Intimate Partner Violence:     Fear of Current or Ex-Partner:     Emotionally Abused:     Physically Abused:     Sexually Abused:        Family HISTORY    Family History   Problem Relation Age of Onset    Asthma Mother     Arthritis Mother     Lung Cancer Mother     Hypertension Father     Elevated Lipids Father     Diabetes Father     Alcohol Abuse Father     Asthma Father     Arthritis Father     Diabetes Paternal Grandfather     Arthritis Paternal Grandfather     Diabetes Maternal Grandmother     Arthritis Maternal Grandmother        PHYSICAL EXAM    Vital Signs:  Ht 5' 7\" (1.702 m)   Wt 260 lb (117.9 kg)   LMP 04/13/2020   BMI 40.72 kg/m²   General Appearance:  Normal body habitus. Alert and oriented to person, place, and time. Affect:  Normal.   Gait:  Normal. Good balance and coordination. Reflexes:  Intact. Pulses:  2+ radial pulses with brisk capillary refill to all fingers. Skin:  Normal.     Wrist Exam:  Hand dominance -right  Surface Exam no visible lesions are seen    Neurologic Exam:  Reflexes:  Normal.   Tinels:  Normal.   Phalens:  Negative. Median Nerve Compression:  Negative. Thenar strength:  Normal.   Thenar atrophy:  Absent.    Sensation:  Normal in the median, radial, and ulnar nerve distributions. She does have pain with stress to the triangular fibrocartilage complex region with no definite crepitus or guarding. Wrist Motion Right Left   DF     PF     RD     CMC     UD     SUP     PRO     IMAGING STUDIES  X-rays 3 views of the left wrist are suspicious for fifth carpometacarpal injury of the distal hamate. IMPRESSION    Left hand crush with residual pain    PLAN    1. Conservative care options including physical therapy, NSAIDs, bracing, and activity modification were discussed. 2.  The indications for therapeutic injections were discussed. 3.  The indications for additional imaging studies were discussed. 4.  After considering the various options discussed, the patient elected to pursue a course that includes bracing medication both oral and topical anti-inflammatory and if her problem does not improve substantially over the next several weeks consider scanning. Procedures    Isamar Timmons Titan Wrist Short Brace     Patient was prescribed a Isamar Timmons Titan Wrist Orthosis. The left wrist will require stabilization / immobilization from this semi-rigid / rigid orthosis to improve their function. The orthosis will assist in protecting the affected area, provide functional support and facilitate healing. The patient was educated and fit by a healthcare professional with expert knowledge and specialization in brace application while under the direct supervision of the treating physician. Verbal and written instructions for the use of and application of this item were provided. They were instructed to contact the office immediately should the brace result in increased pain, decreased sensation, increased swelling or worsening of the condition.

## 2021-05-19 ENCOUNTER — APPOINTMENT (OUTPATIENT)
Dept: PSYCHIATRY | Age: 36
End: 2021-05-19
Payer: MEDICAID

## 2021-05-20 ENCOUNTER — HOSPITAL ENCOUNTER (OUTPATIENT)
Dept: PHYSICAL THERAPY | Age: 36
Setting detail: THERAPIES SERIES
Discharge: HOME | End: 2021-05-20
Payer: MEDICAID

## 2021-05-20 ENCOUNTER — OFFICE VISIT (OUTPATIENT)
Dept: ORTHOPEDIC SURGERY | Age: 36
End: 2021-05-20
Payer: MEDICAID

## 2021-05-20 ENCOUNTER — TELEPHONE (OUTPATIENT)
Dept: ORTHOPEDIC SURGERY | Age: 36
End: 2021-05-20

## 2021-05-20 VITALS — HEIGHT: 67 IN | BODY MASS INDEX: 40.81 KG/M2 | RESPIRATION RATE: 12 BRPM | WEIGHT: 260 LBS

## 2021-05-20 DIAGNOSIS — M23.92 PATELLAR MALALIGNMENT SYNDROME OF LEFT KNEE: Primary | ICD-10-CM

## 2021-05-20 PROCEDURE — 1036F TOBACCO NON-USER: CPT | Performed by: ORTHOPAEDIC SURGERY

## 2021-05-20 PROCEDURE — 97110 THERAPEUTIC EXERCISES: CPT | Performed by: SPECIALIST

## 2021-05-20 PROCEDURE — G8427 DOCREV CUR MEDS BY ELIG CLIN: HCPCS | Performed by: ORTHOPAEDIC SURGERY

## 2021-05-20 PROCEDURE — 97016 VASOPNEUMATIC DEVICE THERAPY: CPT | Performed by: SPECIALIST

## 2021-05-20 PROCEDURE — L1810 KO ELASTIC WITH JOINTS: HCPCS | Performed by: ORTHOPAEDIC SURGERY

## 2021-05-20 PROCEDURE — 97140 MANUAL THERAPY 1/> REGIONS: CPT | Performed by: SPECIALIST

## 2021-05-20 PROCEDURE — 97112 NEUROMUSCULAR REEDUCATION: CPT | Performed by: SPECIALIST

## 2021-05-20 PROCEDURE — G8417 CALC BMI ABV UP PARAM F/U: HCPCS | Performed by: ORTHOPAEDIC SURGERY

## 2021-05-20 PROCEDURE — 99214 OFFICE O/P EST MOD 30 MIN: CPT | Performed by: ORTHOPAEDIC SURGERY

## 2021-05-20 NOTE — PROGRESS NOTES
Chief Complaint  Knee Pain (F/U LEFT KNEE PAIN)      History of Present Illness:  Maude Pinto is a pleasant 37 y.o. female left knee pain about 3 weeks after cortisone injection. She is in physical therapy. She has noted  75% improvement for the first 2 weeks and about 40% past that. She still has a sense that her kneecap wants to give out and has anterior knee pain with prolonged standing. I diagnosed her with patellofemoral syndrome with early patellofemoral arthrosis. She still has some subjective swelling and mechanical symptoms. nonetheless her motion has improved with PT with Marcia Nolen here at our [facility] office. Medical History:  Patient's medications, allergies, past medical, surgical, social and family histories were reviewed and updated as appropriate. Pain Assessment  Location of Pain: Knee  Location Modifiers: Left  Severity of Pain: 2  Quality of Pain: Aching  Frequency of Pain: Intermittent  Aggravating Factors: Stairs, Walking (FULL WEIGHT BEARING)  Limiting Behavior: Yes  Relieving Factors: Rest, Ice  Result of Injury: No  Work-Related Injury: No  Are there other pain locations you wish to document?: No  ROS: Review of systems reviewed from Patient History Form completed today and available in the patient's chart under the Media tab. Pertinent items are noted in HPI  Review of systems reviewed from Patient History Form completed today and available in the patient's chart under the Media tab. Vital Signs:  Resp 12   Ht 5' 7\" (1.702 m)   Wt 260 lb (117.9 kg)   LMP 04/13/2020   BMI 40.72 kg/m²         Neuro: Alert & oriented x 3,  normal,  no focal deficits noted. Normal affect. Eyes: sclera clear  Ears: Normal external ear  Mouth:  No perioral lesions  Pulm: Respirations unlabored and regular  Pulse: Extremities well perfused. 2+ peripheral pulses. Skin: Warm. No ulcerations.       Constitutional: The physical examination finds the patient to be well-developed and immobilization from this semi-rigid / rigid orthosis to improve their function. The orthosis will assist in protecting the affected area, provide functional support and facilitate healing. The patient was educated and fit by a healthcare professional with expert knowledge and specialization in brace application while under the direct supervision of the physician. Verbal and written instructions for the use of and application of this item were provided. They were instructed to contact the office immediately should the brace result in increased pain, decreased sensation, increased swelling or worsening of the condition. Plan: This point she is a candidate for a patellar stabilizing J brace, and an MRI to rule out a MPFL tear or cartilage lesion behind the patellofemoral joint. We will see her back after the MRI to review the findings. Meanwhile she should continue with physical therapy for knee strengthening and flexibility. All the patient's questions were answered while in the clinic. The patient is understanding of all instructions and agrees with the plan. Approximately 30 minutes was spent on patient education and coordinating care. Follow up in: No follow-ups on file. Sincerely,    Dave Fernandez MD [address] [place] [address] E [facility] [place] [place], [facility] E [address] Maganavard  Email: [email or web address]  Office: [phone]    05/21/21  5:51 PM      The encounter with Rola Serrano was carried out by myself, Dr Abdirahman Banegas, who personally examined the patient and reviewed the plan. This dictation was performed with a verbal recognition program (DRAGON) and it was checked for errors. It is possible that there are still dictated errors within this office note. If so, please bring any errors to my attention for an addendum.   All efforts were made to ensure that this office note is accurate.

## 2021-05-20 NOTE — FLOWSHEET NOTE
[facility], [address] [place] [address] [address]  Phone: [phone]   Fax:     [phone]      Physical Therapy Treatment Note/ Progress Report:     Date:  2021    Patient Name:  Ezra Machado    :  1985  MRN: [id number]  Restrictions/Precautions:    Medical/Treatment Diagnosis Information:      M25.562, G89.29 (ICD-10-CM) - Chronic pain of left knee   M23.92 (ICD-10-CM) - Patellar malalignment syndrome of left knee     ·      Insurance/Certification information:   [facility]  Physician Information:   Dr Bridges Marking  Has the plan of care been signed (Y/N):        []  Yes  [x]  No     Date of Patient follow up with Physician: NS    Is this a Progress Report:     []  Yes  [x]  No      If Yes:  Date Range for reporting period:  Beginnin21 ------------ Endin21    Progress report will be due (10 Rx or 30 days whichever is less): 51       Recertification will be due (POC Duration  / 90 days whichever is less): 21        Visit # Insurance Allowable Auth Required   In Person 4 30 []  Yes     []  No    [facility] 0  []  Yes     []  No    Total 4       Functional Scale: LEFS 61%   Date assessed:  21      Latex Allergy:  [x]NO      []YES  Preferred Language for Healthcare:   [x]English       []other:    Pain level:  3/10     SUBJECTIVE:  Increased pain with yard work    OBJECTIVE:      Observation:    Test measurements:     : 131-0    131-0        RESTRICTIONS/PRECAUTIONS: none    Exercises/Interventions:   Therapeutic Ex (27047) Sets/sec Reps Notes/CUES HEP   Heel slides  10x  x   Piriformis stretch 30 2x  x   Piriformis cross over 30 2x  x   HEP x                 Slant board  1 min     LP 90# 30x     NICO B flex/ abd/ add 15# 15x     knee machine flex/ ext 20#/  20x                   Manual Intervention (39741)       Pat mob/ knee ROM  8 min                                        NMR Oscillations-Mobs:  G-I, II, III, IV (PA's, Inf., Post.)  [x] (00555) Provided manual therapy to mobilize LE, proximal hip and/or LS spine soft tissue/joints for the purpose of modulating pain, promoting relaxation, increasing ROM, reducing/eliminating soft tissue swelling/inflammation/restriction, improving soft tissue extensibility and allowing for proper ROM for normal function with self-care, mobility, lifting and ambulation. Modalities:     [x] GAME READY (VASO)- for significant edema, swelling, pain control. Charges:  Timed Code Treatment Minutes: 38   Total Treatment Minutes:  48   BWC:  TE TIME:  NMR TIME:  MANUAL TIME:  UNTIMED MINUTES:  Medicare Total:         [] EVAL (LOW) 55774 (typically 20 minutes face-to-face)  [] EVAL (MOD) 60201 (typically 30 minutes face-to-face)  [] EVAL (HIGH) 57154 (typically 45 minutes face-to-face)  [] RE-EVAL     [x] KY(58780) x  1   [] IONTO  [x] NMR (54402) x   1  [x] VASO  [x] Manual (34390) x 1    [] Other:  [] TA x      [] Mech Traction (05379)  [] ES(attended) (81554)      [] ES (un) (32286):    ASSESSMENT:  Good tolerance with Mary and manuals    GOALS:      Patient stated goal: walk/ stairs     Therapist goals for Patient:   Short Term Goals: To be achieved in: 2 weeks  1. Independent in HEP and progression per patient tolerance, in order to prevent re-injury. [x] Progressing: [] Met: [] Not Met: [] Adjusted     2. Patient will have a decrease in pain to facilitate improvement in movement, function, and ADLs as indicated by Functional Deficits. [x] Progressing: [] Met: [] Not Met: [] Adjusted     Long Term Goals: To be achieved in: 6-8 weeks  1. Disability index score of 30% or less for the LEFS to assist with reaching prior level of function. [x] Progressing: [] Met: [] Not Met: [] Adjusted     2. Patient will demonstrate increased AROM to [facility] to allow for proper joint functioning as indicated by patients Functional Deficits.    [x] Progressing: [] Met: [] Not Met: [] Adjusted     3. Patient will demonstrate an increase in Strength to good proximal hip strength and control, within 5lb HHD in LE to allow for proper functional mobility as indicated by patients Functional Deficits. [x] Progressing: [] Met: [] Not Met: [] Adjusted     4. Patient will return to [facility] for  functional activities without increased symptoms or restriction. [x] Progressing: [] Met: [] Not Met: [] Adjusted     5. Stairs/ walk with min to no limitations (patient specific functional goal)    [x] Progressing: [] Met: [] Not Met: [] Adjusted              Overall Progression Towards Functional goals/ Treatment Progress Update:  [x] Patient is progressing as expected towards functional goals listed. [] Progression is slowed due to complexities/Impairments listed. [] Progression has been slowed due to co-morbidities.   [] Plan just implemented, too soon to assess goals progression <30days   [] Goals require adjustment due to lack of progress  [] Patient is not progressing as expected and requires additional follow up with physician  [] Other    Prognosis for POC: [x] Good [] Fair  [] Poor      Patient requires continued skilled intervention: [x] Yes  [] No    Treatment/Activity Tolerance:  [x] Patient able to complete treatment  [] Patient limited by fatigue  [] Patient limited by pain    [] Patient limited by other medical complications  [] Other:     Return to Play: (if applicable)   [x]  Stage 1: Intro to Strength   []  Stage 2: Return to Run and Strength   []  Stage 3: Return to Jump and Strength   []  Stage 4: Dynamic Strength and Agility   []  Stage 5: Sport Specific Training     []  Ready to Return to Play, Meets All Above Stages   []  Not Ready for Return to Sports   Comments:                          PLAN:   [x] Continue per plan of care [] Alter current plan (see comments above)  [] Plan of care initiated [] Hold pending MD visit [] Discharge    Electronically signed by:  Muna Rose PT    Note: If patient does not return for scheduled/ recommended follow up visits, this note will serve as a discharge from care along with most recent update on progress.

## 2021-05-20 NOTE — TELEPHONE ENCOUNTER
5/20/21 DME    - NOT COVERED - FOLLOW MEDICAID FEE SCHEDULE AND ITEM NOT ON FEE SCHEDULE - SPLIT CODING CROSSWALK FOR MEDICAID   IS COVERED AND NO PRECERT REQUIRED - PER NOTES - MP

## 2021-05-21 ENCOUNTER — APPOINTMENT (OUTPATIENT)
Dept: PSYCHIATRY | Age: 36
End: 2021-05-21
Payer: MEDICAID

## 2021-05-21 ENCOUNTER — TELEPHONE (OUTPATIENT)
Dept: BARIATRICS/WEIGHT MGMT | Age: 36
End: 2021-05-21

## 2021-05-24 ENCOUNTER — OFFICE VISIT (OUTPATIENT)
Dept: ORTHOPEDIC SURGERY | Age: 36
End: 2021-05-24
Payer: MEDICAID

## 2021-05-24 ENCOUNTER — APPOINTMENT (OUTPATIENT)
Dept: PSYCHIATRY | Age: 36
End: 2021-05-24
Payer: MEDICAID

## 2021-05-24 VITALS — BODY MASS INDEX: 40.81 KG/M2 | HEIGHT: 67 IN | WEIGHT: 260 LBS

## 2021-05-24 DIAGNOSIS — M51.26 HNP (HERNIATED NUCLEUS PULPOSUS), LUMBAR: ICD-10-CM

## 2021-05-24 DIAGNOSIS — M47.816 LUMBAR SPONDYLOSIS: Primary | ICD-10-CM

## 2021-05-24 PROCEDURE — 99214 OFFICE O/P EST MOD 30 MIN: CPT | Performed by: PHYSICIAN ASSISTANT

## 2021-05-24 PROCEDURE — 1036F TOBACCO NON-USER: CPT | Performed by: PHYSICIAN ASSISTANT

## 2021-05-24 PROCEDURE — G8417 CALC BMI ABV UP PARAM F/U: HCPCS | Performed by: PHYSICIAN ASSISTANT

## 2021-05-24 PROCEDURE — G8427 DOCREV CUR MEDS BY ELIG CLIN: HCPCS | Performed by: PHYSICIAN ASSISTANT

## 2021-05-24 NOTE — PROGRESS NOTES
New Patient: LUMBAR SPINE    Referring Provider:  No ref. provider found    CHIEF COMPLAINT:    Chief Complaint   Patient presents with    Back Pain     Patient states that she has had back pain since she was 8yo. Patient states that in the last year, she has developed sciatic pain on the right side as well. She has tried PT 2-3x but states it makes it worse. HISTORY OF PRESENT ILLNESS:       Ms. Erika Miranda  is a pleasant 37 y.o. female here for evaluation regarding her LBP and right leg pain. She states her pain began insidiously about several years ago. Her pain has steadily continued since then. She rates her back pain 7/10, right buttock pain 2/10 and right leg pain 3/10. She describes the pain as constant. Pain is worse with any activity and improved some with sitting and leaning forward onto a cart. The leg pain radiates down the posterior aspect of her right leg to her ankle. She as numbness and tingling in her right leg. She denies weakness of her right or left leg and denies bowel or bladder dysfunction. The pain at times disrupts her sleep.    Pain Assessment  Location of Pain: Back  Location Modifiers: Right  Severity of Pain: 2  Quality of Pain: Dull, Aching  Duration of Pain: Persistent  Frequency of Pain: Constant  Aggravating Factors: Walking, Standing  Limiting Behavior: Yes  Relieving Factors: Rest  Result of Injury: No  Work-Related Injury: No  Are there other pain locations you wish to document?: No]    Current/Past Treatment:   · Physical Therapy: None recently  · Chiropractic: None  · Injection: None  · Medications: None    Past Medical History:   Past Medical History:   Diagnosis Date    Asthma     as child    Back pain     Bipolar 1 disorder (HCC)     Depression     Diabetes mellitus (HCC)     Liver disease     fatty liver    Obesity     OCD (obsessive compulsive disorder)     PCOS (polycystic ovarian syndrome)     PCOS (polycystic ovarian syndrome)     Sleep apnea (ANAFRANIL) 25 MG capsule, Take 50 mg by mouth nightly, Disp: , Rfl:     Allergies:  Latex, Lactose, Adhesive tape, and Keflex [cephalexin]    Social History:    reports that she has never smoked. She has never used smokeless tobacco. She reports current alcohol use. She reports current drug use. Frequency: 2.00 times per week. Drug: Marijuana. Family History:   Family History   Problem Relation Age of Onset    Asthma Mother     Arthritis Mother     Lung Cancer Mother     Hypertension Father     Elevated Lipids Father     Diabetes Father     Alcohol Abuse Father     Asthma Father     Arthritis Father     Diabetes Paternal Grandfather     Arthritis Paternal Grandfather     Diabetes Maternal Grandmother     Arthritis Maternal Grandmother        REVIEW OF SYSTEMS: Full ROS noted & scanned   CONSTITUTIONAL: Denies unexplained weight loss, fevers, chills or fatigue  NEUROLOGICAL: Denies unsteady gait or progressive weakness  MUSCULOSKELETAL: Denies joint swelling or redness  PSYCHOLOGICAL: Denies anxiety, depression   SKIN: Denies skin changes, delayed healing, rash, itching   HEMATOLOGIC: Denies easy bleeding or bruising  ENDOCRINE: Denies excessive thirst, urination, heat/cold  RESPIRATORY: Denies current dyspnea, cough  GI: Denies nausea, vomiting, diarrhea   : Denies bowel or bladder issues      PHYSICAL EXAM:    Vitals: Height 5' 7\" (1.702 m), weight 260 lb (117.9 kg), last menstrual period 04/13/2020, not currently breastfeeding. GENERAL EXAM:  · General Apparence: Patient is adequately groomed with no evidence of malnutrition. · Orientation: The patient is oriented to time, place and person. · Mood & Affect:The patient's mood and affect are appropriate. · Vascular: Examination reveals no swelling tenderness in upper or lower extremities. Good capillary refill.   · Lymphatic: The lymphatic examination bilaterally reveals all areas to be without enlargement or induration  · Sensation: Sensation is intact without deficit  · Coordination/Balance: Good coordination. Tandem walking normal.     LUMBAR/SACRAL EXAMINATION:  · Inspection: Local inspection shows no step-off or bruising. Lumbar alignment is normal.  Sagittal and Coronal balance is neutral.      · Palpation:   No evidence of tenderness at the midline. No tenderness bilaterally at the paraspinal or trochanters. There is no step-off or paraspinal spasm. · Range of Motion: Lumbar flexion, extension and rotation are mildly limited due to pain. · Strength:   Strength testing is 5/5 in all muscle groups tested. · Special Tests:   Straight leg raise and crossed SLR negative. Leg length and pelvis level. · Skin: There are no rashes, ulcerations or lesions. · Reflexes: Reflexes are symmetrically 2+ at the patellar and ankle tendons. Clonus absent bilaterally at the feet. · Gait & station: normal, patient ambulates without assistance    · Additional Examinations:   · RIGHT LOWER EXTREMITY: Inspection/examination of the right lower extremity does not show any tenderness, deformity or injury. Range of motion is unremarkable. There is no gross instability. There are no rashes, ulcerations or lesions. Strength and tone are normal.  · LEFT LOWER EXTREMITY:  Inspection/examination of the left lower extremity does not show any tenderness, deformity or injury. Range of motion is unremarkable. There is no gross instability. There are no rashes, ulcerations or lesions. Strength and tone are normal.    Diagnostic Testin views of the lumbar spine that were obtained on 2021 were reviewed with the patient today which shows lumbar spondylosis with mild DDD of the lumbar spine. There is mild facet hypertrophy noted.     MRI of the lumbar spine that was obtained on 3/16/21 was reviewed with the patient today  Which shows multilevel spondylosis with mild disc herniations at L4-5 and L5-S1 with no associated central stenosis, or foraminal stenosis. Impression:   Lumbar spondylosis  HNP L4-5 L5-S1    Plan:      · We discussed the diagnosis and treatment options including observation, additional oral steroids, physical therapy, epidural injections and additional imaging. She wishes to proceed with referral to Dr. Sahara Dee for potential spinal injections.       Patient examined and note dictated by Monet Vang PA-C.

## 2021-05-26 ENCOUNTER — APPOINTMENT (OUTPATIENT)
Dept: PSYCHIATRY | Age: 36
End: 2021-05-26
Payer: MEDICAID

## 2021-05-27 ENCOUNTER — HOSPITAL ENCOUNTER (OUTPATIENT)
Dept: PHYSICAL THERAPY | Age: 36
Setting detail: THERAPIES SERIES
Discharge: HOME | End: 2021-05-27
Payer: MEDICAID

## 2021-05-27 NOTE — FLOWSHEET NOTE
[facility], [address] [place] [address], [address] [place] [address] [address]  Phone: [phone]   Fax:     [phone]    Physical Therapy  Cancellation/No-show Note  Patient Name:  Lou Tang  :  1985   Date:  2021    Cancelled visits to date: 0  No-shows to date: 1    For today's appointment patient:  []  Cancelled  []  Rescheduled appointment  [x]  No-show     Reason given by patient:  []  Patient ill  []  Conflicting appointment  []  No transportation    []  Conflict with work  []  No reason given  []  Other:     Comments:      Phone call information:   [x]  Phone call made today to patient at _ time at number provided:      []  Patient answered, conversation as follows:    [x]  Patient did not answer, message left as follows:  []  Phone call not made today  []  Phone call not needed - pt contacted us to cancel and provided reason for cancellation.      Electronically signed by:  Kaitlin Sumner, PT, PT

## 2021-05-28 ENCOUNTER — APPOINTMENT (OUTPATIENT)
Dept: PSYCHIATRY | Age: 36
End: 2021-05-28
Payer: MEDICAID

## 2021-05-29 ENCOUNTER — PATIENT MESSAGE (OUTPATIENT)
Dept: BARIATRICS/WEIGHT MGMT | Age: 36
End: 2021-05-29

## 2021-05-29 DIAGNOSIS — K21.9 CHRONIC GERD: ICD-10-CM

## 2021-05-31 ENCOUNTER — APPOINTMENT (OUTPATIENT)
Dept: PSYCHIATRY | Age: 36
End: 2021-05-31
Payer: MEDICAID

## 2021-06-01 NOTE — TELEPHONE ENCOUNTER
From: Josesito Wood  To: Margie Agrawal APRN - CNP  Sent: 5/29/2021 9:53 PM EDT  Subject: Visit Follow-Up Question    Wasn't sure if I let you know that my potassium was still low when they rechecked and that they put me on pulls.      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

## 2021-06-03 ENCOUNTER — HOSPITAL ENCOUNTER (EMERGENCY)
Age: 36
Discharge: HOME | End: 2021-06-03
Payer: MEDICAID

## 2021-06-03 ENCOUNTER — TELEPHONE (OUTPATIENT)
Dept: ORTHOPEDIC SURGERY | Age: 36
End: 2021-06-03

## 2021-06-03 ENCOUNTER — HOSPITAL ENCOUNTER (OUTPATIENT)
Dept: PHYSICAL THERAPY | Age: 36
Setting detail: THERAPIES SERIES
Discharge: HOME | End: 2021-06-03
Payer: MEDICAID

## 2021-06-03 VITALS
WEIGHT: 258 LBS | HEIGHT: 67 IN | OXYGEN SATURATION: 100 % | DIASTOLIC BLOOD PRESSURE: 73 MMHG | BODY MASS INDEX: 40.49 KG/M2 | RESPIRATION RATE: 15 BRPM | HEART RATE: 81 BPM | SYSTOLIC BLOOD PRESSURE: 111 MMHG | TEMPERATURE: 97.7 F

## 2021-06-03 DIAGNOSIS — R19.7 NAUSEA VOMITING AND DIARRHEA: Primary | ICD-10-CM

## 2021-06-03 DIAGNOSIS — R11.2 NAUSEA VOMITING AND DIARRHEA: Primary | ICD-10-CM

## 2021-06-03 DIAGNOSIS — J02.9 ACUTE PHARYNGITIS, UNSPECIFIED ETIOLOGY: ICD-10-CM

## 2021-06-03 LAB
A/G RATIO: 1.4 (ref 1.1–2.2)
ALBUMIN SERPL-MCNC: 4.2 G/DL (ref 3.4–5)
ALP BLD-CCNC: 63 U/L (ref 40–129)
ALT SERPL-CCNC: 22 U/L (ref 10–40)
ANION GAP SERPL CALCULATED.3IONS-SCNC: 7 MMOL/L (ref 3–16)
AST SERPL-CCNC: 20 U/L (ref 15–37)
BASOPHILS ABSOLUTE: 0 K/UL (ref 0–0.2)
BASOPHILS RELATIVE PERCENT: 0.5 %
BILIRUB SERPL-MCNC: 0.5 MG/DL (ref 0–1)
BILIRUBIN URINE: NEGATIVE
BLOOD, URINE: NEGATIVE
BUN BLDV-MCNC: 12 MG/DL (ref 7–20)
CALCIUM SERPL-MCNC: 9.4 MG/DL (ref 8.3–10.6)
CHLORIDE BLD-SCNC: 103 MMOL/L (ref 99–110)
CLARITY: CLEAR
CO2: 28 MMOL/L (ref 21–32)
COLOR: YELLOW
CREAT SERPL-MCNC: 0.6 MG/DL (ref 0.6–1.1)
EOSINOPHILS ABSOLUTE: 0.1 K/UL (ref 0–0.6)
EOSINOPHILS RELATIVE PERCENT: 1 %
GFR AFRICAN AMERICAN: >60
GFR NON-AFRICAN AMERICAN: >60
GLOBULIN: 3.1 G/DL
GLUCOSE BLD-MCNC: 111 MG/DL (ref 70–99)
GLUCOSE URINE: NEGATIVE MG/DL
HCT VFR BLD CALC: 41.8 % (ref 36–48)
HEMOGLOBIN: 14 G/DL (ref 12–16)
KETONES, URINE: NEGATIVE MG/DL
LEUKOCYTE ESTERASE, URINE: NEGATIVE
LIPASE: 42 U/L (ref 13–60)
LYMPHOCYTES ABSOLUTE: 1.6 K/UL (ref 1–5.1)
LYMPHOCYTES RELATIVE PERCENT: 21.6 %
MCH RBC QN AUTO: 32.2 PG (ref 26–34)
MCHC RBC AUTO-ENTMCNC: 33.4 G/DL (ref 31–36)
MCV RBC AUTO: 96.5 FL (ref 80–100)
MICROSCOPIC EXAMINATION: NORMAL
MONOCYTES ABSOLUTE: 0.6 K/UL (ref 0–1.3)
MONOCYTES RELATIVE PERCENT: 7.7 %
NEUTROPHILS ABSOLUTE: 5.2 K/UL (ref 1.7–7.7)
NEUTROPHILS RELATIVE PERCENT: 69.2 %
NITRITE, URINE: NEGATIVE
PDW BLD-RTO: 14.2 % (ref 12.4–15.4)
PH UA: 6.5 (ref 5–8)
PLATELET # BLD: 241 K/UL (ref 135–450)
PMV BLD AUTO: 8.9 FL (ref 5–10.5)
POTASSIUM SERPL-SCNC: 4.2 MMOL/L (ref 3.5–5.1)
PROTEIN UA: NEGATIVE MG/DL
RBC # BLD: 4.33 M/UL (ref 4–5.2)
S PYO AG THROAT QL: NEGATIVE
SODIUM BLD-SCNC: 138 MMOL/L (ref 136–145)
SPECIFIC GRAVITY UA: 1.02 (ref 1–1.03)
TOTAL PROTEIN: 7.3 G/DL (ref 6.4–8.2)
URINE TYPE: NORMAL
UROBILINOGEN, URINE: 0.2 E.U./DL
WBC # BLD: 7.5 K/UL (ref 4–11)

## 2021-06-03 PROCEDURE — 97110 THERAPEUTIC EXERCISES: CPT | Performed by: SPECIALIST

## 2021-06-03 PROCEDURE — 97016 VASOPNEUMATIC DEVICE THERAPY: CPT | Performed by: SPECIALIST

## 2021-06-03 PROCEDURE — 87081 CULTURE SCREEN ONLY: CPT

## 2021-06-03 PROCEDURE — 96375 TX/PRO/DX INJ NEW DRUG ADDON: CPT

## 2021-06-03 PROCEDURE — 80053 COMPREHEN METABOLIC PANEL: CPT

## 2021-06-03 PROCEDURE — 83690 ASSAY OF LIPASE: CPT

## 2021-06-03 PROCEDURE — 96374 THER/PROPH/DIAG INJ IV PUSH: CPT

## 2021-06-03 PROCEDURE — 81003 URINALYSIS AUTO W/O SCOPE: CPT

## 2021-06-03 PROCEDURE — 97140 MANUAL THERAPY 1/> REGIONS: CPT | Performed by: SPECIALIST

## 2021-06-03 PROCEDURE — 6360000002 HC RX W HCPCS: Performed by: NURSE PRACTITIONER

## 2021-06-03 PROCEDURE — 97112 NEUROMUSCULAR REEDUCATION: CPT | Performed by: SPECIALIST

## 2021-06-03 PROCEDURE — 87880 STREP A ASSAY W/OPTIC: CPT

## 2021-06-03 PROCEDURE — 99284 EMERGENCY DEPT VISIT MOD MDM: CPT

## 2021-06-03 PROCEDURE — 85025 COMPLETE CBC W/AUTO DIFF WBC: CPT

## 2021-06-03 RX ORDER — METOCLOPRAMIDE HYDROCHLORIDE 5 MG/ML
10 INJECTION INTRAMUSCULAR; INTRAVENOUS ONCE
Status: COMPLETED | OUTPATIENT
Start: 2021-06-03 | End: 2021-06-03

## 2021-06-03 RX ORDER — ONDANSETRON 4 MG/1
4 TABLET, FILM COATED ORAL EVERY 8 HOURS PRN
Qty: 20 TABLET | Refills: 0 | Status: SHIPPED | OUTPATIENT
Start: 2021-06-03 | End: 2021-08-31 | Stop reason: ALTCHOICE

## 2021-06-03 RX ORDER — 0.9 % SODIUM CHLORIDE 0.9 %
1000 INTRAVENOUS SOLUTION INTRAVENOUS ONCE
Status: DISCONTINUED | OUTPATIENT
Start: 2021-06-03 | End: 2021-06-03

## 2021-06-03 RX ORDER — DIPHENHYDRAMINE HYDROCHLORIDE 50 MG/ML
12.5 INJECTION INTRAMUSCULAR; INTRAVENOUS ONCE
Status: COMPLETED | OUTPATIENT
Start: 2021-06-03 | End: 2021-06-03

## 2021-06-03 RX ORDER — KETOROLAC TROMETHAMINE 30 MG/ML
15 INJECTION, SOLUTION INTRAMUSCULAR; INTRAVENOUS ONCE
Status: COMPLETED | OUTPATIENT
Start: 2021-06-03 | End: 2021-06-03

## 2021-06-03 RX ADMIN — KETOROLAC TROMETHAMINE 15 MG: 30 INJECTION, SOLUTION INTRAMUSCULAR; INTRAVENOUS at 16:40

## 2021-06-03 RX ADMIN — DIPHENHYDRAMINE HYDROCHLORIDE 12.5 MG: 50 INJECTION INTRAMUSCULAR; INTRAVENOUS at 16:40

## 2021-06-03 RX ADMIN — METOCLOPRAMIDE 10 MG: 5 INJECTION, SOLUTION INTRAMUSCULAR; INTRAVENOUS at 16:41

## 2021-06-03 ASSESSMENT — ENCOUNTER SYMPTOMS
ABDOMINAL PAIN: 0
SHORTNESS OF BREATH: 0
SORE THROAT: 0
NAUSEA: 1
VOMITING: 1
COLOR CHANGE: 0
DIARRHEA: 1
RHINORRHEA: 0

## 2021-06-03 ASSESSMENT — PAIN SCALES - GENERAL
PAINLEVEL_OUTOF10: 3
PAINLEVEL_OUTOF10: 3

## 2021-06-03 NOTE — ED NOTES
Patient refusing IV fluids, states she needs to leave for child's birthday party at 67. Provider notified.      Maia Arango RN  06/03/21 0029

## 2021-06-03 NOTE — ED NOTES
..Reviewed discharge instructions with patient. Patient verbalized understanding. No distress noted. Ambulatory from department.      Jyotsna Yen RN  06/03/21 0752

## 2021-06-03 NOTE — PROGRESS NOTES
__________________________________________________    Physician Signature:____________________________________ Date:____________  By signing above, therapists plan is approved by physician    If you have any questions or concerns, please don't hesitate to call. Thank you for your referral.    Date:  6/3/2021    Patient Name:  Brian Rios    :  1985  MRN: [id number]  Restrictions/Precautions:    Medical/Treatment Diagnosis Information:      M25.562, G89.29 (ICD-10-CM) - Chronic pain of left knee   M23.92 (ICD-10-CM) - Patellar malalignment syndrome of left knee     ·      Insurance/Certification information:   Cory Levi  Physician Information:    [facility]  Has the plan of care been signed (Y/N):        []  Yes  [x]  No     Date of Patient follow up with Physician: NS    Is this a Progress Report:     [x]  Yes  []  No      If Yes:  Date Range for reporting period:  Beginning:  ------------ Ending:    Progress report will be due (10 Rx or 30 days whichever is less):  29      Recertification will be due (POC Duration  / 90 days whichever is less): 21        Visit # Insurance Allowable Auth Required   In Person 5 30 []  Yes     []  No    Tele Health 0  []  Yes     []  No    Total 5       Functional Scale: LEFS 61%   Date assessed:  21    LEFS 54% 6/3/21     Latex Allergy:  [x]NO      []YES  Preferred Language for Healthcare:   [x]English       []other:    Pain level:  2/10     SUBJECTIVE:  Reports 40% improvement with PT, able to do more activities with less pain.      OBJECTIVE:      Observation:    Test measurements:     : 131-0    131-0   6/3: 142-0        RESTRICTIONS/PRECAUTIONS: none    Exercises/Interventions:   Therapeutic Ex (37000) Sets/sec Reps Notes/CUES HEP   Heel slides  10x  x   Piriformis stretch 30 2x  x   Piriformis cross over 30 2x  x   HEP x   bike  5 min            Slant board  1 min     LP 90# 30x     NICO B flex/ abd/ add 30# 15x     knee machine flex/ ext 20#/ 20x                   Manual Intervention ([id number])       Pat mob/ knee ROM  8 min                                        NMR re-education (03701)   CUES NEEDED     x   HR/TR  15x  x    x   PB supine clamshells GR 10x  x   bridges  15x  x   Step up and overs 2\" 20x  x   LSD 2\"  20x  x   SLS  1 min  x          Therapeutic Activity (91745)                     Vaso L knee  10 min                   Medbridge access code: [email or web address]             Therapeutic Exercise and NMR EXR  [x] (91134) Provided verbal/tactile cueing for activities related to strengthening, flexibility, endurance, ROM for improvements in LE, proximal hip, and core control with self care, mobility, lifting, ambulation. [x] (04251) Provided verbal/tactile cueing for activities related to improving balance, coordination, kinesthetic sense, posture, motor skill, proprioception to assist with LE, proximal hip, and core control in self-care, mobility, lifting, ambulation and eccentric single leg control.      NMR and Therapeutic Activities:    [x] (00795 or 14495) Provided verbal/tactile cueing for activities related to improving balance, coordination, kinesthetic sense, posture, motor skill, proprioception and motor activation to allow for proper function of core, proximal hip and LE with self-care and ADLs and functional mobility.   [] (55369) Gait Re-education- Provided training and instruction to the patient for proper LE, core and proximal hip recruitment and positioning and eccentric body weight control with ambulation re-education including up and down stairs     Home Exercise Program:    [x] (10748) Reviewed/Progressed HEP activities related to strengthening, flexibility, endurance, ROM of core, proximal hip and LE for functional self-care, mobility, lifting and ambulation/stair navigation   [] (75279) Reviewed/Progressed HEP activities related to improving balance, coordination, kinesthetic sense, posture, motor skill, proprioception of core, proximal hip and LE for self-care, mobility, lifting, and ambulation/stair navigation      Manual Treatments:  PROM / STM / Oscillations-Mobs:  G-I, II, III, IV (PA's, Inf., Post.)  [x] (17768) Provided manual therapy to mobilize LE, proximal hip and/or LS spine soft tissue/joints for the purpose of modulating pain, promoting relaxation, increasing ROM, reducing/eliminating soft tissue swelling/inflammation/restriction, improving soft tissue extensibility and allowing for proper ROM for normal function with self-care, mobility, lifting and ambulation. Modalities:     [x] GAME READY (VASO)- for significant edema, swelling, pain control. Charges:  Timed Code Treatment Minutes: 38   Total Treatment Minutes:  48   BWC:  TE TIME:  NMR TIME:  MANUAL TIME:  UNTIMED MINUTES:  Medicare Total:         [] EVAL (LOW) 31007 (typically 20 minutes face-to-face)  [] EVAL (MOD) 33337 (typically 30 minutes face-to-face)  [] EVAL (HIGH) 66666 (typically 45 minutes face-to-face)  [] RE-EVAL     [x] UN(20749) x  1   [] IONTO  [x] NMR (99447) x   1  [x] VASO  [x] Manual (16144) x 1    [] Other:  [] TA x      [] Mech Traction (01471)  [] ES(attended) (31925)      [] ES (un) (11156):    ASSESSMENT:  Good tolerance with Mary and manuals. Improving ROM and strength    GOALS:      Patient stated goal: walk/ stairs     Therapist goals for Patient:   Short Term Goals: To be achieved in: 2 weeks  1. Independent in HEP and progression per patient tolerance, in order to prevent re-injury. [x] Progressing: [] Met: [] Not Met: [] Adjusted     2. Patient will have a decrease in pain to facilitate improvement in movement, function, and ADLs as indicated by Functional Deficits. [] Progressing: [x] Met: [] Not Met: [] Adjusted     Long Term Goals: To be achieved in: 6-8 weeks  1. Disability index score of 30% or less for the LEFS to assist with reaching prior level of function. [x] Progressing: [] Met: [] Not Met: [] Adjusted     2.  Patient will demonstrate increased AROM to [facility] to allow for proper joint functioning as indicated by patients Functional Deficits. [x] Progressing: [] Met: [] Not Met: [] Adjusted     3. Patient will demonstrate an increase in Strength to good proximal hip strength and control, within 5lb HHD in LE to allow for proper functional mobility as indicated by patients Functional Deficits. [x] Progressing: [] Met: [] Not Met: [] Adjusted     4. Patient will return to [facility] for  functional activities without increased symptoms or restriction. [x] Progressing: [] Met: [] Not Met: [] Adjusted     5. Stairs/ walk with min to no limitations (patient specific functional goal)    [x] Progressing: [] Met: [] Not Met: [] Adjusted              Overall Progression Towards Functional goals/ Treatment Progress Update:  [x] Patient is progressing as expected towards functional goals listed. [] Progression is slowed due to complexities/Impairments listed. [] Progression has been slowed due to co-morbidities.   [] Plan just implemented, too soon to assess goals progression <30days   [] Goals require adjustment due to lack of progress  [] Patient is not progressing as expected and requires additional follow up with physician  [] Other    Prognosis for POC: [x] Good [] Fair  [] Poor      Patient requires continued skilled intervention: [x] Yes  [] No    Treatment/Activity Tolerance:  [x] Patient able to complete treatment  [] Patient limited by fatigue  [] Patient limited by pain    [] Patient limited by other medical complications  [] Other:     Return to Play: (if applicable)   [x]  Stage 1: Intro to Strength   []  Stage 2: Return to Run and Strength   []  Stage 3: Return to Jump and Strength   []  Stage 4: Dynamic Strength and Agility   []  Stage 5: Sport Specific Training     []  Ready to Return to Play, Meets All Above Stages   []  Not Ready for Return to Sports   Comments:                          PLAN:   [x] Continue per plan of care []

## 2021-06-03 NOTE — ED PROVIDER NOTES
Evaluated by [id number] [facility] Provider          [facility] ED  [place]        Pt Name: Pennie Bosworth  MRN: [id number]  [place] 1985  Dateof evaluation: 6/3/2021  Provider: ROSE Alvarez CNP  PCP: ROSE Flores CNP  ED Attending: No att. providers found    [address] [place] [address]       Chief Complaint   Patient presents with    Emesis     Pt reports sore throat for the past 3 days, pt reports emesis over the past 2 days, also reports diarrhea and a headache.  Pharyngitis       HISTORY OF PRESENTILLNESS   (Location/Symptom, Timing/Onset, Context/Setting, Quality, Duration, Modifying Factors, Severity)  Note limiting factors. Pennie Bosworth is a 37 y.o. female for nausea and vomiting. Onset was 1 day. Context includes patient reports that she is had nausea and vomiting for the last day. Patient reports she has had diarrhea for 2 days. Patient denies any hospitalizations or recent travel but reports she has been on recent antibiotics however does not familiar with which antibiotic. Patient also reports that she has a headache. Patient reports that she does have a history of headaches. Patient reports that she had a gradual onset to her headache. Patient denies any fevers. Alleviating factors include nothing. Aggravating factors include nothing. Pain is 3/10. Nothing has been used for pain today. Nursing Notes were all reviewed and agreed with or any disagreements were addressed  in the HPI. REVIEW OF SYSTEMS    (2-9 systems for level 4, 10 or more for level 5)     Review of Systems   Constitutional: Negative for fever. HENT: Negative for congestion, rhinorrhea and sore throat. Respiratory: Negative for shortness of breath. Cardiovascular: Negative for chest pain. Gastrointestinal: Positive for diarrhea, nausea and vomiting. Negative for abdominal pain.    Genitourinary: Negative for decreased urine volume and difficulty urinating. Musculoskeletal: Negative for arthralgias and myalgias. Skin: Negative for color change and rash. Neurological: Positive for headaches. Negative for dizziness and light-headedness. Psychiatric/Behavioral: Negative for agitation. All other systems reviewed and are negative. Positives and Pertinent negatives as per HPI. Except as noted above in the ROS, all other systems were reviewed and negative.        PAST MEDICAL HISTORY     Past Medical History:   Diagnosis Date    Asthma     as child    Back pain     Bipolar 1 disorder ([facility]     Depression     Diabetes mellitus ([facility]     Liver disease     fatty liver    Obesity     OCD (obsessive compulsive disorder)     PCOS (polycystic ovarian syndrome)     PCOS (polycystic ovarian syndrome)     Sleep apnea     cpap         SURGICAL HISTORY       Past Surgical History:   Procedure Laterality Date    CHOLECYSTECTOMY, LAPAROSCOPIC N/A 4/20/2020    LAPAROSCOPIC CHOLECYSTECTOMY performed by Angelina Phillips DO at [address] ([facility]) Left 12/10/14    Excision of Cyst Left Upper Posterior Ankle    OTHER SURGICAL HISTORY Right 12/29/2016    Laparotomy with Right SO    OVARY REMOVAL  12/28/2016    unsure of side    SLEEVE GASTRECTOMY N/A 11/6/2019    LAPAROSCOPIC SLEEVE GASTRECTOMY - ETHICON performed by Beverly Parry MD at [address] 8/23/2019    EGD BIOPSY performed by Beverly Parry MD at [address] 3/10/2021    EGD BIOPSY performed by Bill Garcia MD at [address]       Discharge Medication List as of 6/3/2021  4:50 PM      CONTINUE these medications which have NOT CHANGED    Details   omeprazole (PRILOSEC) 40 MG delayed release capsule TAKE 1 CAPSULE (40MG) BY MOUTH 2 TIMES DAILY MAY, Disp-60 capsule, R-0Normal      metFORMIN (GLUCOPHAGE) 500 MG tablet Take 500 mg by mouth daily (with breakfast)Historical Med      DULoxetine (CYMBALTA) 30 MG extended release capsule TAKE 1 CAPSULE BY ORAL ROUTE  EVERY DAYHistorical Med      mirtazapine (REMERON) 15 MG tablet Take 15 mg by mouth nightlyHistorical Med      medical marijuana Take by mouth as needed. Historical Med      busPIRone (BUSPAR) 5 MG tablet Take 5 mg by mouth 3 times dailyHistorical Med      loratadine (CLARITIN) 10 MG tablet Take 10 mg by mouth dailyHistorical Med      Multiple Vitamins-Minerals (BARIATRIC FUSION) CHEW Take 4 tablets by mouth dailyHistorical Med      levothyroxine (SYNTHROID) 75 MCG tablet TAKE 1 TABLET BY MOUTH EVERY DAY, R-5Historical Med      Cholecalciferol (VITAMIN D PO) Take by mouth daily Historical Med      ziprasidone (GEODON) 20 MG capsule Take 20 mg by mouth 2 times daily (with meals)Historical Med      clomiPRAMINE (ANAFRANIL) 25 MG capsule Take 50 mg by mouth nightlyHistorical Med               ALLERGIES     Latex, Lactose, Adhesive tape, and Keflex [cephalexin]    FAMILY HISTORY       Family History   Problem Relation Age of Onset    Asthma Mother    Phoebe Conine Arthritis Mother     Lung Cancer Mother     Hypertension Father     Elevated Lipids Father     Diabetes Father     Alcohol Abuse Father     Asthma Father     Arthritis Father     Diabetes Paternal Grandfather     Arthritis Paternal Grandfather     Diabetes Maternal Grandmother     Arthritis Maternal Grandmother           SOCIAL HISTORY       Social History     Socioeconomic History    Marital status:      Spouse name: Melissa Amado Number of children: 0    Years of education: 12    Highest education level: None   Occupational History    None   Tobacco Use    Smoking status: Never Smoker    Smokeless tobacco: Never Used   Vaping Use    Vaping Use: Never used   Substance and Sexual Activity    Alcohol use: Yes     Comment: 1 drink per week    Drug use: Yes     Frequency: 2.0 times per week Types: Marijuana     Comment: medical marijuana    Sexual activity: Yes     Partners: Male   Other Topics Concern    None   Social History Narrative    None     Social Determinants of Health     Financial Resource Strain:     Difficulty of Paying Living Expenses:    Food Insecurity:     Worried About Running Out of Food in the Last Year:     Ran Out of Food in the Last Year:    Transportation Needs:     Lack of Transportation (Medical):  Lack of Transportation (Non-Medical):    Physical Activity:     Days of Exercise per Week:     Minutes of Exercise per Session:    Stress:     Feeling of Stress :    Social Connections:     Frequency of Communication with Friends and Family:     Frequency of Social Gatherings with Friends and Family:     Attends Hinduism Services:     Active Member of Clubs or Organizations:     Attends Club or Organization Meetings:     Marital Status:    Intimate Partner Violence:     Fear of Current or Ex-Partner:     Emotionally Abused:     Physically Abused:     Sexually Abused:        SCREENINGS    [place] Coma Scale  Eye Opening: Spontaneous  Best Verbal Response: Oriented  Best Motor Response: Obeys commands  Lamin Coma Scale Score: 15        PHYSICAL EXAM  (up to 7 for level 4, 8 or more for level 5)     ED Triage Vitals [06/03/21 1242]   BP Temp Temp Source Pulse Resp SpO2 Height Weight   115/77 97.7 °F (36.5 °C) Oral 80 16 100 % 5' 7\" (1.702 m) 258 lb (117 kg)       Physical Exam  Constitutional:       Appearance: She is well-developed. HENT:      Head: Normocephalic and atraumatic. Mouth/Throat:      Pharynx: No oropharyngeal exudate or posterior oropharyngeal erythema. Cardiovascular:      Rate and Rhythm: Normal rate. Pulmonary:      Effort: Pulmonary effort is normal. No respiratory distress. Abdominal:      General: There is no distension. Palpations: Abdomen is soft. Tenderness: There is no abdominal tenderness.    Musculoskeletal: General: Normal range of motion. Cervical back: Normal range of motion. Skin:     General: Skin is warm and dry. Neurological:      General: No focal deficit present. Mental Status: She is alert and oriented to person, place, and time. Mental status is at baseline. DIAGNOSTIC RESULTS   LABS:    Labs Reviewed   COMPREHENSIVE METABOLIC PANEL - Abnormal; Notable for the following components:       Result Value    Glucose 111 (*)     All other components within normal limits    Narrative:     Performed at:  [facility],  [place], [facility]   Phone [phone] A THROAT    Narrative:     Performed at:  [facility],  [place], [facility]   Phone [phone]   CULTURE, BETA STREP CONFIRM PLATES    Narrative:     ORDER#: [id number]                          ORDERED BY: Rosalind Brooks  SOURCE: Throat                             COLLECTED:  06/03/21 16:15  ANTIBIOTICS AT AUGIE.:                      RECEIVED :  06/04/21 03:30  Performed at:  [facility]  [address] [place] [address]   Phone [phone]   URINALYSIS    Narrative:     Performed at:  [facility],  [place], [facility]   Phone [phone]   CBC WITH AUTO DIFFERENTIAL    Narrative:     Performed at:  [facility],  [place], [facility]   Phone [phone]   LIPASE    Narrative:     Performed at:  [address] [facility],  [place], [facility]   Phone [phone]       All other labs werewithin normal range or not returned as of this dictation. EKG:  All EKG's are interpreted by the Emergency Department Physician who either signs or Co-signs this chart in the absence of a cardiologist.  Please see their note for interpretation of EKG. RADIOLOGY:           Interpretation per the Radiologist below, if available at the time of this note:    No orders to display     No results found. PROCEDURES   Unless otherwise noted below, none     Procedures     CRITICAL CARE TIME   N/A    CONSULTS:  None      EMERGENCYDEPARTMENT COURSE and DIFFERENTIAL DIAGNOSIS/MDM:   Vitals:    Vitals:    06/03/21 1242 06/03/21 1341 06/03/21 1451 06/03/21 1650   BP: 115/77 112/75 117/71 111/73   Pulse: 80 95 90 81   Resp: 16 17 15 15   Temp: 97.7 °F (36.5 °C)      TempSrc: Oral      SpO2: 100% 100% 100% 100%   Weight: 258 lb (117 kg)      Height: 5' 7\" (1.702 m)          Patient was given the following medications:  Medications   ketorolac (TORADOL) injection 15 mg (15 mg Intravenous Given 6/3/21 1640)   metoclopramide (REGLAN) injection 10 mg (10 mg Intravenous Given 6/3/21 1641)   diphenhydrAMINE (BENADRYL) injection 12.5 mg (12.5 mg Intravenous Given 6/3/21 1640)     Patient seen and evaluated by myself. Patient complains of nausea and vomiting. Patient reports that she was called nausea vomiting the last 2 days. She also reports that she is having sore throat for 3 days. Patient did also report headache and some diarrhea. On exam she is awake alert hemodynamically stable nontoxic in appearance. Lab values have been reviewed and interpreted. Patient has no risk factors for C. difficile. Patient was reevaluated and at this time is requesting to be discharged home. Patient will be discharged home with instructions to follow-up with her primary care doctor in the next few days and return to the ED for worsening symptoms. Patient was ultimately discharged home with all questions answered. The patient tolerated their visit well. I have evaluated this patient. My supervising physician was available for consultation.  The patient and / or the family were informed of the results of any tests, a time was given to answer questions, a plan was proposed and they agreed with plan. FINAL IMPRESSION      1. Nausea vomiting and diarrhea    2.  Acute pharyngitis, unspecified etiology          DISPOSITION/PLAN   DISPOSITION Decision To Discharge 06/03/2021 04:45:39 PM      PATIENT REFERRED TO:  ROSE Suggs [address] [place] [address] [facility]  [phone]    Schedule an appointment as soon as possible for a visit in 2 days  for re-evaluation    [facility]  [address] [facility]  [phone]    If symptoms worsen      DISCHARGE MEDICATIONS:  Discharge Medication List as of 6/3/2021  4:50 PM      START taking these medications    Details   ondansetron (ZOFRAN) 4 MG tablet Take 1 tablet by mouth every 8 hours as needed for Nausea, Disp-20 tablet, R-0Print             DISCONTINUED MEDICATIONS:  Discharge Medication List as of 6/3/2021  4:50 PM                 (Please note that portions of this note were completed with a voice recognition program.  Efforts were made to edit the dictations but occasionally words are mis-transcribed.)    ROSE Alvarez CNP (electronically signed)         ROSE Alvarez CNP  06/05/21 4001

## 2021-06-05 LAB — S PYO THROAT QL CULT: NORMAL

## 2021-06-08 RX ORDER — OMEPRAZOLE 40 MG/1
CAPSULE, DELAYED RELEASE ORAL
Qty: 60 CAPSULE | Refills: 0 | Status: SHIPPED | OUTPATIENT
Start: 2021-06-08 | End: 2021-06-29

## 2021-06-10 ENCOUNTER — OFFICE VISIT (OUTPATIENT)
Dept: ORTHOPEDIC SURGERY | Age: 36
End: 2021-06-10
Payer: MEDICAID

## 2021-06-10 ENCOUNTER — HOSPITAL ENCOUNTER (OUTPATIENT)
Dept: PHYSICAL THERAPY | Age: 36
Setting detail: THERAPIES SERIES
Discharge: HOME | End: 2021-06-10
Payer: MEDICAID

## 2021-06-10 VITALS — WEIGHT: 258 LBS | BODY MASS INDEX: 40.49 KG/M2 | HEIGHT: 67 IN

## 2021-06-10 DIAGNOSIS — M76.892 TENDINITIS OF LEFT QUADRICEPS TENDON: Primary | ICD-10-CM

## 2021-06-10 PROCEDURE — 97140 MANUAL THERAPY 1/> REGIONS: CPT | Performed by: SPECIALIST

## 2021-06-10 PROCEDURE — 99214 OFFICE O/P EST MOD 30 MIN: CPT | Performed by: ORTHOPAEDIC SURGERY

## 2021-06-10 PROCEDURE — 97016 VASOPNEUMATIC DEVICE THERAPY: CPT | Performed by: SPECIALIST

## 2021-06-10 PROCEDURE — G8427 DOCREV CUR MEDS BY ELIG CLIN: HCPCS | Performed by: ORTHOPAEDIC SURGERY

## 2021-06-10 PROCEDURE — 97110 THERAPEUTIC EXERCISES: CPT | Performed by: SPECIALIST

## 2021-06-10 PROCEDURE — 97112 NEUROMUSCULAR REEDUCATION: CPT | Performed by: SPECIALIST

## 2021-06-10 PROCEDURE — G8417 CALC BMI ABV UP PARAM F/U: HCPCS | Performed by: ORTHOPAEDIC SURGERY

## 2021-06-10 PROCEDURE — 1036F TOBACCO NON-USER: CPT | Performed by: ORTHOPAEDIC SURGERY

## 2021-06-10 RX ORDER — LAMOTRIGINE 25 MG/1
25 TABLET ORAL DAILY
COMMUNITY

## 2021-06-10 NOTE — FLOWSHEET NOTE
[address] [place] [address] and Sports Rehabilitation[address], [address] [place] [address] [address]  Phone: [phone]   Fax:     [phone]    Physical Therapy Treatment Note/ Progress   Date:  6/10/2021    Patient Name:  Mariano Santos    :  1985  MRN: [id number]  Restrictions/Precautions:    Medical/Treatment Diagnosis Information:      M25.562, G89.29 (ICD-10-CM) - Chronic pain of left knee   M23.92 (ICD-10-CM) - Patellar malalignment syndrome of left knee     ·      Insurance/Certification information:   [facility]  Physician Information:   Dr Salomon Horvath  Has the plan of care been signed (Y/N):        []  Yes  [x]  No     Date of Patient follow up with Physician: NS    Is this a Progress Report:     [x]  Yes  []  No      If Yes:  Date Range for reporting period:  Beginning:  ------------ Ending:    Progress report will be due (10 Rx or 30 days whichever is less):  49      Recertification will be due (POC Duration  / 90 days whichever is less): 21        Visit # Insurance Allowable Auth Required   In Person 6 30 []  Yes     []  No    Tele Health 0  []  Yes     []  No    Total 6       Functional Scale: LEFS 61%   Date assessed:  21    LEFS 54% 6/3/21     Latex Allergy:  [x]NO      []YES  Preferred Language for Healthcare:   [x]English       []other:    Pain level:  4/10     SUBJECTIVE:  Reports started new job at [employer], standing increases pain     OBJECTIVE:      Observation:    Test measurements:     : 131-0    131-0   6/3: 142-0   6/10 142-0        RESTRICTIONS/PRECAUTIONS: none    Exercises/Interventions:   Therapeutic Ex (45846) Sets/sec Reps Notes/CUES HEP   Heel slides  10x  x   Piriformis stretch 30 2x  x   Piriformis cross over 30 2x  x   HEP x   bike  5 min            Slant board  1 min     LP 90# 30x     NICO B flex/ abd/ add 30# 15x     knee machine flex/ ext 20#/  25x                   Manual Intervention ([phone])       Pat mob/ knee ROM  8 min                                        NMR re-education (79603)   CUES NEEDED     x   HR/TR  15x  x    x   PB supine clamshells GR 10x  x   bridges  15x  x   Step up and overs 2\" 20x  x   LSD 2\"  20x  x   SLS blue foam  1 min  x          Therapeutic Activity (27523)                     Vaso L knee  10 min                   Medbridge access code: [email or web address]             Therapeutic Exercise and NMR EXR  [x] (34743) Provided verbal/tactile cueing for activities related to strengthening, flexibility, endurance, ROM for improvements in LE, proximal hip, and core control with self care, mobility, lifting, ambulation. [x] (34782) Provided verbal/tactile cueing for activities related to improving balance, coordination, kinesthetic sense, posture, motor skill, proprioception to assist with LE, proximal hip, and core control in self-care, mobility, lifting, ambulation and eccentric single leg control.      NMR and Therapeutic Activities:    [x] (34704 or 54778) Provided verbal/tactile cueing for activities related to improving balance, coordination, kinesthetic sense, posture, motor skill, proprioception and motor activation to allow for proper function of core, proximal hip and LE with self-care and ADLs and functional mobility.   [] (30264) Gait Re-education- Provided training and instruction to the patient for proper LE, core and proximal hip recruitment and positioning and eccentric body weight control with ambulation re-education including up and down stairs     Home Exercise Program:    [x] (48975) Reviewed/Progressed HEP activities related to strengthening, flexibility, endurance, ROM of core, proximal hip and LE for functional self-care, mobility, lifting and ambulation/stair navigation   [] (37404) Reviewed/Progressed HEP activities related to improving balance, coordination, kinesthetic sense, posture, motor skill, proprioception of core, proximal hip and LE for self-care, mobility, lifting, and ambulation/stair navigation      Manual Treatments:  PROM / STM / Oscillations-Mobs:  G-I, II, III, IV (PA's, Inf., Post.)  [x] (20345) Provided manual therapy to mobilize LE, proximal hip and/or LS spine soft tissue/joints for the purpose of modulating pain, promoting relaxation, increasing ROM, reducing/eliminating soft tissue swelling/inflammation/restriction, improving soft tissue extensibility and allowing for proper ROM for normal function with self-care, mobility, lifting and ambulation. Modalities:     [x] GAME READY (VASO)- for significant edema, swelling, pain control. Charges:  Timed Code Treatment Minutes: 38   Total Treatment Minutes:  48   BWC:  TE TIME:  NMR TIME:  MANUAL TIME:  UNTIMED MINUTES:  Medicare Total:         [] EVAL (LOW) 38420 (typically 20 minutes face-to-face)  [] EVAL (MOD) 29986 (typically 30 minutes face-to-face)  [] EVAL (HIGH) 89345 (typically 45 minutes face-to-face)  [] RE-EVAL     [x] XV(47974) x  1   [] IONTO  [x] NMR (69038) x   1  [x] VASO  [x] Manual (57934) x 1    [] Other:  [] TA x      [] Mech Traction (95271)  [] ES(attended) (32609)      [] ES (un) (77735):    ASSESSMENT:  Good tolerance with Mary and manuals. Improving ROM and strength    GOALS:      Patient stated goal: walk/ stairs     Therapist goals for Patient:   Short Term Goals: To be achieved in: 2 weeks  1. Independent in HEP and progression per patient tolerance, in order to prevent re-injury. [x] Progressing: [] Met: [] Not Met: [] Adjusted     2. Patient will have a decrease in pain to facilitate improvement in movement, function, and ADLs as indicated by Functional Deficits. [] Progressing: [x] Met: [] Not Met: [] Adjusted     Long Term Goals: To be achieved in: 6-8 weeks  1. Disability index score of 30% or less for the LEFS to assist with reaching prior level of function. [x] Progressing: [] Met: [] Not Met: [] Adjusted     2.  Patient will demonstrate increased AROM to [facility] to allow for proper initiated [] Hold pending MD visit [] Discharge    Electronically signed by:  Margi Brown PT    Note: If patient does not return for scheduled/ recommended follow up visits, this note will serve as a discharge from care along with most recent update on progress.

## 2021-06-10 NOTE — PROGRESS NOTES
Chief Complaint  Results (F/U MRI LEFT KNEE RESULTS AND PHYSICAL THERAPY)      History of Present Illness:  Vianey Dang is a pleasant 37 y.o. female left knee pain about 6 weeks after cortisone injection. She is in physical therapy with Angel Oneilball at [facility] office. She has noted  75% improvement for the first 2 weeks and about 40% past that. We had diagnosed her with patellofemoral syndrome although the MRI showed no major findings in the knee with respect to her cartilage or meniscus besides some cartilage thinning behind the kneecap. Currently her pain is a burning type discomfort of the quadricep tendon insertion anteriorly. Is present throughout most times of the day. She has a new job which she has a lot of standing and walking. No longer having mechanical symptoms. Medical History:  Patient's medications, allergies, past medical, surgical, social and family histories were reviewed and updated as appropriate. Pain Assessment  Location of Pain: Knee  Location Modifiers: Left  Severity of Pain: 8  Quality of Pain: Throbbing (BURNING)  Frequency of Pain: Constant  Aggravating Factors: Bending, Standing, Walking, Stairs  Limiting Behavior: Yes  Relieving Factors: Rest, Exercise  Result of Injury: Yes  Work-Related Injury: No  Are there other pain locations you wish to document?: No  ROS: Review of systems reviewed from Patient History Form completed today and available in the patient's chart under the Media tab. Pertinent items are noted in HPI  Review of systems reviewed from Patient History Form completed today and available in the patient's chart under the Media tab. Vital Signs:  Ht 5' 7\" (1.702 m)   Wt 258 lb (117 kg)   LMP 04/13/2020   BMI 40.41 kg/m²         Neuro: Alert & oriented x 3,  normal,  no focal deficits noted. Normal affect.   Eyes: sclera clear  Ears: Normal external ear  Mouth:  No perioral lesions  Pulm: Respirations unlabored and regular  Pulse: Extremities well Specialty:   Orthopedic Surgery     Number of Visits Requested:   1       Plan:  My recommendation is eccentric strengthening of the quadriceps tendon along with continued use with the sleeve as needed but also a referral to my colleague Dr. Rip Gill who could evaluate for candidacy for a possible Tenex procedure of her left quadriceps tendon. Referral was sent. I would like to see La Nena back in about 6 weeks. All the patient's questions were answered while in the clinic. The patient is understanding of all instructions and agrees with the plan. Approximately 30 minutes was spent on patient education and coordinating care. Follow up in: No follow-ups on file. Sincerely,    Vivian Taylor MD [facility]   [address] [place], [address]  Email: [email or web address]  Office: [phone]    06/10/21  11:33 AM      The encounter with Shea Arriaga was carried out by myself, Dr Jackie Jackman, who personally examined the patient and reviewed the plan. This dictation was performed with a verbal recognition program (DRAGON) and it was checked for errors. It is possible that there are still dictated errors within this office note. If so, please bring any errors to my attention for an addendum. All efforts were made to ensure that this office note is accurate.

## 2021-06-14 ENCOUNTER — TELEPHONE (OUTPATIENT)
Dept: ORTHOPEDIC SURGERY | Age: 36
End: 2021-06-14

## 2021-06-14 ENCOUNTER — OFFICE VISIT (OUTPATIENT)
Dept: ORTHOPEDIC SURGERY | Age: 36
End: 2021-06-14
Payer: MEDICAID

## 2021-06-14 VITALS — WEIGHT: 257.94 LBS | HEIGHT: 67 IN | BODY MASS INDEX: 40.48 KG/M2

## 2021-06-14 DIAGNOSIS — M76.899 TENDINOSIS OF QUADRICEPS TENDON: Primary | ICD-10-CM

## 2021-06-14 PROCEDURE — G8417 CALC BMI ABV UP PARAM F/U: HCPCS | Performed by: INTERNAL MEDICINE

## 2021-06-14 PROCEDURE — G8427 DOCREV CUR MEDS BY ELIG CLIN: HCPCS | Performed by: INTERNAL MEDICINE

## 2021-06-14 PROCEDURE — 1036F TOBACCO NON-USER: CPT | Performed by: INTERNAL MEDICINE

## 2021-06-14 PROCEDURE — 99204 OFFICE O/P NEW MOD 45 MIN: CPT | Performed by: INTERNAL MEDICINE

## 2021-06-14 NOTE — LETTER
[facility] [address] [place] [address]  Phone: [phone]  Fax: [phone]           Shaun Ojeda MD      Rosette 15, 2021     Patient: Quinn Quiroz   MR Number: [id number]   YOB: 1985   Date of Visit: 6/14/2021       Dear Dr. Danika Hopkins:    Thank you for referring Janie Mccracken to me for evaluation/treatment. Below are the relevant portions of my assessment and plan of care. Impression: . Encounter Diagnosis   Name Primary?  Tendinitis of left quadriceps tendon Yes              Plan:     Consider a candidate for percutaneous tenotomy TX 2 microtip   Active modification caution against overuse continue maintenance HEP  Short-term knee immobilization along with crutch assisted weightbearing for 10 days postoperatively  Corroborate ultrasound findings with MRI evaluation    My first recommendation would have been to proceed with [id number] however this is not financially feasible for the patient. Proceed as outlined above      The nature of the finding, probable diagnosis and likely treatment was thoroughly discussed with the patient. The options, risks, complications, alternative treatment as well as some of the differential diagnosis was discussed. The patient was thoroughly informed and all questions were answered. the patient indicated understanding and satisfaction with the discussion. Orders:        Orders Placed This Encounter   Procedures    US EXTREMITY LEFT NON VASC LIMITED     Standing Status:   Future     Number of Occurrences:   1     Standing Expiration Date:   6/14/2022     Order Specific Question:   Reason for exam:     Answer:   dx           Disclaimer: \"This note was dictated with voice recognition software. Though review and correction are routine, we apologize for any errors. \"           If you have questions, please do not hesitate to call me. I look forward to following La Nena along with you.     Sincerely,

## 2021-06-15 ENCOUNTER — CLINICAL DOCUMENTATION (OUTPATIENT)
Dept: OTHER | Age: 36
End: 2021-06-15

## 2021-06-15 NOTE — PROGRESS NOTES
Chief Complaint:   Chief Complaint   Patient presents with    Knee Pain     left, ref by Dr. Sara Wheatley for Tenex consult, knee suprapatellar pain          History of Present Illness:       Patient is a 37 y.o. female presents with the above complaint. She is seen in consultation at the request of Dr. Julien Marvin for consideration of percutaneous tenotomy TX microtip for the management of quadriceps tendinosis refractory to aggressive conservative management. The symptoms began 2004 and started with an injury. The patient describes a sharp, burning pain that does not radiate. The symptoms are constant  and are show no change since the onset. She reports the symptoms all started when she apparently sustained blunt trauma to the suprapatellar region of the knee walking into a bathroom sink    Pain localizes to the suprapatellar region of the knee/base of the patella and  does not seems to follow a typical patella femoral provacative pattern. There are not mechanical symptoms that suggest meniscal injury. The patient denies subjective instability about the knee and denies new onset weakness of the lower extremity. Pain level 3    The patient denies a pattern of activity related swelling. Treatment to date: Therapy physical therapy with mild improvement. Past history significant for bariatric surgery and 100 pound weight loss. There is no prior history of autoimmune disease, inflammatory arthropathy, or crystal arthropathy.              Past Medical History:        Past Medical History:   Diagnosis Date    Asthma     as child    Back pain     Bipolar 1 disorder ([address]     Depression     Diabetes mellitus ([address]     Liver disease     fatty liver    Obesity     OCD (obsessive compulsive disorder)     PCOS (polycystic ovarian syndrome)     PCOS (polycystic ovarian syndrome)     Sleep apnea     cpap         Past Surgical History:   Procedure Laterality Date    CHOLECYSTECTOMY, LAPAROSCOPIC N/A 4/20/2020    LAPAROSCOPIC CHOLECYSTECTOMY performed by Hemal Zamora DO at [facility] SURGICAL HISTORY Left 12/10/14    Excision of Cyst Left Upper Posterior Ankle    OTHER SURGICAL HISTORY Right 12/29/2016    Laparotomy with Right SO    OVARY REMOVAL  12/28/2016    unsure of side    SLEEVE GASTRECTOMY N/A 11/6/2019    LAPAROSCOPIC SLEEVE GASTRECTOMY - ETHICON performed by Freedom Padron MD at [address] 8/23/2019    EGD BIOPSY performed by Freedom Padron MD at [address] 3/10/2021    EGD BIOPSY performed by Lorraine Cheng MD at [address] EXTRACTION           Present Medications:         Current Outpatient Medications   Medication Sig Dispense Refill    lamoTRIgine (LAMICTAL) 25 MG tablet Take 25 mg by mouth daily      omeprazole (PRILOSEC) 40 MG delayed release capsule TAKE 1 CAPSULE (40MG) BY MOUTH 2 TIMES DAILY 60 capsule 0    ondansetron (ZOFRAN) 4 MG tablet Take 1 tablet by mouth every 8 hours as needed for Nausea 20 tablet 0    metFORMIN (GLUCOPHAGE) 500 MG tablet Take 500 mg by mouth daily (with breakfast)      DULoxetine (CYMBALTA) 30 MG extended release capsule TAKE 1 CAPSULE BY ORAL ROUTE  EVERY DAY      mirtazapine (REMERON) 15 MG tablet Take 15 mg by mouth nightly      medical marijuana Take by mouth as needed.       busPIRone (BUSPAR) 5 MG tablet Take 5 mg by mouth 3 times daily      loratadine (CLARITIN) 10 MG tablet Take 10 mg by mouth daily      Multiple Vitamins-Minerals (BARIATRIC FUSION) CHEW Take 4 tablets by mouth daily      levothyroxine (SYNTHROID) 75 MCG tablet TAKE 1 TABLET BY MOUTH EVERY DAY  5    Cholecalciferol (VITAMIN D PO) Take by mouth daily       ziprasidone (GEODON) 20 MG capsule Take 20 mg by mouth 2 times daily (with meals)      clomiPRAMINE (ANAFRANIL) 25 MG capsule Take 50 mg by mouth nightly       No current facility-administered medications for this visit. Allergies: Allergies   Allergen Reactions    Latex Itching    Lactose      \"Extreme\" stomach pain and diarrhea    Adhesive Tape      Rips off skin    Keflex [Cephalexin] Nausea And Vomiting     Projectile vomitting        Social History:         Social History     Socioeconomic History    Marital status:      Spouse name: Gely Heath Number of children: 0    Years of education: 12    Highest education level: Not on file   Occupational History    Not on file   Tobacco Use    Smoking status: Never Smoker    Smokeless tobacco: Never Used   Vaping Use    Vaping Use: Never used   Substance and Sexual Activity    Alcohol use: Yes     Comment: 1 drink per week    Drug use: Yes     Frequency: 2.0 times per week     Types: Marijuana     Comment: medical marijuana    Sexual activity: Yes     Partners: Male   Other Topics Concern    Not on file   Social History Narrative    Not on file     Social Determinants of Health     Financial Resource Strain:     Difficulty of Paying Living Expenses:    Food Insecurity:     Worried About Running Out of Food in the Last Year:     Ran Out of Food in the Last Year:    Transportation Needs:     Lack of Transportation (Medical):      Lack of Transportation (Non-Medical):    Physical Activity:     Days of Exercise per Week:     Minutes of Exercise per Session:    Stress:     Feeling of Stress :    Social Connections:     Frequency of Communication with Friends and Family:     Frequency of Social Gatherings with Friends and Family:     Attends [employer] Services:     Active Member of Clubs or Organizations:     Attends Club or Organization Meetings:     Marital Status:    Intimate Partner Violence:     Fear of Current or Ex-Partner:     Emotionally Abused:     Physically Abused:     Sexually Abused:         Review of Symptoms:    Pertinent items are noted in HPI    Review of systems Future     Number of Occurrences:   1     Standing Expiration Date:   2022     Order Specific Question:   Reason for exam:     Answer:   dx     Logiq E ultrasound-left knee  12 MHz    Patient position supine with the knees supported. The quadriceps tendon was evaluated extensively in long axis and short axis using linear transducer. There is mild increase in caliber of the quadriceps tendon in its central medial distribution with low-grade tendinopathy change in this anatomic region without evidence of high-grade tendinopathy or discrete tear. The quadriceps tendon was evaluated in varying degrees of knee flexion. With dynamic imaging there was no evidence of gapping of fibers to suggest tear    There was a small effusion in the suprapatellar bursa consistent with bursitis. Power Doppler imaging negative      Other Outside Imaging and Testing Personally Reviewed:    US EXTREMITY LEFT NON VASC LIMITED    Result Date: 2021  Radiology result is complete; follow up with provider / physician office for radiology results        Site: [email or web address] [facility] #: [id number] #: [id number] Procedure: MR Left Knee w/o Contrast ; Reason for Exam: patellar malalignment syndrome of left knee. This document is confidential medical information.  Unauthorized disclosure or use of this information is prohibited by law. If you are not the intended recipient of this document, please advise us by calling immediately [phone].       [employer] [place] [address]           Patient Name: Wynell Saint   Case ID: [id number]   Patient : 1985   Referring Physician: Meryc Astorga MD   Exam Date: 2021   Exam Description: MR Left Knee w/o Contrast            HISTORY:  54-[employer] female with left knee patellar pain weakness, instability, and pain while    walking.  History of injury in  when patient smashed her knee on a sink.  No history of    surgery.  Evaluate for patellofemoral alignment syndrome.       TECHNICAL FACTORS:  Long- and short-axis fat- and water-weighted images were performed.       COMPARISON:  None.       FINDINGS:  Lateral tilt of the patella, without subluxation.  Lateral retinaculum and MPFL are    intact.  No high-grade chondromalacia or osteochondral defect of the patellar or trochlear    articular surfaces.       Inflammation of the lateral infrapatellar and quadriceps fat pads, suggestive of patellar    maltracking. Tibial tubercle to trochlear groove distance is normal, measuring 12 mm.       Quadriceps tendon and patellar tendon are intact.       No acute tear or injury of the ACL, PCL, MCL, or lateral collateral complex.  No posterolateral    or posteromedial corner injury.       No medial or lateral meniscus tear.       Small joint effusion.  No intra-articular bodies.       No high-grade chondromalacia or osteochondral defect of the medial or lateral femorotibial    compartment weight-bearing surfaces.       No acute fracture or bony injury.       No acute muscle tear or injury.       No posterior soft tissue mass or bursal cyst.       The neurovascular bundle is intact.                       CONCLUSION:   1. Patellar maltracking, with inflammation of the lateral infrapatellar and quadriceps fat    pads, and lateral tilt of the patella. 2. No traumatic medial or lateral meniscus tear. No cruciate or collateral ligament injury.       Thank you for the opportunity to provide your interpretation.               Rd Lorenzana MD       A: [address] [facility] 05/25/2021 1:49 PM           Assessment   Impression: . Encounter Diagnosis   Name Primary?     Tendinitis of left quadriceps tendon Yes              Plan:     Consider a candidate for percutaneous tenotomy TX 2 microtip   Active modification caution against overuse continue maintenance HEP  Short-term knee immobilization along with crutch assisted weightbearing for 10 days postoperatively  Corroborate ultrasound findings with MRI evaluation    My first recommendation would have been to proceed with [id number] however this is not financially feasible for the patient. Proceed as outlined above      The nature of the finding, probable diagnosis and likely treatment was thoroughly discussed with the patient. The options, risks, complications, alternative treatment as well as some of the differential diagnosis was discussed. The patient was thoroughly informed and all questions were answered. the patient indicated understanding and satisfaction with the discussion. Orders:        Orders Placed This Encounter   Procedures    US EXTREMITY LEFT NON VASC LIMITED     Standing Status:   Future     Number of Occurrences:   1     Standing Expiration Date:   6/14/2022     Order Specific Question:   Reason for exam:     Answer:   dx           Disclaimer: \"This note was dictated with voice recognition software. Though review and correction are routine, we apologize for any errors. \"

## 2021-06-17 ENCOUNTER — HOSPITAL ENCOUNTER (OUTPATIENT)
Dept: PHYSICAL THERAPY | Age: 36
Setting detail: THERAPIES SERIES
Discharge: HOME | End: 2021-06-17
Payer: MEDICAID

## 2021-06-17 NOTE — FLOWSHEET NOTE
[facility] [facility], [address] [place] [address], [address] [place] [address] [address]  Phone: [phone]   Fax:     [phone]    Physical Therapy  Cancellation/No-show Note  Patient Name:  Shea Arriaga  :  1985   Date:  2021    Cancelled visits to date: 1  No-shows to date: 1    For today's appointment patient:  [x]  Cancelled  []  Rescheduled appointment  []  No-show     Reason given by patient:  [x]  Patient ill  []  Conflicting appointment  []  No transportation    []  Conflict with work  []  No reason given  []  Other:     Comments:      Phone call information:   []  Phone call made today to patient at _ time at number provided:      []  Patient answered, conversation as follows:    []  Patient did not answer, message left as follows:  []  Phone call not made today  [x]  Phone call not needed - pt contacted us to cancel and provided reason for cancellation.      Electronically signed by:  Tc Adam, PT, PT

## 2021-06-19 ENCOUNTER — TELEMEDICINE (OUTPATIENT)
Dept: BARIATRICS/WEIGHT MGMT | Age: 36
End: 2021-06-19
Payer: MEDICAID

## 2021-06-19 DIAGNOSIS — Z71.3 DIETARY COUNSELING AND SURVEILLANCE: ICD-10-CM

## 2021-06-19 DIAGNOSIS — Z98.84 S/P LAPAROSCOPIC SLEEVE GASTRECTOMY: ICD-10-CM

## 2021-06-19 DIAGNOSIS — E66.01 MORBID OBESITY WITH BMI OF 40.0-44.9, ADULT (HCC): Primary | ICD-10-CM

## 2021-06-19 PROCEDURE — 99204 OFFICE O/P NEW MOD 45 MIN: CPT | Performed by: FAMILY MEDICINE

## 2021-06-19 PROCEDURE — G8427 DOCREV CUR MEDS BY ELIG CLIN: HCPCS | Performed by: FAMILY MEDICINE

## 2021-06-19 ASSESSMENT — ENCOUNTER SYMPTOMS
VOMITING: 0
COUGH: 0
EYE PAIN: 0
APNEA: 0
PHOTOPHOBIA: 0
DIARRHEA: 0
ABDOMINAL DISTENTION: 0
WHEEZING: 0
ABDOMINAL PAIN: 0
NAUSEA: 0
CONSTIPATION: 0
CHOKING: 0
BLOOD IN STOOL: 0
SHORTNESS OF BREATH: 0
CHEST TIGHTNESS: 0

## 2021-06-19 NOTE — PROGRESS NOTES
Patient: Chetan Covarrubias     Encounter Date: 6/19/2021    YOB: 1985               Age: 37 y.o. Patient identification was verified at the start of the visit. Patient-Reported Vitals 5/11/2021   Patient-Reported Weight 259.4   Patient-Reported Height 5'7\"   Patient-Reported Temperature -         BP Readings from Last 1 Encounters:   06/03/21 111/73       BMI Readings from Last 1 Encounters:   06/14/21 40.39 kg/m²       Pulse Readings from Last 1 Encounters:   06/03/21 81                                              Wt Readings from Last 3 Encounters:   06/14/21 257 lb 15 oz (117 kg)   06/10/21 258 lb (117 kg)   06/03/21 258 lb (117 kg)       Chief Complaint   Patient presents with    Bariatric, Initial Visit     [facility]       HPI:    37 y.o. female presents to establish care via video visit. She was referred by Mathew Tanner CNP for medical weight management. The patient's medical history is significant for class III obesity s/p sleeve gastrectomy in November 2019 by Dr. Amanda Poon. Initial weight: 396 pounds. Net weight loss to date: 139 pounds. The patient has a long-standing history of obesity which started gradually. The problem is severe. She is motivated to continue losing weight. Her personal goal is to get under 200 pounds. Feels weight loss slowed down after starting gabapentin. Initial presurgical weight: 396 pounds   Current weight: 257 pounds   Net weight loss to date: 139 pounds       When did you become overweight? [] Childhood   [x] Teens   [] Adulthood   [] Pregnancy   [] Menopause    Highest adult weight: 396 pounds at age      Triggers for weight gain? [] Stress   [] Illness   [] Medications   [] Travel  []Injury     [] Nightshift work   [] Insomnia  [x] No specific triggers   [] Other    Food triggers:   [x] Stress   [x] Boredom   [] Fast food   [] Eating out   [] Seeking reward   [] Social     Have you ever taken medications to help you lose weight?    [] Yes  [x] No    Have you ever been on a meal replacement program?  [] Yes  [x] No        Dietitian's assessment reviewed and addressed with patient. Reviewed:  [x] Nutrition and the importance of regular protein intake  [x] Hidden CHO/carbohydrate sources  [x] Alcohol use  [x] Tobacco use  [x] Drug use- Denies   [x] Importance of exercise and reducing sedentary time        Controlled Substance Monitoring:     RX Monitoring 10/21/2016   Periodic Controlled Substance Monitoring No signs of potential drug abuse or diversion identified. Allergies   Allergen Reactions    Latex Itching    Lactose      \"Extreme\" stomach pain and diarrhea    Adhesive Tape      Rips off skin    Keflex [Cephalexin] Nausea And Vomiting     Projectile vomitting         Current Outpatient Medications:     lamoTRIgine (LAMICTAL) 25 MG tablet, Take 25 mg by mouth daily, Disp: , Rfl:     omeprazole (PRILOSEC) 40 MG delayed release capsule, TAKE 1 CAPSULE (40MG) BY MOUTH 2 TIMES DAILY, Disp: 60 capsule, Rfl: 0    ondansetron (ZOFRAN) 4 MG tablet, Take 1 tablet by mouth every 8 hours as needed for Nausea, Disp: 20 tablet, Rfl: 0    metFORMIN (GLUCOPHAGE) 500 MG tablet, Take 500 mg by mouth daily (with breakfast), Disp: , Rfl:     DULoxetine (CYMBALTA) 30 MG extended release capsule, TAKE 1 CAPSULE BY ORAL ROUTE  EVERY DAY, Disp: , Rfl:     mirtazapine (REMERON) 15 MG tablet, Take 15 mg by mouth nightly, Disp: , Rfl:     medical marijuana, Take by mouth as needed. , Disp: , Rfl:     busPIRone (BUSPAR) 5 MG tablet, Take 5 mg by mouth 3 times daily, Disp: , Rfl:     loratadine (CLARITIN) 10 MG tablet, Take 10 mg by mouth daily, Disp: , Rfl:     Multiple Vitamins-Minerals (BARIATRIC FUSION) CHEW, Take 4 tablets by mouth daily, Disp: , Rfl:     levothyroxine (SYNTHROID) 75 MCG tablet, TAKE 1 TABLET BY MOUTH EVERY DAY, Disp: , Rfl: 5    Cholecalciferol (VITAMIN D PO), Take by mouth daily , Disp: , Rfl:     ziprasidone (GEODON) 20 MG capsule, Take 20 mg by mouth 2 times daily (with meals), Disp: , Rfl:     clomiPRAMINE (ANAFRANIL) 25 MG capsule, Take 50 mg by mouth nightly, Disp: , Rfl:     Patient Active Problem List   Diagnosis    Pelvic mass    Biceps tendonitis on right    Right shoulder strain    Diabetes mellitus type 2 in obese (HCC)    Chronic GERD    Moderate obstructive sleep apnea    Scalp cyst    Ventral hernia without obstruction or gangrene    S/P laparoscopic sleeve gastrectomy    Morbid obesity with BMI of 40.0-44.9, adult (HCC)    RUQ pain    Acute cholecystitis due to biliary calculus    Abnormal CT scan, stomach    Abdominal pain, right upper quadrant       Past Medical History:   Diagnosis Date    Asthma     as child    Back pain     Bipolar 1 disorder [facility] [address]     Depression     Diabetes mellitus ([facility] [address]     Liver disease     fatty liver    Obesity     OCD (obsessive compulsive disorder)     PCOS (polycystic ovarian syndrome)     PCOS (polycystic ovarian syndrome)     Sleep apnea     cpap       Past Surgical History:   Procedure Laterality Date    CHOLECYSTECTOMY, LAPAROSCOPIC N/A 4/20/2020    LAPAROSCOPIC CHOLECYSTECTOMY performed by Kristel Olivo DO at [address] [place] [address] SURGICAL HISTORY Left 12/10/14    Excision of Cyst Left Upper Posterior Ankle    OTHER SURGICAL HISTORY Right 12/29/2016    Laparotomy with Right SO    OVARY REMOVAL  12/28/2016    unsure of side    SLEEVE GASTRECTOMY N/A 11/6/2019    LAPAROSCOPIC SLEEVE GASTRECTOMY - ETHICON performed by Montse Angulo MD at [address] N/A 8/23/2019    EGD BIOPSY performed by Montse Angulo MD at [address] [facility] N/A 3/10/2021    EGD BIOPSY performed by Tri Wetzel MD at [address] [place] [address] Drive EXTRACTION         Family History   Problem Relation Age of Onset    Asthma Mother     Arthritis Mother    Vanice Johana Mother     Hypertension Father BUN 06/03/2021 12  7 - 20 mg/dL Final    CREATININE 06/03/2021 0.6  0.6 - 1.1 mg/dL Final    GFR Non- 06/03/2021 >60  >60 Final    Comment: >60 mL/min/1.73m2 EGFR, calc. for ages 25 and older using the  MDRD formula (not corrected for weight), is valid for stable  renal function.  GFR  06/03/2021 >60  >60 Final    Comment: Chronic Kidney Disease: less than 60 ml/min/1.73 sq.m. Kidney Failure: less than 15 ml/min/1.73 sq.m. Results valid for patients 18 years and older.  Calcium 06/03/2021 9.4  8.3 - 10.6 mg/dL Final    Total Protein 06/03/2021 7.3  6.4 - 8.2 g/dL Final    Albumin 06/03/2021 4.2  3.4 - 5.0 g/dL Final    Albumin/Globulin Ratio 06/03/2021 1.4  1.1 - 2.2 Final    Total Bilirubin 06/03/2021 0.5  0.0 - 1.0 mg/dL Final    Alkaline Phosphatase 06/03/2021 63  40 - 129 U/L Final    ALT 06/03/2021 22  10 - 40 U/L Final    AST 06/03/2021 20  15 - 37 U/L Final    Globulin 06/03/2021 3.1  g/dL Final    Lipase 06/03/2021 42.0  13.0 - 60.0 U/L Final    Rapid Strep A Screen 06/03/2021 Negative  Negative Final    Comment: A culture confirmation plate has been set up and a separate  report will follow. See micro report.  Strep A Culture 06/03/2021 Normal oral mode, No Beta Strep isolated   Final         Assessment and Plan:    1. Morbid obesity with BMI of 40.0-44.9, adult ([address]  Heavily counseled on the importance of therapeutic lifestyle changes through diet and exercise. The patient understands that the goal of treatment is to reach and stay at a healthy weight. The initial treatment goal is to lose at least 5-10% of her body weight in 12 weeks. This will require changes in eating habits, increased physical activity, and behavior changes. Counseled on low carb/terry diet. Patient handouts and education material provided and reviewed in detail with the patient. All questions answered.  Encouraged patient to keep a food journal and to bring it to her next visit. Discussed available treatment options in addition to lifestyle changes including medications or OPTIFAST. She is not interested in treatment that is not covered by her insurance. Start 1200-Todd/low carb meal plan. F/u with PCP re: gabapentin and weight concerns. [facility] to start exercise program again, pt not interested at this time. F/u in 4 weeks. 2. Dietary counseling and surveillance  1200-Todd/low carb meal plan. 3. S/P laparoscopic sleeve gastrectomy  Avoid all carbonated drinks. Choose fluids that are sugar-free. Eat small, but frequent meals including a protein source with each meal. Eat meals over 30 minutes, taking small bites and chewing well. Take MVIs as directed. Nutrition:  [] LCHF/Ketogenic [x] Low carb/low-calorie diet [] Low-calorie diet     []Maintenance        FITTE:   [x] Cardio [] Resistance/stength exercises   [x] ACSM recommendations (150 minutes/week)           Behavior:   [x] Motivational interviewing performed    [] Referral for counseling  [x] Discussed strategies to overcome habits/challenges for focus      [x] Stress management   [x] Stimulus control  [x] Sleep hygiene      No orders of the defined types were placed in this encounter. No follow-ups on file. Maude Pinto is a 37 y.o. female being evaluated by a Virtual Visit (video visit) encounter to address concerns as mentioned above. A caregiver was present when appropriate. Due to this being a TeleHealth encounter (During [facility] public health emergency), evaluation of the following organ systems was limited: Vitals/Constitutional/EENT/Resp/CV/GI//MS/Neuro/Skin/Heme-Lymph-Imm.   Pursuant to the emergency declaration under the [address] waiver authority and the [employer] Act, this Virtual Visit was conducted with patient's (and/or legal guardian's) consent, to reduce the patient's risk of exposure to COVID-19 and provide necessary medical care. The patient (and/or legal guardian) has also been advised to contact this office for worsening conditions or problems, and seek emergency medical treatment and/or call 911 if deemed necessary. Services were provided through a video synchronous discussion virtually to substitute for in-person clinic visit. Patient and provider were located at their individual homes. --Macarena Neumann MD on 6/19/2021 at 10:06 AM    An electronic signature was used to authenticate this note. Greater than 50% of this 45 minute visit was used in direct counseling.

## 2021-06-21 ENCOUNTER — TELEPHONE (OUTPATIENT)
Dept: BARIATRICS/WEIGHT MGMT | Age: 36
End: 2021-06-21

## 2021-06-21 NOTE — TELEPHONE ENCOUNTER
RD attempted to call pt to discuss meal plan/handouts, however pt not available. Pt is s/p sleeve. Left detailed message for pt regarding meal plan and handouts, encouraged pt to focus on protein and veggies first and if able to incorporate CHO/fat she may do so if able to tolerate portion. Emailed all documents to email on file.  Encouraged pt to call office back with questions.     ----- Message from Nan Jacobs MD sent at 2021 11:00 AM EDT -----  Regardin-Todd/LC meal plan and proteinshakes/bars list  Please call/email pt 1200-Todd/LC meal plan and protein shakes/bars list. Ty!

## 2021-06-23 ENCOUNTER — HOSPITAL ENCOUNTER (EMERGENCY)
Age: 36
Discharge: HOME | End: 2021-06-23
Attending: EMERGENCY MEDICINE
Payer: MEDICAID

## 2021-06-23 ENCOUNTER — APPOINTMENT (OUTPATIENT)
Dept: GENERAL RADIOLOGY | Age: 36
End: 2021-06-23
Payer: MEDICAID

## 2021-06-23 VITALS
DIASTOLIC BLOOD PRESSURE: 63 MMHG | SYSTOLIC BLOOD PRESSURE: 100 MMHG | BODY MASS INDEX: 40.18 KG/M2 | RESPIRATION RATE: 20 BRPM | HEART RATE: 78 BPM | OXYGEN SATURATION: 98 % | WEIGHT: 256 LBS | HEIGHT: 67 IN | TEMPERATURE: 97.2 F

## 2021-06-23 DIAGNOSIS — M94.0 COSTOCHONDRITIS: ICD-10-CM

## 2021-06-23 DIAGNOSIS — R07.89 CHEST WALL PAIN: Primary | ICD-10-CM

## 2021-06-23 LAB
A/G RATIO: 1.6 (ref 1.1–2.2)
ALBUMIN SERPL-MCNC: 4.2 G/DL (ref 3.4–5)
ALP BLD-CCNC: 63 U/L (ref 40–129)
ALT SERPL-CCNC: 21 U/L (ref 10–40)
ANION GAP SERPL CALCULATED.3IONS-SCNC: 7 MMOL/L (ref 3–16)
AST SERPL-CCNC: 23 U/L (ref 15–37)
BASOPHILS ABSOLUTE: 0 K/UL (ref 0–0.2)
BASOPHILS RELATIVE PERCENT: 0.7 %
BILIRUB SERPL-MCNC: 0.5 MG/DL (ref 0–1)
BUN BLDV-MCNC: 11 MG/DL (ref 7–20)
CALCIUM SERPL-MCNC: 9.1 MG/DL (ref 8.3–10.6)
CHLORIDE BLD-SCNC: 104 MMOL/L (ref 99–110)
CO2: 30 MMOL/L (ref 21–32)
CREAT SERPL-MCNC: <0.5 MG/DL (ref 0.6–1.1)
EKG ATRIAL RATE: 83 BPM
EKG DIAGNOSIS: NORMAL
EKG P AXIS: 50 DEGREES
EKG P-R INTERVAL: 134 MS
EKG Q-T INTERVAL: 380 MS
EKG QRS DURATION: 86 MS
EKG QTC CALCULATION (BAZETT): 446 MS
EKG R AXIS: 30 DEGREES
EKG T AXIS: 37 DEGREES
EKG VENTRICULAR RATE: 83 BPM
EOSINOPHILS ABSOLUTE: 0 K/UL (ref 0–0.6)
EOSINOPHILS RELATIVE PERCENT: 0.9 %
GFR AFRICAN AMERICAN: >60
GFR NON-AFRICAN AMERICAN: >60
GLOBULIN: 2.7 G/DL
GLUCOSE BLD-MCNC: 87 MG/DL (ref 70–99)
HCT VFR BLD CALC: 38.7 % (ref 36–48)
HEMOGLOBIN: 13.2 G/DL (ref 12–16)
LYMPHOCYTES ABSOLUTE: 1.5 K/UL (ref 1–5.1)
LYMPHOCYTES RELATIVE PERCENT: 30.4 %
MCH RBC QN AUTO: 32.5 PG (ref 26–34)
MCHC RBC AUTO-ENTMCNC: 34 G/DL (ref 31–36)
MCV RBC AUTO: 95.6 FL (ref 80–100)
MONOCYTES ABSOLUTE: 0.4 K/UL (ref 0–1.3)
MONOCYTES RELATIVE PERCENT: 8.6 %
NEUTROPHILS ABSOLUTE: 2.9 K/UL (ref 1.7–7.7)
NEUTROPHILS RELATIVE PERCENT: 59.4 %
PDW BLD-RTO: 13.9 % (ref 12.4–15.4)
PLATELET # BLD: 195 K/UL (ref 135–450)
PMV BLD AUTO: 9.6 FL (ref 5–10.5)
POTASSIUM REFLEX MAGNESIUM: 4 MMOL/L (ref 3.5–5.1)
RBC # BLD: 4.05 M/UL (ref 4–5.2)
SODIUM BLD-SCNC: 141 MMOL/L (ref 136–145)
TOTAL PROTEIN: 6.9 G/DL (ref 6.4–8.2)
TROPONIN: <0.01 NG/ML
TROPONIN: <0.01 NG/ML
WBC # BLD: 4.8 K/UL (ref 4–11)

## 2021-06-23 PROCEDURE — 96374 THER/PROPH/DIAG INJ IV PUSH: CPT

## 2021-06-23 PROCEDURE — 6370000000 HC RX 637 (ALT 250 FOR IP): Performed by: EMERGENCY MEDICINE

## 2021-06-23 PROCEDURE — 93005 ELECTROCARDIOGRAM TRACING: CPT | Performed by: EMERGENCY MEDICINE

## 2021-06-23 PROCEDURE — 93010 ELECTROCARDIOGRAM REPORT: CPT | Performed by: INTERNAL MEDICINE

## 2021-06-23 PROCEDURE — 71046 X-RAY EXAM CHEST 2 VIEWS: CPT

## 2021-06-23 PROCEDURE — 85025 COMPLETE CBC W/AUTO DIFF WBC: CPT

## 2021-06-23 PROCEDURE — 99285 EMERGENCY DEPT VISIT HI MDM: CPT

## 2021-06-23 PROCEDURE — 6360000002 HC RX W HCPCS: Performed by: EMERGENCY MEDICINE

## 2021-06-23 PROCEDURE — 80053 COMPREHEN METABOLIC PANEL: CPT

## 2021-06-23 PROCEDURE — 84484 ASSAY OF TROPONIN QUANT: CPT

## 2021-06-23 RX ORDER — METHOCARBAMOL 500 MG/1
500 TABLET, FILM COATED ORAL ONCE
Status: COMPLETED | OUTPATIENT
Start: 2021-06-23 | End: 2021-06-23

## 2021-06-23 RX ORDER — NAPROXEN 500 MG/1
500 TABLET ORAL 2 TIMES DAILY PRN
Qty: 60 TABLET | Refills: 0 | Status: ON HOLD | OUTPATIENT
Start: 2021-06-23 | End: 2021-08-23 | Stop reason: ALTCHOICE

## 2021-06-23 RX ORDER — KETOROLAC TROMETHAMINE 30 MG/ML
15 INJECTION, SOLUTION INTRAMUSCULAR; INTRAVENOUS ONCE
Status: COMPLETED | OUTPATIENT
Start: 2021-06-23 | End: 2021-06-23

## 2021-06-23 RX ADMIN — METHOCARBAMOL TABLETS 500 MG: 500 TABLET, COATED ORAL at 15:35

## 2021-06-23 RX ADMIN — KETOROLAC TROMETHAMINE 15 MG: 30 INJECTION, SOLUTION INTRAMUSCULAR; INTRAVENOUS at 15:35

## 2021-06-23 ASSESSMENT — PAIN DESCRIPTION - LOCATION: LOCATION: CHEST

## 2021-06-23 ASSESSMENT — PAIN DESCRIPTION - PAIN TYPE: TYPE: ACUTE PAIN

## 2021-06-23 ASSESSMENT — PAIN SCALES - GENERAL
PAINLEVEL_OUTOF10: 9
PAINLEVEL_OUTOF10: 2

## 2021-06-23 NOTE — LETTER
[facility]  [address] [facility] [address]  Phone: [phone]               June 23, 2021    Patient: Macario Ritter   YOB: 1985   Date of Visit: 6/23/2021       To Whom It May Concern:    Amy Mendoza was seen and treated in our emergency department on 6/23/2021. She may return to [facility] on 6/28/21. .      Sincerely,       Alice Dee RN         Signature:__________________________________

## 2021-06-23 NOTE — ED NOTES
.Reviewed discharge instructions with patient. Patient verbalized understanding. No distress noted. Ambulatory from department.        Margarito Medley RN  06/23/21 6000

## 2021-06-24 ENCOUNTER — TELEPHONE (OUTPATIENT)
Dept: ORTHOPEDIC SURGERY | Age: 36
End: 2021-06-24

## 2021-06-24 ENCOUNTER — HOSPITAL ENCOUNTER (OUTPATIENT)
Dept: PHYSICAL THERAPY | Age: 36
Setting detail: THERAPIES SERIES
Discharge: HOME | End: 2021-06-24
Payer: MEDICAID

## 2021-06-24 DIAGNOSIS — G89.29 CHRONIC PAIN OF LEFT KNEE: ICD-10-CM

## 2021-06-24 DIAGNOSIS — M76.892 TENDINITIS OF LEFT QUADRICEPS TENDON: Primary | ICD-10-CM

## 2021-06-24 DIAGNOSIS — M25.562 CHRONIC PAIN OF LEFT KNEE: ICD-10-CM

## 2021-06-24 PROCEDURE — 97110 THERAPEUTIC EXERCISES: CPT | Performed by: SPECIALIST

## 2021-06-24 PROCEDURE — 97112 NEUROMUSCULAR REEDUCATION: CPT | Performed by: SPECIALIST

## 2021-06-24 PROCEDURE — L1832 KO ADJ JNT POS R SUP PRE CST: HCPCS | Performed by: INTERNAL MEDICINE

## 2021-06-24 PROCEDURE — 97016 VASOPNEUMATIC DEVICE THERAPY: CPT | Performed by: SPECIALIST

## 2021-06-24 PROCEDURE — E0114 CRUTCH UNDERARM PAIR NO WOOD: HCPCS | Performed by: INTERNAL MEDICINE

## 2021-06-24 NOTE — FLOWSHEET NOTE
[facility], [address], [address] [place] [address] [address]  Phone: [phone]   Fax:     [phone]    Physical Therapy Treatment Note/ Progress   Date:  2021    Patient Name:  Josesito Wood    :  1985  MRN: [id number]  Restrictions/Precautions:    Medical/Treatment Diagnosis Information:      M25.562, G89.29 (ICD-10-CM) - Chronic pain of left knee   M23.92 (ICD-10-CM) - Patellar malalignment syndrome of left knee     ·      Insurance/Certification information:   [place]  Physician Information:   Dr John Qureshi  Has the plan of care been signed (Y/N):        []  Yes  [x]  No     Date of Patient follow up with Physician: NS    Is this a Progress Report:     [x]  Yes  []  No      If Yes:  Date Range for reporting period:  Beginning:  ------------ Ending:    Progress report will be due (10 Rx or 30 days whichever is less):  65      Recertification will be due (POC Duration  / 90 days whichever is less): 21        Visit # Insurance Allowable Auth Required   In Person 7 30 []  Yes     []  No    Tele Health 0  []  Yes     []  No    Total 7       Functional Scale: LEFS 61%   Date assessed:  21    LEFS 54% 6/3/21     Latex Allergy:  [x]NO      []YES  Preferred Language for Healthcare:   [x]English       []other:    Pain level:  2/10     SUBJECTIVE:  Reports started new job at [employer], standing increases pain     OBJECTIVE:      Observation:    Test measurements:     : 131-0    131-0   6/3: 142-0   6/10 142-0    : 142    RESTRICTIONS/PRECAUTIONS: none    Exercises/Interventions:   Therapeutic Ex (10277) Sets/sec Reps Notes/CUES HEP   Heel slides  10x  x   Piriformis stretch 30 2x  x   Piriformis cross over 30 2x  x   HEP x   bike  5 min            Slant board  1 min     # 30x     NICO B flex/ abd/ add 30# 15x     knee machine flex/ ext 25#/ 15# 25x                   Manual Intervention ([id number]) NMR re-education (39531)   CUES NEEDED     x   HR/TR  15x  x    x   PB supine clamshells GR 10x  x   bridges  15x  x   Step up and overs 2\" 20x  x   LSD 2\"  20x  x   SLS blue foam  1 min  x          Therapeutic Activity (83221)                     Vaso L knee  10 min                   Medbridge access code: [email or web address]             Therapeutic Exercise and NMR EXR  [x] (77345) Provided verbal/tactile cueing for activities related to strengthening, flexibility, endurance, ROM for improvements in LE, proximal hip, and core control with self care, mobility, lifting, ambulation. [x] (02383) Provided verbal/tactile cueing for activities related to improving balance, coordination, kinesthetic sense, posture, motor skill, proprioception to assist with LE, proximal hip, and core control in self-care, mobility, lifting, ambulation and eccentric single leg control.      NMR and Therapeutic Activities:    [x] (85154 or 63246) Provided verbal/tactile cueing for activities related to improving balance, coordination, kinesthetic sense, posture, motor skill, proprioception and motor activation to allow for proper function of core, proximal hip and LE with self-care and ADLs and functional mobility.   [] (11400) Gait Re-education- Provided training and instruction to the patient for proper LE, core and proximal hip recruitment and positioning and eccentric body weight control with ambulation re-education including up and down stairs     Home Exercise Program:    [x] (48713) Reviewed/Progressed HEP activities related to strengthening, flexibility, endurance, ROM of core, proximal hip and LE for functional self-care, mobility, lifting and ambulation/stair navigation   [] (19672) Reviewed/Progressed HEP activities related to improving balance, coordination, kinesthetic sense, posture, motor skill, proprioception of core, proximal hip and LE for self-care, mobility, lifting, and ambulation/stair as indicated by patients Functional Deficits. [x] Progressing: [] Met: [] Not Met: [] Adjusted     3. Patient will demonstrate an increase in Strength to good proximal hip strength and control, within 5lb HHD in LE to allow for proper functional mobility as indicated by patients Functional Deficits. [x] Progressing: [] Met: [] Not Met: [] Adjusted     4. Patient will return to [facility] for  functional activities without increased symptoms or restriction. [x] Progressing: [] Met: [] Not Met: [] Adjusted     5. Stairs/ walk with min to no limitations (patient specific functional goal)    [x] Progressing: [] Met: [] Not Met: [] Adjusted              Overall Progression Towards Functional goals/ Treatment Progress Update:  [x] Patient is progressing as expected towards functional goals listed. [] Progression is slowed due to complexities/Impairments listed. [] Progression has been slowed due to co-morbidities.   [] Plan just implemented, too soon to assess goals progression <30days   [] Goals require adjustment due to lack of progress  [] Patient is not progressing as expected and requires additional follow up with physician  [] Other    Prognosis for POC: [x] Good [] Fair  [] Poor      Patient requires continued skilled intervention: [x] Yes  [] No    Treatment/Activity Tolerance:  [x] Patient able to complete treatment  [] Patient limited by fatigue  [] Patient limited by pain    [] Patient limited by other medical complications  [] Other:     Return to Play: (if applicable)   [x]  Stage 1: Intro to Strength   []  Stage 2: Return to Run and Strength   []  Stage 3: Return to Jump and Strength   []  Stage 4: Dynamic Strength and Agility   []  Stage 5: Sport Specific Training     []  Ready to Return to Play, Meets All Above Stages   []  Not Ready for Return to Sports   Comments:                          PLAN:   [x] Continue per plan of care [] Alter current plan (see comments above)  [] Plan of care initiated [] Hold pending MD visit [] Discharge    Electronically signed by:  Brook Devlin PT    Note: If patient does not return for scheduled/ recommended follow up visits, this note will serve as a discharge from care along with most recent update on progress.

## 2021-06-24 NOTE — PROGRESS NOTES
Name_______________________________________Printed:____________________  Date and time of surgery___07/02/21 1015_____________________Arrival Time:__0845 [facility]______________   1. The instructions given regarding when and if a patient needs to stop oral intake prior to surgery varies. Follow the specific instructions you were given                  ___Nothing to eat or to drink after Midnight the night before.                   ____Carbo loading or ERAS instructions will be given to select patients-if you have been given those instructions -please do the following                           The evening before your surgery after dinner before midnight drink 40 ounces of gatorade. If you are diabetic use sugar free. The morning of surgery drink 40 ounces of water. This needs to be finished 3 hours prior to your surgery start time. 2. Take the following pills with a small sip of water on the morning of surgery____Synthroid_______________________________________________                  Do not take blood pressure medications ending in pril or sartan the karen prior to surgery or the morning of surgery_   3. Aspirin, Ibuprofen, Advil, Naproxen, Vitamin E and other Anti-inflammatory products and supplements should be stopped for 5 -7days before surgery or as directed by your physician. 4. Check with your Doctor regarding stopping Plavix, Coumadin,Eliquis, Lovenox,Effient,Pradaxa,Xarelto, Fragmin or other blood thinners and follow their instructions. 5. Do not smoke, and do not drink any alcoholic beverages 24 hours prior to surgery. This includes NA Beer. Refrain from the usage of any recreational drugs. 6. You may brush your teeth and gargle the morning of surgery. DO NOT SWALLOW WATER   7. You MUST make arrangements for a responsible adult to stay on site while you are here and take you home after your surgery. You will not be allowed to leave alone or drive yourself home.   It is strongly suggested someone stay with you the first 24 hrs. Your surgery will be cancelled if you do not have a ride home. 8. A parent/legal guardian must accompany a child scheduled for surgery and plan to stay at the hospital until the child is discharged. Please do not bring other children with you. 9. Please wear simple, loose fitting clothing to the hospital.  Jake Solid not bring valuables (money, credit cards, checkbooks, etc.) Do not wear any makeup (including no eye makeup) or nail polish on your fingers or toes. 10. DO NOT wear any jewelry or piercings on day of surgery. All body piercing jewelry must be removed. 11. If you have ___dentures, they will be removed before going to the OR; we will provide you a container. If you wear ___contact lenses or _X__glasses, they will be removed; please bring a case for them. 12. Please see your family doctor/pediatrician for a history & physical and/or concerning medications. Bring any test results/reports from your physician's office. PCP__________________Phone___________H&P Appt. Date________             13 If you  have a Living Will and Durable Power of  for Healthcare, please bring in a copy. 15. Notify your Surgeon if you develop any illness between now and surgery  time, cough, cold, fever, sore throat, nausea, vomiting, etc.  Please notify your surgeon if you experience dizziness, shortness of breath or blurred vision between now & the time of your surgery             15. DO NOT shave your operative site 96 hours prior to surgery. For face & neck surgery, men may use an electric razor 48 hours prior to surgery. 16. Shower the night before or morning of surgery using an antibacterial soap or as you have been instructed. 17. To provide excellent care visitors will be limited to one in the room at any given time. 18.  Please bring picture ID and insurance card.              19.  Visit our web site for additional information:  [email or web address]              20.During flu season no children under the age of 15 are permitted in the hospital for the safety of all patients. 21. If you take a long acting insulin in the evening only  take half of your usual  dose the night  before your procedure              22. If you use a c-pap please bring DOS if staying overnight,             23.For your convenience Rachel Zav[facility] has a pharmacy on site to fill your prescriptions. 24. If you use oxygen and have a portable tank please bring it  with you the DOS             25. Bring a complete list of all your medications with name and dose include any supplements. 26. Other__________________________________________   *Please call pre admission testing if you any further questions   [address] [place] [address]. Jessica Montgomery  [phone]   [facility]    __ Done-Where _____  __ Scheduled ___ Where ___   __ Other __________  X__ VACCINATED-instructed to bring card DOS      VISITOR POLICY(subject to change)    There is a one visitor policy at [facility] for all surgeries and endoscopies. Whether the visitor can stay or will be asked to wait in the car will depend on the current policy and if social distancing can be maintained. The policy is subject to change at any time. Please make sure the visitor has a cell phone that is on,charged and able to accept calls, as this may be the way that the staff communicates with them. Pain management is NO VISITOR policyThe patients ride is expected to remain in the car with a cell phone for communication. If the ride is leaving the hospital grounds please make sure they are back in time for pickup. Have the patient inform the staff on arrival what their rides plans are while the patient is in the facility. At the MAIN there is one visitor allowed. Please note that the visitor policy is subject to change. All above information reviewed with patient in person or by phone. Patient verbalizes understanding. All questions and concerns addressed.                                                                                                  Patient/Rep__Patient__________________                                                                                                                                    PRE OP INSTRUCTIONS

## 2021-06-25 ENCOUNTER — TELEPHONE (OUTPATIENT)
Dept: ORTHOPEDIC SURGERY | Age: 36
End: 2021-06-25

## 2021-06-25 NOTE — ED PROVIDER NOTES
[address] ED      CHIEF COMPLAINT  Chest Pain (c/o burning sensation in her chest that started at 9am this morning. denies n/v)       HISTORY OF PRESENT ILLNESS  Lalo Mae is a 37 y.o. female  who presents to the ED complaining of chest pain. She states it started at 9 AM, increased gradually throughout the day. Describes it as a burning sensation. She describes it as left-sided in nature, some radiation to her shoulder. Denies radiation down her arm or to her back. She denies  it is exertional. She denies it is pleuritic. States that it is worse with arm movement. She states she has been lifting heavy boxes at work recently and feels this could be related. She denies any dizziness, diaphoresis, nausea or vomiting. She denies any shortness of breath. Denies a history of CAD. She is not a smoker. She denies a history of hypertension or hyperlipidemia. Denies history of VTE, recent hospitalizations, recent surgeries, recent travel, leg edema, hemoptysis, estrogen supplementation, or malignancy. No other complaints, modifying factors or associated symptoms. I have reviewed the following from the nursing documentation.     Past Medical History:   Diagnosis Date    Asthma     as child    Back pain     [facility] ([facility]     Depression     Diabetes mellitus ([facility]     Liver disease     fatty liver    Obesity     OCD (obsessive compulsive disorder)     PCOS (polycystic ovarian syndrome)     PCOS (polycystic ovarian syndrome)     Sleep apnea     cpap     Past Surgical History:   Procedure Laterality Date    ANKLE SURGERY Left     Scar tissue removal    CHOLECYSTECTOMY, LAPAROSCOPIC N/A 4/20/2020    LAPAROSCOPIC CHOLECYSTECTOMY performed by Ray Buchanan DO at [address] ([facility]) Left 12/10/14    Excision of Cyst Left Upper Posterior Ankle    OTHER SURGICAL HISTORY Right 12/29/2016    Laparotomy with Right SO    OVARY REMOVAL  12/28/2016 unsure of side    SLEEVE GASTRECTOMY N/A 11/6/2019    LAPAROSCOPIC SLEEVE GASTRECTOMY - ETHICON performed by Sheliah Sandhoff, MD at [address] N/A 8/23/2019    EGD BIOPSY performed by Sheliah Sandhoff, MD at [facility] N/A 3/10/2021    EGD BIOPSY performed by Nilton Martin MD at [facility]       Family History   Problem Relation Age of Onset    Asthma Mother     Arthritis Mother     Lung Cancer Mother     Hypertension Father     Elevated Lipids Father     Diabetes Father     Alcohol Abuse Father     Asthma Father     Arthritis Father     Diabetes Paternal Grandfather     Arthritis Paternal Grandfather     Diabetes Maternal Grandmother     Arthritis Maternal Grandmother      Social History     Socioeconomic History    Marital status:      Spouse name: Sigifredo Teague Number of children: 0    Years of education: 12    Highest education level: Not on file   Occupational History    Not on file   Tobacco Use    Smoking status: Never Smoker    Smokeless tobacco: Never Used   Vaping Use    Vaping Use: Never used   Substance and Sexual Activity    Alcohol use: Yes     Comment: 1 drink per week    Drug use: Yes     Frequency: 2.0 times per week     Types: Marijuana     Comment: medical marijuana    Sexual activity: Yes     Partners: Male   Other Topics Concern    Not on file   Social History Narrative    Not on file     Social Determinants of Health     Financial Resource Strain:     Difficulty of Paying Living Expenses:    Food Insecurity:     Worried About Running Out of Food in the Last Year:     Ran Out of Food in the Last Year:    Transportation Needs:     Lack of Transportation (Medical):      Lack of Transportation (Non-Medical):    Physical Activity:     Days of Exercise per Week:     Minutes of Exercise per Session:    Stress:     Feeling of Stress :    Social Connections:     Frequency of Communication with Friends and Family:     Frequency of Social Gatherings with Friends and Family:     Attends Nondenominational Services:     Active Member of Clubs or Organizations:     Attends Club or Organization Meetings:     Marital Status:    Intimate Partner Violence:     Fear of Current or Ex-Partner:     Emotionally Abused:     Physically Abused:     Sexually Abused:      No current facility-administered medications for this encounter. Current Outpatient Medications   Medication Sig Dispense Refill    naproxen (NAPROSYN) 500 MG tablet Take 1 tablet by mouth 2 times daily as needed for Pain 60 tablet 0    lamoTRIgine (LAMICTAL) 25 MG tablet Take 25 mg by mouth daily      omeprazole (PRILOSEC) 40 MG delayed release capsule TAKE 1 CAPSULE (40MG) BY MOUTH 2 TIMES DAILY 60 capsule 0    ondansetron (ZOFRAN) 4 MG tablet Take 1 tablet by mouth every 8 hours as needed for Nausea 20 tablet 0    metFORMIN (GLUCOPHAGE) 500 MG tablet Take 500 mg by mouth daily (with breakfast)      DULoxetine (CYMBALTA) 30 MG extended release capsule TAKE 1 CAPSULE BY ORAL ROUTE  EVERY DAY      mirtazapine (REMERON) 15 MG tablet Take 15 mg by mouth nightly      medical marijuana Take by mouth as needed.       busPIRone (BUSPAR) 5 MG tablet Take 5 mg by mouth 3 times daily      loratadine (CLARITIN) 10 MG tablet Take 10 mg by mouth daily      Multiple Vitamins-Minerals (BARIATRIC FUSION) CHEW Take 4 tablets by mouth daily      levothyroxine (SYNTHROID) 75 MCG tablet TAKE 1 TABLET BY MOUTH EVERY DAY  5    Cholecalciferol (VITAMIN D PO) Take by mouth daily       ziprasidone (GEODON) 20 MG capsule Take 20 mg by mouth 2 times daily (with meals)      clomiPRAMINE (ANAFRANIL) 25 MG capsule Take 50 mg by mouth nightly       Allergies   Allergen Reactions    Latex Itching    Lactose      \"Extreme\" stomach pain and diarrhea    Adhesive Tape      Rips off skin    Keflex [Cephalexin] Nausea And Vomiting     Projectile vomitting       REVIEW OF SYSTEMS  10 systems reviewed, pertinent positives per HPI otherwise noted to be negative. PHYSICAL EXAM  /63   Pulse 78   Temp 97.2 °F (36.2 °C) (Oral)   Resp 20   Ht 5' 7.25\" (1.708 m)   Wt 256 lb (116.1 kg)   LMP 04/13/2020   SpO2 98%   BMI 39.80 kg/m²    Physical exam:  General appearance: awake and cooperative. No distress. Non toxic appearing. Skin: Warm and dry. No rashes or lesions. HENT: Normocephalic. Atraumatic. Mucus membranes are moist.   Neck: supple  Eyes: ENRICO. EOM intact. Heart: RRR. No murmurs. Lungs: Respirations unlabored. CTAB. No wheezes, rales, or rhonchi. Good air exchange  Abdomen: No tenderness. Soft. Non distended. No peritoneal signs. Musculoskeletal: Tenderness to palpation, reproducible to left-sided chest. No lesions. No extremity edema. Compartments soft. No deformity. No tenderness in the extremities. All extremities neurovascularly intact. Radial, Dp, and PT pulses +2/4 bilaterally  Neurological: Alert and oriented. No focal deficits. No aphasia or dysarthria. No gait ataxia. Psychiatric: Normal mood and affect. LABS  I have reviewed all labs for this visit.    Results for orders placed or performed during the hospital encounter of 06/23/21   CBC Auto Differential   Result Value Ref Range    WBC 4.8 4.0 - 11.0 K/uL    RBC 4.05 4.00 - 5.20 M/uL    Hemoglobin 13.2 12.0 - 16.0 g/dL    Hematocrit 38.7 36.0 - 48.0 %    MCV 95.6 80.0 - 100.0 fL    MCH 32.5 26.0 - 34.0 pg    MCHC 34.0 31.0 - 36.0 g/dL    RDW 13.9 12.4 - 15.4 %    Platelets 274 924 - 516 K/uL    MPV 9.6 5.0 - 10.5 fL    Neutrophils % 59.4 %    Lymphocytes % 30.4 %    Monocytes % 8.6 %    Eosinophils % 0.9 %    Basophils % 0.7 %    Neutrophils Absolute 2.9 1.7 - 7.7 K/uL    Lymphocytes Absolute 1.5 1.0 - 5.1 K/uL    Monocytes Absolute 0.4 0.0 - 1.3 K/uL    Eosinophils Absolute 0.0 0.0 - 0.6 K/uL    Basophils Absolute 0.0 0.0 - 0.2 K/uL US EXTREMITY LEFT NON VASC LIMITED    Result Date: 6/14/2021  Radiology result is complete; follow up with provider / physician office for radiology results       ED COURSE/MDM  Patient seen and evaluated. Old records reviewed. Labs and imaging reviewed and results discussed with patient. This is a 82-year-old female presenting with chest pain. Vital signs are within normal limits. She is not tachycardic or hypoxic. She is well-appearing on exam. The chest pain is reproducible on exam. Her EKG is nonischemic. Troponin is negative. I do not suspect ACS. Heart score is 1. I suspect the pain is musculoskeletal, related to costochondritis. She is given Toradol here with some improvement of her pain. Also given a muscle relaxer. Otherwise I do not suspect PE, she is PERC negative. Does not require further work-up. Lab work is otherwise unremarkable. Chest x-ray is clear. 3-hour troponin is negative as well, I feel she is safe and appropriate for discharge home. Taking NSAIDs and as she is prescribed muscle relaxer for home, we also discussed symptomatic care instructions. She is told to follow-up with primary care. We discussed strict return precautions and she voices understanding.      HEART SCORE:    History: +0 for low suspicion  \"Highly suspicious\" = High risk features:  middle or left-sided, heavy chest pain, diaphoresis, initiated by exercise, emotions or cold, radiation, N/V, and/or relief of symptoms by sublingual nitrates  \"Moderately suspicious\" = Mixture of high-risk and low-risk features  \"Low suspicious\" = Well localized, sharp pain, non-exertional, no diaphoresis, no N/V  EKG: +0 for normal EKG   \"Nonspecific changes\" = repolarization abnormalities, nonspecific T wave changes, nonspecific ST segment depression elevation, bundle branch blocks (even if old), pacemaker rhythm, LVH, early repolarization, digoxin effect   Age: [de-identified] for age <45 years  Risk factors (includes HLD, HTN, DM, tobacco use, mg (500 mg Oral Given 6/23/21 1535)        CLINICAL IMPRESSION  1. Chest wall pain    2. Costochondritis        Blood pressure 100/63, pulse 78, temperature 97.2 °F (36.2 °C), temperature source Oral, resp. rate 20, height 5' 7.25\" (1.708 m), weight 256 lb (116.1 kg), last menstrual period 04/13/2020, SpO2 98 %, not currently breastfeeding. Patient was given scripts for the following medications. I counseled patient how to take these medications. Discharge Medication List as of 6/23/2021  4:16 PM      START taking these medications    Details   naproxen (NAPROSYN) 500 MG tablet Take 1 tablet by mouth 2 times daily as needed for Pain, Disp-60 tablet, R-0Normal             Follow-up with:  Jayson Lucero, APRN - [address]  [phone]    Call in 1 day        DISCLAIMER: This chart was created using Dragon dictation software. Efforts were made by me to ensure accuracy, however some errors may be present due to limitations of this technology and occasionally words are not transcribed correctly.        Imani, [address] [place] [address] Avenue  06/25/21 1157

## 2021-06-25 NOTE — PROGRESS NOTES
Per Vandana Toledo at Dr. Otf Mitchell office. He will review the the details of her recent ER visit, in ER for chest pain on 06/23/21. He is aware of normal EKG, labs and note from ER MD. Stated continue date set for OR.

## 2021-06-28 DIAGNOSIS — K21.9 CHRONIC GERD: ICD-10-CM

## 2021-06-28 NOTE — PROGRESS NOTES
Spoke to Saulo gann at Dr. Jhony Trammell office. Patient to have H&P DOS per Dr. Jhony Trammell. Patient had called office and clarified.

## 2021-06-29 RX ORDER — OMEPRAZOLE 40 MG/1
40 CAPSULE, DELAYED RELEASE ORAL DAILY
Qty: 30 CAPSULE | Refills: 0 | Status: SHIPPED | OUTPATIENT
Start: 2021-06-29 | End: 2021-07-27

## 2021-07-01 ENCOUNTER — TELEPHONE (OUTPATIENT)
Dept: BARIATRICS/WEIGHT MGMT | Age: 36
End: 2021-07-01

## 2021-07-01 NOTE — TELEPHONE ENCOUNTER
Spoke with PHYLLIS Sarabia at [facility]. Advised prescription was changed by Clarisse Arzate NP to 40mg daily. No further questions.  Thanks

## 2021-07-01 NOTE — TELEPHONE ENCOUNTER
[facility] called asked for clarification on Omeprazole Rx, because of change to Rx from last request. Please call [facility] and advise if this is correct to lower the quantity to once a day. Thank you.

## 2021-07-02 ENCOUNTER — HOSPITAL ENCOUNTER (OUTPATIENT)
Age: 36
Setting detail: OUTPATIENT SURGERY
Discharge: HOME | End: 2021-07-02
Attending: INTERNAL MEDICINE | Admitting: INTERNAL MEDICINE
Payer: MEDICAID

## 2021-07-02 VITALS
HEART RATE: 65 BPM | OXYGEN SATURATION: 100 % | BODY MASS INDEX: 40.81 KG/M2 | DIASTOLIC BLOOD PRESSURE: 63 MMHG | WEIGHT: 260 LBS | HEIGHT: 67 IN | TEMPERATURE: 97.2 F | RESPIRATION RATE: 18 BRPM | SYSTOLIC BLOOD PRESSURE: 100 MMHG

## 2021-07-02 DIAGNOSIS — M76.892 TENDINITIS OF LEFT QUADRICEPS TENDON: Primary | ICD-10-CM

## 2021-07-02 LAB
GLUCOSE BLD-MCNC: 82 MG/DL (ref 70–99)
PERFORMED ON: NORMAL

## 2021-07-02 PROCEDURE — 2709999900 HC NON-CHARGEABLE SUPPLY: Performed by: INTERNAL MEDICINE

## 2021-07-02 PROCEDURE — 2500000003 HC RX 250 WO HCPCS: Performed by: INTERNAL MEDICINE

## 2021-07-02 PROCEDURE — 99152 MOD SED SAME PHYS/QHP 5/>YRS: CPT | Performed by: INTERNAL MEDICINE

## 2021-07-02 PROCEDURE — 6360000002 HC RX W HCPCS: Performed by: INTERNAL MEDICINE

## 2021-07-02 PROCEDURE — 99153 MOD SED SAME PHYS/QHP EA: CPT | Performed by: INTERNAL MEDICINE

## 2021-07-02 PROCEDURE — 2720000010 HC SURG SUPPLY STERILE: Performed by: INTERNAL MEDICINE

## 2021-07-02 PROCEDURE — 3600000012 HC SURGERY LEVEL 2 ADDTL 15MIN: Performed by: INTERNAL MEDICINE

## 2021-07-02 PROCEDURE — 7100000010 HC PHASE II RECOVERY - FIRST 15 MIN: Performed by: INTERNAL MEDICINE

## 2021-07-02 PROCEDURE — 3600000002 HC SURGERY LEVEL 2 BASE: Performed by: INTERNAL MEDICINE

## 2021-07-02 PROCEDURE — 7100000011 HC PHASE II RECOVERY - ADDTL 15 MIN: Performed by: INTERNAL MEDICINE

## 2021-07-02 RX ORDER — HYDROCODONE BITARTRATE AND ACETAMINOPHEN 5; 325 MG/1; MG/1
1 TABLET ORAL EVERY 6 HOURS PRN
Qty: 20 TABLET | Refills: 0 | Status: SHIPPED | OUTPATIENT
Start: 2021-07-02 | End: 2021-07-07

## 2021-07-02 RX ORDER — BUPIVACAINE HYDROCHLORIDE 5 MG/ML
INJECTION, SOLUTION EPIDURAL; INTRACAUDAL
Status: COMPLETED | OUTPATIENT
Start: 2021-07-02 | End: 2021-07-02

## 2021-07-02 RX ORDER — MIDAZOLAM HYDROCHLORIDE 1 MG/ML
INJECTION INTRAMUSCULAR; INTRAVENOUS PRN
Status: DISCONTINUED | OUTPATIENT
Start: 2021-07-02 | End: 2021-07-02 | Stop reason: ALTCHOICE

## 2021-07-02 RX ORDER — LIDOCAINE HYDROCHLORIDE AND EPINEPHRINE 10; 10 MG/ML; UG/ML
INJECTION, SOLUTION INFILTRATION; PERINEURAL
Status: COMPLETED | OUTPATIENT
Start: 2021-07-02 | End: 2021-07-02

## 2021-07-02 RX ORDER — FENTANYL CITRATE 50 UG/ML
INJECTION, SOLUTION INTRAMUSCULAR; INTRAVENOUS PRN
Status: DISCONTINUED | OUTPATIENT
Start: 2021-07-02 | End: 2021-07-02 | Stop reason: ALTCHOICE

## 2021-07-02 ASSESSMENT — PAIN DESCRIPTION - DESCRIPTORS: DESCRIPTORS: ACHING

## 2021-07-02 ASSESSMENT — PAIN - FUNCTIONAL ASSESSMENT: PAIN_FUNCTIONAL_ASSESSMENT: 0-10

## 2021-07-02 NOTE — PROGRESS NOTES
Pt arrived from OR to PACU bay 6. Reported received from [address] [place] [address] staff. Pt arouses to voice. Surgical incisions dressings in place to LLE. Pt on RA, NSR , and VSS. Pt denies pain and nausea, tolerating PO. Pt only received local anesthesia. Pt meets criteria to be moved directly to phase II. Will continue to monitor.

## 2021-07-02 NOTE — OP NOTE
Operative Note      Patient: Bryan Acosta  YOB: 1985  MRN: [id number]    Date of Procedure: 7/2/2021    Pre-Op Diagnosis: M76.899  LEFT TENDINOSIS OF QUADRICEPS TENDON    Post-Op Diagnosis: Left quadriceps hypertrophic tendinopathy-central medial segment distribution with interstitial tear       Procedure(s):  LEFT QUADRICEP PERCUTANEOUS TENOTOMY , TX2 MICROTIP WITH ULTRASOUND and  Limited ultrasound evaluation of the left quadriceps tendon    Surgeon(s):  Priscilla Nuñez MD    Assistant:   * No surgical staff found *    Anesthesia: IV Sedation    Estimated Blood Loss (mL): 0.3 cc    Complications: None    Specimens:   * No specimens in log *    Implants:  * No implants in log *      Drains: * No LDAs found *    Findings: As outlined below in detail    Detailed Description of Procedure: The patient was position supine on the OR table with the left knee supported in a position of static flexion with a bump and the lower end of the OR table positioned to 20 to 25 degrees below neutral position. The skin was prepped in sterile fashion and strict sterile technique was utilized with appropriate draping of the left lower extremity. A diagnostic ultrasound was performed using Relevant e-solution E ultrasound 10 MHz linear transducer. The quadriceps tendon was evaluated extensively in long axis and short axis overall findings consistent with hypertrophic tendinopathy with an area of mild tendinopathic change in the central medial distribution of the tendon. No evidence of discrete tear whether evaluated statically or dynamically. The linear transducer was position over the area of pathology aforementioned in long axis. Using a 25-gauge needle 5 to 6 cc of lidocaine-epinephrine 1% was injected subcutaneously and overlying the superficial surface of the tendon with careful attention not to violate the tendon.     A 22-gauge needle was then advanced in the same needle tract and then advanced under direct guidance into the central area of pathology outlined above. Proximally 6 to 7 cc of 0.25% Marcaine was injected intratendinous and was visualized to hydrodissect interstitially from proximal to distal consistent with tear. After adequate anesthetic time, a 11 blade surgical knife was advanced under direct guidance using longitudinal technique into the superficial aspect of the tendon at the anatomic level of pathology outlined above. A 14-gauge Angiocath needle was then advanced within the percutaneous stab wound and advanced in the same track created by the 11 blade surgical knife. The tip of the Angiocath needle was advanced into the substance of pathology intratendinous. The Angiocath needle was withdrawn. TX 2 microtip was advanced within the same track created by the Angiocath needle and the tip was placed intratendinous in the central region of pathology. The tendon was tenotomized in both long axis and short axis planes. The tendon was tenotomized circumferentially using both long axis and short axis imaging techniques. Total cutting time/debridement time 6 minutes and 25 seconds. Once the procedure was deemed complete surveillance ultrasound was performed. Intermittent video clips were stored during the procedure    Patient tolerated procedure with negligible to no discomfort utilizing conscious IV sedation and analgesia. Technically successful percutaneous tenotomy quadriceps tendon TX 2 microtip    Steri-Strip applied to the surgical stab wound along with compression dressing with Tegaderm. Ace wrap compression and placement into T scope locked at 0 degrees.     Stable transfer to PACU                Electronically signed by Alpa Rice MD on 7/2/2021 at 2:30 PM

## 2021-07-02 NOTE — PROGRESS NOTES
Discharge instructions given to patient since she was a local. She signed and verbalized understanding of all discharge instructions. VSS.

## 2021-07-06 ENCOUNTER — TELEPHONE (OUTPATIENT)
Dept: ORTHOPEDIC SURGERY | Age: 36
End: 2021-07-06

## 2021-07-06 ENCOUNTER — OFFICE VISIT (OUTPATIENT)
Dept: CARDIOLOGY CLINIC | Age: 36
End: 2021-07-06
Payer: MEDICAID

## 2021-07-06 VITALS
WEIGHT: 263 LBS | DIASTOLIC BLOOD PRESSURE: 62 MMHG | HEIGHT: 67 IN | SYSTOLIC BLOOD PRESSURE: 104 MMHG | HEART RATE: 78 BPM | BODY MASS INDEX: 41.28 KG/M2

## 2021-07-06 DIAGNOSIS — M94.0 COSTOCHONDRITIS: Primary | ICD-10-CM

## 2021-07-06 PROCEDURE — G8417 CALC BMI ABV UP PARAM F/U: HCPCS | Performed by: INTERNAL MEDICINE

## 2021-07-06 PROCEDURE — 99204 OFFICE O/P NEW MOD 45 MIN: CPT | Performed by: INTERNAL MEDICINE

## 2021-07-06 PROCEDURE — 1036F TOBACCO NON-USER: CPT | Performed by: INTERNAL MEDICINE

## 2021-07-06 PROCEDURE — G8427 DOCREV CUR MEDS BY ELIG CLIN: HCPCS | Performed by: INTERNAL MEDICINE

## 2021-07-06 NOTE — TELEPHONE ENCOUNTER
[facility] called, She stated that they are not trained to do this type of test. They did offer UC is an option, There is a doctor there that may be able to do this test but it is not something they are comfortable performing at [facility]. The person I spoke with was [address] [place] [address] at [phone].

## 2021-07-06 NOTE — PROGRESS NOTES
CARDIOLOGY CONSULTATION        Patient Name: Melanie Butler  Primary Care physician: ROSE Nunez - CNP    Reason for Referral/Chief Complaint: Melanie Butler is a 37 y.o. patient who is referred to cardiology clinic today for evaluation and treatment of chest pain. History of Present Illness:   Ms. David Jerome is a 37 y.o. female with a prior medical history notable for diabetes mellitus, asthma, Obesity, OCD/depression, obstructive sleep apnea who is being seen today for chest pain. She has been seen in the emergency department in May and again in late June for continued episodic chest pain. Echocardiogram was ordered by her primary care physician but has since been discontinued. Patient had CT PE protocol performed in May which is negative for pulmonary embolus. No cardiac pathology noted. Today, she reports 2 episodes of chest pain warranting 2 visits to the ED. She was ruled out for myocardial infarction at these visits. Chest x-ray was normal.  EKGs were normal with early repolarization. She was felt to likely have costochondritis. She reports today that in the last 2 weeks she has continued to have burning quality chest pain in her left anterior chest radiating to center of chest, left shoulder and beneath her left breast.  She notes associated shortness of breath, dizziness described partly as vertiginous, and sweats. Her chest discomfort has been constant this week, with waxing and wanning severity at times. Nonexertional.  No alleviating factors by history today. She states certain position will increase pain such as laying on the affected side. Recent Tenex procedure leg brace LLE. When she started using crutches it increased her chest pain. Denies palpitations, near-syncope or samuel syncope. Denies paroxysmal nocturnal dyspnea, orthopnea, bendopnea, increasing lower extremity edema or weight gain.   Activity is relatively limited due to recent procedure on her leg and use of crutches for mobility. She is due to start physical therapy here soon. Past Medical History:   has a past medical history of Asthma, Back pain, Bipolar 1 disorder ([facility] [address] Depression, Diabetes mellitus ([facility] [address] Liver disease, Obesity, OCD (obsessive compulsive disorder), PCOS (polycystic ovarian syndrome), PCOS (polycystic ovarian syndrome), and Sleep apnea. Surgical History:   has a past surgical history that includes other surgical history (Left, 12/10/14); other surgical history (Right, 2016); Ovary removal (2016); Upper gastrointestinal endoscopy (N/A, 2019); Sleeve Gastrectomy (N/A, 2019); Cholecystectomy, laparoscopic (N/A, 2020); Hysterectomy; [place] tooth extraction; Upper gastrointestinal endoscopy (N/A, 3/10/2021); Ankle surgery (Left); and tenotomy (Left, 2021). Social History:  , 3 step children, unemployed, never smoked, 1 drink once every 2 weeks,  Occasional medical marijuana none in 2 months oral use   reports that she has never smoked. She has never used smokeless tobacco. She reports current alcohol use. She reports current drug use. Frequency: 2.00 times per week. Drug: Marijuana. Family History:   Mom  age 61 following treatment for cancer which was complicated with cerebrovascular accident-sounds like paradoxical embolism by history  Her father had a history of coronary artery disease with myocardial infarction at age 39 and  age 64 of complications related to diabetes and alcohol abuse. She has many siblings. Most are healthy. She does have 1 Sister born with \"hole in heart\" but she states avoided surgery as the hole closed. ?  VSD    Home Medications:  Were reviewed and are listed in nursing record and/or below  Prior to Admission medications    Medication Sig Start Date End Date Taking?  Authorizing Provider   HYDROcodone-acetaminophen (NORCO) 5-325 MG per tablet Take 1 tablet by mouth every 6 hours as needed for Pain for up to 5 days. Intended supply: 5 days. Take lowest dose possible to manage pain 7/2/21 7/7/21 Yes Sirena Carrillo MD   omeprazole (PRILOSEC) 40 MG delayed release capsule Take 1 capsule by mouth daily 6/29/21 7/29/21 Yes ROSE Carver CNP   naproxen (NAPROSYN) 500 MG tablet Take 1 tablet by mouth 2 times daily as needed for Pain 6/23/21  Yes Olesya Woods DO   lamoTRIgine (LAMICTAL) 25 MG tablet Take 25 mg by mouth daily   Yes Historical Provider, MD   metFORMIN (GLUCOPHAGE) 500 MG tablet Take 500 mg by mouth daily (with breakfast)   Yes Historical Provider, MD   DULoxetine (CYMBALTA) 30 MG extended release capsule TAKE 1 CAPSULE BY ORAL ROUTE  EVERY DAY 4/2/21  Yes Historical Provider, MD   mirtazapine (REMERON) 15 MG tablet Take 15 mg by mouth nightly   Yes Historical Provider, MD   medical marijuana Take by mouth as needed. Yes Historical Provider, MD   busPIRone (BUSPAR) 5 MG tablet Take 5 mg by mouth 3 times daily   Yes Historical Provider, MD   loratadine (CLARITIN) 10 MG tablet Take 10 mg by mouth daily 4/9/20  Yes Historical Provider, MD   Multiple Vitamins-Minerals (BARIATRIC FUSION) CHEW Take 4 tablets by mouth daily   Yes Historical Provider, MD   levothyroxine (SYNTHROID) 75 MCG tablet TAKE 1 TABLET BY MOUTH EVERY DAY 9/24/19  Yes Historical Provider, MD   Cholecalciferol (VITAMIN D PO) Take by mouth daily    Yes Historical Provider, MD   ziprasidone (GEODON) 20 MG capsule Take 20 mg by mouth 2 times daily (with meals)   Yes Historical Provider, MD   clomiPRAMINE (ANAFRANIL) 25 MG capsule Take 50 mg by mouth nightly   Yes Historical Provider, MD   ondansetron (ZOFRAN) 4 MG tablet Take 1 tablet by mouth every 8 hours as needed for Nausea 6/3/21   ROSE Brennan CNP        CURRENT Medications:  No current facility-administered medications for this visit.       Allergies:  Latex, Lactose, Adhesive tape, and Keflex [cephalexin]     Review of Systems:   A 14 point review of symptoms completed. Pertinent positives identified in the HPI, all other review of symptoms negative as below. Objective:     Vitals:    07/06/21 1439   BP: 104/62    Weight: 263 lb (119.3 kg)    Wt Readings from Last 3 Encounters:   07/06/21 263 lb (119.3 kg)   07/02/21 260 lb (117.9 kg)   06/23/21 256 lb (116.1 kg)        PHYSICAL EXAM:    General:  Alert, cooperative, no distress, appears stated age   Head:  Normocephalic, atraumatic   Eyes:  Conjunctiva/corneas clear, anicteric sclerae    Nose: Nares normal, no drainage or sinus tenderness   Throat: No abnormalities of the lips, oral mucosa or tongue. Neck: Trachea midline. Neck supple with no lymphadenopathy, thyroid not enlarged, symmetric, no tenderness/mass/nodules, no Jugular venous pressure elevation    Lungs:   Clear to auscultation bilaterally, no wheezes, no rales, no respiratory distress   Chest Wall:   Pain is aggravated by palpation of the chest wall. Heart:  Regular rate and rhythm, normal S1, normal S2, no murmur, no rub, no S3/S4, PMI non-displaced. Abdomen:    Obese abdomen, soft, non-tender, with normoactive bowel sounds. No masses, no hepatosplenomegaly   Extremities: No cyanosis, clubbing or pitting edema. She has a brace on her left leg. Vascular: 2+ radial,  dorsalis pedis and posterior tibial pulses bilaterally. Brisk carotid upstrokes without carotid bruit. Skin: Skin color, texture, turgor are normal with no rashes or ulceration. Pysch: Euthymic mood, blunted affect   Neurologic: Oriented to person, place and time. No slurred speech or facial asymmetry. No motor or sensory deficits on gross examination.          Labs:   CBC:   Lab Results   Component Value Date    WBC 4.8 06/23/2021    RBC 4.05 06/23/2021    HGB 13.2 06/23/2021    HCT 38.7 06/23/2021    MCV 95.6 06/23/2021    RDW 13.9 06/23/2021     06/23/2021     CMP:  Lab Results   Component Value Date     06/23/2021    K 4.0 06/23/2021     2021    CO2 30 2021    BUN 11 2021    CREATININE <0.5 2021    GFRAA >60 2021    GFRAA >60 2011    AGRATIO 1.6 2021    LABGLOM >60 2021    GLUCOSE 87 2021    PROT 6.9 2021    PROT 7.1 2011    CALCIUM 9.1 2021    BILITOT 0.5 2021    ALKPHOS 63 2021    AST 23 2021    ALT 21 2021     PT/INR:  No results found for: PTINR  HgBA1c:  Lab Results   Component Value Date    LABA1C 5.2 2021     Lab Results   Component Value Date    TROPONINI <0.01 2021     Lab Results   Component Value Date    CHOL 123 2021    CHOL 136 2020    CHOL 113 2020     Lab Results   Component Value Date    TRIG 66 2021    TRIG 54 2020    TRIG 56 2020     Lab Results   Component Value Date    HDL 55 2021    HDL 57 2020    HDL 46 2020     Lab Results   Component Value Date    LDLCALC 55 2021    LDLCALC 68 2020    LDLCALC 56 2020     Lab Results   Component Value Date    LABVLDL 13 2021    LABVLDL 11 2020    LABVLDL 11 2020     No results found for: CHOLHDLRATIO      Cardiac Data:     EK21, personally reviewed notable for: Normal sinus rhythm with heart rate 83 bpm, early repolarization pattern. EKG 21, personally reviewed, notable for:  Sinus bradycardia with no ischemic changes       Additional studies:   CT chest 21  Pulmonary Arteries: Pulmonary arteries are adequately opacified for evaluation. No evidence of intraluminal filling defect to suggest pulmonary embolism. Main pulmonary artery is normal in caliber. Mediastinum: No evidence of mediastinal lymphadenopathy. The heart and pericardium demonstrate no acute abnormality. There is no acute abnormality of the thoracic aorta. Lungs/pleura: The lungs are without acute process. No focal consolidation or pulmonary edema. No evidence of pleural effusion or pneumothorax.   Upper reviewed by me in its entirety. I confirm that the note above accurately reflects all work, treatment, procedures, and medical decision making performed by me. Teo Dewitt MD, personally performed the services described in this documentation as scribed by Alexei Rahman RN in my presence, and it is both accurate and complete to the best of our ability. I will address the patient's cardiac risk factors and adjusted pharmacologic treatment as needed. In addition, I have reinforced the need for patient directed risk factor modification. All questions and concerns were addressed to the patient/family. Alternatives to my treatment were discussed. Thank you for allowing us to participate in the care of Fany. Please call me with any questions [phone].     Yael Martinez MD, [facility]  Cardiovascular Disease  [facility]  [phone] [facility]  [phone] [facility]  7/6/2021 2:51 PM

## 2021-07-06 NOTE — PATIENT INSTRUCTIONS
1. Advised to try topical analgesic rub for chest pain or patch have PCP address since no NASIDS due to knee surgery  2. Cardiac risk stratification education was reviewed including diet, exercise/activity, medication,and weight loss  3. Low risk from cardiac standpoint no testing warranted  4.  Follow up prn

## 2021-07-07 ENCOUNTER — TELEPHONE (OUTPATIENT)
Dept: ORTHOPEDIC SURGERY | Age: 36
End: 2021-07-07

## 2021-07-07 NOTE — TELEPHONE ENCOUNTER
[place] [facility] [place] REQU[place] - GYPSY MARTÍNEZ @ [place] REF # [id number]

## 2021-07-07 NOTE — TELEPHONE ENCOUNTER
Spoke to [place], Patient had this procedure done by Dr Rubén Richter at the hospital and has already been performed.

## 2021-07-08 ENCOUNTER — APPOINTMENT (OUTPATIENT)
Dept: PHYSICAL THERAPY | Age: 36
End: 2021-07-08
Payer: MEDICAID

## 2021-07-12 ENCOUNTER — OFFICE VISIT (OUTPATIENT)
Dept: ORTHOPEDIC SURGERY | Age: 36
End: 2021-07-12

## 2021-07-12 VITALS — BODY MASS INDEX: 41.28 KG/M2 | WEIGHT: 263.01 LBS | HEIGHT: 67 IN

## 2021-07-12 DIAGNOSIS — M76.899 TENDINOSIS OF QUADRICEPS TENDON: Primary | ICD-10-CM

## 2021-07-12 PROCEDURE — 99024 POSTOP FOLLOW-UP VISIT: CPT | Performed by: INTERNAL MEDICINE

## 2021-07-12 NOTE — PROGRESS NOTES
Chief Complaint:   Chief Complaint   Patient presents with    Knee Pain     left, f/u Tenex 7/2/21, pain is 1/10 at rest when extended, pain increases when accidentally flexed, fell 2x while in brace on crutches, knee was sore, but felt ok          History of Present Illness:       Patient is a 37 y.o. female returns follow up for the above complaint. The patient was last seen approximately 10 daysago. The symptoms are improving since the last visit. The patient has had no further testing for the problem. Status post percutaneous tenotomy TX 2 microtip left quadriceps tendon-hypertrophic tendinopathy-central medial segment distribution with interstitial tear-7/2/2021    She has no reliance on narcotics but only on as needed basis.   She has been diligent with T scope immobilization and has been nonweightbearing as instructed    No new injuries no new events    Discomfort localizes to the distal quadriceps tendon region       Past Medical History:        Past Medical History:   Diagnosis Date    Asthma     as child    Back pain     [facility] 1 disorder ([facility] [address]     Depression     Diabetes mellitus ([facility] [address]     Liver disease     fatty liver    Obesity     OCD (obsessive compulsive disorder)     PCOS (polycystic ovarian syndrome)     PCOS (polycystic ovarian syndrome)     Sleep apnea     cpap        Present Medications:         Current Outpatient Medications   Medication Sig Dispense Refill    omeprazole (PRILOSEC) 40 MG delayed release capsule Take 1 capsule by mouth daily 30 capsule 0    naproxen (NAPROSYN) 500 MG tablet Take 1 tablet by mouth 2 times daily as needed for Pain 60 tablet 0    lamoTRIgine (LAMICTAL) 25 MG tablet Take 25 mg by mouth daily      ondansetron (ZOFRAN) 4 MG tablet Take 1 tablet by mouth every 8 hours as needed for Nausea 20 tablet 0    metFORMIN (GLUCOPHAGE) 500 MG tablet Take 500 mg by mouth daily (with breakfast)      DULoxetine (CYMBALTA) 30 MG extended release capsule TAKE 1 CAPSULE BY ORAL ROUTE  EVERY DAY      mirtazapine (REMERON) 15 MG tablet Take 15 mg by mouth nightly      medical marijuana Take by mouth as needed.  busPIRone (BUSPAR) 5 MG tablet Take 5 mg by mouth 3 times daily      loratadine (CLARITIN) 10 MG tablet Take 10 mg by mouth daily      Multiple Vitamins-Minerals (BARIATRIC FUSION) CHEW Take 4 tablets by mouth daily      levothyroxine (SYNTHROID) 75 MCG tablet TAKE 1 TABLET BY MOUTH EVERY DAY  5    Cholecalciferol (VITAMIN D PO) Take by mouth daily       ziprasidone (GEODON) 20 MG capsule Take 20 mg by mouth 2 times daily (with meals)      clomiPRAMINE (ANAFRANIL) 25 MG capsule Take 50 mg by mouth nightly       No current facility-administered medications for this visit. Allergies: Allergies   Allergen Reactions    Latex Itching    Lactose      \"Extreme\" stomach pain and diarrhea    Adhesive Tape      Rips off skin    Keflex [Cephalexin] Nausea And Vomiting     Projectile vomitting           Review of Systems:    Pertinent items are noted in HPI         Vital Signs: There were no vitals filed for this visit. General Exam:     Constitutional: Patient is adequately groomed with no evidence of malnutrition    Physical Exam: left knee      Primary Exam:    Inspection: No deformity atrophy appreciable effusion      Palpation: Negligible tenderness at the distal quadriceps tendon insertion      Range of Motion: 0/80 moderate discomfort with endrange flexion      Strength: Normal with SLR      Special Tests: Negative extensor lag, minimal discomfort with active quad contraction      Skin: 3 mm surgical incision well-healed. There are no rashes, ulcerations or lesions.       Gait: Crutch dependent     Neurovascular - non focal and intact       Additional Comments:        Additional Examinations:                Office Imaging Results/Procedures PerformedToday:          Office Procedures:     Orders Placed This Encounter   Procedures    [facility]     Referral Priority:   Routine     Referral Type:   Eval and Treat     Referral Reason:   Specialty Services Required     Requested Specialty:   Physical Therapy     Number of Visits Requested:   1           Other Outside Imaging and Testing Personally Reviewed:    No results found. Assessment   Impression: . Encounter Diagnosis   Name Primary?  Tendinosis of quadriceps tendon Yes        Status post percutaneous tenotomy TX 2 microtip left quadriceps tendon-hypertrophic tendinopathy-central medial segment distribution with interstitial tear-7/2/2021      Plan:       Continue T scope immobilization allow for full weightbearing as tolerated locked at 0 degrees extension  She can start quad sets and heel slides as tolerated  Commence/start physical therapy as scheduled for next week and increase range of motion in T scope from 0 to 90 degrees maximum with ambulation  Rehabilitation protocol per principles as defined for rehabilitation protocol defined for Tenex patella tendon percutaneous tenotomy  W0 MAC and limited ultrasound evaluation of the tendon on follow-up           Orders:        Orders Placed This Encounter   Procedures   [address]     Referral Priority:   Routine     Referral Type:   Eval and Treat     Referral Reason:   Specialty Services Required     Requested Specialty:   Physical Therapy     Number of Visits Requested:   1         Stanford Christopher MD.      Jan Burns: \"This note was dictated with voice recognition software. Though review and correction are routine, we apologize for any errors. \"

## 2021-07-15 ENCOUNTER — HOSPITAL ENCOUNTER (OUTPATIENT)
Dept: PHYSICAL THERAPY | Age: 36
Setting detail: THERAPIES SERIES
Discharge: HOME | End: 2021-07-15
Payer: MEDICAID

## 2021-07-15 PROCEDURE — 97110 THERAPEUTIC EXERCISES: CPT | Performed by: SPECIALIST

## 2021-07-15 PROCEDURE — 97140 MANUAL THERAPY 1/> REGIONS: CPT | Performed by: SPECIALIST

## 2021-07-15 PROCEDURE — 97016 VASOPNEUMATIC DEVICE THERAPY: CPT | Performed by: SPECIALIST

## 2021-07-15 NOTE — PROGRESS NOTES
[address] [facility], [address] [facility], [address]  Phone: [phone]   Fax:     [phone]      [address] [facility] and [address] [place] [address], [address] 3560 [facility], [address]  Phone: [phone]   Fax: [phone]    Date: 7/15/2021          Patient Name; :  Kavya Fernández; 1985   Dx:     P40.830, G89.29 (ICD-10-CM) - Chronic pain of left knee   M23.92 (ICD-10-CM) - Patellar malalignment syndrome of left knee     Tenex 21      Physician:   MD Mary Abel MD         Total PT Visits: 7     Measures Previous Current   Pain (0-10) 4-10/10 3/10   Disability % LEFS 61%    -0 90-0             Strength 5-/5 5/5               Specific Functional Improvements & Impressions:Patient presents today S/P Tenex on 21, started protocol. Request to continue 1-2x/week for 6-8 weeks. Thank you! Plan & Recommendations:  [x] Continue rehabilitation due to objective improvement and continued functional deficits with frequency and duration: As above  [] Progress toward  []GAP, []Work Conditioning, []Independent HEP   [] Discharge due to   [] All goals achieved, [] Maximized \"medical necessity\" [] No subjective or objective improvements      Electronically signed by:  Mihaela Carpenter, PT  Therapy Plan of Care Re-Certification  This patient has been re-evaluated for physical therapy services and for therapy to continue, Medicare, Medicaid and other insurances require periodic physician review of the treatment plan.  Please review the above re-evaluation and verify that you agree with plan of care as established above by signing the attached document and return it to our office or note changes to established plan below  [] Follow treatment plan as above [] Discontinue physical therapy  [] Change plan to: __________________________________________________    Physician Signature:____________________________________ Date:____________  By signing above, therapists plan is approved by physician    If you have any questions or concerns, please don't hesitate to call.   Thank you for your referral.  Date:  7/15/2021    Patient Name:  Jacob Brady    :  1985  MRN: [id number]  Restrictions/Precautions:    Medical/Treatment Diagnosis Information:      M25.562, G89.29 (ICD-10-CM) - Chronic pain of left knee   M23.92 (ICD-10-CM) - Patellar malalignment syndrome of left knee     ·      Insurance/Certification information:   Teri Swift  Physician Information:   Dr Eliu Herrera  Has the plan of care been signed (Y/N):        []  Yes  [x]  No     Date of Patient follow up with Physician: NS    Is this a Progress Report:     [x]  Yes  []  No      If Yes:  Date Range for reporting period:  Beginning:  ------------ Ending:    Progress report will be due (10 Rx or 30 days whichever is less):         Recertification will be due (POC Duration  / 90 days whichever is less): 21        Visit # Insurance Allowable Auth Required   In Person  30 []  Yes     []  No    Tele Health 0  []  Yes     []  No    Total 7       Functional Scale: LEFS 61%   Date assessed:  21    LEFS 54% 6/3/21     Latex Allergy:  [x]NO      []YES  Preferred Language for Healthcare:   [x]English       []other:    Pain level:  2/10     SUBJECTIVE:  Reports Tenex on 21    OBJECTIVE:      Observation:    Test measurements:      RESTRICTIONS/PRECAUTIONS: Tenex protocol    Exercises/Interventions:   Therapeutic Ex (85828) Sets/sec Reps Notes/CUES HEP   Heel slides to 90  10x  x   QS 10 10x  x   SAQ  15x  x   Hamstring stretch sit 30 3x  x   Calf stretch towel 30 3x  x                                                    Manual Intervention ([id number])        knee ROM  8 min                                        NMR re-education (43584)   CUES NEEDED Therapeutic Activity (82004)                     Vaso L knee  10 min                   Medbridge access code:  [email or web address]             Therapeutic Exercise and NMR EXR  [x] (80974) Provided verbal/tactile cueing for activities related to strengthening, flexibility, endurance, ROM for improvements in LE, proximal hip, and core control with self care, mobility, lifting, ambulation. [x] (92106) Provided verbal/tactile cueing for activities related to improving balance, coordination, kinesthetic sense, posture, motor skill, proprioception to assist with LE, proximal hip, and core control in self-care, mobility, lifting, ambulation and eccentric single leg control.      NMR and Therapeutic Activities:    [x] (47872 or 34181) Provided verbal/tactile cueing for activities related to improving balance, coordination, kinesthetic sense, posture, motor skill, proprioception and motor activation to allow for proper function of core, proximal hip and LE with self-care and ADLs and functional mobility.   [] (45324) Gait Re-education- Provided training and instruction to the patient for proper LE, core and proximal hip recruitment and positioning and eccentric body weight control with ambulation re-education including up and down stairs     Home Exercise Program:    [x] (87861) Reviewed/Progressed HEP activities related to strengthening, flexibility, endurance, ROM of core, proximal hip and LE for functional self-care, mobility, lifting and ambulation/stair navigation   [] (08773) Reviewed/Progressed HEP activities related to improving balance, coordination, kinesthetic sense, posture, motor skill, proprioception of core, proximal hip and LE for self-care, mobility, lifting, and ambulation/stair navigation      Manual Treatments:  PROM / STM / Oscillations-Mobs:  G-I, II, III, IV (PA's, Inf., Post.)  [x] (57607) Provided manual therapy to mobilize LE, proximal hip and/or LS spine soft tissue/joints for the purpose of modulating pain, promoting relaxation, increasing ROM, reducing/eliminating soft tissue swelling/inflammation/restriction, improving soft tissue extensibility and allowing for proper ROM for normal function with self-care, mobility, lifting and ambulation. Modalities:     [x] GAME READY (VASO)- for significant edema, swelling, pain control. Charges:  Timed Code Treatment Minutes: 38   Total Treatment Minutes:  48   BWC:  TE TIME:  NMR TIME:  MANUAL TIME:  UNTIMED MINUTES:  Medicare Total:         [] EVAL (LOW) 12760 (typically 20 minutes face-to-face)  [] EVAL (MOD) 90174 (typically 30 minutes face-to-face)  [] EVAL (HIGH) 40215 (typically 45 minutes face-to-face)  [] RE-EVAL     [x] RW(83528) x  2   [] IONTO  [] NMR (52644) x     [x] VASO  [x] Manual (66167) x 1    [] Other:  [] TA x      [] Mech Traction (08794)  [] ES(attended) (91996)      [] ES (un) (40089):    ASSESSMENT:  Good tolerance with Mary and manuals. GOALS:      Patient stated goal: walk/ stairs     Therapist goals for Patient:   Short Term Goals: To be achieved in: 2 weeks  1. Independent in HEP and progression per patient tolerance, in order to prevent re-injury. [x] Progressing: [] Met: [] Not Met: [] Adjusted     2. Patient will have a decrease in pain to facilitate improvement in movement, function, and ADLs as indicated by Functional Deficits. [] Progressing: [x] Met: [] Not Met: [] Adjusted     Long Term Goals: To be achieved in: 6-8 weeks  1. Disability index score of 30% or less for the LEFS to assist with reaching prior level of function. [x] Progressing: [] Met: [] Not Met: [] Adjusted     2. Patient will demonstrate increased AROM to [facility] to allow for proper joint functioning as indicated by patients Functional Deficits. [x] Progressing: [] Met: [] Not Met: [] Adjusted     3.  Patient will demonstrate an increase in Strength to good proximal hip strength and control, within 5lb HHD in LE to allow for proper functional mobility as indicated by patients Functional Deficits. [x] Progressing: [] Met: [] Not Met: [] Adjusted     4. Patient will return to [facility] for  functional activities without increased symptoms or restriction. [x] Progressing: [] Met: [] Not Met: [] Adjusted     5. Stairs/ walk with min to no limitations (patient specific functional goal)    [x] Progressing: [] Met: [] Not Met: [] Adjusted              Overall Progression Towards Functional goals/ Treatment Progress Update:  [x] Patient is progressing as expected towards functional goals listed. [] Progression is slowed due to complexities/Impairments listed. [] Progression has been slowed due to co-morbidities.   [] Plan just implemented, too soon to assess goals progression <30days   [] Goals require adjustment due to lack of progress  [] Patient is not progressing as expected and requires additional follow up with physician  [] Other    Prognosis for POC: [x] Good [] Fair  [] Poor      Patient requires continued skilled intervention: [x] Yes  [] No    Treatment/Activity Tolerance:  [x] Patient able to complete treatment  [] Patient limited by fatigue  [] Patient limited by pain    [] Patient limited by other medical complications  [] Other:     Return to Play: (if applicable)   []  Stage 1: Intro to Strength   []  Stage 2: Return to Run and Strength   []  Stage 3: Return to Jump and Strength   []  Stage 4: Dynamic Strength and Agility   []  Stage 5: Sport Specific Training     []  Ready to Return to Play, Meets All Above Stages   [x]  Not Ready for Return to Sports   Comments:                          PLAN:   [x] Continue per plan of care [] Alter current plan (see comments above)  [] Plan of care initiated [] Hold pending MD visit [] Discharge    Electronically signed by:  Samra Saez PT    Note: If patient does not return for scheduled/ recommended follow up visits, this note will serve as a discharge from care along with most recent update on progress.

## 2021-07-22 ENCOUNTER — HOSPITAL ENCOUNTER (OUTPATIENT)
Dept: PHYSICAL THERAPY | Age: 36
Setting detail: THERAPIES SERIES
Discharge: HOME | End: 2021-07-22
Payer: MEDICAID

## 2021-07-22 PROCEDURE — 97016 VASOPNEUMATIC DEVICE THERAPY: CPT | Performed by: SPECIALIST

## 2021-07-22 PROCEDURE — 97110 THERAPEUTIC EXERCISES: CPT | Performed by: SPECIALIST

## 2021-07-22 PROCEDURE — 97140 MANUAL THERAPY 1/> REGIONS: CPT | Performed by: SPECIALIST

## 2021-07-22 PROCEDURE — 97112 NEUROMUSCULAR REEDUCATION: CPT | Performed by: SPECIALIST

## 2021-07-22 NOTE — FLOWSHEET NOTE
[address] [place] [address] and Sports [facility], [address] [place] [address] [address]  Phone: [phone]   Fax:     [phone]  Date:  2021    Patient Name:  Hayden Martinez    :  1985  MRN: [id number]  Restrictions/Precautions:    Medical/Treatment Diagnosis Information:      M25.562, G89.29 (ICD-10-CM) - Chronic pain of left knee   M23.92 (ICD-10-CM) - Patellar malalignment syndrome of left knee     · 3 weeks on 21      Insurance/Certification information:   Phu Howard  Physician Information:   Dr Tay Lawrence  Has the plan of care been signed (Y/N):        []  Yes  [x]  No     Date of Patient follow up with Physician: NS    Is this a Progress Report:     [x]  Yes  []  No      If Yes:  Date Range for reporting period:  Beginning:  ------------ Ending:    Progress report will be due (10 Rx or 30 days whichever is less):         Recertification will be due (POC Duration  / 90 days whichever is less): 21        Visit # Insurance Allowable Auth Required   In Person 9 30 []  Yes     []  No    Tele Health 0  []  Yes     []  No    Total 9       Functional Scale: LEFS 61%   Date assessed:  21    LEFS 54% 6/3/21    LEFS 63% 21     Latex Allergy:  [x]NO      []YES  Preferred Language for Healthcare:   [x]English       []other:    Pain level:  1/10     SUBJECTIVE:  Reports Tenex on 21    OBJECTIVE:      Observation:    Test measurements:      RESTRICTIONS/PRECAUTIONS: Tenex protocol    Exercises/Interventions:   Therapeutic Ex (67829) Sets/sec Reps Notes/CUES HEP   Heel slides  10x  x   QS 10 10x  x   SAQ  15x  x   Hamstring stretch sit 30 3x  x   Calf stretch towel 30 3x  x          SLR 10 10x  x   Bike  5 min                                 Manual Intervention (99711)        knee ROM  8 min                                        NMR re-education (82859)   CUES NEEDED    SLS  1 min  x   HR/TR  15x  x   PB TKE GR 15x  x   Stand calf stretch 20 3x  x                                      Therapeutic Activity (07362)                     Vaso L knee  10 min                   Medbridge access code:  [email or web address]             Therapeutic Exercise and NMR EXR  [x] (70831) Provided verbal/tactile cueing for activities related to strengthening, flexibility, endurance, ROM for improvements in LE, proximal hip, and core control with self care, mobility, lifting, ambulation. [x] (22114) Provided verbal/tactile cueing for activities related to improving balance, coordination, kinesthetic sense, posture, motor skill, proprioception to assist with LE, proximal hip, and core control in self-care, mobility, lifting, ambulation and eccentric single leg control.      NMR and Therapeutic Activities:    [x] (08494 or 56209) Provided verbal/tactile cueing for activities related to improving balance, coordination, kinesthetic sense, posture, motor skill, proprioception and motor activation to allow for proper function of core, proximal hip and LE with self-care and ADLs and functional mobility.   [] (58224) Gait Re-education- Provided training and instruction to the patient for proper LE, core and proximal hip recruitment and positioning and eccentric body weight control with ambulation re-education including up and down stairs     Home Exercise Program:    [x] (71175) Reviewed/Progressed HEP activities related to strengthening, flexibility, endurance, ROM of core, proximal hip and LE for functional self-care, mobility, lifting and ambulation/stair navigation   [] (51092) Reviewed/Progressed HEP activities related to improving balance, coordination, kinesthetic sense, posture, motor skill, proprioception of core, proximal hip and LE for self-care, mobility, lifting, and ambulation/stair navigation      Manual Treatments:  PROM / STM / Oscillations-Mobs:  G-I, II, III, IV (PA's, Inf., Post.)  [x] (35752) Provided manual therapy to mobilize LE, proximal hip and/or LS spine soft tissue/joints for the purpose of modulating pain, promoting relaxation, increasing ROM, reducing/eliminating soft tissue swelling/inflammation/restriction, improving soft tissue extensibility and allowing for proper ROM for normal function with self-care, mobility, lifting and ambulation. Modalities:     [x] GAME READY (VASO)- for significant edema, swelling, pain control. Charges:  Timed Code Treatment Minutes: 38   Total Treatment Minutes:  48   BWC:  TE TIME:  NMR TIME:  MANUAL TIME:  UNTIMED MINUTES:  Medicare Total:         [] EVAL (LOW) 95065 (typically 20 minutes face-to-face)  [] EVAL (MOD) 92308 (typically 30 minutes face-to-face)  [] EVAL (HIGH) 13512 (typically 45 minutes face-to-face)  [] RE-EVAL     [x] ZQ(72307) x  1  [] IONTO  [x] NMR (77245) x  1   [x] VASO  [x] Manual (73618) x 1    [] Other:  [] TA x      [] Mech Traction (01291)  [] ES(attended) (82342)      [] ES (un) (01572):    ASSESSMENT:  Good tolerance with Mary and manuals. GOALS:      Patient stated goal: walk/ stairs     Therapist goals for Patient:   Short Term Goals: To be achieved in: 2 weeks  1. Independent in HEP and progression per patient tolerance, in order to prevent re-injury. [x] Progressing: [] Met: [] Not Met: [] Adjusted     2. Patient will have a decrease in pain to facilitate improvement in movement, function, and ADLs as indicated by Functional Deficits. [] Progressing: [x] Met: [] Not Met: [] Adjusted     Long Term Goals: To be achieved in: 6-8 weeks  1. Disability index score of 30% or less for the LEFS to assist with reaching prior level of function. [x] Progressing: [] Met: [] Not Met: [] Adjusted     2. Patient will demonstrate increased AROM to [facility] to allow for proper joint functioning as indicated by patients Functional Deficits. [x] Progressing: [] Met: [] Not Met: [] Adjusted     3.  Patient will demonstrate an increase in Strength to good proximal hip strength and control, within 5lb HHD in LE to allow for proper functional mobility as indicated by patients Functional Deficits. [x] Progressing: [] Met: [] Not Met: [] Adjusted     4. Patient will return to [facility] for  functional activities without increased symptoms or restriction. [x] Progressing: [] Met: [] Not Met: [] Adjusted     5. Stairs/ walk with min to no limitations (patient specific functional goal)    [x] Progressing: [] Met: [] Not Met: [] Adjusted              Overall Progression Towards Functional goals/ Treatment Progress Update:  [x] Patient is progressing as expected towards functional goals listed. [] Progression is slowed due to complexities/Impairments listed. [] Progression has been slowed due to co-morbidities.   [] Plan just implemented, too soon to assess goals progression <30days   [] Goals require adjustment due to lack of progress  [] Patient is not progressing as expected and requires additional follow up with physician  [] Other    Prognosis for POC: [x] Good [] Fair  [] Poor      Patient requires continued skilled intervention: [x] Yes  [] No    Treatment/Activity Tolerance:  [x] Patient able to complete treatment  [] Patient limited by fatigue  [] Patient limited by pain    [] Patient limited by other medical complications  [] Other:     Return to Play: (if applicable)   []  Stage 1: Intro to Strength   []  Stage 2: Return to Run and Strength   []  Stage 3: Return to Jump and Strength   []  Stage 4: Dynamic Strength and Agility   []  Stage 5: Sport Specific Training     []  Ready to Return to Play, Meets All Above Stages   [x]  Not Ready for Return to Sports   Comments:                          PLAN:   [x] Continue per plan of care [] Alter current plan (see comments above)  [] Plan of care initiated [] Hold pending MD visit [] Discharge    Electronically signed by:  Sussy Newsome PT    Note: If patient does not return for scheduled/ recommended follow up visits, this note will serve as a discharge from care along with most recent update on progress.

## 2021-07-26 ENCOUNTER — OFFICE VISIT (OUTPATIENT)
Dept: ORTHOPEDIC SURGERY | Age: 36
End: 2021-07-26
Payer: MEDICAID

## 2021-07-26 VITALS — WEIGHT: 263 LBS | HEIGHT: 67 IN | BODY MASS INDEX: 41.28 KG/M2

## 2021-07-26 DIAGNOSIS — K21.9 CHRONIC GERD: ICD-10-CM

## 2021-07-26 DIAGNOSIS — M25.561 RIGHT KNEE PAIN, UNSPECIFIED CHRONICITY: ICD-10-CM

## 2021-07-26 DIAGNOSIS — M70.41 PREPATELLAR BURSITIS OF RIGHT KNEE: Primary | ICD-10-CM

## 2021-07-26 DIAGNOSIS — S80.01XA CONTUSION OF RIGHT PATELLA, INITIAL ENCOUNTER: ICD-10-CM

## 2021-07-26 PROCEDURE — G8417 CALC BMI ABV UP PARAM F/U: HCPCS | Performed by: INTERNAL MEDICINE

## 2021-07-26 PROCEDURE — G8428 CUR MEDS NOT DOCUMENT: HCPCS | Performed by: INTERNAL MEDICINE

## 2021-07-26 PROCEDURE — MISC915 KNEE SLEEVE OPEN OR CLOSED - BREG: Performed by: INTERNAL MEDICINE

## 2021-07-26 PROCEDURE — 1036F TOBACCO NON-USER: CPT | Performed by: INTERNAL MEDICINE

## 2021-07-26 PROCEDURE — 99213 OFFICE O/P EST LOW 20 MIN: CPT | Performed by: INTERNAL MEDICINE

## 2021-07-26 NOTE — PROGRESS NOTES
Chief Complaint:   Chief Complaint   Patient presents with    Knee Pain     Cristina Mendez on right knee, no pain, has a knot           History of Present Illness:       Patient is a 37 y.o. female presents with the above complaint. The symptoms began 1 weeksago and started without an injury. The patient describes a sharp pain that does not radiate. The symptoms are intermittent  and are are improving since the onset. She inadvertently fell out of bed impacting her right knee. She is convalescing from percutaneous tenotomy of the left quadriceps tendon as performed 7/2/2021    She noted swelling over the anterior aspect of the knee and contacted the office. Pain localizes to the front side of the knee and  does seems to follow a typical patella femoral provacative pattern. There are not mechanical symptoms that suggest meniscal injury. The patient denies subjective instability about the knee and denies new onset weakness of the lower extremity. Pain level 3    The patient admits to a pattern of activity related swelling. Treatment to date: Ace wrap with mild improvement and local measures     There is no prior history of knee trauma. There is no prior history of autoimmune disease, inflammatory arthropathy, or crystal arthropathy.              Past Medical History:        Past Medical History:   Diagnosis Date    Asthma     as child    Back pain     Bipolar 1 disorder ([address]     Depression     Diabetes mellitus ([facility]     Liver disease     fatty liver    Obesity     OCD (obsessive compulsive disorder)     PCOS (polycystic ovarian syndrome)     PCOS (polycystic ovarian syndrome)     Sleep apnea     cpap         Past Surgical History:   Procedure Laterality Date    ANKLE SURGERY Left     Scar tissue removal    CHOLECYSTECTOMY, LAPAROSCOPIC N/A 4/20/2020    LAPAROSCOPIC CHOLECYSTECTOMY performed by Nhung Phipps DO at [address] ([facility]) Left 12/10/14 meals)      clomiPRAMINE (ANAFRANIL) 25 MG capsule Take 50 mg by mouth nightly       No current facility-administered medications for this visit. Allergies: Allergies   Allergen Reactions    Latex Itching    Lactose      \"Extreme\" stomach pain and diarrhea    Adhesive Tape      Rips off skin    Keflex [Cephalexin] Nausea And Vomiting     Projectile vomitting        Social History:         Social History     Socioeconomic History    Marital status:      Spouse name: Rosalind Prince Number of children: 0    Years of education: 12    Highest education level: Not on file   Occupational History    Not on file   Tobacco Use    Smoking status: Never Smoker    Smokeless tobacco: Never Used   Vaping Use    Vaping Use: Never used   Substance and Sexual Activity    Alcohol use: Yes     Comment: 1 drink per week    Drug use: Yes     Frequency: 2.0 times per week     Types: Marijuana     Comment: medical marijuana    Sexual activity: Yes     Partners: Male   Other Topics Concern    Not on file   Social History Narrative    Not on file     Social Determinants of Health     Financial Resource Strain:     Difficulty of Paying Living Expenses:    Food Insecurity:     Worried About Running Out of Food in the Last Year:     Ran Out of Food in the Last Year:    Transportation Needs:     Lack of Transportation (Medical):      Lack of Transportation (Non-Medical):    Physical Activity:     Days of Exercise per Week:     Minutes of Exercise per Session:    Stress:     Feeling of Stress :    Social Connections:     Frequency of Communication with Friends and Family:     Frequency of Social Gatherings with Friends and Family:     Attends Nondenominational Services:     Active Member of Clubs or Organizations:     Attends Club or Organization Meetings:     Marital Status:    Intimate Partner Violence:     Fear of Current or Ex-Partner:     Emotionally Abused:     Physically Abused:     Sexually Abused:         Review of Symptoms:    Pertinent items are noted in HPI    10 point review of systems negative except as mentioned in HPI       Vital Signs: There were no vitals filed for this visit. General Exam:     Constitutional: Patient is adequately groomed with no evidence of malnutrition  Mental Status: The patient is oriented to time, place and person. The patient's mood and affect are appropriate. Vascular: Examination reveals no swelling or calf tenderness. Peripheral pulses are palpable and 2+. Lymphatics: no lymphadenopathy of the inguinal region or lower extremity      Physical Exam: right knee      Primary Exam:    Inspection: Mild asymmetric soft tissue prominence prepatella no atrophy or effusion      Palpation: Minimal tenderness to palpation of the prepatellar soft tissues      Range of Motion: 0/130 without pain      Strength: Normal with SLR      Special Tests:   Lachman test negative, collateral ligament stressing stable, anterior/posterior drawer negative, medial and lateral [facility] testing negative, patella femoral provacative Negative      Skin: There are no rashes, ulcerations or lesions. Gait: Crutch dependent for left knee      Reflex intact lower     Additional Comments:        Additional Examinations:          Neurolgic -Light touch sensation and manual muscle testing normal L2-S1. No fasiculations. Pattella tendon and Achilles tendon reflexes +2 bilaterally. Seated SLR negative           Office Imaging Results/Procedures PerformedToday:      Radiology:      X-rays obtained and reviewed in office:   Views 2 views right knee   Location right knee   Impression patellofemoral joint is anatomic there is mild patella soft tissue prominence.   The tibiofemoral joint has a normal radiograph appearance no other soft tissue or osseous normalities       Office Procedures:     Orders Placed This Encounter   Procedures    Breg Knee Sleeve $20     Patient was supplied a Knee Sleeve. This retail item was supplied to provide functional support and assist in protecting the affected area. Verbal and written instructions for the use of and application of this item were provided. The patient was educated and fit by a healthcare professional with expert knowledge and specialization in brace application. They were instructed to contact the office immediately should the equipment result in increased pain, decreased sensation, increased swelling or worsening of the condition.  XR KNEE RIGHT (1-2 VIEWS)     Standing Status:   Future     Number of Occurrences:   1     Standing Expiration Date:   7/26/2022     Order Specific Question:   Reason for exam:     Answer:   knee pain           Other Outside Imaging and Testing Personally Reviewed:    XR KNEE RIGHT (1-2 VIEWS)    Result Date: 7/26/2021  Radiology exam is complete. No Radiologist dictation. Please follow up with ordering provider. Assessment   Impression: . Encounter Diagnoses   Name Primary?  Prepatellar bursitis of right knee Yes    Contusion of right patella, initial encounter     Right knee pain, unspecified chronicity               Plan:     Compression sleeve application with ADLs  Local measures inclusive cryotherapy and Voltaren gel  Consider trial of iontophoresis. Limited ultrasound evaluation of prepatellar soft tissues. The nature of the finding, probable diagnosis and likely treatment was thoroughly discussed with the patient. The options, risks, complications, alternative treatment as well as some of the differential diagnosis was discussed. The patient was thoroughly informed and all questions were answered. the patient indicated understanding and satisfaction with the discussion. Orders:        Orders Placed This Encounter   Procedures    Breg Knee Sleeve $20     Patient was supplied a Knee Sleeve.   This retail item was supplied to provide functional support and assist in protecting the affected area. Verbal and written instructions for the use of and application of this item were provided. The patient was educated and fit by a healthcare professional with expert knowledge and specialization in brace application. They were instructed to contact the office immediately should the equipment result in increased pain, decreased sensation, increased swelling or worsening of the condition.  XR KNEE RIGHT (1-2 VIEWS)     Standing Status:   Future     Number of Occurrences:   1     Standing Expiration Date:   7/26/2022     Order Specific Question:   Reason for exam:     Answer:   knee pain           Disclaimer: \"This note was dictated with voice recognition software. Though review and correction are routine, we apologize for any errors. \"

## 2021-07-27 RX ORDER — OMEPRAZOLE 40 MG/1
40 CAPSULE, DELAYED RELEASE ORAL DAILY
Qty: 30 CAPSULE | Refills: 0 | Status: SHIPPED | OUTPATIENT
Start: 2021-07-27 | End: 2021-08-24

## 2021-07-28 ENCOUNTER — HOSPITAL ENCOUNTER (OUTPATIENT)
Dept: PHYSICAL THERAPY | Age: 36
Setting detail: THERAPIES SERIES
Discharge: HOME | End: 2021-07-28
Payer: MEDICAID

## 2021-07-28 PROCEDURE — 97110 THERAPEUTIC EXERCISES: CPT | Performed by: SPECIALIST

## 2021-07-28 PROCEDURE — 97112 NEUROMUSCULAR REEDUCATION: CPT | Performed by: SPECIALIST

## 2021-07-28 PROCEDURE — 97140 MANUAL THERAPY 1/> REGIONS: CPT | Performed by: SPECIALIST

## 2021-07-28 PROCEDURE — 97016 VASOPNEUMATIC DEVICE THERAPY: CPT | Performed by: SPECIALIST

## 2021-07-28 NOTE — FLOWSHEET NOTE
Stand calf stretch 20 3x  x          Step up and over 2\" 20x     LSD 2\" 20x     Gait training  5 min            Therapeutic Activity (26392)                     Vaso L knee  10 min                   Medbridge access code:  [email or web address]             Therapeutic Exercise and NMR EXR  [x] (44706) Provided verbal/tactile cueing for activities related to strengthening, flexibility, endurance, ROM for improvements in LE, proximal hip, and core control with self care, mobility, lifting, ambulation. [x] (98931) Provided verbal/tactile cueing for activities related to improving balance, coordination, kinesthetic sense, posture, motor skill, proprioception to assist with LE, proximal hip, and core control in self-care, mobility, lifting, ambulation and eccentric single leg control.      NMR and Therapeutic Activities:    [x] (26749 or 81454) Provided verbal/tactile cueing for activities related to improving balance, coordination, kinesthetic sense, posture, motor skill, proprioception and motor activation to allow for proper function of core, proximal hip and LE with self-care and ADLs and functional mobility.   [] (14060) Gait Re-education- Provided training and instruction to the patient for proper LE, core and proximal hip recruitment and positioning and eccentric body weight control with ambulation re-education including up and down stairs     Home Exercise Program:    [x] (04358) Reviewed/Progressed HEP activities related to strengthening, flexibility, endurance, ROM of core, proximal hip and LE for functional self-care, mobility, lifting and ambulation/stair navigation   [] (21675) Reviewed/Progressed HEP activities related to improving balance, coordination, kinesthetic sense, posture, motor skill, proprioception of core, proximal hip and LE for self-care, mobility, lifting, and ambulation/stair navigation      Manual Treatments:  PROM / STM / Oscillations-Mobs:  G-I, II, III, IV (PA's, Inf., Post.)  [x] (86578) Provided manual therapy to mobilize LE, proximal hip and/or LS spine soft tissue/joints for the purpose of modulating pain, promoting relaxation, increasing ROM, reducing/eliminating soft tissue swelling/inflammation/restriction, improving soft tissue extensibility and allowing for proper ROM for normal function with self-care, mobility, lifting and ambulation. Modalities:     [x] GAME READY (VASO)- for significant edema, swelling, pain control. Charges:  Timed Code Treatment Minutes: 40   Total Treatment Minutes:  50   BWC:  TE TIME:  NMR TIME:  MANUAL TIME:  UNTIMED MINUTES:  Medicare Total:         [] EVAL (LOW) 27673 (typically 20 minutes face-to-face)  [] EVAL (MOD) 03515 (typically 30 minutes face-to-face)  [] EVAL (HIGH) 06361 (typically 45 minutes face-to-face)  [] RE-EVAL     [x] WL(81954) x  1  [] IONTO  [x] NMR (58224) x  1   [x] VASO  [x] Manual (27459) x 1    [] Other:  [] TA x      [] Mech Traction (61827)  [] ES(attended) (16497)      [] ES (un) (53188):    ASSESSMENT:  Good tolerance with Mary and manuals. Excellent strength unlocked brace to 90    GOALS:      Patient stated goal: walk/ stairs     Therapist goals for Patient:   Short Term Goals: To be achieved in: 2 weeks  1. Independent in HEP and progression per patient tolerance, in order to prevent re-injury. [x] Progressing: [] Met: [] Not Met: [] Adjusted     2. Patient will have a decrease in pain to facilitate improvement in movement, function, and ADLs as indicated by Functional Deficits. [] Progressing: [x] Met: [] Not Met: [] Adjusted     Long Term Goals: To be achieved in: 6-8 weeks  1. Disability index score of 30% or less for the LEFS to assist with reaching prior level of function. [x] Progressing: [] Met: [] Not Met: [] Adjusted     2. Patient will demonstrate increased AROM to [facility] to allow for proper joint functioning as indicated by patients Functional Deficits. [x] Progressing: [] Met: [] Not Met: [] Adjusted     3.  Patient will demonstrate an increase in Strength to good proximal hip strength and control, within 5lb HHD in LE to allow for proper functional mobility as indicated by patients Functional Deficits. [x] Progressing: [] Met: [] Not Met: [] Adjusted     4. Patient will return to [facility] for  functional activities without increased symptoms or restriction. [x] Progressing: [] Met: [] Not Met: [] Adjusted     5. Stairs/ walk with min to no limitations (patient specific functional goal)    [x] Progressing: [] Met: [] Not Met: [] Adjusted              Overall Progression Towards Functional goals/ Treatment Progress Update:  [x] Patient is progressing as expected towards functional goals listed. [] Progression is slowed due to complexities/Impairments listed. [] Progression has been slowed due to co-morbidities.   [] Plan just implemented, too soon to assess goals progression <30days   [] Goals require adjustment due to lack of progress  [] Patient is not progressing as expected and requires additional follow up with physician  [] Other    Prognosis for POC: [x] Good [] Fair  [] Poor      Patient requires continued skilled intervention: [x] Yes  [] No    Treatment/Activity Tolerance:  [x] Patient able to complete treatment  [] Patient limited by fatigue  [] Patient limited by pain    [] Patient limited by other medical complications  [] Other:     Return to Play: (if applicable)   []  Stage 1: Intro to Strength   []  Stage 2: Return to Run and Strength   []  Stage 3: Return to Jump and Strength   []  Stage 4: Dynamic Strength and Agility   []  Stage 5: Sport Specific Training     []  Ready to Return to Play, Meets All Above Stages   [x]  Not Ready for Return to Sports   Comments:                          PLAN:   [x] Continue per plan of care [] Alter current plan (see comments above)  [] Plan of care initiated [] Hold pending MD visit [] Discharge    Electronically signed by:  Tam Henriquez PT    Note: If patient does not return for scheduled/ recommended follow up visits, this note will serve as a discharge from care along with most recent update on progress.

## 2021-07-29 ENCOUNTER — APPOINTMENT (OUTPATIENT)
Dept: PHYSICAL THERAPY | Age: 36
End: 2021-07-29
Payer: MEDICAID

## 2021-08-02 ENCOUNTER — HOSPITAL ENCOUNTER (OUTPATIENT)
Dept: NEUROLOGY | Age: 36
Discharge: HOME | End: 2021-08-02
Payer: MEDICAID

## 2021-08-02 DIAGNOSIS — R20.0 NUMBNESS: ICD-10-CM

## 2021-08-02 PROCEDURE — 95885 MUSC TST DONE W/NERV TST LIM: CPT | Performed by: PHYSICAL MEDICINE & REHABILITATION

## 2021-08-02 PROCEDURE — 95910 NRV CNDJ TEST 7-8 STUDIES: CPT | Performed by: PHYSICAL MEDICINE & REHABILITATION

## 2021-08-02 NOTE — PROCEDURES
Test Date:  2021    Patient: China Corey : 1985 Physician: Darlene Rico DO   Sex: Female ID#:  Ref Phys: April MIRA Kirkpatrick     Patient Complaints:  Patient is a 37year-old female who presents with numbness tingling in the right suzanne body right arm alexus hand. Worse over past months. Patient History / Exam:  PMH: + djd, no neck or arm surgery  + type 2 diabetes. + PCOS. PE: reflexes trace, normal strength     NCV & EMG Findings:  Evaluation of the left median (APB) motor nerve showed prolonged distal onset latency (4.3 ms). The left median sensory nerve showed prolonged distal peak latency (3.7 ms) and decreased conduction velocity (38 m/s). The right median sensory nerve showed prolonged distal peak latency (3.9 ms), reduced amplitude (8 µV), and decreased conduction velocity (36 m/s). All remaining nerves (as indicated in the following tables) were within normal limits. All examined muscles (as indicated in the following table) showed no evidence of electrical instability. Impression:  Study is consistent with mild bilateral carpal tunnel syndrome. no evidence of an acute radiculopathy or other entrapment neuropathy. Thank you.        Darlene Rico DO        Nerve Conduction Studies  Motor Nerve Results      Latency Amplitude F-Lat Segment Distance CV Comment   Site (ms) Norm (mV) Norm (ms)  (cm) (m/s) Norm    Left Median (APB) Motor   Wrist 4.3  < 4.2 6.8  > 5.0         Elbow 7.6 - 8.5 -  Elbow-Wrist 20 61  > 50    Right Median (APB) Motor   Wrist 3.6  < 4.2 9.3  > 5.0         Elbow 6.7 - 10.4 -  Elbow-Wrist 19 61  > 50    Left Ulnar (ADM) Motor   Wrist 3.4  < 4.2 9.5  > 3.0         Bel Elbow 6.7 - 9.1 -  Bel Elbow-Wrist 23 70  > 50    Abv Elbow 8.2 - 8.6 -  Abv Elbow-Bel Elbow 8 53  > 48    Right Ulnar (ADM) Motor   Wrist 2.9  < 4.2 8.8  > 3.0         Bel Elbow 6.9 - 7.4 -  Bel Elbow-Wrist 24 60  > 50    Abv Elbow 7.5 - 7.3 -  Abv Elbow-Bel Elbow 5 83  > 48 Electronically signed by Geremias Feliz DO on 8/2/2021 at 3:13 PM]

## 2021-08-04 ENCOUNTER — HOSPITAL ENCOUNTER (OUTPATIENT)
Dept: PHYSICAL THERAPY | Age: 36
Setting detail: THERAPIES SERIES
Discharge: HOME | End: 2021-08-04
Payer: MEDICAID

## 2021-08-04 NOTE — FLOWSHEET NOTE
[facility], [address] [place] [address], [address] [place] [address] [address]  Phone: [phone]   Fax:     [phone]    Physical Therapy  Cancellation/No-show Note  Patient Name:  Sage Antonio  :  1985   Date:  2021    Cancelled visits to date: 2  No-shows to date: 1    For today's appointment patient:  [x]  Cancelled  []  Rescheduled appointment  []  No-show     Reason given by patient:  []  Patient ill  []  Conflicting appointment  []  No transportation    []  Conflict with work  [x]  No reason given  []  Other:     Comments:      Phone call information:   []  Phone call made today to patient at _ time at number provided:      []  Patient answered, conversation as follows:    []  Patient did not answer, message left as follows:  []  Phone call not made today  [x]  Phone call not needed - pt contacted us to cancel and provided reason for cancellation.      Electronically signed by:  Vini Mcmahon PT, PT

## 2021-08-05 ENCOUNTER — HOSPITAL ENCOUNTER (OUTPATIENT)
Dept: PHYSICAL THERAPY | Age: 36
Setting detail: THERAPIES SERIES
Discharge: HOME | End: 2021-08-05
Payer: MEDICAID

## 2021-08-05 ENCOUNTER — APPOINTMENT (OUTPATIENT)
Dept: PHYSICAL THERAPY | Age: 36
End: 2021-08-05
Payer: MEDICAID

## 2021-08-05 PROCEDURE — 97112 NEUROMUSCULAR REEDUCATION: CPT | Performed by: SPECIALIST

## 2021-08-05 PROCEDURE — 97016 VASOPNEUMATIC DEVICE THERAPY: CPT | Performed by: SPECIALIST

## 2021-08-05 PROCEDURE — 97110 THERAPEUTIC EXERCISES: CPT | Performed by: SPECIALIST

## 2021-08-05 NOTE — FLOWSHEET NOTE
Step up and over 4\" 20x     LSD 4\" 20x     Gait training  5 min            Therapeutic Activity (47617)                     Vaso L knee  10 min                   Medbridge access code:  [email or web address]             Therapeutic Exercise and NMR EXR  [x] (16708) Provided verbal/tactile cueing for activities related to strengthening, flexibility, endurance, ROM for improvements in LE, proximal hip, and core control with self care, mobility, lifting, ambulation. [x] (34425) Provided verbal/tactile cueing for activities related to improving balance, coordination, kinesthetic sense, posture, motor skill, proprioception to assist with LE, proximal hip, and core control in self-care, mobility, lifting, ambulation and eccentric single leg control.      NMR and Therapeutic Activities:    [x] (36065 or 80814) Provided verbal/tactile cueing for activities related to improving balance, coordination, kinesthetic sense, posture, motor skill, proprioception and motor activation to allow for proper function of core, proximal hip and LE with self-care and ADLs and functional mobility.   [] (68150) Gait Re-education- Provided training and instruction to the patient for proper LE, core and proximal hip recruitment and positioning and eccentric body weight control with ambulation re-education including up and down stairs     Home Exercise Program:    [x] (33287) Reviewed/Progressed HEP activities related to strengthening, flexibility, endurance, ROM of core, proximal hip and LE for functional self-care, mobility, lifting and ambulation/stair navigation   [] (88350) Reviewed/Progressed HEP activities related to improving balance, coordination, kinesthetic sense, posture, motor skill, proprioception of core, proximal hip and LE for self-care, mobility, lifting, and ambulation/stair navigation      Manual Treatments:  PROM / STM / Oscillations-Mobs:  G-I, II, III, IV (PA's, Inf., Post.)  [x] (88331) Provided manual therapy to mobilize LE, proximal hip and/or LS spine soft tissue/joints for the purpose of modulating pain, promoting relaxation, increasing ROM, reducing/eliminating soft tissue swelling/inflammation/restriction, improving soft tissue extensibility and allowing for proper ROM for normal function with self-care, mobility, lifting and ambulation. Modalities:     [x] GAME READY (VASO)- for significant edema, swelling, pain control. Charges:  Timed Code Treatment Minutes: 40   Total Treatment Minutes:  50   BWC:  TE TIME:  NMR TIME:  MANUAL TIME:  UNTIMED MINUTES:  Medicare Total:         [] EVAL (LOW) 93419 (typically 20 minutes face-to-face)  [] EVAL (MOD) 56512 (typically 30 minutes face-to-face)  [] EVAL (HIGH) 82008 (typically 45 minutes face-to-face)  [] RE-EVAL     [x] IO(19215) x  2  [] IONTO  [x] NMR (15315) x  1   [x] VASO  [] Manual (65090) x     [] Other:  [] TA x      [] Mech Traction (92695)  [] ES(attended) (08942)      [] ES (un) (98426):    ASSESSMENT:  Good tolerance with Mary and manuals. Excellent strength, D/C brace NV    GOALS:      Patient stated goal: walk/ stairs     Therapist goals for Patient:   Short Term Goals: To be achieved in: 2 weeks  1. Independent in HEP and progression per patient tolerance, in order to prevent re-injury. [x] Progressing: [] Met: [] Not Met: [] Adjusted     2. Patient will have a decrease in pain to facilitate improvement in movement, function, and ADLs as indicated by Functional Deficits. [] Progressing: [x] Met: [] Not Met: [] Adjusted     Long Term Goals: To be achieved in: 6-8 weeks  1. Disability index score of 30% or less for the LEFS to assist with reaching prior level of function. [x] Progressing: [] Met: [] Not Met: [] Adjusted     2. Patient will demonstrate increased AROM to [facility] to allow for proper joint functioning as indicated by patients Functional Deficits. [x] Progressing: [] Met: [] Not Met: [] Adjusted     3.  Patient will demonstrate an increase in Strength to good proximal hip strength and control, within 5lb HHD in LE to allow for proper functional mobility as indicated by patients Functional Deficits. [x] Progressing: [] Met: [] Not Met: [] Adjusted     4. Patient will return to [facility] for  functional activities without increased symptoms or restriction. [x] Progressing: [] Met: [] Not Met: [] Adjusted     5. Stairs/ walk with min to no limitations (patient specific functional goal)    [x] Progressing: [] Met: [] Not Met: [] Adjusted              Overall Progression Towards Functional goals/ Treatment Progress Update:  [x] Patient is progressing as expected towards functional goals listed. [] Progression is slowed due to complexities/Impairments listed. [] Progression has been slowed due to co-morbidities.   [] Plan just implemented, too soon to assess goals progression <30days   [] Goals require adjustment due to lack of progress  [] Patient is not progressing as expected and requires additional follow up with physician  [] Other    Prognosis for POC: [x] Good [] Fair  [] Poor      Patient requires continued skilled intervention: [x] Yes  [] No    Treatment/Activity Tolerance:  [x] Patient able to complete treatment  [] Patient limited by fatigue  [] Patient limited by pain    [] Patient limited by other medical complications  [] Other:     Return to Play: (if applicable)   []  Stage 1: Intro to Strength   []  Stage 2: Return to Run and Strength   []  Stage 3: Return to Jump and Strength   []  Stage 4: Dynamic Strength and Agility   []  Stage 5: Sport Specific Training     []  Ready to Return to Play, Meets All Above Stages   [x]  Not Ready for Return to Sports   Comments:                          PLAN:   [x] Continue per plan of care [] Alter current plan (see comments above)  [] Plan of care initiated [] Hold pending MD visit [] Discharge    Electronically signed by:  Maribell Leblanc PT    Note: If patient does not return for scheduled/ recommended follow up visits, this note will serve as a discharge from care along with most recent update on progress.

## 2021-08-10 ENCOUNTER — OFFICE VISIT (OUTPATIENT)
Dept: ORTHOPEDIC SURGERY | Age: 36
End: 2021-08-10
Payer: MEDICAID

## 2021-08-10 VITALS — BODY MASS INDEX: 40.81 KG/M2 | WEIGHT: 260 LBS | HEIGHT: 67 IN

## 2021-08-10 DIAGNOSIS — G56.03 BILATERAL CARPAL TUNNEL SYNDROME: Primary | ICD-10-CM

## 2021-08-10 PROCEDURE — G8427 DOCREV CUR MEDS BY ELIG CLIN: HCPCS | Performed by: ORTHOPAEDIC SURGERY

## 2021-08-10 PROCEDURE — G8417 CALC BMI ABV UP PARAM F/U: HCPCS | Performed by: ORTHOPAEDIC SURGERY

## 2021-08-10 PROCEDURE — 1036F TOBACCO NON-USER: CPT | Performed by: ORTHOPAEDIC SURGERY

## 2021-08-10 PROCEDURE — 99212 OFFICE O/P EST SF 10 MIN: CPT | Performed by: ORTHOPAEDIC SURGERY

## 2021-08-10 RX ORDER — DULOXETIN HYDROCHLORIDE 60 MG/1
CAPSULE, DELAYED RELEASE ORAL
COMMUNITY
Start: 2021-07-26

## 2021-08-10 RX ORDER — NYSTATIN 100000 [USP'U]/G
POWDER TOPICAL PRN
COMMUNITY
Start: 2021-05-17

## 2021-08-10 RX ORDER — LIRAGLUTIDE 6 MG/ML
INJECTION SUBCUTANEOUS
Status: ON HOLD | COMMUNITY
Start: 2021-08-04 | End: 2022-09-25

## 2021-08-10 NOTE — PROGRESS NOTES
Returns today for reevaluation of both hands. She has had her EMGs and is here for disposition. I reviewed EMGs of both hands which demonstrates bilateral carpal tunnel syndrome. Because she is failed to improve and has almost constant numbness with any activity, I would recommend proceeding with bilateral carpal tunnel releases. She would like to do the right dominant hand first followed 2 weeks later by her left. The patient was counseled at length about the risks of dina Covid-19 during their perioperative period and any recovery window from their procedure. The patient was made aware that dina Covid-19  may worsen their prognosis for recovering from their procedure  and lend to a higher morbidity and/or mortality risk. All material risks, benefits, and reasonable alternatives including postponing the procedure were discussed. The patient does wish to proceed with the procedure at this time. INFORMED CONSENT NOTE        We discussed the risks, benefits, and alternatives to the proposed procedure, as well as the necessity of other members of the healthcare team participating in the procedure. All questions were answered and the patient elected to proceed with the proposed procedure and signed the informed consent form.

## 2021-08-11 ENCOUNTER — HOSPITAL ENCOUNTER (OUTPATIENT)
Dept: PHYSICAL THERAPY | Age: 36
Setting detail: THERAPIES SERIES
Discharge: HOME | End: 2021-08-11
Payer: MEDICAID

## 2021-08-11 ENCOUNTER — TELEPHONE (OUTPATIENT)
Dept: ORTHOPEDIC SURGERY | Age: 36
End: 2021-08-11

## 2021-08-11 NOTE — FLOWSHEET NOTE
[address] [facility], [address] [place] [address], [address] [place] [address] [address]  Phone: [phone]   Fax:     [phone]    Physical Therapy  Cancellation/No-show Note  Patient Name:  Lizzette Colunga  :  1985   Date:  2021    Cancelled visits to date: 3  No-shows to date: 1    For today's appointment patient:  [x]  Cancelled  []  Rescheduled appointment  []  No-show     Reason given by patient:  []  Patient ill  [x]  Conflicting appointment  []  No transportation    []  Conflict with work  []  No reason given  []  Other:     Comments:      Phone call information:   []  Phone call made today to patient at _ time at number provided:      []  Patient answered, conversation as follows:    []  Patient did not answer, message left as follows:  []  Phone call not made today  [x]  Phone call not needed - pt contacted us to cancel and provided reason for cancellation.      Electronically signed by:  Destin Le, PT, PT

## 2021-08-11 NOTE — TELEPHONE ENCOUNTER
Auth: NPR  Date: 8/23/2021  Reference # [id number]  Spoke with: None  Type of SX: Outpatient  Location: [facility]  CPT 18308   SX area: Rt hand  Insurance: Kiki Hoover

## 2021-08-12 ENCOUNTER — OFFICE VISIT (OUTPATIENT)
Dept: ORTHOPEDIC SURGERY | Age: 36
End: 2021-08-12

## 2021-08-12 VITALS — HEIGHT: 67 IN | WEIGHT: 259.92 LBS | BODY MASS INDEX: 40.8 KG/M2

## 2021-08-12 DIAGNOSIS — M76.899 TENDINOSIS OF QUADRICEPS TENDON: Primary | ICD-10-CM

## 2021-08-12 PROCEDURE — 99024 POSTOP FOLLOW-UP VISIT: CPT | Performed by: INTERNAL MEDICINE

## 2021-08-12 NOTE — LETTER
[facility] [address] [facility]  Phone: [phone]  Fax: [phone]    Dahlia Segovia MD         August 12, 2021     Patient: Domonique Porras   YOB: 1985   Date of Visit: 8/12/2021       To Whom It May Concern: It is my medical opinion that [address] requires a disability parking placard for the following reasons:  She cannot walk 200 feet without stopping to rest.  Duration of need: 1 year    If you have any questions or concerns, please don't hesitate to call.     Sincerely,      Tino Tavares MD.      Dahlia Segovia MD

## 2021-08-12 NOTE — PROGRESS NOTES
Chief Complaint:   Chief Complaint   Patient presents with    Knee Pain     left, f/u Tenex, doing better, improving ROM and function; right, still some pain and tenderness over kneecap from falling          History of Present Illness:       Patient is a 37 y.o. female presents with the above complaint. Status post percutaneous tenotomy TX 2 microtip left quadriceps tendon-hypertrophic tendinopathy-central medial segment distribution with interstitial tear-7/2/2021    Overall overall symptoms are improving and she is making progress in physical therapy. She continues with T scope immobilization at 0 to 90 degrees with weightbearing activities.     LEFS 6/3/2021 46%  LEFS 812 2167.5%    No reliance on NSAIDs or other analgesics        Past Medical History:        Past Medical History:   Diagnosis Date    Asthma     as child    Back pain     Bipolar 1 disorder ([facility]     Depression     Diabetes mellitus ([facility]     Liver disease     fatty liver    Obesity     OCD (obsessive compulsive disorder)     PCOS (polycystic ovarian syndrome)     PCOS (polycystic ovarian syndrome)     Sleep apnea     cpap         Past Surgical History:   Procedure Laterality Date    ANKLE SURGERY Left     Scar tissue removal    CHOLECYSTECTOMY, LAPAROSCOPIC N/A 4/20/2020    LAPAROSCOPIC CHOLECYSTECTOMY performed by Omer Vang DO at [address] [address] ([facility]) Left 12/10/14    Excision of Cyst Left Upper Posterior Ankle    OTHER SURGICAL HISTORY Right 12/29/2016    Laparotomy with Right SO    OVARY REMOVAL  12/28/2016    unsure of side    SLEEVE GASTRECTOMY N/A 11/6/2019    LAPAROSCOPIC SLEEVE GASTRECTOMY - ETHICON performed by Renuka Lomeli MD at [address] [place] [address] Left 7/2/2021    LEFT QUADRICEP PERCUTANEOUS TENOTOMY , TX2 MICROTIP WITH ULTRASOUND performed by Ariadne Gill MD at [facility]. N/A 8/23/2019    EGD BIOPSY performed by Noris Angulo Quan Olson MD at [address] [facility] N/A 3/10/2021    EGD BIOPSY performed by Azeb Macario MD at [facility] [facility] [address] EXTRACTION           Present Medications:         Current Outpatient Medications   Medication Sig Dispense Refill    dicloxacillin (DYNAPEN) 500 MG capsule TAKE 1 CAPSULE BY MOUTH EVERY 6 HOURS---1 HOUR BEFORE A MEAL OR 2 HOURS AFTER A MEAL      DULoxetine (CYMBALTA) 60 MG extended release capsule TAKE 1 CAPSULE BY ORAL ROUTE  EVERY DAY      VICTOZA 18 MG/3ML SOPN SC injection INJECT 0.6 MG BY SUBCUTANEOUS ROUTE DAILY FOR 14 DAYS, THEN 1.2 MG DAILY FOR 28 DAYS, THEN 1.8 MG DAILY.  nystatin (MYCOSTATIN) 463941 UNIT/GM powder APPLY BY TOPICAL ROUTE  EVERY DAY TO THE AFFECTED AREA(S)      omeprazole (PRILOSEC) 40 MG delayed release capsule TAKE 1 CAPSULE BY MOUTH DAILY 30 capsule 0    naproxen (NAPROSYN) 500 MG tablet Take 1 tablet by mouth 2 times daily as needed for Pain 60 tablet 0    lamoTRIgine (LAMICTAL) 25 MG tablet Take 25 mg by mouth daily      ondansetron (ZOFRAN) 4 MG tablet Take 1 tablet by mouth every 8 hours as needed for Nausea 20 tablet 0    metFORMIN (GLUCOPHAGE) 500 MG tablet Take 500 mg by mouth daily (with breakfast)      DULoxetine (CYMBALTA) 30 MG extended release capsule TAKE 1 CAPSULE BY ORAL ROUTE  EVERY DAY      mirtazapine (REMERON) 15 MG tablet Take 15 mg by mouth nightly      medical marijuana Take by mouth as needed.       busPIRone (BUSPAR) 5 MG tablet Take 5 mg by mouth 3 times daily      loratadine (CLARITIN) 10 MG tablet Take 10 mg by mouth daily      Multiple Vitamins-Minerals (BARIATRIC FUSION) CHEW Take 4 tablets by mouth daily      levothyroxine (SYNTHROID) 75 MCG tablet TAKE 1 TABLET BY MOUTH EVERY DAY  5    Cholecalciferol (VITAMIN D PO) Take by mouth daily       ziprasidone (GEODON) 20 MG capsule Take 20 mg by mouth 2 times daily (with meals)      clomiPRAMINE (ANAFRANIL) 25 MG capsule Take 50 mg by mouth nightly       No current facility-administered medications for this visit. Allergies: Allergies   Allergen Reactions    Latex Itching    Lactose      \"Extreme\" stomach pain and diarrhea    Adhesive Tape      Rips off skin    Keflex [Cephalexin] Nausea And Vomiting     Projectile vomitting        Social History:         Social History     Socioeconomic History    Marital status:      Spouse name: Kate Allen Number of children: 0    Years of education: 12    Highest education level: Not on file   Occupational History    Not on file   Tobacco Use    Smoking status: Never Smoker    Smokeless tobacco: Never Used   Vaping Use    Vaping Use: Never used   Substance and Sexual Activity    Alcohol use: Yes     Comment: 1 drink per week    Drug use: Yes     Frequency: 2.0 times per week     Types: Marijuana     Comment: medical marijuana    Sexual activity: Yes     Partners: Male   Other Topics Concern    Not on file   Social History Narrative    Not on file     Social Determinants of Health     Financial Resource Strain:     Difficulty of Paying Living Expenses:    Food Insecurity:     Worried About Running Out of Food in the Last Year:     Ran Out of Food in the Last Year:    Transportation Needs:     Lack of Transportation (Medical):      Lack of Transportation (Non-Medical):    Physical Activity:     Days of Exercise per Week:     Minutes of Exercise per Session:    Stress:     Feeling of Stress :    Social Connections:     Frequency of Communication with Friends and Family:     Frequency of Social Gatherings with Friends and Family:     Attends Mosque Services:     Active Member of Clubs or Organizations:     Attends Club or Organization Meetings:     Marital Status:    Intimate Partner Violence:     Fear of Current or Ex-Partner:     Emotionally Abused:     Physically Abused:     Sexually Abused:         Review of Symptoms:    Pertinent items are noted in HPI   ab. Vital Signs: There were no vitals filed for this visit. General Exam:     Constitutional: Patient is adequately groomed with no evidence of malnutrition        Physical Exam: left knee      Primary Exam:    Inspection: Deformity atrophy appreciable effusion      Palpation: No tenderness      Range of Motion: Full range without pain      Strength: Normal with SLR      Special Tests: Negative extensor lag      Skin: There are no rashes, ulcerations or lesions. Gait: Nonantalgic     Neurovascular - non focal and intact       Additional Comments:        Additional Examinations:                Office Imaging Results/Procedures PerformedToday:           Office Procedures:     Orders Placed This Encounter   Procedures    US EXTREMITY LEFT NON VASC LIMITED     Standing Status:   Future     Number of Occurrences:   1     Standing Expiration Date:   8/12/2022     Order Specific Question:   Reason for exam:     Answer:   f/u Tenex     Limited ultrasound evaluation-left knee  Logiq ultrasound 12 MHz    Patient position supine examination table with the knee supported in static flexion. The quadriceps tendon was evaluated extensively in long axis and short axis using linear transducer. The area of pathology localizing to the central central medial interstitial left deep fiber distribution of the tendon. There was well-circumscribed area of decreased echogenicity correlating with the area of pathology within the tendon best visualized and characterized in short axis. With dynamic stressing there was no convincing gapping of fibers    Power Doppler imaging    No other soft tissue or osseous normalities no evidence of retracted tear      Other Outside Imaging and Testing Personally Reviewed:              Assessment   Impression: . Encounter Diagnoses   Name Primary?     Tendinosis of quadriceps tendon Yes    Tendinitis of left quadriceps tendon

## 2021-08-17 ENCOUNTER — ANESTHESIA EVENT (OUTPATIENT)
Dept: OPERATING ROOM | Age: 36
End: 2021-08-17
Payer: MEDICAID

## 2021-08-23 ENCOUNTER — HOSPITAL ENCOUNTER (OUTPATIENT)
Age: 36
Setting detail: OUTPATIENT SURGERY
Discharge: HOME | End: 2021-08-23
Attending: ORTHOPAEDIC SURGERY | Admitting: ORTHOPAEDIC SURGERY
Payer: MEDICAID

## 2021-08-23 ENCOUNTER — ANESTHESIA (OUTPATIENT)
Dept: OPERATING ROOM | Age: 36
End: 2021-08-23
Payer: MEDICAID

## 2021-08-23 VITALS
BODY MASS INDEX: 40.81 KG/M2 | DIASTOLIC BLOOD PRESSURE: 74 MMHG | SYSTOLIC BLOOD PRESSURE: 119 MMHG | HEART RATE: 84 BPM | TEMPERATURE: 97.4 F | HEIGHT: 67 IN | RESPIRATION RATE: 18 BRPM | OXYGEN SATURATION: 100 % | WEIGHT: 260 LBS

## 2021-08-23 VITALS
RESPIRATION RATE: 17 BRPM | DIASTOLIC BLOOD PRESSURE: 63 MMHG | SYSTOLIC BLOOD PRESSURE: 103 MMHG | OXYGEN SATURATION: 85 %

## 2021-08-23 DIAGNOSIS — G56.03 BILATERAL CARPAL TUNNEL SYNDROME: Primary | ICD-10-CM

## 2021-08-23 DIAGNOSIS — K21.9 CHRONIC GERD: ICD-10-CM

## 2021-08-23 LAB
GLUCOSE BLD-MCNC: 82 MG/DL (ref 70–99)
PERFORMED ON: NORMAL

## 2021-08-23 PROCEDURE — 3700000001 HC ADD 15 MINUTES (ANESTHESIA): Performed by: ORTHOPAEDIC SURGERY

## 2021-08-23 PROCEDURE — 6360000002 HC RX W HCPCS: Performed by: NURSE ANESTHETIST, CERTIFIED REGISTERED

## 2021-08-23 PROCEDURE — 3600000012 HC SURGERY LEVEL 2 ADDTL 15MIN: Performed by: ORTHOPAEDIC SURGERY

## 2021-08-23 PROCEDURE — 3700000000 HC ANESTHESIA ATTENDED CARE: Performed by: ORTHOPAEDIC SURGERY

## 2021-08-23 PROCEDURE — 3600000002 HC SURGERY LEVEL 2 BASE: Performed by: ORTHOPAEDIC SURGERY

## 2021-08-23 PROCEDURE — 2500000003 HC RX 250 WO HCPCS: Performed by: NURSE ANESTHETIST, CERTIFIED REGISTERED

## 2021-08-23 PROCEDURE — 7100000010 HC PHASE II RECOVERY - FIRST 15 MIN: Performed by: ORTHOPAEDIC SURGERY

## 2021-08-23 PROCEDURE — 7100000011 HC PHASE II RECOVERY - ADDTL 15 MIN: Performed by: ORTHOPAEDIC SURGERY

## 2021-08-23 PROCEDURE — 6360000002 HC RX W HCPCS: Performed by: ANESTHESIOLOGY

## 2021-08-23 PROCEDURE — 2709999900 HC NON-CHARGEABLE SUPPLY: Performed by: ORTHOPAEDIC SURGERY

## 2021-08-23 PROCEDURE — 6360000002 HC RX W HCPCS: Performed by: ORTHOPAEDIC SURGERY

## 2021-08-23 PROCEDURE — 2580000003 HC RX 258: Performed by: ANESTHESIOLOGY

## 2021-08-23 PROCEDURE — 2500000003 HC RX 250 WO HCPCS: Performed by: ORTHOPAEDIC SURGERY

## 2021-08-23 PROCEDURE — 2500000003 HC RX 250 WO HCPCS: Performed by: ANESTHESIOLOGY

## 2021-08-23 RX ORDER — HYDROCODONE BITARTRATE AND ACETAMINOPHEN 5; 325 MG/1; MG/1
1 TABLET ORAL EVERY 6 HOURS PRN
Qty: 28 TABLET | Refills: 0 | Status: SHIPPED | OUTPATIENT
Start: 2021-08-23 | End: 2021-08-30

## 2021-08-23 RX ORDER — PROMETHAZINE HYDROCHLORIDE 25 MG/ML
6.25 INJECTION, SOLUTION INTRAMUSCULAR; INTRAVENOUS
Status: DISCONTINUED | OUTPATIENT
Start: 2021-08-23 | End: 2021-08-23 | Stop reason: HOSPADM

## 2021-08-23 RX ORDER — LIDOCAINE HYDROCHLORIDE 10 MG/ML
1 INJECTION, SOLUTION EPIDURAL; INFILTRATION; INTRACAUDAL; PERINEURAL
Status: COMPLETED | OUTPATIENT
Start: 2021-08-23 | End: 2021-08-23

## 2021-08-23 RX ORDER — APREPITANT 40 MG/1
40 CAPSULE ORAL ONCE
Status: COMPLETED | OUTPATIENT
Start: 2021-08-23 | End: 2021-08-23

## 2021-08-23 RX ORDER — HYDRALAZINE HYDROCHLORIDE 20 MG/ML
5 INJECTION INTRAMUSCULAR; INTRAVENOUS EVERY 10 MIN PRN
Status: DISCONTINUED | OUTPATIENT
Start: 2021-08-23 | End: 2021-08-23 | Stop reason: HOSPADM

## 2021-08-23 RX ORDER — SODIUM CHLORIDE 9 MG/ML
25 INJECTION, SOLUTION INTRAVENOUS PRN
Status: DISCONTINUED | OUTPATIENT
Start: 2021-08-23 | End: 2021-08-23 | Stop reason: HOSPADM

## 2021-08-23 RX ORDER — FENTANYL CITRATE 50 UG/ML
25 INJECTION, SOLUTION INTRAMUSCULAR; INTRAVENOUS EVERY 5 MIN PRN
Status: DISCONTINUED | OUTPATIENT
Start: 2021-08-23 | End: 2021-08-23 | Stop reason: HOSPADM

## 2021-08-23 RX ORDER — LIDOCAINE HYDROCHLORIDE 10 MG/ML
INJECTION, SOLUTION INFILTRATION; PERINEURAL PRN
Status: DISCONTINUED | OUTPATIENT
Start: 2021-08-23 | End: 2021-08-23 | Stop reason: SDUPTHER

## 2021-08-23 RX ORDER — SODIUM CHLORIDE, SODIUM LACTATE, POTASSIUM CHLORIDE, CALCIUM CHLORIDE 600; 310; 30; 20 MG/100ML; MG/100ML; MG/100ML; MG/100ML
INJECTION, SOLUTION INTRAVENOUS CONTINUOUS
Status: DISCONTINUED | OUTPATIENT
Start: 2021-08-23 | End: 2021-08-23 | Stop reason: HOSPADM

## 2021-08-23 RX ORDER — SODIUM CHLORIDE 0.9 % (FLUSH) 0.9 %
10 SYRINGE (ML) INJECTION EVERY 12 HOURS SCHEDULED
Status: DISCONTINUED | OUTPATIENT
Start: 2021-08-23 | End: 2021-08-23 | Stop reason: HOSPADM

## 2021-08-23 RX ORDER — OXYCODONE HYDROCHLORIDE AND ACETAMINOPHEN 5; 325 MG/1; MG/1
1 TABLET ORAL PRN
Status: DISCONTINUED | OUTPATIENT
Start: 2021-08-23 | End: 2021-08-23 | Stop reason: HOSPADM

## 2021-08-23 RX ORDER — SODIUM CHLORIDE 0.9 % (FLUSH) 0.9 %
10 SYRINGE (ML) INJECTION PRN
Status: DISCONTINUED | OUTPATIENT
Start: 2021-08-23 | End: 2021-08-23 | Stop reason: HOSPADM

## 2021-08-23 RX ORDER — OXYCODONE HYDROCHLORIDE AND ACETAMINOPHEN 5; 325 MG/1; MG/1
2 TABLET ORAL PRN
Status: DISCONTINUED | OUTPATIENT
Start: 2021-08-23 | End: 2021-08-23 | Stop reason: HOSPADM

## 2021-08-23 RX ORDER — PROPOFOL 10 MG/ML
INJECTION, EMULSION INTRAVENOUS PRN
Status: DISCONTINUED | OUTPATIENT
Start: 2021-08-23 | End: 2021-08-23 | Stop reason: SDUPTHER

## 2021-08-23 RX ORDER — ONDANSETRON 2 MG/ML
4 INJECTION INTRAMUSCULAR; INTRAVENOUS
Status: DISCONTINUED | OUTPATIENT
Start: 2021-08-23 | End: 2021-08-23 | Stop reason: HOSPADM

## 2021-08-23 RX ADMIN — Medication 1500 MG: at 14:21

## 2021-08-23 RX ADMIN — APREPITANT 40 MG: 40 CAPSULE ORAL at 12:33

## 2021-08-23 RX ADMIN — PROPOFOL 100 MG: 10 INJECTION, EMULSION INTRAVENOUS at 14:15

## 2021-08-23 RX ADMIN — SODIUM CHLORIDE, POTASSIUM CHLORIDE, SODIUM LACTATE AND CALCIUM CHLORIDE: 600; 310; 30; 20 INJECTION, SOLUTION INTRAVENOUS at 12:43

## 2021-08-23 RX ADMIN — LIDOCAINE HYDROCHLORIDE 80 MG: 10 INJECTION, SOLUTION INFILTRATION; PERINEURAL at 14:15

## 2021-08-23 RX ADMIN — PROPOFOL 100 MG: 10 INJECTION, EMULSION INTRAVENOUS at 14:17

## 2021-08-23 RX ADMIN — Medication 1500 MG: at 13:57

## 2021-08-23 RX ADMIN — FAMOTIDINE 20 MG: 10 INJECTION, SOLUTION INTRAVENOUS at 12:43

## 2021-08-23 RX ADMIN — LIDOCAINE HYDROCHLORIDE 0.1 ML: 10 INJECTION, SOLUTION EPIDURAL; INFILTRATION; INTRACAUDAL; PERINEURAL at 12:43

## 2021-08-23 ASSESSMENT — PULMONARY FUNCTION TESTS
PIF_VALUE: 0

## 2021-08-23 ASSESSMENT — PAIN - FUNCTIONAL ASSESSMENT: PAIN_FUNCTIONAL_ASSESSMENT: 0-10

## 2021-08-23 ASSESSMENT — LIFESTYLE VARIABLES: SMOKING_STATUS: 0

## 2021-08-23 NOTE — PROGRESS NOTES
Pt arrived to PACU from OR via stretcher. Report received from Martir Woo and anesthesia- HANH Magana . Vitals stable. Pt alert. Denies pain. Right wrist surgical dressing in place. Ice bag applied. Will continue to monitor.

## 2021-08-23 NOTE — PROGRESS NOTES
Dr. Khushboo Arshad employed an ace tourniquet to the right arm from placement at 1423 to release time of 1432.

## 2021-08-23 NOTE — PROGRESS NOTES
Pt is ready for discharge. Denies pain. Called ride - states she will be her in about 30 minutes. Will continue to monitor.

## 2021-08-23 NOTE — ANESTHESIA POSTPROCEDURE EVALUATION
Department of Anesthesiology  Postprocedure Note    Patient: [facility]  MRN: [id number]  YOB: 1985  Date of evaluation: 8/23/2021    Procedure Summary     Date: 08/23/21 Room / Location: [address] [place] [address] OR 02 / [facility]    Anesthesia Start: 1410 Anesthesia Stop: 3220    Procedure: RIGHT CARPAL TUNNEL RELEASE (Right Hand) Diagnosis: (RIGHT CARPAL TUNNEL SYNDROME)    Surgeons: Herman Cantu MD Responsible Provider: Kaden Hogan MD    Anesthesia Type: MAC, TIVA, general ASA Status: 3        Anesthesia Type: MAC, TIVA, general    Liya Phase I: Liya Score: 10    Liya Phase II: Liya Score: 10    Last vitals: Reviewed and per EMR flowsheets.      Anesthesia Post Evaluation   Anesthetic Problems: no   Cardiovascular System Stable: yes  Respiratory Function: Airway Patent yes  ETT no  Ventilator no  Level of consciousness: awake, alert and oriented  Post-op pain: adequate analgesia  Hydration Adequate: yes  Nausea/Vomiting:no  Other Issues:     Bailee Keith MD

## 2021-08-23 NOTE — H&P
Update History & Physical    The patient's History and Physical  was reviewed with the patient and I examined the patient. There was no change. The surgical site was confirmed by the patient and me. Plan: The risks, benefits, expected outcome, and alternative to the recommended procedure have been discussed with the patient. Patient understands and wants to proceed with the procedure.      Electronically signed by Vishal Blount MD on 8/23/2021 at 11:21 AM

## 2021-08-23 NOTE — ANESTHESIA PRE PROCEDURE
by mouth daily    ziprasidone (GEODON) 20 MG capsule Take 20 mg by mouth 2 times daily (with meals)   clomiPRAMINE (ANAFRANIL) 25 MG capsule Take 50 mg by mouth nightly     Allergies:     Latex Itching    Lactose      \"Extreme\" stomach pain and diarrhea    Adhesive Tape      Rips off skin    Keflex [Cephalexin] Nausea And Vomiting     Projectile vomitting     Problem List:     Pelvic mass    Biceps tendonitis on right    Right shoulder strain    Diabetes mellitus type 2 in obese (HCC)    Chronic GERD    Moderate obstructive sleep apnea    Scalp cyst    Ventral hernia without obstruction or gangrene    S/P laparoscopic sleeve gastrectomy    Morbid obesity with BMI of 40.0-44.9, adult (HCC)    RUQ pain    Acute cholecystitis due to biliary calculus    Abnormal CT scan, stomach    Abdominal pain, right upper quadrant    Bilateral carpal tunnel syndrome     Past Medical History:     Asthma     as child    Back pain     Bipolar 1 disorder ([address]     Depression     Diabetes mellitus ([address]     Liver disease     fatty liver    Obesity     OCD (obsessive compulsive disorder)     PCOS (polycystic ovarian syndrome)     Sleep apnea     cpap     Past Surgical History:     ANKLE SURGERY Left     Scar tissue removal    CHOLECYSTECTOMY, LAPAROSCOPIC N/A 4/20/2020    LAPAROSCOPIC CHOLECYSTECTOMY performed by Carolyn Gordon DO at [address] Left 12/10/14    Excision of Cyst Left Upper Posterior Ankle    OTHER SURGICAL HISTORY Right 12/29/2016    Laparotomy with Right SO    OVARY REMOVAL  12/28/2016    unsure of side    SLEEVE GASTRECTOMY N/A 11/6/2019    LAPAROSCOPIC SLEEVE GASTRECTOMY - ETHICON performed by Marissa Connolly MD at [address] Left 7/2/2021    LEFT QUADRICEP PERCUTANEOUS TENOTOMY , TX2 MICROTIP WITH ULTRASOUND performed by Aaron Peterson MD at [address] N/A 8/23/2019    EGD BIOPSY performed by Anahi Adorno MD at [address] N/A 3/10/2021    EGD BIOPSY performed by Brandy Leonard MD at [address] [facility] [address] EXTRACTION       Social History:    Tobacco Use    Smoking status: Never Smoker    Smokeless tobacco: Never Used   Substance Use Topics    Alcohol use: Yes     Comment: 1 drink per week                                Counseling given: Not Answered    Vital Signs (Current):    BP: 109/71 Pulse: 82   Resp: 18 SpO2: 97   Temp: 97.6 °F (36.4 °C)   Height: 5' 7\" (1.702 m)  (08/23/21) Weight: 260 lb (117.9 kg)  (08/23/21)   BMI: 40.8           BP Readings from Last 3 Encounters:   07/06/21 104/62   07/02/21 100/63   06/23/21 100/63     NPO Status: >8 hrs                        BMI:   Wt Readings from Last 3 Encounters:   08/12/21 259 lb 14.8 oz (117.9 kg)   08/10/21 260 lb (117.9 kg)   07/26/21 263 lb (119.3 kg)       CBC:    HGB 13.2 06/23/2021    HCT 38.7 06/23/2021     06/23/2021     CMP:     06/23/2021    K 4.0 06/23/2021     06/23/2021    CO2 30 06/23/2021    BUN 11 06/23/2021    CREATININE <0.5 06/23/2021    GLUCOSE 87 06/23/2021    PROT 6.9 06/23/2021    CALCIUM 9.1 06/23/2021    BILITOT 0.5 06/23/2021    ALKPHOS 63 06/23/2021    AST 23 06/23/2021    ALT 21 06/23/2021     Coags:    PROTIME 13.0 11/25/2020    INR 1.12 11/25/2020     COVID-19 Screening (If Applicable):     COVID19 Not Detected 04/29/2021    COVID19 Not Detected 09/30/2020     Anesthesia Evaluation  Patient summary reviewed and Nursing notes reviewed  Airway: Mallampati: I  TM distance: >3 FB   Neck ROM: full  Mouth opening: > = 3 FB Dental:          Pulmonary: breath sounds clear to auscultation  (+) sleep apnea: on CPAP,  asthma:     (-) wheezes and not a current smoker                           Cardiovascular:  Exercise tolerance: good (>4 METS),       (-)  angina,  [place] and murmur      Rhythm: regular  Rate: normal ROS comment: Patient has chest pain that is thought to be 2/2 chronic costochondritis     Neuro/Psych:   (+) psychiatric history:   (-) seizures, TIA and CVA            ROS comment: +Bipolar d/o with OCD GI/Hepatic/Renal:   (+) GERD:, morbid obesity     (-) hepatitis and no renal disease       Endo/Other:    (+) DiabetesType II DM, , hypothyroidism::., .                 Abdominal:   (+) obese,           Vascular:     - DVT and PE. Other Findings:           Anesthesia Plan      MAC, TIVA and general     ASA 3     (MAC/TIVA-> GA/LMA if needed)  Induction: intravenous. MIPS: Prophylactic antiemetics administered. Anesthetic plan and risks discussed with patient. Plan discussed with CRNA.           Duran Montaño MD

## 2021-08-23 NOTE — OP NOTE
[address]                 [address] [address]                                OPERATIVE REPORT    PATIENT NAME: Juvenal Nieves                     :        1985  MED REC NO:   [id number]                          ROOM:  ACCOUNT NO:   [de-identified]                           ADMIT DATE: 2021  PROVIDER:     Greg Wallace MD    DATE OF PROCEDURE:  2021    PREOPERATIVE DIAGNOSIS:  Right carpal tunnel syndrome. POSTOPERATIVE DIAGNOSIS:  Right carpal tunnel syndrome. OPERATION PERFORMED:  Right carpal tunnel release. SURGEON:  Greg Wallace MD    ANESTHESIA:  Local MAC. BLOOD LOSS:  Less than 5 mL. COMPLICATIONS:  None noted. INDICATIONS FOR OPERATION:  This 74-year-old female who had a history,  exam, and testing consistent with bilateral carpal tunnel syndrome. After failure of all other modalities, she elected for further  recommendation of right carpal tunnel release. We planned to do left  carpal tunnel release in two weeks. DESCRIPTION OF OPERATION:  The patient was taken to the operating room,  where under sedation local infiltration into the right palm was  performed using 8 mL of the 50/50 solution of 1% Xylocaine and 0.5%  Marcaine plain. The hand was sterilely prepped and draped, and an Ace  wrap was used for exsanguination and tourniquet control proximal to the  wrist.  Under loupe magnification, a 3.5-cm incision was made from the  flexion crease of the wrist distally in line with the 4th ray. Soft  tissue was dissected down through skin and subcutaneous tissue and  through the superficial palmar fascia. The transverse carpal ligament  was identified and released from proximal to distal ends from the median  nerve end of the carpal tunnel through to the trifurcation. The nerve  was minimally compressed.   An external neurolysis was performed on the  volar surface to reconstitute the normal nerve diameter. The wound was repaired with 4-0 nylon in an interrupted horizontal  mattress fashion. It was dressed with Xeroform, dressing sponge,  Webril, and fiberglass splint. It was wrapped with an Ace wrap with the  wrist held in 20 degrees of extension. The patient had good circulation  checks at the end of the case. The patient was taken to the recovery room stable.         Nguyen Spangler MD    D: 08/23/2021 14:40:06       T: 08/23/2021 18:51:50     [email or web address]  Job#: [id number]     Doc#: [id number]    CC:

## 2021-08-23 NOTE — BRIEF OP NOTE
Brief Postoperative Note      Patient: Argenis Berman  YOB: 1985  MRN: [id number]    Date of Procedure: 8/23/2021    Pre-Op Diagnosis: RIGHT CARPAL TUNNEL SYNDROME    Post-Op Diagnosis: Same       Procedure(s):  RIGHT CARPAL TUNNEL RELEASE    Surgeon(s):  Herman Cantu MD    Assistant:  * No surgical staff found *    Anesthesia: Monitor Anesthesia Care    Estimated Blood Loss (mL): Minimal    Complications: None    Specimens:   * No specimens in log *    Implants:  * No implants in log *      Drains: * No LDAs found *    Findings: MALLORY    Electronically signed by Herman Cantu MD on 8/23/2021 at 2:30 PM

## 2021-08-24 RX ORDER — OMEPRAZOLE 40 MG/1
40 CAPSULE, DELAYED RELEASE ORAL DAILY
Qty: 30 CAPSULE | Refills: 0 | Status: SHIPPED | OUTPATIENT
Start: 2021-08-24 | End: 2021-09-21

## 2021-08-31 ENCOUNTER — OFFICE VISIT (OUTPATIENT)
Dept: ORTHOPEDIC SURGERY | Age: 36
End: 2021-08-31
Payer: MEDICAID

## 2021-08-31 ENCOUNTER — HOSPITAL ENCOUNTER (EMERGENCY)
Age: 36
Discharge: HOME | End: 2021-08-31
Attending: EMERGENCY MEDICINE
Payer: MEDICAID

## 2021-08-31 ENCOUNTER — APPOINTMENT (OUTPATIENT)
Dept: CT IMAGING | Age: 36
End: 2021-08-31
Payer: MEDICAID

## 2021-08-31 VITALS
OXYGEN SATURATION: 97 % | DIASTOLIC BLOOD PRESSURE: 68 MMHG | BODY MASS INDEX: 40.18 KG/M2 | TEMPERATURE: 97.9 F | WEIGHT: 256 LBS | SYSTOLIC BLOOD PRESSURE: 106 MMHG | HEART RATE: 79 BPM | RESPIRATION RATE: 16 BRPM | HEIGHT: 67 IN

## 2021-08-31 VITALS — HEIGHT: 67 IN | WEIGHT: 256 LBS | BODY MASS INDEX: 40.18 KG/M2

## 2021-08-31 DIAGNOSIS — Z98.890 H/O UMBILICAL HERNIA REPAIR: ICD-10-CM

## 2021-08-31 DIAGNOSIS — G56.03 BILATERAL CARPAL TUNNEL SYNDROME: Primary | ICD-10-CM

## 2021-08-31 DIAGNOSIS — R19.8 UMBILICAL BLEEDING: ICD-10-CM

## 2021-08-31 DIAGNOSIS — R11.0 NAUSEA: ICD-10-CM

## 2021-08-31 DIAGNOSIS — R10.10 PAIN OF UPPER ABDOMEN: ICD-10-CM

## 2021-08-31 DIAGNOSIS — R10.33 PERIUMBILICAL ABDOMINAL PAIN: Primary | ICD-10-CM

## 2021-08-31 DIAGNOSIS — Z87.19 H/O UMBILICAL HERNIA REPAIR: ICD-10-CM

## 2021-08-31 LAB
A/G RATIO: 1.3 (ref 1.1–2.2)
ALBUMIN SERPL-MCNC: 4.3 G/DL (ref 3.4–5)
ALP BLD-CCNC: 70 U/L (ref 40–129)
ALT SERPL-CCNC: 25 U/L (ref 10–40)
ANION GAP SERPL CALCULATED.3IONS-SCNC: 9 MMOL/L (ref 3–16)
AST SERPL-CCNC: 25 U/L (ref 15–37)
BASOPHILS ABSOLUTE: 0 K/UL (ref 0–0.2)
BASOPHILS RELATIVE PERCENT: 0.4 %
BILIRUB SERPL-MCNC: 0.3 MG/DL (ref 0–1)
BUN BLDV-MCNC: 12 MG/DL (ref 7–20)
CALCIUM SERPL-MCNC: 9.5 MG/DL (ref 8.3–10.6)
CHLORIDE BLD-SCNC: 101 MMOL/L (ref 99–110)
CO2: 28 MMOL/L (ref 21–32)
CREAT SERPL-MCNC: 0.7 MG/DL (ref 0.6–1.1)
EOSINOPHILS ABSOLUTE: 0 K/UL (ref 0–0.6)
EOSINOPHILS RELATIVE PERCENT: 0.8 %
GFR AFRICAN AMERICAN: >60
GFR NON-AFRICAN AMERICAN: >60
GLOBULIN: 3.2 G/DL
GLUCOSE BLD-MCNC: 81 MG/DL (ref 70–99)
HCT VFR BLD CALC: 41.3 % (ref 36–48)
HEMOGLOBIN: 13.8 G/DL (ref 12–16)
LIPASE: 33 U/L (ref 13–60)
LYMPHOCYTES ABSOLUTE: 2.1 K/UL (ref 1–5.1)
LYMPHOCYTES RELATIVE PERCENT: 34.4 %
MCH RBC QN AUTO: 32.4 PG (ref 26–34)
MCHC RBC AUTO-ENTMCNC: 33.5 G/DL (ref 31–36)
MCV RBC AUTO: 96.8 FL (ref 80–100)
MONOCYTES ABSOLUTE: 0.5 K/UL (ref 0–1.3)
MONOCYTES RELATIVE PERCENT: 8.9 %
NEUTROPHILS ABSOLUTE: 3.4 K/UL (ref 1.7–7.7)
NEUTROPHILS RELATIVE PERCENT: 55.5 %
PDW BLD-RTO: 12.9 % (ref 12.4–15.4)
PLATELET # BLD: 200 K/UL (ref 135–450)
PMV BLD AUTO: 9.6 FL (ref 5–10.5)
POTASSIUM REFLEX MAGNESIUM: 3.9 MMOL/L (ref 3.5–5.1)
RBC # BLD: 4.26 M/UL (ref 4–5.2)
SODIUM BLD-SCNC: 138 MMOL/L (ref 136–145)
TOTAL PROTEIN: 7.5 G/DL (ref 6.4–8.2)
WBC # BLD: 6.1 K/UL (ref 4–11)

## 2021-08-31 PROCEDURE — 99024 POSTOP FOLLOW-UP VISIT: CPT | Performed by: ORTHOPAEDIC SURGERY

## 2021-08-31 PROCEDURE — 96374 THER/PROPH/DIAG INJ IV PUSH: CPT

## 2021-08-31 PROCEDURE — 2580000003 HC RX 258: Performed by: EMERGENCY MEDICINE

## 2021-08-31 PROCEDURE — 74177 CT ABD & PELVIS W/CONTRAST: CPT

## 2021-08-31 PROCEDURE — 96375 TX/PRO/DX INJ NEW DRUG ADDON: CPT

## 2021-08-31 PROCEDURE — 85025 COMPLETE CBC W/AUTO DIFF WBC: CPT

## 2021-08-31 PROCEDURE — 6360000002 HC RX W HCPCS: Performed by: EMERGENCY MEDICINE

## 2021-08-31 PROCEDURE — 2500000003 HC RX 250 WO HCPCS: Performed by: EMERGENCY MEDICINE

## 2021-08-31 PROCEDURE — L3908 WHO COCK-UP NONMOLDE PRE OTS: HCPCS | Performed by: ORTHOPAEDIC SURGERY

## 2021-08-31 PROCEDURE — 6360000004 HC RX CONTRAST MEDICATION: Performed by: EMERGENCY MEDICINE

## 2021-08-31 PROCEDURE — 83690 ASSAY OF LIPASE: CPT

## 2021-08-31 PROCEDURE — 99284 EMERGENCY DEPT VISIT MOD MDM: CPT

## 2021-08-31 PROCEDURE — 80053 COMPREHEN METABOLIC PANEL: CPT

## 2021-08-31 RX ORDER — ONDANSETRON 2 MG/ML
4 INJECTION INTRAMUSCULAR; INTRAVENOUS EVERY 30 MIN PRN
Status: DISCONTINUED | OUTPATIENT
Start: 2021-08-31 | End: 2021-08-31 | Stop reason: HOSPADM

## 2021-08-31 RX ORDER — ONDANSETRON 4 MG/1
4 TABLET, ORALLY DISINTEGRATING ORAL EVERY 8 HOURS PRN
Qty: 20 TABLET | Refills: 0 | Status: SHIPPED | OUTPATIENT
Start: 2021-08-31 | End: 2021-09-07

## 2021-08-31 RX ORDER — KETOROLAC TROMETHAMINE 30 MG/ML
30 INJECTION, SOLUTION INTRAMUSCULAR; INTRAVENOUS ONCE
Status: COMPLETED | OUTPATIENT
Start: 2021-08-31 | End: 2021-08-31

## 2021-08-31 RX ORDER — 0.9 % SODIUM CHLORIDE 0.9 %
1000 INTRAVENOUS SOLUTION INTRAVENOUS ONCE
Status: COMPLETED | OUTPATIENT
Start: 2021-08-31 | End: 2021-08-31

## 2021-08-31 RX ADMIN — FAMOTIDINE 20 MG: 10 INJECTION, SOLUTION INTRAVENOUS at 04:32

## 2021-08-31 RX ADMIN — IOPAMIDOL 75 ML: 755 INJECTION, SOLUTION INTRAVENOUS at 05:23

## 2021-08-31 RX ADMIN — KETOROLAC TROMETHAMINE 30 MG: 30 INJECTION, SOLUTION INTRAMUSCULAR at 04:31

## 2021-08-31 RX ADMIN — SODIUM CHLORIDE 1000 ML: 9 INJECTION, SOLUTION INTRAVENOUS at 04:30

## 2021-08-31 ASSESSMENT — PAIN DESCRIPTION - LOCATION: LOCATION: ABDOMEN

## 2021-08-31 ASSESSMENT — PAIN DESCRIPTION - DESCRIPTORS: DESCRIPTORS: BURNING

## 2021-08-31 ASSESSMENT — PAIN SCALES - GENERAL
PAINLEVEL_OUTOF10: 3
PAINLEVEL_OUTOF10: 3

## 2021-08-31 ASSESSMENT — PAIN DESCRIPTION - ORIENTATION: ORIENTATION: MID

## 2021-08-31 NOTE — PROGRESS NOTES
FOLLOW-UP VISIT      The patient returns for follow-up s/p right carpal tunnel release    Date of Surgery    8/23/2021    Wound Status    Sutures Removed, Incisions are healing well with no surrounding erythema, and minimal ecchymosis. Exam    She is doing well. She is sleeping better and has less carpal tunnel symptoms. Plan    Suture removal and slow return to normal activity    Follow-up Appointment    4 weeks as needed        Procedures    Isamar Timmons Gel Wrist Brace     Patient was prescribed a Isamar Timmons Gel Wrist Brace. The right wrist will require stabilization / immobilization from this semi-rigid / rigid orthosis to improve their function. The orthosis will assist in protecting the affected area, provide functional support and facilitate healing. The patient was educated and fit by a healthcare professional with expert knowledge and specialization in brace application while under the direct supervision of the treating physician. Verbal and written instructions for the use of and application of this item were provided. They were instructed to contact the office immediately should the brace result in increased pain, decreased sensation, increased swelling or worsening of the condition.

## 2021-08-31 NOTE — ED PROVIDER NOTES
Emergency Physician Note  [address] ED  [address] [facility] [place]. [address]  Dept: [phone]  Loc: [phone]  Open Note Time:  4:40 AM EDT    Chief Complaint  Other (bleeding from belly button after having rough sex. Had a umbilical surgery repair 2 years ago.  )       History of Present Illness  Merline Chroman is a 37 y.o. female  has a past medical history of Asthma, Back pain, Bipolar 1 disorder ([address] Degenerative disc disease, lumbar, Depression, Diabetes mellitus ([address] Fibromyalgia, Liver disease, Obesity, OCD (obsessive compulsive disorder), PCOS (polycystic ovarian syndrome), PCOS (polycystic ovarian syndrome), and Sleep apnea. who presents to the ED for esequiel pain and pain bleeding from the umbilicus. Patient states that she was having intercourse when she had sudden onset of upper abdominal pain that describes as a burning sensation. She also relates that there was bleeding from the umbilicus where she has had a hernia repair done approximately 2 years ago. There is some pain associated with the bleeding at the umbilicus region. Patient is also experiencing nausea associated with the symptoms. Patient has chronic baseline shortness of breath secondary to sarcoidosis. Denies fever, chest pain,  cough,   vomiting, diarrhea, headache, sore throat, dysuria. No palliative/provocative factors.        Unless otherwise stated in this report or unable to obtain because of the patient's clinical or mental status as evidenced by the medical record, this patient's positive and negative responses for review of systems, constitutional, psych, eyes, ENT, cardiovascular, respiratory, gastrointestinal, neurological, genitourinary, musculoskeletal, integument systems and systems related to the presenting problem are either stated in the preceding paragraph or were not pertinent or were negative for the symptoms and/or complaints related to the medical problem. I have reviewed the following from the nursing documentation:      Prior to Admission medications    Medication Sig Start Date End Date Taking? Authorizing Provider   omeprazole (PRILOSEC) 40 MG delayed release capsule TAKE 1 CAPSULE BY MOUTH DAILY 8/24/21 9/23/21 Yes ROSE Izaguirre CNP   DULoxetine (CYMBALTA) 60 MG extended release capsule TAKE 1 CAPSULE BY ORAL ROUTE  EVERY DAY 7/26/21  Yes Historical Provider, MD   VICTOZA 18 MG/3ML SOPN SC injection INJECT 0.6 MG BY SUBCUTANEOUS ROUTE DAILY FOR 14 DAYS, THEN 1.2 MG DAILY FOR 28 DAYS, THEN 1.8 MG DAILY. 8/4/21  Yes Historical Provider, MD   nystatin (MYCOSTATIN) 424451 UNIT/GM powder APPLY BY TOPICAL ROUTE  EVERY DAY TO THE AFFECTED AREA(S) 5/17/21  Yes Historical Provider, MD   lamoTRIgine (LAMICTAL) 25 MG tablet Take 25 mg by mouth daily   Yes Historical Provider, MD   metFORMIN (GLUCOPHAGE) 500 MG tablet Take 500 mg by mouth daily (with breakfast)   Yes Historical Provider, MD   mirtazapine (REMERON) 15 MG tablet Take 15 mg by mouth nightly   Yes Historical Provider, MD   busPIRone (BUSPAR) 5 MG tablet Take 5 mg by mouth 3 times daily   Yes Historical Provider, MD   loratadine (CLARITIN) 10 MG tablet Take 10 mg by mouth daily 4/9/20  Yes Historical Provider, MD   Multiple Vitamins-Minerals (BARIATRIC FUSION) CHEW Take 4 tablets by mouth daily   Yes Historical Provider, MD   levothyroxine (SYNTHROID) 75 MCG tablet TAKE 1 TABLET BY MOUTH EVERY DAY 9/24/19  Yes Historical Provider, MD   Cholecalciferol (VITAMIN D PO) Take by mouth daily    Yes Historical Provider, MD   ziprasidone (GEODON) 20 MG capsule Take 20 mg by mouth 2 times daily (with meals)   Yes Historical Provider, MD   ondansetron (ZOFRAN) 4 MG tablet Take 1 tablet by mouth every 8 hours as needed for Nausea 6/3/21   ROSE Restrepo CNP   medical marijuana Take by mouth as needed.     Historical Provider, MD       Allergies as of 08/31/2021 - Fully Reviewed 08/31/2021 Allergen Reaction Noted    Latex Itching 12/29/2016    Lactose  08/13/2011    Adhesive tape  10/10/2019    Keflex [cephalexin] Nausea And Vomiting 12/03/2014       Past Medical History:   Diagnosis Date    Asthma     as child    Back pain     [facility]     Degenerative disc disease, lumbar     Depression     Diabetes mellitus ([facility]     Fibromyalgia     Liver disease     fatty liver    Obesity     OCD (obsessive compulsive disorder)     PCOS (polycystic ovarian syndrome)     PCOS (polycystic ovarian syndrome)     Sleep apnea     cpap        Surgical History:   Past Surgical History:   Procedure Laterality Date    ANKLE SURGERY Left     Scar tissue removal    CARPAL TUNNEL RELEASE Right 08/23/2021    CARPAL TUNNEL RELEASE Right 8/23/2021    RIGHT CARPAL TUNNEL RELEASE performed by Sharonda Meléndez MD at [place] [address], LAPAROSCOPIC N/A 4/20/2020    LAPAROSCOPIC CHOLECYSTECTOMY performed by Ne Aguayo DO at [address] [place] [address] ([facility]) Left 12/10/14    Excision of Cyst Left Upper Posterior Ankle    OTHER SURGICAL HISTORY Right 12/29/2016    Laparotomy with Right SO    OVARY REMOVAL  12/28/2016    unsure of side    SLEEVE GASTRECTOMY N/A 11/6/2019    LAPAROSCOPIC SLEEVE GASTRECTOMY - ETHICON performed by Tati Melton MD at [address] [facility] Left 7/2/2021    LEFT QUADRICEP PERCUTANEOUS TENOTOMY , TX2 MICROTIP WITH ULTRASOUND performed by Rachelle Sandoval MD at [address] [place] [address] N/A 8/23/2019    EGD BIOPSY performed by Tati Melton MD at [address] [facility] N/A 3/10/2021    EGD BIOPSY performed by Thiago Leroy MD at [address] [facility] [address] EXTRACTION          Family History:    Family History   Problem Relation Age of Onset    Asthma Mother     Arthritis Mother     Lung Cancer Mother     Hypertension Father     Elevated Lipids Father     Diabetes Father     Alcohol Abuse Father     Asthma Father     Arthritis Father     Diabetes Paternal Grandfather     Arthritis Paternal Grandfather     Diabetes Maternal Grandmother     Arthritis Maternal Grandmother        Social History     Socioeconomic History    Marital status:      Spouse name: Urszula Pillai Number of children: 0    Years of education: 12    Highest education level: Not on file   Occupational History    Not on file   Tobacco Use    Smoking status: Never Smoker    Smokeless tobacco: Never Used   Vaping Use    Vaping Use: Never used   Substance and Sexual Activity    Alcohol use: Yes     Comment: 1 drink per week    Drug use: Yes     Frequency: 2.0 times per week     Types: Marijuana     Comment: medical marijuana    Sexual activity: Yes     Partners: Male   Other Topics Concern    Not on file   Social History Narrative    Not on file     Social Determinants of Health     Financial Resource Strain:     Difficulty of Paying Living Expenses:    Food Insecurity:     Worried About Running Out of Food in the Last Year:     Ran Out of Food in the Last Year:    Transportation Needs:     Lack of Transportation (Medical):  Lack of Transportation (Non-Medical):    Physical Activity:     Days of Exercise per Week:     Minutes of Exercise per Session:    Stress:     Feeling of Stress :    Social Connections:     Frequency of Communication with Friends and Family:     Frequency of Social Gatherings with Friends and Family:     Attends [employer] Services:     Active Member of Clubs or Organizations:     Attends Club or Organization Meetings:     Marital Status:    Intimate Partner Violence:     Fear of Current or Ex-Partner:     Emotionally Abused:     Physically Abused:     Sexually Abused:        Nursing notes reviewed.     ED Triage Vitals [08/31/21 0328]   Enc Vitals Group      /76      Pulse 79      Resp 16      Temp 97.9 °F (36.6 °C) Temp Source Oral      SpO2 99 %      Weight 256 lb (116.1 kg)      Height 5' 7\" (1.702 m)      Head Circumference       Peak Flow       Pain Score       Pain Loc       Pain Edu? Excl. in [address]? GENERAL:   Body mass index is 40.1 kg/m². Awake, alert. Well developed, well nourished with no apparent distress. Nontoxic-appearing, non-ill-appearing. HENT:   Normocephalic, Atraumatic, no lacerations. No ENT exam due to PPE. EYES:   She consistently kept her eyes closed unless I requested her to open them which she did easily open them and I was able to do observation of both eyes. Conjunctiva normal,   Pupils equal round and reactive to light,   Extraocular movements normal.  NECK:  Trachea is midline. No stridor. CHEST:  Regular rate and regular rhythm, no murmurs/rubs/gallops,  normal S1/S2, chest wall non-tender. LUNGS:  No respiratory distress. No abdominal retractions, no sternal retractions  Clear to auscultation bilaterally, no wheezing, no rhochi, no rales  Speaking comfortably in full sentences  ABDOMEN:  Soft, non-distended. Mild voluntary guarding. No rebound. No costovertebral angle tenderness to palpation. Normal BS, no organomegaly, no abdominal masses  Below the umbilicus as there 3 areas of fading signs of ecchymosis, patient reports that where she gets her Victoza injections. She has moderate tenderness to palpation around the umbilicus, I inserted a Q-tip into the umbilicus and I was able to express some blood. She also has moderate diffuse upper abdominal tenderness to palpation, no fluid wave with palpation of the abdomen. EXTREMITIES:  Moves extremities x4 with purpose. Normal range of motion, no edema,  No tenderness, no deformity,  distal pulses present and equal bilaterally. BACK:  No midline tenderness in the cervical, thoracic, and lumbar spine. No deformities, no step-off. SKIN:  Warm, dry and intact. NEUROLOGIC:  Normal mental status.   Moving all extremities to command. Alert and oriented x4  without focal motor deficit or gross sensory deficit. Normal speech. PSYCHIATRIC:  Not anxious,  normal mood and affect,  Appropriate eye contact,  thoughts are linear and organized,  without delusions/hallucinations,  Not responding to internal stimuli,  responds appropriately to questions    LABS and DIAGNOSTIC RESULTS    RADIOLOGY  X-RAYS:  I have reviewed radiologic plain film image(s). ALL OTHER NON-PLAIN FILM IMAGES SUCH AS CT, ULTRASOUND AND MRI HAVE BEEN READ BY THE RADIOLOGIST. CT ABDOMEN PELVIS W IV CONTRAST Additional Contrast? None   Final Result   No CT evidence of acute intra-abdominal pathology.             LABS  Results for orders placed or performed during the hospital encounter of 08/31/21   CBC Auto Differential   Result Value Ref Range    WBC 6.1 4.0 - 11.0 K/uL    RBC 4.26 4.00 - 5.20 M/uL    Hemoglobin 13.8 12.0 - 16.0 g/dL    Hematocrit 41.3 36.0 - 48.0 %    MCV 96.8 80.0 - 100.0 fL    MCH 32.4 26.0 - 34.0 pg    MCHC 33.5 31.0 - 36.0 g/dL    RDW 12.9 12.4 - 15.4 %    Platelets 108 269 - 105 K/uL    MPV 9.6 5.0 - 10.5 fL    Neutrophils % 55.5 %    Lymphocytes % 34.4 %    Monocytes % 8.9 %    Eosinophils % 0.8 %    Basophils % 0.4 %    Neutrophils Absolute 3.4 1.7 - 7.7 K/uL    Lymphocytes Absolute 2.1 1.0 - 5.1 K/uL    Monocytes Absolute 0.5 0.0 - 1.3 K/uL    Eosinophils Absolute 0.0 0.0 - 0.6 K/uL    Basophils Absolute 0.0 0.0 - 0.2 K/uL   Comprehensive Metabolic Panel w/ Reflex to MG   Result Value Ref Range    Sodium 138 136 - 145 mmol/L    Potassium reflex Magnesium 3.9 3.5 - 5.1 mmol/L    Chloride 101 99 - 110 mmol/L    CO2 28 21 - 32 mmol/L    Anion Gap 9 3 - 16    Glucose 81 70 - 99 mg/dL    BUN 12 7 - 20 mg/dL    CREATININE 0.7 0.6 - 1.1 mg/dL    GFR Non-African American >60 >60    GFR African American >60 >60    Calcium 9.5 8.3 - 10.6 mg/dL    Total Protein 7.5 6.4 - 8.2 g/dL    Albumin 4.3 3.4 - 5.0 g/dL    Albumin/Globulin Ratio 1.3 1.1 - 2.2    Total Bilirubin 0.3 0.0 - 1.0 mg/dL    Alkaline Phosphatase 70 40 - 129 U/L    ALT 25 10 - 40 U/L    AST 25 15 - 37 U/L    Globulin 3.2 g/dL   Lipase   Result Value Ref Range    Lipase 33.0 13.0 - 60.0 U/L       SCREENINGS  NIH Score     Glascow  Lamin Coma Scale  Eye Opening: Spontaneous  Best Verbal Response: Oriented  Best Motor Response: Obeys commands  [place] Coma Scale Score: 15  [facility]    Heart Score       PROCEDURES    MEDICAL DECISION MAKING    Procedures/interventions/images ordered for this visit  Orders Placed This Encounter   Procedures    CT ABDOMEN PELVIS W IV CONTRAST Additional Contrast? None    CBC Auto Differential    Comprehensive Metabolic Panel w/ Reflex to MG    Lipase    Saline lock IV       Medications ordered for this visit  Orders Placed This Encounter   Medications    0.9 % sodium chloride bolus    famotidine (PEPCID) injection 20 mg    ketorolac (TORADOL) injection 30 mg    ondansetron (ZOFRAN) injection 4 mg       ED course notes for this visit       I wore surgical mask and gloves when I evaluated the patient. I evaluated the patient in room 11/11    - Patient was given antiemetic and pain medication in the ED  - Upon reassessment, patient had improvement in her symptoms  - Diagnostic studies reviewed and results discussed with patient  - We agreed to treat nausea and vomiting    Advanced Michael Beltre is a 37 y.o. female  has a past medical history of Asthma, Back pain, Bipolar 1 disorder ([address] Degenerative disc disease, lumbar, Depression, Diabetes mellitus ([address] Fibromyalgia, Liver disease, Obesity, OCD (obsessive compulsive disorder), PCOS (polycystic ovarian syndrome), PCOS (polycystic ovarian syndrome), and Sleep apnea. with stable vital signs . I ordered pain medications, labs drawn, CT abdomen pelvis, antiemetics  I interpreted CT results, lab results    Based on above studies,  patient is safe for D/C.     Differential diagnosis: Abdominal Aortic Aneurysm, Acute Coronary Syndrome, Ischemic Bowel, Bowel Obstruction (including Gastric Outlet Obstruction), PUD, GERD, Acute Cholecystitis, Pancreatitis, Hepatitis, Colitis, SMA Syndrome, Mesenteric Steal Syndrome, Splanchnic Vein Thrombosis, Appendicitis, Diverticulitis, Pyelonephritis, UTI, STD, Gonad Torsion, Ureterolithiasis      This is a very pleasant patient presents with abdominal pain of unclear etiology. At the time of discharge, patient is without significant evidence of toxicity, shock, sepsis, hemodynamic or cardiopulmonary instability, acute appendicitis, acute cholecystitis, AAA, bowel obstruction, bowel perforation, herpes zoster, a cardiac or pulmonary etiology as a cause for the abdominal symptoms, or any disease process requiring other immediate surgical or medical intervention at this time. A CT scan was performed to evaluate for potential causes of the abdominal pain, however, neither the clinical exam nor the CT has identified an emergent etiology for the abdominal pain. Based on the physical exam, the laboratory studies, and the CT findings, I have a low suspicion for a surgical emergency or any other life-threatening disease process at this time. I have discussed with the patient the level of uncertainty with undifferentiated abdominal pain and that it may be too early in the disease process to make a definitive diagnosis of all causes of serious causes of abdominal pain. After treatment in the ER, patient had improvement of their symptoms. On repeat physical exam, patient has a benign abdominal exam.  Pain management and follow-up plan were discussed with the patient. I have clearly explained the need to follow-up as noted on the discharge instructions. The patient understands that all disease processes change over time and therefore close follow up and serial exams are mandatory should the symptoms not resolve completely.      Patient understands (DYNAPEN) 500 MG CAPSULE    TAKE 1 CAPSULE BY MOUTH EVERY 6 HOURS---1 HOUR BEFORE A MEAL OR 2 HOURS AFTER A MEAL    DULOXETINE (CYMBALTA) 30 MG EXTENDED RELEASE CAPSULE    TAKE 1 CAPSULE BY ORAL ROUTE  EVERY DAY    ONDANSETRON (ZOFRAN) 4 MG TABLET    Take 1 tablet by mouth every 8 hours as needed for Nausea         Disposition  At this point I do not feel the patient requires further work up and it is reasonable to discharge the patient. I had a discussion with the patient and/or their surrogate regarding diagnosis, diagnostic testing results, treatment/ plan of care, and follow up. Patient and/or companions verbalized understanding of the ED workup, any relevant findings as well as any incidental findings, and the disposition and plan. There was shared decision-making between myself as well as the patient and/or their surrogate and we are all in agreement with discharge home. Librado Robertson was an opportunity for questions and all questions were answered to the best of my ability and to the satisfaction of the patient and/or patient's family. Patient agreed to follow up as recommend for further evaluation/treatment. The patient was given strict return precautions as we discussed symptoms that would necessitate return to the ED. Patient will return to ED for new/worsening symptoms. The patient verbalized their understanding and agreement with the above plan. Please refer to AVS for further details regarding discharge instructions. Pt is in stable condition upon Discharge to home. The note was completed using Dragon voice recognition transcription. Every effort was made to ensure accuracy; however, inadvertent transcription errors may be present despite my best efforts to edit errors.     Ana Lilia Resendiz MD  [address] MD  08/31/21 [id number] PETER Rees MD  08/31/21 [id number]

## 2021-09-01 ENCOUNTER — TELEPHONE (OUTPATIENT)
Dept: BARIATRICS/WEIGHT MGMT | Age: 36
End: 2021-09-01

## 2021-09-01 NOTE — TELEPHONE ENCOUNTER
Pt called she has been having bleeding from her belly button, she went to the ED on 8/31/21 and had a CT of the abdomen. She is still having an issue with bleeding.  Pt phone # [phone]

## 2021-09-02 ENCOUNTER — TELEPHONE (OUTPATIENT)
Dept: ORTHOPEDIC SURGERY | Age: 36
End: 2021-09-02

## 2021-09-02 NOTE — TELEPHONE ENCOUNTER
Auth: NPR  Date: 9/10/2001  Reference # None  Spoke with: Online  Type of SX: Outpatient  Location: [facility]  CPT 39589   SX area:  hand  Insurance: Ronaldo Ivis

## 2021-09-07 ENCOUNTER — HOSPITAL ENCOUNTER (OUTPATIENT)
Age: 36
Discharge: HOME | End: 2021-09-07
Payer: MEDICAID

## 2021-09-07 LAB — SARS-COV-2: NOT DETECTED

## 2021-09-07 PROCEDURE — U0005 INFEC AGEN DETEC AMPLI PROBE: HCPCS

## 2021-09-07 PROCEDURE — U0003 INFECTIOUS AGENT DETECTION BY NUCLEIC ACID (DNA OR RNA); SEVERE ACUTE RESPIRATORY SYNDROME CORONAVIRUS 2 (SARS-COV-2) (CORONAVIRUS DISEASE [COVID-19]), AMPLIFIED PROBE TECHNIQUE, MAKING USE OF HIGH THROUGHPUT TECHNOLOGIES AS DESCRIBED BY CMS-2020-01-R: HCPCS

## 2021-09-08 ENCOUNTER — ANESTHESIA EVENT (OUTPATIENT)
Dept: OPERATING ROOM | Age: 36
End: 2021-09-08
Payer: MEDICAID

## 2021-09-08 ENCOUNTER — HOSPITAL ENCOUNTER (OUTPATIENT)
Dept: PHYSICAL THERAPY | Age: 36
Setting detail: THERAPIES SERIES
Discharge: HOME | End: 2021-09-08
Payer: MEDICAID

## 2021-09-08 PROCEDURE — 97016 VASOPNEUMATIC DEVICE THERAPY: CPT | Performed by: SPECIALIST

## 2021-09-08 PROCEDURE — 97112 NEUROMUSCULAR REEDUCATION: CPT | Performed by: SPECIALIST

## 2021-09-08 PROCEDURE — 97110 THERAPEUTIC EXERCISES: CPT | Performed by: SPECIALIST

## 2021-09-08 NOTE — DISCHARGE SUMMARY
[facility], [address], [address] [place] [address] [address]  Phone: [phone]   Fax:     [phone]     Physical Therapy Discharge Summary    Dear   Edouard Tao MD,    We had the pleasure of treating the following patient for physical therapy services at [address] [place] [address]. A summary of our findings can be found in the discharge summary below. If you have any questions or concerns regarding these findings, please do not hesitate to contact me at the office phone number checked above. Thank you for the referral.     Physician Signature:________________________________Date:__________________  By signing above (or electronic signature), therapists plan is approved by physician      Overall Response to Treatment:   [x]Patient is responding well to treatment and improvement is noted with regards  to goals   []Patient should continue to improve in reasonable time if they continue HEP   []Patient has plateaued and is no longer responding to skilled PT intervention    []Patient is getting worse and would benefit from return to referring MD   []Patient unable to adhere to initial POC   [x]Other: Patient progressed well with PT with ROM, strength and function.   D/C to HEP    Date range of Visits: 21-21    Total Visits: 11      Date:  2021    Patient Name:  Hayden Martinez    :  1985  MRN: [id number]  Restrictions/Precautions:    Medical/Treatment Diagnosis Information:      M25.562, G89.29 (ICD-10-CM) - Chronic pain of left knee   M23.92 (ICD-10-CM) - Patellar malalignment syndrome of left knee     · 3 weeks on 21      Insurance/Certification information:   Young [employer]  Physician Information:   Dr Tay Lawrence  Has the plan of care been signed (Y/N):        []  Yes  [x]  No     Date of Patient follow up with Physician: NS    Is this a Progress Report:     [x]  Yes  []  No      If Yes:  Date Range for reporting period:  Beginning:  ------------ Ending:    Progress report will be due (10 Rx or 30 days whichever is less):    10/4/83      Recertification will be due (POC Duration  / 90 days whichever is less): 12/8/21        Visit # Insurance Allowable Auth Required   In Person 11 30 []  Yes     []  No    Tele Health 0  []  Yes     []  No    Total 11       Functional Scale: LEFS 61%   Date assessed:  4/28/21    LEFS 54% 6/3/21    LEFS 63% 7/22/21    LEFS 8% 9/8/21     Latex Allergy:  [x]NO      []YES  Preferred Language for Healthcare:   [x]English       []other:    Pain level:  1/10     SUBJECTIVE:  Reports feeling good no pain, only if she is on feet a lot. Stairs no pain . 85% of normal.      OBJECTIVE:      Observation:    Test measurements:     7/28 knee 130-0   8/5: knee 143-0   9/8: 150-0  Strength 5/5     RESTRICTIONS/PRECAUTIONS: Tenex protocol    Exercises/Interventions:   Therapeutic Ex (22381) Sets/sec Reps Notes/CUES HEP    x    x    x    x    x   Slant board  1 min     SLR  2 min  x   Bike  5 min     LAQ 3# 30x     NICO 4 way 30# 15x     LP 90# 30x            Manual Intervention (86864)                                              NMR re-education (27903)   CUES NEEDED    SLS blue foam  1 min  x   HR/ on slant board  15x  x    TKE CC 75# 20x  x    x   bridges  10x  x   Step up and over 4\" 20x     LSD 4\" 20x     Gait training  5 min     Supine PB clamshells BL 15x  x   Therapeutic Activity (77604)                     Vaso L knee  10 min                   Medbridge access code:  [email or web address]             Therapeutic Exercise and NMR EXR  [x] (71135) Provided verbal/tactile cueing for activities related to strengthening, flexibility, endurance, ROM for improvements in LE, proximal hip, and core control with self care, mobility, lifting, ambulation.   [x] (57130) Provided verbal/tactile cueing for activities related to improving balance, coordination, kinesthetic sense, posture, motor skill, proprioception to assist with LE, proximal hip, and core control in self-care, mobility, lifting, ambulation and eccentric single leg control. NMR and Therapeutic Activities:    [x] (58202 or 33179) Provided verbal/tactile cueing for activities related to improving balance, coordination, kinesthetic sense, posture, motor skill, proprioception and motor activation to allow for proper function of core, proximal hip and LE with self-care and ADLs and functional mobility.   [] (98962) Gait Re-education- Provided training and instruction to the patient for proper LE, core and proximal hip recruitment and positioning and eccentric body weight control with ambulation re-education including up and down stairs     Home Exercise Program:    [x] (96165) Reviewed/Progressed HEP activities related to strengthening, flexibility, endurance, ROM of core, proximal hip and LE for functional self-care, mobility, lifting and ambulation/stair navigation   [] (89705) Reviewed/Progressed HEP activities related to improving balance, coordination, kinesthetic sense, posture, motor skill, proprioception of core, proximal hip and LE for self-care, mobility, lifting, and ambulation/stair navigation      Manual Treatments:  PROM / STM / Oscillations-Mobs:  G-I, II, III, IV (PA's, Inf., Post.)  [x] (50167) Provided manual therapy to mobilize LE, proximal hip and/or LS spine soft tissue/joints for the purpose of modulating pain, promoting relaxation, increasing ROM, reducing/eliminating soft tissue swelling/inflammation/restriction, improving soft tissue extensibility and allowing for proper ROM for normal function with self-care, mobility, lifting and ambulation. Modalities:     [x] GAME READY (VASO)- for significant edema, swelling, pain control.      Charges:  Timed Code Treatment Minutes: 40   Total Treatment Minutes:  50   BWC:  TE TIME:  NMR TIME:  MANUAL TIME:  UNTIMED MINUTES:  Medicare Total:         [] EVAL (LOW) 03877 (typically 20 minutes face-to-face)  [] EVAL (MOD) 59691 (typically 30 minutes face-to-face)  [] EVAL (HIGH) 88996 (typically 45 minutes face-to-face)  [] RE-EVAL     [x] YP(91826) x  2  [] IONTO  [x] NMR (87213) x  1   [x] VASO  [] Manual (55455) x     [] Other:  [] TA x      [] Mech Traction (05151)  [] ES(attended) (37168)      [] ES (un) (68019):    ASSESSMENT:  Good tolerance with Mary and manuals. Excellent strength and ROM. GOALS:      Patient stated goal: walk/ stairs     Therapist goals for Patient:   Short Term Goals: To be achieved in: 2 weeks  1. Independent in HEP and progression per patient tolerance, in order to prevent re-injury. [] Progressing: [x] Met: [] Not Met: [] Adjusted     2. Patient will have a decrease in pain to facilitate improvement in movement, function, and ADLs as indicated by Functional Deficits. [] Progressing: [x] Met: [] Not Met: [] Adjusted     Long Term Goals: To be achieved in: 6-8 weeks  1. Disability index score of 30% or less for the LEFS to assist with reaching prior level of function. [] Progressing: [x] Met: [] Not Met: [] Adjusted     2. Patient will demonstrate increased AROM to [facility] to allow for proper joint functioning as indicated by patients Functional Deficits. [] Progressing: [x] Met: [] Not Met: [] Adjusted     3. Patient will demonstrate an increase in Strength to good proximal hip strength and control, within 5lb HHD in LE to allow for proper functional mobility as indicated by patients Functional Deficits. [] Progressing: [x] Met: [] Not Met: [] Adjusted     4. Patient will return to [facility] for  functional activities without increased symptoms or restriction. [] Progressing: [x] Met: [] Not Met: [] Adjusted     5.  Stairs/ walk with min to no limitations (patient specific functional goal)    [] Progressing: [x] Met: [] Not Met: [] Adjusted              Overall Progression Towards Functional goals/ Treatment Progress Update:  [x] Patient is progressing as expected towards functional goals listed. [] Progression is slowed due to complexities/Impairments listed. [] Progression has been slowed due to co-morbidities. [] Plan just implemented, too soon to assess goals progression <30days   [] Goals require adjustment due to lack of progress  [] Patient is not progressing as expected and requires additional follow up with physician  [] Other    Prognosis for POC: [x] Good [] Fair  [] Poor      Patient requires continued skilled intervention: [x] Yes  [] No    Treatment/Activity Tolerance:  [x] Patient able to complete treatment  [] Patient limited by fatigue  [] Patient limited by pain    [] Patient limited by other medical complications  [] Other:     Return to Play: (if applicable)   [x]  Stage 1: Intro to Strength   []  Stage 2: Return to Run and Strength   []  Stage 3: Return to Jump and Strength   []  Stage 4: Dynamic Strength and Agility   []  Stage 5: Sport Specific Training     []  Ready to Return to Play, Meets All Above Stages   []  Not Ready for Return to Sports   Comments:                          PLAN:   [] Continue per plan of care [] Alter current plan (see comments above)  [] Plan of care initiated [] Hold pending MD visit [x] Discharge    Electronically signed by:  Isak Lopez PT    Note: If patient does not return for scheduled/ recommended follow up visits, this note will serve as a discharge from care along with most recent update on progress.

## 2021-09-10 ENCOUNTER — ANESTHESIA (OUTPATIENT)
Dept: OPERATING ROOM | Age: 36
End: 2021-09-10
Payer: MEDICAID

## 2021-09-10 ENCOUNTER — HOSPITAL ENCOUNTER (OUTPATIENT)
Age: 36
Setting detail: OUTPATIENT SURGERY
Discharge: HOME | End: 2021-09-10
Attending: ORTHOPAEDIC SURGERY | Admitting: ORTHOPAEDIC SURGERY
Payer: MEDICAID

## 2021-09-10 VITALS
OXYGEN SATURATION: 99 % | HEIGHT: 67 IN | BODY MASS INDEX: 40.18 KG/M2 | RESPIRATION RATE: 12 BRPM | TEMPERATURE: 98 F | SYSTOLIC BLOOD PRESSURE: 98 MMHG | HEART RATE: 88 BPM | WEIGHT: 256 LBS | DIASTOLIC BLOOD PRESSURE: 62 MMHG

## 2021-09-10 VITALS — DIASTOLIC BLOOD PRESSURE: 52 MMHG | SYSTOLIC BLOOD PRESSURE: 89 MMHG | TEMPERATURE: 98.2 F | OXYGEN SATURATION: 99 %

## 2021-09-10 DIAGNOSIS — G56.03 BILATERAL CARPAL TUNNEL SYNDROME: Primary | ICD-10-CM

## 2021-09-10 LAB
GLUCOSE BLD-MCNC: 79 MG/DL (ref 70–99)
GLUCOSE BLD-MCNC: 85 MG/DL (ref 70–99)
PERFORMED ON: NORMAL
PERFORMED ON: NORMAL

## 2021-09-10 PROCEDURE — 3600000002 HC SURGERY LEVEL 2 BASE: Performed by: ORTHOPAEDIC SURGERY

## 2021-09-10 PROCEDURE — 6360000002 HC RX W HCPCS: Performed by: ANESTHESIOLOGY

## 2021-09-10 PROCEDURE — 6360000002 HC RX W HCPCS: Performed by: ORTHOPAEDIC SURGERY

## 2021-09-10 PROCEDURE — 2500000003 HC RX 250 WO HCPCS: Performed by: ANESTHESIOLOGY

## 2021-09-10 PROCEDURE — 2709999900 HC NON-CHARGEABLE SUPPLY: Performed by: ORTHOPAEDIC SURGERY

## 2021-09-10 PROCEDURE — 7100000011 HC PHASE II RECOVERY - ADDTL 15 MIN: Performed by: ORTHOPAEDIC SURGERY

## 2021-09-10 PROCEDURE — 6360000002 HC RX W HCPCS: Performed by: NURSE ANESTHETIST, CERTIFIED REGISTERED

## 2021-09-10 PROCEDURE — 3700000001 HC ADD 15 MINUTES (ANESTHESIA): Performed by: ORTHOPAEDIC SURGERY

## 2021-09-10 PROCEDURE — 3700000000 HC ANESTHESIA ATTENDED CARE: Performed by: ORTHOPAEDIC SURGERY

## 2021-09-10 PROCEDURE — 3600000012 HC SURGERY LEVEL 2 ADDTL 15MIN: Performed by: ORTHOPAEDIC SURGERY

## 2021-09-10 PROCEDURE — 2580000003 HC RX 258: Performed by: ANESTHESIOLOGY

## 2021-09-10 PROCEDURE — 7100000010 HC PHASE II RECOVERY - FIRST 15 MIN: Performed by: ORTHOPAEDIC SURGERY

## 2021-09-10 PROCEDURE — 2500000003 HC RX 250 WO HCPCS: Performed by: ORTHOPAEDIC SURGERY

## 2021-09-10 PROCEDURE — 2500000003 HC RX 250 WO HCPCS: Performed by: NURSE ANESTHETIST, CERTIFIED REGISTERED

## 2021-09-10 RX ORDER — SODIUM CHLORIDE 0.9 % (FLUSH) 0.9 %
10 SYRINGE (ML) INJECTION PRN
Status: DISCONTINUED | OUTPATIENT
Start: 2021-09-10 | End: 2021-09-10 | Stop reason: HOSPADM

## 2021-09-10 RX ORDER — MORPHINE SULFATE 2 MG/ML
1 INJECTION, SOLUTION INTRAMUSCULAR; INTRAVENOUS EVERY 5 MIN PRN
Status: DISCONTINUED | OUTPATIENT
Start: 2021-09-10 | End: 2021-09-10 | Stop reason: HOSPADM

## 2021-09-10 RX ORDER — BUPIVACAINE HYDROCHLORIDE 5 MG/ML
INJECTION, SOLUTION EPIDURAL; INTRACAUDAL
Status: DISCONTINUED
Start: 2021-09-10 | End: 2021-09-10 | Stop reason: HOSPADM

## 2021-09-10 RX ORDER — PROPOFOL 10 MG/ML
INJECTION, EMULSION INTRAVENOUS PRN
Status: DISCONTINUED | OUTPATIENT
Start: 2021-09-10 | End: 2021-09-10 | Stop reason: SDUPTHER

## 2021-09-10 RX ORDER — MORPHINE SULFATE 2 MG/ML
2 INJECTION, SOLUTION INTRAMUSCULAR; INTRAVENOUS EVERY 5 MIN PRN
Status: DISCONTINUED | OUTPATIENT
Start: 2021-09-10 | End: 2021-09-10 | Stop reason: HOSPADM

## 2021-09-10 RX ORDER — LIDOCAINE HYDROCHLORIDE 10 MG/ML
1 INJECTION, SOLUTION EPIDURAL; INFILTRATION; INTRACAUDAL; PERINEURAL
Status: COMPLETED | OUTPATIENT
Start: 2021-09-10 | End: 2021-09-10

## 2021-09-10 RX ORDER — SODIUM CHLORIDE 0.9 % (FLUSH) 0.9 %
10 SYRINGE (ML) INJECTION EVERY 12 HOURS SCHEDULED
Status: DISCONTINUED | OUTPATIENT
Start: 2021-09-10 | End: 2021-09-10 | Stop reason: HOSPADM

## 2021-09-10 RX ORDER — OXYCODONE HYDROCHLORIDE AND ACETAMINOPHEN 5; 325 MG/1; MG/1
1 TABLET ORAL PRN
Status: DISCONTINUED | OUTPATIENT
Start: 2021-09-10 | End: 2021-09-10 | Stop reason: HOSPADM

## 2021-09-10 RX ORDER — APREPITANT 40 MG/1
40 CAPSULE ORAL ONCE
Status: COMPLETED | OUTPATIENT
Start: 2021-09-10 | End: 2021-09-10

## 2021-09-10 RX ORDER — OXYCODONE HYDROCHLORIDE AND ACETAMINOPHEN 5; 325 MG/1; MG/1
2 TABLET ORAL PRN
Status: DISCONTINUED | OUTPATIENT
Start: 2021-09-10 | End: 2021-09-10 | Stop reason: HOSPADM

## 2021-09-10 RX ORDER — LIDOCAINE HYDROCHLORIDE 10 MG/ML
INJECTION, SOLUTION INFILTRATION; PERINEURAL
Status: DISCONTINUED
Start: 2021-09-10 | End: 2021-09-10 | Stop reason: HOSPADM

## 2021-09-10 RX ORDER — LIDOCAINE HYDROCHLORIDE 20 MG/ML
INJECTION, SOLUTION EPIDURAL; INFILTRATION; INTRACAUDAL; PERINEURAL PRN
Status: DISCONTINUED | OUTPATIENT
Start: 2021-09-10 | End: 2021-09-10 | Stop reason: SDUPTHER

## 2021-09-10 RX ORDER — SODIUM CHLORIDE, SODIUM LACTATE, POTASSIUM CHLORIDE, CALCIUM CHLORIDE 600; 310; 30; 20 MG/100ML; MG/100ML; MG/100ML; MG/100ML
INJECTION, SOLUTION INTRAVENOUS CONTINUOUS
Status: DISCONTINUED | OUTPATIENT
Start: 2021-09-10 | End: 2021-09-10 | Stop reason: HOSPADM

## 2021-09-10 RX ORDER — HYDROCODONE BITARTRATE AND ACETAMINOPHEN 5; 325 MG/1; MG/1
1 TABLET ORAL EVERY 6 HOURS PRN
Qty: 28 TABLET | Refills: 0 | Status: SHIPPED | OUTPATIENT
Start: 2021-09-10 | End: 2021-09-17

## 2021-09-10 RX ORDER — SODIUM CHLORIDE 9 MG/ML
25 INJECTION, SOLUTION INTRAVENOUS PRN
Status: DISCONTINUED | OUTPATIENT
Start: 2021-09-10 | End: 2021-09-10 | Stop reason: HOSPADM

## 2021-09-10 RX ORDER — ONDANSETRON 2 MG/ML
4 INJECTION INTRAMUSCULAR; INTRAVENOUS
Status: DISCONTINUED | OUTPATIENT
Start: 2021-09-10 | End: 2021-09-10 | Stop reason: HOSPADM

## 2021-09-10 RX ADMIN — LIDOCAINE HYDROCHLORIDE 80 MG: 20 INJECTION, SOLUTION EPIDURAL; INFILTRATION; INTRACAUDAL; PERINEURAL at 08:34

## 2021-09-10 RX ADMIN — PROPOFOL 100 MG: 10 INJECTION, EMULSION INTRAVENOUS at 08:43

## 2021-09-10 RX ADMIN — LIDOCAINE HYDROCHLORIDE 1 ML: 10 INJECTION, SOLUTION EPIDURAL; INFILTRATION; INTRACAUDAL; PERINEURAL at 06:53

## 2021-09-10 RX ADMIN — FAMOTIDINE 20 MG: 10 INJECTION, SOLUTION INTRAVENOUS at 07:10

## 2021-09-10 RX ADMIN — APREPITANT 40 MG: 40 CAPSULE ORAL at 06:37

## 2021-09-10 RX ADMIN — SODIUM CHLORIDE, POTASSIUM CHLORIDE, SODIUM LACTATE AND CALCIUM CHLORIDE: 600; 310; 30; 20 INJECTION, SOLUTION INTRAVENOUS at 06:52

## 2021-09-10 RX ADMIN — PROPOFOL 100 MG: 10 INJECTION, EMULSION INTRAVENOUS at 08:38

## 2021-09-10 RX ADMIN — Medication 1500 MG: at 08:05

## 2021-09-10 RX ADMIN — PROPOFOL 100 MG: 10 INJECTION, EMULSION INTRAVENOUS at 08:34

## 2021-09-10 RX ADMIN — PROPOFOL 100 MG: 10 INJECTION, EMULSION INTRAVENOUS at 08:36

## 2021-09-10 RX ADMIN — PROPOFOL 100 MG: 10 INJECTION, EMULSION INTRAVENOUS at 08:40

## 2021-09-10 RX ADMIN — PROPOFOL 100 MG: 10 INJECTION, EMULSION INTRAVENOUS at 08:46

## 2021-09-10 ASSESSMENT — PULMONARY FUNCTION TESTS
PIF_VALUE: 0
PIF_VALUE: 1
PIF_VALUE: 0

## 2021-09-10 ASSESSMENT — ENCOUNTER SYMPTOMS: SHORTNESS OF BREATH: 0

## 2021-09-10 ASSESSMENT — PAIN - FUNCTIONAL ASSESSMENT: PAIN_FUNCTIONAL_ASSESSMENT: 0-10

## 2021-09-10 ASSESSMENT — LIFESTYLE VARIABLES: SMOKING_STATUS: 0

## 2021-09-10 ASSESSMENT — PAIN DESCRIPTION - DESCRIPTORS: DESCRIPTORS: ACHING

## 2021-09-10 NOTE — ANESTHESIA PRE PROCEDURE
Department of Anesthesiology  Preprocedure Note       Name:  Karin Melara   Age:  37 y.o.  :  1985                                          MRN:  [id number]         Date:  9/10/2021      Surgeon: Gaston May):  Leonela Price MD    Procedure: Procedure(s):  LEFT CARPAL TUNNEL RELEASE    Medications prior to admission:   Prior to Admission medications    Medication Sig Start Date End Date Taking? Authorizing Provider   omeprazole (PRILOSEC) 40 MG delayed release capsule TAKE 1 CAPSULE BY MOUTH DAILY 21 Yes Beth Delgado, APRN - CNP   DULoxetine (CYMBALTA) 60 MG extended release capsule TAKE 1 CAPSULE BY ORAL ROUTE  EVERY DAY 21  Yes Historical Provider, MD   VICTOZA 18 MG/3ML SOPN SC injection INJECT 0.6 MG BY SUBCUTANEOUS ROUTE DAILY FOR 14 DAYS, THEN 1.2 MG DAILY FOR 28 DAYS, THEN 1.8 MG DAILY. 21  Yes Historical Provider, MD   lamoTRIgine (LAMICTAL) 25 MG tablet Take 25 mg by mouth daily   Yes Historical Provider, MD   mirtazapine (REMERON) 15 MG tablet Take 15 mg by mouth nightly   Yes Historical Provider, MD   medical marijuana Take by mouth as needed.    Yes Historical Provider, MD   busPIRone (BUSPAR) 5 MG tablet Take 5 mg by mouth 3 times daily   Yes Historical Provider, MD   loratadine (CLARITIN) 10 MG tablet Take 10 mg by mouth daily 20  Yes Historical Provider, MD   Multiple Vitamins-Minerals (BARIATRIC FUSION) CHEW Take 4 tablets by mouth daily   Yes Historical Provider, MD   levothyroxine (SYNTHROID) 75 MCG tablet TAKE 1 TABLET BY MOUTH EVERY DAY 19  Yes Historical Provider, MD   Cholecalciferol (VITAMIN D PO) Take by mouth daily    Yes Historical Provider, MD   ziprasidone (GEODON) 20 MG capsule Take 20 mg by mouth 2 times daily (with meals)   Yes Historical Provider, MD   nystatin (MYCOSTATIN) 278715 UNIT/GM powder APPLY BY TOPICAL ROUTE  EVERY DAY TO THE AFFECTED AREA(S) 21   Historical Provider, MD   metFORMIN (GLUCOPHAGE) 500 MG tablet Take 500 mg by mouth daily (with breakfast)    Historical Provider, MD       Current medications:    Current Facility-Administered Medications   Medication Dose Route Frequency Provider Last Rate Last Admin    lactated ringers infusion   IntraVENous Continuous Carlita Valdez MD        sodium chloride flush 0.9 % injection 10 mL  10 mL IntraVENous 2 times per day Carlita Valdez MD        sodium chloride flush 0.9 % injection 10 mL  10 mL IntraVENous PRN Carlita Valdez MD        0.9 % sodium chloride infusion  25 mL IntraVENous PRN Carlita Valdez MD        lidocaine PF 1 % injection 1 mL  1 mL IntraDERmal Once PRN Carlita Valdez MD        vancomycin 1500 mg in dextrose 5% 300 mL IVPB  1,500 mg IntraVENous Once Yonas Patrick MD           Allergies:     Allergies   Allergen Reactions    Latex Itching    Lactose      \"Extreme\" stomach pain and diarrhea    Adhesive Tape      Rips off skin    Keflex [Cephalexin] Nausea And Vomiting     Projectile vomitting       Problem List:    Patient Active Problem List   Diagnosis Code    Pelvic mass R19.00    Biceps tendonitis on right M75.21    Right shoulder strain S46.911A    Diabetes mellitus type 2 in obese ([facility]) E11.69, E66.9    Chronic GERD K21.9    Moderate obstructive sleep apnea G47.33    Scalp cyst L72.9    Ventral hernia without obstruction or gangrene K43.9    S/P laparoscopic sleeve gastrectomy Z98.84    Morbid obesity with BMI of 40.0-44.9, adult ([facility]) E66.01, Z68.41    RUQ pain R10.11    Acute cholecystitis due to biliary calculus K80.00    Abnormal CT scan, stomach R93.3    Abdominal pain, right upper quadrant R10.11    Bilateral carpal tunnel syndrome G56.03       Past Medical History:        Diagnosis Date    Asthma     as child    Back pain     Bipolar 1 disorder ([facility]     Degenerative disc disease, lumbar     Depression     Diabetes mellitus ([facility])     Fibromyalgia     Liver disease     fatty liver    Obesity     OCD (obsessive compulsive disorder)     PCOS (polycystic ovarian syndrome)     PCOS (polycystic ovarian syndrome)     Sleep apnea     cpap       Past Surgical History:        Procedure Laterality Date    ANKLE SURGERY Left     Scar tissue removal    CARPAL TUNNEL RELEASE Right 08/23/2021    CARPAL TUNNEL RELEASE Right 8/23/2021    RIGHT CARPAL TUNNEL RELEASE performed by Raul Araujo MD at [facility], [address] N/A 4/20/2020    LAPAROSCOPIC CHOLECYSTECTOMY performed by Sade Willis DO at [address] ([facility]) Left 12/10/14    Excision of Cyst Left Upper Posterior Ankle    OTHER SURGICAL HISTORY Right 12/29/2016    Laparotomy with Right SO    OVARY REMOVAL  12/28/2016    unsure of side    SLEEVE GASTRECTOMY N/A 11/6/2019    LAPAROSCOPIC SLEEVE GASTRECTOMY - ETHICON performed by Marilia Contreras MD at [facility] Left 7/2/2021    LEFT QUADRICEP PERCUTANEOUS TENOTOMY , TX2 MICROTIP WITH ULTRASOUND performed by Mary Lou Vincent MD at [address] [place] [address] N/A 8/23/2019    EGD BIOPSY performed by Marilia Contreras MD at [facility] N/A 3/10/2021    EGD BIOPSY performed by Marcela Fontana MD at [address] [facility] [address] EXTRACTION         Social History:    Social History     Tobacco Use    Smoking status: Never Smoker    Smokeless tobacco: Never Used   Substance Use Topics    Alcohol use: Yes     Comment: 1 drink per week                                Counseling given: Not Answered      Vital Signs (Current):   Vitals:    09/10/21 0622   BP: 102/63   Pulse: 81   Resp: 14   Temp: 97 °F (36.1 °C)   TempSrc: Temporal   SpO2: 100%   Weight: 256 lb (116.1 kg)   Height: 5' 7\" (1.702 m)                                              BP Readings from Last 3 Encounters:   09/10/21 102/63   08/31/21 106/68   08/23/21 103/63       NPO Status:  MN+, SEE MAR BMI:   Wt Readings from Last 3 Encounters:   09/10/21 256 lb (116.1 kg)   08/31/21 256 lb (116.1 kg)   08/31/21 256 lb (116.1 kg)     Body mass index is 40.1 kg/m².     CBC:   Lab Results   Component Value Date    WBC 6.1 08/31/2021    RBC 4.26 08/31/2021    HGB 13.8 08/31/2021    HCT 41.3 08/31/2021    MCV 96.8 08/31/2021    RDW 12.9 08/31/2021     08/31/2021       CMP:   Lab Results   Component Value Date     08/31/2021    K 3.9 08/31/2021     08/31/2021    CO2 28 08/31/2021    BUN 12 08/31/2021    CREATININE 0.7 08/31/2021    GFRAA >60 08/31/2021    GFRAA >60 04/07/2011    AGRATIO 1.3 08/31/2021    LABGLOM >60 08/31/2021    GLUCOSE 81 08/31/2021    PROT 7.5 08/31/2021    PROT 7.1 04/07/2011    CALCIUM 9.5 08/31/2021    BILITOT 0.3 08/31/2021    ALKPHOS 70 08/31/2021    AST 25 08/31/2021    ALT 25 08/31/2021       POC Tests:   Recent Labs     09/10/21  6689   POCGLU 79       Coags:   Lab Results   Component Value Date    PROTIME 13.0 11/25/2020    INR 1.12 11/25/2020       HCG (If Applicable):   Lab Results   Component Value Date    PREGTESTUR Negative 04/19/2020        ABGs: No results found for: PHART, PO2ART, ELV9GDU, ARQ9ZDL, BEART, K5GOHBOT     Type & Screen (If Applicable):  No results found for: LABABO, LABRH    Drug/Infectious Status (If Applicable):  No results found for: HIV, HEPCAB    COVID-19 Screening (If Applicable):   Lab Results   Component Value Date    COVID19 Not Detected 09/07/2021    COVID19 Not Detected 09/30/2020           Anesthesia Evaluation  Patient summary reviewed no history of anesthetic complications:   Airway: Mallampati: II  TM distance: >3 FB   Neck ROM: full  Mouth opening: > = 3 FB Dental:          Pulmonary: breath sounds clear to auscultation  (+) sleep apnea (OFF CPAP POST WEIGHT LOSS): on CPAP,      (-) COPD, asthma, shortness of breath, recent URI and not a current smoker          Patient did not smoke on day of surgery. Cardiovascular:        (-) pacemaker, hypertension, past MI, CAD, CABG/stent, dysrhythmias,  angina and  CHF    ECG reviewed  Rhythm: regular  Rate: normal           Beta Blocker:  Not on Beta Blocker         Neuro/Psych:   (+) neuromuscular disease (BENJI MEDINA / Capri Diego):, psychiatric history (PTSD, MED MJ USE / OCD / BIPOLAR):   (-) seizures, TIA and CVA           GI/Hepatic/Renal:   (+) GERD: well controlled, liver disease (FATTY):, morbid obesity     (-) no renal disease and bowel prep       Endo/Other:    (+) DiabetesType II DM, , hypothyroidism: arthritis:., no malignancy/cancer. (-) no malignancy/cancer               Abdominal:   (+) obese,     Abdomen: soft. Vascular: Other Findings:             Anesthesia Plan      TIVA     ASA 3       Induction: intravenous. MIPS: Postoperative opioids intended and Prophylactic antiemetics administered. Anesthetic plan and risks discussed with patient. Plan discussed with CRNA. This pre-anesthesia assessment may be used as a history and physical.    DOS STAFF ADDENDUM:    Pt seen and examined, chart reviewed (including anesthesia, drug and allergy history). No interval changes to history and physical examination. Anesthetic plan, risks, benefits, alternatives, and personnel involved discussed with patient. Patient verbalized an understanding and agrees to proceed.       Yaquelin Hudson MD  September 10, 2021  6:57 AM          Yaquelin Hudson MD   9/10/2021

## 2021-09-10 NOTE — ANESTHESIA POSTPROCEDURE EVALUATION
Department of Anesthesiology  Postprocedure Note    Patient: Joseph Nettles  MRN: [id number]  YOB: 1985  Date of evaluation: 9/10/2021  Time:  9:34 AM     Procedure Summary     Date: 09/10/21 Room / Location: [facility] EG OR 02 / [place]    Anesthesia Start: 0830 Anesthesia Stop: 0900    Procedure: LEFT CARPAL TUNNEL RELEASE (Left Hand) Diagnosis: (LEFT CARPAL TUNNEL SYNDROME)    Surgeons: Huber Real MD Responsible Provider: Jacob Chavira MD    Anesthesia Type: TIVA ASA Status: 3          Anesthesia Type: TIVA    Liya Phase I: Liya Score: 8    Liya Phase II:      Last vitals: Reviewed and per EMR flowsheets.      Vitals:    09/10/21 0622 09/10/21 0901 09/10/21 0910   BP: 102/63 98/62 102/63   Pulse: 81 82 94   Resp: 14 16 12   Temp: 97 °F (36.1 °C) 98 °F (36.7 °C)    TempSrc: Temporal Temporal    SpO2: 100% 98% 100%   Weight: 256 lb (116.1 kg)     Height: 5' 7\" (1.702 m)       Anesthesia Post Evaluation    Patient location during evaluation: bedside  Patient participation: complete - patient participated  Level of consciousness: awake and alert  Airway patency: patent  Nausea & Vomiting: no nausea  Complications: no  Cardiovascular status: hemodynamically stable  Respiratory status: acceptable  Hydration status: euvolemic

## 2021-09-10 NOTE — OP NOTE
[address]                 [address] [place] [address]                                OPERATIVE REPORT    PATIENT NAME: Jamil Brody                     :        1985  MED REC NO:   [id number]                          ROOM:  ACCOUNT NO:   [de-identified]                           ADMIT DATE: 09/10/2021  PROVIDER:     Gloria Sheffield MD    DATE OF PROCEDURE:  09/10/2021    PREOPERATIVE DIAGNOSIS:  Left carpal tunnel syndrome. POSTOPERATIVE DIAGNOSIS:  Left carpal tunnel syndrome. OPERATION PERFORMED:  Left carpal tunnel release. SURGEON:  Gloria Sheffield MD    ANESTHESIA:  Local MAC. BLOOD LOSS:  Less than 5 mL. COMPLICATIONS:  None noted. INDICATIONS FOR OPERATION:  This 62-year-old female with a history,  exam, and testing consistent with bilateral carpal tunnel syndrome. After failure of all other modalities, she elected for the right to be  released first, which did well, and subsequently elected for the left to  be done. DESCRIPTION OF OPERATION:  The patient was taken to the operating room,  where under sedation local infiltration into the left palm was performed  using 8 mL of the 50/50 solution of 1% Xylocaine and 0.5% Marcaine  plain. The hand was sterilely prepped and draped, and an Ace wrap was  used for exsanguination and tourniquet control proximal to the wrist.   Under loupe magnification, a 3.5-cm incision was made from the flexion  crease of the wrist distally in line with the 4th ray. Soft tissue was  dissected down through skin and subcutaneous tissue and through the  superficial palmar fascia. The transverse carpal ligament was  identified and released from proximal to distal ends from the median  nerve end of the carpal tunnel through to the trifurcation. The nerve  was minimally compressed. An external neurolysis was performed on the  volar surface to reconstitute the normal nerve diameter.     The wound was

## 2021-09-10 NOTE — BRIEF OP NOTE
Brief Postoperative Note      Patient: Argenis Berman  YOB: 1985  MRN: [id number]    Date of Procedure: 9/10/2021    Pre-Op Diagnosis: LEFT CARPAL TUNNEL SYNDROME    Post-Op Diagnosis: Same       Procedure(s):  LEFT CARPAL TUNNEL RELEASE    Surgeon(s):  Herman Cantu MD    Assistant:  Surgical Assistant: Logan Hanson    Anesthesia: Monitor Anesthesia Care    Estimated Blood Loss (mL): Minimal    Complications: None    Specimens:   * No specimens in log *    Implants:  * No implants in log *      Drains: * No LDAs found *    Findings: sandrine    Electronically signed by Herman Cantu MD on 9/10/2021 at 8:48 AM

## 2021-09-10 NOTE — H&P
Update History & Physical    The patient's History and Physical  was reviewed with the patient and I examined the patient. There was no change. The surgical site was confirmed by the patient and me. Plan: The risks, benefits, expected outcome, and alternative to the recommended procedure have been discussed with the patient. Patient understands and wants to proceed with the procedure.      Electronically signed by Charmayne Morita, MD on 9/10/2021 at 7:49 AM

## 2021-09-15 ENCOUNTER — APPOINTMENT (OUTPATIENT)
Dept: PHYSICAL THERAPY | Age: 36
End: 2021-09-15
Payer: MEDICAID

## 2021-09-16 ENCOUNTER — APPOINTMENT (OUTPATIENT)
Dept: GENERAL RADIOLOGY | Age: 36
End: 2021-09-16
Payer: MEDICAID

## 2021-09-16 ENCOUNTER — HOSPITAL ENCOUNTER (EMERGENCY)
Age: 36
Discharge: HOME | End: 2021-09-16
Attending: STUDENT IN AN ORGANIZED HEALTH CARE EDUCATION/TRAINING PROGRAM
Payer: MEDICAID

## 2021-09-16 VITALS
SYSTOLIC BLOOD PRESSURE: 112 MMHG | RESPIRATION RATE: 16 BRPM | WEIGHT: 256 LBS | TEMPERATURE: 98 F | BODY MASS INDEX: 40.18 KG/M2 | DIASTOLIC BLOOD PRESSURE: 75 MMHG | HEIGHT: 67 IN | OXYGEN SATURATION: 99 % | HEART RATE: 85 BPM

## 2021-09-16 DIAGNOSIS — M54.10 RADICULOPATHY, UNSPECIFIED SPINAL REGION: Primary | ICD-10-CM

## 2021-09-16 DIAGNOSIS — M25.511 ACUTE PAIN OF RIGHT SHOULDER: ICD-10-CM

## 2021-09-16 PROCEDURE — 73030 X-RAY EXAM OF SHOULDER: CPT

## 2021-09-16 PROCEDURE — 6360000002 HC RX W HCPCS: Performed by: STUDENT IN AN ORGANIZED HEALTH CARE EDUCATION/TRAINING PROGRAM

## 2021-09-16 PROCEDURE — 96372 THER/PROPH/DIAG INJ SC/IM: CPT

## 2021-09-16 PROCEDURE — 99283 EMERGENCY DEPT VISIT LOW MDM: CPT

## 2021-09-16 RX ORDER — KETOROLAC TROMETHAMINE 30 MG/ML
15 INJECTION, SOLUTION INTRAMUSCULAR; INTRAVENOUS ONCE
Status: COMPLETED | OUTPATIENT
Start: 2021-09-16 | End: 2021-09-16

## 2021-09-16 RX ADMIN — KETOROLAC TROMETHAMINE 15 MG: 30 INJECTION, SOLUTION INTRAMUSCULAR; INTRAVENOUS at 05:24

## 2021-09-16 ASSESSMENT — PAIN SCALES - GENERAL: PAINLEVEL_OUTOF10: 5

## 2021-09-16 ASSESSMENT — PAIN DESCRIPTION - LOCATION: LOCATION: SHOULDER

## 2021-09-16 ASSESSMENT — PAIN DESCRIPTION - PAIN TYPE: TYPE: ACUTE PAIN

## 2021-09-16 ASSESSMENT — PAIN DESCRIPTION - DESCRIPTORS: DESCRIPTORS: NUMBNESS

## 2021-09-16 ASSESSMENT — PAIN DESCRIPTION - ORIENTATION: ORIENTATION: RIGHT

## 2021-09-16 NOTE — ED PROVIDER NOTES
[address] ED      CHIEF COMPLAINT  Shoulder Pain (c/o shoulder pain started 2 hours ago with some numbness and posterior neck pain.)     HISTORY OF PRESENT ILLNESS  Phi Phillips is a 37 y.o. female  who presents to the ED complaining of right-sided shoulder pain. Patient states that symptoms started overnight, started with a burning sensation in her right shoulder that radiates to her neck. She states that this is never happened to her before. She denies any associated numbness. She has not taken anything for the pain. She denies any chest pain or shortness of breath. Denies any abdominal pain. She denies any other complaints or concerns. Denies focal weaknesses. Does have a history of fibromyalgia, and degenerative disc disease    No other complaints, modifying factors or associated symptoms. I have reviewed the following from the nursing documentation.     Past Medical History:   Diagnosis Date    Asthma     as child    Back pain     Bipolar 1 disorder ([address]     Degenerative disc disease, lumbar     Depression     Diabetes mellitus (HCC)     Fibromyalgia     Liver disease     fatty liver    Obesity     OCD (obsessive compulsive disorder)     PCOS (polycystic ovarian syndrome)     PCOS (polycystic ovarian syndrome)     Sleep apnea     cpap     Past Surgical History:   Procedure Laterality Date    ANKLE SURGERY Left     Scar tissue removal    CARPAL TUNNEL RELEASE Right 08/23/2021    CARPAL TUNNEL RELEASE Right 8/23/2021    RIGHT CARPAL TUNNEL RELEASE performed by Jagdish Smith MD at [address] Left 9/10/2021    LEFT CARPAL TUNNEL RELEASE performed by Jagdish Smith MD at [address] N/A 4/20/2020    LAPAROSCOPIC CHOLECYSTECTOMY performed by Campos Rebollar DO at [address] ([facility]) Left 12/10/14    Excision of Cyst Left Upper Posterior Ankle    OTHER SURGICAL HISTORY Right 12/29/2016    Laparotomy with Right SO    OVARY REMOVAL  12/28/2016    unsure of side    SLEEVE GASTRECTOMY N/A 11/6/2019    LAPAROSCOPIC SLEEVE GASTRECTOMY - ETHICON performed by Kory Cross MD at [address] [facility] Left 7/2/2021    LEFT QUADRICEP PERCUTANEOUS TENOTOMY , TX2 MICROTIP WITH ULTRASOUND performed by Priscilla Nuñez MD at [facility] N/A 8/23/2019    EGD BIOPSY performed by Kory Cross MD at [facility] N/A 3/10/2021    EGD BIOPSY performed by Carisa Chen MD at [address] [facility]       Family History   Problem Relation Age of Onset    Asthma Mother     Arthritis Mother     Lung Cancer Mother     Hypertension Father     Elevated Lipids Father     Diabetes Father     Alcohol Abuse Father     Asthma Father     Arthritis Father     Diabetes Paternal Grandfather     Arthritis Paternal Grandfather     Diabetes Maternal Grandmother     Arthritis Maternal Grandmother      Social History     Socioeconomic History    Marital status:      Spouse name: Tg Lees Number of children: 0    Years of education: 12    Highest education level: Not on file   Occupational History    Not on file   Tobacco Use    Smoking status: Never Smoker    Smokeless tobacco: Never Used   Vaping Use    Vaping Use: Never used   Substance and Sexual Activity    Alcohol use: Yes     Comment: 1 drink per week    Drug use: Yes     Frequency: 2.0 times per week     Types: Marijuana     Comment: medical marijuana    Sexual activity: Yes     Partners: Male   Other Topics Concern    Not on file   Social History Narrative    Not on file     Social Determinants of Health     Financial Resource Strain:     Difficulty of Paying Living Expenses:    Food Insecurity:     Worried About Running Out of Food in the Last Year:     [place] of Food in the Last Year: Transportation Needs:     Lack of Transportation (Medical):  Lack of Transportation (Non-Medical):    Physical Activity:     Days of Exercise per Week:     Minutes of Exercise per Session:    Stress:     Feeling of Stress :    Social Connections:     Frequency of Communication with Friends and Family:     Frequency of Social Gatherings with Friends and Family:     Attends Anabaptist Services:     Active Member of Clubs or Organizations:     Attends Club or Organization Meetings:     Marital Status:    Intimate Partner Violence:     Fear of Current or Ex-Partner:     Emotionally Abused:     Physically Abused:     Sexually Abused:      No current facility-administered medications for this encounter. Current Outpatient Medications   Medication Sig Dispense Refill    HYDROcodone-acetaminophen (NORCO) 5-325 MG per tablet Take 1 tablet by mouth every 6 hours as needed for Pain for up to 7 days. Intended supply: 7 days. Take lowest dose possible to manage pain 28 tablet 0    omeprazole (PRILOSEC) 40 MG delayed release capsule TAKE 1 CAPSULE BY MOUTH DAILY 30 capsule 0    DULoxetine (CYMBALTA) 60 MG extended release capsule TAKE 1 CAPSULE BY ORAL ROUTE  EVERY DAY      VICTOZA 18 MG/3ML SOPN SC injection INJECT 0.6 MG BY SUBCUTANEOUS ROUTE DAILY FOR 14 DAYS, THEN 1.2 MG DAILY FOR 28 DAYS, THEN 1.8 MG DAILY.  nystatin (MYCOSTATIN) 374618 UNIT/GM powder APPLY BY TOPICAL ROUTE  EVERY DAY TO THE AFFECTED AREA(S)      lamoTRIgine (LAMICTAL) 25 MG tablet Take 25 mg by mouth daily      metFORMIN (GLUCOPHAGE) 500 MG tablet Take 500 mg by mouth daily (with breakfast)      mirtazapine (REMERON) 15 MG tablet Take 15 mg by mouth nightly      medical marijuana Take by mouth as needed.       busPIRone (BUSPAR) 5 MG tablet Take 5 mg by mouth 3 times daily      loratadine (CLARITIN) 10 MG tablet Take 10 mg by mouth daily      Multiple Vitamins-Minerals (BARIATRIC FUSION) CHEW Take 4 tablets by mouth daily      levothyroxine (SYNTHROID) 75 MCG tablet TAKE 1 TABLET BY MOUTH EVERY DAY  5    Cholecalciferol (VITAMIN D PO) Take by mouth daily       ziprasidone (GEODON) 20 MG capsule Take 20 mg by mouth 2 times daily (with meals)       Allergies   Allergen Reactions    Latex Itching    Lactose      \"Extreme\" stomach pain and diarrhea    Adhesive Tape      Rips off skin    Keflex [Cephalexin] Nausea And Vomiting     Projectile vomitting       REVIEW OF SYSTEMS  10 systems reviewed, pertinent positives per HPI otherwise noted to be negative. PHYSICAL EXAM  /75   Pulse 85   Temp 98 °F (36.7 °C) (Oral)   Resp 16   Ht 5' 7\" (1.702 m)   Wt 256 lb (116.1 kg)   LMP 04/13/2020   SpO2 99%   BMI 40.10 kg/m²    GENERAL APPEARANCE: Awake and alert. Cooperative. No acute distress. HENT: Normocephalic. Atraumatic. Mucous membranes are moist.  NECK: Supple. No neck stiffness or meningismus. EYES: PERRL. EOM's grossly intact. HEART/CHEST: RRR. No murmurs. LUNGS: Respirations unlabored. CTAB. Good air exchange. Speaking comfortably in full sentences. ABDOMEN: No tenderness. Soft. Non-distended. No masses. No organomegaly. No guarding or rebound. BACK: No midline tenderness palpation of C, T, or L-spine. MUSCULOSKELETAL: No extremity edema. Compartments soft. No deformity. No tenderness in the extremities. All extremities neurovascularly intact. SKIN: Warm and dry. No acute rashes. NEUROLOGICAL: Alert and oriented. CN's 2-12 intact. No gross facial drooping. Strength 5/5, sensation intact. PSYCHIATRIC: Normal mood and affect. LABS  I have reviewed all labs for this visit. No results found for this visit on 09/16/21. RADIOLOGY  XR SHOULDER RIGHT (MIN 2 VIEWS)   Final Result   No acute findings nor significant degenerative changes in the right shoulder. ED COURSE/MDM  Patient seen and evaluated. Old records reviewed. Labs and imaging reviewed and results discussed with patient. Patient is a 31-year-old female, presenting with concerns for right shoulder pain. Full HPI as detailed above. Upon arrival in the ED, vitals reassuring. Patient is resting comfortably is no acute distress. She has no reproducible tenderness palpation. She did receive some Toradol which she states helped her symptoms significantly. States that the pain is a burning sensation, traveling from her right shoulder to her right neck. Right upper extremity is neurovascularly intact, compartments soft. X-ray of the right shoulder was performed which did not reveal any acute findings or significant degenerative changes. As symptoms have improved, feel that patient is stable for discharge home. Feel that symptoms are likely secondary to radiculopathy. Was advised to take over-the-counter Tylenol/ibuprofen as needed for pain control, advised follow-up with PCP as needed. Patient is comfortable in agreement with plan of care was discharged home. During the patient's ED course, the patient was given:  Medications   ketorolac (TORADOL) injection 15 mg (15 mg IntraMUSCular Given 9/16/21 0524)        CLINICAL IMPRESSION  1. Radiculopathy, unspecified spinal region    2. Acute pain of right shoulder        Blood pressure 112/75, pulse 85, temperature 98 °F (36.7 °C), temperature source Oral, resp. rate 16, height 5' 7\" (1.702 m), weight 256 lb (116.1 kg), last menstrual period 04/13/2020, SpO2 99 %, not currently breastfeeding. Oneyda Varela was discharged to home in good condition. Patient was given scripts for the following medications. I counseled patient how to take these medications.    Discharge Medication List as of 9/16/2021  7:13 AM          Follow-up with:  [facility] - [facility]  [address] [place] [address] [address]  [phone]    Schedule an appointment as soon as possible for a visit       [facility]YSICAL [facility] ED  [address] [address]  [phone]  Go to   If symptoms worsen      DISCLAIMER: This chart was created using [employer] Efforts were made by me to ensure accuracy, however some errors may be present due to limitations of this technology and occasionally words are not transcribed correctly.        Bridget Galeas MD  09/16/21 8722

## 2021-09-20 ENCOUNTER — OFFICE VISIT (OUTPATIENT)
Dept: ORTHOPEDIC SURGERY | Age: 36
End: 2021-09-20
Payer: MEDICAID

## 2021-09-20 ENCOUNTER — OFFICE VISIT (OUTPATIENT)
Dept: SURGERY | Age: 36
End: 2021-09-20
Payer: MEDICAID

## 2021-09-20 VITALS
DIASTOLIC BLOOD PRESSURE: 68 MMHG | HEIGHT: 67 IN | TEMPERATURE: 97.7 F | WEIGHT: 258.6 LBS | BODY MASS INDEX: 40.59 KG/M2 | SYSTOLIC BLOOD PRESSURE: 101 MMHG

## 2021-09-20 VITALS — BODY MASS INDEX: 40.18 KG/M2 | HEIGHT: 67 IN | WEIGHT: 256 LBS

## 2021-09-20 DIAGNOSIS — G56.03 BILATERAL CARPAL TUNNEL SYNDROME: ICD-10-CM

## 2021-09-20 DIAGNOSIS — K21.9 CHRONIC GERD: ICD-10-CM

## 2021-09-20 DIAGNOSIS — Z47.89 ORTHOPEDIC AFTERCARE: Primary | ICD-10-CM

## 2021-09-20 PROCEDURE — 1036F TOBACCO NON-USER: CPT | Performed by: SURGERY

## 2021-09-20 PROCEDURE — L3908 WHO COCK-UP NONMOLDE PRE OTS: HCPCS | Performed by: ORTHOPAEDIC SURGERY

## 2021-09-20 PROCEDURE — 99024 POSTOP FOLLOW-UP VISIT: CPT | Performed by: ORTHOPAEDIC SURGERY

## 2021-09-20 PROCEDURE — G8427 DOCREV CUR MEDS BY ELIG CLIN: HCPCS | Performed by: SURGERY

## 2021-09-20 PROCEDURE — 99213 OFFICE O/P EST LOW 20 MIN: CPT | Performed by: SURGERY

## 2021-09-20 PROCEDURE — G8417 CALC BMI ABV UP PARAM F/U: HCPCS | Performed by: SURGERY

## 2021-09-20 NOTE — PROGRESS NOTES
POST OPERATIVE ORTHOPAEDIC NOTE    DOS: 9/10/2021  PROCEDURES: Left open carpal tunnel release by Dr. Thao Alfaro    The patient has been recovering. The patient states the pain is 3/10 pain. The patient has been following routine postop precautions as reviewed by Dr. Thao Alfaro with her perioperatively. Focused pertinent physical examination of the operative extremity:  Wounds C/D/I, well healing surgical incision  Skin intact throughout  5/5 D B T G IO EPL  SILT Ax, R, U, M  +2 radial pulse     Diagnosis Orders   1. Orthopedic aftercare     2. Bilateral carpal tunnel syndrome  Isamar Timmons Gel Wrist Brace       Assessment and plan: The patient is now 10 days status post the above listed procedure and is recovering    . --Greater than 50% of the time (11/20 minutes) was spent coordinating care and discussing postoperative recovery course  -I had a pleasant discussion with the patient. Her wound is well-appearing. At this time we will remove her sutures and placed Steri-Strips.  -She will follow previous precautions and discussions for perioperative optimization and recovery as discussed by Dr. Thao Alfaro. She is undergone a previous right carpal tunnel release and will continue in situ which she has been positive with regard to the right now for the left. -OTC Tylenol per bottle as needed discomfort  -Continue activity modification. She was instructed not to submerge the wound for at least 1 month postoperatively. Otherwise she may get the wound wet pat dry air dry  -All questions answered to the patient's satisfaction and the patient expressed understanding and agreement with the above listed treatment plan  -Follow up 3 to 4 weeks for routine postoperative consultation with Dr. Thao Alfaro  -Thank you for the clinical consultation and allowing me to participate in the patient's care.       Electronically signed by Aleks Peters MD on 9/20/21 at 11:29 AM CIERRAT         Aleks Peters MD       Orthopaedic Surgery-Sports Medicine      Disclaimer: This note was dictated with voice recognition software. Though review and correction are routinely performed, please contact the office/medical records for any errors requiring correction.

## 2021-09-21 RX ORDER — OMEPRAZOLE 40 MG/1
40 CAPSULE, DELAYED RELEASE ORAL DAILY
Qty: 30 CAPSULE | Refills: 0 | Status: SHIPPED | OUTPATIENT
Start: 2021-09-21 | End: 2021-10-19

## 2021-09-22 ENCOUNTER — APPOINTMENT (OUTPATIENT)
Dept: PHYSICAL THERAPY | Age: 36
End: 2021-09-22
Payer: MEDICAID

## 2021-09-29 ENCOUNTER — APPOINTMENT (OUTPATIENT)
Dept: PHYSICAL THERAPY | Age: 36
End: 2021-09-29
Payer: MEDICAID

## 2021-10-07 ENCOUNTER — HOSPITAL ENCOUNTER (OUTPATIENT)
Age: 36
Discharge: HOME | End: 2021-10-07
Payer: MEDICAID

## 2021-10-07 ENCOUNTER — HOSPITAL ENCOUNTER (OUTPATIENT)
Dept: GENERAL RADIOLOGY | Age: 36
Discharge: HOME | End: 2021-10-07
Payer: MEDICAID

## 2021-10-07 DIAGNOSIS — M47.812 CERVICAL SPONDYLOSIS WITHOUT MYELOPATHY: ICD-10-CM

## 2021-10-07 PROCEDURE — 72040 X-RAY EXAM NECK SPINE 2-3 VW: CPT

## 2021-10-11 ENCOUNTER — OFFICE VISIT (OUTPATIENT)
Dept: ORTHOPEDIC SURGERY | Age: 36
End: 2021-10-11
Payer: MEDICAID

## 2021-10-11 VITALS — HEIGHT: 67 IN | WEIGHT: 258 LBS | BODY MASS INDEX: 40.49 KG/M2

## 2021-10-11 DIAGNOSIS — M76.899 TENDINOSIS OF QUADRICEPS TENDON: Primary | ICD-10-CM

## 2021-10-11 PROCEDURE — 1036F TOBACCO NON-USER: CPT | Performed by: INTERNAL MEDICINE

## 2021-10-11 PROCEDURE — G8484 FLU IMMUNIZE NO ADMIN: HCPCS | Performed by: INTERNAL MEDICINE

## 2021-10-11 PROCEDURE — G8417 CALC BMI ABV UP PARAM F/U: HCPCS | Performed by: INTERNAL MEDICINE

## 2021-10-11 PROCEDURE — G8427 DOCREV CUR MEDS BY ELIG CLIN: HCPCS | Performed by: INTERNAL MEDICINE

## 2021-10-11 PROCEDURE — 99213 OFFICE O/P EST LOW 20 MIN: CPT | Performed by: INTERNAL MEDICINE

## 2021-10-11 NOTE — PROGRESS NOTES
Chief Complaint:   Chief Complaint   Patient presents with    Knee Pain     LT knee           History of Present Illness:       Patient is a 37 y.o. female returns follow up for the above complaint. The patient was last seen approximately 2 monthsago. The symptoms have recently worsened since the last visit. The patient has had no further testing for the problem. Seem to correlate with recent increase in activity with extensive walking intermittently experiencing burning discomfort 2/10 severity. Status post percutaneous tenotomy TX 2 microtip left quadriceps tendon-hypertrophic tendinopathy-central medial segment distribution with interstitial tear-7/2/2021    She has transitioned supervised PT     LEFS 6/3/2021 46%  LEFS 8/12/2021 67.5%  LEFS 60% today        Active related swelling no mechanical symptoms           Past Medical History:        Past Medical History:   Diagnosis Date    Asthma     as child    Back pain     B[facility] 1 disorder ([facility]     Degenerative disc disease, lumbar     Depression     Diabetes mellitus ([facility]     Fibromyalgia     Liver disease     fatty liver    Obesity     OCD (obsessive compulsive disorder)     PCOS (polycystic ovarian syndrome)     PCOS (polycystic ovarian syndrome)     Sleep apnea     cpap        Present Medications:         Current Outpatient Medications   Medication Sig Dispense Refill    omeprazole (PRILOSEC) 40 MG delayed release capsule TAKE 1 CAPSULE BY MOUTH DAILY 30 capsule 0    DULoxetine (CYMBALTA) 60 MG extended release capsule TAKE 1 CAPSULE BY ORAL ROUTE  EVERY DAY      VICTOZA 18 MG/3ML SOPN SC injection INJECT 0.6 MG BY SUBCUTANEOUS ROUTE DAILY FOR 14 DAYS, THEN 1.2 MG DAILY FOR 28 DAYS, THEN 1.8 MG DAILY.       nystatin (MYCOSTATIN) 212404 UNIT/GM powder APPLY BY TOPICAL ROUTE  EVERY DAY TO THE AFFECTED AREA(S)      lamoTRIgine (LAMICTAL) 25 MG tablet Take 25 mg by mouth daily      metFORMIN (GLUCOPHAGE) 500 MG tablet Take 500 mg by mouth daily (with breakfast)      mirtazapine (REMERON) 15 MG tablet Take 15 mg by mouth nightly      medical marijuana Take by mouth as needed.  busPIRone (BUSPAR) 5 MG tablet Take 5 mg by mouth 3 times daily      loratadine (CLARITIN) 10 MG tablet Take 10 mg by mouth daily      Multiple Vitamins-Minerals (BARIATRIC FUSION) CHEW Take 4 tablets by mouth daily      levothyroxine (SYNTHROID) 75 MCG tablet TAKE 1 TABLET BY MOUTH EVERY DAY  5    Cholecalciferol (VITAMIN D PO) Take by mouth daily       ziprasidone (GEODON) 20 MG capsule Take 20 mg by mouth 2 times daily (with meals)       No current facility-administered medications for this visit. Allergies: Allergies   Allergen Reactions    Latex Itching    Lactose      \"Extreme\" stomach pain and diarrhea    Adhesive Tape      Rips off skin    Keflex [Cephalexin] Nausea And Vomiting     Projectile vomitting           Review of Systems:    Pertinent items are noted in HPI       Vital Signs: There were no vitals filed for this visit. General Exam:     Constitutional: Patient is adequately groomed with no evidence of malnutrition    Physical Exam: left knee      Primary Exam:    Inspection: No deformity atrophy appreciable effusion      Palpation: Negligible tenderness distal quadriceps      Range of Motion: Full range symmetric without pain      Strength: Normal with SLR, low-grade resisted pain with isometric resisted knee extension-quadriceps tendon region      Special Tests: Negative extensor lag,      Skin: There are no rashes, ulcerations or lesions. Gait: Nonantalgic      Neurovascular - non focal and intact       Additional Comments:        Additional Examinations:                   Office Imaging Results/Procedures PerformedToday:           Office Procedures:     Orders Placed This Encounter   Procedures    US EXTREMITY LEFT NON VASC LIMITED     This procedure can be scheduled via videof.met.   Access your [employer] account by visiting [email or web address]. Standing Status:   Future     Number of Occurrences:   1     Standing Expiration Date:   10/11/2022    US GUIDED NEEDLE PLACEMENT     Standing Status:   Future     Number of Occurrences:   1     Standing Expiration Date:   10/11/2022     Ultrasound evaluation-left knee  Logiq E ultrasound 12 MHz    Patient position supine examination table knee supported in static flexion. The quadriceps tendon was evaluated extensively and long axis and short axis. Area of pathology localizing to the medial/central medial distribution of the tendon interstitial and deep fiber region. Overall characteristics of the tendon at this anatomic location consistent with moderate hypertrophic tendinopathy. Speckled and homogenous pattern of echogenic signal in the area of pathology as relates to echogenic normal tissue adjacent to tendinopathy tissue. With dynamic imaging there was no evidence of discrete gapping of fibers    Per Doppler imaging negative          Other Outside Imaging and Testing Personally Reviewed:            Assessment   Impression: . Encounter Diagnosis   Name Primary?  Tendinosis of quadriceps tendon Yes      Status post percutaneous tenotomy TX 2 microtip left quadriceps tendon-hypertrophic tendinopathy-central medial segment distribution with interstitial tear-7/2/2021             Plan: To modification caution against overuse  As needed T scope immobilization in extension to unload the quadriceps tendon for symptom control as needed  Consider reengage PT as needed  Local measures for symptom control and as needed use of NSAIDs with GI precaution  Repeat limited ultrasound valuation in 3 months and LEFS         Orders:        Orders Placed This Encounter   Procedures    US EXTREMITY LEFT NON VASC LIMITED     This procedure can be scheduled via [employer]. Access your [employer] account by visiting [email or web address].      Standing Status:   Future     Number of Occurrences:   1     Standing Expiration Date:   10/11/2022    US GUIDED NEEDLE PLACEMENT     Standing Status:   Future     Number of Occurrences:   1     Standing Expiration Date:   10/11/2022         Jesenia Sandoval MD.      Disclaimer: \"This note was dictated with voice recognition software. Though review and correction are routine, we apologize for any errors. \"

## 2021-10-15 ENCOUNTER — CLINICAL DOCUMENTATION (OUTPATIENT)
Dept: OTHER | Age: 36
End: 2021-10-15

## 2021-10-18 DIAGNOSIS — K21.9 CHRONIC GERD: ICD-10-CM

## 2021-10-19 RX ORDER — OMEPRAZOLE 40 MG/1
40 CAPSULE, DELAYED RELEASE ORAL DAILY
Qty: 30 CAPSULE | Refills: 0 | Status: SHIPPED | OUTPATIENT
Start: 2021-10-19 | End: 2021-12-15

## 2021-10-19 NOTE — PROGRESS NOTES
[facility] SURGERY      S:   Patient presents for evaluation of recent umbilical bleeding and burning. She reports this has improved. She was treated for similar a couple years ago. .    O:   Comfortable. No distress. Chest CTA   Heart regular   Abdomen obese. Soft. No evident drainage,  mass, lesion  or hernia at umbilicus. Recent CT normal.                      A:     Encounter Diagnosis   Name Primary?  Omphalitis Yes            P:   No surgical indications at present as exam is unremarkable. Follow up as needed.

## 2021-10-29 ENCOUNTER — TELEPHONE (OUTPATIENT)
Dept: ORTHOPEDIC SURGERY | Age: 36
End: 2021-10-29

## 2021-10-29 NOTE — TELEPHONE ENCOUNTER
DID LEAVE MESSAGE WITH PATIENT 2NP PO APPT [address] W/DR SHEPARD IN ERROR, JAELYN TO [email or web address] WITH DR. Kevin Lockett.

## 2021-11-02 ENCOUNTER — APPOINTMENT (OUTPATIENT)
Dept: CT IMAGING | Age: 36
End: 2021-11-02
Payer: MEDICAID

## 2021-11-02 ENCOUNTER — HOSPITAL ENCOUNTER (EMERGENCY)
Age: 36
Discharge: HOME | End: 2021-11-02
Payer: MEDICAID

## 2021-11-02 ENCOUNTER — APPOINTMENT (OUTPATIENT)
Dept: GENERAL RADIOLOGY | Age: 36
End: 2021-11-02
Payer: MEDICAID

## 2021-11-02 ENCOUNTER — OFFICE VISIT (OUTPATIENT)
Dept: ORTHOPEDIC SURGERY | Age: 36
End: 2021-11-02

## 2021-11-02 VITALS
HEIGHT: 67 IN | WEIGHT: 260 LBS | TEMPERATURE: 97.1 F | BODY MASS INDEX: 40.81 KG/M2 | OXYGEN SATURATION: 100 % | DIASTOLIC BLOOD PRESSURE: 86 MMHG | RESPIRATION RATE: 16 BRPM | SYSTOLIC BLOOD PRESSURE: 137 MMHG | HEART RATE: 90 BPM

## 2021-11-02 DIAGNOSIS — M54.2 CERVICAL PAIN (NECK): ICD-10-CM

## 2021-11-02 DIAGNOSIS — M25.511 CHRONIC PAIN OF BOTH SHOULDERS: Primary | ICD-10-CM

## 2021-11-02 DIAGNOSIS — G56.03 BILATERAL CARPAL TUNNEL SYNDROME: Primary | ICD-10-CM

## 2021-11-02 DIAGNOSIS — M25.512 CHRONIC PAIN OF BOTH SHOULDERS: Primary | ICD-10-CM

## 2021-11-02 DIAGNOSIS — G89.29 CHRONIC PAIN OF BOTH SHOULDERS: Primary | ICD-10-CM

## 2021-11-02 PROCEDURE — 73030 X-RAY EXAM OF SHOULDER: CPT

## 2021-11-02 PROCEDURE — 96372 THER/PROPH/DIAG INJ SC/IM: CPT

## 2021-11-02 PROCEDURE — 99282 EMERGENCY DEPT VISIT SF MDM: CPT

## 2021-11-02 PROCEDURE — 99024 POSTOP FOLLOW-UP VISIT: CPT | Performed by: ORTHOPAEDIC SURGERY

## 2021-11-02 PROCEDURE — 72125 CT NECK SPINE W/O DYE: CPT

## 2021-11-02 PROCEDURE — 6360000002 HC RX W HCPCS: Performed by: PHYSICIAN ASSISTANT

## 2021-11-02 RX ORDER — NAPROXEN 500 MG/1
500 TABLET ORAL 2 TIMES DAILY WITH MEALS
Qty: 60 TABLET | Refills: 3 | Status: SHIPPED | OUTPATIENT
Start: 2021-11-02 | End: 2021-12-22 | Stop reason: ALTCHOICE

## 2021-11-02 RX ORDER — KETOROLAC TROMETHAMINE 30 MG/ML
15 INJECTION, SOLUTION INTRAMUSCULAR; INTRAVENOUS ONCE
Status: COMPLETED | OUTPATIENT
Start: 2021-11-02 | End: 2021-11-02

## 2021-11-02 RX ADMIN — KETOROLAC TROMETHAMINE 15 MG: 30 INJECTION, SOLUTION INTRAMUSCULAR at 14:36

## 2021-11-02 ASSESSMENT — PAIN DESCRIPTION - PAIN TYPE: TYPE: ACUTE PAIN

## 2021-11-02 ASSESSMENT — PAIN SCALES - GENERAL
PAINLEVEL_OUTOF10: 2
PAINLEVEL_OUTOF10: 6
PAINLEVEL_OUTOF10: 7

## 2021-11-02 ASSESSMENT — PAIN DESCRIPTION - ONSET: ONSET: GRADUAL

## 2021-11-02 ASSESSMENT — PAIN DESCRIPTION - LOCATION: LOCATION: SHOULDER

## 2021-11-02 ASSESSMENT — PAIN DESCRIPTION - FREQUENCY: FREQUENCY: CONTINUOUS

## 2021-11-02 ASSESSMENT — PAIN DESCRIPTION - DESCRIPTORS: DESCRIPTORS: ACHING;BURNING

## 2021-11-02 ASSESSMENT — ENCOUNTER SYMPTOMS
RESPIRATORY NEGATIVE: 1
GASTROINTESTINAL NEGATIVE: 1

## 2021-11-02 NOTE — PROGRESS NOTES
She returns here for evaluation and still is having some tingling and numbness in her right hand although less than it was before but still is there pretty much all the time. Because of being 3 months postop with no signs of healing I would recommend repeating the EMG nerve conduction velocity to the right upper extremity to determine the nature of the process going on. She should return after testing. She may benefit from formal occupational therapy but I would like to wait and talk to testing to determine that.

## 2021-11-02 NOTE — ED PROVIDER NOTES
[address] ED  EMERGENCY DEPARTMENT ENCOUNTER        Pt Name: Brian Sanchez  MRN: [id number]  [place] 1985  Date of evaluation: 11/2/2021  Provider: Ronal Montoya PA-C  PCP: ROSE Whipple CNP  Note Started: 1:40 PM EDT       SUKHDEV. I have evaluated this patient. My supervising physician was available for consultation. CHIEF COMPLAINT       Chief Complaint   Patient presents with    Shoulder Pain     pt states neck pain/burning in shoulders no known injury       HISTORY OF PRESENT ILLNESS   (Location, Timing/Onset, Context/Setting, Quality, Duration, Modifying Factors, Severity, Associated Signs and Symptoms)  Note limiting factors. Chief Complaint: bilateral shoulder pain; neck pain    Brian Sanchez is a 31 y.o. female with a past medical history of diabetes, GERD, sleep apnea, asthma, bipolar, fibromyalgia, PCOS, OCD brought in today by private vehicle with complaints of bilateral shoulder pain and cervical spine pain. Patient denies any injury or trauma. Denies any heavy lifting twisting or bending. Denies bowel or bladder incontinence or numbness or tingling to extremities. Onset of symptoms x2 weeks. Symptoms have been persistent since onset. Context includes bilateral shoulder pain and neck pain. She has followed with Ortho in the past diagnosed with bilateral carpal tunnel she has also been diagnosed with radiculopathy. She currently rates her pain a 7 out of 10. No radiation of pain. No aggravating symptoms. No alleviating symptoms. She otherwise denies any other complaints. Nothing seems to make symptoms better or worse. She has not taken anything at this time for symptomatic relief. Otherwise denies any other symptoms. Nursing Notes were all reviewed and agreed with or any disagreements were addressed in the HPI. REVIEW OF SYSTEMS    (2-9 systems for level 4, 10 or more for level 5)     Review of Systems   Constitutional: Negative. Respiratory: Negative. Cardiovascular: Negative. Gastrointestinal: Negative. Genitourinary: Negative. Musculoskeletal: Positive for arthralgias and neck pain. Skin: Negative. Neurological: Negative. Positives and Pertinent negatives as per HPI. Except as noted above in the ROS, all other systems were reviewed and negative.        PAST MEDICAL HISTORY     Past Medical History:   Diagnosis Date    Asthma     as child    Back pain     Bipolar 1 disorder ([address]     Degenerative disc disease, lumbar     Depression     Diabetes mellitus (HCC)     Fibromyalgia     Liver disease     fatty liver    Obesity     OCD (obsessive compulsive disorder)     PCOS (polycystic ovarian syndrome)     PCOS (polycystic ovarian syndrome)     Sleep apnea     cpap         SURGICAL HISTORY     Past Surgical History:   Procedure Laterality Date    ANKLE SURGERY Left     Scar tissue removal    CARPAL TUNNEL RELEASE Right 08/23/2021    CARPAL TUNNEL RELEASE Right 8/23/2021    RIGHT CARPAL TUNNEL RELEASE performed by Monica Siegel MD at [address] [facility] Left 9/10/2021    LEFT CARPAL TUNNEL RELEASE performed by Monica Siegel MD at [facility] N/A 4/20/2020    LAPAROSCOPIC CHOLECYSTECTOMY performed by Geni Tavera DO at [address] [address] ([facility]) Left 12/10/14    Excision of Cyst Left Upper Posterior Ankle    OTHER SURGICAL HISTORY Right 12/29/2016    Laparotomy with Right SO    OVARY REMOVAL  12/28/2016    unsure of side    SLEEVE GASTRECTOMY N/A 11/6/2019    LAPAROSCOPIC SLEEVE GASTRECTOMY - ETHICON performed by Debbie Gunderson MD at [address] [facility] Left 7/2/2021    LEFT QUADRICEP PERCUTANEOUS TENOTOMY , TX2 MICROTIP WITH ULTRASOUND performed by Mariah Anderson MD at [address] N/A 8/23/2019    EGD BIOPSY performed by Debbie Gunderson MD at [address] [facility] ENDOSCOPY    UPPER GASTROINTESTINAL ENDOSCOPY N/A 3/10/2021    EGD BIOPSY performed by Lexi Nicole MD at [address]       Previous Medications    BUSPIRONE (BUSPAR) 5 MG TABLET    Take 5 mg by mouth 3 times daily    CHOLECALCIFEROL (VITAMIN D PO)    Take by mouth daily     DULOXETINE (CYMBALTA) 60 MG EXTENDED RELEASE CAPSULE    TAKE 1 CAPSULE BY ORAL ROUTE  EVERY DAY    LAMOTRIGINE (LAMICTAL) 25 MG TABLET    Take 25 mg by mouth daily    LEVOTHYROXINE (SYNTHROID) 75 MCG TABLET    TAKE 1 TABLET BY MOUTH EVERY DAY    LORATADINE (CLARITIN) 10 MG TABLET    Take 10 mg by mouth daily    MEDICAL MARIJUANA    Take by mouth as needed. METFORMIN (GLUCOPHAGE) 500 MG TABLET    Take 500 mg by mouth daily (with breakfast)    MIRTAZAPINE (REMERON) 15 MG TABLET    Take 15 mg by mouth nightly    MULTIPLE VITAMINS-MINERALS (BARIATRIC FUSION) CHEW    Take 4 tablets by mouth daily    NYSTATIN (MYCOSTATIN) 166348 UNIT/GM POWDER    APPLY BY TOPICAL ROUTE  EVERY DAY TO THE AFFECTED AREA(S)    OMEPRAZOLE (PRILOSEC) 40 MG DELAYED RELEASE CAPSULE    TAKE 1 CAPSULE BY MOUTH DAILY    VICTOZA 18 MG/3ML SOPN SC INJECTION    INJECT 0.6 MG BY SUBCUTANEOUS ROUTE DAILY FOR 14 DAYS, THEN 1.2 MG DAILY FOR 28 DAYS, THEN 1.8 MG DAILY.     ZIPRASIDONE (GEODON) 20 MG CAPSULE    Take 20 mg by mouth 2 times daily (with meals)         ALLERGIES     Latex, Lactose, Adhesive tape, and Keflex [cephalexin]    FAMILYHISTORY       Family History   Problem Relation Age of Onset    Asthma Mother     Arthritis Mother     Lung Cancer Mother     Hypertension Father     Elevated Lipids Father     Diabetes Father     Alcohol Abuse Father     Asthma Father     Arthritis Father     Diabetes Paternal Grandfather     Arthritis Paternal Grandfather     Diabetes Maternal Grandmother     Arthritis Maternal Grandmother           SOCIAL HISTORY       Social History     Tobacco Use    Smoking status: Never Smoker    Smokeless tobacco: Never Used   Vaping Use    Vaping Use: Never used   Substance Use Topics    Alcohol use: Yes     Comment: 1 drink per week    Drug use: Yes     Frequency: 2.0 times per week     Types: Marijuana Jennifer Lux)     Comment: medical marijuana       SCREENINGS             PHYSICAL EXAM    (up to 7 for level 4, 8 or more for level 5)     ED Triage Vitals [11/02/21 1336]   BP Temp Temp Source Pulse Resp SpO2 Height Weight   137/86 97.1 °F (36.2 °C) Tympanic 90 16 100 % 5' 7\" (1.702 m) 260 lb (117.9 kg)       Physical Exam  Vitals and nursing note reviewed. Constitutional:       Appearance: She is well-developed. She is not diaphoretic. HENT:      Head: Normocephalic and atraumatic. Nose: Nose normal.   Eyes:      General:         Right eye: No discharge. Left eye: No discharge. Cardiovascular:      Rate and Rhythm: Normal rate and regular rhythm. Heart sounds: Normal heart sounds. No murmur heard. No gallop. Pulmonary:      Effort: Pulmonary effort is normal. No respiratory distress. Breath sounds: Normal breath sounds. No wheezing or rales. Chest:      Chest wall: No tenderness. Musculoskeletal:         General: No deformity. Right shoulder: Tenderness present. No swelling, deformity, effusion, laceration, bony tenderness or crepitus. Decreased range of motion. Normal strength. Normal pulse. Left shoulder: Tenderness present. No swelling, deformity, effusion, laceration, bony tenderness or crepitus. Decreased range of motion. Normal strength. Normal pulse. Right elbow: Normal. No swelling, deformity, effusion or lacerations. Normal range of motion. No tenderness. Left elbow: Normal. No swelling, deformity, effusion or lacerations. Normal range of motion. No tenderness. Right forearm: No swelling, edema, deformity, lacerations, tenderness or bony tenderness.       Left forearm: No swelling, edema, deformity, lacerations, tenderness or bony tenderness. Right wrist: Normal. No swelling, deformity, effusion, lacerations, tenderness, bony tenderness, snuff box tenderness or crepitus. Normal range of motion. Normal pulse. Left wrist: Normal. No swelling, deformity, effusion, lacerations, tenderness, bony tenderness, snuff box tenderness or crepitus. Normal range of motion. Normal pulse. Cervical back: Normal range of motion and neck supple. Comments: Neurovascularly intact. Sensation intact in C4-C5 C6-C7-C8 and symmetric. Strength assessed to upper extremities and 5 out of 5 and symmetric.  strength 5 out of 5. Flexion extension internal and external rotation intact and symmetric. Skin:     General: Skin is warm and dry. Capillary Refill: Capillary refill takes less than 2 seconds. Neurological:      Mental Status: She is alert and oriented to person, place, and time. Psychiatric:         Behavior: Behavior normal.         DIAGNOSTIC RESULTS   LABS:    Labs Reviewed - No data to display    When ordered only abnormal lab results are displayed. All other labs were within normal range or not returned as of this dictation. EKG: When ordered, EKG's are interpreted by the Emergency Department Physician in the absence of a cardiologist.  Please see their note for interpretation of EKG. RADIOLOGY:   Non-plain film images such as CT, Ultrasound and MRI are read by the radiologist. Plain radiographic images are visualized and preliminarily interpreted by the ED Provider with the below findings:        Interpretation per the Radiologist below, if available at the time of this note:    XR SHOULDER LEFT (MIN 2 VIEWS)   Final Result   Unremarkable bilateral shoulders         XR SHOULDER RIGHT (MIN 2 VIEWS)   Final Result   Unremarkable bilateral shoulders         CT CERVICAL SPINE WO CONTRAST   Final Result   No acute abnormality of the cervical spine. No results found. PROCEDURES   Unless otherwise noted below, none     Procedures    CRITICAL CARE TIME   N/A    CONSULTS:  None      EMERGENCY DEPARTMENT COURSE and DIFFERENTIAL DIAGNOSIS/MDM:   Vitals:    Vitals:    11/02/21 1336   BP: 137/86   Pulse: 90   Resp: 16   Temp: 97.1 °F (36.2 °C)   TempSrc: Tympanic   SpO2: 100%   Weight: 260 lb (117.9 kg)   Height: 5' 7\" (1.702 m)       Patient was given the following medications:  Medications   ketorolac (TORADOL) injection 15 mg (15 mg IntraMUSCular Given 11/2/21 1436)           Patient brought in today by private vehicle with complaints of bilateral shoulder pain and cervical spine pain. On exam patient alert oriented afebrile breathing on room air satting at 100%. Nontoxic. No acute respiratory distress. Old labs and records reviewed. Patient seen and evaluated by myself and my attending was available as needed for consultation. X-ray of the left and right shoulder is unremarkable. CT cervical spine shows no acute abnormality. Patient given Toradol here. Plan at this time will be to discharge home with close follow-up to orthopedics. She has follow-up with Dr. Rony Suazo with orthopedics in the past. She already has muscle relaxants at home in addition I will prescribe Naprosyn. Patient told return immediately to the ED with any new or worsening symptoms including but not limited to increased pain, bowel or bladder incontinence, numbness or tingling or any new or changing symptoms. She verbalized understanding. I did feel comfortable sending this patient home with close follow-up instructions and strict return precautions. Patient discharged in stable condition. FINAL IMPRESSION      1. Chronic pain of both shoulders    2.  Cervical pain (neck)          DISPOSITION/PLAN   DISPOSITION Decision To Discharge 11/02/2021 03:06:06 PM      PATIENT REFERRED TO:  Luis Richards MD  [facility] [address] [facility] [address] [phone]    Schedule an appointment as soon as possible for a visit       [place] [facility] [place] [facility]  [address] [facility] [address]  [phone]  Schedule an appointment as soon as possible for a visit   As needed, If symptoms worsen      DISCHARGE MEDICATIONS:  New Prescriptions    NAPROXEN (NAPROSYN) 500 MG TABLET    Take 1 tablet by mouth 2 times daily (with meals)       DISCONTINUED MEDICATIONS:  Discontinued Medications    No medications on file              (Please note that portions of this note were completed with a voice recognition program.  Efforts were made to edit the dictations but occasionally words are mis-transcribed.)    Santosh Browning PA-C (electronically signed)            Santosh Browning PA-C  11/02/21 1509

## 2021-11-08 ENCOUNTER — OFFICE VISIT (OUTPATIENT)
Dept: ORTHOPEDIC SURGERY | Age: 36
End: 2021-11-08
Payer: MEDICAID

## 2021-11-08 VITALS — BODY MASS INDEX: 40.81 KG/M2 | HEIGHT: 67 IN | WEIGHT: 260 LBS

## 2021-11-08 DIAGNOSIS — M25.512 ACUTE PAIN OF BOTH SHOULDERS: ICD-10-CM

## 2021-11-08 DIAGNOSIS — M75.81 TENDONITIS OF BOTH ROTATOR CUFFS: ICD-10-CM

## 2021-11-08 DIAGNOSIS — M75.82 TENDONITIS OF BOTH ROTATOR CUFFS: ICD-10-CM

## 2021-11-08 DIAGNOSIS — M19.012 ARTHRITIS OF BOTH ACROMIOCLAVICULAR JOINTS: Primary | ICD-10-CM

## 2021-11-08 DIAGNOSIS — M19.011 ARTHRITIS OF BOTH ACROMIOCLAVICULAR JOINTS: Primary | ICD-10-CM

## 2021-11-08 DIAGNOSIS — M25.511 ACUTE PAIN OF BOTH SHOULDERS: ICD-10-CM

## 2021-11-08 PROCEDURE — 1036F TOBACCO NON-USER: CPT | Performed by: ORTHOPAEDIC SURGERY

## 2021-11-08 PROCEDURE — G8417 CALC BMI ABV UP PARAM F/U: HCPCS | Performed by: ORTHOPAEDIC SURGERY

## 2021-11-08 PROCEDURE — G8484 FLU IMMUNIZE NO ADMIN: HCPCS | Performed by: ORTHOPAEDIC SURGERY

## 2021-11-08 PROCEDURE — G8427 DOCREV CUR MEDS BY ELIG CLIN: HCPCS | Performed by: ORTHOPAEDIC SURGERY

## 2021-11-08 PROCEDURE — 99214 OFFICE O/P EST MOD 30 MIN: CPT | Performed by: ORTHOPAEDIC SURGERY

## 2021-11-08 RX ORDER — MELOXICAM 15 MG/1
15 TABLET ORAL DAILY PRN
Qty: 30 TABLET | Refills: 0 | Status: SHIPPED | OUTPATIENT
Start: 2021-11-08 | End: 2021-12-13

## 2021-11-08 NOTE — PROGRESS NOTES
ORTHOPAEDIC SURGERY H&P / CONSULTATION NOTE    Chief complaint:   Chief Complaint   Patient presents with    Shoulder Pain     bilateral shoulder w/ L>R        History of present illness: The patient is a 31 y.o. female right hand dominant with subjective symptoms of bilateral shoulder pain. The chief complaint is located at left greater than right shoulder superior aspect. Duration of symptoms has been for over 1 month. The severity of symptoms is rated at 5/10 pain on intake form. Patient states that she is doing quite well from her carpal tunnel release by Dr Kathie Rodríguez and is very pleased with the improvement in her symptoms. She states with regard to previous known right shoulder rotator cuff injury that she had rehabilitated several years ago and had done well with. She states pain that goes from the base of her neck over to her scapula. She denies pain going down into her hands. She states that ultimately she did gone to the emergency room on 11/2/2021 given the shoulder pain where she was given a Toradol shot per her report. She sees pain management/spine Dr Maryanne Wagner and they are currently looking to get an MRI of her C-spine she states. She denies bowel bladder or balance changes. She denies recent injury. She states dull throbbing achy pain, occasional sharp pain. She was referred for evaluation and treatment with regard to both her shoulders. The patient has tried the below listed items prior to today's consultation for above listed chief complaint.     -   Over-the-counter anti-inflammatories/prescription medication anti-inflammatory.      -   Physical therapy / guided home exercise program -     -   Previous corticosteroid injections    Past medical history:    Past Medical History:   Diagnosis Date    Asthma     as child    Back pain     Bipolar 1 disorder ([facility] [address]     Degenerative disc disease, lumbar     Depression     Diabetes mellitus (HCC)     Fibromyalgia     Liver disease     fatty liver    Obesity     OCD (obsessive compulsive disorder)     PCOS (polycystic ovarian syndrome)     PCOS (polycystic ovarian syndrome)     Sleep apnea     cpap        Past surgical history:    Past Surgical History:   Procedure Laterality Date    ANKLE SURGERY Left     Scar tissue removal    CARPAL TUNNEL RELEASE Right 08/23/2021    CARPAL TUNNEL RELEASE Right 8/23/2021    RIGHT CARPAL TUNNEL RELEASE performed by Sujata Stallings MD at [address] [facility] [address] Left 9/10/2021    LEFT CARPAL TUNNEL RELEASE performed by Sujata Stallings MD at [facility] N/A 4/20/2020    LAPAROSCOPIC CHOLECYSTECTOMY performed by WILLIE SIMMONS [facility],  at [address] ([facility]) Left 12/10/14    Excision of Cyst Left Upper Posterior Ankle    OTHER SURGICAL HISTORY Right 12/29/2016    Laparotomy with Right SO    OVARY REMOVAL  12/28/2016    unsure of side    SLEEVE GASTRECTOMY N/A 11/6/2019    LAPAROSCOPIC SLEEVE GASTRECTOMY - ETHICON performed by Ragini Llanes MD at [address] [facility] Left 7/2/2021    LEFT QUADRICEP PERCUTANEOUS TENOTOMY , TX2 MICROTIP WITH ULTRASOUND performed by Rudy Landin MD at [address] [place] [address] N/A 8/23/2019    EGD BIOPSY performed by Ragini Llanes MD at [address] [facility] [address] 3/10/2021    EGD BIOPSY performed by Raul Rubin MD at [address] [facility] [address] EXTRACTION          Allergies:     Allergies   Allergen Reactions    Latex Itching    Lactose      \"Extreme\" stomach pain and diarrhea    Adhesive Tape      Rips off skin    Keflex [Cephalexin] Nausea And Vomiting     Projectile vomitting         Medications:   Current Outpatient Medications:     meloxicam (MOBIC) 15 MG tablet, Take 1 tablet by mouth daily as needed for Pain, Disp: 30 tablet, Rfl: 0    naproxen (NAPROSYN) 500 MG tablet, Take 1 tablet by mouth Types: Marijuana [place]     Comment: medical marijuana    Sexual activity: Yes     Partners: Male   Other Topics Concern    Not on file   Social History Narrative    Not on file     Social Determinants of Health     Financial Resource Strain:     Difficulty of Paying Living Expenses: Not on file   Food Insecurity:     Worried About Running Out of Food in the Last Year: Not on file    [place] of Food in the Last Year: Not on file   Transportation Needs:     Lack of Transportation (Medical): Not on file    Lack of Transportation (Non-Medical): Not on file   Physical Activity:     Days of Exercise per Week: Not on file    Minutes of Exercise per Session: Not on file   Stress:     Feeling of Stress : Not on file   Social Connections:     Frequency of Communication with Friends and Family: Not on file    Frequency of Social Gatherings with Friends and Family: Not on file    Attends Restoration Services: Not on file    Active Member of [address] [place] [address] or Organizations: Not on file    Attends Club or Organization Meetings: Not on file    Marital Status: Not on file   Intimate Partner Violence:     Fear of Current or Ex-Partner: Not on file    Emotionally Abused: Not on file    Physically Abused: Not on file    Sexually Abused: Not on file   Housing Stability:     Unable to Pay for Housing in the Last Year: Not on file    Number of [place] in the Last Year: Not on file    Unstable Housing in the Last Year: Not on file     Tobacco use. Social History     Tobacco Use   Smoking Status Never Smoker   Smokeless Tobacco Never Used     Employment: Noncontributory    Workers compensation claim: Noncontributory    Review of systems: Patient denies any fevers chills chest pain shortness of breath nausea vomiting significant weight loss any change in voiding or bowel movements. Patient denies any significant numbness or tingling at baseline as it relates to this presenting symptom/chief complaint.  The patient denies any significant problems with skin or any significant allergies. Physical examination:  Body mass index is 40.72 kg/m². AAOx3, NCAT: Negative Spurling bilateral upper extremities  EOMI  MMM  RR  Unlabored breathing, no wheezing  Skin intact BUE and BLE, warm and moist  Bilateral upper extremity examination specific to subjective symptoms    Exam Right Shoulder                                                Active Range of Motion (FF/Abd/ER/IR)         170/170/50/T12  Passive Range of Motion (FF/Abd/ER/IR)      same  Negative   Neer,    trace Garcia,      5/5   empty Can,          5/5 ER arm at the side,       5/5   belly Press,      5/5 bear Hug,     positiveO'Briens,         none   TTP at Biceps Tendon Sheath,     negative Speed, negative Yergeson, positive   TTP AC Joint, positive cross arm adduction,                 Exam Left Shoulder  Active Range of Motion (FF/Abd/ER/IR)         170/170/50/T12  Passive Range of Motion (FF/Abd/ER/IR)      same  Negative   Neer,    trace Garcia,      5/5   empty Can,          5/5 ER arm at the side,       5/5   belly Press,      5/5 bear Hug,     positiveO'Briens,         none   TTP at Biceps Tendon Sheath,     negative Speed, negative Yergeson, positive   TTP AC Joint, positive cross arm adduction,        Bilateral upper extremities:  Skin intact throughout  5/5 D B T G IO EPL  SILT Ax, R, U, M  +2 radial pulse    Diagnostic imaging:  MY READ:  3 view 11/2/2021 left shoulder and right shoulder, 2 view 11/8/2021 right and left shoulder: Type II acromion bilateral.  Positive acromioclavicular joint space narrowing/arthrosis bilateral.  Negative fracture. No gross glenohumeral arthrosis. Pertinent lab work: None     Diagnosis Orders   1. Arthritis of both acromioclavicular joints  OSR PT - [facility]   2. Tendonitis of both rotator cuffs  [facility] PT - [facility]   3.  Acute pain of both shoulders  XR SHOULDER RIGHT (MIN 2 VIEWS)    XR PRANEETH Lynn MD       Orthopaedic Surgery-Sports Medicine        Disclaimer: This note was dictated with voice recognition software. Though review and correction are routinely performed, please contact the office/medical records for any errors requiring correction.

## 2021-11-16 NOTE — PLAN OF CARE
[address] [facility], [address] [place] [address] [place], [address] [facility] [address]  Phone: [phone]   Fax:     [phone]                                                       Physical Therapy Certification    Dear   Dr Juani Marino     We had the pleasure of evaluating the following patient for physical therapy services at [address] [place] [address]. A summary of our findings can be found in the initial assessment below. This includes our plan of care. If you have any questions or concerns regarding these findings, please do not hesitate to contact me at the office phone number checked above. Thank you for the referral.       Physician Signature:_______________________________Date:__________________  By signing above (or electronic signature), therapists plan is approved by physician      Patient: Daly Kenney   : 1985   MRN: [id number]  Referring Physician:   Dr Juani Marino      Evaluation Date: 2021      Medical Diagnosis Information:      M75.81, M75.82 (ICD-10-CM) - Tendonitis of both rotator cuffs   M19.011, M19.012 (ICD-10-CM) - Arthritis of both acromioclavicular joints                                                  Insurance information:   [employer]    Precautions/ Contra-indications: none      C-SSRS Triggered by Intake questionnaire (Past 2 wk assessment):   [x] No, Questionnaire did not trigger screening. [x] Yes, Patient intake triggered further evaluation      [x] C-SSRS Screening completed  Patient is seeing family Dr for meds F/U  [] PCP notified via Plan of Care  [] Emergency services notified     Latex Allergy:  [x]NO      []YES  Preferred Language for Healthcare:   [x]English       []other:    SUBJECTIVE: Patient states insidious onset 1 month ago, left worse than right.      Relevant Medical History:DM, anxiety depression  Functional Disability Index:  Benay Felty 52%  Pain Scale: 8/10  Easing factors: heat  Provocative factors: sleep, reach computer     Type: []Constant   [x]Intermittent  []Radiating []Localized []other:     Numbness/Tingling: none    Occupation/School: Not working    Living Status/Prior Level of Function: Independent with ADLs and IADLs,     OBJECTIVE:     CERV ROM     Cervical Flexion WFL all motions     Cervical Extension     Cervical SB     Cervical rotation          ROM Left Right   Shoulder Flex WFL all motions    Shoulder Abd     Shoulder ER     Shoulder IR                    Strength  Left Right   Shoulder Flex 5/5 all motions    Shoulder Scap pain pain   Shoulder ER     Shoulder IR                 Reflexes/Sensation:    [x]Dermatomes/Myotomes intact    [x]Reflexes equal and normal bilaterally   []Other:    Joint mobility:    [x]Normal    []Hypo   []Hyper    Palpation: Tenderness anterior B shoulders    Functional Mobility/Transfers: WFL    Posture: WFL    Bandages/Dressings/Incisions: intact    Gait: (include devices/WB status): WNL    Orthopedic Special Tests: Garcia + B                       [x] Patient history, allergies, meds reviewed. Medical chart reviewed. See intake form. Review Of Systems (ROS):  [x]Performed Review of systems (Integumentary, CardioPulmonary, Neurological) by intake and observation. Intake form has been scanned into medical record. Patient has been instructed to contact their primary care physician regarding ROS issues if not already being addressed at this time.       Co-morbidities/Complexities (which will affect course of rehabilitation):   []None           Arthritic conditions   []Rheumatoid arthritis (M05.9)  []Osteoarthritis (M19.91)   Cardiovascular conditions   []Hypertension (I10)  []Hyperlipidemia (E78.5)  []Angina pectoris (I20)  []Atherosclerosis (I70)   Musculoskeletal conditions   []Disc pathology   []Congenital spine pathologies   []Prior surgical intervention  []Osteoporosis (M81.8)  []Osteopenia (M85.8) Endocrine conditions   []Hypothyroid (E03.9)  []Hyperthyroid Gastrointestinal conditions   []Constipation (O18.14)   Metabolic conditions   []Morbid obesity (E66.01)  [x]Diabetes type 1(E10.65) or 2 (E11.65)   []Neuropathy (G60.9)     Pulmonary conditions   []Asthma (J45)  []Coughing   []COPD (J44.9)   Psychological Disorders  [x]Anxiety (F41.9)  [x]Depression (F32.9)   []Other:   []Other:          Barriers to/and or personal factors that will affect rehab potential:              []Age  []Sex              []Motivation/Lack of Motivation                        [x]Co-Morbidities              []Cognitive Function, education/learning barriers              []Environmental, home barriers              []profession/work barriers  []past PT/medical experience  []other:  Justification:      Falls Risk Assessment (30 days):   [x] Falls Risk assessed and no intervention required.   [] Falls Risk assessed and Patient requires intervention due to being higher risk   TUG score (>12s at risk):     [] Falls education provided, including       G-Codes:       ASSESSMENT:  Functional Impairments   []Noted spinal or UE joint hypomobility   []Noted spinal or UE joint hypermobility   [x]Decreased UE functional ROM   [x]Decreased UE functional strength   []Abnormal reflexes/sensation/myotomal/dermatomal deficits   [x]Decreased RC/scapular/core strength and neuromuscular control   []other:      Functional Activity Limitations (from functional questionnaire and intake)   [x]Reduced ability to tolerate prolonged functional positions   [x]Reduced ability or difficulty with changes of positions or transfers between positions   [x]Reduced ability to maintain good posture and demonstrate good body mechanics with sitting, bending, and lifting   [x] Reduced ability or tolerance with driving and/or computer work   [x]Reduced ability to sleep   [x]Reduced ability to perform lifting, reaching, carrying tasks   [x]Reduced ability to tolerate impact through UE   [x]Reduced ability to reach behind back   [x]Reduced ability to  or hold objects   [x]Reduced ability to throw or toss an object   []other:    Participation Restrictions   [x]Reduced participation in self care activities   [x]Reduced participation in home management activities   [x]Reduced participation in work activities   []Reduced participation in social activities. []Reduced participation in sport/recreation activities. Classification:   []Signs/symptoms consistent with post-surgical status including decreased ROM, strength and function.   [x]Signs/symptoms consistent with joint sprain/strain   [x]Signs/symptoms consistent with shoulder impingement   [x]Signs/symptoms consistent with shoulder/elbow/wrist tendinopathy   []Signs/symptoms consistent with Rotator cuff tear   []Signs/symptoms consistent with labral tear   []Signs/symptoms consistent with postural dysfunction    []Signs/symptoms consistent with Glenohumeral IR Deficit - <45 degrees   []Signs/symptoms consistent with facet dysfunction of cervical/thoracic spine    []Signs/symptoms consistent with pathology which may benefit from Dry needling     []other:     Prognosis/Rehab Potential:      []Excellent   [x]Good    []Fair   []Poor    Tolerance of evaluation/treatment:    []Excellent   [x]Good    []Fair   []Poor    Physical Therapy Evaluation Complexity Justification  [x] A history of present problem with:  [] no personal factors and/or comorbidities that impact the plan of care;  [x]1-2 personal factors and/or comorbidities that impact the plan of care  []3 personal factors and/or comorbidities that impact the plan of care  [x] An examination of body systems using standardized tests and measures addressing any of the following: body structures and functions (impairments), activity limitations, and/or participation restrictions;:  [x] a total of 1-2 or more elements   [] a total of 3 or more elements   [] a total of 4 or more will demonstrate increased AROM to WNL  to allow for proper joint functioning as indicated by patients Functional Deficits. [x] Progressing: [] Met: [] Not Met: [] Adjusted     3. Patient will demonstrate an increase in Strength to 5/5 to allow for proper functional mobility as indicated by patients Functional Deficits. [x] Progressing: [] Met: [] Not Met: [] Adjusted     4. Patient will return to [facility] for  functional activities without increased symptoms or restriction. [x] Progressing: [] Met: [] Not Met: [] Adjusted     5.  Reach/ ADLs with min to no limitations (patient specific functional goal)    [x] Progressing: [] Met: [] Not Met: [] Adjusted      Electronically signed by:  Josefa Sexton PT

## 2021-11-17 ENCOUNTER — HOSPITAL ENCOUNTER (OUTPATIENT)
Dept: PHYSICAL THERAPY | Age: 36
Setting detail: THERAPIES SERIES
Discharge: HOME | End: 2021-11-17
Payer: MEDICAID

## 2021-11-17 PROCEDURE — 97161 PT EVAL LOW COMPLEX 20 MIN: CPT | Performed by: SPECIALIST

## 2021-11-17 PROCEDURE — 97112 NEUROMUSCULAR REEDUCATION: CPT | Performed by: SPECIALIST

## 2021-11-17 PROCEDURE — 97016 VASOPNEUMATIC DEVICE THERAPY: CPT | Performed by: SPECIALIST

## 2021-11-23 ENCOUNTER — HOSPITAL ENCOUNTER (OUTPATIENT)
Dept: PHYSICAL THERAPY | Age: 36
Setting detail: THERAPIES SERIES
Discharge: HOME | End: 2021-11-23
Payer: MEDICAID

## 2021-11-23 PROCEDURE — 97112 NEUROMUSCULAR REEDUCATION: CPT | Performed by: SPECIALIST

## 2021-11-23 PROCEDURE — 97016 VASOPNEUMATIC DEVICE THERAPY: CPT | Performed by: SPECIALIST

## 2021-11-23 PROCEDURE — 97110 THERAPEUTIC EXERCISES: CPT | Performed by: SPECIALIST

## 2021-11-23 NOTE — FLOWSHEET NOTE
[address] [facility], [address] [place] [address], [address] [place] [address] [address]  Phone: [phone]   Fax:     [phone]      Physical Therapy Treatment Note/ Progress Report:     Date:  2021    Patient Name:  Madelyn Bran    :  1985  MRN: [id number]  Restrictions/Precautions:    Medical/Treatment Diagnosis Information:      M75.81, M75.82 (ICD-10-CM) - Tendonitis of both rotator cuffs   M19.011, M19.012 (ICD-10-CM) - Arthritis of both acromioclavicular joints     ·      Insurance/Certification information:    [employer] [place] MEDICA[employer]  Physician Information:    Dr Selma Roberts  Has the plan of care been signed (Y/N):        []  Yes  [x]  No     Date of Patient follow up with Physician: NS    Is this a Progress Report:     []  Yes  [x]  No     If Yes:  Date Range for reporting period:  Beginnin21 ------------ Endin21    Progress report will be due (10 Rx or 30 days whichever is less):      Recertification will be due (POC Duration  / 90 days whichever is less): 21      Visit # Insurance Allowable Auth Required   In Person 2  []  Yes     []  No    Tele Health 0  []  Yes     []  No    Total 2       Functional Scale: Quick Dash 52%    Date assessed:  21      Latex Allergy:  [x]NO      []YES  Preferred Language for Healthcare:   [x]English       []other:    Pain level:  4/10     SUBJECTIVE:  Reports she has been sick with throat infection since last visit    OBJECTIVE: See eval   Observation:    Test measurements:      RESTRICTIONS/PRECAUTIONS: none    Exercises/Interventions:   Therapeutic Ex (22547) Sets/sec Reps Notes/CUES HEP   Doorway stretch 30 2x  x                 UBE  3 min     pulleys  3 min                                                      Manual Intervention (61834)                                                 NMR re-education (81672)   CUES NEEDED    PB  rows OR 15x  x   PB IR ea OR 15x  x   PB B ext/ ER / flex  OR 15x  x          Wall push  15x  x   Ball on wall  15x  x   SL abd/ ER B 2# 15x                   Therapeutic Activity (58165)                     Vaso L  10 min                   [employer] access code: [email or web address]           Therapeutic Exercise and NMR EXR  [x] (52650) Provided verbal/tactile cueing for activities related to strengthening, flexibility, endurance, ROM  for improvements in scapular, scapulothoracic and UE control with self care, reaching, carrying, lifting, house/yardwork, driving/computer work.    [] (63514) Provided verbal/tactile cueing for activities related to improving balance, coordination, kinesthetic sense, posture, motor skill, proprioception  to assist with  scapular, scapulothoracic and UE control with self care, reaching, carrying, lifting, house/yardwork, driving/computer work. Therapeutic Activities:    [x] (15110 or 75111) Provided verbal/tactile cueing for activities related to improving balance, coordination, kinesthetic sense, posture, motor skill, proprioception and motor activation to allow for proper function of scapular, scapulothoracic and UE control with self care, carrying, lifting, driving/computer work.      Home Exercise Program:    [x] (39659) Reviewed/Progressed HEP activities related to strengthening, flexibility, endurance, ROM of scapular, scapulothoracic and UE control with self care, reaching, carrying, lifting, house/yardwork, driving/computer work  [] (54723) Reviewed/Progressed HEP activities related to improving balance, coordination, kinesthetic sense, posture, motor skill, proprioception of scapular, scapulothoracic and UE control with self care, reaching, carrying, lifting, house/yardwork, driving/computer work      Manual Treatments:  PROM / STM / Oscillations-Mobs:  G-I, II, III, IV (PA's, Inf., Post.)  [x] (40615) Provided manual therapy to mobilize soft tissue/joints of cervical/CT, scapular GHJ and UE for the purpose of modulating pain, promoting relaxation,  increasing ROM, reducing/eliminating soft tissue swelling/inflammation/restriction, improving soft tissue extensibility and allowing for proper ROM for normal function with self care, reaching, carrying, lifting, house/yardwork, driving/computer work    Modalities:     [x] GAME READY (VASO)- for significant edema, swelling, pain control. Charges:  Timed Code Treatment Minutes: 25   Total Treatment Minutes:  55   BWC:  TE TIME:  NMR TIME:  MANUAL TIME:  UNTIMED MINUTES:  Medicare Total:       [] EVAL (LOW) 68230 (typically 20 minutes face-to-face)  [] EVAL (MOD) 54014 (typically 30 minutes face-to-face)  [] EVAL (HIGH) 24967 (typically 45 minutes face-to-face)  [] RE-EVAL     [x] NL(63116) x 1    [] IONTO  [x] NMR (68615) x  2   [x] VASO  [] Manual (57767) x     [] Other:  [] TA x      [] Mech Traction (43913)  [] ES(attended) (52792)      [] ES (un) (12606):    ASSESSMENT:  Good tolerance with Mary, soreness with abd      GOALS:      Patient stated goal: reach ADLs    Therapist goals for Patient:   Short Term Goals: To be achieved in: 2 weeks  1. Independent in HEP and progression per patient tolerance, in order to prevent re-injury. [x] Progressing: [] Met: [] Not Met: [] Adjusted     2. Patient will have a decrease in pain to facilitate improvement in movement, function, and ADLs as indicated by Functional Deficits. [x] Progressing: [] Met: [] Not Met: [] Adjusted     Long Term Goals: To be achieved in: 6-8 weeks  1. Disability index score of 25% or less for the DASH to assist with reaching prior level of function. [x] Progressing: [] Met: [] Not Met: [] Adjusted     2. Patient will demonstrate increased AROM to WNL  to allow for proper joint functioning as indicated by patients Functional Deficits. [x] Progressing: [] Met: [] Not Met: [] Adjusted     3.  Patient will demonstrate an increase in Strength to 5/5 to allow for proper functional mobility as indicated by patients Functional Deficits. [x] Progressing: [] Met: [] Not Met: [] Adjusted     4. Patient will return to [facility] for  functional activities without increased symptoms or restriction. [x] Progressing: [] Met: [] Not Met: [] Adjusted     5. Reach/ ADLs with min to no limitations (patient specific functional goal)    [x] Progressing: [] Met: [] Not Met: [] Adjusted            Overall Progression Towards Functional goals/ Treatment Progress Update:  [x] Patient is progressing as expected towards functional goals listed. [] Progression is slowed due to complexities/Impairments listed. [] Progression has been slowed due to co-morbidities. [] Plan just implemented, too soon to assess goals progression <30days   [] Goals require adjustment due to lack of progress  [] Patient is not progressing as expected and requires additional follow up with physician  [] Other    Prognosis for POC: [x] Good [] Fair  [] Poor      Patient requires continued skilled intervention: [x] Yes  [] No    Treatment/Activity Tolerance:  [x] Patient able to complete treatment  [] Patient limited by fatigue  [] Patient limited by pain    [] Patient limited by other medical complications  [] Other:       PLAN:   [x] Continue per plan of care [] Alter current plan (see comments above)  [] Plan of care initiated [] Hold pending MD visit [] Discharge    Electronically signed by:  Partha Henriquez PT    Note: If patient does not return for scheduled/ recommended follow up visits, this note will serve as a discharge from care along with most recent update on progress.

## 2021-11-30 ENCOUNTER — HOSPITAL ENCOUNTER (EMERGENCY)
Age: 36
Discharge: HOME | End: 2021-11-30
Payer: MEDICAID

## 2021-11-30 VITALS
SYSTOLIC BLOOD PRESSURE: 131 MMHG | HEART RATE: 84 BPM | OXYGEN SATURATION: 99 % | BODY MASS INDEX: 40.81 KG/M2 | RESPIRATION RATE: 14 BRPM | WEIGHT: 260 LBS | HEIGHT: 67 IN | TEMPERATURE: 98.4 F | DIASTOLIC BLOOD PRESSURE: 88 MMHG

## 2021-11-30 DIAGNOSIS — S16.1XXD CERVICAL STRAIN, ACUTE, SUBSEQUENT ENCOUNTER: ICD-10-CM

## 2021-11-30 DIAGNOSIS — M54.12 CERVICAL RADICULOPATHY: Primary | ICD-10-CM

## 2021-11-30 PROCEDURE — 99283 EMERGENCY DEPT VISIT LOW MDM: CPT

## 2021-11-30 PROCEDURE — 6370000000 HC RX 637 (ALT 250 FOR IP): Performed by: NURSE PRACTITIONER

## 2021-11-30 RX ORDER — LIDOCAINE 4 G/G
1 PATCH TOPICAL DAILY
Qty: 15 PATCH | Refills: 0 | Status: SHIPPED | OUTPATIENT
Start: 2021-11-30 | End: 2021-12-22

## 2021-11-30 RX ORDER — NAPROXEN 500 MG/1
500 TABLET ORAL ONCE
Status: COMPLETED | OUTPATIENT
Start: 2021-11-30 | End: 2021-11-30

## 2021-11-30 RX ORDER — LIDOCAINE 4 G/G
1 PATCH TOPICAL ONCE
Status: DISCONTINUED | OUTPATIENT
Start: 2021-11-30 | End: 2021-12-01 | Stop reason: HOSPADM

## 2021-11-30 RX ORDER — METHOCARBAMOL 500 MG/1
500 TABLET, FILM COATED ORAL 3 TIMES DAILY
Qty: 15 TABLET | Refills: 0 | Status: SHIPPED | OUTPATIENT
Start: 2021-11-30 | End: 2021-12-05

## 2021-11-30 RX ADMIN — NAPROXEN 500 MG: 500 TABLET ORAL at 23:01

## 2021-11-30 ASSESSMENT — PAIN DESCRIPTION - LOCATION: LOCATION: HEAD

## 2021-11-30 ASSESSMENT — PAIN SCALES - GENERAL
PAINLEVEL_OUTOF10: 10
PAINLEVEL_OUTOF10: 10

## 2021-11-30 ASSESSMENT — PAIN DESCRIPTION - ONSET: ONSET: GRADUAL

## 2021-11-30 ASSESSMENT — PAIN DESCRIPTION - DESCRIPTORS: DESCRIPTORS: BURNING

## 2021-11-30 ASSESSMENT — PAIN DESCRIPTION - FREQUENCY: FREQUENCY: CONTINUOUS

## 2021-11-30 ASSESSMENT — PAIN DESCRIPTION - PAIN TYPE: TYPE: ACUTE PAIN

## 2021-12-01 ENCOUNTER — HOSPITAL ENCOUNTER (OUTPATIENT)
Dept: PHYSICAL THERAPY | Age: 36
Setting detail: THERAPIES SERIES
Discharge: HOME | End: 2021-12-01
Payer: MEDICAID

## 2021-12-01 NOTE — FLOWSHEET NOTE
[facility], [address] [place] [address], [address] [place] [address] [address]  Phone: [phone]   Fax:     [phone]    Physical Therapy  Cancellation/No-show Note  Patient Name:  Larissa Geiger  :  1985   Date:  2021    Cancelled visits to date: 1  No-shows to date: 0    For today's appointment patient:  [x]  Cancelled  []  Rescheduled appointment  []  No-show     Reason given by patient:  [x]  Patient ill  []  Conflicting appointment  []  No transportation    []  Conflict with work  []  No reason given  []  Other:     Comments:      Phone call information:   []  Phone call made today to patient at am/pm at the number provided:      []  Patient answered, conversation as follows:    []  Patient did not answer, message left as follows:  [x]  Phone call not needed - pt contacted us to cancel and provided reason for cancellation.      Electronically signed by:  Fritz Jaquez PT, PT

## 2021-12-01 NOTE — ED PROVIDER NOTES
Discharge Medication List as of 11/30/2021 10:57 PM      CONTINUE these medications which have NOT CHANGED    Details   meloxicam (MOBIC) 15 MG tablet Take 1 tablet by mouth daily as needed for Pain, Disp-30 tablet, R-0Normal      naproxen (NAPROSYN) 500 MG tablet Take 1 tablet by mouth 2 times daily (with meals), Disp-60 tablet, R-3Normal      omeprazole (PRILOSEC) 40 MG delayed release capsule TAKE 1 CAPSULE BY MOUTH DAILY, Disp-30 capsule, R-0Normal      DULoxetine (CYMBALTA) 60 MG extended release capsule TAKE 1 CAPSULE BY ORAL ROUTE  EVERY DAYHistorical Med      VICTOZA 18 MG/3ML SOPN SC injection INJECT 0.6 MG BY SUBCUTANEOUS ROUTE DAILY FOR 14 DAYS, THEN 1.2 MG DAILY FOR 28 DAYS, THEN 1.8 MG DAILY. , DAWHistorical Med      nystatin (MYCOSTATIN) 639690 UNIT/GM powder APPLY BY TOPICAL ROUTE  EVERY DAY TO THE AFFECTED AREA(S), Historical Med      lamoTRIgine (LAMICTAL) 25 MG tablet Take 25 mg by mouth dailyHistorical Med      metFORMIN (GLUCOPHAGE) 500 MG tablet Take 500 mg by mouth daily (with breakfast)Historical Med      mirtazapine (REMERON) 15 MG tablet Take 15 mg by mouth nightlyHistorical Med      medical marijuana Take by mouth as needed. Historical Med      busPIRone (BUSPAR) 5 MG tablet Take 5 mg by mouth 3 times dailyHistorical Med      loratadine (CLARITIN) 10 MG tablet Take 10 mg by mouth dailyHistorical Med      Multiple Vitamins-Minerals (BARIATRIC FUSION) CHEW Take 4 tablets by mouth dailyHistorical Med      levothyroxine (SYNTHROID) 75 MCG tablet TAKE 1 TABLET BY MOUTH EVERY DAY, R-5Historical Med      Cholecalciferol (VITAMIN D PO) Take by mouth daily Historical Med      ziprasidone (GEODON) 20 MG capsule Take 20 mg by mouth 2 times daily (with meals)Historical Med               ALLERGIES     Latex, Lactose, Adhesive tape, and Keflex [cephalexin]    FAMILYHISTORY       Family History   Problem Relation Age of Onset    Asthma Mother    [facility] Arthritis Mother     Lung Cancer Mother    [facility] Hypertension Father     Elevated Lipids Father     Diabetes Father     Alcohol Abuse Father     Asthma Father     Arthritis Father     Diabetes Paternal Grandfather     Arthritis Paternal Grandfather     Diabetes Maternal Grandmother     Arthritis Maternal Grandmother           SOCIAL HISTORY       Social History     Tobacco Use    Smoking status: Never Smoker    Smokeless tobacco: Never Used   Vaping Use    Vaping Use: Never used   Substance Use Topics    Alcohol use: Yes     Comment: 1 drink per week    Drug use: Yes     Frequency: 2.0 times per week     Types: Marijuana (Weed)     Comment: medical marijuana       SCREENINGS    [place] Coma Scale  Eye Opening: Spontaneous  Best Verbal Response: Oriented  Best Motor Response: Obeys commands  Lamin Coma Scale Score: 15        PHYSICAL EXAM    (up to 7 for level 4, 8 or more for level 5)     ED Triage Vitals [11/30/21 2204]   Enc Vitals Group      /88      Pulse 84      Resp 14      Temp 98.4 °F (36.9 °C)      Temp Source Oral      SpO2 99 %      Weight 260 lb (117.9 kg)      Height 5' 7\" (1.702 m)         Physical Exam  Vitals and nursing note reviewed. Constitutional:       General: She is awake. Appearance: Normal appearance. She is well-developed. She is morbidly obese. HENT:      Head: Normocephalic and atraumatic. Right Ear: Hearing, tympanic membrane, ear canal and external ear normal.      Left Ear: Hearing, tympanic membrane, ear canal and external ear normal.      Nose: Nose normal. No mucosal edema, congestion or rhinorrhea. Right Sinus: No maxillary sinus tenderness or frontal sinus tenderness. Left Sinus: No maxillary sinus tenderness or frontal sinus tenderness. Mouth/Throat:      Mouth: Mucous membranes are moist.      Pharynx: Oropharynx is clear. Eyes:      General:         Right eye: No discharge. Left eye: No discharge. Extraocular Movements: Extraocular movements intact. Conjunctiva/sclera: Conjunctivae normal.      Pupils: Pupils are equal, round, and reactive to light. Neck:      Vascular: No JVD. Trachea: Trachea and phonation normal.   Cardiovascular:      Rate and Rhythm: Normal rate and regular rhythm. Pulses:           Radial pulses are 2+ on the right side and 2+ on the left side. Heart sounds: Normal heart sounds. Pulmonary:      Effort: Pulmonary effort is normal. No respiratory distress. Breath sounds: Normal breath sounds. Abdominal:      General: Bowel sounds are normal.      Palpations: Abdomen is soft. Tenderness: There is no abdominal tenderness. Musculoskeletal:         General: Normal range of motion. Cervical back: Normal range of motion. Muscular tenderness (left lateral paraspinous) present. No spinous process tenderness. Skin:     General: Skin is warm and dry. Coloration: Skin is not pale. Neurological:      Mental Status: She is alert and oriented to person, place, and time. Psychiatric:         Mood and Affect: Mood is depressed. Affect is flat. Behavior: Behavior normal. Behavior is cooperative. DIAGNOSTIC RESULTS   LABS:    Labs Reviewed - No data to display    When ordered only abnormal lab results are displayed. All other labs were within normal range or not returned as of this dictation. EKG: When ordered, EKG's are interpreted by the Emergency Department Physician in the absence of a cardiologist.  Please see their note for interpretation of EKG. RADIOLOGY:   Non-plain film images such as CT, Ultrasound and MRI are read by the radiologist. Plain radiographic images are visualized and preliminarily interpreted by the ED Provider with the below findings:        Interpretation per the Radiologist below, if available at the time of this note:    No orders to display     No results found.         PROCEDURES   Unless otherwise noted below, none     Procedures    CRITICAL CARE TIME N/A    CONSULTS:  None      EMERGENCY DEPARTMENT COURSE and DIFFERENTIAL DIAGNOSIS/MDM:   Vitals:    Vitals:    11/30/21 2204   BP: 131/88   Pulse: 84   Resp: 14   Temp: 98.4 °F (36.9 °C)   TempSrc: Oral   SpO2: 99%   Weight: 260 lb (117.9 kg)   Height: 5' 7\" (1.702 m)       Patient was given the following medications:  Medications   lidocaine 4 % external patch 1 patch (1 patch TransDERmal Patch Applied 11/30/21 2301)   naproxen (NAPROSYN) tablet 500 mg (500 mg Oral Given 11/30/21 2301)           Care of this patient took place during the COVID-19 pandemic emergency. ED COURSE & MEDICAL DECISION MAKING    - The patient presented to the ER with complaints of  neck pain, shoulder pain. Vital signs were reviewed. Exam well-developed, well-nourished female who appears uncomfortable. Peripheral IV placed. Labs, Imaging ordered. - Pertinent Labs & Imaging studies reviewed. (See chart for details)   -  Patient seen and evaluated in the emergency department. -  Triage and nursing notes reviewed and incorporated. -  Old chart records reviewed and incorporated. -  SUKHDEV. I have evaluated this patient. My supervising physician was available for consultation.  -  Differential diagnosis includes:  Cervical radiculopathy, aneurysm, dissection, ICH, SAH, SDH, steal syndrome, CHF, meningitis, versus COVID-19  -  Work-up included:  See above  -  ED treatment included:   Naproxen, lidocaine patch  -  Results discussed with patient. David Aldrich is a 81-year-old female with complaints of continued neck pain into her bilateral shoulders present for the past many months. Patient does note she has had numerous evaluations with an extensive work-up for the symptoms. She was most recently seen yesterday by her PCP and diagnosed with pharyngitis and given an antibiotic. Patient is unsure of the name of the antibiotic, although states she has been compliant.   Patient does note numerous emergency department, PCP, and orthopedist visits for the symptoms. She continued to have pain and therefore presented to the emergency department and thought there would be a different diagnosis or different pain medicine prescribed. She denies any recent trauma, accidents, injuries, or falls. On exam she does have cervical left paraspinous tenderness that is reproducible. Neurovascular status intact. DP pulse 2+. She was given naproxen and lidocaine patch in the emergency department and encouraged prompt follow-up with PCP as needed for additional outpatient testing and treatment. She was given strict return discharge instructions. Shared decision making is complete and patient is stable for discharge at this time. FINAL IMPRESSION      1. Cervical radiculopathy    2.  Cervical strain, acute, subsequent encounter          DISPOSITION/PLAN   DISPOSITION        PATIENT REFERRED TO:  ROSE Shook CNP  [address] [place] [address] [facility]  [phone]    Call in 2 days  As needed, If symptoms worsen    [facility]  [address]  [phone]  Go to   As needed      DISCHARGE MEDICATIONS:  Discharge Medication List as of 11/30/2021 10:57 PM      START taking these medications    Details   lidocaine 4 % external patch Place 1 patch onto the skin daily for 15 doses, TransDERmal, DAILY Starting Tue 11/30/2021, Until Wed 12/15/2021, For 15 doses, Disp-15 patch, R-0, Print      methocarbamol (ROBAXIN) 500 MG tablet Take 1 tablet by mouth 3 times daily for 15 doses, Disp-15 tablet, R-0Print             DISCONTINUED MEDICATIONS:  Discharge Medication List as of 11/30/2021 10:57 PM                 (Please note that portions of this note were completed with a voice recognition program.  Efforts were made to edit the dictations but occasionally words are mis-transcribed.)    ROSE Portillo CNP (electronically signed)            ROSE Portillo CNP  12/04/21 [address] [facility]

## 2021-12-06 ENCOUNTER — OFFICE VISIT (OUTPATIENT)
Dept: ORTHOPEDIC SURGERY | Age: 36
End: 2021-12-06
Payer: MEDICAID

## 2021-12-06 ENCOUNTER — TELEPHONE (OUTPATIENT)
Dept: ORTHOPEDIC SURGERY | Age: 36
End: 2021-12-06

## 2021-12-06 ENCOUNTER — HOSPITAL ENCOUNTER (OUTPATIENT)
Dept: NEUROLOGY | Age: 36
Discharge: HOME | End: 2021-12-06
Payer: MEDICAID

## 2021-12-06 VITALS — HEIGHT: 67 IN | BODY MASS INDEX: 40.81 KG/M2 | WEIGHT: 260 LBS

## 2021-12-06 DIAGNOSIS — M19.012 ARTHRITIS OF BOTH ACROMIOCLAVICULAR JOINTS: Primary | ICD-10-CM

## 2021-12-06 DIAGNOSIS — G56.03 BILATERAL CARPAL TUNNEL SYNDROME: ICD-10-CM

## 2021-12-06 DIAGNOSIS — M19.011 ARTHRITIS OF BOTH ACROMIOCLAVICULAR JOINTS: Primary | ICD-10-CM

## 2021-12-06 PROCEDURE — 95910 NRV CNDJ TEST 7-8 STUDIES: CPT | Performed by: PHYSICAL MEDICINE & REHABILITATION

## 2021-12-06 PROCEDURE — 95885 MUSC TST DONE W/NERV TST LIM: CPT | Performed by: PHYSICAL MEDICINE & REHABILITATION

## 2021-12-06 PROCEDURE — 1036F TOBACCO NON-USER: CPT | Performed by: ORTHOPAEDIC SURGERY

## 2021-12-06 PROCEDURE — G8427 DOCREV CUR MEDS BY ELIG CLIN: HCPCS | Performed by: ORTHOPAEDIC SURGERY

## 2021-12-06 PROCEDURE — G8484 FLU IMMUNIZE NO ADMIN: HCPCS | Performed by: ORTHOPAEDIC SURGERY

## 2021-12-06 PROCEDURE — 99213 OFFICE O/P EST LOW 20 MIN: CPT | Performed by: ORTHOPAEDIC SURGERY

## 2021-12-06 PROCEDURE — G8417 CALC BMI ABV UP PARAM F/U: HCPCS | Performed by: ORTHOPAEDIC SURGERY

## 2021-12-06 NOTE — TELEPHONE ENCOUNTER
CALLED PATIENT BACK AND SHE STATES THAT THE MESSAGE FOR THE RADIOLOGY GUIDED INJECTION PHONE NUMBER STATES THAT ONLY PHYSICIAN'S OR THE OFFICE CAN SCHEDULE THESE TESTS. SHE STATES THAT SHE PREFERS MORNING APPOINTMENTS. I WILL CONTACT THE HOSPITAL TO SCHEDULE THIS FOR HER.

## 2021-12-06 NOTE — PROGRESS NOTES
FOLLOW UP ORTHOPAEDIC NOTE    The patient follows up today for reevaluation of bilateral shoulder pain. The patient states left greater than right shoulder pain. She has been taking the anti-inflammatory and went to formal physical therapy. She has had activity modifications and continues to complain of left greater than right shoulder pain over the acromioclavicular joint. She states pain with direct compression/sleeping on the shoulders. She denies it waking her up per se. She is still undergoing treatment for some numbness and tingling in her hand from previous procedures. 8/10 pain on intake form    PE:  AAOx3  RR  Unlabored breathing  Skin warm and moist  Focused physical examination of the bilateral acromioclavicular joints  Positive tenderness palpation bilateral acromioclavicular joints left greater than right. Positive crossarm adduction. Trace Neer/Garcia bilateral shoulders. Remainder of exam unchanged     Diagnosis Orders   1. Arthritis of both acromioclavicular joints  IR FLUORO GUIDED NEEDLE PLACEMENT    33441 - MT DRAIN/INJECT LARGE JOINT/BURSA     Assessment and plan: 31 female with continued subjective symptoms of bilateral shoulder pain with known, correlating diagnosis of left greater than right acromioclavicular joint synovitis/arthritis, very slight subacromial bursitis. -Time of 16 minutes was spent coordinating and discussing the clinical findings and diagnostic imaging results as they pertain to the patient's presenting subjective symptoms.  -I had a pleasant discussion with the patient today.   I reviewed with her that currently her clinical examination continues to correlate to acromioclavicular joint pathology.  -I have ordered a corticosteroid injection into the intra-articular space left acromioclavicular joint to be provided under radiologic guidance  -Pending the results of this, consideration for right corticosteroid injection into the right intra-articular acromioclavicular joint  -Continue Mobic 15 mg p.o. daily as needed pain  -Continue activity modification to include low impact  -Continue physical therapy which has been prescribed as a home exercise program by the therapist  -All questions answered to the patient's satisfaction and the patient expressed understanding and agreement with the above listed treatment plan  -Follow up in 2 to 4 weeks after corticosteroid injection. She may contact the office should she wish for a right acromioclavicular joint corticosteroid injection pending results of the left shoulder. Pending the results of these diagnostic and therapeutic injections, should she continue to have pain, consider MRI bilateral shoulders for review. Potential consideration should she fail conservative care treatment options for left shoulder arthroscopic subacromial decompression and distal clavicle resection  -Thank you for the clinical consultation and allowing me to participate in the patient's care. Electronically signed by Jm Lemons MD on 12/6/21 at 9:35 AM PRANEETH Lemons MD       Orthopaedic Surgery-Sports Medicine    Disclaimer: This note was dictated with voice recognition software. Though review and correction are routinely performed, please contact the office/medical records for any errors requiring correction.

## 2021-12-06 NOTE — TELEPHONE ENCOUNTER
Surgery and/or Procedure Scheduling     Contact Name: Tj Kaplan  Surgical/Procedure Request: PROCEDURE  Patient Contact Number: [phone]

## 2021-12-06 NOTE — PROCEDURES
Test Date:  2021    Patient: Kaykay Perera : 1985 Physician: Brenden Gaston DO   Sex: Female ID#:  Ref Phys: Lydia Brown MD      Patient Complaints:  Patient is a 31year-old female who presents with numbness tingling in the right hand Patient had right carpal tunnel surgery  and left in . Patient History / Exam:  PMH: + type 2 diabetes, PCOS + degenerative disk disease. PE:  reflexes trace, + thumb opposition weakness     NCV & EMG Findings:  Evaluation of the left median (APB) motor nerve showed prolonged distal onset latency (4.3 ms). The left median sensory, the right median sensory, the left ulnar sensory, and the right ulnar sensory nerves showed prolonged distal peak latency (L4.4, R4.1, L4.0, R3.9 ms) and decreased conduction velocity (L32, R34, L35, R36 m/s). All remaining nerves (as indicated in the following tables) were within normal limits. All examined muscles (as indicated in the following table) showed no evidence of electrical instability. Impression:  study is consistent with residual slowing of median sensory distal latencies post carpal tunnel release. Prolonged ulnar sensory responses suggest underlying mild peripheral neuropathy, most likely secondary to diabetes.   Thank you         Brenden Gaston DO        Nerve Conduction Studies  Motor Nerve Results      Latency Amplitude F-Lat Segment Distance CV Comment   Site (ms) Norm (mV) Norm (ms)  (cm) (m/s) Norm    Left Median (APB) Motor   Wrist 4.3  < 4.2 9.8  > 5.0         Elbow 8.5 - 7.3 -  Elbow-Wrist 21 50  > 50    Right Median (APB) Motor   Wrist 3.8  < 4.2 5.3  > 5.0         Elbow 7.8 - 8.3 -  Elbow-Wrist 21 53  > 50    Left Ulnar (ADM) Motor   Wrist 4.2  < 4.2 10.4  > 3.0         Bel Elbow 7.8 - 6.3 -  Bel Elbow-Wrist 23 64  > 50    Abv Elbow 8.6 - 9.3 -  Abv Elbow-Bel Elbow 6 75  > 48    Right Ulnar (ADM) Motor   Wrist 3.6  < 4.2 10.5  > 3.0         Bel Elbow 7.9 - 8.9 -  Bel Elbow-Wrist 23 53  > 50    Abv Elbow 8.5 - 8.8 -  Abv Elbow-Bel Elbow 5 83  > 48      Sensory Nerve Results      Latency (Peak) Amplitude (P-P) Segment Distance CV Comment   Site (ms) Norm (µV) Norm  (cm) (m/s) Norm    Left Median Sensory   Wrist-Dig II 4.4  < 3.6 98  > 10 Wrist-Dig II 14 32  > 39    Right Median Sensory   Wrist-Dig II 4.1  < 3.6 37  > 10 Wrist-Dig II 14 34  > 39    Left Ulnar Sensory   Wrist-Dig V 4.0  < 3.7 69  > 15 Wrist-Dig V 14 35  > 38    Right Ulnar Sensory   Wrist-Dig V 3.9  < 3.7 64  > 15 Wrist-Dig V 14 36  > 38        Electromyography     Side Muscle Nerve Root Ins Act Fibs Psw Amp Dur Poly Recrt Int Lajas Lark Comment   Right Deltoid Axillary C5-C6 Nml Nml Nml Nml Nml 0 Nml Nml    Right Biceps Musculocut C5-C6 Nml Nml Nml Nml Nml 0 Nml Nml    Right Triceps Radial C6-C8 Nml Nml Nml Nml Nml 0 Nml Nml    Right Brachiorad Radial C5-C6 Nml Nml Nml Nml Nml 0 Nml Nml    Right Pronator Teres Median C6-C7 Nml Nml Nml Nml Nml 0 Nml Nml    Right EIP Post Interosseous,  R... C7-C8 Nml Nml Nml Nml Nml 0 Nml Nml    Right APB Median C8-T1 Nml Nml Nml Nml Nml 0 Nml Nml    Right FDI Ulnar C8-T1 Nml Nml Nml Nml Nml 0 Nml Nml    Right Cervical Paraspinal (Uppe. .. Rami C1-C3 Nml Nml Nml         Right Cervical Paraspinal (Mid) Rami C4-C6 Nml Nml Nml         Right Cervical Paraspinal (Linda Smack. .. Rami C7-C8 Nml Nml Nml         Left Deltoid Axillary C5-C6 Nml Nml Nml Nml Nml 0 Nml Nml    Left Biceps Musculocut C5-C6 Nml Nml Nml Nml Nml 0 Nml Nml    Left Triceps Radial C6-C8 Nml Nml Nml Nml Nml 0 Nml Nml    Left Brachiorad Radial C5-C6 Nml Nml Nml Nml Nml 0 Nml Nml    Left Pronator Teres Median C6-C7 Nml Nml Nml Nml Nml 0 Nml Nml    Left EIP Post Interosseous,  R... C7-C8 Nml Nml Nml Nml Nml 0 Nml Nml    Left APB Median C8-T1 Nml Nml Nml Nml Nml 0 Nml Nml    Left FDI Ulnar C8-T1 Nml Nml Nml Nml Nml 0 Nml Nml    Left Cervical Paraspinal (Uppe. ..  Rami C1-C3 Nml Nml Nml         Left Cervical Paraspinal (Mid) Rami C4-C6 Nml Nml Nml Left Cervical Paraspinal (Sherley Hester. ..  Rami C7-C8 Nml Nml Nml             Electronically signed by Zainab Johnson DO on 12/6/2021 at 1:37 PM

## 2021-12-06 NOTE — ED TRIAGE NOTES
Chief Complaint   Patient presents with    Back Pain     Pt  states that she has had mid back pain that \"goes up and down my spine\" since 1400 today. Pt states that she sometimes feels like she needs to urinate and gets a burning sensation like she needs to pee, but cannot pee. And when she does urinate she has no burning. Call light within reach. Will continue to monitor. Left arm;

## 2021-12-07 ENCOUNTER — TELEPHONE (OUTPATIENT)
Dept: ORTHOPEDIC SURGERY | Age: 36
End: 2021-12-07

## 2021-12-09 ENCOUNTER — HOSPITAL ENCOUNTER (OUTPATIENT)
Dept: PHYSICAL THERAPY | Age: 36
Setting detail: THERAPIES SERIES
Discharge: HOME | End: 2021-12-09
Payer: MEDICAID

## 2021-12-09 PROCEDURE — 97112 NEUROMUSCULAR REEDUCATION: CPT | Performed by: SPECIALIST

## 2021-12-09 PROCEDURE — 97016 VASOPNEUMATIC DEVICE THERAPY: CPT | Performed by: SPECIALIST

## 2021-12-09 PROCEDURE — 97110 THERAPEUTIC EXERCISES: CPT | Performed by: SPECIALIST

## 2021-12-09 ASSESSMENT — ENCOUNTER SYMPTOMS
GASTROINTESTINAL NEGATIVE: 1
EYES NEGATIVE: 1
ALLERGIC/IMMUNOLOGIC NEGATIVE: 1
RESPIRATORY NEGATIVE: 1

## 2021-12-09 NOTE — FLOWSHEET NOTE
[facility]  Orthopaedics and Sports Rehabilitation, [facility], [address] [place] [address] [address]  Phone: [phone]   Fax:     [phone]      Physical Therapy Treatment Note/ Progress Report:     Date:  2021    Patient Name:  Kathy Man    :  1985  MRN: [id number]  Restrictions/Precautions:    Medical/Treatment Diagnosis Information:      M75.81, M75.82 (ICD-10-CM) - Tendonitis of both rotator cuffs   M19.011, M19.012 (ICD-10-CM) - Arthritis of both acromioclavicular joints     ·      Insurance/Certification information:    [employer] [place] MEDICAID  Physician Information:    Dr Ann Geiger  Has the plan of care been signed (Y/N):        []  Yes  [x]  No     Date of Patient follow up with Physician: NS    Is this a Progress Report:     []  Yes  [x]  No     If Yes:  Date Range for reporting period:  Beginnin21 ------------ Endin21    Progress report will be due (10 Rx or 30 days whichever is less):      Recertification will be due (POC Duration  / 90 days whichever is less): 21      Visit # Insurance Allowable Auth Required   In Person 3  []  Yes     []  No    Tele Health 0  []  Yes     []  No    Total 3       Functional Scale: Quick Dash 52%    Date assessed:  21      Latex Allergy:  [x]NO      []YES  Preferred Language for Healthcare:   [x]English       []other:    Pain level:  4/10     SUBJECTIVE:  Reports still pain with trying to sleep    OBJECTIVE: See eval   Observation:    Test measurements:      RESTRICTIONS/PRECAUTIONS: none    Exercises/Interventions:   Therapeutic Ex (34150) Sets/sec Reps Notes/CUES HEP   Doorway stretch 30 2x  x                 UBE  3 min     pulleys  3 min                                                      Manual Intervention (25662)                                                 NMR re-education (00168)   CUES NEEDED    PB  rows OR 20x  x   PB IR ea OR 20x  x   PB B ext/ ER / flex  OR 20x x          Wall push  20x  x   Ball on wall  20x  x   SL abd/ ER B 2# 20x                   Therapeutic Activity (33305)                     Vaso L  10 min                   [employer] access code: [email or web address]           Therapeutic Exercise and NMR EXR  [x] (04512) Provided verbal/tactile cueing for activities related to strengthening, flexibility, endurance, ROM  for improvements in scapular, scapulothoracic and UE control with self care, reaching, carrying, lifting, house/yardwork, driving/computer work.    [] (57634) Provided verbal/tactile cueing for activities related to improving balance, coordination, kinesthetic sense, posture, motor skill, proprioception  to assist with  scapular, scapulothoracic and UE control with self care, reaching, carrying, lifting, house/yardwork, driving/computer work. Therapeutic Activities:    [x] (49266 or 71421) Provided verbal/tactile cueing for activities related to improving balance, coordination, kinesthetic sense, posture, motor skill, proprioception and motor activation to allow for proper function of scapular, scapulothoracic and UE control with self care, carrying, lifting, driving/computer work.      Home Exercise Program:    [x] (99696) Reviewed/Progressed HEP activities related to strengthening, flexibility, endurance, ROM of scapular, scapulothoracic and UE control with self care, reaching, carrying, lifting, house/yardwork, driving/computer work  [] (32243) Reviewed/Progressed HEP activities related to improving balance, coordination, kinesthetic sense, posture, motor skill, proprioception of scapular, scapulothoracic and UE control with self care, reaching, carrying, lifting, house/yardwork, driving/computer work      Manual Treatments:  PROM / STM / Oscillations-Mobs:  G-I, II, III, IV (PA's, Inf., Post.)  [x] (11702) Provided manual therapy to mobilize soft tissue/joints of cervical/CT, scapular GHJ and UE for the purpose of modulating pain, promoting relaxation, increasing ROM, reducing/eliminating soft tissue swelling/inflammation/restriction, improving soft tissue extensibility and allowing for proper ROM for normal function with self care, reaching, carrying, lifting, house/yardwork, driving/computer work    Modalities:     [x] GAME READY (VASO)- for significant edema, swelling, pain control. Charges:  Timed Code Treatment Minutes: 38   Total Treatment Minutes:  48   BWC:  TE TIME:  NMR TIME:  MANUAL TIME:  UNTIMED MINUTES:  Medicare Total:       [] EVAL (LOW) 03817 (typically 20 minutes face-to-face)  [] EVAL (MOD) 39620 (typically 30 minutes face-to-face)  [] EVAL (HIGH) 72494 (typically 45 minutes face-to-face)  [] RE-EVAL     [x] ET(17320) x 1    [] IONTO  [x] NMR (87603) x  2   [x] VASO  [] Manual (23142) x     [] Other:  [] TA x      [] Mech Traction (52799)  [] ES(attended) (58779)      [] ES (un) (26832):    ASSESSMENT:  Good tolerance with Mary, soreness with abd      GOALS:      Patient stated goal: reach ADLs    Therapist goals for Patient:   Short Term Goals: To be achieved in: 2 weeks  1. Independent in HEP and progression per patient tolerance, in order to prevent re-injury. [x] Progressing: [] Met: [] Not Met: [] Adjusted     2. Patient will have a decrease in pain to facilitate improvement in movement, function, and ADLs as indicated by Functional Deficits. [x] Progressing: [] Met: [] Not Met: [] Adjusted     Long Term Goals: To be achieved in: 6-8 weeks  1. Disability index score of 25% or less for the DASH to assist with reaching prior level of function. [x] Progressing: [] Met: [] Not Met: [] Adjusted     2. Patient will demonstrate increased AROM to WNL  to allow for proper joint functioning as indicated by patients Functional Deficits. [x] Progressing: [] Met: [] Not Met: [] Adjusted     3. Patient will demonstrate an increase in Strength to 5/5 to allow for proper functional mobility as indicated by patients Functional Deficits.    [x]

## 2021-12-09 NOTE — PATIENT INSTRUCTIONS
Diet tips to help make you successful postoperatively    Eating habits after surgery will need to be a long-term change. Eating habits are so ingrained that it can be difficult to change so having made these changes for surgery is a great accomplishment. It is important to maintain these new eating habits after surgery. Also, remember that overall health, age, and genetics make each person's weight loss progress different. Do not compare your progress, the amount you eat, or exercise to other patients.  Protein first at every meal- Eat the protein portion of your meal first. Eating protein helps the body feel full and sends a signal to stop eating. Protein is very important in building tissue in the body.  Eat at least 4 times per day- This includes protein supplements and small meals with a high amount of protein   Chewing your food thoroughly- Eating too quickly and improper chewing can cause pain and vomiting after surgery.  Slowing down the speed at which you eat- Refill your fork only after you swallow. Adopt a new pattern of eating by taking a bite of food and putting your utensil down between bites. This will help to reduce the feeling of food being stuck.    Drink water and other fluids slowly- Drink at least 48 ounces per day minimum. Sip fluids as if they were hot beverages. If you find it difficult to stop gulping liquids, try using a sippy cup or a sport top water bottle.  Make sure you are eating meals without drinking fluids- After surgery you will not be allowed to drink fluids 30 minutes before, during, or 30 minutes after your meal (30/30/30 rule). This will be a life-long behavior change. The reason for the rule is to keep food from passing through your smaller stomach more rapidly.  This will cause you to feel hungry again shortly after your meal.   Continue to avoid caffeine and carbonated beverages- Caffeine acts as a diuretic and can be dehydrating as well as irritating to the lining of the stomach. Carbonated beverages release gas and can expand the stomach.  Continue to keep temptation from your kitchen- Keep your pantry and kitchen cabinets cleaned out of those dangerous foods that might tempt you after surgery (chips, cookies, candy, etc.).  Continue to increase your exercise program- Increase your daily physical activity. Aim for 5-6 days per week for 30 minutes. Walking is an easy way to get started with exercising. You want exercise to be a regular part of your life after surgery.  Make sure you have a good support system- There will be many changes and adjustments to make after surgery. It is important to have a supportive friend, family member or co-worker, etc. with whom you can talk. Continue to attend [facility]) Weight Management support groups as they can be helpful in maintaining behaviors. In addition, it is the responsibility of the patient to schedule and follow up on labs and tests completed after surgery. Results will be reviewed at each visit. We recommend you have the following labs completed by your primary care doctor each year after bariatric surgery: Complete Blood Count (CBC), Complete Metabolic Panel (CMP), Iron Studies, HgbA1c, Lipid Panel, TSH (Thyroid), Vitamin A, Vitamin B1, Vitamin B12 & Folate and Vitamin D. Patient received dietary handouts and education.

## 2021-12-09 NOTE — PROGRESS NOTES
[facility]) Physicians   [employer] [facility] [employer]    12/22/2021    TELEHEALTH EVALUATION -- Audio/Visual    Subjective:      Patient ID: Olinda Nichole is a 31 y.o. female    HPI    4 years 1 month s/p sleeve gastrectomy    Olinda Nichole is a 31 y.o. obese female , Body mass index is 40.57 kg/m². Due to the COVID-19 restrictions on close contact interactions the patient's visit was conducted via audio/video in magdalena of a face to face visit. Patient has consented to have this visit conducted via audio/video and I am conducting it from the office. The patient is here through telemedicine for their post op bariatric surgery visit. Patient denies any nausea, vomiting, fevers, chills, shortness of breath, chest pain, constipation or urinary symptoms. Denies any heartburn nor dysphagia.     Past Medical History:   Diagnosis Date    Asthma     as child    Back pain     Bipolar 1 disorder ([facility] [address]     Degenerative disc disease, lumbar     Depression     Diabetes mellitus (HCC)     Fibromyalgia     Liver disease     fatty liver    Obesity     OCD (obsessive compulsive disorder)     PCOS (polycystic ovarian syndrome)     PCOS (polycystic ovarian syndrome)     Sleep apnea     cpap     Past Surgical History:   Procedure Laterality Date    ANKLE SURGERY Left     Scar tissue removal    CARPAL TUNNEL RELEASE Right 08/23/2021    CARPAL TUNNEL RELEASE Right 8/23/2021    RIGHT CARPAL TUNNEL RELEASE performed by Eamon Andrade MD at [address] Left 9/10/2021    LEFT CARPAL TUNNEL RELEASE performed by Eamon Andrade MD at [address] N/A 4/20/2020    LAPAROSCOPIC CHOLECYSTECTOMY performed by Alvaro Brown DO at [address] ([facility]) Left 12/10/14    Excision of Cyst Left Upper Posterior Ankle    OTHER SURGICAL HISTORY Right 12/29/2016    Laparotomy with Right SO    OVARY REMOVAL  12/28/2016    unsure of side  SLEEVE GASTRECTOMY N/A 11/6/2019    LAPAROSCOPIC SLEEVE GASTRECTOMY - ETHICON performed by Gallo Figueroa MD at [address] Left 7/2/2021    LEFT QUADRICEP PERCUTANEOUS TENOTOMY , TX2 MICROTIP WITH ULTRASOUND performed by Faiza Stauffer MD at [address] [place] [address] N/A 8/23/2019    EGD BIOPSY performed by Gallo Figueroa MD at [address] [facility] N/A 3/10/2021    EGD BIOPSY performed by oTny Shah MD at [address] [facility]       Family History   Problem Relation Age of Onset    Asthma Mother     Arthritis Mother     Lung Cancer Mother     Hypertension Father     Elevated Lipids Father     Diabetes Father     Alcohol Abuse Father     Asthma Father     Arthritis Father     Diabetes Paternal Grandfather     Arthritis Paternal Grandfather     Diabetes Maternal Grandmother     Arthritis Maternal Grandmother      Social History     Tobacco Use    Smoking status: Never Smoker    Smokeless tobacco: Never Used   Substance Use Topics    Alcohol use: Yes     Comment: 1 drink per week     I counseled the patient on the importance of not smoking and risks of ETOH. Allergies   Allergen Reactions    Latex Itching    Lactose      \"Extreme\" stomach pain and diarrhea    Adhesive Tape      Rips off skin    Keflex [Cephalexin] Nausea And Vomiting     Projectile vomitting     Vitals:    12/22/21 0715   Weight: 259 lb (117.5 kg)   Height: 5' 7\" (1.702 m)     Body mass index is 40.57 kg/m². Current Outpatient Medications:     omeprazole (PRILOSEC) 40 MG delayed release capsule, TAKE 1 CAPSULE BY MOUTH DAILY, Disp: 30 capsule, Rfl: 0    DULoxetine (CYMBALTA) 60 MG extended release capsule, TAKE 1 CAPSULE BY ORAL ROUTE  EVERY DAY, Disp: , Rfl:     VICTOZA 18 MG/3ML SOPN SC injection, INJECT 0.6 MG BY SUBCUTANEOUS ROUTE DAILY FOR 14 DAYS, THEN 1.2 MG DAILY FOR 28 DAYS, THEN 1.8 MG DAILY. , Disp: , Rfl:     nystatin (MYCOSTATIN) 323680 UNIT/GM powder, APPLY BY TOPICAL ROUTE  EVERY DAY TO THE AFFECTED AREA(S), Disp: , Rfl:     lamoTRIgine (LAMICTAL) 25 MG tablet, Take 25 mg by mouth daily, Disp: , Rfl:     medical marijuana, Take by mouth as needed. , Disp: , Rfl:     busPIRone (BUSPAR) 5 MG tablet, Take 5 mg by mouth 3 times daily, Disp: , Rfl:     loratadine (CLARITIN) 10 MG tablet, Take 10 mg by mouth daily, Disp: , Rfl:     Multiple Vitamins-Minerals (BARIATRIC FUSION) CHEW, Take 4 tablets by mouth daily, Disp: , Rfl:     levothyroxine (SYNTHROID) 75 MCG tablet, TAKE 1 TABLET BY MOUTH EVERY DAY, Disp: , Rfl: 5    Cholecalciferol (VITAMIN D PO), Take by mouth daily , Disp: , Rfl:     ziprasidone (GEODON) 20 MG capsule, Take 20 mg by mouth 2 times daily (with meals), Disp: , Rfl:     Lab Results   Component Value Date    WBC 6.1 08/31/2021    RBC 4.26 08/31/2021    HGB 13.8 08/31/2021    HCT 41.3 08/31/2021    MCV 96.8 08/31/2021    MCH 32.4 08/31/2021    MCHC 33.5 08/31/2021    MPV 9.6 08/31/2021    NEUTOPHILPCT 55.5 08/31/2021    LYMPHOPCT 34.4 08/31/2021    MONOPCT 8.9 08/31/2021    EOSRELPCT 0.8 08/31/2021    BASOPCT 0.4 08/31/2021    NEUTROABS 3.4 08/31/2021    LYMPHSABS 2.1 08/31/2021    MONOSABS 0.5 08/31/2021    EOSABS 0.0 08/31/2021     Lab Results   Component Value Date     08/31/2021    K 3.9 08/31/2021     08/31/2021    CO2 28 08/31/2021    ANIONGAP 9 08/31/2021    GLUCOSE 81 08/31/2021    BUN 12 08/31/2021    CREATININE 0.7 08/31/2021    LABGLOM >60 08/31/2021    GFRAA >60 08/31/2021    GFRAA >60 04/07/2011    CALCIUM 9.5 08/31/2021    PROT 7.5 08/31/2021    PROT 7.1 04/07/2011    LABALBU 4.3 08/31/2021    AGRATIO 1.3 08/31/2021    BILITOT 0.3 08/31/2021    ALKPHOS 70 08/31/2021    ALT 25 08/31/2021    AST 25 08/31/2021    GLOB 3.2 08/31/2021     Lab Results   Component Value Date    CHOL 123 05/11/2021    TRIG 66 05/11/2021    HDL 55 05/11/2021    LDLCALC 55 05/11/2021    LABVLDL 13 05/11/2021     Lab Results   Component Value Date    TSHREFLEX 1.05 05/11/2021     Lab Results   Component Value Date    IRON 128 05/11/2021    TIBC 251 05/11/2021    LABIRON 51 05/11/2021     Lab Results   Component Value Date    LWYCVVPR10 730 05/11/2021    FOLATE >20.00 05/11/2021     Lab Results   Component Value Date    VITD25 70.3 05/11/2021     Lab Results   Component Value Date    LABA1C 5.2 05/11/2021    .5 05/11/2021       Review of Systems   Constitutional: Negative. HENT: Positive for trouble swallowing (Sees ENT ). Eyes: Negative. Respiratory: Negative. Cardiovascular: Negative. Gastrointestinal: Negative. Endocrine: Negative. Genitourinary: Negative. Musculoskeletal: Negative. Skin: Negative. Allergic/Immunologic: Negative. Neurological: Negative. Hematological: Negative. Psychiatric/Behavioral: Negative. PHYSICAL EXAMINATION:    Constitutional: [x] Appears well-developed and well-nourished [x] No apparent distress      [] Abnormal-   Mental status  [x] Alert and awake  [x] Oriented to person/place/time [x]Able to follow commands      Eyes:  EOM    [x]  Normal  [] Abnormal-  Sclera  [x]  Normal  [] Abnormal -         Discharge [x]  None visible  [] Abnormal -    HENT:   [x] Normocephalic, atraumatic.   [] Abnormal     Neck: [x] No visualized mass     Pulmonary/Chest: [x] Respiratory effort normal.  [x] No visualized signs of difficulty breathing or respiratory distress        [] Abnormal-      Musculoskeletal:   [] Normal gait with no signs of ataxia         [x] Normal range of motion of neck        [] Abnormal-     Neurological:        [x] No Facial Asymmetry (Cranial nerve 7 motor function) (limited exam to video visit)          [x] No gaze palsy        [] Abnormal-         Skin:        [x] No significant exanthematous lesions or discoloration noted on facial skin         [] Abnormal-            Psychiatric:       [x] Normal Affect [] No Hallucinations        [] Abnormal-     Other pertinent observable physical exam findings-     Due to this being a TeleHealth encounter, evaluation of the following organ systems is limited: Vitals/Constitutional/EENT/Resp/CV/GI//MS/Neuro/Skin/Heme-Lymph-Imm. Assessment and Plan:   Patient is here via telemedicine and is 2 years 1 month s/p sleeve gastrectomy, up 1.1 lbs with a total weight loss of 137 lbs. The patient's current Body mass index is 40.57 kg/m². (12/22/21). She is doing well, denies n/v/, but does have some intermittent dysphagia and reflux. Patient has been evaluated by ENT and there is a slight defect around her voice box, but nothing that can be corrected surgically per patient. She is tolerating diet, getting adequate fluids and protein. She is exercising on her [employer] two days per week. Encouraged continued physical activity and discussed increasing by 1-2 days per week over the next few months. She is taking multivitamins, but needed to add in calcium chews and iron. She did speak with the registered dietitian for continued follow up. Discussed with dietitian and I agree with recommendations and plan. We will see her back in 6 months for continued follow up or via telemedicine depending on COVID-19 restrictions at the time of their next appointment. Diabetes Mellitus Type II in Obese:   [x] Continue to make dietary and lifestyle modifications per our recommendations. [x] Reviewed the importance of checking blood sugars. [x] Continue to follow up with their PCP and/or Endocrinologist for medication management (Victoza & Metformin) and monitoring. Obstructive Sleep Apnea:   [x] Continue to make dietary and lifestyle modifications per our recommendations. [x] Reviewed the importance of wearing your CPAP/BIPAP. [x] Continue to follow up with their sleep medicine provider for CPAP/BIPAP management and monitoring.     Chronic GERD:   [x] Continue to make dietary and lifestyle modifications per our recommendations. [x] Continue PPI (prilosec). [] Continue H2 Blocker  [] Wean PPI. Take every other day for two weeks. If no issues with heartburn/reflux you may decrease to every third day for two weeks. If no issues with heartburn/reflux you may stop the Prilosec. Recommend that you get OTC Pepcid to take should you have occasional heartburn/reflux. Obesity:  [x] Continue to make dietary and lifestyle modifications per our recommendations. [x] Return for follow-up 6 months. Total encounter time: 30 minutes, including any number of the following: Bariatric Postoperative work up/protocols, review of labs, imaging, provider notes, outside hospital records, performing examination/evaluation, counseling patient and/or family, ordering medications/tests, placing referrals and communication with referring physicians, coordination of care; discussing exercise and physical activity; discussing dietary plan/recall with the patient as well with registered dietitian and documentation in the EHR. Of note, the above was done during same day of the actual patient encounter. An electronic signature was used to authenticate this note. This Virtual  Visit was conducted, with patient's consent, to reduce the patient's risk of exposure to COVID-19 and provide continuity of care for an established patient. Services were provided through a video synchronous discussion virtually to substitute for in-person clinic visit.

## 2021-12-10 ENCOUNTER — TELEPHONE (OUTPATIENT)
Dept: ORTHOPEDIC SURGERY | Age: 36
End: 2021-12-10

## 2021-12-10 NOTE — TELEPHONE ENCOUNTER
Called patient with regards to her radiology guided injection. Left a voice message for her that it is schedule for Monday, December 13th at 10:30am at [facility] with a 10am arrival time.

## 2021-12-13 ENCOUNTER — HOSPITAL ENCOUNTER (OUTPATIENT)
Dept: GENERAL RADIOLOGY | Age: 36
Discharge: HOME | End: 2021-12-13
Payer: MEDICAID

## 2021-12-13 DIAGNOSIS — K21.9 CHRONIC GERD: ICD-10-CM

## 2021-12-13 DIAGNOSIS — M19.011 ARTHRITIS OF BOTH ACROMIOCLAVICULAR JOINTS: ICD-10-CM

## 2021-12-13 DIAGNOSIS — M19.012 ARTHRITIS OF BOTH ACROMIOCLAVICULAR JOINTS: ICD-10-CM

## 2021-12-13 PROCEDURE — 77002 NEEDLE LOCALIZATION BY XRAY: CPT

## 2021-12-13 PROCEDURE — 6360000002 HC RX W HCPCS: Performed by: STUDENT IN AN ORGANIZED HEALTH CARE EDUCATION/TRAINING PROGRAM

## 2021-12-13 PROCEDURE — 20610 DRAIN/INJ JOINT/BURSA W/O US: CPT

## 2021-12-13 RX ORDER — TRIAMCINOLONE ACETONIDE 40 MG/ML
20 INJECTION, SUSPENSION INTRA-ARTICULAR; INTRAMUSCULAR ONCE
Status: COMPLETED | OUTPATIENT
Start: 2021-12-13 | End: 2021-12-13

## 2021-12-13 RX ORDER — ROPIVACAINE HYDROCHLORIDE 2 MG/ML
0.5 INJECTION, SOLUTION EPIDURAL; INFILTRATION; PERINEURAL ONCE
Status: COMPLETED | OUTPATIENT
Start: 2021-12-13 | End: 2021-12-13

## 2021-12-13 RX ORDER — MELOXICAM 15 MG/1
15 TABLET ORAL DAILY PRN
Qty: 30 TABLET | Refills: 0 | Status: SHIPPED | OUTPATIENT
Start: 2021-12-13 | End: 2021-12-22 | Stop reason: ALTCHOICE

## 2021-12-13 RX ADMIN — TRIAMCINOLONE ACETONIDE 20 MG: 40 INJECTION, SUSPENSION INTRA-ARTICULAR; INTRAMUSCULAR at 12:29

## 2021-12-13 RX ADMIN — ROPIVACAINE HYDROCHLORIDE 0.5 ML: 2 INJECTION, SOLUTION EPIDURAL; INFILTRATION at 12:29

## 2021-12-13 ASSESSMENT — PAIN SCALES - GENERAL: PAINLEVEL_OUTOF10: 7

## 2021-12-13 NOTE — BRIEF OP NOTE
Brief Postoperative Note    Alfredo Sue  YOB: 1985  [id number]    Pre-operative Diagnosis: left AC joint pain    Post-operative Diagnosis: Same    Procedure: steroid injection    Anesthesia: Local    Surgeons/Assistants: Layton Langston MD    Estimated Blood Loss: less than 5    Complications: None    Specimens: Was Not Obtained    Findings: successful AC joint injection    Electronically signed by Layton Langston MD on 12/13/2021 at 12:07 PM

## 2021-12-15 RX ORDER — OMEPRAZOLE 40 MG/1
40 CAPSULE, DELAYED RELEASE ORAL DAILY
Qty: 30 CAPSULE | Refills: 0 | Status: SHIPPED | OUTPATIENT
Start: 2021-12-15 | End: 2022-02-14

## 2021-12-16 ENCOUNTER — HOSPITAL ENCOUNTER (OUTPATIENT)
Dept: PHYSICAL THERAPY | Age: 36
Setting detail: THERAPIES SERIES
Discharge: HOME | End: 2021-12-16
Payer: MEDICAID

## 2021-12-16 PROCEDURE — 97112 NEUROMUSCULAR REEDUCATION: CPT | Performed by: SPECIALIST

## 2021-12-16 PROCEDURE — 97110 THERAPEUTIC EXERCISES: CPT | Performed by: SPECIALIST

## 2021-12-16 PROCEDURE — 97016 VASOPNEUMATIC DEVICE THERAPY: CPT | Performed by: SPECIALIST

## 2021-12-16 NOTE — FLOWSHEET NOTE
[facility], [address] [place] [address], [address] [place] [address] [address]  Phone: [phone]   Fax:     [phone]      Physical Therapy Treatment Note/ Progress Report:     Date:  2021    Patient Name:  Billy Valencia    :  1985  MRN: [id number]  Restrictions/Precautions:    Medical/Treatment Diagnosis Information:      M75.81, M75.82 (ICD-10-CM) - Tendonitis of both rotator cuffs   M19.011, M19.012 (ICD-10-CM) - Arthritis of both acromioclavicular joints     ·      Insurance/Certification information:    [facility] [place] [facility]  Physician Information:    Dr Kasandra Priest  Has the plan of care been signed (Y/N):        []  Yes  [x]  No     Date of Patient follow up with Physician: NS    Is this a Progress Report:     []  Yes  [x]  No     If Yes:  Date Range for reporting period:  Beginnin21 ------------ Endin21    Progress report will be due (10 Rx or 30 days whichever is less):      Recertification will be due (POC Duration  / 90 days whichever is less): 21      Visit # Insurance Allowable Auth Required   In Person 4  []  Yes     []  No    Tele Health 0  []  Yes     []  No    Total 4       Functional Scale: Quick Dash 52%    Date assessed:  21      Latex Allergy:  [x]NO      []YES  Preferred Language for Healthcare:   [x]English       []other:    Pain level:  4/10     SUBJECTIVE:  Reports no change with steroid injection L AC joint     OBJECTIVE: See eval   Observation:    Test measurements:      RESTRICTIONS/PRECAUTIONS: none    Exercises/Interventions:   Therapeutic Ex (76411) Sets/sec Reps Notes/CUES HEP   Doorway stretch 30 2x  x   Wall walk 10 5x            UBE  3 min     pulleys  3 min                                                      Manual Intervention (08316)                                                 NMR re-education (66032)   CUES NEEDED    PB  rows OR 20x  x   PB IR ea OR 20x  x   PB B ext/ ER / flex  OR 20x  x          Wall push  20x  x   Ball on wall 2# 20x  x   SL abd/ ER B 2# 20x                   Therapeutic Activity (57258)                     Vaso L  10 min                   CEYX access code: [email or web address]           Therapeutic Exercise and NMR EXR  [x] (11772) Provided verbal/tactile cueing for activities related to strengthening, flexibility, endurance, ROM  for improvements in scapular, scapulothoracic and UE control with self care, reaching, carrying, lifting, house/yardwork, driving/computer work.    [] (33536) Provided verbal/tactile cueing for activities related to improving balance, coordination, kinesthetic sense, posture, motor skill, proprioception  to assist with  scapular, scapulothoracic and UE control with self care, reaching, carrying, lifting, house/yardwork, driving/computer work. Therapeutic Activities:    [x] (18104 or 46175) Provided verbal/tactile cueing for activities related to improving balance, coordination, kinesthetic sense, posture, motor skill, proprioception and motor activation to allow for proper function of scapular, scapulothoracic and UE control with self care, carrying, lifting, driving/computer work.      Home Exercise Program:    [x] (88903) Reviewed/Progressed HEP activities related to strengthening, flexibility, endurance, ROM of scapular, scapulothoracic and UE control with self care, reaching, carrying, lifting, house/yardwork, driving/computer work  [] (13263) Reviewed/Progressed HEP activities related to improving balance, coordination, kinesthetic sense, posture, motor skill, proprioception of scapular, scapulothoracic and UE control with self care, reaching, carrying, lifting, house/yardwork, driving/computer work      Manual Treatments:  PROM / STM / Oscillations-Mobs:  G-I, II, III, IV (PA's, Inf., Post.)  [x] (20227) Provided manual therapy to mobilize soft tissue/joints of cervical/CT, scapular GHJ and UE for the purpose of modulating pain, promoting relaxation,  increasing ROM, reducing/eliminating soft tissue swelling/inflammation/restriction, improving soft tissue extensibility and allowing for proper ROM for normal function with self care, reaching, carrying, lifting, house/yardwork, driving/computer work    Modalities:     [x] GAME READY (VASO)- for significant edema, swelling, pain control. Charges:  Timed Code Treatment Minutes: 38   Total Treatment Minutes:  48   BWC:  TE TIME:  NMR TIME:  MANUAL TIME:  UNTIMED MINUTES:  Medicare Total:       [] EVAL (LOW) 75709 (typically 20 minutes face-to-face)  [] EVAL (MOD) 64057 (typically 30 minutes face-to-face)  [] EVAL (HIGH) 91335 (typically 45 minutes face-to-face)  [] RE-EVAL     [x] (40279) x 1    [] IONTO  [x] NMR (28856) x  2   [x] VASO  [] Manual (59610) x     [] Other:  [] TA x      [] Mech Traction (49467)  [] ES(attended) (68814)      [] ES (un) (25074):    ASSESSMENT:  Good tolerance with Mary, soreness with abd      GOALS:      Patient stated goal: reach ADLs    Therapist goals for Patient:   Short Term Goals: To be achieved in: 2 weeks  1. Independent in HEP and progression per patient tolerance, in order to prevent re-injury. [x] Progressing: [] Met: [] Not Met: [] Adjusted     2. Patient will have a decrease in pain to facilitate improvement in movement, function, and ADLs as indicated by Functional Deficits. [x] Progressing: [] Met: [] Not Met: [] Adjusted     Long Term Goals: To be achieved in: 6-8 weeks  1. Disability index score of 25% or less for the DASH to assist with reaching prior level of function. [x] Progressing: [] Met: [] Not Met: [] Adjusted     2. Patient will demonstrate increased AROM to WNL  to allow for proper joint functioning as indicated by patients Functional Deficits. [x] Progressing: [] Met: [] Not Met: [] Adjusted     3.  Patient will demonstrate an increase in Strength to 5/5 to allow for proper functional mobility as indicated by patients Functional Deficits. [x] Progressing: [] Met: [] Not Met: [] Adjusted     4. Patient will return to [facility] for  functional activities without increased symptoms or restriction. [x] Progressing: [] Met: [] Not Met: [] Adjusted     5. Reach/ ADLs with min to no limitations (patient specific functional goal)    [x] Progressing: [] Met: [] Not Met: [] Adjusted            Overall Progression Towards Functional goals/ Treatment Progress Update:  [x] Patient is progressing as expected towards functional goals listed. [] Progression is slowed due to complexities/Impairments listed. [] Progression has been slowed due to co-morbidities. [] Plan just implemented, too soon to assess goals progression <30days   [] Goals require adjustment due to lack of progress  [] Patient is not progressing as expected and requires additional follow up with physician  [] Other    Prognosis for POC: [x] Good [] Fair  [] Poor      Patient requires continued skilled intervention: [x] Yes  [] No    Treatment/Activity Tolerance:  [x] Patient able to complete treatment  [] Patient limited by fatigue  [] Patient limited by pain    [] Patient limited by other medical complications  [] Other:       PLAN:   [x] Continue per plan of care [] Alter current plan (see comments above)  [] Plan of care initiated [] Hold pending MD visit [] Discharge    Electronically signed by:  Partha Henriquez PT    Note: If patient does not return for scheduled/ recommended follow up visits, this note will serve as a discharge from care along with most recent update on progress.

## 2021-12-22 ENCOUNTER — TELEMEDICINE (OUTPATIENT)
Dept: BARIATRICS/WEIGHT MGMT | Age: 36
End: 2021-12-22
Payer: MEDICAID

## 2021-12-22 VITALS — HEIGHT: 67 IN | BODY MASS INDEX: 40.65 KG/M2 | WEIGHT: 259 LBS

## 2021-12-22 DIAGNOSIS — E66.01 MORBID OBESITY WITH BMI OF 40.0-44.9, ADULT (HCC): ICD-10-CM

## 2021-12-22 DIAGNOSIS — E11.69 DIABETES MELLITUS TYPE 2 IN OBESE (HCC): Primary | ICD-10-CM

## 2021-12-22 DIAGNOSIS — G47.33 MODERATE OBSTRUCTIVE SLEEP APNEA: ICD-10-CM

## 2021-12-22 DIAGNOSIS — K21.9 CHRONIC GERD: ICD-10-CM

## 2021-12-22 DIAGNOSIS — E66.9 DIABETES MELLITUS TYPE 2 IN OBESE (HCC): Primary | ICD-10-CM

## 2021-12-22 DIAGNOSIS — Z98.84 S/P LAPAROSCOPIC SLEEVE GASTRECTOMY: ICD-10-CM

## 2021-12-22 PROCEDURE — 1036F TOBACCO NON-USER: CPT | Performed by: NURSE PRACTITIONER

## 2021-12-22 PROCEDURE — 2022F DILAT RTA XM EVC RTNOPTHY: CPT | Performed by: NURSE PRACTITIONER

## 2021-12-22 PROCEDURE — G8484 FLU IMMUNIZE NO ADMIN: HCPCS | Performed by: NURSE PRACTITIONER

## 2021-12-22 PROCEDURE — 99214 OFFICE O/P EST MOD 30 MIN: CPT | Performed by: NURSE PRACTITIONER

## 2021-12-22 PROCEDURE — G8427 DOCREV CUR MEDS BY ELIG CLIN: HCPCS | Performed by: NURSE PRACTITIONER

## 2021-12-22 PROCEDURE — G8417 CALC BMI ABV UP PARAM F/U: HCPCS | Performed by: NURSE PRACTITIONER

## 2021-12-22 ASSESSMENT — ENCOUNTER SYMPTOMS: TROUBLE SWALLOWING: 1

## 2021-12-22 NOTE — PROGRESS NOTES
Dietary Assessment Note      Vitals:   Vitals:    12/22/21 0715   Weight: 259 lb (117.5 kg)   Height: 5' 7\" (1.702 m)    Patient gained 1.1 lbs over past 7 months. Total Weight Loss: 137 lbs    Labs reviewed: No new nutrition related labs     Protein intake: 60-80 grams/day     Fluid intake: 60 oz per day     Multivitamin/mineral intake: Fusion soft chew MVI - takes 2     Calcium intake: None     Other: Hair skin and nails     Exercise: total gym 2 x week    Nutrition Assessment: 2 year s/p sleeve post-op visit. Pt previously saw Dr. Suzanna Real and was provided with 1200 kcal LC meal plan which she has loosely used. Pt taking GOLO dietary supplement. Pt has had a lot of cravings and was started on medication for this and feels it has improved. Breakfast: Protein shake     Snack: nothing     Lunch: frozen breakfast sandwich (ham or pork sausage/ turkey sausage)    Snack: protein shake     Dinner: Tacos OR homemade chili OR udon noodles with protein    Snack: *cravings - few pieces of kodiak protein cookies    Fluids: Water, nature twist lemonade (5 calorie), diet green tea, tea with sweetener    Amount able to eat per sitting: reports was eating too much, reports has reduced and now eating 1-2 cups     Following 30/30/30 rule: Yes     Food Intolerances/issues: greasy, dairy, tomato     Client Concerns: Pt with some swallowing issues and had modified barium swallow completed who recommended she work with speech therapy and aspiration precautions were addressed with pt per note - pt reports food gets  stuck several times a day and gets coughed back up but only has issue with liquids every 2-3 days; pt wants to lose more weight     Goals:    Increase exercise to 3-4 x week  Track intakes with goal of 1200 per day  Add in fruit/veggies  Take 2 Fusion Calcium Soft Chews and Fe (discussed she needs 45 mg of Fe, take Calcium at lunch and dinner and Fe at breakfast with MVI)    Plan: F/U per Provider     Alan Altamirano ANIKET, SOFIE

## 2022-01-12 ENCOUNTER — OFFICE VISIT (OUTPATIENT)
Dept: ORTHOPEDIC SURGERY | Age: 37
End: 2022-01-12
Payer: MEDICAID

## 2022-01-12 VITALS — HEIGHT: 67 IN | RESPIRATION RATE: 15 BRPM | WEIGHT: 259 LBS | BODY MASS INDEX: 40.65 KG/M2

## 2022-01-12 DIAGNOSIS — G56.03 BILATERAL CARPAL TUNNEL SYNDROME: Primary | ICD-10-CM

## 2022-01-12 PROCEDURE — 1036F TOBACCO NON-USER: CPT | Performed by: ORTHOPAEDIC SURGERY

## 2022-01-12 PROCEDURE — 99204 OFFICE O/P NEW MOD 45 MIN: CPT | Performed by: ORTHOPAEDIC SURGERY

## 2022-01-12 PROCEDURE — G8417 CALC BMI ABV UP PARAM F/U: HCPCS | Performed by: ORTHOPAEDIC SURGERY

## 2022-01-12 PROCEDURE — G8427 DOCREV CUR MEDS BY ELIG CLIN: HCPCS | Performed by: ORTHOPAEDIC SURGERY

## 2022-01-12 PROCEDURE — G8484 FLU IMMUNIZE NO ADMIN: HCPCS | Performed by: ORTHOPAEDIC SURGERY

## 2022-01-13 ENCOUNTER — OFFICE VISIT (OUTPATIENT)
Dept: ORTHOPEDIC SURGERY | Age: 37
End: 2022-01-13
Payer: MEDICAID

## 2022-01-13 VITALS — HEIGHT: 67 IN | WEIGHT: 259.04 LBS | BODY MASS INDEX: 40.66 KG/M2

## 2022-01-13 DIAGNOSIS — M76.899 TENDINOSIS OF QUADRICEPS TENDON: Primary | ICD-10-CM

## 2022-01-13 DIAGNOSIS — M22.8X9 PATELLAR MALTRACKING, UNSPECIFIED LATERALITY: ICD-10-CM

## 2022-01-13 DIAGNOSIS — M76.892 TENDINITIS OF LEFT QUADRICEPS TENDON: ICD-10-CM

## 2022-01-13 PROCEDURE — G8484 FLU IMMUNIZE NO ADMIN: HCPCS | Performed by: INTERNAL MEDICINE

## 2022-01-13 PROCEDURE — G8427 DOCREV CUR MEDS BY ELIG CLIN: HCPCS | Performed by: INTERNAL MEDICINE

## 2022-01-13 PROCEDURE — G8417 CALC BMI ABV UP PARAM F/U: HCPCS | Performed by: INTERNAL MEDICINE

## 2022-01-13 PROCEDURE — 99213 OFFICE O/P EST LOW 20 MIN: CPT | Performed by: INTERNAL MEDICINE

## 2022-01-13 PROCEDURE — 1036F TOBACCO NON-USER: CPT | Performed by: INTERNAL MEDICINE

## 2022-01-13 NOTE — PROGRESS NOTES
Chief Complaint:   Chief Complaint   Patient presents with    Knee Pain     left, s/p Tenex 7/3/21, doing well, burning pain at patella up to 4-5/10 with cold weather, some difficulty with stairs and heavier lifting still; bumped patella 10 days ago, soreness now subsiding          History of Present Illness:       Patient is a 31 y.o. female returns follow up for the above complaint. The patient was last seen approximately 3 monthsago. The symptoms show no change since the last visit. The patient has had no further testing for the problem. LEFS: 55%, prior 60%     Status post percutaneous tenotomy TX 2 microtip left quadriceps tendon-hypertrophic tendinopathy-central medial segment distribution with interstitial tear-7/2/2021    Intermittent subjective \"burning\" quality pain localizing to the quadriceps tendon region no significant \"flares\" of pain in the interim or pattern of activity related swelling. No new injuries no new events. Pain levels 4-5/10 at worst.  No reliance on NSAIDs or other analgesics    Admittedly she has not been diligent with home exercises    She continues to experience concurrent anterior knee pain of different character and quality that follows a typical patella femoral provocative pattern. Prior MRI was referenced as a  Below in detail.      Past Medical History:        Past Medical History:   Diagnosis Date    Asthma     as child    Back pain     Bipolar 1 disorder ([address]     Degenerative disc disease, lumbar     Depression     Diabetes mellitus (HCC)     Fibromyalgia     Liver disease     fatty liver    Obesity     OCD (obsessive compulsive disorder)     PCOS (polycystic ovarian syndrome)     PCOS (polycystic ovarian syndrome)     Sleep apnea     cpap        Present Medications:         Current Outpatient Medications   Medication Sig Dispense Refill    omeprazole (PRILOSEC) 40 MG delayed release capsule TAKE 1 CAPSULE BY MOUTH DAILY 30 capsule 0    DULoxetine (CYMBALTA) 60 MG extended release capsule TAKE 1 CAPSULE BY ORAL ROUTE  EVERY DAY      VICTOZA 18 MG/3ML SOPN SC injection INJECT 0.6 MG BY SUBCUTANEOUS ROUTE DAILY FOR 14 DAYS, THEN 1.2 MG DAILY FOR 28 DAYS, THEN 1.8 MG DAILY.  nystatin (MYCOSTATIN) 772339 UNIT/GM powder APPLY BY TOPICAL ROUTE  EVERY DAY TO THE AFFECTED AREA(S)      lamoTRIgine (LAMICTAL) 25 MG tablet Take 25 mg by mouth daily      medical marijuana Take by mouth as needed.  busPIRone (BUSPAR) 5 MG tablet Take 5 mg by mouth 3 times daily      loratadine (CLARITIN) 10 MG tablet Take 10 mg by mouth daily      Multiple Vitamins-Minerals (BARIATRIC FUSION) CHEW Take 4 tablets by mouth daily      levothyroxine (SYNTHROID) 75 MCG tablet TAKE 1 TABLET BY MOUTH EVERY DAY  5    Cholecalciferol (VITAMIN D PO) Take by mouth daily       ziprasidone (GEODON) 20 MG capsule Take 20 mg by mouth 2 times daily (with meals)       No current facility-administered medications for this visit. Allergies: Allergies   Allergen Reactions    Latex Itching    Lactose      \"Extreme\" stomach pain and diarrhea    Adhesive Tape      Rips off skin    Keflex [Cephalexin] Nausea And Vomiting     Projectile vomitting           Review of Systems:    Pertinent items are noted in HPI       Vital Signs: There were no vitals filed for this visit. General Exam:     Constitutional: Patient is adequately groomed with no evidence of malnutrition    Physical Exam: left knee      Primary Exam:    Inspection: No deformity atrophy appreciable effusion      Palpation: Subtle crepitation patellofemoral      Range of Motion: Full range and symmetric      Strength: Isometric resisted knee extension negligible discomfort      Special Tests: Negative extensor lag      Skin: There are no rashes, ulcerations or lesions.       Gait: Nonantalgic      Neurovascular - non focal and intact       Additional Comments:        Additional Examinations: Office Imaging Results/Procedures PerformedToday:            Office Procedures:   No orders of the defined types were placed in this encounter. Limited ultrasound evaluation left knee  Logiq ultrasound 12 MHz    Patient positioned supine examination table with the knee supported. The quadriceps tendon was evaluated tentatively in long axis and short axis. Area of tendinopathy change localizing to the medial third/segment of the tendon with trilaminar involvement more pronounced in the interstitial deep fiber regions. There is inhomogenous pattern of punctate echogenic signal in a speckled distribution in this anatomic region of pathology suggestive of punctate calcifications. There is cortical irregularity of the patella base at the anatomic location of this pathology teno-osseous involvement. This represents a change from preprocedure sonographic images from June 2021. Mild increase in caliber in this anatomic distribution consistent with hypertrophic tendinopathy change. With dynamic stressing there is no discrete evidence of gapping of fibers. Power Doppler imaging negative    Compared to preprocedure images from June 2021 the findings are more conspicuous on today's evaluation and are stable to improved when compared to images from August 2021. Other Outside Imaging and Testing Personally Reviewed:    No results found. Assessment   Impression: . Encounter Diagnoses   Name Primary?  Tendinosis of quadriceps tendon Yes    Tendinitis of left quadriceps tendon     Patellar maltracking, unspecified laterality         Status post percutaneous tenotomy TX 2 microtip left quadriceps tendon-hypertrophic tendinopathy-central medial segment distribution with interstitial tear-7/2/2021      Plan:      Activity modification caution against overuse  Clinical follow-up in 6 months or sooner and repeat LEFS and limited ultrasound evaluation  Continue/restart maintenance I

## 2022-01-27 ENCOUNTER — OFFICE VISIT (OUTPATIENT)
Dept: ORTHOPEDIC SURGERY | Age: 37
End: 2022-01-27
Payer: MEDICAID

## 2022-01-27 VITALS — WEIGHT: 259 LBS | HEIGHT: 67 IN | BODY MASS INDEX: 40.65 KG/M2

## 2022-01-27 DIAGNOSIS — M25.512 CHRONIC LEFT SHOULDER PAIN: ICD-10-CM

## 2022-01-27 DIAGNOSIS — G89.29 CHRONIC LEFT SHOULDER PAIN: ICD-10-CM

## 2022-01-27 DIAGNOSIS — M75.52 SUBACROMIAL BURSITIS OF LEFT SHOULDER JOINT: ICD-10-CM

## 2022-01-27 DIAGNOSIS — M19.019 OSTEOARTHRITIS OF ACROMIOCLAVICULAR JOINT: Primary | ICD-10-CM

## 2022-01-27 PROCEDURE — 99213 OFFICE O/P EST LOW 20 MIN: CPT | Performed by: ORTHOPAEDIC SURGERY

## 2022-01-27 PROCEDURE — G8484 FLU IMMUNIZE NO ADMIN: HCPCS | Performed by: ORTHOPAEDIC SURGERY

## 2022-01-27 PROCEDURE — 1036F TOBACCO NON-USER: CPT | Performed by: ORTHOPAEDIC SURGERY

## 2022-01-27 PROCEDURE — G8417 CALC BMI ABV UP PARAM F/U: HCPCS | Performed by: ORTHOPAEDIC SURGERY

## 2022-01-27 PROCEDURE — G8427 DOCREV CUR MEDS BY ELIG CLIN: HCPCS | Performed by: ORTHOPAEDIC SURGERY

## 2022-01-27 NOTE — PROGRESS NOTES
FOLLOW UP ORTHOPAEDIC NOTE    The patient follows up today for reevaluation of continued left shoulder pain. The patient states previous corticosteroid in the left acromioclavicular joint had provided 2 days worth of 50 to 60% symptom relief. She states continued pain with sleeping on that shoulder. She states pain with ADLs. PE:  AAOx3  RR  Unlabored breathing  Skin warm and moist  Focused physical examination of the left shoulder  Positive tenderness to palpation acromioclavicular joint. Positive Garcia, positive Neer. 170 degrees forward flexion abduction. Negative Speed negative Yergason, trace tenderness to palpation biceps tendon sheath  Skin intact throughout  5/5 D B T G IO EPL  SILT Ax, R, U, M  +2 radial pulse     Diagnosis Orders   1. Osteoarthritis of acromioclavicular joint  MRI SHOULDER LEFT WO CONTRAST   2. Subacromial bursitis of left shoulder joint     3. Chronic left shoulder pain       Assessment and plan: 31 female with continued subjective symptoms of left shoulder pain with known, correlating diagnosis of left shoulder acromioclavicular joint synovitis/arthritis and subacromial bursitis. -Time of 16 minutes was spent coordinating and discussing the clinical findings and diagnostic imaging results as they pertain to the patient's presenting subjective symptoms.  -At this time, and as discussed previously, I recommend a MRI left shoulder to better discern acromioclavicular joint synovitis and any significant bicipital tenosynovitis  -Pending the results, consideration for left shoulder arthroscopic subacromial decompression and distal clavicle resection.   At this time the patient has failed conservative care treatment options to include activity modification, corticosteroid injection, anti-inflammatories and formal physical therapy  -Continue activity modification to include low impact  -Follow-up after MRI completed to review results  -All questions answered to the patient's satisfaction and the patient expressed understanding and agreement with the above listed treatment plan  -Follow up in after MRI completed  -Thank you for the clinical consultation and allowing me to participate in the patient's care. Electronically signed by Gerardo Hernandes MD on 1/27/22 at 8:56 AM PRANEETH Hernandes MD       Orthopaedic Surgery-Sports Medicine    Disclaimer: This note was dictated with voice recognition software. Though review and correction are routinely performed, please contact the office/medical records for any errors requiring correction.

## 2022-02-03 RX ORDER — MELOXICAM 15 MG/1
TABLET ORAL
Qty: 30 TABLET | Refills: 0 | OUTPATIENT
Start: 2022-02-03

## 2022-02-04 ENCOUNTER — PATIENT MESSAGE (OUTPATIENT)
Dept: BARIATRICS/WEIGHT MGMT | Age: 37
End: 2022-02-04

## 2022-02-07 ENCOUNTER — HOSPITAL ENCOUNTER (OUTPATIENT)
Dept: MRI IMAGING | Age: 37
Discharge: HOME | End: 2022-02-07
Payer: MEDICAID

## 2022-02-07 DIAGNOSIS — M19.019 OSTEOARTHRITIS OF ACROMIOCLAVICULAR JOINT: ICD-10-CM

## 2022-02-07 DIAGNOSIS — M50.10 CERVICAL DISC DISORDER WITH RADICULOPATHY, UNSPECIFIED CERVICAL REGION: ICD-10-CM

## 2022-02-07 PROCEDURE — 72141 MRI NECK SPINE W/O DYE: CPT

## 2022-02-07 PROCEDURE — 73221 MRI JOINT UPR EXTREM W/O DYE: CPT

## 2022-02-10 ENCOUNTER — OFFICE VISIT (OUTPATIENT)
Dept: ORTHOPEDIC SURGERY | Age: 37
End: 2022-02-10
Payer: MEDICAID

## 2022-02-10 VITALS — HEIGHT: 67 IN | BODY MASS INDEX: 40.81 KG/M2 | WEIGHT: 260 LBS

## 2022-02-10 DIAGNOSIS — M75.81 TENDONITIS OF BOTH ROTATOR CUFFS: ICD-10-CM

## 2022-02-10 DIAGNOSIS — M75.82 TENDONITIS OF BOTH ROTATOR CUFFS: ICD-10-CM

## 2022-02-10 DIAGNOSIS — M19.012 ARTHRITIS OF BOTH ACROMIOCLAVICULAR JOINTS: Primary | ICD-10-CM

## 2022-02-10 DIAGNOSIS — M75.52 SUBACROMIAL BURSITIS OF LEFT SHOULDER JOINT: ICD-10-CM

## 2022-02-10 DIAGNOSIS — M19.011 ARTHRITIS OF BOTH ACROMIOCLAVICULAR JOINTS: Primary | ICD-10-CM

## 2022-02-10 PROCEDURE — 99214 OFFICE O/P EST MOD 30 MIN: CPT | Performed by: ORTHOPAEDIC SURGERY

## 2022-02-10 PROCEDURE — G8427 DOCREV CUR MEDS BY ELIG CLIN: HCPCS | Performed by: ORTHOPAEDIC SURGERY

## 2022-02-10 PROCEDURE — G8417 CALC BMI ABV UP PARAM F/U: HCPCS | Performed by: ORTHOPAEDIC SURGERY

## 2022-02-10 PROCEDURE — 1036F TOBACCO NON-USER: CPT | Performed by: ORTHOPAEDIC SURGERY

## 2022-02-10 PROCEDURE — G8484 FLU IMMUNIZE NO ADMIN: HCPCS | Performed by: ORTHOPAEDIC SURGERY

## 2022-02-10 NOTE — LETTER
[id number]   DR. Jessica Alcantar     TODAY'S DATE: 2/10/22    PATIENT'S NAME: Gaetano Cook    PATIENT'S NUMBER: [id number]    : 1985    PREFERRED PHONE NUMBER: [phone]      WORK PHONE NUMBER: There is no work phone number on file. DIAGNOSIS:   1. Arthritis of left acromioclavicular joint    2. Subacromial bursitis of left shoulder joint    3.  Tendonitis of left rotator cuff        PROCEDURE: Left shoulder arthroscopic subacromial decompression with acromioplasty and distal clavicle resection    SURGEON: Dr. Ibrahima Khoury: Elective    HOSPITAL: [facility]    ANESTHESIA: Regional Nerve Block  Pre-Op Block requested YES - Interscalene Single Shot    LENGTH OF SURGERY: 1 Hr    EQUIPMENT REQUESTED: None      X-RAYS REQUIRED: None    ANTIBIOTICS: Ancef 2gm IV x 1    MEDICATIONS: None    LABS: None          PCP: ROSE Frances CNP    H&P TO BE DONE BY THE PCP      CARDIAC CLEARANCE NEEDED: No     ALLERGIES:   Allergies   Allergen Reactions    Latex Itching    Lactose      \"Extreme\" stomach pain and diarrhea    Adhesive Tape      Rips off skin    Keflex [Cephalexin] Nausea And Vomiting     Projectile vomitting       DME: Simple Sling    POST-OP VISIT: 10-12 days    OTHER INSTRUCTIONS/REMARKS: NA    INSURANCE INFORMATION: _________________________ CARD IN MEDIA IN EPIC___  CARD FAXED___    PRE-CERTIFICATION REQUIRED: YES   NO   PER _______________________    SURGERY SCHEDULED BY: ____________________________________    PATIENT CALLED AND CONFIRMED DATE & TIME: ______________________             Vita Son MD       Orthopaedic Surgery - Sports Medicine

## 2022-02-10 NOTE — PROGRESS NOTES
FOLLOW UP ORTHOPAEDIC NOTE    The patient follows up today for reevaluation of left shoulder pain 4/10 pain primarily along the superior and anterior superior aspect of the shoulder. The patient states she has been having pain still with ADLs. She obtained the MRI and follows up today for repeat evaluation. PE:  AAOx3  RR  Unlabored breathing  Skin warm and moist  Focused physical examination of the left shoulder  Positive tenderness palpation acromioclavicular joint, positive cross arm adduction. No significant tenderness along the biceps tendon sheath today. Negative Speed negative Yergason. Trace Neer, positive Garcia. 5/5 strength supraspinatus infraspinatus and subscapularis without pain  Skin intact throughout  5/5 D B T G IO EPL  SILT Ax, R, U, M  +2 radial pulse    Pertinent radiographs/imaging:  MRI left shoulder 2/7/2022:  Impression   1. No full-thickness rotator cuff tear. 2. New small 5 mm subscapularis tendon partial-thickness tear. 3. No biceps tear.         MY READ: Supraspinatus infraspinatus and subscapularis with tendinosis. No samuel tear at insertion. No significant fluid in the biceps tendon sheath and without significant superior labral undermining to suggest SLAP tear. Positive subacromial edema. Positive acromioclavicular joint edema/arthrosis/synovitis. Diagnosis Orders   1. Arthritis of left acromioclavicular joint  [facility] (Ortho [facility])-[facility]   2. Subacromial bursitis of left shoulder joint  [facility] Physical [facility] (Ortho & Sports)-[facility]   3. Tendonitis of left rotator cuff  [facility] Physical [facility] (Ortho & [facility])-[facility]     Assessment and plan: 31 female with continued subjective symptoms of left shoulder pain with known, correlating diagnosis of left shoulder acromioclavicular joint synovitis/arthritis and subacromial bursitis/rotator cuff tendinitis.   -Time of 16 minutes was spent coordinating and discussing the clinical decompression and distal clavicle resection.  -I reviewed with her the perioperative course and what will entail. She understands this and is wishing to proceed with surgery. -Authorizations to be placed and she will be scheduled for outpatient surgery  -All questions answered to the patient's satisfaction and the patient expressed understanding and agreement with the above listed treatment plan  -Follow up in 10 to 12 days postop per routine  -Thank you for the clinical consultation and allowing me to participate in the patient's care. Electronically signed by Dragan Cruz MD on 2/10/22 at 11:20 AM EST         Dragan Cruz MD       Orthopaedic Surgery-Sports Medicine    Disclaimer: This note was dictated with voice recognition software. Though review and correction are routinely performed, please contact the office/medical records for any errors requiring correction.

## 2022-02-11 ENCOUNTER — TELEPHONE (OUTPATIENT)
Dept: ORTHOPEDIC SURGERY | Age: 37
End: 2022-02-11

## 2022-02-11 ENCOUNTER — HOSPITAL ENCOUNTER (OUTPATIENT)
Age: 37
Discharge: HOME | End: 2022-02-11
Payer: MEDICAID

## 2022-02-11 LAB — SARS-COV-2: NOT DETECTED

## 2022-02-11 PROCEDURE — U0005 INFEC AGEN DETEC AMPLI PROBE: HCPCS

## 2022-02-11 PROCEDURE — U0003 INFECTIOUS AGENT DETECTION BY NUCLEIC ACID (DNA OR RNA); SEVERE ACUTE RESPIRATORY SYNDROME CORONAVIRUS 2 (SARS-COV-2) (CORONAVIRUS DISEASE [COVID-19]), AMPLIFIED PROBE TECHNIQUE, MAKING USE OF HIGH THROUGHPUT TECHNOLOGIES AS DESCRIBED BY CMS-2020-01-R: HCPCS

## 2022-02-14 ENCOUNTER — ANESTHESIA EVENT (OUTPATIENT)
Dept: OPERATING ROOM | Age: 37
End: 2022-02-14
Payer: MEDICAID

## 2022-02-14 RX ORDER — OMEPRAZOLE 40 MG/1
40 CAPSULE, DELAYED RELEASE ORAL DAILY
COMMUNITY

## 2022-02-14 NOTE — PROGRESS NOTES
Althea Libby    Age 31 y.o.    female    1985    N [id number]    2/16/2022  Arrival Time_____________  OR Time____________170 Min     Procedure(s):  VIDEO ARTHROSCOPY LEFT SHOULDER, SUBACROMIAL DECOMPRESSION WITH ACROMIOPLASTY AND DISTAL CLAVICLE RESECTION                      General    Surgeon(s):  Campos Bardales, MD       Phone [phone] ([place])     [address] [place] [address] [facility]  Cell         Work  _____________________________________________________________________  _____________________________________________________________________  _____________________________________________________________________  _____________________________________________________________________  _____________________________________________________________________    PCP _____________________________ Phone_________________     H&P__________________Bringing      Chart            Epic   DOS      Called________  EKG__________________Bringing      Chart            Epic   DOS      Called________  LAB__________________ Bringing      Chart            Epic   DOS      Called________  Cardiac Clearance_______Bringing      Chart            Epic      DOS      Called________    Cardiologist________________________ Phone___________________________    ? Anglican concerns / Waiver on Chart            [employer]________________  ? Pre-op Instructions Given [place]          _________________________________  ? Directions to Surgery Center                          _________________________________  ? Transportation Home_______________      __________________________________  ?  Crutches/Walker__________________        __________________________________    ________Pre-op Orders   _______Transcribed    _______Wt.  ________Pharmacy          _______SCD  ______VTE     ______[facility] [place]  _______  Surgery Consent    _______  Anesthesia Consent         COVID DATE______________LOCATION________________ RESULT__________

## 2022-02-15 ENCOUNTER — TELEPHONE (OUTPATIENT)
Dept: ORTHOPEDIC SURGERY | Age: 37
End: 2022-02-15

## 2022-02-15 NOTE — TELEPHONE ENCOUNTER
Spoke with patient in regards to the [address] [place] [address] knee joint pain program. Patient stated that her knee pain has been doing ok, but is currently scheduled for surgery tomorrow with Dr. Adryan Robins for other orthopaedic issues. I let her know that if there is anything further that we can do for her or if her knee symptoms worsen or fail to improve, she can give us a callback at [phone] for further evaluation and treatment options.

## 2022-02-16 ENCOUNTER — ANESTHESIA (OUTPATIENT)
Dept: OPERATING ROOM | Age: 37
End: 2022-02-16
Payer: MEDICAID

## 2022-02-16 ENCOUNTER — HOSPITAL ENCOUNTER (OUTPATIENT)
Age: 37
Setting detail: OUTPATIENT SURGERY
Discharge: HOME | End: 2022-02-16
Attending: ORTHOPAEDIC SURGERY | Admitting: ORTHOPAEDIC SURGERY
Payer: MEDICAID

## 2022-02-16 VITALS
DIASTOLIC BLOOD PRESSURE: 55 MMHG | WEIGHT: 260 LBS | BODY MASS INDEX: 40.81 KG/M2 | HEIGHT: 67 IN | RESPIRATION RATE: 8 BRPM | OXYGEN SATURATION: 98 % | TEMPERATURE: 97.5 F | HEART RATE: 90 BPM | SYSTOLIC BLOOD PRESSURE: 99 MMHG

## 2022-02-16 VITALS
OXYGEN SATURATION: 98 % | RESPIRATION RATE: 15 BRPM | DIASTOLIC BLOOD PRESSURE: 69 MMHG | TEMPERATURE: 98.2 F | SYSTOLIC BLOOD PRESSURE: 106 MMHG

## 2022-02-16 DIAGNOSIS — M19.012 ARTHRITIS OF LEFT ACROMIOCLAVICULAR JOINT: Primary | ICD-10-CM

## 2022-02-16 LAB
GLUCOSE BLD-MCNC: 98 MG/DL (ref 70–99)
PERFORMED ON: NORMAL

## 2022-02-16 PROCEDURE — 3600000004 HC SURGERY LEVEL 4 BASE: Performed by: ORTHOPAEDIC SURGERY

## 2022-02-16 PROCEDURE — 7100000001 HC PACU RECOVERY - ADDTL 15 MIN: Performed by: ORTHOPAEDIC SURGERY

## 2022-02-16 PROCEDURE — 2580000003 HC RX 258: Performed by: ANESTHESIOLOGY

## 2022-02-16 PROCEDURE — 6360000002 HC RX W HCPCS: Performed by: ANESTHESIOLOGY

## 2022-02-16 PROCEDURE — 3700000000 HC ANESTHESIA ATTENDED CARE: Performed by: ORTHOPAEDIC SURGERY

## 2022-02-16 PROCEDURE — 7100000011 HC PHASE II RECOVERY - ADDTL 15 MIN: Performed by: ORTHOPAEDIC SURGERY

## 2022-02-16 PROCEDURE — 64415 NJX AA&/STRD BRCH PLXS IMG: CPT | Performed by: ANESTHESIOLOGY

## 2022-02-16 PROCEDURE — 6360000002 HC RX W HCPCS: Performed by: ORTHOPAEDIC SURGERY

## 2022-02-16 PROCEDURE — 2709999900 HC NON-CHARGEABLE SUPPLY: Performed by: ORTHOPAEDIC SURGERY

## 2022-02-16 PROCEDURE — 3600000014 HC SURGERY LEVEL 4 ADDTL 15MIN: Performed by: ORTHOPAEDIC SURGERY

## 2022-02-16 PROCEDURE — 2720000010 HC SURG SUPPLY STERILE: Performed by: ORTHOPAEDIC SURGERY

## 2022-02-16 PROCEDURE — 2500000003 HC RX 250 WO HCPCS: Performed by: NURSE ANESTHETIST, CERTIFIED REGISTERED

## 2022-02-16 PROCEDURE — 3700000001 HC ADD 15 MINUTES (ANESTHESIA): Performed by: ORTHOPAEDIC SURGERY

## 2022-02-16 PROCEDURE — 7100000010 HC PHASE II RECOVERY - FIRST 15 MIN: Performed by: ORTHOPAEDIC SURGERY

## 2022-02-16 PROCEDURE — 29824 SHO ARTHRS SRG DSTL CLAVICLC: CPT | Performed by: ORTHOPAEDIC SURGERY

## 2022-02-16 PROCEDURE — 29826 SHO ARTHRS SRG DECOMPRESSION: CPT | Performed by: ORTHOPAEDIC SURGERY

## 2022-02-16 PROCEDURE — 2580000003 HC RX 258: Performed by: ORTHOPAEDIC SURGERY

## 2022-02-16 PROCEDURE — 6360000002 HC RX W HCPCS: Performed by: NURSE ANESTHETIST, CERTIFIED REGISTERED

## 2022-02-16 PROCEDURE — A4217 STERILE WATER/SALINE, 500 ML: HCPCS | Performed by: ORTHOPAEDIC SURGERY

## 2022-02-16 PROCEDURE — 7100000000 HC PACU RECOVERY - FIRST 15 MIN: Performed by: ORTHOPAEDIC SURGERY

## 2022-02-16 PROCEDURE — 6370000000 HC RX 637 (ALT 250 FOR IP): Performed by: ANESTHESIOLOGY

## 2022-02-16 RX ORDER — SODIUM CHLORIDE 0.9 % (FLUSH) 0.9 %
5-40 SYRINGE (ML) INJECTION EVERY 12 HOURS SCHEDULED
Status: DISCONTINUED | OUTPATIENT
Start: 2022-02-16 | End: 2022-02-16 | Stop reason: HOSPADM

## 2022-02-16 RX ORDER — SODIUM CHLORIDE, SODIUM LACTATE, POTASSIUM CHLORIDE, CALCIUM CHLORIDE 600; 310; 30; 20 MG/100ML; MG/100ML; MG/100ML; MG/100ML
INJECTION, SOLUTION INTRAVENOUS CONTINUOUS
Status: DISCONTINUED | OUTPATIENT
Start: 2022-02-16 | End: 2022-02-16 | Stop reason: HOSPADM

## 2022-02-16 RX ORDER — ROCURONIUM BROMIDE 10 MG/ML
INJECTION, SOLUTION INTRAVENOUS PRN
Status: DISCONTINUED | OUTPATIENT
Start: 2022-02-16 | End: 2022-02-16 | Stop reason: SDUPTHER

## 2022-02-16 RX ORDER — SODIUM CHLORIDE 9 MG/ML
25 INJECTION, SOLUTION INTRAVENOUS PRN
Status: DISCONTINUED | OUTPATIENT
Start: 2022-02-16 | End: 2022-02-16 | Stop reason: HOSPADM

## 2022-02-16 RX ORDER — MEPERIDINE HYDROCHLORIDE 50 MG/ML
12.5 INJECTION INTRAMUSCULAR; INTRAVENOUS; SUBCUTANEOUS EVERY 5 MIN PRN
Status: DISCONTINUED | OUTPATIENT
Start: 2022-02-16 | End: 2022-02-16 | Stop reason: HOSPADM

## 2022-02-16 RX ORDER — SODIUM CHLORIDE 0.9 % (FLUSH) 0.9 %
10 SYRINGE (ML) INJECTION EVERY 12 HOURS SCHEDULED
Status: DISCONTINUED | OUTPATIENT
Start: 2022-02-16 | End: 2022-02-16 | Stop reason: HOSPADM

## 2022-02-16 RX ORDER — TRANEXAMIC ACID 100 MG/ML
INJECTION, SOLUTION INTRAVENOUS PRN
Status: DISCONTINUED | OUTPATIENT
Start: 2022-02-16 | End: 2022-02-16 | Stop reason: SDUPTHER

## 2022-02-16 RX ORDER — SODIUM CHLORIDE 0.9 % (FLUSH) 0.9 %
5-40 SYRINGE (ML) INJECTION PRN
Status: DISCONTINUED | OUTPATIENT
Start: 2022-02-16 | End: 2022-02-16 | Stop reason: HOSPADM

## 2022-02-16 RX ORDER — HYDROCODONE BITARTRATE AND ACETAMINOPHEN 5; 325 MG/1; MG/1
1 TABLET ORAL EVERY 6 HOURS PRN
Qty: 20 TABLET | Refills: 0 | Status: SHIPPED | OUTPATIENT
Start: 2022-02-16 | End: 2022-02-21

## 2022-02-16 RX ORDER — APREPITANT 40 MG/1
40 CAPSULE ORAL ONCE
Status: COMPLETED | OUTPATIENT
Start: 2022-02-16 | End: 2022-02-16

## 2022-02-16 RX ORDER — PROPOFOL 10 MG/ML
INJECTION, EMULSION INTRAVENOUS PRN
Status: DISCONTINUED | OUTPATIENT
Start: 2022-02-16 | End: 2022-02-16 | Stop reason: SDUPTHER

## 2022-02-16 RX ORDER — ONDANSETRON 2 MG/ML
INJECTION INTRAMUSCULAR; INTRAVENOUS PRN
Status: DISCONTINUED | OUTPATIENT
Start: 2022-02-16 | End: 2022-02-16 | Stop reason: SDUPTHER

## 2022-02-16 RX ORDER — LIDOCAINE HYDROCHLORIDE 10 MG/ML
1 INJECTION, SOLUTION EPIDURAL; INFILTRATION; INTRACAUDAL; PERINEURAL
Status: DISCONTINUED | OUTPATIENT
Start: 2022-02-16 | End: 2022-02-16 | Stop reason: HOSPADM

## 2022-02-16 RX ORDER — LABETALOL HYDROCHLORIDE 5 MG/ML
INJECTION, SOLUTION INTRAVENOUS PRN
Status: DISCONTINUED | OUTPATIENT
Start: 2022-02-16 | End: 2022-02-16 | Stop reason: SDUPTHER

## 2022-02-16 RX ORDER — LIDOCAINE HYDROCHLORIDE 20 MG/ML
INJECTION, SOLUTION EPIDURAL; INFILTRATION; INTRACAUDAL; PERINEURAL PRN
Status: DISCONTINUED | OUTPATIENT
Start: 2022-02-16 | End: 2022-02-16 | Stop reason: SDUPTHER

## 2022-02-16 RX ORDER — SODIUM CHLORIDE 0.9 % (FLUSH) 0.9 %
10 SYRINGE (ML) INJECTION PRN
Status: DISCONTINUED | OUTPATIENT
Start: 2022-02-16 | End: 2022-02-16 | Stop reason: HOSPADM

## 2022-02-16 RX ORDER — MIDAZOLAM HYDROCHLORIDE 1 MG/ML
INJECTION INTRAMUSCULAR; INTRAVENOUS
Status: DISCONTINUED
Start: 2022-02-16 | End: 2022-02-16 | Stop reason: HOSPADM

## 2022-02-16 RX ORDER — MAGNESIUM HYDROXIDE 1200 MG/15ML
LIQUID ORAL CONTINUOUS PRN
Status: COMPLETED | OUTPATIENT
Start: 2022-02-16 | End: 2022-02-16

## 2022-02-16 RX ORDER — DEXAMETHASONE SODIUM PHOSPHATE 10 MG/ML
INJECTION INTRAMUSCULAR; INTRAVENOUS PRN
Status: DISCONTINUED | OUTPATIENT
Start: 2022-02-16 | End: 2022-02-16 | Stop reason: SDUPTHER

## 2022-02-16 RX ORDER — OXYCODONE HYDROCHLORIDE AND ACETAMINOPHEN 5; 325 MG/1; MG/1
1 TABLET ORAL EVERY 4 HOURS PRN
Status: DISCONTINUED | OUTPATIENT
Start: 2022-02-16 | End: 2022-02-16 | Stop reason: HOSPADM

## 2022-02-16 RX ORDER — ONDANSETRON 2 MG/ML
4 INJECTION INTRAMUSCULAR; INTRAVENOUS EVERY 10 MIN PRN
Status: DISCONTINUED | OUTPATIENT
Start: 2022-02-16 | End: 2022-02-16 | Stop reason: HOSPADM

## 2022-02-16 RX ADMIN — APREPITANT 40 MG: 40 CAPSULE ORAL at 12:09

## 2022-02-16 RX ADMIN — LABETALOL HYDROCHLORIDE 5 MG: 5 INJECTION, SOLUTION INTRAVENOUS at 16:03

## 2022-02-16 RX ADMIN — HYDROMORPHONE HYDROCHLORIDE 0.5 MG: 1 INJECTION, SOLUTION INTRAMUSCULAR; INTRAVENOUS; SUBCUTANEOUS at 16:59

## 2022-02-16 RX ADMIN — ROCURONIUM BROMIDE 20 MG: 10 SOLUTION INTRAVENOUS at 16:16

## 2022-02-16 RX ADMIN — OXYCODONE HYDROCHLORIDE AND ACETAMINOPHEN 1 TABLET: 5; 325 TABLET ORAL at 17:03

## 2022-02-16 RX ADMIN — LIDOCAINE HYDROCHLORIDE 80 MG: 20 INJECTION, SOLUTION EPIDURAL; INFILTRATION; INTRACAUDAL; PERINEURAL at 15:36

## 2022-02-16 RX ADMIN — SODIUM CHLORIDE, POTASSIUM CHLORIDE, SODIUM LACTATE AND CALCIUM CHLORIDE: 600; 310; 30; 20 INJECTION, SOLUTION INTRAVENOUS at 12:05

## 2022-02-16 RX ADMIN — SUGAMMADEX 200 MG: 100 INJECTION, SOLUTION INTRAVENOUS at 16:35

## 2022-02-16 RX ADMIN — PROPOFOL 200 MG: 10 INJECTION, EMULSION INTRAVENOUS at 15:36

## 2022-02-16 RX ADMIN — ROCURONIUM BROMIDE 50 MG: 10 SOLUTION INTRAVENOUS at 15:36

## 2022-02-16 RX ADMIN — CEFAZOLIN 1000 MG: 10 INJECTION, POWDER, FOR SOLUTION INTRAVENOUS at 16:30

## 2022-02-16 RX ADMIN — DEXAMETHASONE SODIUM PHOSPHATE 10 MG: 10 INJECTION INTRAMUSCULAR; INTRAVENOUS at 15:42

## 2022-02-16 RX ADMIN — ONDANSETRON 4 MG: 2 INJECTION INTRAMUSCULAR; INTRAVENOUS at 15:42

## 2022-02-16 RX ADMIN — TRANEXAMIC ACID 1000 MG: 100 INJECTION, SOLUTION INTRAVENOUS at 15:41

## 2022-02-16 RX ADMIN — TRANEXAMIC ACID 1000 MG: 100 INJECTION, SOLUTION INTRAVENOUS at 16:31

## 2022-02-16 RX ADMIN — SODIUM CHLORIDE, POTASSIUM CHLORIDE, SODIUM LACTATE AND CALCIUM CHLORIDE: 600; 310; 30; 20 INJECTION, SOLUTION INTRAVENOUS at 16:22

## 2022-02-16 RX ADMIN — CEFAZOLIN 3000 MG: 10 INJECTION, POWDER, FOR SOLUTION INTRAVENOUS at 15:33

## 2022-02-16 ASSESSMENT — PAIN SCALES - GENERAL
PAINLEVEL_OUTOF10: 7
PAINLEVEL_OUTOF10: 7

## 2022-02-16 ASSESSMENT — PULMONARY FUNCTION TESTS
PIF_VALUE: 20
PIF_VALUE: 25
PIF_VALUE: 20
PIF_VALUE: 21
PIF_VALUE: 20
PIF_VALUE: 27
PIF_VALUE: 20
PIF_VALUE: 1
PIF_VALUE: 20
PIF_VALUE: 21
PIF_VALUE: 20
PIF_VALUE: 22
PIF_VALUE: 20
PIF_VALUE: 1
PIF_VALUE: 20
PIF_VALUE: 19
PIF_VALUE: 21
PIF_VALUE: 1
PIF_VALUE: 20
PIF_VALUE: 1
PIF_VALUE: 25
PIF_VALUE: 20
PIF_VALUE: 19
PIF_VALUE: 20
PIF_VALUE: 20
PIF_VALUE: 1
PIF_VALUE: 0
PIF_VALUE: 7
PIF_VALUE: 20
PIF_VALUE: 23
PIF_VALUE: 20
PIF_VALUE: 25
PIF_VALUE: 21
PIF_VALUE: 2
PIF_VALUE: 20
PIF_VALUE: 21
PIF_VALUE: 19
PIF_VALUE: 20
PIF_VALUE: 21
PIF_VALUE: 20
PIF_VALUE: 21
PIF_VALUE: 20
PIF_VALUE: 8
PIF_VALUE: 0

## 2022-02-16 ASSESSMENT — PAIN - FUNCTIONAL ASSESSMENT: PAIN_FUNCTIONAL_ASSESSMENT: 0-10

## 2022-02-16 NOTE — H&P
ORTHOPAEDIC SURGERY INTERVAL H&P    La Nena Aaron was seen in the preoperative area, where a history and physical examination was reviewed and the patient was examined by me today. There have been no significant clinical changes since the completion of the previous recorded history and physical dated 11/8/22 and most recent 2/10/22. The surgical site was confirmed by the patient and me and the surgical site was marked. The risks, benefits, and alternatives of the proposed procedure(s) have been explained to the patient (or appropriately confirmed guardian) and understanding was verbalized. Please see outpatient notes for details. All questions were answered and a signed documented consent has been placed in the patient's chart. The patient wishes to proceed. On call to the OR. Electronically signed by: Tamika Roberto MD,2/16/2022,2:54 PM         Tamika Roberto MD       Orthopaedic Surgery-Sports Medicine      Disclaimer: This note was dictated with voice recognition software. Though review and correction are routinely performed, please contact the office/medical records for any errors requiring correction.

## 2022-02-16 NOTE — OP NOTE
Operative Note      Patient: Jenny Davenport  YOB: 1985  MRN: [id number]    Date of Procedure: 2/16/2022    Pre-Op Diagnosis: ARTHRITIS OF LEFT ACROMIOCLAVICULAR JOINT, SUBACROMIAL BURSITIS OF LEFT SHOULDER JOINT, TENDONITIS OF LEFT ROTATOR CUFF    Post-Op Diagnosis: Same       Procedure(s):  VIDEO ARTHROSCOPY LEFT SHOULDER, SUBACROMIAL DECOMPRESSION WITH ACROMIOPLASTY AND DISTAL CLAVICLE RESECTION -SLEEP APNEA    Surgeon(s):  Patricia Albert MD    Assistant:   Surgical Assistant: Jim Garcia    Anesthesia: General    Estimated Blood Loss (mL): Minimal    Complications: None    Specimens:   * No specimens in log *    Implants:  * No implants in log *      Drains: * No LDAs found *    Findings: No rotator cuff tear or gross pathology. No gross SLAP tear and no gross bicipital tenosynovitis. + dense subacromial bursitis with anterior acromial spur. + AC joint arthrosis and synovitis    Detailed Description of Procedure:     OPERATIVE INDICATIONS: Patient is a  31 y.o. female with left shoulder pain and symptoms consistent with subacromial impingement, rotator cuff tendinitis, and a.c. joint arthritis. Please see outpatient notes for details and clinical history and previous treatment courses as applicable. With further discussion with the patient regarding continued non operative versus operative measures including risks and benefit s of each. Understanding these, the patient wished to proceed with surgery. These risks included were not limited to: damage to nerves, arteries, tendons, veins, infection, bleeding, continued pain, continued disability, need for revision surgery, chondral changes/chondral damage, loss of range of motion, damage to ligaments causing shoulder instability, painful hardware, retained broken in strumentation, retained broken hardware, venothromboembolism, hardware failure, cosmetic deformity, associated complications with anesthesia, and ultimately death.  Understanding these, a signed document of consent was placed in the patients chart. OPERATIVE PROCEDURE: The patient was correctly identified in the preoperative holding area. The left upper extremity was marked by the surgeon. All questions were answered. Ultimately the patient was transported back to the operating room placed supine on the OR table with all bony prominences well padded and transitioned to a beach chair position after anesthesia induced per above. The left upper extremity was prepped and draped in the standard sterile fashion after the patient received Ancef IV x1 within 60 minutes prior to surgery, a timeout was performed per protocol, and the patient had SCDs on the bilateral lower extremity for venothromboembolism prophylaxis. The left shoulder glenohumeral joint was insufflated with 60 cc of normal saline. Standard posterior working portal was made and arthroscope was introduced. Immediate anterior working portal was made by spinal needle localization. Above findings were appreciated. There was no gross tear of the biceps or its anchor. Articular sided review and probing of the rotator cuff revealed no gross tear of the supraspinatus and no tear of the  infraspinatus . No tear was visualized in the subscapularis. No loose bodies in the joint appreciated. All arthroscopic equipment was removed from the glenohumeral joint and scope placed into the subacromial space when viewing from posteriorly. Direct anterior lateral working portal was made after localizing with a spinal needle. Shaver and werewolf electrocautery were introduced to perform a subacromial decompression which there was significant bursitis appreciated. Viewing laterally and working by Spencer Estrada, anterior acromial hook/spur was taken down by be as an acromioplasty. With reviewing there was no gross tear of the rotator cuff. The rotator cuff moved as one unit.      Attention then turned to the distal clavicle resection where using a 70° scope and working anteriorly by indirect methods, 8-10 mm was resected of the distal clavicle. Wounds were cleaned and dried. The wounds were closed with 3-0 nylon in interrupted fashion. The wounds were ultimately dressed with Xeroform 4 x 4's ABDs and foam tape. The left upper extremity was placed in a sling.  Patient was awakened without difficulty and transferred to the PACU in stable condition    Condition stable     Disposition to PACU and then to home       Electronically signed by Vita Son MD on 2/16/22 at 4:42 PM EST      Electronically signed by Vita Son MD on 2/16/2022 at 4:41 PM

## 2022-02-16 NOTE — ANESTHESIA POSTPROCEDURE EVALUATION
Department of Anesthesiology  Postprocedure Note    Patient: Cait Torres  MRN: [id number]  YOB: 1985  Date of evaluation: 2/16/2022    Procedure Summary     Date: 02/16/22 Room / Location: [address] Bella Shelley [address] [place] [address]    Anesthesia Start: 3239 Anesthesia Stop: 1646    Procedure: VIDEO ARTHROSCOPY LEFT SHOULDER, SUBACROMIAL DECOMPRESSION WITH ACROMIOPLASTY AND DISTAL CLAVICLE RESECTION -SLEEP APNEA (Left Shoulder) Diagnosis:       Arthrosis of left acromioclavicular joint      Subacromial bursitis of left shoulder joint      Tendonitis of left rotator cuff      (ARTHRITIS OF LEFT ACROMIOCLAVICULAR JOINT, SUBACROMIAL BURSITIS OF LEFT SHOULDER JOINT, TENDONITIS OF LEFT ROTATOR CUFF)    Surgeons: Jayleen Vogel MD Responsible Provider: Kusum Liu MD    Anesthesia Type: general, regional ASA Status: 3        Anesthesia Type: general, regional    Liya Phase I: Liya Score: 10    Liya Phase II: Liya Score: 10    Last vitals: Reviewed and per EMR flowsheets.      Anesthesia Post Evaluation   Anesthetic Problems: no   Cardiovascular System Stable: yes  Respiratory Function: Airway Patent yes  ETT no  Ventilator no  Level of consciousness: awake, alert and oriented  Post-op pain: adequate analgesia  Hydration Adequate: yes  Nausea/Vomiting:no  Other Issues:     Tyler Austin MD

## 2022-02-16 NOTE — ANESTHESIA PROCEDURE NOTES
Peripheral Block    Patient location during procedure: pre-op  Staffing  Performed: anesthesiologist   Anesthesiologist: Carmela Avila MD  Preanesthetic Checklist  Completed: patient identified, IV checked, site marked, risks and benefits discussed, surgical consent, monitors and equipment checked, pre-op evaluation, timeout performed, anesthesia consent given, oxygen available and patient being monitored  Peripheral Block  Patient position: sitting  Prep: ChloraPrep  Patient monitoring: cardiac monitor, continuous pulse ox, frequent blood pressure checks and IV access  Block type: Brachial plexus  Laterality: left  Injection technique: single-shot  Guidance: ultrasound guided  Local infiltration: lidocaine  Infiltration strength: 1 %  Dose: 3 mL  Interscalene and Supraclavicular  Provider prep: mask and sterile gloves  Local infiltration: lidocaine  Needle  Needle type: combined needle/nerve stimulator   Needle gauge: 21 G  Needle length: 10 cm  Needle localization: ultrasound guidance  Assessment  Injection assessment: negative aspiration for heme, no paresthesia on injection and local visualized surrounding nerve on ultrasound  Paresthesia pain: none  Slow fractionated injection: yes  Hemodynamics: stable  Additional Notes  Immediately prior to procedure a \"time out\" was called to verify the correct patient, allergies, laterality, procedure and equipment. Time out performed with RN    Local Anesthetic: 0.5 %  Bupivacaine, Decadron 10 mg   Amount: 30 ml  in 5 ml increments after negative aspiration each time. Versed 10 mg  IV    Anterior scalene and middle scalene muscles, upper, middle and lower trunks of the brachial plexus are identified, the tip of the needle and the spread of the local anesthetic around the brachial plexus are visualized. The Brachial plexus appeared to be anatomically normal and there were no abnormal pathologically findings seen.          Reason for block: post-op pain management and at surgeon's request

## 2022-02-23 ENCOUNTER — TELEPHONE (OUTPATIENT)
Dept: ORTHOPEDIC SURGERY | Age: 37
End: 2022-02-23

## 2022-02-23 ENCOUNTER — HOSPITAL ENCOUNTER (OUTPATIENT)
Dept: PHYSICAL THERAPY | Age: 37
Setting detail: THERAPIES SERIES
Discharge: HOME | End: 2022-02-23
Payer: MEDICAID

## 2022-02-23 PROCEDURE — 97161 PT EVAL LOW COMPLEX 20 MIN: CPT | Performed by: SPECIALIST

## 2022-02-23 PROCEDURE — 97140 MANUAL THERAPY 1/> REGIONS: CPT | Performed by: SPECIALIST

## 2022-02-23 PROCEDURE — 97016 VASOPNEUMATIC DEVICE THERAPY: CPT | Performed by: SPECIALIST

## 2022-02-23 PROCEDURE — 97110 THERAPEUTIC EXERCISES: CPT | Performed by: SPECIALIST

## 2022-02-23 NOTE — FLOWSHEET NOTE
[facility], [address] [place] [address], [address] [place] [address] [address]  Phone: [phone]   Fax:     [phone]      Physical Therapy Treatment Note/ Progress Report:     Date:  2022    Patient Name:  Luci Barroso    :  1985  MRN: [id number]  Restrictions/Precautions:    Medical/Treatment Diagnosis Information:      M75.52 (ICD-10-CM) - Subacromial bursitis of left shoulder joint   M19.011, M19.012 (ICD-10-CM) - Arthritis of both acromioclavicular joints   M75.81, M75.82 (ICD-10-CM) - Tendonitis of both rotator cuffs     Procedure(s):  VIDEO ARTHROSCOPY LEFT SHOULDER, SUBACROMIAL DECOMPRESSION WITH ACROMIOPLASTY AND DISTAL CLAVICLE RESECTION -SLEEP APNEA  ·   ·    Insurance/Certification information:    [place] MEDICAID  Physician Information:    Max Lamb MD  Has the plan of care been signed (Y/N):        []  Yes  [x]  No     Date of Patient follow up with Physician: NS    Is this a Progress Report:     []  Yes  [x]  No     If Yes:  Date Range for reporting period:  Beginnin22 ------------ Ending: 3/23/22    Progress report will be due (10 Rx or 30 days whichever is less): 36     Recertification will be due (POC Duration  / 90 days whichever is less): 22      Visit # Insurance Allowable Auth Required   In Person 1  []  Yes     []  No    Tele Health 0  []  Yes     []  No    Total 1       Functional Scale: Quick Dash 64%      Date assessed:  22      Latex Allergy:  [x]NO      []YES  Preferred Language for Healthcare:   [x]English       []other:    Pain level:  5/10     SUBJECTIVE:  See eval    OBJECTIVE: See eval   Observation:    Test measurements:      RESTRICTIONS/PRECAUTIONS: none    Exercises/Interventions:   Therapeutic Ex (79018) Sets/sec Reps Notes/CUES HEP   Supine cane flex  10x  x                 PS  10x  x   Wall walk 10 5x  x Manual Intervention ([phone])       JM/ S ROM  8 min                                        NMR re-education (05380)   CUES NEEDED    SL abd/ ER  10x  x   SG/ SBS  10x  x   Wall push ups  10x  x   Ball on wall  NV                                        Therapeutic Activity (62010)                     Vaso  10 min                   iKnowl access code: [email or web address]           Therapeutic Exercise and NMR EXR  [x] (55296) Provided verbal/tactile cueing for activities related to strengthening, flexibility, endurance, ROM  for improvements in scapular, scapulothoracic and UE control with self care, reaching, carrying, lifting, house/yardwork, driving/computer work.    [] (54916) Provided verbal/tactile cueing for activities related to improving balance, coordination, kinesthetic sense, posture, motor skill, proprioception  to assist with  scapular, scapulothoracic and UE control with self care, reaching, carrying, lifting, house/yardwork, driving/computer work. Therapeutic Activities:    [x] (38285 or 51079) Provided verbal/tactile cueing for activities related to improving balance, coordination, kinesthetic sense, posture, motor skill, proprioception and motor activation to allow for proper function of scapular, scapulothoracic and UE control with self care, carrying, lifting, driving/computer work.      Home Exercise Program:    [x] (86299) Reviewed/Progressed HEP activities related to strengthening, flexibility, endurance, ROM of scapular, scapulothoracic and UE control with self care, reaching, carrying, lifting, house/yardwork, driving/computer work  [] (35616) Reviewed/Progressed HEP activities related to improving balance, coordination, kinesthetic sense, posture, motor skill, proprioception of scapular, scapulothoracic and UE control with self care, reaching, carrying, lifting, house/yardwork, driving/computer work      Manual Treatments:  PROM / STM / Oscillations-Mobs:  G-I, II, III, IV (PA's, Inf., Post.)  [x] (41754) Provided manual therapy to mobilize soft tissue/joints of cervical/CT, scapular GHJ and UE for the purpose of modulating pain, promoting relaxation,  increasing ROM, reducing/eliminating soft tissue swelling/inflammation/restriction, improving soft tissue extensibility and allowing for proper ROM for normal function with self care, reaching, carrying, lifting, house/yardwork, driving/computer work    Modalities:     [x] GAME READY (VASO)- for significant edema, swelling, pain control. Charges:  Timed Code Treatment Minutes: 25   Total Treatment Minutes:  55   BWC:  TE TIME:  NMR TIME:  MANUAL TIME:  UNTIMED MINUTES:  Medicare Total:       [x] EVAL (LOW) 09159 (typically 20 minutes face-to-face)  [] EVAL (MOD) 11636 (typically 30 minutes face-to-face)  [] EVAL (HIGH) 20699 (typically 45 minutes face-to-face)  [] RE-EVAL     [x] XD(18560) x  1   [] IONTO  [] NMR (46959) x     [] VASO  [x] Manual (89781) x 1    [] Other:  [] TA x      [] Mech Traction (13773)  [] ES(attended) (14256)      [] ES (un) (70624):    ASSESSMENT:  See eval      GOALS:      Patient stated goal: reach ADLs    Therapist goals for Patient:   Short Term Goals: To be achieved in: 2 weeks  1. Independent in HEP and progression per patient tolerance, in order to prevent re-injury. [x] Progressing: [] Met: [] Not Met: [] Adjusted     2. Patient will have a decrease in pain to facilitate improvement in movement, function, and ADLs as indicated by Functional Deficits. [x] Progressing: [] Met: [] Not Met: [] Adjusted     Long Term Goals: To be achieved in: 6 weeks  1. Disability index score of 32% or less for the DASH to assist with reaching prior level of function. [x] Progressing: [] Met: [] Not Met: [] Adjusted     2. Patient will demonstrate increased AROM to 160 to allow for proper joint functioning as indicated by patients Functional Deficits. [x] Progressing: [] Met: [] Not Met: [] Adjusted     3.  Patient will demonstrate an increase in Strength to 5/5 to allow for proper functional mobility as indicated by patients Functional Deficits. [x] Progressing: [] Met: [] Not Met: [] Adjusted     4. Patient will return to [facility] for  functional activities without increased symptoms or restriction. [x] Progressing: [] Met: [] Not Met: [] Adjusted     5. ADLs/ reach with min to no limitations (patient specific functional goal)    [x] Progressing: [] Met: [] Not Met: [] Adjusted              Overall Progression Towards Functional goals/ Treatment Progress Update:  [] Patient is progressing as expected towards functional goals listed. [] Progression is slowed due to complexities/Impairments listed. [] Progression has been slowed due to co-morbidities. [x] Plan just implemented, too soon to assess goals progression <30days   [] Goals require adjustment due to lack of progress  [] Patient is not progressing as expected and requires additional follow up with physician  [] Other    Prognosis for POC: [x] Good [] Fair  [] Poor      Patient requires continued skilled intervention: [x] Yes  [] No    Treatment/Activity Tolerance:  [x] Patient able to complete treatment  [] Patient limited by fatigue  [] Patient limited by pain    [] Patient limited by other medical complications  [] Other:       PLAN: See eval  [] Continue per plan of care [] Alter current plan (see comments above)  [x] Plan of care initiated [] Hold pending MD visit [] Discharge    Electronically signed by:  Kathrin Montes PT    Note: If patient does not return for scheduled/ recommended follow up visits, this note will serve as a discharge from care along with most recent update on progress.

## 2022-02-23 NOTE — TELEPHONE ENCOUNTER
Patient called me today and states that she has concerns about her sutures. While at physical therapy today, the PT told her that they looked red and should touch base with me. I discussed with the patient and ask if she had any discharge coming from them. She denied. She also denied any aches, fever or chills. She states that they didn't \"seem that red\" and weren't really giving her issues. I told her that if the redness gets worse, she does get discharge, fever achs or chills to call us ASAP or go to the hospital. She understood and will keep in touch.

## 2022-02-23 NOTE — PLAN OF CARE
[facility], [address] [facility] [place], [address]  Phone: [phone]   Fax:     [phone]                                                       Physical Therapy Certification    Dear   Tamika Roberto MD,    We had the pleasure of evaluating the following patient for physical therapy services at [address] [place] [address]. A summary of our findings can be found in the initial assessment below. This includes our plan of care. If you have any questions or concerns regarding these findings, please do not hesitate to contact me at the office phone number checked above.   Thank you for the referral.       Physician Signature:_______________________________Date:__________________  By signing above (or electronic signature), therapists plan is approved by physician      Patient: Ranulfo Castano   : 1985   MRN: [id number]  Referring Physician:   Tamika Roberto MD      Evaluation Date: 2022      Medical Diagnosis Information:      M75.52 (ICD-10-CM) - Subacromial bursitis of left shoulder joint   M19.011, M19.012 (ICD-10-CM) - Arthritis of both acromioclavicular joints   M75.81, M75.82 (ICD-10-CM) - Tendonitis of both rotator cuffs   22  Procedure(s):  VIDEO ARTHROSCOPY LEFT SHOULDER, SUBACROMIAL DECOMPRESSION WITH ACROMIOPLASTY AND DISTAL CLAVICLE RESECTION -SLEEP APNEA                                            Insurance information:   [facility] MEDICAID    Precautions/ Contra-indications: none      C-SSRS Triggered by Intake questionnaire (Past 2 wk assessment):   [x] No, Questionnaire did not trigger screening.   [] Yes, Patient intake triggered further evaluation      [] C-SSRS Screening completed  [] PCP notified via Plan of Care  [] Emergency services notified     Latex Allergy:  [x]NO      []YES  Preferred Language for Healthcare:   [x]English []other:    SUBJECTIVE: Patient states surgery L shoulder 2/16/22    Relevant Medical History:DM, anxiety depression  Functional Disability Index:  Quick Dash 64%    Pain Scale: 5/10  Easing factors: rest  Provocative factors: reaching     Type: []Constant   [x]Intermittent  []Radiating []Localized []other:     Numbness/Tingling: none    Occupation/School: NA    Living Status/Prior Level of Function: Independent with ADLs and IADLs,    OBJECTIVE:     CERV ROM     Cervical Flexion WFL all motions    Cervical Extension     Cervical SB     Cervical rotation          ROM Left Right   Shoulder Flex 143    Shoulder Abd 100    Shoulder ER 85    Shoulder IR 48                    Strength  Left Right   Shoulder Flex 4-    Shoulder Scap 4-    Shoulder ER 4    Shoulder IR 4                Reflexes/Sensation:    [x]Dermatomes/Myotomes intact    [x]Reflexes equal and normal bilaterally   []Other:    Joint mobility:    [x]Normal    []Hypo   []Hyper    Palpation: tenderness L shoulder     Functional Mobility/Transfers: WFL    Posture: WFL    Bandages/Dressings/Incisions: Sutures intact recommend removal    Gait: (include devices/WB status): WNL    Orthopedic Special Tests: NT                       [x] Patient history, allergies, meds reviewed. Medical chart reviewed. See intake form. Review Of Systems (ROS):  [x]Performed Review of systems (Integumentary, CardioPulmonary, Neurological) by intake and observation. Intake form has been scanned into medical record. Patient has been instructed to contact their primary care physician regarding ROS issues if not already being addressed at this time.       Co-morbidities/Complexities (which will affect course of rehabilitation):   []None           Arthritic conditions   []Rheumatoid arthritis (M05.9)  []Osteoarthritis (M19.91)   Cardiovascular conditions   []Hypertension (I10)  []Hyperlipidemia (E78.5)  []Angina pectoris (I20)  []Atherosclerosis (I70)   Musculoskeletal conditions []Disc pathology   []Congenital spine pathologies   []Prior surgical intervention  []Osteoporosis (M81.8)  []Osteopenia (M85.8)   Endocrine conditions   []Hypothyroid (E03.9)  []Hyperthyroid Gastrointestinal conditions   []Constipation (Y96.54)   Metabolic conditions   []Morbid obesity (E66.01)  [x]Diabetes type 1(E10.65) or 2 (E11.65)   []Neuropathy (G60.9)     Pulmonary conditions   []Asthma (J45)  []Coughing   []COPD (J44.9)   Psychological Disorders  [x]Anxiety (F41.9)  [x]Depression (F32.9)   []Other:   []Other:          Barriers to/and or personal factors that will affect rehab potential:              []Age  []Sex              []Motivation/Lack of Motivation                        []Co-Morbidities              []Cognitive Function, education/learning barriers              []Environmental, home barriers              []profession/work barriers  []past PT/medical experience  []other:  Justification:      Falls Risk Assessment (30 days):   [x] Falls Risk assessed and no intervention required.   [] Falls Risk assessed and Patient requires intervention due to being higher risk   TUG score (>12s at risk):     [] Falls education provided, including       G-Codes:       ASSESSMENT:   Functional Impairments   []Noted spinal or UE joint hypomobility   []Noted spinal or UE joint hypermobility   [x]Decreased UE functional ROM   [x]Decreased UE functional strength   []Abnormal reflexes/sensation/myotomal/dermatomal deficits   [x]Decreased RC/scapular/core strength and neuromuscular control   []other:      Functional Activity Limitations (from functional questionnaire and intake)   [x]Reduced ability to tolerate prolonged functional positions   [x]Reduced ability or difficulty with changes of positions or transfers between positions   [x]Reduced ability to maintain good posture and demonstrate good body mechanics with sitting, bending, and lifting   [x] Reduced ability or tolerance with driving and/or computer work   [x]Reduced ability to sleep   [x]Reduced ability to perform lifting, reaching, carrying tasks   [x]Reduced ability to tolerate impact through UE   [x]Reduced ability to reach behind back   [x]Reduced ability to  or hold objects   [x]Reduced ability to throw or toss an object   []other:    Participation Restrictions   [x]Reduced participation in self care activities   [x]Reduced participation in home management activities   [x]Reduced participation in work activities   []Reduced participation in social activities. []Reduced participation in sport/recreation activities. Classification:   [x]Signs/symptoms consistent with post-surgical status including decreased ROM, strength and function.   []Signs/symptoms consistent with joint sprain/strain   []Signs/symptoms consistent with shoulder impingement   []Signs/symptoms consistent with shoulder/elbow/wrist tendinopathy   []Signs/symptoms consistent with Rotator cuff tear   []Signs/symptoms consistent with labral tear   []Signs/symptoms consistent with postural dysfunction    []Signs/symptoms consistent with Glenohumeral IR Deficit - <45 degrees   []Signs/symptoms consistent with facet dysfunction of cervical/thoracic spine    []Signs/symptoms consistent with pathology which may benefit from Dry needling     []other:     Prognosis/Rehab Potential:      []Excellent   [x]Good    []Fair   []Poor    Tolerance of evaluation/treatment:    []Excellent   [x]Good    []Fair   []Poor    Physical Therapy Evaluation Complexity Justification  [x] A history of present problem with:  [] no personal factors and/or comorbidities that impact the plan of care;  [x]1-2 personal factors and/or comorbidities that impact the plan of care  []3 personal factors and/or comorbidities that impact the plan of care  [x] An examination of body systems using standardized tests and measures addressing any of the following: body structures and functions (impairments), activity limitations, and/or participation restrictions;:  [x] a total of 1-2 or more elements   [] a total of 3 or more elements   [] a total of 4 or more elements   [x] A clinical presentation with:  [x] stable and/or uncomplicated characteristics   [] evolving clinical presentation with changing characteristics  [] unstable and unpredictable characteristics;   [x] Clinical decision making of [x] low, [] moderate, [] high complexity using standardized patient assessment instrument and/or measurable assessment of functional outcome. [x] EVAL (LOW) 58914 (typically 20 minutes face-to-face)  [] EVAL (MOD) 28795 (typically 30 minutes face-to-face)  [] EVAL (HIGH) 25676 (typically 45 minutes face-to-face)  [] RE-EVAL     PLAN:  Frequency/Duration:  1-2 days per week for 6 Weeks:  INTERVENTIONS:  [x] Therapeutic exercise including: strength training, ROM, for Upper extremity and core   [x]  NMR activation and proprioception for UE, scap and Core   [x] Manual therapy as indicated for shoulder, scapula and spine to include: Dry Needling/IASTM, STM, PROM, Gr I-IV mobilizations, manipulation. [x] Modalities as needed that may include: thermal agents, E-stim, Biofeedback, US, iontophoresis as indicated  [x] Patient education on joint protection, postural re-education, activity modification, progression of HEP. HEP instruction: Refer to [address] [place] [address] access code and exercises on the 1st visit treatment note    GOALS:  Patient stated goal: reach ADLs    Therapist goals for Patient:   Short Term Goals: To be achieved in: 2 weeks  1. Independent in HEP and progression per patient tolerance, in order to prevent re-injury. [x] Progressing: [] Met: [] Not Met: [] Adjusted     2. Patient will have a decrease in pain to facilitate improvement in movement, function, and ADLs as indicated by Functional Deficits. [x] Progressing: [] Met: [] Not Met: [] Adjusted     Long Term Goals: To be achieved in: 6 weeks  1.  Disability index score of 32% or less for the DASH to assist with reaching prior level of function. [x] Progressing: [] Met: [] Not Met: [] Adjusted     2. Patient will demonstrate increased AROM to 160 to allow for proper joint functioning as indicated by patients Functional Deficits. [x] Progressing: [] Met: [] Not Met: [] Adjusted     3. Patient will demonstrate an increase in Strength to 5/5 to allow for proper functional mobility as indicated by patients Functional Deficits. [x] Progressing: [] Met: [] Not Met: [] Adjusted     4. Patient will return to [facility] for  functional activities without increased symptoms or restriction. [x] Progressing: [] Met: [] Not Met: [] Adjusted     5.  ADLs/ reach with min to no limitations (patient specific functional goal)    [x] Progressing: [] Met: [] Not Met: [] Adjusted      Electronically signed by:  Mckinley Le, PT

## 2022-02-26 ENCOUNTER — APPOINTMENT (OUTPATIENT)
Dept: GENERAL RADIOLOGY | Age: 37
End: 2022-02-26
Payer: MEDICAID

## 2022-02-26 ENCOUNTER — HOSPITAL ENCOUNTER (EMERGENCY)
Age: 37
Discharge: HOME | End: 2022-02-26
Payer: MEDICAID

## 2022-02-26 VITALS
SYSTOLIC BLOOD PRESSURE: 136 MMHG | HEART RATE: 89 BPM | DIASTOLIC BLOOD PRESSURE: 89 MMHG | HEIGHT: 67 IN | BODY MASS INDEX: 40.81 KG/M2 | RESPIRATION RATE: 16 BRPM | OXYGEN SATURATION: 100 % | TEMPERATURE: 98.7 F | WEIGHT: 260 LBS

## 2022-02-26 DIAGNOSIS — M25.512 ACUTE PAIN OF LEFT SHOULDER: Primary | ICD-10-CM

## 2022-02-26 DIAGNOSIS — Z98.890 H/O SHOULDER SURGERY: ICD-10-CM

## 2022-02-26 PROCEDURE — 73030 X-RAY EXAM OF SHOULDER: CPT

## 2022-02-26 PROCEDURE — 6370000000 HC RX 637 (ALT 250 FOR IP): Performed by: NURSE PRACTITIONER

## 2022-02-26 PROCEDURE — 99283 EMERGENCY DEPT VISIT LOW MDM: CPT

## 2022-02-26 RX ORDER — HYDROCODONE BITARTRATE AND ACETAMINOPHEN 5; 325 MG/1; MG/1
1 TABLET ORAL ONCE
Status: COMPLETED | OUTPATIENT
Start: 2022-02-26 | End: 2022-02-26

## 2022-02-26 RX ORDER — NAPROXEN 250 MG/1
250 TABLET ORAL 2 TIMES DAILY WITH MEALS
Qty: 20 TABLET | Refills: 1 | Status: SHIPPED | OUTPATIENT
Start: 2022-02-26 | End: 2022-09-09 | Stop reason: ALTCHOICE

## 2022-02-26 RX ADMIN — HYDROCODONE BITARTRATE AND ACETAMINOPHEN 1 TABLET: 5; 325 TABLET ORAL at 18:45

## 2022-02-26 ASSESSMENT — ENCOUNTER SYMPTOMS
RHINORRHEA: 0
ABDOMINAL PAIN: 0
SORE THROAT: 0
SHORTNESS OF BREATH: 0
COLOR CHANGE: 0

## 2022-02-26 ASSESSMENT — PAIN DESCRIPTION - DESCRIPTORS: DESCRIPTORS: BURNING

## 2022-02-26 ASSESSMENT — PAIN SCALES - GENERAL
PAINLEVEL_OUTOF10: 6
PAINLEVEL_OUTOF10: 6

## 2022-02-26 ASSESSMENT — PAIN - FUNCTIONAL ASSESSMENT: PAIN_FUNCTIONAL_ASSESSMENT: 0-10

## 2022-02-26 ASSESSMENT — PAIN DESCRIPTION - ORIENTATION: ORIENTATION: LEFT

## 2022-02-26 ASSESSMENT — PAIN DESCRIPTION - LOCATION: LOCATION: SHOULDER
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
## 2022-02-28 ENCOUNTER — OFFICE VISIT (OUTPATIENT)
Dept: ORTHOPEDIC SURGERY | Age: 37
End: 2022-02-28

## 2022-02-28 VITALS — BODY MASS INDEX: 40.81 KG/M2 | WEIGHT: 260 LBS | HEIGHT: 67 IN

## 2022-02-28 DIAGNOSIS — Z47.89 ORTHOPEDIC AFTERCARE: Primary | ICD-10-CM

## 2022-02-28 PROCEDURE — 99024 POSTOP FOLLOW-UP VISIT: CPT | Performed by: ORTHOPAEDIC SURGERY
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
## 2022-03-01 ENCOUNTER — APPOINTMENT (OUTPATIENT)
Dept: PHYSICAL THERAPY | Age: 37
End: 2022-03-01
Payer: MEDICAID

## 2022-03-02 ENCOUNTER — HOSPITAL ENCOUNTER (OUTPATIENT)
Dept: PHYSICAL THERAPY | Age: 37
Setting detail: THERAPIES SERIES
Discharge: HOME | End: 2022-03-02
Payer: MEDICAID

## 2022-03-02 ENCOUNTER — APPOINTMENT (OUTPATIENT)
Dept: PHYSICAL THERAPY | Age: 37
End: 2022-03-02
Payer: MEDICAID

## 2022-03-02 PROCEDURE — 97112 NEUROMUSCULAR REEDUCATION: CPT | Performed by: SPECIALIST/TECHNOLOGIST

## 2022-03-02 PROCEDURE — 97016 VASOPNEUMATIC DEVICE THERAPY: CPT | Performed by: SPECIALIST/TECHNOLOGIST

## 2022-03-02 PROCEDURE — 97110 THERAPEUTIC EXERCISES: CPT | Performed by: SPECIALIST/TECHNOLOGIST

## 2022-03-02 PROCEDURE — 97140 MANUAL THERAPY 1/> REGIONS: CPT | Performed by: SPECIALIST/TECHNOLOGIST

## 2022-03-04 RX ORDER — MELOXICAM 15 MG/1
TABLET ORAL
Qty: 30 TABLET | Refills: 0 | OUTPATIENT
Start: 2022-03-04

## 2022-03-08 ENCOUNTER — HOSPITAL ENCOUNTER (OUTPATIENT)
Dept: PHYSICAL THERAPY | Age: 37
Setting detail: THERAPIES SERIES
Discharge: HOME | End: 2022-03-08
Payer: MEDICAID

## 2022-03-08 ENCOUNTER — APPOINTMENT (OUTPATIENT)
Dept: PHYSICAL THERAPY | Age: 37
End: 2022-03-08
Payer: MEDICAID

## 2022-03-08 PROCEDURE — 97110 THERAPEUTIC EXERCISES: CPT | Performed by: SPECIALIST/TECHNOLOGIST

## 2022-03-08 PROCEDURE — 97016 VASOPNEUMATIC DEVICE THERAPY: CPT | Performed by: SPECIALIST/TECHNOLOGIST

## 2022-03-08 PROCEDURE — 97140 MANUAL THERAPY 1/> REGIONS: CPT | Performed by: SPECIALIST/TECHNOLOGIST

## 2022-03-08 PROCEDURE — 97112 NEUROMUSCULAR REEDUCATION: CPT | Performed by: SPECIALIST/TECHNOLOGIST

## 2022-03-08 NOTE — FLOWSHEET NOTE
[facility]  Orthopaedics and Sports Rehabilitation, [address] [place] [address], [address] [place] [address] [address]  Phone: [phone]   Fax:     [phone]      Physical Therapy Treatment Note/ Progress Report:     Date:  3/8/2022    Patient Name:  Toyin Holloway    :  1985  MRN: [id number]  Restrictions/Precautions:    Medical/Treatment Diagnosis Information:      M75.52 (ICD-10-CM) - Subacromial bursitis of left shoulder joint   M19.011, M19.012 (ICD-10-CM) - Arthritis of both acromioclavicular joints   M75.81, M75.82 (ICD-10-CM) - Tendonitis of both rotator cuffs     Procedure(s):  VIDEO ARTHROSCOPY LEFT SHOULDER, SUBACROMIAL DECOMPRESSION WITH ACROMIOPLASTY AND DISTAL CLAVICLE RESECTION -SLEEP APNEA  ·      Insurance/Certification information:    [place] MEDICAID  Physician Information:    Beth Parmar MD  Has the plan of care been signed (Y/N):        []  Yes  [x]  No     Date of Patient follow up with Physician: NS    Is this a Progress Report:     []  Yes  [x]  No     If Yes:  Date Range for reporting period:  Beginnin22 ------------ Ending: 3/23/22    Progress report will be due (10 Rx or 30 days whichever is less):      Recertification will be due (POC Duration  / 90 days whichever is less): 22      Visit # Insurance Allowable Auth Required   In Person 3  []  Yes     []  No    Tele Health 0  []  Yes     []  No    Total 3       Functional Scale: Quick Dash 64%      Date assessed:  22      Latex Allergy:  [x]NO      []YES  Preferred Language for Healthcare:   [x]English       []other:    Pain level:  5/10     SUBJECTIVE: Pt notes that her shoulder has been doing ok. Notes she has some discomfort when she reaches behind her back.      OBJECTIVE:    Observation:    Test measurements:      RESTRICTIONS/PRECAUTIONS: none    Exercises/Interventions:   Therapeutic Ex (40130) Sets/sec Reps Notes/CUES HEP   Supine cane flex  10x  x PS  10x  x   Wall walk 10 10x  x                                                    Manual Intervention (95656)       JM/ S ROM  8 min                                        NMR re-education (06906)   CUES NEEDED    SL abd/ ER  30x  x   SG/ SBS Green   IR Green  ER Green  3  3  3 10  10  10  x   Wall push ups  20x  x   Ball on wall  20x                                        Therapeutic Activity (98822)                     Vaso  10 min                   eSpace access code: [email or web address]           Therapeutic Exercise and NMR EXR  [x] (06499) Provided verbal/tactile cueing for activities related to strengthening, flexibility, endurance, ROM  for improvements in scapular, scapulothoracic and UE control with self care, reaching, carrying, lifting, house/yardwork, driving/computer work.    [] (18009) Provided verbal/tactile cueing for activities related to improving balance, coordination, kinesthetic sense, posture, motor skill, proprioception  to assist with  scapular, scapulothoracic and UE control with self care, reaching, carrying, lifting, house/yardwork, driving/computer work. Therapeutic Activities:    [x] (95378 or 19494) Provided verbal/tactile cueing for activities related to improving balance, coordination, kinesthetic sense, posture, motor skill, proprioception and motor activation to allow for proper function of scapular, scapulothoracic and UE control with self care, carrying, lifting, driving/computer work.      Home Exercise Program:    [x] (10723) Reviewed/Progressed HEP activities related to strengthening, flexibility, endurance, ROM of scapular, scapulothoracic and UE control with self care, reaching, carrying, lifting, house/yardwork, driving/computer work  [] (47226) Reviewed/Progressed HEP activities related to improving balance, coordination, kinesthetic sense, posture, motor skill, proprioception of scapular, scapulothoracic and UE control with self care, reaching, carrying, lifting, house/yardwork, driving/computer work      Manual Treatments:  PROM / STM / Oscillations-Mobs:  G-I, II, III, IV (PA's, Inf., Post.)  [x] (90058) Provided manual therapy to mobilize soft tissue/joints of cervical/CT, scapular GHJ and UE for the purpose of modulating pain, promoting relaxation,  increasing ROM, reducing/eliminating soft tissue swelling/inflammation/restriction, improving soft tissue extensibility and allowing for proper ROM for normal function with self care, reaching, carrying, lifting, house/yardwork, driving/computer work    Modalities:     [x] GAME READY (VASO)- for significant edema, swelling, pain control. Charges:  Timed Code Treatment Minutes: 38   Total Treatment Minutes:  48   BWC:  TE TIME:  NMR TIME:  MANUAL TIME:  UNTIMED MINUTES:  Medicare Total:       [] EVAL (LOW) 59665 (typically 20 minutes face-to-face)  [] EVAL (MOD) 59851 (typically 30 minutes face-to-face)  [] EVAL (HIGH) 70928 (typically 45 minutes face-to-face)  [] RE-EVAL     [x] HY(71776) x  1   [] IONTO  [x] NMR (25082) x   1  [x] VASO  [x] Manual (81031) x 1    [] Other:  [] TA x      [] Mech Traction (34809)  [] ES(attended) (99268)      [] ES (un) (89333):    ASSESSMENT:  Pt tolerated tx well. She has good exercise tolerance. GOALS:      Patient stated goal: reach ADLs    Therapist goals for Patient:   Short Term Goals: To be achieved in: 2 weeks  1. Independent in HEP and progression per patient tolerance, in order to prevent re-injury. [x] Progressing: [] Met: [] Not Met: [] Adjusted     2. Patient will have a decrease in pain to facilitate improvement in movement, function, and ADLs as indicated by Functional Deficits. [x] Progressing: [] Met: [] Not Met: [] Adjusted     Long Term Goals: To be achieved in: 6 weeks  1. Disability index score of 32% or less for the DASH to assist with reaching prior level of function. [x] Progressing: [] Met: [] Not Met: [] Adjusted     2.  Patient will demonstrate increased AROM to 160 to allow for proper joint functioning as indicated by patients Functional Deficits. [x] Progressing: [] Met: [] Not Met: [] Adjusted     3. Patient will demonstrate an increase in Strength to 5/5 to allow for proper functional mobility as indicated by patients Functional Deficits. [x] Progressing: [] Met: [] Not Met: [] Adjusted     4. Patient will return to [facility] for  functional activities without increased symptoms or restriction. [x] Progressing: [] Met: [] Not Met: [] Adjusted     5. ADLs/ reach with min to no limitations (patient specific functional goal)    [x] Progressing: [] Met: [] Not Met: [] Adjusted              Overall Progression Towards Functional goals/ Treatment Progress Update:  [] Patient is progressing as expected towards functional goals listed. [] Progression is slowed due to complexities/Impairments listed. [] Progression has been slowed due to co-morbidities. [x] Plan just implemented, too soon to assess goals progression <30days   [] Goals require adjustment due to lack of progress  [] Patient is not progressing as expected and requires additional follow up with physician  [] Other    Prognosis for POC: [x] Good [] Fair  [] Poor      Patient requires continued skilled intervention: [x] Yes  [] No    Treatment/Activity Tolerance:  [x] Patient able to complete treatment  [] Patient limited by fatigue  [] Patient limited by pain    [] Patient limited by other medical complications  [] Other:       PLAN:   [x] Continue per plan of care [] Alter current plan (see comments above)  [] Plan of care initiated [] Hold pending MD visit [] Discharge    Electronically signed by:  Dc Clark PTA, ATC    Note: If patient does not return for scheduled/ recommended follow up visits, this note will serve as a discharge from care along with most recent update on progress.

## 2022-03-10 ENCOUNTER — APPOINTMENT (OUTPATIENT)
Dept: PHYSICAL THERAPY | Age: 37
End: 2022-03-10
Payer: MEDICAID

## 2022-03-15 ENCOUNTER — APPOINTMENT (OUTPATIENT)
Dept: PHYSICAL THERAPY | Age: 37
End: 2022-03-15
Payer: MEDICAID

## 2022-03-15 ENCOUNTER — HOSPITAL ENCOUNTER (OUTPATIENT)
Dept: PHYSICAL THERAPY | Age: 37
Setting detail: THERAPIES SERIES
Discharge: HOME | End: 2022-03-15
Payer: MEDICAID

## 2022-03-15 PROCEDURE — 97140 MANUAL THERAPY 1/> REGIONS: CPT | Performed by: SPECIALIST/TECHNOLOGIST

## 2022-03-15 PROCEDURE — 97016 VASOPNEUMATIC DEVICE THERAPY: CPT | Performed by: SPECIALIST/TECHNOLOGIST

## 2022-03-15 PROCEDURE — 97112 NEUROMUSCULAR REEDUCATION: CPT | Performed by: SPECIALIST/TECHNOLOGIST

## 2022-03-15 PROCEDURE — 97110 THERAPEUTIC EXERCISES: CPT | Performed by: SPECIALIST/TECHNOLOGIST

## 2022-03-15 NOTE — FLOWSHEET NOTE
[facility]  Orthopaedics and Sports Rehabilitation, [address] [place] [address], [address] [place] [address] [address]  Phone: [phone]   Fax:     [phone]      Physical Therapy Treatment Note/ Progress Report:     Date:  3/15/2022    Patient Name:  Sidney Garcia    :  1985  MRN: [id number]  Restrictions/Precautions:    Medical/Treatment Diagnosis Information:      M75.52 (ICD-10-CM) - Subacromial bursitis of left shoulder joint   M19.011, M19.012 (ICD-10-CM) - Arthritis of both acromioclavicular joints   M75.81, M75.82 (ICD-10-CM) - Tendonitis of both rotator cuffs     Procedure(s):  VIDEO ARTHROSCOPY LEFT SHOULDER, SUBACROMIAL DECOMPRESSION WITH ACROMIOPLASTY AND DISTAL CLAVICLE RESECTION -SLEEP APNEA  ·      Insurance/Certification information:    [place] MEDICAID  Physician Information:    Ken Aponte MD  Has the plan of care been signed (Y/N):        []  Yes  [x]  No     Date of Patient follow up with Physician: NS    Is this a Progress Report:     []  Yes  [x]  No     If Yes:  Date Range for reporting period:  Beginnin22 ------------ Ending: 3/23/22    Progress report will be due (10 Rx or 30 days whichever is less):      Recertification will be due (POC Duration  / 90 days whichever is less): 22      Visit # Insurance Allowable Auth Required   In Person 4  []  Yes     []  No    Tele Health 0  []  Yes     []  No    Total 4       Functional Scale: Quick Dash 64%      Date assessed:  22      Latex Allergy:  [x]NO      []YES  Preferred Language for Healthcare:   [x]English       []other:    Pain level:  10     SUBJECTIVE: Pt notes she thinks she is a little more sore from sitting in front of the computer.    OBJECTIVE:    Observation:    Test measurements:      RESTRICTIONS/PRECAUTIONS: none    Exercises/Interventions:   Therapeutic Ex (75906) Sets/sec Reps Notes/CUES HEP   Supine cane flex  10x  x                 PS  10x  x   Wall walk 10 10x  x                                                    Manual Intervention ([id number])       JM/ S ROM  8 min                                        NMR re-education (59776)   CUES NEEDED    SL abd/ ER  30x  x   SG/ SBS Blue  IR Blue  ER Blue 3  3  3 10  10  10  x   Wall push ups  30x  x   Ball on wall  20x 4 ways    Triceps  3 10 Blk                                 Therapeutic Activity (28689)                     Vaso  10 min                   Thinkr access code: [email or web address]           Therapeutic Exercise and NMR EXR  [x] (44553) Provided verbal/tactile cueing for activities related to strengthening, flexibility, endurance, ROM  for improvements in scapular, scapulothoracic and UE control with self care, reaching, carrying, lifting, house/yardwork, driving/computer work.    [] (87180) Provided verbal/tactile cueing for activities related to improving balance, coordination, kinesthetic sense, posture, motor skill, proprioception  to assist with  scapular, scapulothoracic and UE control with self care, reaching, carrying, lifting, house/yardwork, driving/computer work. Therapeutic Activities:    [x] (31048 or 05239) Provided verbal/tactile cueing for activities related to improving balance, coordination, kinesthetic sense, posture, motor skill, proprioception and motor activation to allow for proper function of scapular, scapulothoracic and UE control with self care, carrying, lifting, driving/computer work.      Home Exercise Program:    [x] (93507) Reviewed/Progressed HEP activities related to strengthening, flexibility, endurance, ROM of scapular, scapulothoracic and UE control with self care, reaching, carrying, lifting, house/yardwork, driving/computer work  [] (17383) Reviewed/Progressed HEP activities related to improving balance, coordination, kinesthetic sense, posture, motor skill, proprioception of scapular, scapulothoracic and UE control with self care, reaching, carrying, lifting, house/yardwork, driving/computer work      Manual Treatments:  PROM / STM / Oscillations-Mobs:  G-I, II, III, IV (PA's, Inf., Post.)  [x] (01720) Provided manual therapy to mobilize soft tissue/joints of cervical/CT, scapular GHJ and UE for the purpose of modulating pain, promoting relaxation,  increasing ROM, reducing/eliminating soft tissue swelling/inflammation/restriction, improving soft tissue extensibility and allowing for proper ROM for normal function with self care, reaching, carrying, lifting, house/yardwork, driving/computer work    Modalities:     [x] GAME READY (VASO)- for significant edema, swelling, pain control. Charges:  Timed Code Treatment Minutes: 38   Total Treatment Minutes:  48   BWC:  TE TIME:  NMR TIME:  MANUAL TIME:  UNTIMED MINUTES:  Medicare Total:       [] EVAL (LOW) 60246 (typically 20 minutes face-to-face)  [] EVAL (MOD) 73784 (typically 30 minutes face-to-face)  [] EVAL (HIGH) 11820 (typically 45 minutes face-to-face)  [] RE-EVAL     [x] CB(90513) x  1   [] IONTO  [x] NMR (66070) x   1  [x] VASO  [x] Manual (01549) x 1    [] Other:  [] TA x      [] Mech Traction (64032)  [] ES(attended) (68225)      [] ES (un) (43728):    ASSESSMENT:  Pt tolerated tx well. She has good exercise tolerance. GOALS:      Patient stated goal: reach ADLs    Therapist goals for Patient:   Short Term Goals: To be achieved in: 2 weeks  1. Independent in HEP and progression per patient tolerance, in order to prevent re-injury. [x] Progressing: [] Met: [] Not Met: [] Adjusted     2. Patient will have a decrease in pain to facilitate improvement in movement, function, and ADLs as indicated by Functional Deficits. [x] Progressing: [] Met: [] Not Met: [] Adjusted     Long Term Goals: To be achieved in: 6 weeks  1. Disability index score of 32% or less for the DASH to assist with reaching prior level of function. [x] Progressing: [] Met: [] Not Met: [] Adjusted     2.  Patient will demonstrate increased AROM to 160 to allow for proper joint functioning as indicated by patients Functional Deficits. [x] Progressing: [] Met: [] Not Met: [] Adjusted     3. Patient will demonstrate an increase in Strength to 5/5 to allow for proper functional mobility as indicated by patients Functional Deficits. [x] Progressing: [] Met: [] Not Met: [] Adjusted     4. Patient will return to [facility] for  functional activities without increased symptoms or restriction. [x] Progressing: [] Met: [] Not Met: [] Adjusted     5. ADLs/ reach with min to no limitations (patient specific functional goal)    [x] Progressing: [] Met: [] Not Met: [] Adjusted              Overall Progression Towards Functional goals/ Treatment Progress Update:  [] Patient is progressing as expected towards functional goals listed. [] Progression is slowed due to complexities/Impairments listed. [] Progression has been slowed due to co-morbidities. [x] Plan just implemented, too soon to assess goals progression <30days   [] Goals require adjustment due to lack of progress  [] Patient is not progressing as expected and requires additional follow up with physician  [] Other    Prognosis for POC: [x] Good [] Fair  [] Poor      Patient requires continued skilled intervention: [x] Yes  [] No    Treatment/Activity Tolerance:  [x] Patient able to complete treatment  [] Patient limited by fatigue  [] Patient limited by pain    [] Patient limited by other medical complications  [] Other:       PLAN:   [x] Continue per plan of care [] Alter current plan (see comments above)  [] Plan of care initiated [] Hold pending MD visit [] Discharge    Electronically signed by:  Rozina Xiao PTA, ATC    Note: If patient does not return for scheduled/ recommended follow up visits, this note will serve as a discharge from care along with most recent update on progress.

## 2022-03-16 ENCOUNTER — APPOINTMENT (OUTPATIENT)
Dept: PHYSICAL THERAPY | Age: 37
End: 2022-03-16
Payer: MEDICAID

## 2022-03-17 ENCOUNTER — APPOINTMENT (OUTPATIENT)
Dept: PHYSICAL THERAPY | Age: 37
End: 2022-03-17
Payer: MEDICAID

## 2022-03-22 ENCOUNTER — APPOINTMENT (OUTPATIENT)
Dept: PHYSICAL THERAPY | Age: 37
End: 2022-03-22
Payer: MEDICAID

## 2022-03-22 ENCOUNTER — HOSPITAL ENCOUNTER (OUTPATIENT)
Dept: PHYSICAL THERAPY | Age: 37
Setting detail: THERAPIES SERIES
Discharge: HOME | End: 2022-03-22
Payer: MEDICAID

## 2022-03-22 NOTE — FLOWSHEET NOTE
[facility], [address] [place] [address], [address] [place] [address] [address]  Phone: [phone]   Fax:     [phone]    Physical Therapy  Cancellation/No-show Note  Patient Name:  Janine Pedraza  :  1985   Date:  3/22/2022    Cancelled visits to date: 2  No-shows to date: 0    For today's appointment patient:  [x]  Cancelled  []  Rescheduled appointment  []  No-show     Reason given by patient:  [x]  Patient ill  []  Conflicting appointment  []  No transportation    []  Conflict with work  []  No reason given  []  Other:     Comments:      Phone call information:   [x]  Phone call made today to patient at 6 pm at the number provided:      []  Patient answered, conversation as follows:    []  Patient did not answer, message left as follows:  []  Phone call not needed - pt contacted us to cancel and provided reason for cancellation.      Electronically signed by:  Mariaa Khalil PTA, ATC

## 2022-03-24 ENCOUNTER — APPOINTMENT (OUTPATIENT)
Dept: PHYSICAL THERAPY | Age: 37
End: 2022-03-24
Payer: MEDICAID

## 2022-03-29 ENCOUNTER — HOSPITAL ENCOUNTER (OUTPATIENT)
Dept: PHYSICAL THERAPY | Age: 37
Setting detail: THERAPIES SERIES
Discharge: HOME | End: 2022-03-29
Payer: MEDICAID

## 2022-03-29 ENCOUNTER — APPOINTMENT (OUTPATIENT)
Dept: PHYSICAL THERAPY | Age: 37
End: 2022-03-29
Payer: MEDICAID

## 2022-03-29 PROCEDURE — 97140 MANUAL THERAPY 1/> REGIONS: CPT | Performed by: SPECIALIST/TECHNOLOGIST

## 2022-03-29 PROCEDURE — 97112 NEUROMUSCULAR REEDUCATION: CPT | Performed by: SPECIALIST/TECHNOLOGIST

## 2022-03-29 PROCEDURE — 97016 VASOPNEUMATIC DEVICE THERAPY: CPT | Performed by: SPECIALIST/TECHNOLOGIST

## 2022-03-29 PROCEDURE — 97110 THERAPEUTIC EXERCISES: CPT | Performed by: SPECIALIST/TECHNOLOGIST

## 2022-03-29 NOTE — FLOWSHEET NOTE
[facility]  Orthopaedics and Sports Rehabilitation, [address] [place] [address], [address] [place] [address] [address]  Phone: [phone]   Fax:     [phone]      Physical Therapy Treatment Note/ Progress Report:     Date:  3/29/2022    Patient Name:  Luci Barroso    :  1985  MRN: [id number]  Restrictions/Precautions:    Medical/Treatment Diagnosis Information:      M75.52 (ICD-10-CM) - Subacromial bursitis of left shoulder joint   M19.011, M19.012 (ICD-10-CM) - Arthritis of both acromioclavicular joints   M75.81, M75.82 (ICD-10-CM) - Tendonitis of both rotator cuffs     Procedure(s):  VIDEO ARTHROSCOPY LEFT SHOULDER, SUBACROMIAL DECOMPRESSION WITH ACROMIOPLASTY AND DISTAL CLAVICLE RESECTION -SLEEP APNEA  ·      Insurance/Certification information:    [place] MEDICAID  Physician Information:    Max Lamb MD  Has the plan of care been signed (Y/N):        []  Yes  [x]  No     Date of Patient follow up with Physician: NS    Is this a Progress Report:     []  Yes  [x]  No     If Yes:  Date Range for reporting period:   Beginnin22 ------------ Ending: 3/23/22    Progress report will be due (10 Rx or 30 days whichever is less): 53     Recertification will be due (POC Duration  / 90 days whichever is less): 22      Visit # Insurance Allowable Auth Required   In Person 5  []  Yes     []  No    Tele Health 0  []  Yes     []  No    Total 5       Functional Scale: Quick Dash 64%      Date assessed:  22      Latex Allergy:  [x]NO      []YES  Preferred Language for Healthcare:   [x]English       []other:    Pain level:  5/10      SUBJECTIVE: Pt notes she has been having a sharp pain in the front of her shoulder.    OBJECTIVE:    Observation:    Test measurements:      RESTRICTIONS/PRECAUTIONS: none    Exercises/Interventions:   Therapeutic Ex (42858) Sets/sec Reps Notes/CUES HEP   Supine cane flex  10x  x                 PS  10x  x   Wall walk 10 10x  x Manual Intervention ([id number])       JM/ S ROM  8 min                                        NMR re-education (55300)   CUES NEEDED    SL abd/ ER  30x  x   SG/ SBS Blue  IR Blue  ER Blue 3  3  3 10  10  10  x   Wall push ups  30x  x   Ball on wall  20x 4 ways    Triceps  3 10 Blk                                 Therapeutic Activity (80239)                     Vaso  10 min                   Tangoe access code: [email or web address]           Therapeutic Exercise and NMR EXR  [x] (19176) Provided verbal/tactile cueing for activities related to strengthening, flexibility, endurance, ROM  for improvements in scapular, scapulothoracic and UE control with self care, reaching, carrying, lifting, house/yardwork, driving/computer work.    [] (40267) Provided verbal/tactile cueing for activities related to improving balance, coordination, kinesthetic sense, posture, motor skill, proprioception  to assist with  scapular, scapulothoracic and UE control with self care, reaching, carrying, lifting, house/yardwork, driving/computer work. Therapeutic Activities:    [x] (09232 or 87230) Provided verbal/tactile cueing for activities related to improving balance, coordination, kinesthetic sense, posture, motor skill, proprioception and motor activation to allow for proper function of scapular, scapulothoracic and UE control with self care, carrying, lifting, driving/computer work.      Home Exercise Program:    [x] (58464) Reviewed/Progressed HEP activities related to strengthening, flexibility, endurance, ROM of scapular, scapulothoracic and UE control with self care, reaching, carrying, lifting, house/yardwork, driving/computer work  [] (13693) Reviewed/Progressed HEP activities related to improving balance, coordination, kinesthetic sense, posture, motor skill, proprioception of scapular, scapulothoracic and UE control with self care, reaching, carrying, lifting, house/yardwork, driving/computer work      Manual Treatments:  PROM / STM / Oscillations-Mobs:  G-I, II, III, IV (PA's, Inf., Post.)  [x] (15562) Provided manual therapy to mobilize soft tissue/joints of cervical/CT, scapular GHJ and UE for the purpose of modulating pain, promoting relaxation,  increasing ROM, reducing/eliminating soft tissue swelling/inflammation/restriction, improving soft tissue extensibility and allowing for proper ROM for normal function with self care, reaching, carrying, lifting, house/yardwork, driving/computer work    Modalities:     [x] GAME READY (VASO)- for significant edema, swelling, pain control. Charges:  Timed Code Treatment Minutes: 38   Total Treatment Minutes:  48   BWC:  TE TIME:  NMR TIME:  MANUAL TIME:  UNTIMED MINUTES:  Medicare Total:       [] EVAL (LOW) 17759 (typically 20 minutes face-to-face)  [] EVAL (MOD) 11412 (typically 30 minutes face-to-face)  [] EVAL (HIGH) 47039 (typically 45 minutes face-to-face)  [] RE-EVAL     [x] VK(85200) x  1   [] IONTO  [x] NMR (68447) x   1  [x] VASO  [x] Manual (62580) x 1    [] Other:  [] TA x      [] Mech Traction (21328)  [] ES(attended) (24350)      [] ES (un) (30809):    ASSESSMENT:  Pt tolerated tx well. She has good exercise tolerance. GOALS:      Patient stated goal: reach ADLs    Therapist goals for Patient:   Short Term Goals: To be achieved in: 2 weeks  1. Independent in HEP and progression per patient tolerance, in order to prevent re-injury. [x] Progressing: [] Met: [] Not Met: [] Adjusted     2. Patient will have a decrease in pain to facilitate improvement in movement, function, and ADLs as indicated by Functional Deficits. [x] Progressing: [] Met: [] Not Met: [] Adjusted     Long Term Goals: To be achieved in: 6 weeks  1. Disability index score of 32% or less for the DASH to assist with reaching prior level of function. [x] Progressing: [] Met: [] Not Met: [] Adjusted     2.  Patient will demonstrate increased AROM to 160 to allow for proper joint functioning as indicated by patients Functional Deficits. [x] Progressing: [] Met: [] Not Met: [] Adjusted     3. Patient will demonstrate an increase in Strength to 5/5 to allow for proper functional mobility as indicated by patients Functional Deficits. [x] Progressing: [] Met: [] Not Met: [] Adjusted     4. Patient will return to [facility] for  functional activities without increased symptoms or restriction. [x] Progressing: [] Met: [] Not Met: [] Adjusted     5. ADLs/ reach with min to no limitations (patient specific functional goal)    [x] Progressing: [] Met: [] Not Met: [] Adjusted              Overall Progression Towards Functional goals/ Treatment Progress Update:  [] Patient is progressing as expected towards functional goals listed. [] Progression is slowed due to complexities/Impairments listed. [] Progression has been slowed due to co-morbidities. [x] Plan just implemented, too soon to assess goals progression <30days   [] Goals require adjustment due to lack of progress  [] Patient is not progressing as expected and requires additional follow up with physician  [] Other    Prognosis for POC: [x] Good [] Fair  [] Poor      Patient requires continued skilled intervention: [x] Yes  [] No    Treatment/Activity Tolerance:  [x] Patient able to complete treatment  [] Patient limited by fatigue  [] Patient limited by pain    [] Patient limited by other medical complications  [] Other:       PLAN:   [x] Continue per plan of care [] Alter current plan (see comments above)  [] Plan of care initiated [] Hold pending MD visit [] Discharge    Electronically signed by:  Rozina Xiao PTA, ATC    Note: If patient does not return for scheduled/ recommended follow up visits, this note will serve as a discharge from care along with most recent update on progress.

## 2022-03-31 ENCOUNTER — APPOINTMENT (OUTPATIENT)
Dept: PHYSICAL THERAPY | Age: 37
End: 2022-03-31
Payer: MEDICAID

## 2022-04-04 ENCOUNTER — OFFICE VISIT (OUTPATIENT)
Dept: ORTHOPEDIC SURGERY | Age: 37
End: 2022-04-04

## 2022-04-04 VITALS — HEIGHT: 67 IN | BODY MASS INDEX: 40.81 KG/M2 | WEIGHT: 260 LBS

## 2022-04-04 DIAGNOSIS — Z47.89 ORTHOPEDIC AFTERCARE: Primary | ICD-10-CM

## 2022-04-04 PROCEDURE — 99024 POSTOP FOLLOW-UP VISIT: CPT | Performed by: ORTHOPAEDIC SURGERY
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
## 2022-04-05 ENCOUNTER — HOSPITAL ENCOUNTER (OUTPATIENT)
Dept: PHYSICAL THERAPY | Age: 37
Setting detail: THERAPIES SERIES
Discharge: HOME | End: 2022-04-05
Payer: MEDICAID

## 2022-04-05 PROCEDURE — 97110 THERAPEUTIC EXERCISES: CPT | Performed by: SPECIALIST/TECHNOLOGIST

## 2022-04-05 PROCEDURE — 97112 NEUROMUSCULAR REEDUCATION: CPT | Performed by: SPECIALIST/TECHNOLOGIST

## 2022-04-05 PROCEDURE — 97016 VASOPNEUMATIC DEVICE THERAPY: CPT | Performed by: SPECIALIST/TECHNOLOGIST

## 2022-04-05 PROCEDURE — 97140 MANUAL THERAPY 1/> REGIONS: CPT | Performed by: SPECIALIST/TECHNOLOGIST

## 2022-04-05 NOTE — FLOWSHEET NOTE
723 [facility], [address] [place] [address], [address] [place] [address] [address]  Phone: [phone]   Fax:     [phone]      Physical Therapy Treatment Note/ Progress Report:     Date:  2022    Patient Name:  Mikki Hendrix    :  1985  MRN: [id number]  Restrictions/Precautions:    Medical/Treatment Diagnosis Information:      M75.52 (ICD-10-CM) - Subacromial bursitis of left shoulder joint   M19.011, M19.012 (ICD-10-CM) - Arthritis of both acromioclavicular joints   M75.81, M75.82 (ICD-10-CM) - Tendonitis of both rotator cuffs     Procedure(s):  VIDEO ARTHROSCOPY LEFT SHOULDER, SUBACROMIAL DECOMPRESSION WITH ACROMIOPLASTY AND DISTAL CLAVICLE RESECTION -SLEEP APNEA  ·      Insurance/Certification information:    [place] MEDICAID  Physician Information:    Raj Pruett MD  Has the plan of care been signed (Y/N):        []  Yes  [x]  No     Date of Patient follow up with Physician: NS    Is this a Progress Report:     []  Yes  [x]  No     If Yes:  Date Range for reporting period:   Beginnin22 ------------ Ending: 3/23/22    Progress report will be due (10 Rx or 30 days whichever is less):      Recertification will be due (POC Duration  / 90 days whichever is less): 22      Visit # Insurance Allowable Auth Required   In Person 6  []  Yes     []  No    Tele Health 0  []  Yes     []  No    Total 6       Functional Scale: Quick Dash 64%      Date assessed:  22  Next Visit    Latex Allergy:  [x]NO      []YES  Preferred Language for Healthcare:   [x]English       []other:    Pain level:  5/10      SUBJECTIVE: Pt notes she has been having a sharp pain in the front of her shoulder.    OBJECTIVE:    Observation:    Test measurements:      RESTRICTIONS/PRECAUTIONS: none    Exercises/Interventions:   Therapeutic Ex (69635) Sets/sec Reps Notes/CUES HEP   Supine cane flex  10x  x   Pulleys  5'            PS  10x  x   Wall walk 10 10x  x                                                    Manual Intervention (16320)       JM/ S ROM  8 min                                        NMR re-education (61178)   CUES NEEDED    SL abd/ ER  Prone ext  30x  30x 1#  1# x   SG/ SBS Blue  IR Blue  ER Blue 3  3  3 10  10  10  x   Wall push ups  30x  x   Ball on wall  30x 4 ways    Triceps  3 10 Blk                                 Therapeutic Activity (51419)                     Vaso  10 min                   [employer] access code: [email or web address]           Therapeutic Exercise and NMR EXR  [x] (81750) Provided verbal/tactile cueing for activities related to strengthening, flexibility, endurance, ROM  for improvements in scapular, scapulothoracic and UE control with self care, reaching, carrying, lifting, house/yardwork, driving/computer work.    [] (49923) Provided verbal/tactile cueing for activities related to improving balance, coordination, kinesthetic sense, posture, motor skill, proprioception  to assist with  scapular, scapulothoracic and UE control with self care, reaching, carrying, lifting, house/yardwork, driving/computer work. Therapeutic Activities:    [x] (07324 or 83968) Provided verbal/tactile cueing for activities related to improving balance, coordination, kinesthetic sense, posture, motor skill, proprioception and motor activation to allow for proper function of scapular, scapulothoracic and UE control with self care, carrying, lifting, driving/computer work.      Home Exercise Program:    [x] (26018) Reviewed/Progressed HEP activities related to strengthening, flexibility, endurance, ROM of scapular, scapulothoracic and UE control with self care, reaching, carrying, lifting, house/yardwork, driving/computer work  [] (42618) Reviewed/Progressed HEP activities related to improving balance, coordination, kinesthetic sense, posture, motor skill, proprioception of scapular, scapulothoracic and UE control with self care, reaching, carrying, lifting, house/yardwork, driving/computer work      Manual Treatments:  PROM / STM / Oscillations-Mobs:  G-I, II, III, IV (PA's, Inf., Post.)  [x] (98166) Provided manual therapy to mobilize soft tissue/joints of cervical/CT, scapular GHJ and UE for the purpose of modulating pain, promoting relaxation,  increasing ROM, reducing/eliminating soft tissue swelling/inflammation/restriction, improving soft tissue extensibility and allowing for proper ROM for normal function with self care, reaching, carrying, lifting, house/yardwork, driving/computer work    Modalities:     [x] GAME READY (VASO)- for significant edema, swelling, pain control. Charges:  Timed Code Treatment Minutes: 38   Total Treatment Minutes:  48   BWC:  TE TIME:  NMR TIME:  MANUAL TIME:  UNTIMED MINUTES:  Medicare Total:       [] EVAL (LOW) 75537 (typically 20 minutes face-to-face)  [] EVAL (MOD) 80061 (typically 30 minutes face-to-face)  [] EVAL (HIGH) 14737 (typically 45 minutes face-to-face)  [] RE-EVAL     [x] AD(10851) x  1   [] IONTO  [x] NMR (71506) x   1  [x] VASO  [x] Manual (08115) x 1    [] Other:  [] TA x      [] Mech Traction (70092)  [] ES(attended) (20164)      [] ES (un) (27848):    ASSESSMENT:  Pt was able to tolerate increased PREs. GOALS:      Patient stated goal: reach ADLs    Therapist goals for Patient:   Short Term Goals: To be achieved in: 2 weeks  1. Independent in HEP and progression per patient tolerance, in order to prevent re-injury. [x] Progressing: [] Met: [] Not Met: [] Adjusted     2. Patient will have a decrease in pain to facilitate improvement in movement, function, and ADLs as indicated by Functional Deficits. [x] Progressing: [] Met: [] Not Met: [] Adjusted     Long Term Goals: To be achieved in: 6 weeks  1. Disability index score of 32% or less for the DASH to assist with reaching prior level of function. [x] Progressing: [] Met: [] Not Met: [] Adjusted     2.  Patient will demonstrate increased AROM to 160 to allow for proper joint functioning as indicated by patients Functional Deficits. [x] Progressing: [] Met: [] Not Met: [] Adjusted     3. Patient will demonstrate an increase in Strength to 5/5 to allow for proper functional mobility as indicated by patients Functional Deficits. [x] Progressing: [] Met: [] Not Met: [] Adjusted     4. Patient will return to [facility] for  functional activities without increased symptoms or restriction. [x] Progressing: [] Met: [] Not Met: [] Adjusted     5. ADLs/ reach with min to no limitations (patient specific functional goal)    [x] Progressing: [] Met: [] Not Met: [] Adjusted              Overall Progression Towards Functional goals/ Treatment Progress Update:  [] Patient is progressing as expected towards functional goals listed. [] Progression is slowed due to complexities/Impairments listed. [] Progression has been slowed due to co-morbidities. [x] Plan just implemented, too soon to assess goals progression <30days   [] Goals require adjustment due to lack of progress  [] Patient is not progressing as expected and requires additional follow up with physician  [] Other    Prognosis for POC: [x] Good [] Fair  [] Poor      Patient requires continued skilled intervention: [x] Yes  [] No    Treatment/Activity Tolerance:  [x] Patient able to complete treatment  [] Patient limited by fatigue  [] Patient limited by pain    [] Patient limited by other medical complications  [] Other:       PLAN:   [x] Continue per plan of care [] Alter current plan (see comments above)  [] Plan of care initiated [] Hold pending MD visit [] Discharge    Electronically signed by:  Gene Choudhary PTA, ATC    Note: If patient does not return for scheduled/ recommended follow up visits, this note will serve as a discharge from care along with most recent update on progress.

## 2022-04-07 ENCOUNTER — APPOINTMENT (OUTPATIENT)
Dept: PHYSICAL THERAPY | Age: 37
End: 2022-04-07
Payer: MEDICAID

## 2022-04-12 ENCOUNTER — HOSPITAL ENCOUNTER (OUTPATIENT)
Dept: PHYSICAL THERAPY | Age: 37
Setting detail: THERAPIES SERIES
Discharge: HOME | End: 2022-04-12
Payer: MEDICAID

## 2022-04-12 PROCEDURE — 97016 VASOPNEUMATIC DEVICE THERAPY: CPT | Performed by: SPECIALIST/TECHNOLOGIST

## 2022-04-12 PROCEDURE — 97112 NEUROMUSCULAR REEDUCATION: CPT | Performed by: SPECIALIST/TECHNOLOGIST

## 2022-04-12 PROCEDURE — 97140 MANUAL THERAPY 1/> REGIONS: CPT | Performed by: SPECIALIST/TECHNOLOGIST

## 2022-04-12 PROCEDURE — 97110 THERAPEUTIC EXERCISES: CPT | Performed by: SPECIALIST/TECHNOLOGIST

## 2022-04-12 NOTE — FLOWSHEET NOTE
723 [facility], [address] [place] [address], [address] [place] [address] [address]  Phone: [phone]   Fax:     [phone]      Physical Therapy Treatment Note/ Progress Report:     Date:  2022    Patient Name:  Loletta Boxer    :  1985  MRN: [id number]  Restrictions/Precautions:    Medical/Treatment Diagnosis Information:      M75.52 (ICD-10-CM) - Subacromial bursitis of left shoulder joint   M19.011, M19.012 (ICD-10-CM) - Arthritis of both acromioclavicular joints   M75.81, M75.82 (ICD-10-CM) - Tendonitis of both rotator cuffs     Procedure(s):  VIDEO ARTHROSCOPY LEFT SHOULDER, SUBACROMIAL DECOMPRESSION WITH ACROMIOPLASTY AND DISTAL CLAVICLE RESECTION -SLEEP APNEA  ·      Insurance/Certification information:    [place] MEDICAID  Physician Information:    Diana Heredia MD  Has the plan of care been signed (Y/N):        []  Yes  [x]  No     Date of Patient follow up with Physician: NS    Is this a Progress Report:     []  Yes  [x]  No     If Yes:  Date Range for reporting period:   Beginnin22 ------------ Ending: 3/23/22    Progress report will be due (10 Rx or 30 days whichever is less): 6/15/99     Recertification will be due (POC Duration  / 90 days whichever is less): 22      Visit # Insurance Allowable Auth Required   In Person 7  []  Yes     []  No    Tele Health 0  []  Yes     []  No    Total 7       Functional Scale: Quick Dash 64%      Date assessed:  22  Next Visit    Latex Allergy:  [x]NO      []YES  Preferred Language for Healthcare:   [x]English       []other:    Pain level:  5/10      SUBJECTIVE: Pt notes that her shoulder wakes her up at night when she rolls on it.    OBJECTIVE:    Observation:    Test measurements:      RESTRICTIONS/PRECAUTIONS: none    Exercises/Interventions:   Therapeutic Ex (36422) Sets/sec Reps Notes/CUES HEP   Supine cane flex  10x  x   Pulleys  5'            PS  10x  x   Wall walk 10 10x  x                                                    Manual Intervention (79328)       JM/ S ROM  8 min                                        NMR re-education (29745)   CUES NEEDED    SL abd/ ER  Prone ext  30x  30x 2#  2# x   SG/ SBS Blue  IR Blue  ER Blue 3  3  3 10  10  10  x   Wall push ups  30x  x   Ball on wall  30x 4 ways    Triceps  3 10 Blk                                 Therapeutic Activity (63809)                     Vaso  10 min                   [employer] access code: [email or web address]           Therapeutic Exercise and NMR EXR  [x] (11579) Provided verbal/tactile cueing for activities related to strengthening, flexibility, endurance, ROM  for improvements in scapular, scapulothoracic and UE control with self care, reaching, carrying, lifting, house/yardwork, driving/computer work.    [] (59531) Provided verbal/tactile cueing for activities related to improving balance, coordination, kinesthetic sense, posture, motor skill, proprioception  to assist with  scapular, scapulothoracic and UE control with self care, reaching, carrying, lifting, house/yardwork, driving/computer work. Therapeutic Activities:    [x] (06040 or 97455) Provided verbal/tactile cueing for activities related to improving balance, coordination, kinesthetic sense, posture, motor skill, proprioception and motor activation to allow for proper function of scapular, scapulothoracic and UE control with self care, carrying, lifting, driving/computer work.      Home Exercise Program:    [x] (99025) Reviewed/Progressed HEP activities related to strengthening, flexibility, endurance, ROM of scapular, scapulothoracic and UE control with self care, reaching, carrying, lifting, house/yardwork, driving/computer work  [] (17615) Reviewed/Progressed HEP activities related to improving balance, coordination, kinesthetic sense, posture, motor skill, proprioception of scapular, scapulothoracic and UE control with self care, reaching, carrying, lifting, house/yardwork, driving/computer work      Manual Treatments:  PROM / STM / Oscillations-Mobs:  G-I, II, III, IV (PA's, Inf., Post.)  [x] (42540) Provided manual therapy to mobilize soft tissue/joints of cervical/CT, scapular GHJ and UE for the purpose of modulating pain, promoting relaxation,  increasing ROM, reducing/eliminating soft tissue swelling/inflammation/restriction, improving soft tissue extensibility and allowing for proper ROM for normal function with self care, reaching, carrying, lifting, house/yardwork, driving/computer work    Modalities:     [x] GAME READY (VASO)- for significant edema, swelling, pain control. Charges:  Timed Code Treatment Minutes: 38   Total Treatment Minutes:  48   BWC:  TE TIME:  NMR TIME:  MANUAL TIME:  UNTIMED MINUTES:  Medicare Total:       [] EVAL (LOW) 80431 (typically 20 minutes face-to-face)  [] EVAL (MOD) 34243 (typically 30 minutes face-to-face)  [] EVAL (HIGH) 56834 (typically 45 minutes face-to-face)  [] RE-EVAL     [x] XD(35589) x  1   [] IONTO  [x] NMR (93017) x   1  [x] VASO  [x] Manual (63401) x 1    [] Other:  [] TA x      [] Mech Traction (42976)  [] ES(attended) (67277)      [] ES (un) (68645):    ASSESSMENT:  Pt was able to tolerate increased PREs. Pt demonstrated improved function. GOALS:      Patient stated goal: reach ADLs    Therapist goals for Patient:   Short Term Goals: To be achieved in: 2 weeks  1. Independent in HEP and progression per patient tolerance, in order to prevent re-injury. [x] Progressing: [] Met: [] Not Met: [] Adjusted     2. Patient will have a decrease in pain to facilitate improvement in movement, function, and ADLs as indicated by Functional Deficits. [x] Progressing: [] Met: [] Not Met: [] Adjusted     Long Term Goals: To be achieved in: 6 weeks  1. Disability index score of 32% or less for the DASH to assist with reaching prior level of function. [x] Progressing: [] Met: [] Not Met: [] Adjusted     2. Patient will demonstrate increased AROM to 160 to allow for proper joint functioning as indicated by patients Functional Deficits. [x] Progressing: [] Met: [] Not Met: [] Adjusted     3. Patient will demonstrate an increase in Strength to 5/5 to allow for proper functional mobility as indicated by patients Functional Deficits. [x] Progressing: [] Met: [] Not Met: [] Adjusted     4. Patient will return to [facility] for  functional activities without increased symptoms or restriction. [x] Progressing: [] Met: [] Not Met: [] Adjusted     5. ADLs/ reach with min to no limitations (patient specific functional goal)    [x] Progressing: [] Met: [] Not Met: [] Adjusted              Overall Progression Towards Functional goals/ Treatment Progress Update:  [] Patient is progressing as expected towards functional goals listed. [] Progression is slowed due to complexities/Impairments listed. [] Progression has been slowed due to co-morbidities. [x] Plan just implemented, too soon to assess goals progression <30days   [] Goals require adjustment due to lack of progress  [] Patient is not progressing as expected and requires additional follow up with physician  [] Other    Prognosis for POC: [x] Good [] Fair  [] Poor      Patient requires continued skilled intervention: [x] Yes  [] No    Treatment/Activity Tolerance:  [x] Patient able to complete treatment  [] Patient limited by fatigue  [] Patient limited by pain    [] Patient limited by other medical complications  [] Other:       PLAN:   [x] Continue per plan of care [] Alter current plan (see comments above)  [] Plan of care initiated [] Hold pending MD visit [] Discharge    Electronically signed by:  Simin Nolen PTA, ATC    Note: If patient does not return for scheduled/ recommended follow up visits, this note will serve as a discharge from care along with most recent update on progress.

## 2022-04-14 ENCOUNTER — APPOINTMENT (OUTPATIENT)
Dept: PHYSICAL THERAPY | Age: 37
End: 2022-04-14
Payer: MEDICAID

## 2022-04-15 ENCOUNTER — TELEPHONE (OUTPATIENT)
Dept: ORTHOPEDIC SURGERY | Age: 37
End: 2022-04-15

## 2022-04-15 NOTE — TELEPHONE ENCOUNTER
Faxed medical records for 2/26/2022 to present to [phone] for [place], [facility] [facility] issues so faxed. Sent request to nursing home [facility] as well.

## 2022-04-19 ENCOUNTER — HOSPITAL ENCOUNTER (OUTPATIENT)
Dept: PHYSICAL THERAPY | Age: 37
Setting detail: THERAPIES SERIES
Discharge: HOME | End: 2022-04-19
Payer: MEDICAID

## 2022-04-19 PROCEDURE — 97110 THERAPEUTIC EXERCISES: CPT | Performed by: SPECIALIST/TECHNOLOGIST

## 2022-04-19 PROCEDURE — 97016 VASOPNEUMATIC DEVICE THERAPY: CPT | Performed by: SPECIALIST/TECHNOLOGIST

## 2022-04-19 PROCEDURE — 97140 MANUAL THERAPY 1/> REGIONS: CPT | Performed by: SPECIALIST/TECHNOLOGIST

## 2022-04-19 PROCEDURE — 97112 NEUROMUSCULAR REEDUCATION: CPT | Performed by: SPECIALIST/TECHNOLOGIST

## 2022-04-19 NOTE — FLOWSHEET NOTE
723 [facility], [address] [place] [address], [address] [place] [address] [address]  Phone: [phone]   Fax:     [phone]      Physical Therapy Treatment Note/ Progress Report:     Date:  2022    Patient Name:  Lizzette Colunga    :  1985  MRN: [id number]  Restrictions/Precautions:    Medical/Treatment Diagnosis Information:      M75.52 (ICD-10-CM) - Subacromial bursitis of left shoulder joint   M19.011, M19.012 (ICD-10-CM) - Arthritis of both acromioclavicular joints   M75.81, M75.82 (ICD-10-CM) - Tendonitis of both rotator cuffs     Procedure(s):  VIDEO ARTHROSCOPY LEFT SHOULDER, SUBACROMIAL DECOMPRESSION WITH ACROMIOPLASTY AND DISTAL CLAVICLE RESECTION -SLEEP APNEA  ·      Insurance/Certification information:    [place] MEDICAID  Physician Information:    Cesar Viera MD  Has the plan of care been signed (Y/N):        []  Yes  [x]  No     Date of Patient follow up with Physician: NS    Is this a Progress Report:     []  Yes  [x]  No     If Yes:  Date Range for reporting period:   Beginnin22 ------------ Ending: 3/23/22    Progress report will be due (10 Rx or 30 days whichever is less):      Recertification will be due (POC Duration  / 90 days whichever is less): 22      Visit # Insurance Allowable Auth Required   In Person 8  []  Yes     []  No    Tele Health 0  []  Yes     []  No    Total 8       Functional Scale: Quick Dash 64%      Date assessed:  22  Next Visit    Latex Allergy:  [x]NO      []YES  Preferred Language for Healthcare:   [x]English       []other:    Pain level:  5/10      SUBJECTIVE: Pt notes that her shoulder has been feeling a little better.    OBJECTIVE:    Observation:    Test measurements:      RESTRICTIONS/PRECAUTIONS: none    Exercises/Interventions:   Therapeutic Ex (97028) Sets/sec Reps Notes/CUES HEP   Supine cane flex  10x  x   Pulleys  5'            PS  10x  x   Wall walk 10 10x  x                                                    Manual Intervention ([id number])       JM/ S ROM  8 min                                        NMR re-education (73221)   CUES NEEDED    SL abd/ ER  Prone ext  30x  30x 3#  3# x   SG/ SBS Blk  IR Blk  ER Blk 3  3  3 10  10  10  x   Wall push ups  30x  x   Ball on wall  30x 4 ways    Triceps  3 10 Blk            Chest press 3 10 5#    Serratus Punches 3 10 5#           Therapeutic Activity (65626)                     Vaso  10 min                   [employer] access code: [email or web address]           Therapeutic Exercise and NMR EXR  [x] (04612) Provided verbal/tactile cueing for activities related to strengthening, flexibility, endurance, ROM  for improvements in scapular, scapulothoracic and UE control with self care, reaching, carrying, lifting, house/yardwork, driving/computer work.    [] (11336) Provided verbal/tactile cueing for activities related to improving balance, coordination, kinesthetic sense, posture, motor skill, proprioception  to assist with  scapular, scapulothoracic and UE control with self care, reaching, carrying, lifting, house/yardwork, driving/computer work. Therapeutic Activities:    [x] (07618 or 15342) Provided verbal/tactile cueing for activities related to improving balance, coordination, kinesthetic sense, posture, motor skill, proprioception and motor activation to allow for proper function of scapular, scapulothoracic and UE control with self care, carrying, lifting, driving/computer work.      Home Exercise Program:    [x] (35252) Reviewed/Progressed HEP activities related to strengthening, flexibility, endurance, ROM of scapular, scapulothoracic and UE control with self care, reaching, carrying, lifting, house/yardwork, driving/computer work  [] (24341) Reviewed/Progressed HEP activities related to improving balance, coordination, kinesthetic sense, posture, motor skill, proprioception of scapular, scapulothoracic and UE control with self care, reaching, carrying, lifting, house/yardwork, driving/computer work      Manual Treatments:  PROM / STM / Oscillations-Mobs:  G-I, II, III, IV (PA's, Inf., Post.)  [x] (86307) Provided manual therapy to mobilize soft tissue/joints of cervical/CT, scapular GHJ and UE for the purpose of modulating pain, promoting relaxation,  increasing ROM, reducing/eliminating soft tissue swelling/inflammation/restriction, improving soft tissue extensibility and allowing for proper ROM for normal function with self care, reaching, carrying, lifting, house/yardwork, driving/computer work    Modalities:     [x] GAME READY (VASO)- for significant edema, swelling, pain control. Charges:  Timed Code Treatment Minutes: 38   Total Treatment Minutes:  48   BWC:  TE TIME:  NMR TIME:  MANUAL TIME:  UNTIMED MINUTES:  Medicare Total:       [] EVAL (LOW) 11512 (typically 20 minutes face-to-face)  [] EVAL (MOD) 28870 (typically 30 minutes face-to-face)  [] EVAL (HIGH) 09140 (typically 45 minutes face-to-face)  [] RE-EVAL     [x] GI(97151) x  1   [] IONTO  [x] NMR (37940) x   1  [x] VASO  [x] Manual (32783) x 1    [] Other:  [] TA x      [] Mech Traction (42733)  [] ES(attended) (92806)      [] ES (un) (41506):    ASSESSMENT:  Pt was able to tolerate increased PREs. Pt demonstrated improved function. GOALS:      Patient stated goal: reach ADLs    Therapist goals for Patient:   Short Term Goals: To be achieved in: 2 weeks  1. Independent in HEP and progression per patient tolerance, in order to prevent re-injury. [x] Progressing: [] Met: [] Not Met: [] Adjusted     2. Patient will have a decrease in pain to facilitate improvement in movement, function, and ADLs as indicated by Functional Deficits. [x] Progressing: [] Met: [] Not Met: [] Adjusted     Long Term Goals: To be achieved in: 6 weeks  1. Disability index score of 32% or less for the DASH to assist with reaching prior level of function.    [x] Progressing: [] Met: [] Not Met: [] Adjusted     2. Patient will demonstrate increased AROM to 160 to allow for proper joint functioning as indicated by patients Functional Deficits. [x] Progressing: [] Met: [] Not Met: [] Adjusted     3. Patient will demonstrate an increase in Strength to 5/5 to allow for proper functional mobility as indicated by patients Functional Deficits. [x] Progressing: [] Met: [] Not Met: [] Adjusted     4. Patient will return to [facility] for  functional activities without increased symptoms or restriction. [x] Progressing: [] Met: [] Not Met: [] Adjusted     5. ADLs/ reach with min to no limitations (patient specific functional goal)    [x] Progressing: [] Met: [] Not Met: [] Adjusted              Overall Progression Towards Functional goals/ Treatment Progress Update:  [] Patient is progressing as expected towards functional goals listed. [] Progression is slowed due to complexities/Impairments listed. [] Progression has been slowed due to co-morbidities. [x] Plan just implemented, too soon to assess goals progression <30days   [] Goals require adjustment due to lack of progress  [] Patient is not progressing as expected and requires additional follow up with physician  [] Other    Prognosis for POC: [x] Good [] Fair  [] Poor      Patient requires continued skilled intervention: [x] Yes  [] No    Treatment/Activity Tolerance:  [x] Patient able to complete treatment  [] Patient limited by fatigue  [] Patient limited by pain    [] Patient limited by other medical complications  [] Other:       PLAN:   [x] Continue per plan of care [] Alter current plan (see comments above)  [] Plan of care initiated [] Hold pending MD visit [] Discharge    Electronically signed by:  Jason Toledo PTA, ATC    Note: If patient does not return for scheduled/ recommended follow up visits, this note will serve as a discharge from care along with most recent update on progress.

## 2022-04-21 ENCOUNTER — APPOINTMENT (OUTPATIENT)
Dept: PHYSICAL THERAPY | Age: 37
End: 2022-04-21
Payer: MEDICAID

## 2022-04-26 ENCOUNTER — HOSPITAL ENCOUNTER (OUTPATIENT)
Dept: PHYSICAL THERAPY | Age: 37
Setting detail: THERAPIES SERIES
Discharge: HOME | End: 2022-04-26
Payer: MEDICAID

## 2022-04-26 PROCEDURE — 97140 MANUAL THERAPY 1/> REGIONS: CPT | Performed by: SPECIALIST/TECHNOLOGIST

## 2022-04-26 PROCEDURE — 97110 THERAPEUTIC EXERCISES: CPT | Performed by: SPECIALIST/TECHNOLOGIST

## 2022-04-26 PROCEDURE — 97112 NEUROMUSCULAR REEDUCATION: CPT | Performed by: SPECIALIST/TECHNOLOGIST

## 2022-04-26 PROCEDURE — 97016 VASOPNEUMATIC DEVICE THERAPY: CPT | Performed by: SPECIALIST/TECHNOLOGIST

## 2022-04-26 NOTE — PROGRESS NOTES
[facility] 35[address] [facility], [address]  Phone: [phone]   Fax:     [phone]      [facility] and [address] [place] [address], [address] 3560 [facility], [address]  Phone: [phone]   Fax: [phone]    Date: 2022          Patient Name; :  Halima Agrawal; 1985   Dx:     M75.81, M75.82 (ICD-10-CM) - Tendonitis of both rotator cuffs   M19.011, M19.012 (ICD-10-CM) - Arthritis of both acromioclavicular joints             Physician:  Dr. Tawanda Núñez        Total PT Visits:  9     Measures Previous Current   Pain (0-10) 8/10 1-2/10   Disability % 52% 20%   ROM [facility]     Specific Functional Improvements & Impressions:  Pt has marked improvement with shoulder function. She has seemed to reall ahso increased improvement since her last visit with MD.       Plan & Recommendations:  [] Continue rehabilitation due to objective improvement and continued functional deficits with frequency and duration:  [] Progress toward  []GAP, []Work Conditioning, []Independent HEP   [] Discharge due to   [] All goals achieved, [] Maximized \"medical necessity\" [] No subjective or objective improvements      Electronically signed by:  Yessenia Musa PTA, ATC  Therapy Plan of Care Re-Certification  This patient has been re-evaluated for physical therapy services and for therapy to continue, Medicare, Medicaid and other insurances require periodic physician review of the treatment plan.  Please review the above re-evaluation and verify that you agree with plan of care as established above by signing the attached document and return it to our office or note changes to established plan below  [] Follow treatment plan as above [] Discontinue physical therapy  [] Change plan to:                                 __________________________________________________    Physician Signature:____________________________________ Date:____________  By signing above, therapists plan is approved by physician    If you have any questions or concerns, please don't hesitate to call. Thank you for your referral.    Physical Therapy Treatment Note/ Progress Report:     Date:  2022    Patient Name:  Bryan Acosta    :  1985  MRN: [id number]  Restrictions/Precautions:    Medical/Treatment Diagnosis Information:      M75.52 (ICD-10-CM) - Subacromial bursitis of left shoulder joint   M19.011, M19.012 (ICD-10-CM) - Arthritis of both acromioclavicular joints   M75.81, M75.82 (ICD-10-CM) - Tendonitis of both rotator cuffs     Procedure(s):  VIDEO ARTHROSCOPY LEFT SHOULDER, SUBACROMIAL DECOMPRESSION WITH ACROMIOPLASTY AND DISTAL CLAVICLE RESECTION -SLEEP APNEA  ·      Insurance/Certification information:    [place] MEDICAID  Physician Information:    Candi Salvador MD  Has the plan of care been signed (Y/N):        []  Yes  [x]  No     Date of Patient follow up with Physician: NS    Is this a Progress Report:     []  Yes  [x]  No     If Yes:  Date Range for reporting period:   Beginnin22 ------------ Ending: 3/23/22    Progress report will be due (10 Rx or 30 days whichever is less):      Recertification will be due (POC Duration  / 90 days whichever is less): 22      Visit # Insurance Allowable Auth Required   In Person 9  []  Yes     []  No    Tele Health 0  []  Yes     []  No    Total 9       Functional Scale: Kit Sevin        Date assessed:        Latex Allergy:  [x]NO      []YES  Preferred Language for Healthcare:   [x]English       []other:    Pain level:  5/10      SUBJECTIVE: Pt notes that her shoulder has been feeling a little better.    OBJECTIVE:    Observation:    Test measurements:      RESTRICTIONS/PRECAUTIONS: none    Exercises/Interventions:   Therapeutic Ex (52725) Sets/sec Reps Notes/CUES HEP   Supine cane flex  10x  x   Pulleys  5' UBE  8'  4fwd/4bkwd    PS  10x  x   Wall walk 10 10x  x                                                    Manual Intervention (05487)       JM/ S ROM  8 min                                        NMR re-education (73133)   CUES NEEDED    SL abd/ ER  Prone ext  30x  30x 3#  3# x   SG/ SBS Blk  IR Blk  ER Blk 3  3  3 10  10  10  x   Wall push ups  30x  x   Ball on wall  30x 4 ways    Triceps  3 10 Blk     Body Blade 3 30\"     Chest press 3 10 5#    Serratus Punches 3 10 5#           Therapeutic Activity (43549)                     Vaso  10 min                   makexyz access code: [email or web address]           Therapeutic Exercise and NMR EXR  [x] (78547) Provided verbal/tactile cueing for activities related to strengthening, flexibility, endurance, ROM  for improvements in scapular, scapulothoracic and UE control with self care, reaching, carrying, lifting, house/yardwork, driving/computer work.    [] (94210) Provided verbal/tactile cueing for activities related to improving balance, coordination, kinesthetic sense, posture, motor skill, proprioception  to assist with  scapular, scapulothoracic and UE control with self care, reaching, carrying, lifting, house/yardwork, driving/computer work. Therapeutic Activities:    [x] (77699 or 33318) Provided verbal/tactile cueing for activities related to improving balance, coordination, kinesthetic sense, posture, motor skill, proprioception and motor activation to allow for proper function of scapular, scapulothoracic and UE control with self care, carrying, lifting, driving/computer work.      Home Exercise Program:    [x] (49646) Reviewed/Progressed HEP activities related to strengthening, flexibility, endurance, ROM of scapular, scapulothoracic and UE control with self care, reaching, carrying, lifting, house/yardwork, driving/computer work  [] (96754) Reviewed/Progressed HEP activities related to improving balance, coordination, kinesthetic sense, posture, motor skill, proprioception of scapular, scapulothoracic and UE control with self care, reaching, carrying, lifting, house/yardwork, driving/computer work      Manual Treatments:  PROM / STM / Oscillations-Mobs:  G-I, II, III, IV (Jojo, Inf., Post.)  [x] (15362) Provided manual therapy to mobilize soft tissue/joints of cervical/CT, scapular GHJ and UE for the purpose of modulating pain, promoting relaxation,  increasing ROM, reducing/eliminating soft tissue swelling/inflammation/restriction, improving soft tissue extensibility and allowing for proper ROM for normal function with self care, reaching, carrying, lifting, house/yardwork, driving/computer work    Modalities:     [x] GAME READY (VASO)- for significant edema, swelling, pain control. Charges:  Timed Code Treatment Minutes: 38   Total Treatment Minutes:  48   BWC:  TE TIME:  NMR TIME:  MANUAL TIME:  UNTIMED MINUTES:  Medicare Total:       [] EVAL (LOW) 00571 (typically 20 minutes face-to-face)  [] EVAL (MOD) 52544 (typically 30 minutes face-to-face)  [] EVAL (HIGH) 55845 (typically 45 minutes face-to-face)  [] RE-EVAL     [x] OR(64687) x  1   [] IONTO  [x] NMR (60749) x   1  [x] VASO  [x] Manual (83021) x 1    [] Other:  [] TA x      [] Mech Traction (39754)  [] ES(attended) (01763)      [] ES (un) (15430):    ASSESSMENT:  Pt was able to tolerate increased PREs. Pt demonstrated improved function. GOALS:      Patient stated goal: reach ADLs    Therapist goals for Patient:   Short Term Goals: To be achieved in: 2 weeks  1. Independent in HEP and progression per patient tolerance, in order to prevent re-injury. [x] Progressing: [] Met: [] Not Met: [] Adjusted     2. Patient will have a decrease in pain to facilitate improvement in movement, function, and ADLs as indicated by Functional Deficits. [x] Progressing: [] Met: [] Not Met: [] Adjusted     Long Term Goals: To be achieved in: 6 weeks  1.  Disability index score of 32% or less for the DASH to assist with reaching prior level of function. [x] Progressing: [] Met: [] Not Met: [] Adjusted     2. Patient will demonstrate increased AROM to 160 to allow for proper joint functioning as indicated by patients Functional Deficits. [x] Progressing: [] Met: [] Not Met: [] Adjusted     3. Patient will demonstrate an increase in Strength to 5/5 to allow for proper functional mobility as indicated by patients Functional Deficits. [x] Progressing: [] Met: [] Not Met: [] Adjusted     4. Patient will return to [facility] for  functional activities without increased symptoms or restriction. [x] Progressing: [] Met: [] Not Met: [] Adjusted     5. ADLs/ reach with min to no limitations (patient specific functional goal)    [x] Progressing: [] Met: [] Not Met: [] Adjusted              Overall Progression Towards Functional goals/ Treatment Progress Update:  [] Patient is progressing as expected towards functional goals listed. [] Progression is slowed due to complexities/Impairments listed. [] Progression has been slowed due to co-morbidities.   [x] Plan just implemented, too soon to assess goals progression <30days   [] Goals require adjustment due to lack of progress  [] Patient is not progressing as expected and requires additional follow up with physician  [] Other    Prognosis for POC: [x] Good [] Fair  [] Poor      Patient requires continued skilled intervention: [x] Yes  [] No    Treatment/Activity Tolerance:  [x] Patient able to complete treatment  [] Patient limited by fatigue  [] Patient limited by pain    [] Patient limited by other medical complications  [] Other:       PLAN:   [x] Continue per plan of care [] Alter current plan (see comments above)  [] Plan of care initiated [] Hold pending MD visit [] Discharge    Electronically signed by:  Daryl Canela PTA, ATC    Note: If patient does not return for scheduled/ recommended follow up visits, this note will serve as a discharge from care along with most recent update on progress.

## 2022-04-28 ENCOUNTER — APPOINTMENT (OUTPATIENT)
Dept: PHYSICAL THERAPY | Age: 37
End: 2022-04-28
Payer: MEDICAID

## 2022-05-03 ENCOUNTER — HOSPITAL ENCOUNTER (OUTPATIENT)
Dept: PHYSICAL THERAPY | Age: 37
Setting detail: THERAPIES SERIES
Discharge: HOME | End: 2022-05-03
Payer: MEDICAID

## 2022-05-03 NOTE — FLOWSHEET NOTE
723 [facility], [address] [place] [address], [address] [place] [address] [address]  Phone: [phone]   Fax:     [phone]    Physical Therapy  Cancellation/No-show Note  Patient Name:  Bharati Early  :  1985   Date:  5/3/2022    Cancelled visits to date: 2  No-shows to date: 0    For today's appointment patient:  [x]  Cancelled  []  Rescheduled appointment  []  No-show     Reason given by patient:  [x]  Patient ill  []  Conflicting appointment  []  No transportation    []  Conflict with work  []  No reason given  []  Other:     Comments:      Phone call information:   []  Phone call made today to patient at am/pm at the number provided:      []  Patient answered, conversation as follows:    []  Patient did not answer, message left as follows:  [x]  Phone call not needed - pt contacted us to cancel and provided reason for cancellation.      Electronically signed by:  Jose Gutierrez PTA, ATC

## 2022-05-10 ENCOUNTER — HOSPITAL ENCOUNTER (OUTPATIENT)
Dept: PHYSICAL THERAPY | Age: 37
Setting detail: THERAPIES SERIES
Discharge: HOME | End: 2022-05-10
Payer: MEDICAID

## 2022-05-10 PROCEDURE — 97140 MANUAL THERAPY 1/> REGIONS: CPT | Performed by: SPECIALIST/TECHNOLOGIST

## 2022-05-10 PROCEDURE — 97016 VASOPNEUMATIC DEVICE THERAPY: CPT | Performed by: SPECIALIST/TECHNOLOGIST

## 2022-05-10 PROCEDURE — 97112 NEUROMUSCULAR REEDUCATION: CPT | Performed by: SPECIALIST/TECHNOLOGIST

## 2022-05-10 PROCEDURE — 97110 THERAPEUTIC EXERCISES: CPT | Performed by: SPECIALIST/TECHNOLOGIST

## 2022-05-10 NOTE — PROGRESS NOTES
723 [facility], [address] [place] [address], [address] [place] [address] [address]  Phone: [phone]   Fax:     [phone]      Thank you for your referral.    Physical Therapy Treatment Note/ Progress Report:     Date:  5/10/2022    Patient Name:  Lizzette Colunga    :  1985  MRN: [id number]  Restrictions/Precautions:    Medical/Treatment Diagnosis Information:      M75.52 (ICD-10-CM) - Subacromial bursitis of left shoulder joint   M19.011, M19.012 (ICD-10-CM) - Arthritis of both acromioclavicular joints   M75.81, M75.82 (ICD-10-CM) - Tendonitis of both rotator cuffs     Procedure(s):  VIDEO ARTHROSCOPY LEFT SHOULDER, SUBACROMIAL DECOMPRESSION WITH ACROMIOPLASTY AND DISTAL CLAVICLE RESECTION -SLEEP APNEA  ·      Insurance/Certification information:    [place] MEDICAID  Physician Information:    Cesar Viera MD  Has the plan of care been signed (Y/N):        []  Yes  [x]  No     Date of Patient follow up with Physician: NS    Is this a Progress Report:     []  Yes  [x]  No     If Yes:  Date Range for reporting period:   Beginnin22 ------------ Ending: 3/23/22    Progress report will be due (10 Rx or 30 days whichever is less):      Recertification will be due (POC Duration  / 90 days whichever is less): 22      Visit # Insurance Allowable Auth Required   In Person 10  []  Yes     []  No    Tele Health 0  []  Yes     []  No    Total 10       Functional Scale: Quick Dash  20%      Date assessed:   22     Latex Allergy:  [x]NO      []YES  Preferred Language for Healthcare:   [x]English       []other:    Pain level:  5/10      SUBJECTIVE: Pt notes that her shoulder has been feeling a little better.    OBJECTIVE:    Observation:    Test measurements:      RESTRICTIONS/PRECAUTIONS: none    Exercises/Interventions:   Therapeutic Ex (22070) Sets/sec Reps Notes/CUES HEP   Supine cane flex  10x  x   Pulleys  5'     UBE  8' 4fwd/4bkwd    PS  10x  x   Wall walk 10 10x  x                                                    Manual Intervention (30504)       JM/ S ROM  8 min                                        NMR re-education (32444)   CUES NEEDED    SL abd/ ER  Prone ext  30x  30x 4#  3# x   SG/ SBS Silver  IR Blk  ER Blk 3  3  3 10  10  10  x   Wall push ups  30x  x   Ball on wall  30x 4 ways    Triceps  3 10 Silver    Body Blade 3 30\"     Chest press 3 10 7.5#    Serratus Punches 3 10 7.5#           Therapeutic Activity (35626)                     Vaso  10 min                   Cytoo access code: [email or web address]           Therapeutic Exercise and NMR EXR  [x] (99763) Provided verbal/tactile cueing for activities related to strengthening, flexibility, endurance, ROM  for improvements in scapular, scapulothoracic and UE control with self care, reaching, carrying, lifting, house/yardwork, driving/computer work.    [] (70491) Provided verbal/tactile cueing for activities related to improving balance, coordination, kinesthetic sense, posture, motor skill, proprioception  to assist with  scapular, scapulothoracic and UE control with self care, reaching, carrying, lifting, house/yardwork, driving/computer work. Therapeutic Activities:    [x] (02441 or 41636) Provided verbal/tactile cueing for activities related to improving balance, coordination, kinesthetic sense, posture, motor skill, proprioception and motor activation to allow for proper function of scapular, scapulothoracic and UE control with self care, carrying, lifting, driving/computer work.      Home Exercise Program:    [x] (27164) Reviewed/Progressed HEP activities related to strengthening, flexibility, endurance, ROM of scapular, scapulothoracic and UE control with self care, reaching, carrying, lifting, house/yardwork, driving/computer work  [] (25605) Reviewed/Progressed HEP activities related to improving balance, coordination, kinesthetic sense, posture, motor skill, proprioception of scapular, scapulothoracic and UE control with self care, reaching, carrying, lifting, house/yardwork, driving/computer work      Manual Treatments:  PROM / STM / Oscillations-Mobs:  G-I, II, III, IV (PA's, Inf., Post.)  [x] (23992) Provided manual therapy to mobilize soft tissue/joints of cervical/CT, scapular GHJ and UE for the purpose of modulating pain, promoting relaxation,  increasing ROM, reducing/eliminating soft tissue swelling/inflammation/restriction, improving soft tissue extensibility and allowing for proper ROM for normal function with self care, reaching, carrying, lifting, house/yardwork, driving/computer work    Modalities:     [x] GAME READY (VASO)- for significant edema, swelling, pain control. Charges:  Timed Code Treatment Minutes: 38   Total Treatment Minutes:  48   BWC:  TE TIME:  NMR TIME:  MANUAL TIME:  UNTIMED MINUTES:  Medicare Total:       [] EVAL (LOW) 55883 (typically 20 minutes face-to-face)  [] EVAL (MOD) 29603 (typically 30 minutes face-to-face)  [] EVAL (HIGH) 01332 (typically 45 minutes face-to-face)  [] RE-EVAL     [x] GN(34049) x  1   [] IONTO  [x] NMR (43415) x   1  [x] VASO  [x] Manual (88712) x 1    [] Other:  [] TA x      [] Mech Traction (31738)  [] ES(attended) (54234)      [] ES (un) (36963):    ASSESSMENT:  Pt was able to tolerate increased PREs. Pt demonstrated improved function. GOALS:      Patient stated goal: reach ADLs    Therapist goals for Patient:   Short Term Goals: To be achieved in: 2 weeks  1. Independent in HEP and progression per patient tolerance, in order to prevent re-injury. [x] Progressing: [] Met: [] Not Met: [] Adjusted     2. Patient will have a decrease in pain to facilitate improvement in movement, function, and ADLs as indicated by Functional Deficits. [x] Progressing: [] Met: [] Not Met: [] Adjusted     Long Term Goals: To be achieved in: 6 weeks  1.  Disability index score of 32% or less for the DASH to assist with reaching prior level of function. [x] Progressing: [] Met: [] Not Met: [] Adjusted     2. Patient will demonstrate increased AROM to 160 to allow for proper joint functioning as indicated by patients Functional Deficits. [x] Progressing: [] Met: [] Not Met: [] Adjusted     3. Patient will demonstrate an increase in Strength to 5/5 to allow for proper functional mobility as indicated by patients Functional Deficits. [x] Progressing: [] Met: [] Not Met: [] Adjusted     4. Patient will return to [facility] for  functional activities without increased symptoms or restriction. [x] Progressing: [] Met: [] Not Met: [] Adjusted     5. ADLs/ reach with min to no limitations (patient specific functional goal)    [x] Progressing: [] Met: [] Not Met: [] Adjusted              Overall Progression Towards Functional goals/ Treatment Progress Update:  [] Patient is progressing as expected towards functional goals listed. [] Progression is slowed due to complexities/Impairments listed. [] Progression has been slowed due to co-morbidities.   [x] Plan just implemented, too soon to assess goals progression <30days   [] Goals require adjustment due to lack of progress  [] Patient is not progressing as expected and requires additional follow up with physician  [] Other    Prognosis for POC: [x] Good [] Fair  [] Poor      Patient requires continued skilled intervention: [x] Yes  [] No    Treatment/Activity Tolerance:  [x] Patient able to complete treatment  [] Patient limited by fatigue  [] Patient limited by pain    [] Patient limited by other medical complications  [] Other:       PLAN:   [x] Continue per plan of care [] Alter current plan (see comments above)  [] Plan of care initiated [] Hold pending MD visit [] Discharge    Electronically signed by:  Dena Nina PTA, ATC    Note: If patient does not return for scheduled/ recommended follow up visits, this note will serve as a discharge from care along with most recent update on progress.

## 2022-05-16 ENCOUNTER — OFFICE VISIT (OUTPATIENT)
Dept: ORTHOPEDIC SURGERY | Age: 37
End: 2022-05-16

## 2022-05-16 DIAGNOSIS — Z47.89 ORTHOPEDIC AFTERCARE: Primary | ICD-10-CM

## 2022-05-16 PROCEDURE — 99024 POSTOP FOLLOW-UP VISIT: CPT | Performed by: ORTHOPAEDIC SURGERY

## 2022-05-16 NOTE — PROGRESS NOTES
care.       Electronically signed by Sanna Opitz, MD on 5/16/22 at 8:14 AM EDT         Sanna Opitz, MD       Orthopaedic Surgery-Sports Medicine      Disclaimer: This note was dictated with voice recognition software. Though review and correction are routinely performed, please contact the office/medical records for any errors requiring correction.

## 2022-05-17 ENCOUNTER — TELEPHONE (OUTPATIENT)
Dept: ORTHOPEDIC SURGERY | Age: 37
End: 2022-05-17

## 2022-05-17 ENCOUNTER — HOSPITAL ENCOUNTER (OUTPATIENT)
Dept: PHYSICAL THERAPY | Age: 37
Setting detail: THERAPIES SERIES
Discharge: HOME | End: 2022-05-17
Payer: MEDICAID

## 2022-05-17 NOTE — FLOWSHEET NOTE
583 [facility], [address] [place] [address], [address] [place] [address] [address]  Phone: [phone]   Fax:     [phone]    Physical Therapy  Cancellation/No-show Note  Patient Name:  Anil Ramirez  :  1985   Date:  2022    Cancelled visits to date: 3  No-shows to date: 0    For today's appointment patient:   [x]  Cancelled  []  Rescheduled appointment  []  No-show     Reason given by patient:  []  Patient ill  []  Conflicting appointment  []  No transportation    []  Conflict with work  []  No reason given  [x]  Other:     Comments:   Kid conflict    Phone call information:   []  Phone call made today to patient at am/pm at the number provided:      []  Patient answered, conversation as follows:    []  Patient did not answer, message left as follows:  [x]  Phone call not needed - pt contacted us to cancel and provided reason for cancellation.      Electronically signed by:  Nate Espinosa PTA, MHI, ATC

## 2022-05-17 NOTE — TELEPHONE ENCOUNTER
Imported PT records for 2/26/2022 to current into [facility] for [address], [address]

## 2022-05-24 ENCOUNTER — HOSPITAL ENCOUNTER (OUTPATIENT)
Dept: PHYSICAL THERAPY | Age: 37
Setting detail: THERAPIES SERIES
Discharge: HOME | End: 2022-05-24
Payer: MEDICAID

## 2022-05-24 PROCEDURE — 97110 THERAPEUTIC EXERCISES: CPT | Performed by: SPECIALIST/TECHNOLOGIST

## 2022-05-24 PROCEDURE — 97016 VASOPNEUMATIC DEVICE THERAPY: CPT | Performed by: SPECIALIST/TECHNOLOGIST

## 2022-05-24 PROCEDURE — 97140 MANUAL THERAPY 1/> REGIONS: CPT | Performed by: SPECIALIST/TECHNOLOGIST

## 2022-05-24 PROCEDURE — 97112 NEUROMUSCULAR REEDUCATION: CPT | Performed by: SPECIALIST/TECHNOLOGIST

## 2022-05-24 NOTE — FLOWSHEET NOTE
[address] [facility], [address] [place] [address], [address] [place] [address] [address]  Phone: [phone]   Fax:     [phone]      Thank you for your referral.    Physical Therapy Treatment Note/ Progress Report:     Date:  2022    Patient Name:  Raghav Marie    :  1985  MRN: [id number]  Restrictions/Precautions:    Medical/Treatment Diagnosis Information:      M75.52 (ICD-10-CM) - Subacromial bursitis of left shoulder joint   M19.011, M19.012 (ICD-10-CM) - Arthritis of both acromioclavicular joints   M75.81, M75.82 (ICD-10-CM) - Tendonitis of both rotator cuffs     Procedure(s):  VIDEO ARTHROSCOPY LEFT SHOULDER, SUBACROMIAL DECOMPRESSION WITH ACROMIOPLASTY AND DISTAL CLAVICLE RESECTION -SLEEP APNEA  ·      Insurance/Certification information:    [place] MEDICAID  Physician Information:    Yael Rodriguez MD  Has the plan of care been signed (Y/N):        []  Yes  [x]  No     Date of Patient follow up with Physician: NS    Is this a Progress Report:     []  Yes  [x]  No     If Yes:  Date Range for reporting period:   Beginnin22 ------------ Ending: 3/23/22    Progress report will be due (10 Rx or 30 days whichever is less):      Recertification will be due (POC Duration  / 90 days whichever is less): 22      Visit # Insurance Allowable Auth Required   In Person 11  []  Yes     []  No    Tele Health 0  []  Yes     []  No    Total 11       Functional Scale: Quick Dash  20%      Date assessed:   22     Latex Allergy:  [x]NO      []YES  Preferred Language for Healthcare:   [x]English       []other:    Pain level:  5/10      SUBJECTIVE: Pt notes that her shoulder has been feeling a lot better. MD was pleased with her progress and she is ready to continue with a HEP.       OBJECTIVE:    Observation:    Test measurements:      RESTRICTIONS/PRECAUTIONS: none    Exercises/Interventions:   Therapeutic Ex (97028) Sets/sec Reps Notes/CUES HEP   Supine cane flex  10x  x   Pulleys  5'     UBE  8'  4fwd/4bkwd    PS  10x  x   Wall walk 10 10x  x                                                    Manual Intervention (64786)       JM/ S ROM  8 min                                        NMR re-education (61278)   CUES NEEDED    SL abd/ ER  Prone ext  30x  30x 4#  4# x   SG/ SBS Silver  IR Blk  ER Blk 3  3  3 10  10  10  x   Wall push ups  30x  x   Ball on wall  30x 4 ways    Triceps  3 10 Silver    Body Blade 3 30\"     Chest press 3 10 7.5#    Serratus Punches 3 10 7.5#           Therapeutic Activity (56418)                     Vaso  10 min                   [employer] access code: [email or web address]           Therapeutic Exercise and NMR EXR  [x] (88133) Provided verbal/tactile cueing for activities related to strengthening, flexibility, endurance, ROM  for improvements in scapular, scapulothoracic and UE control with self care, reaching, carrying, lifting, house/yardwork, driving/computer work.    [] (83761) Provided verbal/tactile cueing for activities related to improving balance, coordination, kinesthetic sense, posture, motor skill, proprioception  to assist with  scapular, scapulothoracic and UE control with self care, reaching, carrying, lifting, house/yardwork, driving/computer work. Therapeutic Activities:    [x] (26995 or 02722) Provided verbal/tactile cueing for activities related to improving balance, coordination, kinesthetic sense, posture, motor skill, proprioception and motor activation to allow for proper function of scapular, scapulothoracic and UE control with self care, carrying, lifting, driving/computer work.      Home Exercise Program:    [x] (72697) Reviewed/Progressed HEP activities related to strengthening, flexibility, endurance, ROM of scapular, scapulothoracic and UE control with self care, reaching, carrying, lifting, house/yardwork, driving/computer work  [] (18638) Reviewed/Progressed HEP activities related to improving balance, coordination, kinesthetic sense, posture, motor skill, proprioception of scapular, scapulothoracic and UE control with self care, reaching, carrying, lifting, house/yardwork, driving/computer work      Manual Treatments:  PROM / STM / Oscillations-Mobs:  G-I, II, III, IV (PA's, Inf., Post.)  [x] (24804) Provided manual therapy to mobilize soft tissue/joints of cervical/CT, scapular GHJ and UE for the purpose of modulating pain, promoting relaxation,  increasing ROM, reducing/eliminating soft tissue swelling/inflammation/restriction, improving soft tissue extensibility and allowing for proper ROM for normal function with self care, reaching, carrying, lifting, house/yardwork, driving/computer work    Modalities:     [x] GAME READY (VASO)- for significant edema, swelling, pain control. Charges:  Timed Code Treatment Minutes: 38   Total Treatment Minutes:  48   BWC:  TE TIME:  NMR TIME:  MANUAL TIME:  UNTIMED MINUTES:  Medicare Total:       [] EVAL (LOW) 76391 (typically 20 minutes face-to-face)  [] EVAL (MOD) 27189 (typically 30 minutes face-to-face)  [] EVAL (HIGH) 04349 (typically 45 minutes face-to-face)  [] RE-EVAL     [x] UL(14351) x  1   [] IONTO  [x] NMR (54467) x   1  [x] VASO  [x] Manual (05366) x 1    [] Other:  [] TA x      [] Mech Traction (54130)  [] ES(attended) (33922)      [] ES (un) (62124):    ASSESSMENT:  Pt was able to tolerate increased PREs. Pt demonstrated improved function. GOALS:      Patient stated goal: reach ADLs    Therapist goals for Patient:   Short Term Goals: To be achieved in: 2 weeks  1. Independent in HEP and progression per patient tolerance, in order to prevent re-injury. [x] Progressing: [] Met: [] Not Met: [] Adjusted     2. Patient will have a decrease in pain to facilitate improvement in movement, function, and ADLs as indicated by Functional Deficits. [x] Progressing: [] Met: [] Not Met: [] Adjusted     Long Term Goals:  To be achieved in: 6 weeks  1. Disability index score of 32% or less for the DASH to assist with reaching prior level of function. [x] Progressing: [] Met: [] Not Met: [] Adjusted     2. Patient will demonstrate increased AROM to 160 to allow for proper joint functioning as indicated by patients Functional Deficits. [x] Progressing: [] Met: [] Not Met: [] Adjusted     3. Patient will demonstrate an increase in Strength to 5/5 to allow for proper functional mobility as indicated by patients Functional Deficits. [x] Progressing: [] Met: [] Not Met: [] Adjusted     4. Patient will return to [facility] for  functional activities without increased symptoms or restriction. [x] Progressing: [] Met: [] Not Met: [] Adjusted     5. ADLs/ reach with min to no limitations (patient specific functional goal)    [x] Progressing: [] Met: [] Not Met: [] Adjusted              Overall Progression Towards Functional goals/ Treatment Progress Update:  [] Patient is progressing as expected towards functional goals listed. [] Progression is slowed due to complexities/Impairments listed. [] Progression has been slowed due to co-morbidities.   [x] Plan just implemented, too soon to assess goals progression <30days   [] Goals require adjustment due to lack of progress  [] Patient is not progressing as expected and requires additional follow up with physician  [] Other    Prognosis for POC: [x] Good [] Fair  [] Poor      Patient requires continued skilled intervention: [x] Yes  [] No    Treatment/Activity Tolerance:  [x] Patient able to complete treatment  [] Patient limited by fatigue  [] Patient limited by pain    [] Patient limited by other medical complications  [] Other:       PLAN:   [x] Continue per plan of care [] Alter current plan (see comments above)  [] Plan of care initiated [] Hold pending MD visit [] Discharge    Electronically signed by:  Chaya Tobar, ELIEZER, MHI, ATC    Note: If patient does not return for scheduled/ recommended follow up visits, this note will serve as a discharge from care along with most recent update on progress.

## 2022-05-31 ENCOUNTER — HOSPITAL ENCOUNTER (OUTPATIENT)
Dept: PHYSICAL THERAPY | Age: 37
Setting detail: THERAPIES SERIES
Discharge: HOME | End: 2022-05-31
Payer: MEDICAID

## 2022-05-31 ENCOUNTER — TELEPHONE (OUTPATIENT)
Dept: ORTHOPEDIC SURGERY | Age: 37
End: 2022-05-31

## 2022-05-31 NOTE — TELEPHONE ENCOUNTER
General Question     Subject: CURRENT TREATMENT PLAN FOR PATIENT  Patient and /or Facility Request: Niki Jones  Contact Number: [phone]       OFFICE: [facility] REQUESTING A CALL BACK REGARDING TREATMENT PLAN FOR PATIENT. PATIENT DID TELL THEM SHE FINISHED WITH TREATMENT, THEY ARE NEEDING TO CONFIRM THIS. PLEASE RETURN CALL TO [facility] [phone].

## 2022-05-31 NOTE — PROGRESS NOTES
[address] [facility], [address] [place] [address], [address] [place] [address] [address]  Phone: [phone]   Fax:     [phone]    Physical Therapy  Cancellation/No-show Note  Patient Name:  Mikhail Gallagher  :  1985   Date:  2022    Cancelled visits to date: 4  No-shows to date: 0    For today's appointment patient:   [x]  Cancelled  []  Rescheduled appointment  []  No-show     Reason given by patient:  []  Patient ill  []  Conflicting appointment  []  No transportation    []  Conflict with work  []  No reason given  [x]  Other:     Comments:   Pt was [facility] to [facility]. Phone call information:   []  Phone call made today to patient at am/pm at the number provided:      []  Patient answered, conversation as follows:    []  Patient did not answer, message left as follows:  [x]  Phone call not needed - pt contacted us to cancel and provided reason for cancellation.      Electronically signed by:  Gene Choudhary PTA, MHI, ATC

## 2022-06-05 ASSESSMENT — ENCOUNTER SYMPTOMS
ALLERGIC/IMMUNOLOGIC NEGATIVE: 1
RESPIRATORY NEGATIVE: 1
EYES NEGATIVE: 1
GASTROINTESTINAL NEGATIVE: 1

## 2022-06-06 NOTE — PATIENT INSTRUCTIONS
Diet tips to help make you successful postoperatively    Eating habits after surgery will need to be a long-term change. Eating habits are so ingrained that it can be difficult to change so having made these changes for surgery is a great accomplishment. It is important to maintain these new eating habits after surgery. Also, remember that overall health, age, and genetics make each person's weight loss progress different. Do not compare your progress, the amount you eat, or exercise to other patients.  Protein first at every meal- Eat the protein portion of your meal first. Eating protein helps the body feel full and sends a signal to stop eating. Protein is very important in building tissue in the body.  Eat at least 4 times per day- This includes protein supplements and small meals with a high amount of protein   Chewing your food thoroughly- Eating too quickly and improper chewing can cause pain and vomiting after surgery.  Slowing down the speed at which you eat- Refill your fork only after you swallow. Adopt a new pattern of eating by taking a bite of food and putting your utensil down between bites. This will help to reduce the feeling of food being stuck.    Drink water and other fluids slowly- Drink at least 48 ounces per day minimum. Sip fluids as if they were hot beverages. If you find it difficult to stop gulping liquids, try using a sippy cup or a sport top water bottle.  Make sure you are eating meals without drinking fluids- After surgery you will not be allowed to drink fluids 30 minutes before, during, or 30 minutes after your meal (30/30/30 rule). This will be a life-long behavior change. The reason for the rule is to keep food from passing through your smaller stomach more rapidly.  This will cause you to feel hungry again shortly after your meal.   Continue to avoid caffeine and carbonated beverages- Caffeine acts as a diuretic and can be dehydrating as well as irritating to the lining of the stomach. Carbonated beverages release gas and can expand the stomach.  Continue to keep temptation from your kitchen- Keep your pantry and kitchen cabinets cleaned out of those dangerous foods that might tempt you after surgery (chips, cookies, candy, etc.).  Continue to increase your exercise program- Increase your daily physical activity. Aim for 5-6 days per week for 30 minutes. Walking is an easy way to get started with exercising. You want exercise to be a regular part of your life after surgery.  Make sure you have a good support system- There will be many changes and adjustments to make after surgery. It is important to have a supportive friend, family member or co-worker, etc. with whom you can talk. Continue to attend [facility]) Weight Management support groups as they can be helpful in maintaining behaviors. In addition, it is the responsibility of the patient to schedule and follow up on labs and tests completed after surgery. Results will be reviewed at each visit. We recommend you have the following labs completed by your primary care doctor each year after bariatric surgery: Complete Blood Count (CBC), Complete Metabolic Panel (CMP), Iron Studies, HgbA1c, Lipid Panel, TSH (Thyroid), Vitamin A, Vitamin B1, Vitamin B12 & Folate and Vitamin D. Patient received dietary handouts and education.

## 2022-06-06 NOTE — PROGRESS NOTES
[facility]) Physicians   [facility]    6/22/2022    TELEHEALTH EVALUATION -- Audio/Visual    Subjective:      Patient ID: Hermilo Carrillo is a 31 y.o. female    HPI    2 years 7 months s/p sleeve gastrectomy    Hermilo Carrillo is a 31 y.o. obese female , Body mass index is 42.29 kg/m². Due to the COVID-19 restrictions on close contact interactions the patient's visit was conducted via audio/video in magdalena of a face to face visit. Patient has consented to have this visit conducted via audio/video and I am conducting it from the office. The patient is here through telemedicine for their post op bariatric surgery visit. Patient denies any nausea, vomiting, fevers, chills, shortness of breath, chest pain, constipation or urinary symptoms. Denies any heartburn, but intermittent dysphagia. Patient has been seeing [address] [place] [address] for a revision.     Past Medical History:   Diagnosis Date    Acid reflux     Asthma     as child    Back pain     Bipolar 1 disorder ([facility] [address]     Degenerative disc disease, lumbar     Depression     Diabetes mellitus (HCC)     Fibromyalgia     Liver disease     fatty liver    Obesity     OCD (obsessive compulsive disorder)     PCOS (polycystic ovarian syndrome)     Sleep apnea     no longer uses cpap after weight loss     Past Surgical History:   Procedure Laterality Date    ANKLE SURGERY Left     Scar tissue removal    CARPAL TUNNEL RELEASE Right 8/23/2021    RIGHT CARPAL TUNNEL RELEASE performed by Gyu Rayo MD at [address] Left 9/10/2021    LEFT CARPAL TUNNEL RELEASE performed by Guy Rayo MD at [address] N/A 4/20/2020    LAPAROSCOPIC CHOLECYSTECTOMY performed by Sheron Drummond DO at [address] [facility] ([address] [place] [address])      OTHER SURGICAL HISTORY Left 12/10/14    Excision of Cyst Left Upper Posterior Ankle    OTHER SURGICAL HISTORY Right 12/29/2016    Laparotomy with Right SO    OVARY REMOVAL  12/28/2016    unsure of side    SHOULDER ARTHROSCOPY Left 2/16/2022    VIDEO ARTHROSCOPY LEFT SHOULDER, SUBACROMIAL DECOMPRESSION WITH ACROMIOPLASTY AND DISTAL CLAVICLE RESECTION -SLEEP APNEA performed by Aleks Petres MD at [place] N/A 11/6/2019    LAPAROSCOPIC [address] performed by Ryan Martel MD at [address] [facility] Left 7/2/2021    LEFT QUADRICEP PERCUTANEOUS TENOTOMY , TX2 MICROTIP WITH ULTRASOUND performed by Lewis Yang MD at [address] N/A 8/23/2019    EGD BIOPSY performed by Ryan Martel MD at [address] N/A 3/10/2021    EGD BIOPSY performed by Jose Bain MD at [address]       Family History   Problem Relation Age of Onset    Asthma Mother     Arthritis Mother     Lung Cancer Mother     Hypertension Father     Elevated Lipids Father     Diabetes Father     Alcohol Abuse Father     Asthma Father     Arthritis Father     Diabetes Paternal Grandfather     Arthritis Paternal Grandfather     Diabetes Maternal Grandmother     Arthritis Maternal Grandmother      Social History     Tobacco Use    Smoking status: Never Smoker    Smokeless tobacco: Never Used   Substance Use Topics    Alcohol use: Yes     Comment: 1 drink per week     I counseled the patient on the importance of not smoking and risks of ETOH. Allergies   Allergen Reactions    Latex Itching    Lactose      \"Extreme\" stomach pain and diarrhea    Adhesive Tape      Rips off skin    Keflex [Cephalexin] Nausea And Vomiting     Projectile vomitting     Vitals:    06/22/22 0658   Weight: 270 lb (122.5 kg)   Height: 5' 7\" (1.702 m)     Body mass index is 42.29 kg/m².     Current Outpatient Medications:     naproxen (NAPROSYN) 250 MG tablet, Take 1 tablet by mouth 2 times daily (with meals), Disp: 20 tablet, Rfl: 1    CALCIUM PO, Take by mouth daily, Disp: , Rfl:     Ferrous Sulfate (IRON PO), Take by mouth daily, Disp: , Rfl:     omeprazole (PRILOSEC) 40 MG delayed release capsule, Take 40 mg by mouth daily, Disp: , Rfl:     DULoxetine (CYMBALTA) 60 MG extended release capsule, TAKE 1 CAPSULE BY ORAL ROUTE  EVERY DAY, Disp: , Rfl:     VICTOZA 18 MG/3ML SOPN SC injection, INJECT 0.6 MG BY SUBCUTANEOUS ROUTE DAILY FOR 14 DAYS, THEN 1.2 MG DAILY FOR 28 DAYS, THEN 1.8 MG DAILY. , Disp: , Rfl:     nystatin (MYCOSTATIN) 173023 UNIT/GM powder, as needed , Disp: , Rfl:     lamoTRIgine (LAMICTAL) 25 MG tablet, Take 25 mg by mouth daily, Disp: , Rfl:     medical marijuana, Take by mouth as needed. , Disp: , Rfl:     busPIRone (BUSPAR) 5 MG tablet, Take 5 mg by mouth 3 times daily, Disp: , Rfl:     loratadine (CLARITIN) 10 MG tablet, Take 10 mg by mouth daily, Disp: , Rfl:     Multiple Vitamins-Minerals (BARIATRIC FUSION) CHEW, Take 2 tablets by mouth daily , Disp: , Rfl:     levothyroxine (SYNTHROID) 75 MCG tablet, TAKE 1 TABLET BY MOUTH EVERY DAY, Disp: , Rfl: 5    Cholecalciferol (VITAMIN D PO), Take by mouth daily , Disp: , Rfl:     ziprasidone (GEODON) 20 MG capsule, Take 20 mg by mouth 2 times daily (with meals), Disp: , Rfl:     Lab Results   Component Value Date    WBC 6.1 08/31/2021    RBC 4.26 08/31/2021    HGB 13.8 08/31/2021    HCT 41.3 08/31/2021    MCV 96.8 08/31/2021    MCH 32.4 08/31/2021    MCHC 33.5 08/31/2021    MPV 9.6 08/31/2021    NEUTOPHILPCT 55.5 08/31/2021    LYMPHOPCT 34.4 08/31/2021    MONOPCT 8.9 08/31/2021    EOSRELPCT 0.8 08/31/2021    BASOPCT 0.4 08/31/2021    NEUTROABS 3.4 08/31/2021    LYMPHSABS 2.1 08/31/2021    MONOSABS 0.5 08/31/2021    EOSABS 0.0 08/31/2021     Lab Results   Component Value Date     08/31/2021    K 3.9 08/31/2021     08/31/2021    CO2 28 08/31/2021    ANIONGAP 9 08/31/2021    GLUCOSE 81 08/31/2021    BUN 12 08/31/2021    CREATININE 0.7 08/31/2021    LABGLOM >60 08/31/2021    GFRAA >60 08/31/2021    GFRAA >60 04/07/2011    CALCIUM 9.5 08/31/2021    PROT 7.5 08/31/2021    PROT 7.1 04/07/2011    LABALBU 4.3 08/31/2021    AGRATIO 1.3 08/31/2021    BILITOT 0.3 08/31/2021    ALKPHOS 70 08/31/2021    ALT 25 08/31/2021    AST 25 08/31/2021    GLOB 3.2 08/31/2021     Lab Results   Component Value Date    CHOL 123 05/11/2021    TRIG 66 05/11/2021    HDL 55 05/11/2021    LDLCALC 55 05/11/2021    LABVLDL 13 05/11/2021     Lab Results   Component Value Date    TSHREFLEX 1.05 05/11/2021     Lab Results   Component Value Date    IRON 128 05/11/2021    TIBC 251 05/11/2021    LABIRON 51 05/11/2021     Lab Results   Component Value Date    JJYDKBEY78 730 05/11/2021    FOLATE >20.00 05/11/2021     Lab Results   Component Value Date    VITD25 70.3 05/11/2021     Lab Results   Component Value Date    LABA1C 5.2 05/11/2021    .5 05/11/2021       Review of Systems   Constitutional: Negative. HENT: Negative. Eyes: Negative. Respiratory: Negative. Cardiovascular: Negative. Gastrointestinal: Negative. Endocrine: Negative. Genitourinary: Negative. Musculoskeletal: Negative. Skin: Negative. Allergic/Immunologic: Negative. Neurological: Negative. Hematological: Negative. Psychiatric/Behavioral: Negative. PHYSICAL EXAMINATION:    Constitutional: [x] Appears well-developed and well-nourished [x] No apparent distress      [] Abnormal-   Mental status  [x] Alert and awake  [x] Oriented to person/place/time [x]Able to follow commands      Eyes:  EOM    [x]  Normal  [] Abnormal-  Sclera  [x]  Normal  [] Abnormal -         Discharge [x]  None visible  [] Abnormal -    HENT:   [x] Normocephalic, atraumatic.   [] Abnormal     Neck: [x] No visualized mass     Pulmonary/Chest: [x] Respiratory effort normal.  [x] No visualized signs of difficulty breathing or respiratory distress        [] Abnormal-      Musculoskeletal:   [] Normal gait with no signs of ataxia         [x] Normal range of motion of neck        [] Abnormal-     Neurological:        [x] No Facial Asymmetry (Cranial nerve 7 motor function) (limited exam to video visit)          [x] No gaze palsy        [] Abnormal-         Skin:        [x] No significant exanthematous lesions or discoloration noted on facial skin         [] Abnormal-            Psychiatric:       [x] Normal Affect [] No Hallucinations        [] Abnormal-     Other pertinent observable physical exam findings-     Due to this being a TeleHealth encounter, evaluation of the following organ systems is limited: Vitals/Constitutional/EENT/Resp/CV/GI//MS/Neuro/Skin/Heme-Lymph-Imm. Assessment and Plan:   Patient is here via telemedicine and is 2 years 7 months s/p sleeve gastrectomy, down 11 lbs with a total weight loss of 126 lbs. The patient's current Body mass index is 42.29 kg/m². (6/22/22). She is doing ok, denies n/v/ reflux, but occasional dysphagia. Has been seen by ENT and not severe enough to warrant surgery. Instructed to just cough when this occurs. She is tolerating diet, getting adequate fluids and protein. She is being seen by  for revision surgery. This has helped her regain some of her structure and motivation towards making better food choices and exercising. She is exercising daily with peddlar under desk at home, gpoing to the gym and weighted hula hoop. Encouraged continued physical activity. She is taking vitamins as instructed. She did speak with the registered dietitian for continued follow up. Discussed with dietitian and I agree with recommendations and plan. We will only see her back for continued follow up if her surgery does not happen at [facility]. She is looking at possibly September of this year for her revision surgery. Explained that following that she will continue with them.     Diabetes Mellitus Type II in Obese:   [x] Continue to make dietary and lifestyle modifications per our recommendations. [x] Reviewed the importance of checking blood sugars. [x] Continue to follow up with their PCP and/or Endocrinologist for medication management (Victoza) and monitoring. [] Follow up labs ordered today. Obstructive Sleep Apnea:   [x] Continue to make dietary and lifestyle modifications per our recommendations. [x] Reviewed the importance of wearing your CPAP/BIPAP. [x] Continue to follow up with their sleep medicine provider for CPAP/BIPAP management and monitoring. Chronic GERD:   [x] Continue to make dietary and lifestyle modifications per our recommendations. [x] Continue PPI (Prilosec). [] Continue H2 Blocker  [] Wean PPI. Take every other day for two weeks. If no issues with heartburn/reflux you may decrease to every third day for two weeks. If no issues with heartburn/reflux you may stop the Prilosec. Recommend that you get OTC Pepcid to take should you have occasional heartburn/reflux. Vitamin D Deficiency:  [x] Continue to make dietary and lifestyle modifications per our recommendations. [x] Continue to take Vitamin D supplements. [x] Continue to take multivitamins. [] Follow up labs ordered today. Obesity:  [x] Continue to make dietary and lifestyle modifications per our recommendations. Total encounter time: 30 minutes, including any number of the following: Bariatric Postoperative work up/protocols, review of labs, imaging, provider notes, outside hospital records, performing examination/evaluation, counseling patient and/or family, ordering medications/tests, placing referrals and communication with referring physicians, coordination of care; discussing exercise and physical activity; discussing dietary plan/recall with the patient as well with registered dietitian and documentation in the EHR. Of note, the above was done during same day of the actual patient encounter. An electronic signature was used to authenticate this note.      Pop Martinez was evaluated through a synchronous (real-time) audio-video encounter. The patient (or guardian if applicable) is aware that this is a billable service, which includes applicable co-pays. This Virtual Visit was conducted with patient's (and/or legal guardian's) consent. The visit was conducted pursuant to the emergency declaration under the [address] [place] [address] authority and the [employer] and [employer] General Act. Patient identification was verified, and a caregiver was present when appropriate. The patient was located in a state where the provider was licensed to provide care.

## 2022-06-07 ENCOUNTER — TELEPHONE (OUTPATIENT)
Dept: ORTHOPEDIC SURGERY | Age: 37
End: 2022-06-07

## 2022-06-22 ENCOUNTER — TELEMEDICINE (OUTPATIENT)
Dept: BARIATRICS/WEIGHT MGMT | Age: 37
End: 2022-06-22
Payer: MEDICAID

## 2022-06-22 VITALS — WEIGHT: 270 LBS | HEIGHT: 67 IN | BODY MASS INDEX: 42.38 KG/M2

## 2022-06-22 DIAGNOSIS — K21.9 CHRONIC GERD: ICD-10-CM

## 2022-06-22 DIAGNOSIS — Z98.84 S/P LAPAROSCOPIC SLEEVE GASTRECTOMY: ICD-10-CM

## 2022-06-22 DIAGNOSIS — E66.01 MORBID OBESITY WITH BMI OF 40.0-44.9, ADULT (HCC): ICD-10-CM

## 2022-06-22 DIAGNOSIS — E11.69 DIABETES MELLITUS TYPE 2 IN OBESE (HCC): Primary | ICD-10-CM

## 2022-06-22 DIAGNOSIS — E66.9 DIABETES MELLITUS TYPE 2 IN OBESE (HCC): Primary | ICD-10-CM

## 2022-06-22 DIAGNOSIS — G47.33 MODERATE OBSTRUCTIVE SLEEP APNEA: ICD-10-CM

## 2022-06-22 PROCEDURE — 99214 OFFICE O/P EST MOD 30 MIN: CPT | Performed by: NURSE PRACTITIONER

## 2022-06-22 PROCEDURE — 2022F DILAT RTA XM EVC RTNOPTHY: CPT | Performed by: NURSE PRACTITIONER

## 2022-06-22 PROCEDURE — 3046F HEMOGLOBIN A1C LEVEL >9.0%: CPT | Performed by: NURSE PRACTITIONER

## 2022-06-22 PROCEDURE — G8417 CALC BMI ABV UP PARAM F/U: HCPCS | Performed by: NURSE PRACTITIONER

## 2022-06-22 PROCEDURE — 1036F TOBACCO NON-USER: CPT | Performed by: NURSE PRACTITIONER

## 2022-06-22 PROCEDURE — G8427 DOCREV CUR MEDS BY ELIG CLIN: HCPCS | Performed by: NURSE PRACTITIONER

## 2022-06-22 NOTE — PROGRESS NOTES
Dietary Assessment Note      Vitals:   Vitals:    06/22/22 0658   Weight: 270 lb (122.5 kg)   Height: 5' 7\" (1.702 m)    Patient gained 11 lbs over past 6 months. Total Weight Loss: 126 lbs    Labs reviewed: No new nutrition related labs     Protein intake: 60-80 grams/day     Fluid intake: 48-64 oz/day    Multivitamin/mineral intake: 2 Fusion soft chew MVI     Calcium intake: 2 Calcium    Other: hair skin and nails, Fe    Exercise: Exercising daily - gym, recumbent bike under desk - several hours, weighted hula hoop for 30 minutes - 1 hour    Nutrition Assessment: 4 year 7 mo s/p sleeve post-op visit. Previously saw Dr. Evelyn Gomez and had been provided 1200 kcal LC meal plan. Was doing a semi-cheat day and having more CHO options. Has started working with [facility] to have revisional sx and they have instructed her to keep CHO < 100 g/day.      Breakfast: protein shake     Snack: nothing     Lunch: protein shake     Snack: apple and PB OR jerky     Dinner: cauliflower pizza OR homemade chili OR taco and lettuce (no shell) OR chicken with broccoli     Snack: nothing     Fluids: Water, nature twist lemonade (5 calorie), diet decaf green tea with sweetener    Amount able to eat per sitting: Decreased portions to 1/2 - 1 cup/sitting     Following 30/30/30 rule: Yes     Food Intolerances/issues: greasy foods, dairy    Client Concerns: Pt has some swallowing issues and had modified barium swallow, went to ENT and states food is getting stuck at front of her voice box - they instructed her to cough at this point as it is not serious enough to address for sx; pt wants to lose more weight     Goals:   Continue with diet and exercise     Plan: F/U per Provider     Danial Morgan, ANIKET, LD

## 2022-07-31 ENCOUNTER — HOSPITAL ENCOUNTER (EMERGENCY)
Age: 37
Discharge: HOME | End: 2022-07-31
Attending: EMERGENCY MEDICINE
Payer: MEDICAID

## 2022-07-31 VITALS
TEMPERATURE: 98.3 F | HEART RATE: 87 BPM | RESPIRATION RATE: 16 BRPM | HEIGHT: 66 IN | BODY MASS INDEX: 42.59 KG/M2 | SYSTOLIC BLOOD PRESSURE: 115 MMHG | DIASTOLIC BLOOD PRESSURE: 87 MMHG | WEIGHT: 265 LBS | OXYGEN SATURATION: 97 %

## 2022-07-31 DIAGNOSIS — U07.1 COVID-19: Primary | ICD-10-CM

## 2022-07-31 LAB
INFLUENZA A: NOT DETECTED
INFLUENZA B: NOT DETECTED
SARS-COV-2 RNA, RT PCR: DETECTED

## 2022-07-31 PROCEDURE — 99283 EMERGENCY DEPT VISIT LOW MDM: CPT

## 2022-07-31 PROCEDURE — 87636 SARSCOV2 & INF A&B AMP PRB: CPT

## 2022-07-31 RX ORDER — ONDANSETRON 4 MG/1
4 TABLET, ORALLY DISINTEGRATING ORAL EVERY 8 HOURS PRN
Qty: 15 TABLET | Refills: 0 | Status: SHIPPED | OUTPATIENT
Start: 2022-07-31 | End: 2022-08-05

## 2022-07-31 RX ORDER — BENZONATATE 100 MG/1
100 CAPSULE ORAL 3 TIMES DAILY PRN
Qty: 21 CAPSULE | Refills: 0 | Status: SHIPPED | OUTPATIENT
Start: 2022-07-31 | End: 2022-08-07

## 2022-07-31 ASSESSMENT — PAIN - FUNCTIONAL ASSESSMENT: PAIN_FUNCTIONAL_ASSESSMENT: NONE - DENIES PAIN

## 2022-07-31 NOTE — ED PROVIDER NOTES
Emergency Department Provider Note  Location: [place] [address] ED  7/31/2022     Patient Identification  Jill Irizarry is a 31 y.o. female    Chief Complaint  Covid Testing (States had 1 positive and 1 negative test at home.)      Mode of Arrival  private car    HPI  (History provided by patient)  This is a 31 y.o. female with a PMH significant for DM  presented today for cough, congestion, headache, body ache x 3-4 days. She took a home COVID test 2 days ago and it was negative. She took one today and it was positive. She states she needs to know if she has COVID or not. She states she took some over-the-counter cold medicine with some relief. She states a few other family member has similar URI symptoms as well. No diarrhea. No rash. ROS  Review of Systems   Constitutional:  Negative for fever. HENT:  Positive for congestion, rhinorrhea and sore throat. Negative for trouble swallowing. Eyes:  Negative for discharge. Respiratory:  Positive for cough. Negative for shortness of breath. Cardiovascular:  Negative for chest pain and leg swelling. Gastrointestinal:  Negative for abdominal pain, nausea and vomiting. Genitourinary:  Negative for dysuria and frequency. Musculoskeletal:  Positive for myalgias. Negative for neck stiffness. Skin:  Negative for rash. Neurological:  Positive for headaches. Negative for syncope. I have reviewed the following nursing documentation:  Allergies:    Allergies   Allergen Reactions    Latex Itching    Lactose      \"Extreme\" stomach pain and diarrhea    Adhesive Tape      Rips off skin    Keflex [Cephalexin] Nausea And Vomiting     Projectile vomitting       Past medical history:  has a past medical history of Acid reflux, Asthma, Back pain, Bipolar 1 disorder (HCC), Degenerative disc disease, lumbar, Depression, Diabetes mellitus ([address] Fibromyalgia, Liver disease, Obesity, OCD (obsessive compulsive disorder), PCOS (polycystic ovarian syndrome), and Sleep apnea. Past surgical history:  has a past surgical history that includes other surgical history (Left, 12/10/14); other surgical history (Right, 12/29/2016); Ovary removal (12/28/2016); Upper gastrointestinal endoscopy (N/A, 8/23/2019); Sleeve Gastrectomy (N/A, 11/6/2019); Cholecystectomy, laparoscopic (N/A, 4/20/2020); Hysterectomy; [place] tooth extraction; Upper gastrointestinal endoscopy (N/A, 3/10/2021); Ankle surgery (Left); tenotomy (Left, 7/2/2021); Carpal tunnel release (Right, 8/23/2021); Carpal tunnel release (Left, 9/10/2021); and Shoulder arthroscopy (Left, 2/16/2022). Home medications:   Prior to Admission medications    Medication Sig Start Date End Date Taking? Authorizing Provider   empagliflozin (JARDIANCE) 10 MG tablet Take 10 mg by mouth in the morning. Yes Historical Provider, MD   benzonatate (TESSALON PERLES) 100 MG capsule Take 1 capsule by mouth 3 times daily as needed for Cough 7/31/22 8/7/22 Yes Nayeli Bahena MD   ondansetron (ZOFRAN ODT) 4 MG disintegrating tablet Take 1 tablet by mouth every 8 hours as needed for Nausea or Vomiting 7/31/22 8/5/22 Yes Nayeli Bahena MD   naproxen (NAPROSYN) 250 MG tablet Take 1 tablet by mouth 2 times daily (with meals) 2/26/22   ROSE Williamson - CNP   omeprazole (PRILOSEC) 40 MG delayed release capsule Take 40 mg by mouth daily    Historical Provider, MD   DULoxetine (CYMBALTA) 60 MG extended release capsule TAKE 1 CAPSULE BY ORAL ROUTE  EVERY DAY 7/26/21   Historical Provider, MD   VICTOZA 18 MG/3ML SOPN SC injection INJECT 0.6 MG BY SUBCUTANEOUS ROUTE DAILY FOR 14 DAYS, THEN 1.2 MG DAILY FOR 28 DAYS, THEN 1.8 MG DAILY. 8/4/21   Historical Provider, MD   nystatin (MYCOSTATIN) 637184 UNIT/GM powder as needed  5/17/21   Historical Provider, MD   lamoTRIgine (LAMICTAL) 25 MG tablet Take 25 mg by mouth daily    Historical Provider, MD   medical marijuana Take by mouth as needed.     Historical Provider, MD   busPIRone (BUSPAR) 5 MG tablet Take 5 mg by mouth 3 times daily    Historical Provider, MD   loratadine (CLARITIN) 10 MG tablet Take 10 mg by mouth daily 4/9/20   Historical Provider, MD   Multiple Vitamins-Minerals (BARIATRIC FUSION) CHEW Take 2 tablets by mouth daily     Historical Provider, MD   levothyroxine (SYNTHROID) 75 MCG tablet TAKE 1 TABLET BY MOUTH EVERY DAY 9/24/19   Historical Provider, MD   Cholecalciferol (VITAMIN D PO) Take by mouth daily     Historical Provider, MD   ziprasidone (GEODON) 20 MG capsule Take 20 mg by mouth 2 times daily (with meals)    Historical Provider, MD       Social history:  reports that she has never smoked. She has never used smokeless tobacco. She reports current alcohol use. She reports that she does not currently use drugs after having used the following drugs: Marijuana Belkis Wilkins). Family history:    Family History   Problem Relation Age of Onset    Asthma Mother     Arthritis Mother     Lung Cancer Mother     Hypertension Father     Elevated Lipids Father     Diabetes Father     Alcohol Abuse Father     Asthma Father     Arthritis Father     Diabetes Paternal Grandfather     Arthritis Paternal Grandfather     Diabetes Maternal Grandmother     Arthritis Maternal Grandmother        Exam  ED Triage Vitals [07/31/22 0219]   BP Temp Temp Source Heart Rate Resp SpO2 Height Weight   115/87 98.3 °F (36.8 °C) Oral 87 16 97 % 5' 6\" (1.676 m) 265 lb (120.2 kg)   Nursing note and vital signs reviewed  Constitutional: Patient is oriented to person, place, and time. WDWN. No distress. Nontoxic. HENT:      Head: Normocephalic and atraumatic. Ears: External ears normal. TM normal bilaterally. Nose: Nose normal.     Mouth: Membrane mucosa moist and pink. Uvula midline. Soft palate symmetrical.  No evidence of PTA. No tonsillar exudate. No trismus. No submandibular fullness or tongue elevation. Eyes: Anicteric sclera. PERRL. EOMI. No discharge. Neck: Supple. Trachea midline. Good ROM. No meningismus signs. No mass. Lymph: No cervical adenopathy  Cardiovascular: RRR, no g/r/m. 2+ radial pulses. Pulmonary/Chest: Effort normal. No respiratory distress. CTAB. No stridor. No wheezes. No rales. Musculoskeletal: No extremity edema. Compartments soft. No deformity. Neurological: Alert and oriented to person, place, and time. No facial droop. No slurred speech. Normal gait. Skin: Warm and dry. No rash. No petechiae. MDM/ED Course  Labs  Results for orders placed or performed during the hospital encounter of 07/31/22   COVID-19 & Influenza Combo    Specimen: Nasopharyngeal Swab   Result Value Ref Range    SARS-CoV-2 RNA, RT PCR DETECTED (A) NOT DETECTED    INFLUENZA A NOT DETECTED NOT DETECTED    INFLUENZA B NOT DETECTED NOT DETECTED         - Patient seen and evaluated in room 6.  31 y.o. female presented for acute URI symptoms x 3-4 days. Signs and symptoms suggestive of COVID and rapid COVID test in the ED was positive. She also have 1 home test that was positive earlier today. - Exam showed WDWN F, no signs of systemic toxicity. - Patient was placed on telemetry during his/her ED stay and no malignant dysrhythmia observed. - Is this patient to be included in the SEP-1 Core Measure due to severe sepsis or septic shock? No   Exclusion criteria - the patient is NOT to be included for SEP-1 Core Measure due to:  Viral etiology found or highly suspected (including COVID-19) without concomitant bacterial infection    - I discussed the results with patient. We agreed to treat symptomatically with Tessalon perle, Zofran, and over-the-counter cold medicine.  - Return precautions also discussed. patient verbalized understanding of care plan and agreed to follow-up with PCP as advised.       I estimate there is LOW risk for ACUTE RESPIRATORY FAILURE, EPIGLOTTITIS, MENINGITIS, OR ABSCESS IN THE HEAD/NECK REGION (e.g. PTA, RTP, LIANA'S, etc.), thus I consider the discharge disposition reasonable. Also, there is no evidence or peritonitis, sepsis, or toxicity. Judson Mccray and I have discussed the diagnosis and risks, and we agree with discharging home to follow-up with PCP. We also discussed returning to the Emergency Department immediately if new or worsening symptoms occur. We have discussed the symptoms which are most concerning (e.g., changing or worsening pain, trouble swallowing or breathing, neck stiffness, fever) that necessitate immediate return. Clinical Impression:  1. COVID-19          Disposition:  Discharge to home in good condition. Blood pressure 115/87, pulse 87, temperature 98.3 °F (36.8 °C), temperature source Oral, resp. rate 16, height 5' 6\" (1.676 m), weight 265 lb (120.2 kg), last menstrual period 04/13/2020, SpO2 97 %, not currently breastfeeding. Patient was given scripts for the following medications. I counseled patient how to take these medications. Discharge Medication List as of 7/31/2022  3:04 AM        START taking these medications    Details   benzonatate (TESSALON PERLES) 100 MG capsule Take 1 capsule by mouth 3 times daily as needed for Cough, Disp-21 capsule, R-0Print      ondansetron (ZOFRAN ODT) 4 MG disintegrating tablet Take 1 tablet by mouth every 8 hours as needed for Nausea or Vomiting, Disp-15 tablet, R-0Print             Disposition referral (if applicable):  ROSE Rodriguez - [address]  [address] [facility]  [phone]    Schedule an appointment as soon as possible for a visit in 3 days          Total critical care time is 0 minutes, which excludes separately billable procedures and updating family. Time spent is specifically for management of the presenting complaint and symptoms initially, direct bedside care, reevaluation, review of records, and consultation.   There was a high probability of clinically significant life-threatening deterioration in the patient's condition, which required my urgent intervention. This chart was generated in part by using Dragon Dictation system and may contain errors related to that system including errors in grammar, punctuation, and spelling, as well as words and phrases that may be inappropriate. If there are any questions or concerns please feel free to contact the dictating provider for clarification.      Tuyet Gutiérrez MD  [address] [facility] Gilson Wall MD  08/03/22 [id number]

## 2022-08-03 ASSESSMENT — ENCOUNTER SYMPTOMS
TROUBLE SWALLOWING: 0
EYE DISCHARGE: 0
COUGH: 1
RHINORRHEA: 1
VOMITING: 0
NAUSEA: 0
SHORTNESS OF BREATH: 0
ABDOMINAL PAIN: 0
SORE THROAT: 1

## 2022-08-12 ENCOUNTER — HOSPITAL ENCOUNTER (EMERGENCY)
Age: 37
Discharge: HOME | End: 2022-08-12
Payer: MEDICAID

## 2022-08-12 VITALS
SYSTOLIC BLOOD PRESSURE: 108 MMHG | DIASTOLIC BLOOD PRESSURE: 69 MMHG | RESPIRATION RATE: 18 BRPM | HEART RATE: 76 BPM | TEMPERATURE: 97.9 F | OXYGEN SATURATION: 97 %

## 2022-08-12 DIAGNOSIS — M54.12 CERVICAL RADICULOPATHY: Primary | ICD-10-CM

## 2022-08-12 PROCEDURE — 6370000000 HC RX 637 (ALT 250 FOR IP): Performed by: PHYSICIAN ASSISTANT

## 2022-08-12 PROCEDURE — 99284 EMERGENCY DEPT VISIT MOD MDM: CPT

## 2022-08-12 PROCEDURE — 6360000002 HC RX W HCPCS: Performed by: PHYSICIAN ASSISTANT

## 2022-08-12 PROCEDURE — 96374 THER/PROPH/DIAG INJ IV PUSH: CPT

## 2022-08-12 PROCEDURE — 96372 THER/PROPH/DIAG INJ SC/IM: CPT

## 2022-08-12 RX ORDER — LIDOCAINE 4 G/G
1 PATCH TOPICAL ONCE
Status: DISCONTINUED | OUTPATIENT
Start: 2022-08-12 | End: 2022-08-12 | Stop reason: HOSPADM

## 2022-08-12 RX ORDER — DEXAMETHASONE SODIUM PHOSPHATE 10 MG/ML
4 INJECTION INTRAMUSCULAR; INTRAVENOUS ONCE
Status: COMPLETED | OUTPATIENT
Start: 2022-08-12 | End: 2022-08-12

## 2022-08-12 RX ORDER — NAPROXEN 500 MG/1
500 TABLET ORAL 2 TIMES DAILY WITH MEALS
Qty: 10 TABLET | Refills: 0 | Status: SHIPPED | OUTPATIENT
Start: 2022-08-12 | End: 2022-09-18

## 2022-08-12 RX ORDER — LIDOCAINE 4 G/G
1 PATCH TOPICAL DAILY
Qty: 30 PATCH | Refills: 0 | Status: SHIPPED | OUTPATIENT
Start: 2022-08-12 | End: 2022-09-11

## 2022-08-12 RX ORDER — KETOROLAC TROMETHAMINE 30 MG/ML
15 INJECTION, SOLUTION INTRAMUSCULAR; INTRAVENOUS ONCE
Status: COMPLETED | OUTPATIENT
Start: 2022-08-12 | End: 2022-08-12

## 2022-08-12 RX ADMIN — DEXAMETHASONE SODIUM PHOSPHATE 4 MG: 10 INJECTION INTRAMUSCULAR; INTRAVENOUS at 19:22

## 2022-08-12 RX ADMIN — KETOROLAC TROMETHAMINE 15 MG: 30 INJECTION, SOLUTION INTRAMUSCULAR at 19:21

## 2022-08-12 ASSESSMENT — ENCOUNTER SYMPTOMS
EYE REDNESS: 0
RHINORRHEA: 0
ABDOMINAL PAIN: 0
DIARRHEA: 0
NAUSEA: 0
CONSTIPATION: 0
SHORTNESS OF BREATH: 0
CHEST TIGHTNESS: 0
SORE THROAT: 0
SINUS PAIN: 0
SINUS PRESSURE: 0
EYE DISCHARGE: 0
COUGH: 0
VOMITING: 0

## 2022-08-12 ASSESSMENT — PAIN SCALES - GENERAL
PAINLEVEL_OUTOF10: 6
PAINLEVEL_OUTOF10: 6

## 2022-08-12 ASSESSMENT — PAIN DESCRIPTION - LOCATION: LOCATION: ARM;SHOULDER

## 2022-08-12 ASSESSMENT — PAIN - FUNCTIONAL ASSESSMENT: PAIN_FUNCTIONAL_ASSESSMENT: 0-10

## 2022-08-12 ASSESSMENT — PAIN DESCRIPTION - ORIENTATION: ORIENTATION: LEFT

## 2022-08-12 NOTE — ED PROVIDER NOTES
constipation, diarrhea, nausea and vomiting. Genitourinary:  Negative for difficulty urinating, dysuria and frequency. Musculoskeletal:  Positive for arthralgias and myalgias. Skin: Negative. Neurological: Negative. Negative for dizziness, weakness, numbness and headaches. Psychiatric/Behavioral: Negative. All other systems reviewed and are negative. Positivesand Pertinent negatives as per HPI. Except as noted above in the ROS, all other systems were reviewed and negative.        PAST MEDICAL HISTORY     Past Medical History:   Diagnosis Date    Acid reflux     Asthma     as child    Back pain     Bipolar 1 disorder ([facility] [address]     Degenerative disc disease, lumbar     Depression     Diabetes mellitus ([facility] [address]     Fibromyalgia     Liver disease     fatty liver    Obesity     OCD (obsessive compulsive disorder)     PCOS (polycystic ovarian syndrome)     Sleep apnea     no longer uses cpap after weight loss         SURGICAL HISTORY       Past Surgical History:   Procedure Laterality Date    ANKLE SURGERY Left     Scar tissue removal    CARPAL TUNNEL RELEASE Right 8/23/2021    RIGHT CARPAL TUNNEL RELEASE performed by Jackson Dubin, MD at [address] Left 9/10/2021    LEFT CARPAL TUNNEL RELEASE performed by Jackson Dubin, MD at [address] [facility], [address] N/A 4/20/2020    LAPAROSCOPIC CHOLECYSTECTOMY performed by Mary Grace Chaves DO at [address] ([facility])      OTHER SURGICAL HISTORY Left 12/10/14    Excision of Cyst Left Upper Posterior Ankle    OTHER SURGICAL HISTORY Right 12/29/2016    Laparotomy with Right SO    OVARY REMOVAL  12/28/2016    unsure of side    SHOULDER ARTHROSCOPY Left 2/16/2022    VIDEO ARTHROSCOPY LEFT SHOULDER, SUBACROMIAL DECOMPRESSION WITH ACROMIOPLASTY AND DISTAL CLAVICLE RESECTION -SLEEP APNEA performed by Cheyenne Streeter MD at [address] [facility] N/A 11/6/2019    LAPAROSCOPIC SLEEVE GASTRECTOMY - ETHICON performed by Zay Mejia MD at [address] Left 7/2/2021    LEFT QUADRICEP PERCUTANEOUS TENOTOMY , TX2 MICROTIP WITH ULTRASOUND performed by Nancy Rodriguez MD at [place] [address] N/A 8/23/2019    EGD BIOPSY performed by Zay Mejia MD at [address] [facility] N/A 3/10/2021    EGD BIOPSY performed by Reema Cox MD at [facility]       Discharge Medication List as of 8/12/2022  7:38 PM        CONTINUE these medications which have NOT CHANGED    Details   empagliflozin (JARDIANCE) 10 MG tablet Take 10 mg by mouth in the morning. Historical Med      !! naproxen (NAPROSYN) 250 MG tablet Take 1 tablet by mouth 2 times daily (with meals), Disp-20 tablet, R-1Print      omeprazole (PRILOSEC) 40 MG delayed release capsule Take 40 mg by mouth dailyHistorical Med      DULoxetine (CYMBALTA) 60 MG extended release capsule TAKE 1 CAPSULE BY ORAL ROUTE  EVERY DAYHistorical Med      VICTOZA 18 MG/3ML SOPN SC injection INJECT 0.6 MG BY SUBCUTANEOUS ROUTE DAILY FOR 14 DAYS, THEN 1.2 MG DAILY FOR 28 DAYS, THEN 1.8 MG DAILY. , DAWHistorical Med      nystatin (MYCOSTATIN) 248049 UNIT/GM powder as needed , PRN Starting Mon 5/17/2021, Historical Med      lamoTRIgine (LAMICTAL) 25 MG tablet Take 25 mg by mouth dailyHistorical Med      medical marijuana Take by mouth as needed. Historical Med      busPIRone (BUSPAR) 5 MG tablet Take 5 mg by mouth 3 times dailyHistorical Med      loratadine (CLARITIN) 10 MG tablet Take 10 mg by mouth dailyHistorical Med      Multiple Vitamins-Minerals (BARIATRIC FUSION) CHEW Take 2 tablets by mouth daily Historical Med      levothyroxine (SYNTHROID) 75 MCG tablet TAKE 1 TABLET BY MOUTH EVERY DAY, R-5Historical Med      Cholecalciferol (VITAMIN D PO) Take by mouth daily Historical Med      ziprasidone (GEODON) 20 MG capsule Take 20 mg by mouth 2 times daily (with meals)Historical Med       !! - Potential duplicate medications found. Please discuss with provider. ALLERGIES     Latex, Lactose, Adhesive tape, and Keflex [cephalexin]    FAMILY HISTORY       Family History   Problem Relation Age of Onset    Asthma Mother     Arthritis Mother     Lung Cancer Mother     Hypertension Father     Elevated Lipids Father     Diabetes Father     Alcohol Abuse Father     Asthma Father     Arthritis Father     Diabetes Paternal Grandfather     Arthritis Paternal Grandfather     Diabetes Maternal Grandmother     Arthritis Maternal Grandmother          SOCIAL HISTORY       Social History     Socioeconomic History    Marital status:      Spouse name: Rachele Branham    Number of children: 0    Years of education: 16    Highest education level: None   Tobacco Use    Smoking status: Never    Smokeless tobacco: Never   Vaping Use    Vaping Use: Never used   Substance and Sexual Activity    Alcohol use: Yes     Comment: 1 drink per week    Drug use: Not Currently     Types: Marijuana Stephanie Jordy)     Comment: medical marijuana    Sexual activity: Yes     Partners: Male       SCREENINGS     NIH Score       [facility]      [facility]     Heart Score         PHYSICAL EXAM    (up to 7 for level 4, 8 ormore for level 5)     ED Triage Vitals [08/12/22 1647]   BP Temp Temp Source Heart Rate Resp SpO2 Height Weight   120/79 97.9 °F (36.6 °C) Oral 96 18 97 % -- --       Physical Exam  Vitals and nursing note reviewed. Constitutional:       Appearance: Normal appearance. She is well-developed. She is not diaphoretic. HENT:      Head: Normocephalic and atraumatic. Nose: Nose normal.      Mouth/Throat:      Mouth: Mucous membranes are moist.      Pharynx: No oropharyngeal exudate. Eyes:      General:         Right eye: No discharge. Left eye: No discharge. Extraocular Movements: Extraocular movements intact.       Conjunctiva/sclera: Conjunctivae normal.      Pupils: Pupils are equal, round, and reactive to light. Cardiovascular:      Rate and Rhythm: Normal rate and regular rhythm. Heart sounds: Normal heart sounds. No murmur heard. No friction rub. No gallop. Pulmonary:      Effort: Pulmonary effort is normal. No respiratory distress. Breath sounds: Normal breath sounds. No wheezing. Abdominal:      General: Bowel sounds are normal. There is no distension. Palpations: Abdomen is soft. Tenderness: There is no abdominal tenderness. Musculoskeletal:         General: Normal range of motion. Cervical back: Normal range of motion. Skin:     General: Skin is warm and dry. Capillary Refill: Capillary refill takes less than 2 seconds. Neurological:      General: No focal deficit present. Mental Status: She is alert. Psychiatric:         Mood and Affect: Mood normal.         Behavior: Behavior normal.         DIAGNOSTIC RESULTS   LABS:    Labs Reviewed - No data to display    All other labs were within normal range or notreturned as of this dictation. EKG: All EKG's are interpreted by the Emergency Department Physician who either signs or Co-signs this chart in the absence of a cardiologist.  Please see their note for interpretation of EKG. RADIOLOGY:         Interpretation per the Radiologist below, if available at the time of this note:    No orders to display     No results found. PROCEDURES   Unless otherwise noted below, none     Procedures    CRITICAL CARE TIME   N/A    Is this patient to be included in the SEP-1 Core Measure due to severe sepsis or septic shock? No   Exclusion criteria - the patient is NOT to be included for SEP-1 Core Measure due to:   Infection is not suspected     CONSULTS:  None      EMERGENCY DEPARTMENT COURSE and DIFFERENTIAL DIAGNOSIS/MDM:   Vitals:    Vitals:    08/12/22 1647 08/12/22 1928   BP: 120/79 108/69   Pulse: 96 76   Resp: 18 18   Temp: 97.9 °F (36.6 °C)    TempSrc: Oral    SpO2: 97% 97%       Patient was given the following medications:  Medications   lidocaine 4 % external patch 1 patch (1 patch TransDERmal Patch Applied 8/12/22 1920)   ketorolac (TORADOL) injection 15 mg (15 mg IntraVENous Given 8/12/22 1921)   dexamethasone (DECADRON) injection 4 mg (4 mg IntraMUSCular Given 8/12/22 1922)         Afebrile, stable, patient presents to the ED for evaluation. Nontoxic patient in no acute distress SPO2 on room air of 97% patient is not hypoxic. Patient with pain from her shoulder with radiation down to her fingertips. No injury or neurological deficit. Patient is neurovascularly intact nondominant arm that this is affecting. We discussed symptomatic treatment and keeping her appointment that she has scheduled with orthopedics on Wednesday. All questions were answered and the patient is discharged in stable condition. All questions are answered. Indications for return to the ED are discussed. Patient is advised if any new or worsening symptoms arise they should immediately return to the emergency room. Follow-up with primary care in 1-2 days. The patient tolerated their visit well. The patient and / or the family were informed of the results of any tests, a time was given to answer questions, a plan was proposed and they agreed Nena Che. I estimate there is LOW risk for COMPARTMENT SYNDROME, DEEP VENOUS THROMBOSIS, SEPTIC ARTHRITIS, TENDON OR NEUROVASCULAR INJURY, thus I consider the discharge disposition reasonable. Alfredo Sue and I have discussed the diagnosis and risks, and we agree with discharging home to follow-up with their primary doctor or the referral orthopedist. We also discussed returning to the Emergency Department immediately if new or worsening symptoms occur. We have discussed the symptoms which are most concerning (e.g., changing or worsening pain, numbness, weakness) that necessitate immediate return.     Final Impression    1. Cervical radiculopathy        Blood pressure 108/69, pulse 76, temperature 97.9 °F (36.6 °C), temperature source Oral, resp. rate 18, last menstrual period 04/13/2020, SpO2 97 %, not currently breastfeeding. FINAL IMPRESSION      1. Cervical radiculopathy          DISPOSITION/PLAN   DISPOSITION Decision To Discharge 08/12/2022 07:22:43 PM      PATIENT REFERRED TO:  ROSE Landon - [facility]  [address] [facility]  [phone]      for a recheck in 2-3  days    MD Raymond Leahy [address] [place] [address]  [phone]      Keep the appointment you have scheduled to follow-up      DISCHARGE MEDICATIONS:  Discharge Medication List as of 8/12/2022  7:38 PM        START taking these medications    Details   lidocaine 4 % external patch Place 1 patch onto the skin in the morning., TransDERmal, DAILY Starting Fri 8/12/2022, Until Sun 9/11/2022, For 30 days, Disp-30 patch, R-0, Normal      !! naproxen (NAPROSYN) 500 MG tablet Take 1 tablet by mouth in the morning and 1 tablet in the evening. Take with meals. Do all this for 5 days. , Disp-10 tablet, R-0Normal       !! - Potential duplicate medications found. Please discuss with provider. DISCONTINUED MEDICATIONS:  Discharge Medication List as of 8/12/2022  7:38 PM                 Pt was seen during the [employer] pandemic. Appropriate PPE worn by ME during patient encounters. Pt seen during a time with constrained hospital bed capacity and other potential inpatient and outpatient resources were constrained due to the viral pandemic.    Please note that portions of this note were completed with a voice recognition program.  Efforts were made to edit the dictations but occasionally words are mis-transcribed.)    Julieth Clements PA-C (electronically signed)        Julieth Clements PA-C  08/13/22 [id number]

## 2022-08-15 ENCOUNTER — OFFICE VISIT (OUTPATIENT)
Dept: ORTHOPEDIC SURGERY | Age: 37
End: 2022-08-15
Payer: MEDICAID

## 2022-08-15 VITALS — WEIGHT: 265 LBS | BODY MASS INDEX: 42.59 KG/M2 | HEIGHT: 66 IN

## 2022-08-15 DIAGNOSIS — R20.2 PARESTHESIA OF BOTH LOWER EXTREMITIES: ICD-10-CM

## 2022-08-15 DIAGNOSIS — Z98.890 STATUS POST CARPAL TUNNEL RELEASE OF BOTH WRISTS: ICD-10-CM

## 2022-08-15 DIAGNOSIS — M51.27 HERNIATION OF INTERVERTEBRAL DISC BETWEEN L5 AND S1: ICD-10-CM

## 2022-08-15 DIAGNOSIS — M25.511 ACUTE PAIN OF BOTH SHOULDERS: ICD-10-CM

## 2022-08-15 DIAGNOSIS — M51.26 LUMBAR DISCOGENIC PAIN SYNDROME: ICD-10-CM

## 2022-08-15 DIAGNOSIS — Z86.69 HX OF PERIPHERAL NEUROPATHY: ICD-10-CM

## 2022-08-15 DIAGNOSIS — G56.93 BILATERAL NEUROPATHY OF UPPER EXTREMITIES: Primary | ICD-10-CM

## 2022-08-15 DIAGNOSIS — M54.42 ACUTE MIDLINE LOW BACK PAIN WITH BILATERAL SCIATICA: ICD-10-CM

## 2022-08-15 DIAGNOSIS — M51.26 HERNIATION OF INTERVERTEBRAL DISC BETWEEN L4 AND L5: ICD-10-CM

## 2022-08-15 DIAGNOSIS — M47.816 LUMBAR SPONDYLOSIS: ICD-10-CM

## 2022-08-15 DIAGNOSIS — M54.41 ACUTE MIDLINE LOW BACK PAIN WITH BILATERAL SCIATICA: ICD-10-CM

## 2022-08-15 DIAGNOSIS — M25.512 ACUTE PAIN OF BOTH SHOULDERS: ICD-10-CM

## 2022-08-15 PROCEDURE — G8428 CUR MEDS NOT DOCUMENT: HCPCS | Performed by: INTERNAL MEDICINE

## 2022-08-15 PROCEDURE — G8417 CALC BMI ABV UP PARAM F/U: HCPCS | Performed by: ORTHOPAEDIC SURGERY

## 2022-08-15 PROCEDURE — 99213 OFFICE O/P EST LOW 20 MIN: CPT | Performed by: ORTHOPAEDIC SURGERY

## 2022-08-15 PROCEDURE — 99214 OFFICE O/P EST MOD 30 MIN: CPT | Performed by: INTERNAL MEDICINE

## 2022-08-15 PROCEDURE — G8428 CUR MEDS NOT DOCUMENT: HCPCS | Performed by: ORTHOPAEDIC SURGERY

## 2022-08-15 PROCEDURE — 1036F TOBACCO NON-USER: CPT | Performed by: INTERNAL MEDICINE

## 2022-08-15 PROCEDURE — G8417 CALC BMI ABV UP PARAM F/U: HCPCS | Performed by: INTERNAL MEDICINE

## 2022-08-15 PROCEDURE — 1036F TOBACCO NON-USER: CPT | Performed by: ORTHOPAEDIC SURGERY

## 2022-08-15 RX ORDER — METHYLPREDNISOLONE 4 MG/1
TABLET ORAL
Qty: 1 KIT | Refills: 0 | Status: SHIPPED | OUTPATIENT
Start: 2022-08-15 | End: 2022-09-09 | Stop reason: ALTCHOICE

## 2022-08-15 NOTE — PROGRESS NOTES
FOLLOW UP ORTHOPAEDIC NOTE    The patient follows up today for evaluation of bilateral upper extremity numbness and tingling left greater than right. She states that she is been doing quite well with regard to her left shoulder. She is now roughly 6 months out from left shoulder procedures and she feels that she is doing well. She had gone to the emergency room given bilateral upper extremity numbness and tingling. She feels that this is left. Right. Primarily in the left ulnar 2 fingers and in the right hand occasional total hand. She is had previous carpal tunnel releases by Dr. Gisell Beckford. She is also been worked up previously with MRI yet still continues to have on again off again symptoms. She states and I had reviewed her EMG from prior which did state slight ulnar neuropathy at the left elbow consistent with cubital tunnel syndrome. She continues to have symptoms and has additional questions with regard to some of her neurologic symptoms. PE:  AAOx3  RR  Unlabored breathing  Skin warm and moist  Focused physical examination of the left shoulder  Nontender to palpation throughout. 5/5 rotator cuff testing supraspinatus infraspinatus subscapularis, nontender to palpation in and around the biceps, nontender to palpation over the portals, nontender to palpation at the previous distal clavicle resection. Negative Garcia negative Neer  Negative Spurling bilateral upper extremity  Positive Tinel's at the left elbow cubital tunnel with recreation of symptoms in the left ulnar 2 fingers  Skin intact throughout  5/5 D B T G IO EPL  SILT Ax, R, U, M  +2 radial pulse    Pertinent radiographs/imagin view C-spine 8/15/2022: Negative fracture no gross arthrosis. Diagnosis Orders   1. Bilateral neuropathy of upper extremities  XR CERVICAL SPINE (2-3 VIEWS)    [address] [place] [address],  Orthopedic Surgery (Spine), [facility]      2.  Status post carpal tunnel release of both wrists  [facility] and treatment and should there be no further work-up or indication to do so, consideration for left elbow cubital tunnel decompression with ulnar nerve transposition subcutaneously pending intraoperative assessment for any gross subluxation at that time.  -Thank you for the clinical consultation and allowing me to participate in the patient's care. Electronically signed by Breanna Bender MD on 8/15/22 at 12:56 PM EDT         Breanna Bender MD       Orthopaedic Surgery-Sports Medicine    Disclaimer: This note was dictated with voice recognition software. Though review and correction are routinely performed, please contact the office/medical records for any errors requiring correction.

## 2022-08-15 NOTE — LETTER
[facility] [address] [facility]  Phone: [phone]  Fax: [phone]    Ayse Lizama MD    August 16, 2022     [facility] - [address] [place] [address]    Patient: Za Aguirre   MR Number: [id number]   YOB: 1985   Date of Visit: 8/15/2022       Dear Rodolfo Camara: Thank you for referring Shelva Spurling to me for evaluation/treatment. Below are the relevant portions of my assessment and plan of care. Impression: . Encounter Diagnoses   Name Primary?  Acute midline low back pain with bilateral sciatica Yes    Lumbar spondylosis     HNP (herniated nucleus pulposus), lumbar               Plan:        ***         Orders:        Orders Placed This Encounter   Procedures    XR LUMBAR SPINE (2-3 VIEWS)     Standing Status:   Future     Number of Occurrences:   1     Standing Expiration Date:   8/15/2023     Order Specific Question:   Reason for exam:     Answer:   pain    MRI LUMBAR SPINE WO CONTRAST     Standing Status:   Future     Standing Expiration Date:   8/15/2023     Scheduling Instructions:      Dinora Stone, please contact pt to schedule, Dimitry will obtain auth and fwd to your facility. Pt advised to f/u in clinic 2-3 days after MRI for results. Order Specific Question:   Reason for exam:     Answer:   R/O HNP / STENOSIS - PROGRESSION     Order Specific Question:   What is the sedation requirement? Answer:   None         Jhony Perez MD.      Disclaimer: \"This note was dictated with voice recognition software. Though review and correction are routine, we apologize for any errors. \"         If you have questions, please do not hesitate to call me. I look forward to following [place] along with you.     Sincerely,      Ayse Lizama MD
Order Specific Question:   Reason for exam:     Answer:   R/O HNP / STENOSIS - PROGRESSION     Order Specific Question:   What is the sedation requirement? Answer:   None         Randi Larios MD.      Disclaimer: \"This note was dictated with voice recognition software. Though review and correction are routine, we apologize for any errors. \"         If you have questions, please do not hesitate to call me. I look forward to following [place] along with you.     Sincerely,      Nancy Rodriguez MD

## 2022-08-15 NOTE — PROGRESS NOTES
Chief Complaint:   Chief Complaint   Patient presents with    Knee Pain     L knee pain, feels burning in L knee that made her go to the ER over the weekend due to pain. She was given a toradol shot in the arm. Feeling better from shot but having bilateral numbness and tingling in feet. Lower Back Pain     Lumbar,           History of Present Illness:       Patient is a 31 y.o. female presents with worsening pattern of back pain and lower limb paresthesias over the past 2 weeks unprompted any specific injury or event. She presented to the emergency room was treated and released and presents here for further evaluation. The patient has had no further testing for the problem. Prior spine intervention has included lumbar RFA approximately 1 year ago with subjective benefit. The symptoms of back pain doshow a discogenic provacative pattern and are worsened by bending forwards, sitting, walking, and activity levels  and improved with recumbency and shifting position . There is not new onset weakness or progressive weakness of the lower extremities that has developed. The patient denies new onset bowel or bladder dysfunction. There  is no history of previous spinal trauma. The patient does not have history or orthopaedic lumbar spine surgery. Pain localizes to the lumbar region    Painl levels: 6    There is lower limb pain . The back pain : limb pain is 40 : 60 and follows a L5-S1 dermatomal distribution involving Bilateral lower extremity radiating into the feet. She does have history of peripheral neuropathy-NOS    Work-up to date has included: MRI  Prior treatment has included lumbar RFA. This patient reports some improvement with this treatment. The patient has no history or autoimmune disease, inflammatory arthropathy or crystal arthropathy.     .     Past Medical History:        Past Medical History:   Diagnosis Date    Acid reflux     Asthma     as child    Back pain     Bipolar 1 disorder ([facility] [address]     Degenerative disc disease, lumbar     Depression     Diabetes mellitus (HCC)     Fibromyalgia     Liver disease     fatty liver    Obesity     OCD (obsessive compulsive disorder)     PCOS (polycystic ovarian syndrome)     Sleep apnea     no longer uses cpap after weight loss        Present Medications:         Current Outpatient Medications   Medication Sig Dispense Refill    methylPREDNISolone (MEDROL, FRAN,) 4 MG tablet By mouth. 1 kit 0    lidocaine 4 % external patch Place 1 patch onto the skin in the morning. 30 patch 0    naproxen (NAPROSYN) 500 MG tablet Take 1 tablet by mouth in the morning and 1 tablet in the evening. Take with meals. Do all this for 5 days. 10 tablet 0    empagliflozin (JARDIANCE) 10 MG tablet Take 10 mg by mouth in the morning. naproxen (NAPROSYN) 250 MG tablet Take 1 tablet by mouth 2 times daily (with meals) 20 tablet 1    omeprazole (PRILOSEC) 40 MG delayed release capsule Take 40 mg by mouth daily      DULoxetine (CYMBALTA) 60 MG extended release capsule TAKE 1 CAPSULE BY ORAL ROUTE  EVERY DAY      VICTOZA 18 MG/3ML SOPN SC injection INJECT 0.6 MG BY SUBCUTANEOUS ROUTE DAILY FOR 14 DAYS, THEN 1.2 MG DAILY FOR 28 DAYS, THEN 1.8 MG DAILY. nystatin (MYCOSTATIN) 834607 UNIT/GM powder as needed       lamoTRIgine (LAMICTAL) 25 MG tablet Take 25 mg by mouth daily      medical marijuana Take by mouth as needed. busPIRone (BUSPAR) 5 MG tablet Take 5 mg by mouth 3 times daily      loratadine (CLARITIN) 10 MG tablet Take 10 mg by mouth daily      Multiple Vitamins-Minerals (BARIATRIC FUSION) CHEW Take 2 tablets by mouth daily       levothyroxine (SYNTHROID) 75 MCG tablet TAKE 1 TABLET BY MOUTH EVERY DAY  5    Cholecalciferol (VITAMIN D PO) Take by mouth daily       ziprasidone (GEODON) 20 MG capsule Take 20 mg by mouth 2 times daily (with meals)       No current facility-administered medications for this visit. Allergies:         Allergies   Allergen Reactions    Latex Itching    Lactose      \"Extreme\" stomach pain and diarrhea    Adhesive Tape      Rips off skin    Keflex [Cephalexin] Nausea And Vomiting     Projectile vomitting           Review of Systems:    Pertinent items are noted in HPI    Review of systems reviewed from Patient History Form dated on this date and   available in the patient's chart under the Media tab. Vital Signs: There were no vitals filed for this visit. General Exam:     Constitutional: Patient is adequately groomed with no evidence of malnutrition  Mental Status: The patient is oriented to time, place and person. The patient's mood and affect are appropriate. Vascular: Examination reveals no swelling or calf tenderness. Peripheral pulses are palpable and 2+. Lymphatics: no lymphadenopathy of the inguinal region or lower extremity   Physical Exam: lower back      Primary Exam:    Inspection: No deformity atrophy appreciable curvature      Palpation: No focal trigger point tenderness      Range of Motion: 30/10 pain with extension greater than flexion      Strength: Normal lower extremity      Special Tests: Negative SLR      Skin: There are no rashes, ulcerations or lesions. Gait: Nonantalgic      Reflex intact lower     Additional Comments:        Additional Examinations:           Neurolgic -Light touch sensation and manual muscle testing normal L2-S1. No fasiculations. Pattella tendon and Achilles tendon reflexes +2 bilaterally. Seated SLR negative           Office Imaging Results/Procedures PerformedToday:      Radiology:      X-rays obtained and reviewed in office:   Views 3 views lumbar spine   Location lumbar spine   Impression normal alignment on the AP projection. There is at least  moderate intervertebral to space narrowing L4-S1 with at least mild facet arthropathy L4-S1. No evidence of spondylolisthesis. Posterior osteophyte/disc osteophyte complex posteriorly at L4/L5.   No evidence of spondylolisthesis vertebral body heights are well-maintained. No other soft tissue or osseous abnormalities       Office Procedures:     Orders Placed This Encounter   Procedures    XR LUMBAR SPINE (2-3 VIEWS)     Standing Status:   Future     Number of Occurrences:   1     Standing Expiration Date:   8/15/2023     Order Specific Question:   Reason for exam:     Answer:   pain    MRI LUMBAR SPINE WO CONTRAST     Standing Status:   Future     Standing Expiration Date:   8/15/2023     Scheduling Instructions:      Leta Melvin, please contact pt to schedule, Dimitry will obtain auth and fwd to your facility. Pt advised to f/u in clinic 2-3 days after MRI for results. Order Specific Question:   Reason for exam:     Answer:   R/O HNP / STENOSIS - PROGRESSION     Order Specific Question:   What is the sedation requirement? Answer:   None           Other Outside Imaging and Testing Personally Reviewed:    XR CERVICAL SPINE (2-3 VIEWS)    Result Date: 8/15/2022  Radiology exam is complete. No Radiologist dictation. Please follow up with ordering provider. XR LUMBAR SPINE (2-3 VIEWS)    Result Date: 8/15/2022  Radiology exam is complete. No Radiologist dictation. Please follow up with ordering provider. MRI-L-SPINE W/O CONTRAST    Anatomical Region Laterality Modality   T-Spine -- Magnetic Resonance   L-Spine -- --   Pelvis -- --       Impression    IMPRESSION:   1. Mild thoracic spondylosis as described with no associated central stenosis or cord compression. No high-grade foraminal stenosis or thoracic nerve root impingement is detected. MRI lumbar spine findings:     Conus medullaris terminates at the L1 level. It appears normal.     Lumbar osseous structures are normal in height, signal, and alignment with no evidence of focal bone marrow edema or osseous destruction. No fracture detected.      No definite abnormalities of the paraspinal or retroperitoneal soft tissues are detected. L1-L2: Mild disc bulge. No central or foraminal stenosis. L2-L3: Mild facet arthrosis without central or foraminal stenosis. L3-L4: Mild facet arthrosis. Mild disc bulge. No central stenosis. Mild noncompressive bilateral foraminal stenosis. L4-L5: Mild facet arthrosis bilaterally. Disc desiccation and mild disc bulge with superimposed central, right central and right foraminal disc herniation. No central stenosis. Mild to moderate right foraminal stenosis without foraminal nerve root impingement or displacement. L5-S1: Mild facet arthrosis. Mild disc bulge with superimposed central disc herniation. No central stenosis. No foraminal stenosis or impingement. IMPRESSION:   1. Multilevel spondylosis as described including disc herniations at L4-L5 and L5-S1. No associated central stenosis. Foraminal stenosis most pronounced on the right L4-L5, with no associated foraminal nerve root impingement or displacement detected. Signed By: Ora Triana MD, Wanda Mazariegos    Narrative    MR thoracic and MR lumbar spine performed without intravenous contrast 3/16/2021     CLINICAL HISTORY: Chronic low back pain, unspecified back pain laterality, unspecified whether sciatica present. Osteoarthritis of thoracic spine, unspecified spinal osteoarthritis complication status. Comparisons: None. PROCEDURE: Multiplanar, multisequence MR imaging of the thoracic and lumbar spine performed without intravenous contrast.     MR thoracic spine findings:     Thoracic spinal cord appears normal in size, signal and morphology. Thoracic osseous structures demonstrate normal height, signal and alignment with no evidence of subluxation, fracture, or bone marrow edema. No abnormalities are detected in the paraspinal soft tissues. T1-T2: Desiccation with superimposed central and left central disc herniation. No evidence of canal stenosis or cord compression. Mild left foraminal stenosis.      T6-T7: Desiccation with superimposed right central disc herniation. No central stenosis or cord compression. No foraminal stenosis. T8-T9: Disc desiccation with superimposed shallow left central disc herniation. No canal or foraminal stenosis. Procedure Note    Mehul Hannah MD - 03/16/2021   Formatting of this note might be different from the original.   MR thoracic and MR lumbar spine performed without intravenous contrast 3/16/2021     CLINICAL HISTORY: Chronic low back pain, unspecified back pain laterality, unspecified whether sciatica present. Osteoarthritis of thoracic spine, unspecified spinal osteoarthritis complication status. Comparisons: None. PROCEDURE: Multiplanar, multisequence MR imaging of the thoracic and lumbar spine performed without intravenous contrast.     MR thoracic spine findings:     Thoracic spinal cord appears normal in size, signal and morphology. Thoracic osseous structures demonstrate normal height, signal and alignment with no evidence of subluxation, fracture, or bone marrow edema. No abnormalities are detected in the paraspinal soft tissues. T1-T2: Desiccation with superimposed central and left central disc herniation. No evidence of canal stenosis or cord compression. Mild left foraminal stenosis. T6-T7: Desiccation with superimposed right central disc herniation. No central stenosis or cord compression. No foraminal stenosis. T8-T9: Disc desiccation with superimposed shallow left central disc herniation. No canal or foraminal stenosis. IMPRESSION:   1. Mild thoracic spondylosis as described with no associated central stenosis or cord compression. No high-grade foraminal stenosis or thoracic nerve root impingement is detected. MRI lumbar spine findings:     Conus medullaris terminates at the L1 level.  It appears normal.     Lumbar osseous structures are normal in height, signal, and alignment with no evidence of focal bone marrow edema or osseous destruction. No fracture detected. No definite abnormalities of the paraspinal or retroperitoneal soft tissues are detected. L1-L2: Mild disc bulge. No central or foraminal stenosis. L2-L3: Mild facet arthrosis without central or foraminal stenosis. L3-L4: Mild facet arthrosis. Mild disc bulge. No central stenosis. Mild noncompressive bilateral foraminal stenosis. L4-L5: Mild facet arthrosis bilaterally. Disc desiccation and mild disc bulge with superimposed central, right central and right foraminal disc herniation. No central stenosis. Mild to moderate right foraminal stenosis without foraminal nerve root impingement or displacement. L5-S1: Mild facet arthrosis. Mild disc bulge with superimposed central disc herniation. No central stenosis. No foraminal stenosis or impingement. IMPRESSION:   1. Multilevel spondylosis as described including disc herniations at L4-L5 and L5-S1. No associated central stenosis. Foraminal stenosis most pronounced on the right L4-L5, with no associated foraminal nerve root impingement or displacement detected. Signed By: Kaden Mullins MD  Exam End: 03/16/21 08:34    Specimen Collected: 03/16/21 08:49 Last Resulted: 03/16/21 08:57   Received From: [facility]  Result Received: 03/29/22 17:20         MRI-T-SPINE W/O CONTRAST    Anatomical Region Laterality Modality   -- -- Magnetic Resonance       Impression    IMPRESSION:   1. Mild thoracic spondylosis as described with no associated central stenosis or cord compression. No high-grade foraminal stenosis or thoracic nerve root impingement is detected. MRI lumbar spine findings:     Conus medullaris terminates at the L1 level. It appears normal.     Lumbar osseous structures are normal in height, signal, and alignment with no evidence of focal bone marrow edema or osseous destruction. No fracture detected.      No definite abnormalities of the paraspinal or retroperitoneal soft tissues are Desiccation with superimposed right central disc herniation. No central stenosis or cord compression. No foraminal stenosis. T8-T9: Disc desiccation with superimposed shallow left central disc herniation. No canal or foraminal stenosis. Procedure Note    Pasquale Feliciano MD - 03/16/2021   Formatting of this note might be different from the original.   MR thoracic and MR lumbar spine performed without intravenous contrast 3/16/2021     CLINICAL HISTORY: Chronic low back pain, unspecified back pain laterality, unspecified whether sciatica present. Osteoarthritis of thoracic spine, unspecified spinal osteoarthritis complication status. Comparisons: None. PROCEDURE: Multiplanar, multisequence MR imaging of the thoracic and lumbar spine performed without intravenous contrast.     MR thoracic spine findings:     Thoracic spinal cord appears normal in size, signal and morphology. Thoracic osseous structures demonstrate normal height, signal and alignment with no evidence of subluxation, fracture, or bone marrow edema. No abnormalities are detected in the paraspinal soft tissues. T1-T2: Desiccation with superimposed central and left central disc herniation. No evidence of canal stenosis or cord compression. Mild left foraminal stenosis. T6-T7: Desiccation with superimposed right central disc herniation. No central stenosis or cord compression. No foraminal stenosis. T8-T9: Disc desiccation with superimposed shallow left central disc herniation. No canal or foraminal stenosis. IMPRESSION:   1. Mild thoracic spondylosis as described with no associated central stenosis or cord compression. No high-grade foraminal stenosis or thoracic nerve rootimpingement is detected. Assessment   Impression: . Encounter Diagnoses   Name Primary?     Lumbar discogenic pain syndrome     Herniation of intervertebral disc between L4 and L5     Herniation of intervertebral disc between L5 and S1 Lumbar spondylosis     Paresthesia of both lower extremities     Hx of peripheral neuropathy     Acute midline low back pain with bilateral sciatica               Plan:        MRI lumbar spine evaluate severity of discopathy/ stenosis suspected clinically and evaluate for progression of disease  Consider Her a candidate for spine intervention injection  Activity modification, lumbar spine precautions  lumbar spinestabilization home exercise program  Medical pain management: Medrol, continue Cymbalta consider low-dose gabapentin minimize polypharmacy in this patient  Obtain medical records from prior spine intervention procedures  Obtain medical records related to prior EMG of the lower extremities                Orders:        Orders Placed This Encounter   Procedures    XR LUMBAR SPINE (2-3 VIEWS)     Standing Status:   Future     Number of Occurrences:   1     Standing Expiration Date:   8/15/2023     Order Specific Question:   Reason for exam:     Answer:   pain    MRI LUMBAR SPINE WO CONTRAST     Standing Status:   Future     Standing Expiration Date:   8/15/2023     Scheduling Instructions:      Wan Sepulveda, please contact pt to schedule, Dimitry will obtain auth and fwd to your facility. Pt advised to f/u in clinic 2-3 days after MRI for results. Order Specific Question:   Reason for exam:     Answer:   R/O HNP / STENOSIS - PROGRESSION     Order Specific Question:   What is the sedation requirement? Answer:   None         Darline Dee MD.      Disclaimer: \"This note was dictated with voice recognition software. Though review and correction are routine, we apologize for any errors. \"

## 2022-08-19 ENCOUNTER — PATIENT MESSAGE (OUTPATIENT)
Dept: ORTHOPEDIC SURGERY | Age: 37
End: 2022-08-19

## 2022-08-23 ENCOUNTER — TELEPHONE (OUTPATIENT)
Dept: ORTHOPEDIC SURGERY | Age: 37
End: 2022-08-23

## 2022-08-23 DIAGNOSIS — G56.93 BILATERAL NEUROPATHY OF UPPER EXTREMITIES: Primary | ICD-10-CM

## 2022-08-23 NOTE — TELEPHONE ENCOUNTER
I contacted the patient today by phone and had a pleasant conversation with her. She states that Dr. Oscar Sorenson is currently working with her low back and I greatly appreciate his aid in care of the patient. Upon discussion with her she continues to have bilateral upper extremity symptoms left greater than right. She is status post previous carpal tunnel releases by Dr. Gilford Sievert with repeat postoperative EMG in December 2021 still showing slowing with regard to the median nerve as well as some potential peripheral neuropathy in association with the ulnar nerve. She does have well-controlled diabetes at least upon her report and with prior glucose checks    At this time, while I do feel that her previous clinical examination correlates to cubital tunnel syndrome, I still have questions and concerns with regard to overall symptomatology still present despite previous surgical releases as identified on postoperative EMG. I offered a referral to neurology for evaluation and treatment given continued bilateral upper extremity symptoms. Understanding this she wishes to currently pursue this referral and had actually requested to do so by her primary care provider however they had left initially and she has been attempting to reestablish primary based care    Referral will be placed and she will follow-up with neurology for evaluation and treatment of continued bilateral upper extremity symptoms despite surgical releases with EMG confirmed peripheral neuropathy and to see if there is any more systemic related neurologic process going on. Dr. Oscar Sorenson is currently working with her low back however pending his overall assessments and consideration, potential transition to neurology care as well as he sees fit/indicated    I had a pleasant conversation with the patient and all her questions were answered today.

## 2022-08-24 ENCOUNTER — TELEPHONE (OUTPATIENT)
Dept: ORTHOPEDIC SURGERY | Age: 37
End: 2022-08-24

## 2022-08-24 NOTE — TELEPHONE ENCOUNTER
MRI was denied by insurance, P2P will be attempted today and pt notified of outcome once received. Pt v/u. Previous spine intervention was done by Dr. Komal Alvarado with [facility]. Have submitted fax request for those records. Pt states she has never had EMG of the LE, only UE. Pt states that she felt about 40% improvement with the steroids, but pain has returned to where it was. Cont to do HEP, which offer temp relief and \"improved blood flow\" in legs. Cont taking Cymbalta.

## 2022-08-26 NOTE — TELEPHONE ENCOUNTER
DOMINIC and she wanted to go to [facility]. [facility] PT was ordered and patient was told to go to PT for 2-3 weeks and to follow up or to follow up sooner if her symptoms became worse.

## 2022-08-30 NOTE — FLOWSHEET NOTE
[address] [place] [address] and Sports Rehabilitation[address], [address] [place] [address] [address]  Phone: [phone]   Fax:     [phone]      Physical Therapy Treatment Note/ Progress Report:       Date:  2022    Patient Name:  Staci Ramirez    :  1985  MRN: [id number]  Restrictions/Precautions:    Medical/Treatment Diagnosis Information:     M54.42, M54.41 (ICD-10-CM) - Acute midline low back pain with bilateral sciatica   M47.816 (ICD-10-CM) - Lumbar spondylosis   M51.26 (ICD-10-CM) - Lumbar discogenic pain syndrome   M51.26 (ICD-10-CM) - Herniation of intervertebral disc between L4 and L5   M51.27 (ICD-10-CM) - Herniation of intervertebral disc between L5 and S1   R20.2 (ICD-10-CM) - Paresthesia of both lower extremities   Z86.69 (ICD-10-CM) - Hx of peripheral neuropathy        Insurance/Certification information:   [employer] Chancy [place] MEDICAID  Physician Information:   Zac Pradhan MD  Has the plan of care been signed (Y/N):        []  Yes  [x]  No     Date of Patient follow up with Physician: NS    Is this a Progress Report:     []  Yes  [x]  No      If Yes:  Date Range for reporting period:  Beginnin22 ------------ Endin22    Progress report will be due (10 Rx or 30 days whichever is less): 3/76/03     Recertification will be due (POC Duration  / 90 days whichever is less): 22      Visit # Insurance Allowable Auth Required   In Person 1 30 yr []  Yes     []  No    Tele Health 0  []  Yes     []  No    Total 1       FOTO Score: 46/54     Date assessed:  22      Latex Allergy:  [x]NO      []YES  Preferred Language for Healthcare:   [x]English       []other:    Pain level:  6/10     SUBJECTIVE:  See eval    OBJECTIVE: See eval  Observation:   Test measurements:      RESTRICTIONS/PRECAUTIONS: none    Exercises/Interventions:   Therapeutic Ex (73338) Sets/sec Reps Notes/CUES HEP   ELÍAS  2 min  x   Prone glut sets  15x  x Prone buttock kicks  15x  x                                                                  Manual Intervention (87378)                                                 NMR re-education (31089)   CUES NEEDED    bridges  15x  x   Supine PB clamshells RD 15x  x   Supine ball squeeze  15x  x   Supine marches  15x  x                                      Therapeutic Activity (93914)                            Prone ESU/CP  10 min            Medbridge access code: [email or web address]           Therapeutic Exercise and NMR EXR  [x] (91525) Provided verbal/tactile cueing for activities related to strengthening, flexibility, endurance, ROM  for improvements in proximal hip and core control with self care, mobility, lifting and ambulation.  [] (20098) Provided verbal/tactile cueing for activities related to improving balance, coordination, kinesthetic sense, posture, motor skill, proprioception  to assist with core control in self care, mobility, lifting, and ambulation.      Therapeutic Activities:    [x] (21601 or 31132) Provided verbal/tactile cueing for activities related to improving balance, coordination, kinesthetic sense, posture, motor skill, proprioception and motor activation to allow for proper function  with self care and ADLs  [] (45562) Provided training and instruction to the patient for proper core and proximal hip recruitment and positioning with ambulation re-education     Home Exercise Program:    [x] (36109) Reviewed/Progressed HEP activities related to strengthening, flexibility, endurance, ROM of core, proximal hip and LE for functional self-care, mobility, lifting and ambulation   [] (40751) Reviewed/Progressed HEP activities related to improving balance, coordination, kinesthetic sense, posture, motor skill, proprioception of core, proximal hip and LE for self care, mobility, lifting, and ambulation      Manual Treatments:  PROM / STM / Oscillations-Mobs:  G-I, II, III, IV (PA's, Inf., Post.)  [] (32541) a discharge from care along with most recent update on progress.

## 2022-08-30 NOTE — PLAN OF CARE
[facility]  Makirinne Lay Lawler. [address] [facility], [address] [facility] [address]  Phone: [phone]   Fax:     [phone]     Physical Therapy Certification    Dear  Justen Dickey MD,    We had the pleasure of evaluating the following patient for physical therapy services at [address] [place] [address]. A summary of our findings can be found in the initial assessment below. This includes our plan of care. If you have any questions or concerns regarding these findings, please do not hesitate to contact me at the office phone number checked above.   Thank you for the referral.       Physician Signature:_______________________________Date:__________________  By signing above (or electronic signature), therapists plan is approved by physician      Patient: Mary Nation   : 1985   MRN: [id number]  Referring Physician:  Justen Dickey MD      Evaluation Date: 2022      Medical Diagnosis Information:      M54.42, M54.41 (ICD-10-CM) - Acute midline low back pain with bilateral sciatica   M47.816 (ICD-10-CM) - Lumbar spondylosis   M51.26 (ICD-10-CM) - Lumbar discogenic pain syndrome   M51.26 (ICD-10-CM) - Herniation of intervertebral disc between L4 and L5   M51.27 (ICD-10-CM) - Herniation of intervertebral disc between L5 and S1   R20.2 (ICD-10-CM) - Paresthesia of both lower extremities   Z86.69 (ICD-10-CM) - Hx of peripheral neuropathy                                               Insurance information:  [facility] MEDICAID     Precautions/ Contra-indications: none    C-SSRS Triggered by Intake questionnaire (Past 2 wk assessment):   [x] No, Questionnaire did not trigger screening.   [] Yes, Patient intake triggered further evaluation      [] C-SSRS Screening completed  [] PCP notified via Plan of Care  [] Emergency services notified     Latex Allergy:  [x]NO      []YES  Preferred Language for Healthcare:   [x]English       []other:    SUBJECTIVE: Patient states insidious onset 1-2 months ago, LBP mid back with radiating to feet.     Relevant Medical History:see below  FOTO Score:   46/54    Pain Scale: 6/10  Easing factors: sit   Provocative factors: walk stand sleep    Type: []Constant   [x]Intermittent  []Radiating []Localized []other:     Numbness/Tingling: B LES    Occupation/School: not working    Living Status/Prior Level of Function: Independent with ADLs and IADLs,     OBJECTIVE:     ROM  Comments   Lumbar Flex 50%    Lumbar Ext 50%      ROM LEFT RIGHT Comments   Lumbar Side Bend      Lumbar Rotation      Hip Flexion      Hip Abd      Hip ER      Hip IR      Hip Extension      Knee Ext      Knee Flex      Hamstring Flex      Piriformis                    Strength LEFT RIGHT Comments   Multifidus      Transverse Ab      Hip Flexors 5 5    Hip Abductors      Hip Extensors      knee 5 5             Myotomes Normal Abnormal Comments   Hip flexion (L1-L2)      Knee extension (L2-L4)      Dorsiflexion (L4-L5)      Great Toe Ext (L5)      Ankle Eversion (S1-S2)      Ankle PF(S1-S2)        Dermatomes Normal Abnormal Comments   inguinal area (L1)       anterior mid-thigh (L2)      distal ant thigh/med knee (L3)      medial lower leg and foot (L4)      lateral lower leg and foot (L5)      posterior calf (S1)      medial calcaneus (S2)        Neural dynamic tension testing Normal Abnormal Comments   Slump Test  - Degree of knee flexion:       SLR       0-30      30-70      Femoral nerve (L2-4)        Reflexes Normal Abnormal Comments   S1-2 Seated achilles      S1-2 Prone knee bend      L3-4 Patellar tendon      C5-6 Biceps      C6 Brachioradialis      C7-8 Triceps      Clonus      Babinski      Delacruz's        Joint mobility:    [x]Normal    []Hypo   []Hyper    Palpation: tender T/L paraspinals    Functional Mobility/Transfers: WFL    Posture: flexed    Gait: (include devices/WB status) []Cognitive Function, education/learning barriers              []Environmental, home barriers              []profession/work barriers  []past PT/medical experience  []other:  Justification:     Falls Risk Assessment (30 days):   [x] Falls Risk assessed and no intervention required. [] Falls Risk assessed and Patient requires intervention due to being higher risk   TUG score (>12s at risk):     [] Falls education provided, including       G-Codes:       ASSESSMENT:   Functional Impairments:     []Noted lumbar/proximal hip hypomobility   []Noted lumbosacral and/or generalized hypermobility   [x]Decreased Lumbosacral/hip/LE functional ROM   [x]Decreased core/proximal hip strength and neuromuscular control    [x]Decreased LE functional strength    []Abnormal reflexes/sensation/myotomal/dermatomal deficits  [x]Reduced balance/proprioceptive control    []other:      Functional Activity Limitations (from functional questionnaire and intake)   [x]Reduced ability to tolerate prolonged functional positions   [x]Reduced ability or difficulty with changes of positions or transfers between positions   [x]Reduced ability to maintain good posture and demonstrate good body mechanics with sitting, bending, and lifting   [x]Reduced ability to sleep   [x] Reduced ability or tolerance with driving and/or computer work   [x]Reduced ability to perform lifting, reaching, carrying tasks   [x]Reduced ability to squat   [x]Reduced ability to forward bend   [x]Reduced ability to ambulate prolonged functional periods/distances/surfaces   [x]Reduced ability to ascend/descend stairs   []other:       Participation Restrictions   [x]Reduced participation in self care activities   [x]Reduced participation in home management activities   [x]Reduced participation in work activities   []Reduced participation in social activities. []Reduced participation in sport/recreational activities.     Classification:   []Signs/symptoms consistent with Lumbar instability/stabilization subgroup. []Signs/symptoms consistent with Lumbar mobilization/manipulation subgroup, myotomes and dermatomes intact. Meets manipulation criteria. [x]Signs/symptoms consistent with Lumbar direction specific/centralization subgroup   []Signs/symptoms consistent with Lumbar traction subgroup     []Signs/symptoms consistent with lumbar facet dysfunction   [x]Signs/symptoms consistent with lumbar stenosis type dysfunction   []Signs/symptoms consistent with nerve root involvement including myotome & dermatome dysfunction   []Signs/symptoms consistent with post-surgical status including: decreased ROM, strength and function. []signs/symptoms consistent with pathology which may benefit from Dry needling     []other:      Prognosis/Rehab Potential:      []Excellent   [x]Good    []Fair   []Poor    Tolerance of evaluation/treatment:    []Excellent   [x]Good    []Fair   []Poor     Physical Therapy Evaluation Complexity Justification  [x] A history of present problem with:  [] no personal factors and/or comorbidities that impact the plan of care;  []1-2 personal factors and/or comorbidities that impact the plan of care  [x]3 personal factors and/or comorbidities that impact the plan of care  [x] An examination of body systems using standardized tests and measures addressing any of the following: body structures and functions (impairments), activity limitations, and/or participation restrictions;:  [x] a total of 1-2 or more elements   [] a total of 3 or more elements   [] a total of 4 or more elements   [x] A clinical presentation with:  [x] stable and/or uncomplicated characteristics   [] evolving clinical presentation with changing characteristics  [] unstable and unpredictable characteristics;   [x] Clinical decision making of [x] low, [] moderate, [] high complexity using standardized patient assessment instrument and/or measurable assessment of functional outcome.     [x] EVAL (LOW) 546 [id number] (typically 20 minutes face-to-face)  [] EVAL (MOD) 80513 (typically 30 minutes face-to-face)  [] EVAL (HIGH) 11773 (typically 45 minutes face-to-face)  [] RE-EVAL     PLAN: Begin PT focusing on: proximal hip mobilizations, LB mobs, LB core activation, proximal hip activation, and HEP    Frequency/Duration:  2 days per week for 6-8 Weeks:  Interventions:  [x]  Therapeutic exercise including: strength training, ROM, for LE, Glutes and core   [x]  NMR activation and proprioception for glutes , LE and Core   [x]  Manual therapy as indicated for Hip complex, LE and spine to include: Dry Needling/IASTM, STM, PROM, Gr I-IV mobilizations, manipulation. [x]  Modalities as needed that may include: thermal agents, E-stim, Biofeedback, US, iontophoresis as indicated  [x]  Patient education on joint protection, postural re-education, activity modification, progression of HEP. HEP instruction: Refer to [address] [place] [address] access code and exercises on the 1st visit treatment note    GOALS:  Patient stated goal: walk stand    Therapist goals for Patient:   Short Term Goals: To be achieved in: 2 weeks  1. Independent in HEP and progression per patient tolerance, in order to prevent re-injury. [x] Progressing: [] Met: [] Not Met: [] Adjusted  2. Patient will have a decrease in pain to facilitate improvement in movement, function, and ADLs as indicated by Functional Deficits. [x] Progressing: [] Met: [] Not Met: [] Adjusted    Long Term Goals: To be achieved in: 6-8 weeks  1. FOTO score will match or exceed predicted score to assist with reaching prior level of function. [x] Progressing: [] Met: [] Not Met: [] Adjusted  2. Patient will demonstrate increased AROM to WNL, good LS mobility, good hip ROM to allow for proper joint functioning as indicated by patients Functional Deficits. [x] Progressing: [] Met: [] Not Met: [] Adjusted  3.  Patient will demonstrate an increase in Strength to good proximal hip and core activation to allow for proper functional mobility as indicated by patients Functional Deficits. [x] Progressing: [] Met: [] Not Met: [] Adjusted  4. Patient will return to [facility] for functional activities without increased symptoms or restriction. [x] Progressing: [] Met: [] Not Met: [] Adjusted  5.  Walk stand with min to no limitations  (patient specific functional goal)    [x] Progressing: [] Met: [] Not Met: [] Adjusted     Electronically signed by:  Daisy Hays, PT

## 2022-08-31 ENCOUNTER — HOSPITAL ENCOUNTER (OUTPATIENT)
Dept: PHYSICAL THERAPY | Age: 37
Setting detail: THERAPIES SERIES
Discharge: HOME | End: 2022-08-31
Payer: MEDICAID

## 2022-08-31 PROCEDURE — 97112 NEUROMUSCULAR REEDUCATION: CPT | Performed by: SPECIALIST

## 2022-08-31 PROCEDURE — 97161 PT EVAL LOW COMPLEX 20 MIN: CPT | Performed by: SPECIALIST

## 2022-08-31 PROCEDURE — G0283 ELEC STIM OTHER THAN WOUND: HCPCS | Performed by: SPECIALIST

## 2022-08-31 PROCEDURE — 97110 THERAPEUTIC EXERCISES: CPT | Performed by: SPECIALIST

## 2022-09-06 ENCOUNTER — HOSPITAL ENCOUNTER (OUTPATIENT)
Dept: PHYSICAL THERAPY | Age: 37
Setting detail: THERAPIES SERIES
Discharge: HOME | End: 2022-09-06
Payer: MEDICAID

## 2022-09-06 PROCEDURE — 97110 THERAPEUTIC EXERCISES: CPT | Performed by: SPECIALIST/TECHNOLOGIST

## 2022-09-06 PROCEDURE — 97112 NEUROMUSCULAR REEDUCATION: CPT | Performed by: SPECIALIST/TECHNOLOGIST

## 2022-09-06 PROCEDURE — G0283 ELEC STIM OTHER THAN WOUND: HCPCS | Performed by: SPECIALIST/TECHNOLOGIST

## 2022-09-06 NOTE — PROGRESS NOTES
[address] [place] [address] and Sports Rehabilitation[address], [address] [place] [address] [address]  Phone: [phone]   Fax:     [phone]      Physical Therapy Treatment Note/ Progress Report:       Date:  2022    Patient Name:  Marsha Gonzales    :  1985  MRN: [id number]  Restrictions/Precautions:    Medical/Treatment Diagnosis Information:     M54.42, M54.41 (ICD-10-CM) - Acute midline low back pain with bilateral sciatica   M47.816 (ICD-10-CM) - Lumbar spondylosis   M51.26 (ICD-10-CM) - Lumbar discogenic pain syndrome   M51.26 (ICD-10-CM) - Herniation of intervertebral disc between L4 and L5   M51.27 (ICD-10-CM) - Herniation of intervertebral disc between L5 and S1   R20.2 (ICD-10-CM) - Paresthesia of both lower extremities   Z86.69 (ICD-10-CM) - Hx of peripheral neuropathy        Insurance/Certification information:   [employer] [place] MEDICAID  Physician Information:   Nancy Rodriguez MD  Has the plan of care been signed (Y/N):        []  Yes  [x]  No     Date of Patient follow up with Physician: NS    Is this a Progress Report:     []  Yes  [x]  No      If Yes:  Date Range for reporting period:  Beginnin22 ------------ Endin22    Progress report will be due (10 Rx or 30 days whichever is less): 60     Recertification will be due (POC Duration  / 90 days whichever is less): 22      Visit # Insurance Allowable Auth Required   In Person 2 30 yr []  Yes     []  No    Tele Health 0  []  Yes     []  No    Total 13 11 previous       FOTO Score: 46/54     Date assessed:  22      Latex Allergy:  [x]NO      []YES  Preferred Language for Healthcare:   [x]English       []other:    Pain level:  6/10     SUBJECTIVE:  Pt notes that her back may be a little worse than her first visit.      OBJECTIVE:   Observation:   Test measurements:      RESTRICTIONS/PRECAUTIONS: none    Exercises/Interventions:   Therapeutic Ex (25720) Sets/sec Reps Notes/CUES HEP   ELÍAS  2 min  x   Prone glut sets  15x  x   Prone buttock kicks  15x  x                                                                  Manual Intervention (92087)                                                 NMR re-education (29220)   CUES NEEDED    bridges  15x  x   Supine PB clamshells RD 15x  x   Supine ball squeeze  15x  x   Supine marches  15x  x                                      Therapeutic Activity (63966)                            Prone ESU/CP  10 min            Medbridge access code: [email or web address]           Therapeutic Exercise and NMR EXR  [x] (12111) Provided verbal/tactile cueing for activities related to strengthening, flexibility, endurance, ROM  for improvements in proximal hip and core control with self care, mobility, lifting and ambulation.  [] (97888) Provided verbal/tactile cueing for activities related to improving balance, coordination, kinesthetic sense, posture, motor skill, proprioception  to assist with core control in self care, mobility, lifting, and ambulation.      Therapeutic Activities:    [x] (44697 or 30836) Provided verbal/tactile cueing for activities related to improving balance, coordination, kinesthetic sense, posture, motor skill, proprioception and motor activation to allow for proper function  with self care and ADLs  [] (05837) Provided training and instruction to the patient for proper core and proximal hip recruitment and positioning with ambulation re-education     Home Exercise Program:    [x] (36401) Reviewed/Progressed HEP activities related to strengthening, flexibility, endurance, ROM of core, proximal hip and LE for functional self-care, mobility, lifting and ambulation   [] (47079) Reviewed/Progressed HEP activities related to improving balance, coordination, kinesthetic sense, posture, motor skill, proprioception of core, proximal hip and LE for self care, mobility, lifting, and ambulation      Manual Treatments:  PROM / STM / Oscillations-Mobs:  G-I, II, III, IV (PA's, Inf., Post.)  [] (61539) Provided manual therapy to mobilize proximal hip and LS spine soft tissue/joints for the purpose of modulating pain, promoting relaxation,  increasing ROM, reducing/eliminating soft tissue swelling/inflammation/restriction, improving soft tissue extensibility and allowing for proper ROM for normal function with self care, mobility, lifting and ambulation. Modalities:     [] GAME READY (VASO)- for significant edema, swelling, pain control. Charges:  Timed Code Treatment Minutes: 38   Total Treatment Minutes:  48   BWC:  TE TIME:  NMR TIME:  MANUAL TIME:  UNTIMED MINUTES:  Medicare Total:           [] EVAL (LOW) 84674 (typically 20 minutes face-to-face)  [] EVAL (MOD) 13799 (typically 30 minutes face-to-face)  [] EVAL (HIGH) 43194 (typically 45 minutes face-to-face)  [] RE-EVAL     [x] OT(57424) x  2   [] IONTO  [x] NMR (06929) x   1  [] VASO  [] Manual (97140) x     [] Other:  [] TA x      [] Mech Traction (23393)  [] ES(attended) (44771)      [x] ES (un) (63041):       ASSESSMENT:  Pt tolerated tx with minimal problems. GOALS:      Patient stated goal: walk stand    Therapist goals for Patient:   Short Term Goals: To be achieved in: 2 weeks  1. Independent in HEP and progression per patient tolerance, in order to prevent re-injury. [x] Progressing: [] Met: [] Not Met: [] Adjusted  2. Patient will have a decrease in pain to facilitate improvement in movement, function, and ADLs as indicated by Functional Deficits. [x] Progressing: [] Met: [] Not Met: [] Adjusted    Long Term Goals: To be achieved in: 6-8 weeks  1. FOTO score will match or exceed predicted score to assist with reaching prior level of function. [x] Progressing: [] Met: [] Not Met: [] Adjusted  2. Patient will demonstrate increased AROM to WNL, good LS mobility, good hip ROM to allow for proper joint functioning as indicated by patients Functional Deficits.    [x] Progressing: [] Met: [] Not Met: [] Adjusted  3. Patient will demonstrate an increase in Strength to good proximal hip and core activation to allow for proper functional mobility as indicated by patients Functional Deficits. [x] Progressing: [] Met: [] Not Met: [] Adjusted  4. Patient will return to [facility] for functional activities without increased symptoms or restriction. [x] Progressing: [] Met: [] Not Met: [] Adjusted  5. Walk stand with min to no limitations  (patient specific functional goal)    [x] Progressing: [] Met: [] Not Met: [] Adjusted         Overall Progression Towards Functional goals/ Treatment Progress Update:  [] Patient is progressing as expected towards functional goals listed. [] Progression is slowed due to complexities/Impairments listed. [] Progression has been slowed due to co-morbidities.   [x] Plan just implemented, too soon to assess goals progression <30days   [] Goals require adjustment due to lack of progress  [] Patient is not progressing as expected and requires additional follow up with physician  [] Other    Prognosis for POC: [x] Good [] Fair  [] Poor      Patient requires continued skilled intervention: [x] Yes  [] No    Treatment/Activity Tolerance:  [x] Patient able to complete treatment  [] Patient limited by fatigue  [] Patient limited by pain    [] Patient limited by other medical complications  [] Other:     Return to Play: (if applicable)   []  Stage 1: Intro to Strength   []  Stage 2: Return to Run and Strength   []  Stage 3: Return to Jump and Strength   []  Stage 4: Dynamic Strength and Agility   []  Stage 5: Sport Specific Training     []  Ready to Return to Play, Meets All Above Stages   [x]  Not Ready for Return to Sports   Comments:                           PLAN: See eval  [] Continue per plan of care [] Alter current plan (see comments above)  [x] Plan of care initiated [] Hold pending MD visit [] Discharge    Electronically signed by:  aMrlena Simon, ELIEZER, MHI, ATC    Note: If patient does not return for scheduled/ recommended follow up visits, this note will serve as a discharge from care along with most recent update on progress.

## 2022-09-08 ENCOUNTER — HOSPITAL ENCOUNTER (OUTPATIENT)
Dept: PHYSICAL THERAPY | Age: 37
Setting detail: THERAPIES SERIES
Discharge: HOME | End: 2022-09-08
Payer: MEDICAID

## 2022-09-08 ENCOUNTER — OFFICE VISIT (OUTPATIENT)
Dept: ORTHOPEDIC SURGERY | Age: 37
End: 2022-09-08
Payer: MEDICAID

## 2022-09-08 VITALS — BODY MASS INDEX: 42.59 KG/M2 | WEIGHT: 264.99 LBS | HEIGHT: 66 IN

## 2022-09-08 DIAGNOSIS — Z86.69 HX OF PERIPHERAL NEUROPATHY: ICD-10-CM

## 2022-09-08 DIAGNOSIS — M47.816 LUMBAR SPONDYLOSIS: ICD-10-CM

## 2022-09-08 DIAGNOSIS — M51.27 HERNIATION OF INTERVERTEBRAL DISC BETWEEN L5 AND S1: ICD-10-CM

## 2022-09-08 DIAGNOSIS — R20.2 PARESTHESIA OF BOTH LOWER EXTREMITIES: ICD-10-CM

## 2022-09-08 DIAGNOSIS — M51.26 HERNIATION OF INTERVERTEBRAL DISC BETWEEN L4 AND L5: Primary | ICD-10-CM

## 2022-09-08 DIAGNOSIS — Z90.3 HISTORY OF SLEEVE GASTRECTOMY: ICD-10-CM

## 2022-09-08 PROCEDURE — 1036F TOBACCO NON-USER: CPT | Performed by: INTERNAL MEDICINE

## 2022-09-08 PROCEDURE — G8417 CALC BMI ABV UP PARAM F/U: HCPCS | Performed by: INTERNAL MEDICINE

## 2022-09-08 PROCEDURE — 97110 THERAPEUTIC EXERCISES: CPT | Performed by: SPECIALIST/TECHNOLOGIST

## 2022-09-08 PROCEDURE — G8427 DOCREV CUR MEDS BY ELIG CLIN: HCPCS | Performed by: INTERNAL MEDICINE

## 2022-09-08 PROCEDURE — 99214 OFFICE O/P EST MOD 30 MIN: CPT | Performed by: INTERNAL MEDICINE

## 2022-09-08 PROCEDURE — 97112 NEUROMUSCULAR REEDUCATION: CPT | Performed by: SPECIALIST/TECHNOLOGIST

## 2022-09-08 PROCEDURE — G0283 ELEC STIM OTHER THAN WOUND: HCPCS | Performed by: SPECIALIST/TECHNOLOGIST

## 2022-09-08 RX ORDER — CELECOXIB 100 MG/1
100 CAPSULE ORAL DAILY
Qty: 30 CAPSULE | Refills: 1 | Status: SHIPPED | OUTPATIENT
Start: 2022-09-08 | End: 2022-10-27

## 2022-09-08 NOTE — PROGRESS NOTES
Chief Complaint:   Chief Complaint   Patient presents with    Lower Back Pain     has had 2 PT visits, and pain is worsening, aggravated some during PT, but mostly after, no improvement, trying to do HEP as well as able, tender LBP and bilat leg pain          History of Present Illness:       Patient is a 31 y.o. female returns follow up for the above complaint. The patient was last seen approximately 3 weeksago. The symptoms are worsening since the last visit. The patient has had no further testing for the problem. Medical records were reviewed in the interim confirming history of multilevel lumbar RFA  In 2021    Symptoms have worsened despite 2 sessions of physical therapy and she is rightfully frustrated by this. Back:Bilateral leg pain 70:30. Pain in back is sharp in quality. Pain is burning and numbness primarily in the forelegs radiating into the feet    Pain levels:6.     The patient does continue with supervised PT. The patient denies new onset or progressive weakness of the lower extremities. The patient denies new onset bowel or bladder dysfunction. She has been counseled to avoid NSAIDs related to sleeve gastrectomy she is currently on Prilosec. Past Medical History:        Past Medical History:   Diagnosis Date    Acid reflux     Asthma     as child    Back pain     Bipolar 1 disorder ([facility] [address]     Degenerative disc disease, lumbar     Depression     Diabetes mellitus (HCC)     Fibromyalgia     Liver disease     fatty liver    Obesity     OCD (obsessive compulsive disorder)     PCOS (polycystic ovarian syndrome)     Sleep apnea     no longer uses cpap after weight loss        Present Medications:         Current Outpatient Medications   Medication Sig Dispense Refill    celecoxib (CELEBREX) 100 MG capsule Take 1 capsule by mouth daily For 2 weeks then daily as needed thereafter 30 capsule 1    lidocaine 4 % external patch Place 1 patch onto the skin in the morning.  30 patch 0 naproxen (NAPROSYN) 500 MG tablet Take 1 tablet by mouth in the morning and 1 tablet in the evening. Take with meals. Do all this for 5 days. 10 tablet 0    empagliflozin (JARDIANCE) 10 MG tablet Take 10 mg by mouth in the morning. omeprazole (PRILOSEC) 40 MG delayed release capsule Take 40 mg by mouth daily      DULoxetine (CYMBALTA) 60 MG extended release capsule TAKE 1 CAPSULE BY ORAL ROUTE  EVERY DAY      VICTOZA 18 MG/3ML SOPN SC injection INJECT 0.6 MG BY SUBCUTANEOUS ROUTE DAILY FOR 14 DAYS, THEN 1.2 MG DAILY FOR 28 DAYS, THEN 1.8 MG DAILY. nystatin (MYCOSTATIN) 364602 UNIT/GM powder as needed       lamoTRIgine (LAMICTAL) 25 MG tablet Take 25 mg by mouth daily      medical marijuana Take by mouth as needed. busPIRone (BUSPAR) 5 MG tablet Take 5 mg by mouth 3 times daily      loratadine (CLARITIN) 10 MG tablet Take 10 mg by mouth daily      Multiple Vitamins-Minerals (BARIATRIC FUSION) CHEW Take 2 tablets by mouth daily       levothyroxine (SYNTHROID) 75 MCG tablet TAKE 1 TABLET BY MOUTH EVERY DAY  5    Cholecalciferol (VITAMIN D PO) Take by mouth daily       ziprasidone (GEODON) 20 MG capsule Take 20 mg by mouth 2 times daily (with meals)       No current facility-administered medications for this visit. Allergies: Allergies   Allergen Reactions    Latex Itching    Lactose      \"Extreme\" stomach pain and diarrhea    Adhesive Tape      Rips off skin    Keflex [Cephalexin] Nausea And Vomiting     Projectile vomitting           Review of Systems:    Pertinent items are noted in HPI     Vital Signs: There were no vitals filed for this visit.      General Exam:     Constitutional: Patient is adequately groomed with no evidence of malnutrition    Physical Exam: lower back      Primary Exam:    Inspection: No deformity atrophy appreciable curvature      Palpation: No focal trigger point tenderness      Range of Motion: 25/5 pain with extension greater than flexion      Strength: Normal lower extremity      Special Tests: Negative SLR      Skin: There are no rashes, ulcerations or lesions. Gait: Nonantalgic      Neurovascular - non focal and intact       Additional Comments:        Additional Examinations:                  Office Imaging Results/Procedures PerformedToday:           Office Procedures:     Orders Placed This Encounter   Procedures    OT electric stimulation     Standing Status:   Future     Standing Expiration Date:   9/8/2023           Other Outside Imaging and Testing Personally Reviewed:    No results found. Assessment   Impression: . Encounter Diagnoses   Name Primary? Herniation of intervertebral disc between L4 and L5 Yes    Herniation of intervertebral disc between L5 and S1     Lumbar spondylosis     Paresthesia of both lower extremities     Hx of peripheral neuropathy     History of sleeve gastrectomy               Plan:     Resubmit  MRI evaluation lumbar spine evaluate progression of/severity of compressive discopathy/stenosis suspected clinically given clinical worsening despite trial of PT  Consider candidate for lumbar spine intervention injection pending results of MRI  Reconsider trial of gabapentin previously discontinued as it was attributed to cause for her weight gain  Complete 1 additional session of PT today and then transition to I HEP and hold therapy until after MRI  TENS unit and local measures low-dose Celebrex with GI precaution  Follow-up EMG results bilateral lower extremities       Orders:        Orders Placed This Encounter   Procedures    OT electric stimulation     Standing Status:   Future     Standing Expiration Date:   9/8/2023         Debbie Gonzalez MD.      Disclaimer: \"This note was dictated with voice recognition software. Though review and correction are routine, we apologize for any errors. \"

## 2022-09-08 NOTE — LETTER
[facility] [address] [facility]  Phone: [phone]  Fax: [phone]    Estee Harman MD    September 9, 2022     Adriano Herrera, APRN - [address] [place] [address]    Patient: Colt Reinoso   MR Number: [id number]   YOB: 1985   Date of Visit: 9/8/2022       Dear Adriano Herrera: Thank you for referring Ute Mike to me for evaluation/treatment. Below are the relevant portions of my assessment and plan of care. Impression: . Encounter Diagnoses   Name Primary?  Herniation of intervertebral disc between L4 and L5 Yes    Herniation of intervertebral disc between L5 and S1     Lumbar spondylosis     Paresthesia of both lower extremities     Hx of peripheral neuropathy     History of sleeve gastrectomy               Plan:     Resubmit  MRI evaluation lumbar spine evaluate progression of/severity of compressive discopathy/stenosis suspected clinically given clinical worsening despite trial of PT  Consider candidate for lumbar spine intervention injection pending results of MRI  Reconsider trial of gabapentin previously discontinued as it was attributed to cause for her weight gain  Complete 1 additional session of PT today and then transition to I HEP and hold therapy until after MRI  TENS unit and local measures low-dose Celebrex with GI precaution  Follow-up EMG results bilateral lower extremities       Orders:        Orders Placed This Encounter   Procedures    OT electric stimulation     Standing Status:   Future     Standing Expiration Date:   9/8/2023         Jesenia Sandoval MD.      Disclaimer: \"This note was dictated with voice recognition software. Though review and correction are routine, we apologize for any errors. \"         If you have questions, please do not hesitate to call me. I look forward to following [place] along with you.     Sincerely,      Estee Harman MD

## 2022-09-08 NOTE — PROGRESS NOTES
[address] [place] [address] and [facility], [address] [place] [address] [address]  Phone: [phone]   Fax:     [phone]      Physical Therapy Treatment Note/ Progress Report:       Date:  2022    Patient Name:  Brian Sanchez    :  1985  MRN: [id number]  Restrictions/Precautions:    Medical/Treatment Diagnosis Information:     M54.42, M54.41 (ICD-10-CM) - Acute midline low back pain with bilateral sciatica   M47.816 (ICD-10-CM) - Lumbar spondylosis   M51.26 (ICD-10-CM) - Lumbar discogenic pain syndrome   M51.26 (ICD-10-CM) - Herniation of intervertebral disc between L4 and L5   M51.27 (ICD-10-CM) - Herniation of intervertebral disc between L5 and S1   R20.2 (ICD-10-CM) - Paresthesia of both lower extremities   Z86.69 (ICD-10-CM) - Hx of peripheral neuropathy        Insurance/Certification information:   [facility] MEDICAID  Physician Information:   Rebecca Lange MD  Has the plan of care been signed (Y/N):        []  Yes  [x]  No     Date of Patient follow up with Physician: NS    Is this a Progress Report:     []  Yes  [x]  No      If Yes:  Date Range for reporting period:  Beginnin22 ------------ Endin22    Progress report will be due (10 Rx or 30 days whichever is less):      Recertification will be due (POC Duration  / 90 days whichever is less): 22      Visit # Insurance Allowable Auth Required   In Person 3 30 yr []  Yes     []  No    Tele Health 0  []  Yes     []  No    Total 14 11 previous       FOTO Score: 46/54     Date assessed:  22      Latex Allergy:  [x]NO      []YES  Preferred Language for Healthcare:   [x]English       []other:    Pain level:  6/10     SUBJECTIVE:  Pt notes that she feels about the same. She is going to hold therapy until approval for MRI.      OBJECTIVE:   Observation:   Test measurements:      RESTRICTIONS/PRECAUTIONS: none    Exercises/Interventions: Therapeutic Ex (09260) Sets/sec Reps Notes/CUES HEP   ELÍAS  2 min  x   Prone glut sets  15x  x   Prone buttock kicks  15x  x                                                                  Manual Intervention (89134)                                                 NMR re-education (02448)   CUES NEEDED    bridges  15x  x   Supine PB clamshells RD 15x  x   Supine ball squeeze  15x  x   Supine marches  15x  x                                      Therapeutic Activity (74311)                            Prone ESU/CP  10 min            Medbridge access code: [email or web address]           Therapeutic Exercise and NMR EXR  [x] (84509) Provided verbal/tactile cueing for activities related to strengthening, flexibility, endurance, ROM  for improvements in proximal hip and core control with self care, mobility, lifting and ambulation.  [] (15610) Provided verbal/tactile cueing for activities related to improving balance, coordination, kinesthetic sense, posture, motor skill, proprioception  to assist with core control in self care, mobility, lifting, and ambulation.      Therapeutic Activities:    [x] (46916 or 23816) Provided verbal/tactile cueing for activities related to improving balance, coordination, kinesthetic sense, posture, motor skill, proprioception and motor activation to allow for proper function  with self care and ADLs  [] (05229) Provided training and instruction to the patient for proper core and proximal hip recruitment and positioning with ambulation re-education     Home Exercise Program:    [x] (59399) Reviewed/Progressed HEP activities related to strengthening, flexibility, endurance, ROM of core, proximal hip and LE for functional self-care, mobility, lifting and ambulation   [] (83003) Reviewed/Progressed HEP activities related to improving balance, coordination, kinesthetic sense, posture, motor skill, proprioception of core, proximal hip and LE for self care, mobility, lifting, and ambulation      Manual Treatments:  PROM / STM / Oscillations-Mobs:  G-I, II, III, IV (PA's, Inf., Post.)  [] (51879) Provided manual therapy to mobilize proximal hip and LS spine soft tissue/joints for the purpose of modulating pain, promoting relaxation,  increasing ROM, reducing/eliminating soft tissue swelling/inflammation/restriction, improving soft tissue extensibility and allowing for proper ROM for normal function with self care, mobility, lifting and ambulation. Modalities:     [] GAME READY (VASO)- for significant edema, swelling, pain control. Charges:  Timed Code Treatment Minutes: 38   Total Treatment Minutes:  48   BWC:  TE TIME:  NMR TIME:  MANUAL TIME:  UNTIMED MINUTES:  Medicare Total:           [] EVAL (LOW) 60828 (typically 20 minutes face-to-face)  [] EVAL (MOD) 34053 (typically 30 minutes face-to-face)  [] EVAL (HIGH) 60951 (typically 45 minutes face-to-face)  [] RE-EVAL     [x] OY(80080) x  2   [] IONTO  [x] NMR (84944) x   1  [] VASO  [] Manual (71688) x     [] Other:  [] TA x      [] Mech Traction (49559)  [] ES(attended) (03477)      [x] ES (un) (18892):       ASSESSMENT:  Pt tolerated tx with minimal problems. Hold therapy until further notice. GOALS:      Patient stated goal: walk stand    Therapist goals for Patient:   Short Term Goals: To be achieved in: 2 weeks  1. Independent in HEP and progression per patient tolerance, in order to prevent re-injury. [x] Progressing: [] Met: [] Not Met: [] Adjusted  2. Patient will have a decrease in pain to facilitate improvement in movement, function, and ADLs as indicated by Functional Deficits. [x] Progressing: [] Met: [] Not Met: [] Adjusted    Long Term Goals: To be achieved in: 6-8 weeks  1. FOTO score will match or exceed predicted score to assist with reaching prior level of function. [x] Progressing: [] Met: [] Not Met: [] Adjusted  2.  Patient will demonstrate increased AROM to WNL, good LS mobility, good hip ROM to allow for proper joint functioning as indicated by patients Functional Deficits. [x] Progressing: [] Met: [] Not Met: [] Adjusted  3. Patient will demonstrate an increase in Strength to good proximal hip and core activation to allow for proper functional mobility as indicated by patients Functional Deficits. [x] Progressing: [] Met: [] Not Met: [] Adjusted  4. Patient will return to [facility] for functional activities without increased symptoms or restriction. [x] Progressing: [] Met: [] Not Met: [] Adjusted  5. Walk stand with min to no limitations  (patient specific functional goal)    [x] Progressing: [] Met: [] Not Met: [] Adjusted         Overall Progression Towards Functional goals/ Treatment Progress Update:  [] Patient is progressing as expected towards functional goals listed. [] Progression is slowed due to complexities/Impairments listed. [] Progression has been slowed due to co-morbidities.   [x] Plan just implemented, too soon to assess goals progression <30days   [] Goals require adjustment due to lack of progress  [] Patient is not progressing as expected and requires additional follow up with physician  [] Other    Prognosis for POC: [x] Good [] Fair  [] Poor      Patient requires continued skilled intervention: [x] Yes  [] No    Treatment/Activity Tolerance:  [x] Patient able to complete treatment  [] Patient limited by fatigue  [] Patient limited by pain    [] Patient limited by other medical complications  [] Other:     Return to Play: (if applicable)   []  Stage 1: Intro to Strength   []  Stage 2: Return to Run and Strength   []  Stage 3: Return to Jump and Strength   []  Stage 4: Dynamic Strength and Agility   []  Stage 5: Sport Specific Training     []  Ready to Return to Play, Meets All Above Stages   [x]  Not Ready for Return to Sports   Comments:                           PLAN: See eval  [] Continue per plan of care [] Alter current plan (see comments above)  [x] Plan of care initiated [] Hold pending MD

## 2022-09-13 ENCOUNTER — APPOINTMENT (OUTPATIENT)
Dept: PHYSICAL THERAPY | Age: 37
End: 2022-09-13
Payer: MEDICAID

## 2022-09-15 ENCOUNTER — OFFICE VISIT (OUTPATIENT)
Dept: ORTHOPEDIC SURGERY | Age: 37
End: 2022-09-15
Payer: MEDICAID

## 2022-09-15 ENCOUNTER — APPOINTMENT (OUTPATIENT)
Dept: PHYSICAL THERAPY | Age: 37
End: 2022-09-15
Payer: MEDICAID

## 2022-09-15 VITALS — BODY MASS INDEX: 43.52 KG/M2 | HEIGHT: 66 IN | WEIGHT: 270.8 LBS

## 2022-09-15 DIAGNOSIS — M51.27 HERNIATION OF INTERVERTEBRAL DISC BETWEEN L5 AND S1: ICD-10-CM

## 2022-09-15 DIAGNOSIS — R20.2 PARESTHESIA OF BOTH LOWER EXTREMITIES: ICD-10-CM

## 2022-09-15 DIAGNOSIS — M47.816 LUMBAR SPONDYLOSIS: ICD-10-CM

## 2022-09-15 DIAGNOSIS — M51.26 HERNIATION OF INTERVERTEBRAL DISC BETWEEN L4 AND L5: Primary | ICD-10-CM

## 2022-09-15 PROCEDURE — G8427 DOCREV CUR MEDS BY ELIG CLIN: HCPCS | Performed by: INTERNAL MEDICINE

## 2022-09-15 PROCEDURE — 1036F TOBACCO NON-USER: CPT | Performed by: INTERNAL MEDICINE

## 2022-09-15 PROCEDURE — 99214 OFFICE O/P EST MOD 30 MIN: CPT | Performed by: INTERNAL MEDICINE

## 2022-09-15 PROCEDURE — G8417 CALC BMI ABV UP PARAM F/U: HCPCS | Performed by: INTERNAL MEDICINE

## 2022-09-15 RX ORDER — GABAPENTIN 300 MG/1
300 CAPSULE ORAL NIGHTLY
Qty: 30 CAPSULE | Refills: 1 | Status: SHIPPED | OUTPATIENT
Start: 2022-09-15 | End: 2022-10-24

## 2022-09-15 NOTE — PROGRESS NOTES
Chief Complaint:   Chief Complaint   Patient presents with    Results     MRI Results Lsp          History of Present Illness:       Patient is a 31 y.o. female returns follow up for the above complaint. The patient was last seen approximately 1 weeksago. The symptoms show no change since the last visit. The patient has had further testing for the problem. MRI completed in the interim    Back:Bilateral leg pain 70:30. Pain in back and legs is burning in quality. Lower limb pain follows an L5-S1 dermatomal distribution. Back pain does not follow a typical discogenic provocative pattern    Pain levels:7.     PT was held in the interim. The patient denies new onset or progressive weakness of the lower extremities. The patient denies new onset bowel or bladder dysfunction. Has not yet started the trial of Celebrex     Past Medical History:        Past Medical History:   Diagnosis Date    Acid reflux     Asthma     as child    Back pain     Bipolar 1 disorder ([facility] [address]     Degenerative disc disease, lumbar     Depression     Diabetes mellitus (HCC)     Fibromyalgia     Liver disease     fatty liver    Obesity     OCD (obsessive compulsive disorder)     PCOS (polycystic ovarian syndrome)     Sleep apnea     no longer uses cpap after weight loss        Present Medications:         Current Outpatient Medications   Medication Sig Dispense Refill    gabapentin (NEURONTIN) 300 MG capsule Take 1 capsule by mouth nightly for 60 days. Intended supply: 30 days 30 capsule 1    celecoxib (CELEBREX) 100 MG capsule Take 1 capsule by mouth daily For 2 weeks then daily as needed thereafter 30 capsule 1    naproxen (NAPROSYN) 500 MG tablet Take 1 tablet by mouth in the morning and 1 tablet in the evening. Take with meals. Do all this for 5 days. 10 tablet 0    empagliflozin (JARDIANCE) 10 MG tablet Take 10 mg by mouth in the morning.       omeprazole (PRILOSEC) 40 MG delayed release capsule Take 40 mg by mouth daily Procedures:     Orders Placed This Encounter   Procedures    EMG     [facility] Neurology- Caralee Spurling MD  [address],   [facility], [address]  [phone]    Please call Dr. Ani Mcdaniel office to schedule your appt. This is your responsibility to schedule, since it is a test order and not a referral.  Results will be sent to Dr. Alicia Ring within 24 hours, so you may schedule your follow up appt 1-2 days after your EMG to go over your results in the clinic. Please call us to schedule your follow up at [phone]. Standing Status:   Future     Standing Expiration Date:   9/15/2023     Order Specific Question:   Which body part? Answer:   BLE           Other Outside Imaging and Testing Personally Reviewed:    MRI LUMBAR SPINE WO CONTRAST    Result Date: 2022  [facility] [employer] [facility] #: [id number] #: [id number] Procedure: MR Lumbar Spine w/o Contrast ; Reason for Exam:  Herniation of intervertebral disc between L4-L5, L5-S1. Spondylosis. Paresthesia of both lower extremities. Peripheral neuropathy. History of sleeve gastrectomy. This document is confidential medical information. Unauthorized disclosure or use of this information is prohibited by law. If you are not the intended recipient of this document, please advise us by calling immediately [phone]. [employer] [facility] [address] Patient Name: David Aldrich Case ID: [id number] Patient : 1985 Referring Physician: Rich Rincon MD Exam Date: 2022 Exam Description: MR Lumbar Spine w/o Contrast HISTORY:  Low back pain and paresthesias. TECHNICAL FACTORS:  Long- and short-axis fat- and water-weighted images were performed. COMPARISON:  None. FINDINGS:   L5-S1 level shows a central disc herniation indenting the anterior thecal sac. The  neural foramina patent. The L4-5 level shows a central disc herniation indenting the anterior thecal sac. The neural foramina patent. L3-4 level shows degenerative disease without focal disc herniation or central spinal stenosis. The neural foramina patent. The L2-3, L1-2, T12-L1 and T11-12 levels show degenerative disc disease without focal disc herniation or central spinal stenosis. The neural foramina patent. The conus and cauda equina are normal. Paravertebral tissues are normal. No fracture dislocation identified. Renal cysts. CONCLUSION: 1. Central L5-S1 disc herniation indenting the anterior thecal sac. 2. Central L4-5 disc herniation indenting the anterior thecal sac. 3. Degenerative disc disease involving the lumbar spine with no other sites of lumbar disc herniation or conus or cauda equina compression. Thank you for the opportunity to provide your interpretation. Felix Stock MD A: GD 09/13/2022 6:39 PM      MR thoracic and MR lumbar spine performed without intravenous contrast 3/16/2021     CLINICAL HISTORY: Chronic low back pain, unspecified back pain laterality, unspecified whether sciatica present. Osteoarthritis of thoracic spine, unspecified spinal osteoarthritis complication status. Comparisons: None. PROCEDURE: Multiplanar, multisequence MR imaging of the thoracic and lumbar spine performed without intravenous contrast.     MR thoracic spine findings:     Thoracic spinal cord appears normal in size, signal and morphology. Thoracic osseous structures demonstrate normal height, signal and alignment with no evidence of subluxation, fracture, or bone marrow edema. No abnormalities are detected in the paraspinal soft tissues. T1-T2: Desiccation with superimposed central and left central disc herniation. No evidence of canal stenosis or cord compression. Mild left foraminal stenosis. T6-T7: Desiccation with superimposed right central disc herniation. No central stenosis or cord compression. No foraminal stenosis. T8-T9: Disc desiccation with superimposed shallow left central disc herniation.  No canal or foraminal stenosis. Procedure Note    Erin Hernandez MD - 03/16/2021   Formatting of this note might be different from the original.   MR thoracic and MR lumbar spine performed without intravenous contrast 3/16/2021     CLINICAL HISTORY: Chronic low back pain, unspecified back pain laterality, unspecified whether sciatica present. Osteoarthritis of thoracic spine, unspecified spinal osteoarthritis complication status. Comparisons: None. PROCEDURE: Multiplanar, multisequence MR imaging of the thoracic and lumbar spine performed without intravenous contrast.     MR thoracic spine findings:     Thoracic spinal cord appears normal in size, signal and morphology. Thoracic osseous structures demonstrate normal height, signal and alignment with no evidence of subluxation, fracture, or bone marrow edema. No abnormalities are detected in the paraspinal soft tissues. T1-T2: Desiccation with superimposed central and left central disc herniation. No evidence of canal stenosis or cord compression. Mild left foraminal stenosis. T6-T7: Desiccation with superimposed right central disc herniation. No central stenosis or cord compression. No foraminal stenosis. T8-T9: Disc desiccation with superimposed shallow left central disc herniation. No canal or foraminal stenosis. IMPRESSION:   1. Mild thoracic spondylosis as described with no associated central stenosis or cord compression. No high-grade foraminal stenosis or thoracic nerve root impingement is detected. MRI lumbar spine findings:     Conus medullaris terminates at the L1 level. It appears normal.     Lumbar osseous structures are normal in height, signal, and alignment with no evidence of focal bone marrow edema or osseous destruction. No fracture detected. No definite abnormalities of the paraspinal or retroperitoneal soft tissues are detected. L1-L2: Mild disc bulge. No central or foraminal stenosis.      L2-L3: Mild facet arthrosis without central or foraminal stenosis. L3-L4: Mild facet arthrosis. Mild disc bulge. No central stenosis. Mild noncompressive bilateral foraminal stenosis. L4-L5: Mild facet arthrosis bilaterally. Disc desiccation and mild disc bulge with superimposed central, right central and right foraminal disc herniation. No central stenosis. Mild to moderate right foraminal stenosis without foraminal nerve root impingement or displacement. L5-S1: Mild facet arthrosis. Mild disc bulge with superimposed central disc herniation. No central stenosis. No foraminal stenosis or impingement. IMPRESSION:   1. Multilevel spondylosis as described including disc herniations at L4-L5 and L5-S1. No associated central stenosis. Foraminal stenosis most pronounced on the right L4-L5, with no associated foraminal nerve root impingement or displacement detected. Signed By: Jonathon Meeks MD  Exam End: 03/16/21 08:34    Specimen Collected: 03/16/21 08:49 Last Resulted: 03/16/21 08:57   Received From: [facility] [place]  Result Received: 08/31/22 09:25              Assessment   Impression: . Encounter Diagnoses   Name Primary? Herniation of intervertebral disc between L4 and L5 Yes    Herniation of intervertebral disc between L5 and S1     Lumbar spondylosis     Paresthesia of both lower extremities         Rule out diabetic peripheral neuropathy      Plan:     Commence Celebrex low-dose with GI precaution along with gabapentin low-dose and titrate to efficacy  Documented weight today of 270.8 pounds  Resume PT after 1 week of starting aforementioned medication regimen and TENS unit  No indication for lumbar spine intervention injection at this time  EMG bilateral lower extremities and follow-up thereafter      No evidence of nerve root compression to explain her lower limb symptoms at this time. Proceed with EMG as outlined above.   When compared to prior MRI interpretation of lumbar spine March 2021 there is no progression of discopathy at L4-S1    Approximately 30 minutes was spent related to previewing pertinent medical documentation prior to the patient's visit along with counseling during the patient's visit with respect to treatment options inclusive of alternatives to treatment and the complications and risks related to those treatment options along with expectations of outcome related to those treatments and inclusive of time in the documentation and ordering of testing and treatment after the visit. Orders:        Orders Placed This Encounter   Procedures    EMG     [facility] Neurology- Selma Girard MD  [address] [place] [address], [address] [place] [address]  [phone]    Please call Dr. Aparna Rosen office to schedule your appt. This is your responsibility to schedule, since it is a test order and not a referral.  Results will be sent to Dr. Carolina Mandel within 24 hours, so you may schedule your follow up appt 1-2 days after your EMG to go over your results in the clinic. Please call us to schedule your follow up at [phone]. Standing Status:   Future     Standing Expiration Date:   9/15/2023     Order Specific Question:   Which body part? Answer:   James Liu MD.      Disclaimer: \"This note was dictated with voice recognition software. Though review and correction are routine, we apologize for any errors. \"

## 2022-09-19 NOTE — TELEPHONE ENCOUNTER
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  Which frustrates me because I don't know if that can be fixed to stop my pain especially in the upper and the elbows and wrists

## 2022-09-20 ENCOUNTER — APPOINTMENT (OUTPATIENT)
Dept: PHYSICAL THERAPY | Age: 37
End: 2022-09-20
Payer: MEDICAID

## 2022-09-21 NOTE — TELEPHONE ENCOUNTER
Spoke with patient relaying Dr. Kenji Gill message to continue follow up care with Dr. Jocelyn Otero and with neurology.  Patient is scheduled with neuro 9/27/22

## 2022-09-22 ENCOUNTER — APPOINTMENT (OUTPATIENT)
Dept: PHYSICAL THERAPY | Age: 37
End: 2022-09-22
Payer: MEDICAID

## 2022-09-25 ENCOUNTER — HOSPITAL ENCOUNTER (INPATIENT)
Age: 37
LOS: 1 days | Discharge: HOME | DRG: 753 | End: 2022-09-26
Attending: EMERGENCY MEDICINE | Admitting: INTERNAL MEDICINE
Payer: MEDICAID

## 2022-09-25 DIAGNOSIS — R11.2 NON-INTRACTABLE VOMITING WITH NAUSEA, UNSPECIFIED VOMITING TYPE: ICD-10-CM

## 2022-09-25 DIAGNOSIS — T50.902A MEDICATION OVERDOSE, INTENTIONAL SELF-HARM, INITIAL ENCOUNTER (HCC): Primary | ICD-10-CM

## 2022-09-25 PROBLEM — F33.9 MAJOR DEPRESSION, RECURRENT (HCC): Status: ACTIVE | Noted: 2022-09-25

## 2022-09-25 PROBLEM — R45.86 MOOD DISTURBANCE: Status: ACTIVE | Noted: 2022-09-25

## 2022-09-25 LAB
A/G RATIO: 1.4 (ref 1.1–2.2)
ACETAMINOPHEN LEVEL: <5 UG/ML (ref 10–30)
ALBUMIN SERPL-MCNC: 4.3 G/DL (ref 3.4–5)
ALP BLD-CCNC: 76 U/L (ref 40–129)
ALT SERPL-CCNC: 19 U/L (ref 10–40)
AMPHETAMINE SCREEN, URINE: NORMAL
ANION GAP SERPL CALCULATED.3IONS-SCNC: 9 MMOL/L (ref 3–16)
AST SERPL-CCNC: 25 U/L (ref 15–37)
BARBITURATE SCREEN URINE: NORMAL
BASOPHILS ABSOLUTE: 0 K/UL (ref 0–0.2)
BASOPHILS RELATIVE PERCENT: 0.4 %
BENZODIAZEPINE SCREEN, URINE: NORMAL
BILIRUB SERPL-MCNC: 0.3 MG/DL (ref 0–1)
BILIRUBIN URINE: NEGATIVE
BLOOD, URINE: NEGATIVE
BUN BLDV-MCNC: 11 MG/DL (ref 7–20)
CALCIUM SERPL-MCNC: 9.3 MG/DL (ref 8.3–10.6)
CANNABINOID SCREEN URINE: NORMAL
CHLORIDE BLD-SCNC: 98 MMOL/L (ref 99–110)
CLARITY: CLEAR
CO2: 28 MMOL/L (ref 21–32)
COCAINE METABOLITE SCREEN URINE: NORMAL
COLOR: YELLOW
CREAT SERPL-MCNC: 0.8 MG/DL (ref 0.6–1.1)
EKG ATRIAL RATE: 74 BPM
EKG DIAGNOSIS: NORMAL
EKG P AXIS: 55 DEGREES
EKG P-R INTERVAL: 162 MS
EKG Q-T INTERVAL: 436 MS
EKG QRS DURATION: 100 MS
EKG QTC CALCULATION (BAZETT): 483 MS
EKG R AXIS: 21 DEGREES
EKG T AXIS: 22 DEGREES
EKG VENTRICULAR RATE: 74 BPM
EOSINOPHILS ABSOLUTE: 0 K/UL (ref 0–0.6)
EOSINOPHILS RELATIVE PERCENT: 0.7 %
ETHANOL: NORMAL MG/DL (ref 0–0.08)
FENTANYL SCREEN, URINE: NORMAL
GFR AFRICAN AMERICAN: >60
GFR NON-AFRICAN AMERICAN: >60
GLUCOSE BLD-MCNC: 102 MG/DL (ref 70–99)
GLUCOSE URINE: NEGATIVE MG/DL
HCG QUALITATIVE: NEGATIVE
HCT VFR BLD CALC: 39.9 % (ref 36–48)
HEMOGLOBIN: 13.6 G/DL (ref 12–16)
KETONES, URINE: NEGATIVE MG/DL
LEUKOCYTE ESTERASE, URINE: NEGATIVE
LYMPHOCYTES ABSOLUTE: 2 K/UL (ref 1–5.1)
LYMPHOCYTES RELATIVE PERCENT: 33.6 %
Lab: NORMAL
MAGNESIUM: 2.1 MG/DL (ref 1.8–2.4)
MCH RBC QN AUTO: 31.1 PG (ref 26–34)
MCHC RBC AUTO-ENTMCNC: 34 G/DL (ref 31–36)
MCV RBC AUTO: 91.3 FL (ref 80–100)
METHADONE SCREEN, URINE: NORMAL
MICROSCOPIC EXAMINATION: NORMAL
MONOCYTES ABSOLUTE: 0.5 K/UL (ref 0–1.3)
MONOCYTES RELATIVE PERCENT: 8 %
NEUTROPHILS ABSOLUTE: 3.4 K/UL (ref 1.7–7.7)
NEUTROPHILS RELATIVE PERCENT: 57.3 %
NITRITE, URINE: NEGATIVE
OPIATE SCREEN URINE: NORMAL
OXYCODONE URINE: NORMAL
PDW BLD-RTO: 13.7 % (ref 12.4–15.4)
PH UA: 6
PH UA: 6 (ref 5–8)
PHENCYCLIDINE SCREEN URINE: NORMAL
PLATELET # BLD: 237 K/UL (ref 135–450)
PMV BLD AUTO: 8.8 FL (ref 5–10.5)
POTASSIUM REFLEX MAGNESIUM: 3.5 MMOL/L (ref 3.5–5.1)
PROTEIN UA: NEGATIVE MG/DL
RBC # BLD: 4.37 M/UL (ref 4–5.2)
SALICYLATE, SERUM: <0.3 MG/DL (ref 15–30)
SARS-COV-2, NAAT: NOT DETECTED
SODIUM BLD-SCNC: 135 MMOL/L (ref 136–145)
SPECIFIC GRAVITY UA: 1.01 (ref 1–1.03)
TOTAL PROTEIN: 7.4 G/DL (ref 6.4–8.2)
TSH SERPL DL<=0.05 MIU/L-ACNC: 2.52 UIU/ML (ref 0.27–4.2)
URINE REFLEX TO CULTURE: NORMAL
URINE TYPE: NORMAL
UROBILINOGEN, URINE: 0.2 E.U./DL
WBC # BLD: 6 K/UL (ref 4–11)

## 2022-09-25 PROCEDURE — 84443 ASSAY THYROID STIM HORMONE: CPT

## 2022-09-25 PROCEDURE — 87635 SARS-COV-2 COVID-19 AMP PRB: CPT

## 2022-09-25 PROCEDURE — 96374 THER/PROPH/DIAG INJ IV PUSH: CPT

## 2022-09-25 PROCEDURE — 2580000003 HC RX 258: Performed by: EMERGENCY MEDICINE

## 2022-09-25 PROCEDURE — 83036 HEMOGLOBIN GLYCOSYLATED A1C: CPT

## 2022-09-25 PROCEDURE — 36415 COLL VENOUS BLD VENIPUNCTURE: CPT

## 2022-09-25 PROCEDURE — 80143 DRUG ASSAY ACETAMINOPHEN: CPT

## 2022-09-25 PROCEDURE — 82077 ASSAY SPEC XCP UR&BREATH IA: CPT

## 2022-09-25 PROCEDURE — 81003 URINALYSIS AUTO W/O SCOPE: CPT

## 2022-09-25 PROCEDURE — 93005 ELECTROCARDIOGRAM TRACING: CPT | Performed by: EMERGENCY MEDICINE

## 2022-09-25 PROCEDURE — 85025 COMPLETE CBC W/AUTO DIFF WBC: CPT

## 2022-09-25 PROCEDURE — 99285 EMERGENCY DEPT VISIT HI MDM: CPT

## 2022-09-25 PROCEDURE — 80053 COMPREHEN METABOLIC PANEL: CPT

## 2022-09-25 PROCEDURE — 84703 CHORIONIC GONADOTROPIN ASSAY: CPT

## 2022-09-25 PROCEDURE — 1240000000 HC EMOTIONAL WELLNESS R&B

## 2022-09-25 PROCEDURE — 93010 ELECTROCARDIOGRAM REPORT: CPT | Performed by: INTERNAL MEDICINE

## 2022-09-25 PROCEDURE — 6370000000 HC RX 637 (ALT 250 FOR IP): Performed by: PSYCHIATRY & NEUROLOGY

## 2022-09-25 PROCEDURE — 80307 DRUG TEST PRSMV CHEM ANLYZR: CPT

## 2022-09-25 PROCEDURE — 6360000002 HC RX W HCPCS: Performed by: EMERGENCY MEDICINE

## 2022-09-25 PROCEDURE — 80179 DRUG ASSAY SALICYLATE: CPT

## 2022-09-25 PROCEDURE — 83735 ASSAY OF MAGNESIUM: CPT

## 2022-09-25 PROCEDURE — 80061 LIPID PANEL: CPT

## 2022-09-25 RX ORDER — POTASSIUM CHLORIDE 20 MEQ/1
20 TABLET, EXTENDED RELEASE ORAL 2 TIMES DAILY
COMMUNITY

## 2022-09-25 RX ORDER — BENZTROPINE MESYLATE 1 MG/ML
2 INJECTION INTRAMUSCULAR; INTRAVENOUS 2 TIMES DAILY PRN
Status: DISCONTINUED | OUTPATIENT
Start: 2022-09-25 | End: 2022-09-26 | Stop reason: HOSPADM

## 2022-09-25 RX ORDER — GABAPENTIN 300 MG/1
300 CAPSULE ORAL 3 TIMES DAILY
Status: DISCONTINUED | OUTPATIENT
Start: 2022-09-25 | End: 2022-09-26

## 2022-09-25 RX ORDER — 0.9 % SODIUM CHLORIDE 0.9 %
1000 INTRAVENOUS SOLUTION INTRAVENOUS ONCE
Status: COMPLETED | OUTPATIENT
Start: 2022-09-25 | End: 2022-09-25

## 2022-09-25 RX ORDER — HYDROXYZINE 50 MG/1
50 TABLET, FILM COATED ORAL 3 TIMES DAILY PRN
Status: DISCONTINUED | OUTPATIENT
Start: 2022-09-25 | End: 2022-09-26 | Stop reason: HOSPADM

## 2022-09-25 RX ORDER — NICOTINE 21 MG/24HR
1 PATCH, TRANSDERMAL 24 HOURS TRANSDERMAL DAILY
Status: DISCONTINUED | OUTPATIENT
Start: 2022-09-25 | End: 2022-09-26

## 2022-09-25 RX ORDER — POLYETHYLENE GLYCOL 3350 17 G
2 POWDER IN PACKET (EA) ORAL
Status: DISCONTINUED | OUTPATIENT
Start: 2022-09-25 | End: 2022-09-26

## 2022-09-25 RX ORDER — OLANZAPINE 10 MG/1
10 TABLET ORAL EVERY 4 HOURS PRN
Status: DISCONTINUED | OUTPATIENT
Start: 2022-09-25 | End: 2022-09-26 | Stop reason: HOSPADM

## 2022-09-25 RX ORDER — MAGNESIUM HYDROXIDE/ALUMINUM HYDROXICE/SIMETHICONE 120; 1200; 1200 MG/30ML; MG/30ML; MG/30ML
30 SUSPENSION ORAL EVERY 6 HOURS PRN
Status: DISCONTINUED | OUTPATIENT
Start: 2022-09-25 | End: 2022-09-26 | Stop reason: HOSPADM

## 2022-09-25 RX ORDER — IBUPROFEN 400 MG/1
400 TABLET ORAL EVERY 6 HOURS PRN
Status: DISCONTINUED | OUTPATIENT
Start: 2022-09-25 | End: 2022-09-26 | Stop reason: HOSPADM

## 2022-09-25 RX ORDER — ACETAMINOPHEN 325 MG/1
650 TABLET ORAL EVERY 4 HOURS PRN
Status: DISCONTINUED | OUTPATIENT
Start: 2022-09-25 | End: 2022-09-26 | Stop reason: HOSPADM

## 2022-09-25 RX ORDER — ROPINIROLE 0.25 MG/1
0.75 TABLET, FILM COATED ORAL NIGHTLY
COMMUNITY

## 2022-09-25 RX ORDER — SEMAGLUTIDE 1.34 MG/ML
INJECTION, SOLUTION SUBCUTANEOUS
COMMUNITY

## 2022-09-25 RX ORDER — TRAZODONE HYDROCHLORIDE 50 MG/1
50 TABLET ORAL NIGHTLY PRN
Status: DISCONTINUED | OUTPATIENT
Start: 2022-09-25 | End: 2022-09-26 | Stop reason: HOSPADM

## 2022-09-25 RX ORDER — ROPINIROLE 0.25 MG/1
0.75 TABLET, FILM COATED ORAL NIGHTLY
Status: DISCONTINUED | OUTPATIENT
Start: 2022-09-25 | End: 2022-09-26 | Stop reason: HOSPADM

## 2022-09-25 RX ORDER — METOCLOPRAMIDE HYDROCHLORIDE 5 MG/ML
10 INJECTION INTRAMUSCULAR; INTRAVENOUS ONCE
Status: COMPLETED | OUTPATIENT
Start: 2022-09-25 | End: 2022-09-25

## 2022-09-25 RX ADMIN — LURASIDONE HYDROCHLORIDE 20 MG: 40 TABLET, FILM COATED ORAL at 21:44

## 2022-09-25 RX ADMIN — ROPINIROLE HYDROCHLORIDE 0.75 MG: 0.25 TABLET, FILM COATED ORAL at 21:43

## 2022-09-25 RX ADMIN — GABAPENTIN 300 MG: 300 CAPSULE ORAL at 21:43

## 2022-09-25 RX ADMIN — HYDROXYZINE HYDROCHLORIDE 50 MG: 50 TABLET, FILM COATED ORAL at 21:43

## 2022-09-25 RX ADMIN — SODIUM CHLORIDE 1000 ML: 9 INJECTION, SOLUTION INTRAVENOUS at 04:56

## 2022-09-25 RX ADMIN — METOCLOPRAMIDE 10 MG: 5 INJECTION, SOLUTION INTRAMUSCULAR; INTRAVENOUS at 04:10

## 2022-09-25 RX ADMIN — TRAZODONE HYDROCHLORIDE 50 MG: 50 TABLET ORAL at 21:43

## 2022-09-25 RX ADMIN — SODIUM CHLORIDE 1000 ML: 9 INJECTION, SOLUTION INTRAVENOUS at 03:22

## 2022-09-25 ASSESSMENT — PAIN - FUNCTIONAL ASSESSMENT
PAIN_FUNCTIONAL_ASSESSMENT: NONE - DENIES PAIN
PAIN_FUNCTIONAL_ASSESSMENT: NONE - DENIES PAIN

## 2022-09-25 ASSESSMENT — ENCOUNTER SYMPTOMS
COLOR CHANGE: 0
ABDOMINAL PAIN: 0
TROUBLE SWALLOWING: 0
WHEEZING: 0
VOMITING: 1
NAUSEA: 1
STRIDOR: 0
VOICE CHANGE: 0
SHORTNESS OF BREATH: 0
FACIAL SWELLING: 0

## 2022-09-25 ASSESSMENT — LIFESTYLE VARIABLES
HOW MANY STANDARD DRINKS CONTAINING ALCOHOL DO YOU HAVE ON A TYPICAL DAY: 1 OR 2
HOW OFTEN DO YOU HAVE A DRINK CONTAINING ALCOHOL: 2-4 TIMES A MONTH
HOW MANY STANDARD DRINKS CONTAINING ALCOHOL DO YOU HAVE ON A TYPICAL DAY: 1 OR 2
HOW OFTEN DO YOU HAVE A DRINK CONTAINING ALCOHOL: 2-4 TIMES A MONTH

## 2022-09-25 ASSESSMENT — SLEEP AND FATIGUE QUESTIONNAIRES
DO YOU USE A SLEEP AID: NO
AVERAGE NUMBER OF SLEEP HOURS: 5
AVERAGE NUMBER OF SLEEP HOURS: 5
DO YOU HAVE DIFFICULTY SLEEPING: YES
DO YOU HAVE DIFFICULTY SLEEPING: YES
DO YOU USE A SLEEP AID: NO

## 2022-09-25 ASSESSMENT — PATIENT HEALTH QUESTIONNAIRE - PHQ9: SUM OF ALL RESPONSES TO PHQ QUESTIONS 1-9: 22

## 2022-09-25 NOTE — ED NOTES
Level of Care Disposition: Admit      Patient was seen by ED provider and [facility] AN AFFILIATE OF [facility] staff. The case presented to psychiatric provider on-call Dr. Pebbles Levi. Based on the ED evaluation and information presented to the provider by this writer and [facility], it is the recommendation of the on call psychiatric provider that inpatient hospitalization is the least restrictive environment for the patient at this time. The patient will be admitted to the inpatient unit.       [address] [place] [address]  09/25/22 1527

## 2022-09-25 NOTE — ED NOTES
Presenting Problem:Patient presents to ED on a SOB after intentional overdose on Buspar in a suicide attempt. Appearance/Hygiene:  well-appearing, hospital attire, seated in bed, good grooming, and good hygiene   Motor Behavior: WNL   Attitude: cooperative  Affect: congruent with sad/depressed mood   Speech: normal pitch and normal volume  Mood: depressed and sad   Thought Processes: Goal directed  Perceptions: Absent   Thought content: organized, goal directed   Orientation: A&Ox4   Memory: intact  Concentration: Good    Insight/ judgement: impaired judgment    Psychosocial and contextual factors: Pt lives with significant other of 12 yrs with his 3 children (her step-children). Significant stress in relationship with significant other due to him wanting to date other people. She worries also about loosing the children, because she has no legal claim to them. She also is unable to work currently due to multiple health conditions, so she worries how she will be able to support herself without his help. She also reports significant stress currently, because the birthdays of both of her  parents are this month. C-SSRS flowsheet is  Complete. Reports she has occasional thoughts that maybe it would be better off if she wasn't here (about once a week), but she is able to easily redirect the thoughts. She stated she was raised in a very Sabianism family and was taught suicide Ganesh Kateing instantly. She also stated she has a lot to live for and knows there are lots of people who care about her and would be hurt if she killed herself. She stated the attempt last night was the only time she has ever even come close to attempting. She described the overdose last night as an impulsive act she instantly regretted. Psychiatric History (including current outpatient provider and past inpatient admissions): No previous hx of in-pt admissions but hx of out-pt treatment.  Currently treatment from PCP only, but she does plan to get reconnected at [facility] in [facility]. Had meant to go last week to open-access intake, but was unable to make it. Plans to go this week. Access to Firearms: Denies    ASSESSMENT FOR IMMINENT FUTURE DANGER:    RISK FACTORS:    []  Age <25 or >49   []  Male gender   [x]  Depressed mood   []  Active suicidal ideation   []  Suicide plan   [x]  Suicide attempt   []  Access to lethal means   []  Prior suicide attempt   []  Active substance abuse (if yes pleases add details)   []  Highly impulsive behaviors   []  Not attending to self-care/ADLs    [x]  Recent significant loss   [x]  Chronic pain or medical illness   []  Social isolation   []  History of violence (if yes please elaborate)   []  Active psychosis   []  Cognitive impairment    [x]  No outpatient services in place   []  Medication noncompliance   []  No collateral information to support safety   [] Self- injurious/ Self-harm behavior    PROTECTIVE FACTORS:  [x] Age >25 and <55  [x] Female gender   [] Denies depression  [x] Denies suicidal ideation  [x] Does not have lethal plan   [x] Does not have access to guns or weapons  [x] Patient is verbally dina for safety  [] No prior suicide attempts  [x] No active substance abuse  [x] Patient has social or family support (from close friends)  [x] No active psychosis or cognitive dysfunction  [] Physically healthy  [] Has outpatient services in place  [] Compliant with recommended medications  [] Collateral information from supports patient safety   [x] Patient is future oriented with plans to spoke about a job opportunity she has and what that would mean for her future. Also, spoke about doctor's appointments she has this week she doesn't want to miss, because they will be really hard to reschedule. Pt contracts for safety if discharged. Pt reports she regrets the attempt to overdose last night and has no intent to do anything like that again.  She stated she just became overwhelmed with a stressful situation with significant other last night and didn't think clearly about what she was doing. She stated she knows the problems in their relationship is far more about him than her, and there is nothing wrong with her. She stated she is going to have to continue living with significant other for awhile, and it will be really hard, but she just needs to focus on what she needs to do to get to the point of being able to take care of herself.
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
## 2022-09-25 NOTE — ED NOTES
Pt arrived ambulatory dressed in hospital gown and accompanied by ED RN. Pt cooperated with changing in to safety gown. Pt was oriented to [facility] AN AFFILIATE OF [facility] and assessment process, given a blanket and shown her assigned room. Denies any needs at this time.

## 2022-09-25 NOTE — ED PROVIDER NOTES
[address] ED  eMERGENCY dEPARTMENT eNCOUnter      Pt Name: Za Aguirre  MRN: [id number]  [place] 1985  Date of evaluation: 9/25/2022  Provider: Fredy Brennan MD    CHIEF COMPLAINT       Chief Complaint   Patient presents with    Suicide Attempt     Brought in by  for taking \"a handful of buspar\" about 20 min ago. When asked if Suicidal patient states \"svitlana\"          HISTORY OF PRESENT ILLNESS   (Location/Symptom, Timing/Onset, Context/Setting, Quality, Duration, Modifying Factors, Severity)  Note limiting factors. Za Aguirre is a 31 y.o. female with history of bipolar disorder, obsessive-compulsive disorder, and diabetes who presents after an intentional ingestion. The patient reports she has been under increased stress and took \"a handful\" of BuSpar at approximately 2:30 AM.  She does report that this was an attempt to harm herself. She reports that there were 30 pills in the bottle when she first felt it and estimates that there were approximately 15 pills that she swallowed. Each pill was 5 mg. The patient does report some nausea and vomiting after taking this. She denies any seizures, change in muscle tone, or change in mental status. She does report continued depression. She reports her symptoms are moderate constant and worsening. HPI    Nursing Notes were reviewed. REVIEW OFSYSTEMS    (2-9 systems for level 4, 10 or more for level 5)     Review of Systems   Constitutional:  Negative for appetite change, fever and unexpected weight change. HENT:  Negative for facial swelling, trouble swallowing and voice change. Eyes:  Negative for visual disturbance. Respiratory:  Negative for shortness of breath, wheezing and stridor. Cardiovascular:  Negative for chest pain and palpitations. Gastrointestinal:  Positive for nausea and vomiting. Negative for abdominal pain. Genitourinary:  Negative for dysuria and vaginal bleeding.    Musculoskeletal:  Negative for neck pain and neck stiffness. Skin:  Negative for color change and wound. Neurological:  Negative for seizures and syncope. Psychiatric/Behavioral:  Positive for dysphoric mood, self-injury and suicidal ideas. Except as noted above the remainder of the review of systems was reviewed and negative.        PAST MEDICAL HISTORY     Past Medical History:   Diagnosis Date    Acid reflux     Asthma     as child    Back pain     Bipolar 1 disorder ([facility]     Degenerative disc disease, lumbar     Depression     Diabetes mellitus ([facility]     Fibromyalgia     Liver disease     fatty liver    Obesity     OCD (obsessive compulsive disorder)     PCOS (polycystic ovarian syndrome)     Sleep apnea     no longer uses cpap after weight loss         SURGICAL HISTORY       Past Surgical History:   Procedure Laterality Date    ANKLE SURGERY Left     Scar tissue removal    CARPAL TUNNEL RELEASE Right 8/23/2021    RIGHT CARPAL TUNNEL RELEASE performed by Josep Lockett MD at [address] Left 9/10/2021    LEFT CARPAL TUNNEL RELEASE performed by Josep Lockett MD at [address] [facility] [address] N/A 4/20/2020    LAPAROSCOPIC CHOLECYSTECTOMY performed by Pierre Vernon DO at [address] [facility] ([address] [place] [address])      OTHER SURGICAL HISTORY Left 12/10/14    Excision of Cyst Left Upper Posterior Ankle    OTHER SURGICAL HISTORY Right 12/29/2016    Laparotomy with Right SO    OVARY REMOVAL  12/28/2016    unsure of side    SHOULDER ARTHROSCOPY Left 2/16/2022    VIDEO ARTHROSCOPY LEFT SHOULDER, SUBACROMIAL DECOMPRESSION WITH ACROMIOPLASTY AND DISTAL CLAVICLE RESECTION -SLEEP APNEA performed by Itzel Lyon MD at [address] [facility] N/A 11/6/2019    LAPAROSCOPIC SLEEVE GASTRECTOMY - ETHICON performed by Davina Bond MD at [address] [facility] Left 7/2/2021    LEFT QUADRICEP PERCUTANEOUS TENOTOMY , TX2 MICROTIP WITH ULTRASOUND performed by Irma Shoulder Kirk Li MD at [facility] N/A 8/23/2019    EGD BIOPSY performed by Caio Franco MD at [address] [place] [address] 3/10/2021    EGD BIOPSY performed by Dillon Rocha MD at [facility]       Previous Medications    BUSPIRONE (BUSPAR) 5 MG TABLET    Take 5 mg by mouth 3 times daily    CELECOXIB (CELEBREX) 100 MG CAPSULE    Take 1 capsule by mouth daily For 2 weeks then daily as needed thereafter    CHOLECALCIFEROL (VITAMIN D PO)    Take by mouth daily     DULOXETINE (CYMBALTA) 60 MG EXTENDED RELEASE CAPSULE    TAKE 1 CAPSULE BY ORAL ROUTE  EVERY DAY    EMPAGLIFLOZIN (JARDIANCE) 10 MG TABLET    Take 10 mg by mouth in the morning. GABAPENTIN (NEURONTIN) 300 MG CAPSULE    Take 1 capsule by mouth nightly for 60 days. Intended supply: 30 days    LAMOTRIGINE (LAMICTAL) 25 MG TABLET    Take 25 mg by mouth daily    LEVOTHYROXINE (SYNTHROID) 75 MCG TABLET    TAKE 1 TABLET BY MOUTH EVERY DAY    LORATADINE (CLARITIN) 10 MG TABLET    Take 10 mg by mouth daily    MEDICAL MARIJUANA    Take by mouth as needed. MULTIPLE VITAMINS-MINERALS (BARIATRIC FUSION) CHEW    Take 2 tablets by mouth daily     NYSTATIN (MYCOSTATIN) 827763 UNIT/GM POWDER    as needed     OMEPRAZOLE (PRILOSEC) 40 MG DELAYED RELEASE CAPSULE    Take 40 mg by mouth daily    VICTOZA 18 MG/3ML SOPN SC INJECTION    INJECT 0.6 MG BY SUBCUTANEOUS ROUTE DAILY FOR 14 DAYS, THEN 1.2 MG DAILY FOR 28 DAYS, THEN 1.8 MG DAILY.     ZIPRASIDONE (GEODON) 20 MG CAPSULE    Take 20 mg by mouth 2 times daily (with meals)       ALLERGIES     Latex, Lactose, Adhesive tape, and Keflex [cephalexin]    FAMILY HISTORY       Family History   Problem Relation Age of Onset    Asthma Mother     Arthritis Mother     Lung Cancer Mother     Hypertension Father     Elevated Lipids Father     Diabetes Father     Alcohol Abuse Father     Asthma Father Arthritis Father     Diabetes Paternal Grandfather     Arthritis Paternal Grandfather     Diabetes Maternal Grandmother     Arthritis Maternal Grandmother           SOCIAL HISTORY       Social History     Socioeconomic History    Marital status:      Spouse name: [facility]    Number of children: 0    Years of education: 16    Highest education level: None   Tobacco Use    Smoking status: Never    Smokeless tobacco: Never   Vaping Use    Vaping Use: Never used   Substance and Sexual Activity    Alcohol use: Yes     Comment: 1 drink per week    Drug use: Yes     Types: Marijuana Estil Catena)     Comment: medical marijuana    Sexual activity: Yes     Partners: Male     Social Determinants of Health     Physical Activity: Inactive    Days of Exercise per Week: 0 days    Minutes of Exercise per Session: 0 min   Intimate Partner Violence: Not At Risk    Fear of Current or Ex-Partner: No    Emotionally Abused: No    Physically Abused: No    Sexually Abused: No         PHYSICAL EXAM    (up to 7 for level 4, 8 or more for level 5)     ED Triage Vitals   BP Temp Temp Source Heart Rate Resp SpO2 Height Weight   09/25/22 0315 09/25/22 0322 09/25/22 0322 09/25/22 0315 09/25/22 0315 09/25/22 0315 09/25/22 0315 09/25/22 0315   117/71 98.9 °F (37.2 °C) Oral 71 26 97 % 5' 6\" (1.676 m) 270 lb (122.5 kg)       Physical Exam  Vitals and nursing note reviewed. Constitutional:       Appearance: She is well-developed. She is not diaphoretic. HENT:      Head: Normocephalic and atraumatic. Right Ear: External ear normal.      Left Ear: External ear normal.   Eyes:      Conjunctiva/sclera: Conjunctivae normal.   Neck:      Vascular: No JVD. Trachea: No tracheal deviation. Cardiovascular:      Rate and Rhythm: Normal rate. Pulmonary:      Effort: Pulmonary effort is normal. No respiratory distress. Breath sounds: Normal breath sounds. No wheezing. Abdominal:      General: There is no distension.       Palpations: Abdomen is soft. Tenderness: There is no abdominal tenderness. There is no guarding or rebound. Comments: Abdomen soft and nontender to palpation. No rebound, guarding, peritoneal signs. Musculoskeletal:         General: No tenderness or deformity. Normal range of motion. Cervical back: Neck supple. Skin:     General: Skin is warm and dry. Neurological:      Mental Status: She is alert. Cranial Nerves: No cranial nerve deficit. Psychiatric:         Thought Content: Thought content includes suicidal ideation. Thought content does not include homicidal ideation. Thought content includes suicidal plan. Thought content does not include homicidal plan. DIAGNOSTIC RESULTS     EKG:All EKG's are interpreted by the Emergency Department Physician who either signs or Co-signs this chart in the absence of a cardiologist.    The Ekg interpreted by me shows  normal sinus rhythm with a rate of 74  Axis is   Normal  QTc is   483ms  Intervals and Durations are unremarkable. ST Segments: no acute change  Mild prolongation of the QTc from previous EKG on 6/23/2021      LABS:  Labs Reviewed   COMPREHENSIVE METABOLIC PANEL W/ REFLEX TO MG FOR LOW K - Abnormal; Notable for the following components:       Result Value    Sodium 135 (*)     Chloride 98 (*)     Glucose 102 (*)     All other components within normal limits   SALICYLATE LEVEL - Abnormal; Notable for the following components:    Salicylate, Serum <0.2 (*)     All other components within normal limits   ACETAMINOPHEN LEVEL - Abnormal; Notable for the following components:    Acetaminophen Level <5 (*)     All other components within normal limits   COVID-19, RAPID   CBC WITH AUTO DIFFERENTIAL   URINALYSIS WITH REFLEX TO CULTURE   URINE DRUG SCREEN   HCG, SERUM, QUALITATIVE   ETHANOL   MAGNESIUM       All otherlabs were within normal range or not returned as of this dictation.     EMERGENCY DEPARTMENT COURSE and DIFFERENTIAL DIAGNOSIS/MDM: Vitals:    Vitals:    09/25/22 0315 09/25/22 0322 09/25/22 0401 09/25/22 0502   BP: 117/71  114/78 132/76   Pulse: 71  64 57   Resp: 26  17 12   Temp:  98.9 °F (37.2 °C)     TempSrc:  Oral     SpO2: 97%  98% 100%   Weight: 270 lb (122.5 kg)      Height: 5' 6\" (1.676 m)            Is this patient to be included in the SEP-1 Core Measure due to severe sepsis or septic shock? No   Exclusion criteria - the patient is NOT to be included for SEP-1 Core Measure due to:  2+ SIRS criteria are not met      MDM  Patient is given IV fluids and antiemetics with resolution of nausea no further vomiting after treatment. She reports she feels much better and reports she is asymptomatic at this time other than depression and suicidal ideation. Laboratory evaluation does not show any emergent abnormalities or signs of endorgan damage. Available tests for coingestions are obtained and are negative. Poison control is consulted and reports that the dose that the patient is reported to have taken is unlikely to cause significant pathology and recommends 4 to 6 hours of observation prior to medical clearance for psychiatric evaluation. The patient is placed on a psychiatric hold, she is observed for over 4 hours from her time of ingestion, and cleared for psychiatric evaluation. Patient is reevaluated after period of observation and reports all of her medical symptoms including nausea and vomiting have resolved and has no focal neurologic deficits or signs of seizure at this time.       Liz Ramirez MD am the primary clinician of record       ED CONSULTS:  Poison Control  Psychiatry       Critical Care  Performed by: Scott Choi MD  Authorized by: Scott Choi MD     Critical care provider statement:     Critical care time (minutes):  31    Critical care time was exclusive of:  Separately billable procedures and treating other patients and teaching time    Critical care was necessary to treat or prevent imminent or life-threatening deterioration of the following conditions:  Toxidrome and shock    Critical care was time spent personally by me on the following activities:  Ordering and performing treatments and interventions, development of treatment plan with patient or surrogate, ordering and review of laboratory studies, discussions with consultants, evaluation of patient's response to treatment, re-evaluation of patient's condition and examination of patient    FINAL IMPRESSION      1. Medication overdose, intentional self-harm, initial encounter ([facility] [address]    2. Non-intractable vomiting with nausea, unspecified vomiting type          DISPOSITION/PLAN   [address] [facility],[address] [place] [address] 09/25/2022 05:49:41 AM               (Please note that portions of this note were completed with a voice recognition program.  Efforts were made to edit the dictations but occasionally words aremis-transcribed. )    Danielle Painter MD (electronically signed)  Attending Emergency Physician           Danielle Painter MD  09/25/22 0433

## 2022-09-26 VITALS
DIASTOLIC BLOOD PRESSURE: 58 MMHG | WEIGHT: 270 LBS | HEIGHT: 66 IN | TEMPERATURE: 98.4 F | SYSTOLIC BLOOD PRESSURE: 93 MMHG | BODY MASS INDEX: 43.39 KG/M2 | HEART RATE: 87 BPM | RESPIRATION RATE: 14 BRPM | OXYGEN SATURATION: 95 %

## 2022-09-26 PROBLEM — T50.901A MEDICATION OVERDOSE: Status: ACTIVE | Noted: 2022-09-26

## 2022-09-26 LAB
CHOLESTEROL, TOTAL: 152 MG/DL (ref 0–199)
EKG ATRIAL RATE: 77 BPM
EKG DIAGNOSIS: NORMAL
EKG P AXIS: 56 DEGREES
EKG P-R INTERVAL: 148 MS
EKG Q-T INTERVAL: 410 MS
EKG QRS DURATION: 92 MS
EKG QTC CALCULATION (BAZETT): 463 MS
EKG R AXIS: 42 DEGREES
EKG T AXIS: 35 DEGREES
EKG VENTRICULAR RATE: 77 BPM
ESTIMATED AVERAGE GLUCOSE: 105.4 MG/DL
HBA1C MFR BLD: 5.3 %
HDLC SERPL-MCNC: 63 MG/DL (ref 40–60)
LDL CHOLESTEROL CALCULATED: 78 MG/DL
TRIGL SERPL-MCNC: 54 MG/DL (ref 0–150)
VLDLC SERPL CALC-MCNC: 11 MG/DL

## 2022-09-26 PROCEDURE — 93005 ELECTROCARDIOGRAM TRACING: CPT | Performed by: PSYCHIATRY & NEUROLOGY

## 2022-09-26 PROCEDURE — 6370000000 HC RX 637 (ALT 250 FOR IP): Performed by: PSYCHIATRY & NEUROLOGY

## 2022-09-26 PROCEDURE — 5130000000 HC BRIDGE APPOINTMENT

## 2022-09-26 PROCEDURE — 6370000000 HC RX 637 (ALT 250 FOR IP)

## 2022-09-26 PROCEDURE — 99239 HOSP IP/OBS DSCHRG MGMT >30: CPT

## 2022-09-26 PROCEDURE — 99221 1ST HOSP IP/OBS SF/LOW 40: CPT

## 2022-09-26 PROCEDURE — 93010 ELECTROCARDIOGRAM REPORT: CPT | Performed by: INTERNAL MEDICINE

## 2022-09-26 RX ORDER — PANTOPRAZOLE SODIUM 40 MG/1
40 TABLET, DELAYED RELEASE ORAL
Status: DISCONTINUED | OUTPATIENT
Start: 2022-09-27 | End: 2022-09-26 | Stop reason: HOSPADM

## 2022-09-26 RX ORDER — GABAPENTIN 300 MG/1
300 CAPSULE ORAL NIGHTLY
Status: DISCONTINUED | OUTPATIENT
Start: 2022-09-27 | End: 2022-09-26 | Stop reason: HOSPADM

## 2022-09-26 RX ORDER — LEVOTHYROXINE SODIUM 0.05 MG/1
75 TABLET ORAL DAILY
Status: DISCONTINUED | OUTPATIENT
Start: 2022-09-26 | End: 2022-09-26 | Stop reason: HOSPADM

## 2022-09-26 RX ADMIN — GABAPENTIN 300 MG: 300 CAPSULE ORAL at 08:28

## 2022-09-26 RX ADMIN — LEVOTHYROXINE SODIUM 75 MCG: 50 TABLET ORAL at 15:14

## 2022-09-26 NOTE — H&P
INITIAL PSYCHIATRIC HISTORY AND PHYSICAL      Patient name: Davey Eng  Admit date: 9/25/2022  Today's date: 9/26/2022           CC:  MDD    HPI:   Patient seen in room on Adult Behavioral Unit. Patient is a 31 y.o. female who presents  to ED on a SOB after intentional overdose on Buspar in a suicide attempt. Pt states that she has been in a relationship with a man for about 11 years. She considers his three children like her own. Pt states that she and her boyfriend have been , he has been seeing other people event though they live together. Her boyfriend brought a new girl to a Halloween event with her and the kids, and she thought she could handle it but it caused her to feel upset. When they got home pt went to take her medications, and thought \"why not\", taking more Buspar than normal , she reports around 10-15 tablets. Pt states she immediately felt remorseful, telling her boyfriend what she did. Pt reports being Christianity and knows she could not go to [facility] if she killed herself. Pt also wants to be a better role model to her step-children. Pt has plans to do a walk-in visit with [facility] this week, due to increasing depression and anxiety around her relationship. Pt has an appt with neurology tomorrow to test for multiple sclerosis. She is also in classes for insurance certification, tomorrow from 10-1pm.  Pt denies that she wants to harm herself, is future oriented, and has plans to address her mental health moving forward. Pt denies any previous attempts or hospitalizations. Pt denies all SI/HI and AVH at this time. Pt is safe to discharge home at this time.         PAST PSYCHIATRIC HISTORY:  MDD    FAMILY PSYCHIATRIC HISTORY:     Family History   Problem Relation Age of Onset    Asthma Mother     Arthritis Mother     Lung Cancer Mother     Hypertension Father     Elevated Lipids Father     Diabetes Father     Alcohol Abuse Father     Asthma Father     Arthritis Father ACROMIOPLASTY AND DISTAL CLAVICLE RESECTION -SLEEP APNEA performed by Saw Collins MD at [address] [facility] N/A 11/06/2019    LAPAROSCOPIC SLEEVE GASTRECTOMY - ETHICON performed by Daniel Adkins MD at [address] [facility] 07/02/2021    LEFT QUADRICEP PERCUTANEOUS TENOTOMY , TX2 MICROTIP WITH ULTRASOUND performed by Lolita Arora MD at [place] [address] N/A 08/23/2019    EGD BIOPSY performed by Daniel Adkins MD at [address] [facility] N/A 03/10/2021    EGD BIOPSY performed by Rose Diggs MD at [address] [facility] LIST:  Principal Problem:    Major depression, recurrent ([address]  Active Problems:    Mood disturbance    Medication overdose  Resolved Problems:    * No resolved hospital problems.  *       SOCIAL HISTORY:  Social History     Socioeconomic History    Marital status:      Spouse name: Alex Christina    Number of children: 0    Years of education: 16    Highest education level: Not on file   Occupational History    Not on file   Tobacco Use    Smoking status: Never    Smokeless tobacco: Never   Vaping Use    Vaping Use: Never used   Substance and Sexual Activity    Alcohol use: Yes     Comment: 1 drink per week    Drug use: Yes     Types: Marijuana Berneta Gino)     Comment: medical marijuana    Sexual activity: Yes     Partners: Male   Other Topics Concern    Not on file   Social History Narrative    Not on file     Social Determinants of Health     Financial Resource Strain: Not on file   Food Insecurity: Not on file   Transportation Needs: Not on file   Physical Activity: Inactive    Days of Exercise per Week: 0 days    Minutes of Exercise per Session: 0 min   Stress: Not on file   Social Connections: Not on file   Intimate Partner Violence: Not At Risk    Fear of Current or Ex-Partner: No    Emotionally Abused: No    Physically Abused: No    Sexually Abused: No   Housing Stability: Not on file       OBJECTIVE:   Vitals:    09/26/22 1038   BP: (!) 93/58   Pulse: 87   Resp: 14   Temp: 98.4 °F (36.9 °C)   SpO2:        REVIEW OF SYSTEMS:   Reports no current cardiovascular, respiratory, gastrointestinal, genitourinary,   integumentary, neurological, musculoskeletal, or immunological symptoms today. PSYCHIATRIC:  See HPI above     PSYCHIATRIC EXAMINATION / MENTAL STATUS EXAM:     CONSTITUTIONAL:    Vitals:    Blood pressure (!) 93/58, pulse 87, temperature 98.4 °F (36.9 °C), temperature source Oral, resp. rate 14, height 5' 6\" (1.676 m), weight 270 lb (122.5 kg), last menstrual period 04/13/2020, SpO2 95 %, not currently breastfeeding.   General appearance:  [x]  appears age, []  appears older than stated age,     [x]  adequately dressed and groomed, [] disheveled,                [x]  in no acute distress, [] appears mildly distressed,      []  Other:      MUSCULOSKELETAL:   Gait:   [x] normal, [] antalgic, [] unsteady, [] in bed, gait not evaluated   Station:  [x] erect, [] sitting, [] recumbent, [] other        Strength/tone:  [x] muscle strength and tone appear consistent with age and condition     [] atrophy      [] abnormal movements  PSYCHIATRIC:    Relatedness:  [x] cooperative, [] guarded, [] indifferent, [] hostile,      [] sedated  Speech:  [x] normal prosody, [] pressured, [] decreased volume,    [] slurred, [] halting, [] slowed, [] other     [] echolalia, [] incoherent, [] stuttering   Eye contact:  [x] direct, [] avoidant, [] intense  Kinetics:  [x] normal, [] increased, [] decreased  Mood:   [x] stable, [] depressed, [] anxious, [] irritable,     [] labile  Affect:   [x] normal range, [] constricted, [] depressed, [] anxious,     [] angry, [] blunted  Hallucinations  [x] denies, [] auditory,  [] visual,  [] olfactory, [] tactile  Delusions  [x] none, [] grandiose,  [] jealous,  [] persecutory,  [] somatic,     [] bizarre  Preoccupations  [x] none, [] violence, [] obsessions, [] other     Suicidal ideation  [x] denies, [] endorses  Homicidal ideation [x] denies, [] endorses  Thought process: [x] logical, [] circumstantial, [] tangential, [] GERARDO,     [] simplistic, [] disorganized  Associations:  [x] logical and coherent, [] loosening, [] disorganized  Insight:   [] good, [x] fair, [] poor  Judgment:  [] good, [] fair, [x] poor  Attention and concentration:     [x] intact, [] limited, [] able to focus on interview,     [] grossly impaired  Orientation:  [x] person, place, time, situation     [] disoriented to:     Memory:  Remote memory [x] intact, [] impaired     Recent memory  [x] intact, [] impaired          IMPRESSION    Principal Problem:    Major depression, recurrent ([facility]  Active Problems:    Mood disturbance    Medication overdose  Resolved Problems:    * No resolved hospital problems. *       ______      Tx plan:  Prevent self injury, stabilize affect, restore sleep, treat depression, treat anxiety, establish/maintain aftercare, increase coping mechanisms, improve medication compliance. All conditions present on admission are being treated while pt is hospitalized. Discussed PHP after discharge as part of transition back to the community.      Medications  Current Facility-Administered Medications   Medication Dose Route Frequency Provider Last Rate Last Admin    [START ON 9/27/2022] gabapentin (NEURONTIN) capsule 300 mg  300 mg Oral Nightly ANAY Blackmon        levothyroxine (SYNTHROID) tablet 75 mcg  75 mcg Oral Daily ANAY Blackmon   75 mcg at 09/26/22 1514    [START ON 9/27/2022] pantoprazole (PROTONIX) tablet 40 mg  40 mg Oral QAM  ANAY Villafana        acetaminophen (TYLENOL) tablet 650 mg  650 mg Oral Q4H PRN Nivia Chen MD        ibuprofen (ADVIL;MOTRIN) tablet 400 mg  400 mg Oral Q6H PRN Nivia Chen MD        magnesium hydroxide (MILK OF MAGNESIA) 400 MG/5ML suspension 30 mL  30 mL Oral Daily PRN [facility] Cami Digna Singer MD        aluminum & magnesium hydroxide-simethicone (MAALOX) 200-200-20 MG/5ML suspension 30 mL  30 mL Oral Q6H PRN Shelbie Johnson MD        hydrOXYzine HCl (ATARAX) tablet 50 mg  50 mg Oral TID PRN Shelbie Johnson MD   50 mg at 09/25/22 2143    OLANZapine (ZYPREXA) tablet 10 mg  10 mg Oral Q4H PRN Shelbie Johnson MD        Or    OLANZapine (ZYPREXA) 10 mg in sterile water 2 mL injection  10 mg IntraMUSCular Q4H PRN Shelbie Johnson MD        benztropine mesylate (COGENTIN) injection 2 mg  2 mg IntraMUSCular BID PRN Shelbie Johnson MD        traZODone (DESYREL) tablet 50 mg  50 mg Oral Nightly PRN Shelbie Johnson MD   50 mg at 09/25/22 2143    lurasidone (LATUDA) tablet 20 mg  20 mg Oral Nightly Shelbie Johnson MD   20 mg at 09/25/22 2144    rOPINIRole (REQUIP) tablet 0.75 mg  0.75 mg Oral Nightly Shelbie Johnson MD   0.75 mg at 09/25/22 2143      [START ON 9/27/2022] gabapentin  300 mg Oral Nightly    levothyroxine  75 mcg Oral Daily    [START ON 9/27/2022] pantoprazole  40 mg Oral QAM AC    lurasidone  20 mg Oral Nightly    rOPINIRole  0.75 mg Oral Nightly      PRN Meds: acetaminophen, ibuprofen, magnesium hydroxide, aluminum & magnesium hydroxide-simethicone, hydrOXYzine HCl, OLANZapine **OR** OLANZapine (ZyPREXA) in sterile water IntraMUSCular, benztropine mesylate, traZODone   Estimated length of stay: 1-2 days  Prognosis:  Fair   Criteria for Discharge:  Not suicidal, sleeping well, affect stable, depression improving, eating well, aftercare arranged. Spent > 70 minutes evaluating and treating patient, more than 50 % of that time was spent counseling the patient on their symptoms, treatment, and expected goals. ______  PLAN   1. Admit to Adult Behavioral Unit / Senior Behavioral Unit  2. Consult Internal Medicine to evaluate and treat medical conditions  3. Adjust psychotropic medications to target symptoms  4.   Occupational Therapy, Physical Therapy, Group Psychotherapy as tolerated   5. Reviewed treatment plan with patient including medication risks, benefits, side effects. Obtained informed consent for treatment.      MARITZA Osborn-BC Negative

## 2022-09-26 NOTE — PROGRESS NOTES
Behavioral Services  Medicare Certification Upon Admission    I certify that this patient's inpatient psychiatric hospital admission is medically necessary for:    [x] (1) Treatment which could reasonably be expected to improve this patient's condition,       [x] (2) Or for diagnostic study;     AND     [x](2) The inpatient psychiatric services are provided while the individual is under the care of a physician and are included in the individualized plan of care.     Estimated length of stay/service 2-5 days    Plan for post-hospital care outpatient services    Electronically signed by ROSE Solorio CNP on 9/26/2022 at 11:19 AM

## 2022-09-26 NOTE — H&P
Hospital Medicine History & Physical      PCP: No primary care provider on file. Date of Admission: 9/25/2022    Date of Service: Pt seen/examined on 9/26/2022     Chief Complaint:    Chief Complaint   Patient presents with    Suicide Attempt     Brought in by  for taking \"a handful of buspar\" about 20 min ago. When asked if Suicidal patient states \"svitlana\"          History Of Present Illness: The patient is a 31 y.o. female with pmhx of bipolar disorder, depression, DM, JANAY, PCOS, asthma who presented to [address] [place] [address] for intentional ingestion, pt took a handful of buspar, 15 pills of 5 mg each. Patient was seen and evaluated in the ED by the ED medical provider, patient was medically cleared for admission to [facility] at [facility]. This note serves as an admission medical H&P.     Tobacco use: None  ETOH use: 1 drink per week   Illicit drug use: Marijuana     Patient denies any medical complaints     Past Medical History:        Diagnosis Date    Acid reflux     Asthma     as child    Back pain     Bipolar 1 disorder (HCC)     Degenerative disc disease, lumbar     Depression     Diabetes mellitus (HCC)     Fibromyalgia     Liver disease     fatty liver    Obesity     OCD (obsessive compulsive disorder)     PCOS (polycystic ovarian syndrome)     Sleep apnea     no longer uses cpap after weight loss       Past Surgical History:        Procedure Laterality Date    ANKLE SURGERY Left     Scar tissue removal    CARPAL TUNNEL RELEASE Right 08/23/2021    RIGHT CARPAL TUNNEL RELEASE performed by Catie Ho MD at [address] Left 09/10/2021    LEFT CARPAL TUNNEL RELEASE performed by Catie Ho MD at [address] [place] [address], [address] N/A 04/20/2020    LAPAROSCOPIC CHOLECYSTECTOMY performed by Santos Reeder DO at [facility] (CERVIX STATUS UNKNOWN)      OTHER SURGICAL HISTORY Left 12/10/2014    Excision of Cyst Left Upper Posterior Ankle    OTHER SURGICAL HISTORY Right 12/29/2016    Laparotomy with Right SO    OVARY REMOVAL  12/28/2016    unsure of side    SHOULDER ARTHROSCOPY Left 02/16/2022    VIDEO ARTHROSCOPY LEFT SHOULDER, SUBACROMIAL DECOMPRESSION WITH ACROMIOPLASTY AND DISTAL CLAVICLE RESECTION -SLEEP APNEA performed by Devang Agudelo MD at [address] [facility] N/A 11/06/2019    LAPAROSCOPIC SLEEVE GASTRECTOMY - ETHICON performed by Enrique Hoffman MD at [address] [facility] Left 07/02/2021    LEFT QUADRICEP PERCUTANEOUS TENOTOMY , TX2 MICROTIP WITH ULTRASOUND performed by Theodore Hall MD at [place] [address] N/A 08/23/2019    EGD BIOPSY performed by Enrique Hoffman MD at [facility] N/A 03/10/2021    EGD BIOPSY performed by Tamara Brooks MD at [facility]         Medications Prior to Admission:    Prior to Admission medications    Medication Sig Start Date End Date Taking? Authorizing Provider   lurasidone (LATUDA) 20 MG TABS tablet Take 20 mg by mouth at bedtime   Yes Historical Provider, MD   potassium chloride (KLOR-CON M) 20 MEQ extended release tablet Take 20 mEq by mouth 2 times daily   Yes Historical Provider, MD   rOPINIRole (REQUIP) 0.25 MG tablet Take 0.75 mg by mouth nightly   Yes Historical Provider, MD   OZEMPIC, 0.25 OR 0.5 MG/DOSE, 2 MG/1.5ML SOPN Inject into the skin Per package directions   Yes Historical Provider, MD   gabapentin (NEURONTIN) 300 MG capsule Take 1 capsule by mouth nightly for 60 days.  Intended supply: 30 days 9/15/22 11/14/22  Theodore Hall MD   celecoxib (CELEBREX) 100 MG capsule Take 1 capsule by mouth daily For 2 weeks then daily as needed thereafter 9/8/22   Theodore Hall MD   omeprazole (PRILOSEC) 40 MG delayed release capsule Take 40 mg by mouth daily    Historical Provider, MD   DULoxetine (CYMBALTA) 60 MG extended Psychiatric/Behavorial: Per psychiatry team evaluation     PHYSICAL EXAM:    /70   Pulse (!) 102 Comment: 102  Temp 98.3 °F (36.8 °C) (Temporal)   Resp 16   Ht 5' 6\" (1.676 m)   Wt 270 lb (122.5 kg)   LMP 04/13/2020   SpO2 95%   BMI 43.58 kg/m²     Gen: No distress. Alert. Pleasant obese female  Eyes: PERRL. No sclera icterus. No conjunctival injection. Neck: No JVD. No Carotid Bruit. Trachea midline. Resp: No accessory muscle use. No crackles. No wheezes. No rhonchi. CV: Regular rate. Regular rhythm. No murmur. No rub. No edema. GI: Non-tender. Non-distended. Normal bowel sounds. Skin: Warm and dry. No nodule on exposed extremities. No rash on exposed extremities. M/S: No cyanosis. No joint deformity. No clubbing. Neuro: Awake. No focal neurologic deficit on exam.  Cranial nerves II through XII intact. Patient is able to ambulate without difficulty.   Psych: Per psychiatry team evaluation     Lab Results   Component Value Date    WBC 6.0 09/25/2022    HGB 13.6 09/25/2022    HCT 39.9 09/25/2022    MCV 91.3 09/25/2022     09/25/2022     Lab Results   Component Value Date     (L) 09/25/2022    K 3.5 09/25/2022    CL 98 (L) 09/25/2022    CO2 28 09/25/2022    BUN 11 09/25/2022    CREATININE 0.8 09/25/2022    GLUCOSE 102 (H) 09/25/2022    CALCIUM 9.3 09/25/2022    PROT 7.4 09/25/2022    LABALBU 4.3 09/25/2022    BILITOT 0.3 09/25/2022    ALKPHOS 76 09/25/2022    AST 25 09/25/2022    ALT 19 09/25/2022    LABGLOM >60 09/25/2022    GFRAA >60 09/25/2022    AGRATIO 1.4 09/25/2022    GLOB 3.2 08/31/2021       U/A:    Lab Results   Component Value Date/Time    NITRITE negative 04/07/2011 08:37 PM    COLORU Yellow 09/25/2022 03:37 AM    WBCUA None seen 04/12/2020 12:40 AM    RBCUA 3-4 04/12/2020 12:40 AM    MUCUS 2+ 03/22/2020 10:13 PM    BACTERIA 2+ 03/22/2020 10:13 PM    CLARITYU Clear 09/25/2022 03:37 AM    SPECGRAV 1.010 09/25/2022 03:37 AM    LEUKOCYTESUR Negative 09/25/2022 03:37

## 2022-09-26 NOTE — PRE-CERTIFICATION NOTE
Facesheet and SOB faxed to [facility] for pre-certification.     Jayant Lopez [facility]  9/25/2022

## 2022-09-26 NOTE — PLAN OF CARE
Pt is alert and oriented X4. She is pleasant and cooperative. She states she had an impulsive moment when taking her medications, where she decided to end it all and took too many of her pills. She states she regretted it. She states she has had a lot of stress lately in her relationship. She states she has talked to her boyfriend and he has agreed to therapy as well. Her parents have passed, but she is close to her brother Ken Santacruzer a couple of her sisters. \" She is educated how to reach out for help when she is having suicidal thoughts and is encouraged to use her coping skills. She denies SI/HI at this time. She has not been noted to be RTIS and denies ever having hallucinations.

## 2022-09-26 NOTE — DISCHARGE SUMMARY
Discharge Summary     Admit Date: 9/25/2022   Discharge Date:    9/26/2022    Spent over 40 minutes with patient and staff on [address] [facility] more than 50 % of that time was spent counseling the patient on their symptoms, treatment, and expected goals. Final Dx: Major depression, recurrent ([address]       At Discharge: 61-70 mild symptoms   All conditions and active problems were treated while patient was hospitalized. Condition on DC  Mood and affect are stable and pt is not suicidal     VITALS:  BP (!) 93/58   Pulse 87   Temp 98.4 °F (36.9 °C) (Oral)   Resp 14   Ht 5' 6\" (1.676 m)   Wt 270 lb (122.5 kg)   LMP 04/13/2020   SpO2 95%   BMI 43.58 kg/m²     Brief Summary Present Illness   Patient is a 31 y.o. female who presents  to ED on a SOB after intentional overdose on Buspar in a suicide attempt. Pt states that she has been in a relationship with a man for about 11 years. She considers his three children like her own. Pt states that she and her boyfriend have been , he has been seeing other people event though they live together. Her boyfriend brought a new girl to a Halloween event with her and the kids, and she thought she could handle it but it caused her to feel upset. When they got home pt went to take her medications, and thought \"why not\", taking more Buspar than normal , she reports around 10-15 tablets. Pt states she immediately felt remorseful, telling her boyfriend what she did. Pt reports being Holiness and knows she could not go to [facility] if she killed herself. Pt also wants to be a better role model to her step-children. Pt has plans to do a walk-in visit with [facility] this week, due to increasing depression and anxiety around her relationship. Pt has an appt with neurology tomorrow to test for multiple sclerosis.   She is also in classes for insurance certification, tomorrow from 10-1pm.  Pt denies that she wants to harm herself, is future oriented, and has plans to address her mental health moving forward. Pt denies any previous attempts or hospitalizations. Pt denies all SI/HI and AVH at this time. Pt is safe to discharge home at this time. Hospital Course Patient stabilized on meds and milieu treatment. Patient was discharged to home to continue recovery in the community.      PE: (reviewed) and labs (see medical H&PE)  Labs:    Admission on 09/25/2022   Component Date Value Ref Range Status    WBC 09/25/2022 6.0  4.0 - 11.0 K/uL Final    RBC 09/25/2022 4.37  4.00 - 5.20 M/uL Final    Hemoglobin 09/25/2022 13.6  12.0 - 16.0 g/dL Final    Hematocrit 09/25/2022 39.9  36.0 - 48.0 % Final    MCV 09/25/2022 91.3  80.0 - 100.0 fL Final    MCH 09/25/2022 31.1  26.0 - 34.0 pg Final    MCHC 09/25/2022 34.0  31.0 - 36.0 g/dL Final    RDW 09/25/2022 13.7  12.4 - 15.4 % Final    Platelets 34/68/4644 237  135 - 450 K/uL Final    MPV 09/25/2022 8.8  5.0 - 10.5 fL Final    Neutrophils % 09/25/2022 57.3  % Final    Lymphocytes % 09/25/2022 33.6  % Final    Monocytes % 09/25/2022 8.0  % Final    Eosinophils % 09/25/2022 0.7  % Final    Basophils % 09/25/2022 0.4  % Final    Neutrophils Absolute 09/25/2022 3.4  1.7 - 7.7 K/uL Final    Lymphocytes Absolute 09/25/2022 2.0  1.0 - 5.1 K/uL Final    Monocytes Absolute 09/25/2022 0.5  0.0 - 1.3 K/uL Final    Eosinophils Absolute 09/25/2022 0.0  0.0 - 0.6 K/uL Final    Basophils Absolute 09/25/2022 0.0  0.0 - 0.2 K/uL Final    Sodium 09/25/2022 135 (A)  136 - 145 mmol/L Final    Potassium reflex Magnesium 09/25/2022 3.5  3.5 - 5.1 mmol/L Final    Chloride 09/25/2022 98 (A)  99 - 110 mmol/L Final    CO2 09/25/2022 28  21 - 32 mmol/L Final    Anion Gap 09/25/2022 9  3 - 16 Final    Glucose 09/25/2022 102 (A)  70 - 99 mg/dL Final    BUN 09/25/2022 11  7 - 20 mg/dL Final    Creatinine 09/25/2022 0.8  0.6 - 1.1 mg/dL Final    GFR Non- 09/25/2022 >60  >60 Final    Comment: >60 mL/min/1.73m2 EGFR, calc. for ages 25 and older using the  MDRD formula (not corrected for weight), is valid for stable  renal function. GFR  09/25/2022 >60  >60 Final    Comment: Chronic Kidney Disease: less than 60 ml/min/1.73 sq.m. Kidney Failure: less than 15 ml/min/1.73 sq.m. Results valid for patients 18 years and older.       Calcium 09/25/2022 9.3  8.3 - 10.6 mg/dL Final    Total Protein 09/25/2022 7.4  6.4 - 8.2 g/dL Final    Albumin 09/25/2022 4.3  3.4 - 5.0 g/dL Final    Albumin/Globulin Ratio 09/25/2022 1.4  1.1 - 2.2 Final    Total Bilirubin 09/25/2022 0.3  0.0 - 1.0 mg/dL Final    Alkaline Phosphatase 09/25/2022 76  40 - 129 U/L Final    ALT 09/25/2022 19  10 - 40 U/L Final    AST 09/25/2022 25  15 - 37 U/L Final    Ventricular Rate 09/25/2022 74  BPM Final    Atrial Rate 09/25/2022 74  BPM Final    P-R Interval 09/25/2022 162  ms Final    QRS Duration 09/25/2022 100  ms Final    Q-T Interval 09/25/2022 436  ms Final    QTc Calculation (Bazett) 09/25/2022 483  ms Final    P Axis 09/25/2022 55  degrees Final    R Axis 09/25/2022 21  degrees Final    T Axis 09/25/2022 22  degrees Final    Diagnosis 09/25/2022 Normal sinus rhythmWhen compared with ECG of 23-JUN-2021 13:29,No significant change was foundConfirmed by MICHELA Mcclain MD ([id number]) on 9/25/2022 7:25:16 AM   Final    Color, UA 09/25/2022 Yellow  Straw/Yellow Final    Clarity, UA 09/25/2022 Clear  Clear Final    Glucose, Ur 09/25/2022 Negative  Negative mg/dL Final    Bilirubin Urine 09/25/2022 Negative  Negative Final    Ketones, Urine 09/25/2022 Negative  Negative mg/dL Final    Specific Gravity, UA 09/25/2022 1.010  1.005 - 1.030 Final    Blood, Urine 09/25/2022 Negative  Negative Final    pH, UA 09/25/2022 6.0  5.0 - 8.0 Final    Protein, UA 09/25/2022 Negative  Negative mg/dL Final    Urobilinogen, Urine 09/25/2022 0.2  <2.0 E.U./dL Final    Nitrite, Urine 09/25/2022 Negative  Negative Final    Leukocyte Esterase, Urine 09/25/2022 Negative  Negative Final Microscopic Examination 09/25/2022 Not Indicated   Final    Urine Type 09/25/2022 Voided   Final    Urine Reflex to Culture 09/25/2022 Not Indicated   Final    Amphetamine Screen, Urine 09/25/2022 Neg  Negative <1000ng/mL Final    Barbiturate Screen, Ur 09/25/2022 Neg  Negative <200 ng/mL Final    Benzodiazepine Screen, Urine 09/25/2022 Neg  Negative <200 ng/mL Final    Cannabinoid Scrn, Ur 09/25/2022 Neg  Negative <50 ng/mL Final    Cocaine Metabolite Screen, Urine 09/25/2022 Neg  Negative <300 ng/mL Final    Opiate Scrn, Ur 09/25/2022 Neg  Negative <300 ng/mL Final    Comment: \"Therapeutic levels of pain medication, especially oxycontin and synthetic  opioids, may not be detected by this Methodology. Pain management screen  panel  Drug panel-PM-Hi Res Ur, Interp (PAIN) should be considered for drug  monitoring \". PCP Screen, Urine 09/25/2022 Neg  Negative <25 ng/mL Final    Methadone Screen, Urine 09/25/2022 Neg  Negative <300 ng/mL Final    Oxycodone Urine 09/25/2022 Neg  Negative <100 ng/ml Final    FENTANYL SCREEN, URINE 09/25/2022 Neg  Negative <5 ng/mL Final    pH, UA 09/25/2022 6.0   Final    Comment: Urine pH less than 5.0 or greater than 8.0 may indicate sample adulteration. Another sample should be collected if clinically  indicated. Drug Screen Comment: 09/25/2022 see below   Final    Comment: This method is a screening test to detect only these drug  classes as part of a medical workup. Confirmatory testing  by another method should be ordered if clinically indicated.       hCG Qual 09/25/2022 Negative  Detects HCG level >10 MIU/mL Final    Ethanol Lvl 09/25/2022 None Detected  mg/dL Final    Comment:    None Detected  Conversion factor:  100 mg/dl = .100 g/dl  For Medical Purposes Only      Salicylate, Serum 22/06/6795 <0.3 (A)  15.0 - 30.0 mg/dL Final    Comment: Therapeutic Range: 15.0-30.0 mg/dL  Toxic: >30.0 mg/dL      Acetaminophen Level 09/25/2022 <5 (A)  10 - 30 ug/mL Final Comment: Therapeutic Range: 10.0-30.0 ug/mL  Toxic: >=150 ug/mL      Magnesium 09/25/2022 2.10  1.80 - 2.40 mg/dL Final    SARS-CoV-2, NAAT 09/25/2022 Not Detected  Not Detected Final    Comment: Rapid NAAT:   Negative results should be treated as presumptive and,  if inconsistent with clinical signs and symptoms or necessary for  patient management, should be tested with an alternative molecular  assay. Negative results do not preclude SARS-CoV-2 infection and  should not be used as the sole basis for patient management decisions. This test has been authorized by the FDA under an Emergency Use  Authorization (EUA) for use by authorized laboratories. Fact sheet for Healthcare Providers:  BuildHer.es  Fact sheet for Patients: BuildHer.es    METHODOLOGY: Isothermal Nucleic Acid Amplification      TSH 09/25/2022 2.52  0.27 - 4.20 uIU/mL Final    Ventricular Rate 09/26/2022 77  BPM Final    Atrial Rate 09/26/2022 77  BPM Final    P-R Interval 09/26/2022 148  ms Final    QRS Duration 09/26/2022 92  ms Final    Q-T Interval 09/26/2022 410  ms Final    QTc Calculation (Bazett) 09/26/2022 463  ms Final    P Axis 09/26/2022 56  degrees Final    R Axis 09/26/2022 42  degrees Final    T Axis 09/26/2022 35  degrees Final    Diagnosis 09/26/2022 Normal sinus rhythmNormal ECGWhen compared with ECG of 25-SEP-2022 03:30,No significant change was foundConfirmed by Lacy Bo MD, MICHELA ([id number]) on 9/26/2022 9:33:54 AM   Final    Cholesterol, Total 09/25/2022 152  0 - 199 mg/dL Final    Triglycerides 09/25/2022 54  0 - 150 mg/dL Final    HDL 09/25/2022 63 (A)  40 - 60 mg/dL Final    Comment: An HDL cholesterol less than 40 mg/dL is low and  constitutes a coronary heart disease risk factor. An HDL cholesterol greater than 60 mg/dL is a  negative risk factor for coronary heart disease.       LDL Calculated 09/25/2022 78  <100 mg/dL Final    VLDL Cholesterol Calculated 09/25/2022 11  Not Established mg/dL Final    Hemoglobin A1C 09/25/2022 5.3  See comment % Final    Comment: Comment:  Diagnosis of Diabetes: > or = 6.5%  Increased risk of diabetes (Prediabetes): 5.7-6.4%  Glycemic Control: Nonpregnant Adults: <7.0%                    Pregnant: <6.0%        eAG 09/25/2022 105.4  mg/dL Final          Mental Status Exam at Discharge:  Level of consciousness:  awake  Appearance:  well-appearing, in chair, good grooming and good hygiene well-developed, well-nourished  Behavior/Motor:  no abnormalities noted normal gait and station AIMS: 0  Attitude toward examiner:  cooperative, attentive and good eye contact  Speech:  spontaneous, normal rate, normal volume and well articulated  Mood:  dysthymic  Affect:  mood congruent Anxiety: mild  Hallucinations: Absent  Thought processes:  coherent Attention span, Concentration & Attention:  attention span and concentration were age appropriate  Thought content:  No evidence of delusions OCD: none    Insight: normal insight and judgment Cognition:  oriented to person, place, and time  Fund of Knowledge: average  IQ:average Memory: intact  Suicide:  No specific plan to harm self  Sleep: sleeps through the night  Appetite: ok     Reassess Suicide Risk:  no specific plan to harm self Pt has phone numbers to contact if suicidal thoughts recur and states pt will return to the hospital if suicidal feelings return.    Hospital Routine Meds:     [START ON 9/27/2022] gabapentin  300 mg Oral Nightly    levothyroxine  75 mcg Oral Daily    [START ON 9/27/2022] pantoprazole  40 mg Oral QAM AC    lurasidone  20 mg Oral Nightly    rOPINIRole  0.75 mg Oral Nightly      Hospital PRN Meds: acetaminophen, ibuprofen, magnesium hydroxide, aluminum & magnesium hydroxide-simethicone, hydrOXYzine HCl, OLANZapine **OR** OLANZapine (ZyPREXA) in sterile water IntraMUSCular, benztropine mesylate, traZODone   Discharge Meds:    Current Discharge Medication List Details   lurasidone (LATUDA) 20 MG TABS tablet Take 20 mg by mouth at bedtime      potassium chloride (KLOR-CON M) 20 MEQ extended release tablet Take 20 mEq by mouth 2 times daily      rOPINIRole (REQUIP) 0.25 MG tablet Take 0.75 mg by mouth nightly      OZEMPIC, 0.25 OR 0.5 MG/DOSE, 2 MG/1.5ML SOPN Inject into the skin Per package directions      gabapentin (NEURONTIN) 300 MG capsule Take 1 capsule by mouth nightly for 60 days. Intended supply: 30 days  Qty: 30 capsule, Refills: 1      celecoxib (CELEBREX) 100 MG capsule Take 1 capsule by mouth daily For 2 weeks then daily as needed thereafter  Qty: 30 capsule, Refills: 1      omeprazole (PRILOSEC) 40 MG delayed release capsule Take 40 mg by mouth daily      DULoxetine (CYMBALTA) 60 MG extended release capsule TAKE 1 CAPSULE BY ORAL ROUTE  EVERY DAY      nystatin (MYCOSTATIN) 672438 UNIT/GM powder as needed       lamoTRIgine (LAMICTAL) 25 MG tablet Take 25 mg by mouth daily      medical marijuana Take by mouth as needed. loratadine (CLARITIN) 10 MG tablet Take 10 mg by mouth daily      Multiple Vitamins-Minerals (BARIATRIC FUSION) CHEW Take 2 tablets by mouth daily       levothyroxine (SYNTHROID) 75 MCG tablet TAKE 1 TABLET BY MOUTH EVERY DAY  Refills: 5      Cholecalciferol (VITAMIN D PO) Take by mouth daily                  Disposition - Residence Home or Self Care       Follow Up:  See Discharge Instructions     Total time with patient was 40 minutes and more than 50 % of that time was spent counseling the patient on their symptoms, treatment and expected goals.      Yon Edwards - PMHNP-BC

## 2022-09-26 NOTE — PLAN OF CARE
Problem: Chronic Conditions and Co-morbidities  Goal: Patient's chronic conditions and co-morbidity symptoms are monitored and maintained or improved  Outcome: Progressing     Problem: Self Harm/Suicidality  Goal: Will have no self-injury during hospital stay  Description: INTERVENTIONS:  1. Q 30 MINUTES: Routine safety checks  2. Q SHIFT & PRN: Assess risk to determine if routine checks are adequate to maintain patient safety  Outcome: Progressing     Problem: Depression  Goal: Will be euthymic at discharge  Description: INTERVENTIONS:  1. Administer medication as ordered  2. Provide emotional support via 1:1 interaction with staff  3. Encourage involvement in milieu/groups/activities  4. Monitor for social isolation  Outcome: Progressing     Problem: Anxiety  Goal: Will report anxiety at manageable levels  Description: INTERVENTIONS:  1. Administer medication as ordered  2. Teach and rehearse alternative coping skills  3. Provide emotional support with 1:1 interaction with staff  Outcome: Progressing   Pt friendly, cooperative. States that she made an impulsive decision to take the overdose of buspar and states \"I'll never do that again. \" Looking forward to getting back home to her children. Planning to begin outpt therapy when she leaves - already planned to do this, but was overwhelmed by situation with ex last night. Also starting a new job this week and looking forward to that. Pt appears brightened, has a blunt affect. CFS, denies all.

## 2022-09-26 NOTE — BH NOTE
Comments: + interactions, + contribution, and + engagement.         Time: 7095-3889      Type of Group: Wrap up, Relaxation      Level of Participation: 9/16

## 2022-09-26 NOTE — DISCHARGE INSTRUCTIONS
Advanced Directives:  Patient does not have a surrogate decision maker appointed   Name (if yes): declined Phone Number:   Patient does not have a psychiatric and/ or medical advanced directive or power of . Patient was offered psychiatric and/ or medical advanced directive or power of  information/completion but declined to complete   Why not? declined    Discharge Planning is Complete. Discharge Date: 9/26/2022  Reason for Hospitalization: Suicidal Ideation  Discharge Diagnosis: Suicidal Ideation  Discharging to: Private Domicile    Your instructions: Your physician here was No att. providers found. If you have any questions please call the unit at [phone] for the adult unit or [phone] for [facility]. Please note that we have a patient family advisory [place] that meets the second Wednesday of January and the second Wednesday of July at [address] [facility] in [place] at [facility]. Department leadership would love for you to attend to give feedback on what we are doing well and areas in which we can improve our patient care. Please come if you are able and feel free to reach out to [facility] at [phone] with any questions. The crisis number for [facility] is [phone] ([facility]). This crisis line is available 24 hours a day, seven days a week. Please follow up with your PCP regarding any pending labs.      Your next appointment is:  Name of Provider: Marisol  Provider specialty/license: mental health services  Walk-in registration day:  Tuesday, September 27th, 2022, starting at 8:00 AM  The type/s of services requested are: counseling/medication management  Agency name: [facility]  Address: [address] [place] [address] # [place], [facility]  Phone: [phone]  Special instructions (what to bring to appointment, etc.): Valid ID, insurance card if you have one, proof of residence (mail), proof of income (tax return, check stubs, award letter). If you are unable to attend the open enrollment date and time above please plan to attend open enrollment any Monday-Thursday from 8:00am-4:30pm or Friday from 8:00am-3:00pm    We also want to express the availability of Zen Sim here at [facility]. If you are connecting to community services and you find you have a long wait time until you can see your new psychiatrist, therapist, or primary care physician, know that we can provide health care services to you in the time between your care here and the beginning of your care with your new providers. As long as you have psychiatry and therapy appointments scheduled we can see you for medicine management. If you need a Bridge Appointment please call Chris Durand at [phone]. Discharge Completed By: Sirena Santana LS  Fax to: Follow up provider name and number  [facility] [phone]    The following personal items were collected during your admission and were returned to you:    Belongings  Dental Appliances: None  Vision - Corrective Lenses: None  Hearing Aid: None  Clothing: Shorts, Pants, Footwear, Undergarments, Shirt  Jewelry: None  Body Piercings Removed: No (tongue and nipples)  Electronic Devices: None  Weapons (Notify Protective Services/Security): None  Other Valuables: Other (Comment) (none)  Home Medications: None  Valuables Given To: Other (Comment) (none)  Provide Name(s) of Who Valuable(s) Were Given To: none  Responsible person(s) in the waiting room: none  Patient approves for provider to speak to responsible person post operatively: No    By signing below, I understand and acknowledge receipt of the instructions indicated above.

## 2022-09-26 NOTE — PLAN OF CARE
[facility]  Discharge Note    Pt discharged with followings belongings:   Dental Appliances: None  Vision - Corrective Lenses: None  Hearing Aid: None  Jewelry: None  Body Piercings Removed: No (tongue and nipples)  Clothing: Shorts, Pants, Footwear, Undergarments, Shirt  Other Valuables: Other (Comment) (none)   Valuables sent home with patient. Patient educated on aftercare instructions: yes  Information faxed to [facility] by Charge RN  at [address] PM .Patient verbalize understanding of AVS:  yes. Status EXAM upon discharge:  Mental Status and Behavioral Exam  Normal: Yes  Level of Assistance: Independent/Self  Facial Expression: Brightened  Affect: Appropriate  Level of Consciousness: Alert  Frequency of Checks: 4 times per hour, close  Mood:Normal: Yes  Mood: Anxious  Motor Activity:Normal: Yes  Eye Contact: Good  Observed Behavior: Cooperative, Friendly  Sexual Misconduct History: Current - no  Preception: [place] to person, [place] to time, [place] to place, [place] to situation  Attention:Normal: Yes  Thought Processes:  (Linear)  Thought Content:Normal: Yes  Depression Symptoms: No problems reported or observed. Anxiety Symptoms: No problems reported or observed. Harriett Symptoms: No problems reported or observed.   Hallucinations: None  Delusions: No  Memory:Normal: Yes  Insight and Judgment: No  Insight and Judgment: Poor judgment    Tobacco Screening:  Practical Counseling, on admission, nancy X, if applicable and completed (first 3 are required if patient doesn't refuse):            ( ) Recognizing danger situations (included triggers and roadblocks)                    ( ) Coping skills (new ways to manage stress,relaxation techniques, changing routine, distraction)                                                           ( ) Basic information about quitting (benefits of quitting, techniques in how to quit, available resources  ( ) Referral for counseling faxed to Eric ( x) Patient refused counseling  ( ) Patient refused referral  ( ) Patient refused prescription upon discharge  ( ) Patient has not smoked in the last 30 days    Metabolic Screening:    Lab Results   Component Value Date    LABA1C 5.3 09/25/2022       Lab Results   Component Value Date    CHOL 152 09/25/2022    CHOL 123 05/11/2021    CHOL 136 11/25/2020    CHOL 113 07/14/2020    CHOL 136 05/29/2019     Lab Results   Component Value Date    TRIG 54 09/25/2022    TRIG 66 05/11/2021    TRIG 54 11/25/2020    TRIG 56 07/14/2020    TRIG 84 05/29/2019     Lab Results   Component Value Date    HDL 63 (H) 09/25/2022    HDL 55 05/11/2021    HDL 57 11/25/2020    HDL 46 07/14/2020    HDL 39 (L) 05/29/2019     No components found for: [facility]  Lab Results   Component Value Date    LABVLDL 11 09/25/2022    LABVLDL 13 05/11/2021    LABVLDL 11 11/25/2020    LABVLDL 11 07/14/2020    LABVLDL 17 05/29/2019     Bridge Appointment completed: Reviewed Discharge Instructions with patient. Patient verbalizes understanding and agreement with the discharge plan using the teachback method.        Vaccinations (nancy X if applicable and completed):  ( ) Patient states already received influenza vaccine elsewhere  ( ) Patient received influenza vaccine during this hospitalization  ( ) Patient refused influenza vaccine at this time       Niranjan Fink RN

## 2022-09-26 NOTE — BH NOTE
[facility]  Admission Note     Admission Type:   Admission Type: Voluntary    Reason for admission:  Reason for Admission: took too many pills      Addictive Behavior:   Addictive Behavior  In the Past 3 Months, Have You Felt or Has Someone Told You That You Have a Problem With  : Eating (too much/too little), Excessive fluid intake    Medical Problems:   Past Medical History:   Diagnosis Date    Acid reflux     Asthma     as child    Back pain     Bipolar 1 disorder ([facility] [address]     Degenerative disc disease, lumbar     Depression     Diabetes mellitus (HCC)     Fibromyalgia     Liver disease     fatty liver    Obesity     OCD (obsessive compulsive disorder)     PCOS (polycystic ovarian syndrome)     Sleep apnea     no longer uses cpap after weight loss       Status EXAM:  Mental Status and Behavioral Exam  Normal: Yes  Level of Assistance: Independent/Self  Facial Expression: Brightened  Affect: Blunt, Appropriate  Level of Consciousness: Alert  Frequency of Checks: 4 times per hour, close  Mood:Normal: Yes  Motor Activity:Normal: Yes  Eye Contact: Good  Observed Behavior: Friendly, Cooperative  Sexual Misconduct History: Current - no  Preception: [place] to person, [place] to time, [place] to place, [place] to situation  Attention:Normal: Yes  Thought Processes:  (logical)  Thought Content:Normal: Yes  Depression Symptoms: Feelings of worthlessness, Loss of interest, Sleep disturbance  Anxiety Symptoms: Generalized  Harriett Symptoms: No problems reported or observed.   Hallucinations: None  Delusions: No  Memory:Normal: Yes  Insight and Judgment: Yes    Tobacco Screening:  Practical Counseling, on admission, nancy X, if applicable and completed (first 3 are required if patient doesn't refuse)n/a:            ( ) Recognizing danger situations (included triggers and roadblocks)                    ( ) Coping skills (new ways to manage stress,relaxation techniques, changing routine, distraction) ( ) Basic information about quitting (benefits of quitting, techniques in how to quit, available resources  ( ) Referral for counseling faxed to Eric                                                                                                                   ( x) Patient refused counseling  ( x) Patient has not smoked in the last 30 days    Metabolic Screening:    Lab Results   Component Value Date    LABA1C 5.2 05/11/2021       Lab Results   Component Value Date    CHOL 123 05/11/2021    CHOL 136 11/25/2020    CHOL 113 07/14/2020    CHOL 136 05/29/2019     Lab Results   Component Value Date    TRIG 66 05/11/2021    TRIG 54 11/25/2020    TRIG 56 07/14/2020    TRIG 84 05/29/2019     Lab Results   Component Value Date    HDL 55 05/11/2021    HDL 57 11/25/2020    HDL 46 07/14/2020    HDL 39 (L) 05/29/2019     No components found for: LDLCAL  Lab Results   Component Value Date    LABVLDL 13 05/11/2021    LABVLDL 11 11/25/2020    LABVLDL 11 07/14/2020    LABVLDL 17 05/29/2019         Body mass index is 43.58 kg/m². BP Readings from Last 2 Encounters:   09/25/22 112/70   08/12/22 108/69           Pt admitted with followings belongings:  Dental Appliances: None  Vision - Corrective Lenses: None  Hearing Aid: None  Jewelry: None  Body Piercings Removed: No (tongue and nipples)  Clothing: Shorts, Pants, Footwear, Undergarments, Shirt  Other Valuables:  Other (Comment) (none)    Danny Lee RN

## 2022-09-27 ENCOUNTER — APPOINTMENT (OUTPATIENT)
Dept: PHYSICAL THERAPY | Age: 37
End: 2022-09-27
Payer: MEDICAID

## 2022-09-29 ENCOUNTER — APPOINTMENT (OUTPATIENT)
Dept: PHYSICAL THERAPY | Age: 37
End: 2022-09-29
Payer: MEDICAID

## 2022-09-30 ENCOUNTER — PATIENT MESSAGE (OUTPATIENT)
Dept: ORTHOPEDIC SURGERY | Age: 37
End: 2022-09-30

## 2022-10-07 DIAGNOSIS — M79.89 SWELLING OF BOTH LOWER EXTREMITIES: ICD-10-CM

## 2022-10-07 DIAGNOSIS — R20.2 PARESTHESIA OF BOTH LOWER EXTREMITIES: ICD-10-CM

## 2022-10-07 DIAGNOSIS — Z86.69 HX OF PERIPHERAL NEUROPATHY: Primary | ICD-10-CM

## 2022-10-10 ENCOUNTER — HOSPITAL ENCOUNTER (OUTPATIENT)
Dept: VASCULAR LAB | Age: 37
Discharge: HOME | End: 2022-10-10
Payer: MEDICAID

## 2022-10-10 DIAGNOSIS — R20.2 PARESTHESIA OF BOTH LOWER EXTREMITIES: ICD-10-CM

## 2022-10-10 DIAGNOSIS — Z86.69 HX OF PERIPHERAL NEUROPATHY: ICD-10-CM

## 2022-10-10 DIAGNOSIS — M79.89 SWELLING OF BOTH LOWER EXTREMITIES: ICD-10-CM

## 2022-10-10 PROCEDURE — 93970 EXTREMITY STUDY: CPT

## 2022-10-13 NOTE — TELEPHONE ENCOUNTER
From: Darylene Genet  Sent: 10/13/2022 5:33 AM EDT  To: [facility] Clinical Staff  Subject: Follow up after nuerology    Saw my numerological surgeon after back mri, he looked at the upper back one from a while ago too. Says that it isn't what's causing the nueropathy. I won't see the right neurologist till March to see if its anything worsening or bad. Still having the pain in the elbows and hands. Wanted to update you.  Continuing with lower body with Dr Sedrick Velasquez

## 2022-10-20 ENCOUNTER — PROCEDURE VISIT (OUTPATIENT)
Dept: NEUROLOGY | Age: 37
End: 2022-10-20
Payer: MEDICAID

## 2022-10-20 DIAGNOSIS — M79.605 BILATERAL LEG PAIN: Primary | ICD-10-CM

## 2022-10-20 DIAGNOSIS — M79.604 BILATERAL LEG PAIN: Primary | ICD-10-CM

## 2022-10-20 PROCEDURE — 95909 NRV CNDJ TST 5-6 STUDIES: CPT | Performed by: PSYCHIATRY & NEUROLOGY

## 2022-10-20 PROCEDURE — 95886 MUSC TEST DONE W/N TEST COMP: CPT | Performed by: PSYCHIATRY & NEUROLOGY

## 2022-10-20 NOTE — PROGRESS NOTES
Geronimo Flores M.D. [facility]) Physicians/[facility]  Board Certified in [address], [address] [place] [address]    EMG / NERVE CONDUCTION STUDY      PATIENT:  Sonja Case       DATE OF EMG:  10/20/22     YOB: 1985       REASON FOR EMG:   Low back pain and bilateral leg pain      REFERRING PHYSICIAN:  MD [place] AmberAnalilia lay [address]     SUMMARY:   Bilateral peroneal motor nerve studies were normal.  Bilateral posterior tibial motor nerve studies were normal.  Bilateral sural sensory nerve studies were normal.  Needle EMG of several muscles in both lower extremities was normal.  Needle EMG of bilateral lumbar paraspinal muscles was normal.      CLINICAL DIAGNOSIS:  Neuropathy versus radiculopathy        EMG RESULTS:   This is a normal EMG and nerve conduction study of both lower extremities. There is no electrophysiological evidence for large fiber peripheral neuropathy or lumbar radiculopathy in this study. ---------------------------------------------  Geronimo Flores M.D.   Electromyographer / Neurologist

## 2022-10-21 ENCOUNTER — HOSPITAL ENCOUNTER (EMERGENCY)
Age: 37
Discharge: HOME | End: 2022-10-21
Attending: EMERGENCY MEDICINE
Payer: MEDICAID

## 2022-10-21 VITALS
RESPIRATION RATE: 17 BRPM | HEIGHT: 66 IN | BODY MASS INDEX: 43.39 KG/M2 | SYSTOLIC BLOOD PRESSURE: 132 MMHG | WEIGHT: 270 LBS | OXYGEN SATURATION: 98 % | HEART RATE: 80 BPM | DIASTOLIC BLOOD PRESSURE: 91 MMHG

## 2022-10-21 DIAGNOSIS — K08.89 PAIN, DENTAL: Primary | ICD-10-CM

## 2022-10-21 PROCEDURE — 99283 EMERGENCY DEPT VISIT LOW MDM: CPT

## 2022-10-21 PROCEDURE — 6370000000 HC RX 637 (ALT 250 FOR IP): Performed by: EMERGENCY MEDICINE

## 2022-10-21 RX ORDER — PENICILLIN V POTASSIUM 250 MG/1
500 TABLET ORAL ONCE
Status: COMPLETED | OUTPATIENT
Start: 2022-10-21 | End: 2022-10-21

## 2022-10-21 RX ORDER — PENICILLIN V POTASSIUM 500 MG/1
500 TABLET ORAL 4 TIMES DAILY
Qty: 40 TABLET | Refills: 0 | Status: SHIPPED | OUTPATIENT
Start: 2022-10-21 | End: 2022-10-31

## 2022-10-21 RX ORDER — IBUPROFEN 600 MG/1
600 TABLET ORAL ONCE
Status: COMPLETED | OUTPATIENT
Start: 2022-10-21 | End: 2022-10-21

## 2022-10-21 RX ADMIN — IBUPROFEN 600 MG: 600 TABLET, FILM COATED ORAL at 00:48

## 2022-10-21 RX ADMIN — PENICILLIN V POTASSIUM 500 MG: 250 TABLET, FILM COATED ORAL at 00:49

## 2022-10-21 ASSESSMENT — PAIN DESCRIPTION - LOCATION: LOCATION: MOUTH

## 2022-10-21 ASSESSMENT — PAIN SCALES - GENERAL: PAINLEVEL_OUTOF10: 6

## 2022-10-21 ASSESSMENT — PAIN - FUNCTIONAL ASSESSMENT: PAIN_FUNCTIONAL_ASSESSMENT: 0-10

## 2022-10-21 NOTE — ED PROVIDER NOTES
[address] ED  EMERGENCY DEPARTMENT ENCOUNTER      Pt Name: Melita Poole  MRN: [id number]  Arm[place]olinar 1985  Date of evaluation: 10/21/2022  Provider: José Miguel Reece MD    CHIEF COMPLAINT       Chief Complaint   Patient presents with    Dental Pain     Upper left side dental pain x 2 weeks. No fevers. HISTORY OF PRESENT ILLNESS   (Location/Symptom, Timing/Onset, Context/Setting, Quality, Duration, Modifying Factors, Severity)  Note limiting factors. Melita Poole is a 31 y.o. female with past medical history of needed illness here today for dental pain. Patient notes that over the course last 1 to 2 weeks has had increased throbbing aching pain in the left anterior upper jaw with mild swelling. No fevers though she is had some chills. Moderate to severe pain and discomfort. Concern for tooth ache. Made an appointment with dentistry but does not see them for another 4 days. Not currently on any antibiotics. No trouble swallowing. No change in speech or voice. No obvious alleviating factors. HPI    Nursing Notes were reviewed. REVIEW OF SYSTEMS    (2-9 systems for level 4, 10 or more for level 5)     Review of Systems    Please see HPI for pertinent positive and negative review of system findings. A full 10 system ROS was performed and otherwise negative.         PAST MEDICAL HISTORY     Past Medical History:   Diagnosis Date    Acid reflux     Asthma     as child    Back pain     Bipolar 1 disorder ([facility] [address]     Degenerative disc disease, lumbar     Depression     Diabetes mellitus (HCC)     Fibromyalgia     Liver disease     fatty liver    Obesity     OCD (obsessive compulsive disorder)     PCOS (polycystic ovarian syndrome)     Sleep apnea     no longer uses cpap after weight loss         SURGICAL HISTORY       Past Surgical History:   Procedure Laterality Date    ANKLE SURGERY Left     Scar tissue removal    CARPAL TUNNEL RELEASE Right 08/23/2021    RIGHT CARPAL TUNNEL RELEASE performed by Eliane Corrales MD at [address] Left 09/10/2021    LEFT CARPAL TUNNEL RELEASE performed by Eliane Corrales MD at [address] [facility] N/A 04/20/2020    LAPAROSCOPIC CHOLECYSTECTOMY performed by Pedro Champagne DO at [address], [address] ([address] [facility] [place])      OTHER SURGICAL HISTORY Left 12/10/2014    Excision of Cyst Left Upper Posterior Ankle    OTHER SURGICAL HISTORY Right 12/29/2016    Laparotomy with Right SO    OVARY REMOVAL  12/28/2016    unsure of side    SHOULDER ARTHROSCOPY Left 02/16/2022    VIDEO ARTHROSCOPY LEFT SHOULDER, SUBACROMIAL DECOMPRESSION WITH ACROMIOPLASTY AND DISTAL CLAVICLE RESECTION -SLEEP APNEA performed by Johanna Garcia MD at [address] [facility] N/A 11/06/2019    LAPAROSCOPIC SLEEVE GASTRECTOMY - ETHICON performed by Jade Waldron MD at [address] [facility] Left 07/02/2021    LEFT QUADRICEP PERCUTANEOUS TENOTOMY , TX2 MICROTIP WITH ULTRASOUND performed by Mylene Oates MD at [address] [facility] 08/23/2019    EGD BIOPSY performed by Jade Waldron MD at [address] [facility] N/A 03/10/2021    EGD BIOPSY performed [facility] Minna Pearson MD at [facility]       Discharge Medication List as of 10/21/2022 12:49 AM        CONTINUE these medications which have NOT CHANGED    Details   lurasidone (LATUDA) 20 MG TABS tablet Take 20 mg by mouth at bedtimeHistorical Med      potassium chloride (KLOR-CON M) 20 MEQ extended release tablet Take 20 mEq by mouth 2 times dailyHistorical Med      rOPINIRole (REQUIP) 0.25 MG tablet Take 0.75 mg by mouth nightlyHistorical Med      OZEMPIC, 0.25 OR 0.5 MG/DOSE, 2 MG/1.5ML SOPN Inject into the skin Per package directions, DAWHistorical Med      gabapentin (NEURONTIN) 300 MG capsule Take 1 capsule by mouth nightly for 60 days. Intended supply: 30 days, Disp-30 capsule, R-1Normal      celecoxib (CELEBREX) 100 MG capsule Take 1 capsule by mouth daily For 2 weeks then daily as needed thereafter, Disp-30 capsule, R-1Normal      omeprazole (PRILOSEC) 40 MG delayed release capsule Take 40 mg by mouth dailyHistorical Med      DULoxetine (CYMBALTA) 60 MG extended release capsule TAKE 1 CAPSULE BY ORAL ROUTE  EVERY DAYHistorical Med      nystatin (MYCOSTATIN) 518670 UNIT/GM powder as needed , PRN Starting Mon 5/17/2021, Historical Med      lamoTRIgine (LAMICTAL) 25 MG tablet Take 25 mg by mouth dailyHistorical Med      medical marijuana Take by mouth as needed. Historical Med      loratadine (CLARITIN) 10 MG tablet Take 10 mg by mouth dailyHistorical Med      Multiple Vitamins-Minerals (BARIATRIC FUSION) CHEW Take 2 tablets by mouth daily Historical Med      levothyroxine (SYNTHROID) 75 MCG tablet TAKE 1 TABLET BY MOUTH EVERY DAY, R-5Historical Med      Cholecalciferol (VITAMIN D PO) Take by mouth daily Historical Med             ALLERGIES     Latex, Lactose, Adhesive tape, and Keflex [cephalexin]    FAMILY HISTORY       Family History   Problem Relation Age of Onset    Asthma Mother     Arthritis Mother     Lung Cancer Mother     Hypertension Father     Elevated Lipids Father     Diabetes Father     Alcohol Abuse Father     Asthma Father     Arthritis Father     Diabetes Paternal Grandfather     Arthritis Paternal Grandfather     Diabetes Maternal Grandmother     Arthritis Maternal Grandmother           SOCIAL HISTORY       Social History     Socioeconomic History    Marital status:      Spouse name: Janey Merino    Number of children: 0    Years of education: 16    Highest education level: None   Tobacco Use    Smoking status: Never    Smokeless tobacco: Never   Vaping Use    Vaping Use: Never used   Substance and Sexual Activity    Alcohol use: Yes     Comment: 1 drink per week    Drug use:  Yes Types: Marijuana (Weed)     Comment: occ    Sexual activity: Yes     Partners: Male     Social Determinants of Health     Physical Activity: Inactive    Days of Exercise per Week: 0 days    Minutes of Exercise per Session: 0 min   Intimate Partner Violence: Not At Risk    Fear of Current or Ex-Partner: No    Emotionally Abused: No    Physically Abused: No    Sexually Abused: No       SCREENINGS    Ramona Coma Scale  Eye Opening: Spontaneous  Best Verbal Response: Oriented  Best Motor Response: Obeys commands  Ramona Coma Scale Score: 15          PHYSICAL EXAM    (up to 7 for level 4, 8 or more for level 5)     ED Triage Vitals [10/21/22 0040]   BP Temp Temp src Heart Rate Resp SpO2 Height Weight   (!) 132/91 -- -- 80 17 98 % 5' 6\" (1.676 m) 270 lb (122.5 kg)       Physical Exam      General appearance:  Cooperative. No acute distress. Skin:  Warm. Dry. Ears, nose, mouth and throat:  Oral mucosa moist, mild slight facial swelling in the left nasolabial fold without overlying erythema. Mild tenderness to palpation just proximal to the left upper canine tooth with slight erythema but no fluctuance or mass. No trismus or malocclusion. Floor of mouth soft. Perfusion:  intact  Respiratory: Respirations nonlabored. Neurological:  Alert. Moves all extremities spontaneously  Musculoskeletal:   Normal ROM, no deformities          Psychiatric:  Normal mood      DIAGNOSTIC RESULTS       Labs Reviewed - No data to display    Interpretation per the Radiologist below, if obtained/available at the time of this note:    No orders to display       All other labs/imaging were within normal range or not returned as of this dictation. EMERGENCY DEPARTMENT COURSE and DIFFERENTIAL DIAGNOSIS/MDM:   Vitals:    Vitals:    10/21/22 0040   BP: (!) 132/91   Pulse: 80   Resp: 17   SpO2: 98%   Weight: 270 lb (122.5 kg)   Height: 5' 6\" (1.676 m)       Patient presents today with dental pain. Early canine space infection. Should do well as an outpatient. Will start on penicillin. Notes Keflex allergy just causes nausea and vomiting. Not a true allergy. She do well with penicillin and already has dental follow-up. Coretta Wild M.D., am the primary clinician of record. MDM      CONSULTS     None    Critical Care:   None    REASSESSMENT          PROCEDURE     Unless otherwise noted below, none     Procedures      FINAL IMPRESSION      1. Pain, dental            DISPOSITION/PLAN   DISPOSITION Decision To Discharge 10/21/2022 12:46:12 AM        PATIENT REFERRED TO:  [facility]    Schedule an appointment as soon as possible for a visit       DISCHARGE MEDICATIONS:  Discharge Medication List as of 10/21/2022 12:49 AM        START taking these medications    Details   penicillin v potassium (VEETID) 500 MG tablet Take 1 tablet by mouth 4 times daily for 10 days, Disp-40 tablet, R-0Normal           Controlled Substances Monitoring:     RX Monitoring 10/21/2016   Periodic Controlled Substance Monitoring No signs of potential drug abuse or diversion identified.        (Please note that portions of this note were completed with a voice recognition program.  Efforts were made to edit the dictations but occasionally words are mis-transcribed.)    José Miguel Reece MD (electronically signed)  Attending Emergency Physician            Earl Peterson MD  10/21/22 5760

## 2022-10-24 ENCOUNTER — OFFICE VISIT (OUTPATIENT)
Dept: ORTHOPEDIC SURGERY | Age: 37
End: 2022-10-24
Payer: MEDICAID

## 2022-10-24 DIAGNOSIS — M51.27 HERNIATION OF INTERVERTEBRAL DISC BETWEEN L5 AND S1: ICD-10-CM

## 2022-10-24 DIAGNOSIS — R20.2 PARESTHESIA OF BOTH LOWER EXTREMITIES: ICD-10-CM

## 2022-10-24 DIAGNOSIS — M51.26 HERNIATION OF INTERVERTEBRAL DISC BETWEEN L4 AND L5: ICD-10-CM

## 2022-10-24 DIAGNOSIS — Z86.39 HISTORY OF DIABETES MELLITUS: ICD-10-CM

## 2022-10-24 PROCEDURE — G8428 CUR MEDS NOT DOCUMENT: HCPCS | Performed by: INTERNAL MEDICINE

## 2022-10-24 PROCEDURE — G8417 CALC BMI ABV UP PARAM F/U: HCPCS | Performed by: INTERNAL MEDICINE

## 2022-10-24 PROCEDURE — 99214 OFFICE O/P EST MOD 30 MIN: CPT | Performed by: INTERNAL MEDICINE

## 2022-10-24 PROCEDURE — 1111F DSCHRG MED/CURRENT MED MERGE: CPT | Performed by: INTERNAL MEDICINE

## 2022-10-24 PROCEDURE — G8484 FLU IMMUNIZE NO ADMIN: HCPCS | Performed by: INTERNAL MEDICINE

## 2022-10-24 PROCEDURE — 1036F TOBACCO NON-USER: CPT | Performed by: INTERNAL MEDICINE

## 2022-10-24 RX ORDER — GABAPENTIN 300 MG/1
300 CAPSULE ORAL 2 TIMES DAILY
Qty: 60 CAPSULE | Refills: 1 | Status: SHIPPED | OUTPATIENT
Start: 2022-10-24 | End: 2022-12-23

## 2022-10-24 NOTE — PROGRESS NOTES
Chief Complaint:   Chief Complaint   Patient presents with    Lower Back Pain          History of Present Illness:       Patient is a 30 y.o. female returns follow up for the above complaint. The patient was last seen approximately 5 weeksago. The symptoms are improving since the last visit. The patient has had further testing for the problem. She has noted some mild therapeutic benefit with a trial of gabapentin    EMG completed in the interim    Fair response to the trial of  to Celebrex and gabapentin . Back:Bilateralleg pain 50:50 . Pain leg is burning in quality. The symptoms do not follow a discogenic provocative pattern. Pain levels:6.    The patient denies new onset or progressive weakness of the lower extremities. The patient denies now onset bowel or bladder function. She continues on medical pain management as per previous  inclusive of NSAID-Celebrex, gabapentin      EMG completed in the interim and lower extremity venous Doppler completed in the interim  Lower extremity venous Doppler - 10/10/2022 bilateral lower extremities   Past Medical History:        Past Medical History:   Diagnosis Date    Acid reflux     Asthma     as child    Back pain     Bipolar 1 disorder (HCC)     Degenerative disc disease, lumbar     Depression     Diabetes mellitus (HCC)     Fibromyalgia     Liver disease     fatty liver    Obesity     OCD (obsessive compulsive disorder)     PCOS (polycystic ovarian syndrome)     Sleep apnea     no longer uses cpap after weight loss        Present Medications:         Current Outpatient Medications   Medication Sig Dispense Refill    gabapentin (NEURONTIN) 300 MG capsule Take 1 capsule by mouth in the morning and at bedtime for 60 days.  Intended supply: 30 days 60 capsule 1    penicillin v potassium (VEETID) 500 MG tablet Take 1 tablet by mouth 4 times daily for 10 days 40 tablet 0    lurasidone (LATUDA) 20 MG TABS tablet Take 20 mg by mouth at bedtime      potassium chloride (KLOR-CON M) 20 MEQ extended release tablet Take 20 mEq by mouth 2 times daily      rOPINIRole (REQUIP) 0.25 MG tablet Take 0.75 mg by mouth nightly      OZEMPIC, 0.25 OR 0.5 MG/DOSE, 2 MG/1.5ML SOPN Inject into the skin Per package directions      celecoxib (CELEBREX) 100 MG capsule Take 1 capsule by mouth daily For 2 weeks then daily as needed thereafter 30 capsule 1    omeprazole (PRILOSEC) 40 MG delayed release capsule Take 40 mg by mouth daily      DULoxetine (CYMBALTA) 60 MG extended release capsule TAKE 1 CAPSULE BY ORAL ROUTE  EVERY DAY      nystatin (MYCOSTATIN) 084071 UNIT/GM powder as needed       lamoTRIgine (LAMICTAL) 25 MG tablet Take 25 mg by mouth daily      medical marijuana Take by mouth as needed. loratadine (CLARITIN) 10 MG tablet Take 10 mg by mouth daily      Multiple Vitamins-Minerals (BARIATRIC FUSION) CHEW Take 2 tablets by mouth daily       levothyroxine (SYNTHROID) 75 MCG tablet TAKE 1 TABLET BY MOUTH EVERY DAY  5    Cholecalciferol (VITAMIN D PO) Take by mouth daily        No current facility-administered medications for this visit. Allergies: Allergies   Allergen Reactions    Latex Itching and Rash    Lactose      \"Extreme\" stomach pain and diarrhea    Adhesive Tape Other (See Comments)     Rips off skin    Keflex [Cephalexin] Nausea And Vomiting     Projectile vomitting           Review of Systems:    Pertinent items are noted in HPI        Vital Signs: There were no vitals filed for this visit.      General Exam:     Constitutional: Patient is adequately groomed with no evidence of malnutrition    Physical Exam:  Lumbar spine / back      Primary Exam:    Inspection: No deformity atrophy appreciable curvature      Palpation: No focal trigger point tenderness      Range of Motion: 45/10 pain with extension greater than flexion      Strength: Normal lower extremity      Special Tests: Negative SLR      Skin: There are no rashes, ulcerations or lesions. Gait: Nonantalgic     Neurovascular - non focal and intact       Additional Comments:        Additional Examinations:                  Office Imaging Results/Procedures PerformedToday:           Office Procedures:   No orders of the defined types were placed in this encounter. Other Outside Imaging and Testing Personally Reviewed:    PATIENT:  La Nena Magallanes        DATE OF EMG:  10/20/22      YOB: 1985        REASON FOR EMG:   Low back pain and bilateral leg pain        REFERRING PHYSICIAN:  MD Cabrera Mendoza [address] [facility] [address]      SUMMARY:   Bilateral peroneal motor nerve studies were normal.  Bilateral posterior tibial motor nerve studies were normal.  Bilateral sural sensory nerve studies were normal.  Needle EMG of several muscles in both lower extremities was normal.  Needle EMG of bilateral lumbar paraspinal muscles was normal.        CLINICAL DIAGNOSIS:  Neuropathy versus radiculopathy           EMG RESULTS:   This is a normal EMG and nerve conduction study of both lower extremities. There is no electrophysiological evidence for large fiber peripheral neuropathy or lumbar radiculopathy in this study. ---------------------------------------------  Abiodun Miles M.D. Electromyographer / Neurologist           Assessment   Impression: . Encounter Diagnoses   Name Primary?     Paresthesia of both lower extremities     Herniation of intervertebral disc between L5 and S1     Herniation of intervertebral disc between L4 and L5     History of diabetes mellitus         Rule out small fiber peripheral neuropathy      Plan:     Titrate gabapentin to 600 mg divided dosing caution relative to potential for interaction with Lamictal  Continue/progress PT lumbar spine and eventual transition to I HEP  Clinical evaluation with neurology specialist as scheduled for March 2023  Continue diabetes management  No indication for spine intervention at this time    Overall I do not believe the lower limb pain is of radicular etiology there is no evidence of large fiber peripheral neuropathy proceed as outlined above    Approximately 30 minutes was spent related to previewing pertinent medical documentation prior to the patient's visit along with counseling during the patient's visit with respect to treatment options inclusive of alternatives to treatment and the complications and risks related to those treatment options along with expectations of outcome related to those treatments and inclusive of time in the documentation and ordering of testing and treatment after the visit. Orders:      No orders of the defined types were placed in this encounter. Lopez Emery MD.      Disclaimer: \"This note was dictated with voice recognition software. Though review and correction are routine, we apologize for any errors. \"

## 2022-10-27 RX ORDER — CELECOXIB 100 MG/1
100 CAPSULE ORAL DAILY PRN
Qty: 30 CAPSULE | Refills: 1 | Status: SHIPPED | OUTPATIENT
Start: 2022-10-27 | End: 2022-12-14

## 2022-11-02 ENCOUNTER — TELEPHONE (OUTPATIENT)
Dept: ORTHOPEDIC SURGERY | Age: 37
End: 2022-11-02

## 2022-11-02 NOTE — TELEPHONE ENCOUNTER
E-mailed the billing request to [facility] from Trev Mota, [address]

## 2022-11-08 ENCOUNTER — PATIENT MESSAGE (OUTPATIENT)
Dept: NEUROLOGY | Age: 37
End: 2022-11-08

## 2022-11-08 NOTE — TELEPHONE ENCOUNTER
From: Chris Ni  To: Dr. Scott Bold: 11/8/2022 3:32 PM EST  Subject: Concern of Ms like symptoms worsening while waiting for referal     I've been sick for a few weeks, horrible headaches, tremors, dizziness, mild fevers, some vomiting. Worried it's related to my fear of ms, not sure what to do since the neurologist won't see me till March. Should I try to get my primary to do a new brain scan to rule out lesions causing it?

## 2022-11-13 ENCOUNTER — TELEPHONE (OUTPATIENT)
Dept: ORTHOPEDIC SURGERY | Age: 37
End: 2022-11-13

## 2022-12-07 ENCOUNTER — OFFICE VISIT (OUTPATIENT)
Dept: ORTHOPEDIC SURGERY | Age: 37
End: 2022-12-07

## 2022-12-07 VITALS — BODY MASS INDEX: 43.4 KG/M2 | WEIGHT: 270.06 LBS | HEIGHT: 66 IN

## 2022-12-07 DIAGNOSIS — G56.21 NEURITIS OF RIGHT ULNAR NERVE: ICD-10-CM

## 2022-12-07 DIAGNOSIS — Z98.890 HISTORY OF CARPAL TUNNEL RELEASE OF BOTH WRISTS: ICD-10-CM

## 2022-12-07 DIAGNOSIS — G56.22 NEURITIS OF LEFT ULNAR NERVE: ICD-10-CM

## 2022-12-07 DIAGNOSIS — R94.131 ABNORMAL ELECTROMYOGRAM (EMG): ICD-10-CM

## 2022-12-07 SDOH — HEALTH STABILITY: PHYSICAL HEALTH: ON AVERAGE, HOW MANY MINUTES DO YOU ENGAGE IN EXERCISE AT THIS LEVEL?: 0 MIN

## 2022-12-07 SDOH — HEALTH STABILITY: PHYSICAL HEALTH: ON AVERAGE, HOW MANY DAYS PER WEEK DO YOU ENGAGE IN MODERATE TO STRENUOUS EXERCISE (LIKE A BRISK WALK)?: 0 DAYS

## 2022-12-07 NOTE — PROGRESS NOTES
Chief Complaint:   Chief Complaint   Patient presents with    Elbow Pain     Bilaeral elbow pain, pt states she has had this going on for what feels like years. Pain that starts at elbow and runs down to bilateral wrist and to the 4th and 5th digit. Pain in arms and neck is burning. Increased pain with using a computer, laying on elbows, and arms and elbows in flexion. History of Present Illness:       Patient is a 30 y.o. female presents with the above complaint. The symptoms began  several months  ago and started without an injury. The patient describes a sharp, burning pain that does radiate. The symptoms are constant  and are are worsening since the onset. Pain localizes ulnar hand and is not asociated with mechanical symptoms. The patient denies subjective instability and denies new onset or progressive weakness of the hand. Pain level 7    The patient denies a pattern of activity related swelling. Treatment to date: none. There is no prior history of hand trauma. Past history significant for bilateral carpal tunnel syndrome status post carpal tunnel release 8/23/2021 and 9/10/2021 respectively. Prior work-up is included EMG of the bilateral lower extremities as outlined below    There is no prior history of autoimmune disease, inflammatory arthropathy, or crystal arthropathy.       Past Medical History:        Past Medical History:   Diagnosis Date    Acid reflux     Asthma     as child    Back pain     Bipolar 1 disorder ([facility] [address]     Degenerative disc disease, lumbar     Depression     Diabetes mellitus (HCC)     Fibromyalgia     Liver disease     fatty liver    Obesity     OCD (obsessive compulsive disorder)     PCOS (polycystic ovarian syndrome)     Sleep apnea     no longer uses cpap after weight loss         Past Surgical History:   Procedure Laterality Date    ANKLE SURGERY Left     Scar tissue removal    CARPAL TUNNEL RELEASE Right 08/23/2021    RIGHT CARPAL TUNNEL RELEASE performed by Aretha Parker MD at [address] Left 09/10/2021    LEFT CARPAL TUNNEL RELEASE performed by Aretha Parker MD at [address] [facility], [address] N/A 04/20/2020    LAPAROSCOPIC CHOLECYSTECTOMY performed by Cherylene Glee, DO at [address] [facility], [address] ([address] [facility])      OTHER SURGICAL HISTORY Left 12/10/2014    Excision of Cyst Left Upper Posterior Ankle    OTHER SURGICAL HISTORY Right 12/29/2016    Laparotomy with Right SO    OVARY REMOVAL  12/28/2016    unsure of side    SHOULDER ARTHROSCOPY Left 02/16/2022    VIDEO ARTHROSCOPY LEFT SHOULDER, SUBACROMIAL DECOMPRESSION WITH ACROMIOPLASTY AND DISTAL CLAVICLE RESECTION -SLEEP APNEA performed by Jose Alcantar MD at [address] [facility] N/A 11/06/2019    LAPAROSCOPIC SLEEVE GASTRECTOMY - ETHICON performed by José Antonio Flores MD at [address] [facility] Left 07/02/2021    LEFT QUADRICEP PERCUTANEOUS TENOTOMY , TX2 MICROTIP WITH ULTRASOUND performed by Rekha Love MD at [place] [address] N/A 08/23/2019    EGD BIOPSY performed by José Antonio Flores MD at [address] [facility] N/A 03/10/2021    EGD BIOPSY performed by Miguel Roper MD at [address] [facility]           Present Medications:         Current Outpatient Medications   Medication Sig Dispense Refill    celecoxib (CELEBREX) 100 MG capsule Take 1 capsule by mouth daily as needed for Pain 30 capsule 1    gabapentin (NEURONTIN) 300 MG capsule Take 1 capsule by mouth in the morning and at bedtime for 60 days.  Intended supply: 30 days 60 capsule 1    lurasidone (LATUDA) 20 MG TABS tablet Take 20 mg by mouth at bedtime      potassium chloride (KLOR-CON M) 20 MEQ extended release tablet Take 20 mEq by mouth 2 times daily      rOPINIRole (REQUIP) 0.25 MG tablet Take 0.75 mg by mouth nightly OZEMPIC, 0.25 OR 0.5 MG/DOSE, 2 MG/1.5ML SOPN Inject into the skin Per package directions      omeprazole (PRILOSEC) 40 MG delayed release capsule Take 40 mg by mouth daily      DULoxetine (CYMBALTA) 60 MG extended release capsule TAKE 1 CAPSULE BY ORAL ROUTE  EVERY DAY      nystatin (MYCOSTATIN) 116661 UNIT/GM powder as needed       lamoTRIgine (LAMICTAL) 25 MG tablet Take 25 mg by mouth daily      medical marijuana Take by mouth as needed. loratadine (CLARITIN) 10 MG tablet Take 10 mg by mouth daily      Multiple Vitamins-Minerals (BARIATRIC FUSION) CHEW Take 2 tablets by mouth daily       levothyroxine (SYNTHROID) 75 MCG tablet TAKE 1 TABLET BY MOUTH EVERY DAY  5    Cholecalciferol (VITAMIN D PO) Take by mouth daily        No current facility-administered medications for this visit. Allergies:         Allergies   Allergen Reactions    Latex Itching and Rash    Lactose      \"Extreme\" stomach pain and diarrhea    Adhesive Tape Other (See Comments)     Rips off skin    Keflex [Cephalexin] Nausea And Vomiting     Projectile vomitting        Social History:         Social History     Socioeconomic History    Marital status:      Spouse name: Ridge Mean    Number of children: 0    Years of education: 16    Highest education level: Not on file   Occupational History    Not on file   Tobacco Use    Smoking status: Never    Smokeless tobacco: Never   Vaping Use    Vaping Use: Never used   Substance and Sexual Activity    Alcohol use: Yes     Comment: 1 drink per week    Drug use: Yes     Types: Marijuana Rodrigue Bautista     Comment: occ    Sexual activity: Yes     Partners: Male   Other Topics Concern    Not on file   Social History Narrative    Not on file     Social Determinants of Health     Financial Resource Strain: Not on file   Food Insecurity: Not on file   Transportation Needs: Not on file   Physical Activity: Inactive    Days of Exercise per Week: 0 days    Minutes of Exercise per Session: 0 min Stress: Not on file   Social Connections: Not on file   Intimate Partner Violence: Not At Risk    Fear of Current or Ex-Partner: No    Emotionally Abused: No    Physically Abused: No    Sexually Abused: No   Housing Stability: Not on file        Review of Symptoms:    Pertinent items are noted in HPI  10 point review of systems negative except as mentioned in HPI      Vital Signs: There were no vitals filed for this visit. General Exam:     Constitutional: Patient is adequately groomed with no evidence of malnutrition  Mental Status: The patient is oriented to time, place and person. The patient's mood and affect are appropriate. Vascular: Examination reveals no swelling or calf tenderness. Peripheral pulses are palpable and 2+. Lymphatics: no lymphadenopathy of the cervical or axillary regions or upper extremity      Physical Exam: bilateral elbow and bilateral hand      Primary Exam:    Inspection: No deformity atrophy appreciable effusion      Palpation: No focal tenderness      Range of Motion: Full range and symmetric at the elbows and wrist      Strength: Normal ulnar nerve distribution and median nerve distribution bilateral hands      Special Tests: Tinel's mildly positive at the elbow left greater than right negative at the wrist bilaterally      Skin: There are no rashes, ulcerations or lesions. Gait: Nonantalgic      Reflex nonantalgic     Additional Comments:        Additional Examinations:           Neck: Examination of the neck does not show any tenderness, deformity or injury. Range of motion is unremarkable. There is no gross instability. There are no rashes, ulcerations or lesions. Strength and tone are normal.  Neurologic -Light touch sensation and manual muscle testing is normal C5-C8 . Biceps and triceps reflexes are symmetric and +2.  Spurlling sign is negative         Office Imaging Results/Procedures PerformedToday:             Office Procedures:     Orders Placed This Encounter   Procedures    B[employer] Issa Heel and Elbow Protector $15     Patient was supplied a [employer] Issa Heel and Elbow Protector. This retail item was supplied to provide functional support and assist in protecting the affected area. Verbal and written instructions for the use of and application of this item were provided. The patient was educated and fit by a healthcare professional with expert knowledge and specialization in brace application. They were instructed to contact the office immediately should the equipment result in increased pain, decreased sensation, increased swelling or worsening of the condition. Other Outside Imaging and Testing Personally Reviewed:    Test Date:  2021     Patient: Travis Rice : 1985 Physician: Bernardo Ng DO   Sex: Female ID#:   Ref Phys: Lili Landeros MD       Patient Complaints:  Patient is a 31year-old female who presents with numbness tingling in the right hand Patient had right carpal tunnel surgery  and left in . Patient History / Exam:  PMH: + type 2 diabetes, PCOS + degenerative disk disease. PE:  reflexes trace, + thumb opposition weakness      NCV & EMG Findings:  Evaluation of the left median (APB) motor nerve showed prolonged distal onset latency (4.3 ms). The left median sensory, the right median sensory, the left ulnar sensory, and the right ulnar sensory nerves showed prolonged distal peak latency (L4.4, R4.1, L4.0, R3.9 ms) and decreased conduction velocity (L32, R34, L35, R36 m/s). All remaining nerves (as indicated in the following tables) were within normal limits. All examined muscles (as indicated in the following table) showed no evidence of electrical instability. Impression:  study is consistent with residual slowing of median sensory distal latencies post carpal tunnel release.  Prolonged ulnar sensory responses suggest underlying mild peripheral neuropathy, most likely secondary to diabetes. Thank you       Test Date:  2021     Patient: Flower Langley : 1985 Physician: Cayla Bailey DO   Sex: Female ID#:   Ref Phys: MIRA Jarrell      Patient Complaints:  Patient is a 37year-old female who presents with numbness tingling in the right suzanne body right arm alexus hand. Worse over past months. Patient History / Exam:  PMH: + djd, no neck or arm surgery  + type 2 diabetes. + PCOS. PE: reflexes trace, normal strength      NCV & EMG Findings:  Evaluation of the left median (APB) motor nerve showed prolonged distal onset latency (4.3 ms). The left median sensory nerve showed prolonged distal peak latency (3.7 ms) and decreased conduction velocity (38 m/s). The right median sensory nerve showed prolonged distal peak latency (3.9 ms), reduced amplitude (8 µV), and decreased conduction velocity (36 m/s). All remaining nerves (as indicated in the following tables) were within normal limits. All examined muscles (as indicated in the following table) showed no evidence of electrical instability. Impression:  Study is consistent with mild bilateral carpal tunnel syndrome. no evidence of an acute radiculopathy or other entrapment neuropathy. Thank you. Cayla Bailey DO    Collected: 22 [id number]   Result status: Final   Resulting lab: 0 [facility]   Reference range: See comment %   Value: 5.3   Comment: Comment:   Diagnosis of Diabetes: > or = 6.5%   Increased risk of diabetes (Prediabetes): 5.7-6.4%   Glycemic Control: Nonpregnant Adults: <7.0%                     Pregnant: <6.0%      *Additional information available - comment         Assessment   Impression: . Encounter Diagnoses   Name Primary?     Neuritis of right ulnar nerve     Neuritis of left ulnar nerve     Abnormal electromyogram (EMG)     History of carpal tunnel release of both wrists               Plan:     Continue medical pain management with gabapentin caution relative to potential for drug interaction with Topamax  Heelbo immobilization and ulnar nerve glide exercises  Consider her candidate for ulnar nerve hydrodissection at the elbow as needed    Based on her clinical examination I believe the neuropathy is more than likely related mechanical compression/entrapment. Proceed as outlined above      The nature of the finding, probable diagnosis and likely treatment was thoroughly discussed with the patient. The options, risks, complications, alternative treatment as well as some of the differential diagnosis was discussed. The patient was thoroughly informed and all questions were answered. the patient indicated understanding and satisfaction with the discussion. Orders:        Orders Placed This Encounter   Procedures    Bird and Issa Heel and Elbow Protector $15     Patient was supplied a Bird and Issa Heel and Elbow Protector. This retail item was supplied to provide functional support and assist in protecting the affected area. Verbal and written instructions for the use of and application of this item were provided. The patient was educated and fit by a healthcare professional with expert knowledge and specialization in brace application. They were instructed to contact the office immediately should the equipment result in increased pain, decreased sensation, increased swelling or worsening of the condition. Disclaimer: \"This note was dictated with voice recognition software. Though review and correction are routine, we apologize for any errors. \"

## 2022-12-14 RX ORDER — CELECOXIB 100 MG/1
100 CAPSULE ORAL DAILY PRN
Qty: 30 CAPSULE | Refills: 1 | Status: SHIPPED | OUTPATIENT
Start: 2022-12-14

## 2022-12-14 RX ORDER — GABAPENTIN 300 MG/1
300 CAPSULE ORAL 2 TIMES DAILY
Qty: 60 CAPSULE | Refills: 1 | Status: SHIPPED | OUTPATIENT
Start: 2022-12-14 | End: 2023-02-12

## 2023-01-12 ENCOUNTER — OFFICE VISIT (OUTPATIENT)
Dept: ORTHOPEDIC SURGERY | Age: 38
End: 2023-01-12

## 2023-01-12 VITALS — BODY MASS INDEX: 43.4 KG/M2 | WEIGHT: 270.06 LBS | HEIGHT: 66 IN

## 2023-01-12 DIAGNOSIS — Q68.8 OS TRIGONUM SYNDROME: ICD-10-CM

## 2023-01-12 DIAGNOSIS — G89.29 CHRONIC PAIN OF RIGHT ANKLE: Primary | ICD-10-CM

## 2023-01-12 DIAGNOSIS — M25.571 CHRONIC PAIN OF RIGHT ANKLE: Primary | ICD-10-CM

## 2023-01-12 DIAGNOSIS — R94.131 ABNORMAL ELECTROMYOGRAM (EMG): ICD-10-CM

## 2023-01-12 DIAGNOSIS — G56.22 NEURITIS OF LEFT ULNAR NERVE: ICD-10-CM

## 2023-01-12 DIAGNOSIS — Z98.890 HISTORY OF CARPAL TUNNEL RELEASE OF BOTH WRISTS: ICD-10-CM

## 2023-01-12 NOTE — PROGRESS NOTES
Chief Complaint:   Chief Complaint   Patient presents with    Elbow Pain     bilat, L>R, pain at elbow still shooting down forearm, pain at ulnar side of wrist too, alexus with WB on L hand, digits 4-5 will sometimes cramp and lock up, would like to proceed with ulnar nerve hydro    Ankle Pain     right, pain progressively worsening, NKI, began as popping, then becoming painful and feeling unstable. History of Present Illness:       Patient is a 30 y.o. female presents with the above complaint. The symptoms began 2 monthsago and started without an injury. The patient describes a sharp, aching pain that does not radiate. The symptoms are intermittent  and are are worsening since the onset. Pain localizes to the Lateral hindfoot/posterior ankle and  is not predictably aggravated by weightbearing. There are not mechanical symptoms associated with the symptoms. There are notneuritic symptoms involving the foot or ankle. The patient admits to subjective instability about the foot or ankle and admits to new onset or progressive weakness of the lower extremity. Pain level 5    The patient denies a pattern of activity related swelling. Treatment to date:Bracing OTC    There is no no prior history of foot or ankle trauma. There is no prior history of autoimmune disease, crystal arthropathy, or crystal arthropathy.      With respect to the right upper extremity she continues to experience neuritic symptoms the following ulnar nerve distribution in the hand we have previously discussed ultrasound-guided nerve hydrodissection of the ulnar nerve at the elbow as a treatment option she would like to proceed with this today     Past Medical History:        Past Medical History:   Diagnosis Date    Acid reflux     Asthma     as child    Back pain     Bipolar 1 disorder ([facility]     Degenerative disc disease, lumbar     Depression     Diabetes mellitus ([facility] [address]     Fibromyalgia     Liver disease     fatty liver Obesity     OCD (obsessive compulsive disorder)     PCOS (polycystic ovarian syndrome)     Sleep apnea     no longer uses cpap after weight loss         Past Surgical History:   Procedure Laterality Date    ANKLE SURGERY Left     Scar tissue removal    CARPAL TUNNEL RELEASE Right 08/23/2021    RIGHT CARPAL TUNNEL RELEASE performed by Ana Irby MD at [address] Left 09/10/2021    LEFT CARPAL TUNNEL RELEASE performed by Ana Irby MD at [address] [facility] [address] N/A 04/20/2020    LAPAROSCOPIC CHOLECYSTECTOMY performed by Enrique Malone DO at [address] [facility], [facility] ([facility])      OTHER SURGICAL HISTORY Left 12/10/2014    Excision of Cyst Left Upper Posterior Ankle    OTHER SURGICAL HISTORY Right 12/29/2016    Laparotomy with Right SO    OVARY REMOVAL  12/28/2016    unsure of side    SHOULDER ARTHROSCOPY Left 02/16/2022    VIDEO ARTHROSCOPY LEFT SHOULDER, SUBACROMIAL DECOMPRESSION WITH ACROMIOPLASTY AND DISTAL CLAVICLE RESECTION -SLEEP APNEA performed by Hansel Jason MD at [address] [facility] N/A 11/06/2019    LAPAROSCOPIC SLEEVE GASTRECTOMY - ETHICON performed by Yun Farfan MD at [address] [facility] Left 07/02/2021    LEFT QUADRICEP PERCUTANEOUS TENOTOMY , TX2 MICROTIP WITH ULTRASOUND performed by Matt Adam MD at [facility] N/A 08/23/2019    EGD BIOPSY performed by Yun Farfan MD at [facility] N/A 03/10/2021    EGD BIOPSY performed by Moises Higuera MD at [address] [facility]           Present Medications:         Current Outpatient Medications   Medication Sig Dispense Refill    gabapentin (NEURONTIN) 300 MG capsule TAKE 1 CAPSULE BY MOUTH IN THE MORNING AND AT BEDTIME FOR 60 DAYS.  INTENDED SUPPLY: 30 DAYS 60 capsule 1    celecoxib (CELEBREX) 100 MG capsule TAKE 1 CAPSULE BY MOUTH DAILY AS NEEDED FOR PAIN 30 capsule 1    lurasidone (LATUDA) 20 MG TABS tablet Take 20 mg by mouth at bedtime      potassium chloride (KLOR-CON M) 20 MEQ extended release tablet Take 20 mEq by mouth 2 times daily      rOPINIRole (REQUIP) 0.25 MG tablet Take 0.75 mg by mouth nightly      OZEMPIC, 0.25 OR 0.5 MG/DOSE, 2 MG/1.5ML SOPN Inject into the skin Per package directions      omeprazole (PRILOSEC) 40 MG delayed release capsule Take 40 mg by mouth daily      DULoxetine (CYMBALTA) 60 MG extended release capsule TAKE 1 CAPSULE BY ORAL ROUTE  EVERY DAY      nystatin (MYCOSTATIN) 393114 UNIT/GM powder as needed       lamoTRIgine (LAMICTAL) 25 MG tablet Take 25 mg by mouth daily      medical marijuana Take by mouth as needed. loratadine (CLARITIN) 10 MG tablet Take 10 mg by mouth daily      Multiple Vitamins-Minerals (BARIATRIC FUSION) CHEW Take 2 tablets by mouth daily       levothyroxine (SYNTHROID) 75 MCG tablet TAKE 1 TABLET BY MOUTH EVERY DAY  5    Cholecalciferol (VITAMIN D PO) Take by mouth daily        No current facility-administered medications for this visit. Allergies:         Allergies   Allergen Reactions    Latex Itching and Rash    Lactose      \"Extreme\" stomach pain and diarrhea    Adhesive Tape Other (See Comments)     Rips off skin    Keflex [Cephalexin] Nausea And Vomiting     Projectile vomitting        Social History:         Social History     Socioeconomic History    Marital status:      Spouse name: Kaycee Gallagher    Number of children: 0    Years of education: 16    Highest education level: Not on file   Occupational History    Not on file   Tobacco Use    Smoking status: Never    Smokeless tobacco: Never   Vaping Use    Vaping Use: Never used   Substance and Sexual Activity    Alcohol use: Yes     Comment: 1 drink per week    Drug use: Yes     Types: Marijuana [place])     Comment: occ    Sexual activity: Yes     Partners: Male   Other Topics Concern    Not on file   Social History Narrative    Not on file     Social Determinants of Health     Financial Resource Strain: Not on file   Food Insecurity: Not on file   Transportation Needs: Not on file   Physical Activity: Inactive    Days of Exercise per Week: 0 days    Minutes of Exercise per Session: 0 min   Stress: Not on file   Social Connections: Not on file   Intimate Partner Violence: Not At Risk    Fear of Current or Ex-Partner: No    Emotionally Abused: No    Physically Abused: No    Sexually Abused: No   Housing Stability: Not on file        Review of Symptoms:    Pertinent items are noted in HPI    Review of systems reviewed from Patient History Form dated on    and   available in the patient's chart under the Media tab. Vital Signs: There were no vitals filed for this visit. General Exam:     Constitutional: Patient is adequately groomed with no evidence of malnutrition  Mental Status: The patient is oriented to time, place and person. The patient's mood and affect are appropriate. Vascular: Examination reveals no swelling or calf tenderness. Peripheral pulses are palpable and 2+. Lymphatics: no lymphadenopathy of the inguinal region or lower extremity      Physical Exam: right ankle      Primary Exam:    Inspection: No deformity atrophy appreciable effusion      Palpation: Mild tenderness over the lateral malleolar region and posterior compartment of the ankle      Range of Motion: Dorsiflexion 2 degrees bilaterally      Strength: Normal to ankle      Special Tests: Anterior talar tilt stable, mild discomfort with passive hyper plantarflexion, single heel rise low-grade discomfort      Skin: There are no rashes, ulcerations or lesions. Gait: Nonantalgic      Reflex intact lower     Additional Comments:        Additional Examinations:          Neurolgic -Light touch sensation and manual muscle testing normal L2-S1. No fasiculations.  Pattella tendon and Achilles tendon reflexes +2 bilaterally. Seated SLR negative           Office Imaging Results/Procedures PerformedToday:      Radiology:      X-rays obtained and reviewed in office:   Views 3 views ankle right   Location right ankle   Impression ankle mortise is anatomic no evidence of discrete fracture dental note of os trigonum. Posterior and plantar calcaneal spurs. Subtalar joint has a normal radiographic appearance. Small sized enthesophyte medial malleolus may suggest prior deltoid ligament injury       Office Procedures:     Orders Placed This Encounter   Procedures    XR ANKLE RIGHT (MIN 3 VIEWS)     Standing Status:   Future     Number of Occurrences:   1     Standing Expiration Date:   1/12/2024     Order Specific Question:   Reason for exam:     Answer:   pain    US GUIDED NEEDLE PLACEMENT     Standing Status:   Future     Number of Occurrences:   1     Standing Expiration Date:   1/12/2024     Order Specific Question:   Reason for exam:     Answer:   Ulnar nerve hydro    US EXTREMITY LEFT NON VASC LIMITED     Standing Status:   Future     Number of Occurrences:   1     Standing Expiration Date:   1/12/2024     Order Specific Question:   Reason for exam:     Answer:   dx    MRI ANKLE RIGHT WO CONTRAST     [facility], please contact pt to schedule, [address] will obtain auth and fwd to your facility. Pt advised to f/u in clinic 2-3 days after MRI for results. Standing Status:   Future     Standing Expiration Date:   1/12/2024     Order Specific Question:   Reason for exam:     Answer:   r/o os trigonum impingement, OCD talus     Order Specific Question:   What is the sedation requirement? Answer:   None    DJO Stabilizing Pro Speed Ankle Brace     Patient was prescribed a DJO Stabilizing Pro Speed Ankle Brace. The right ankle will require stabilization / immobilization from this semi-rigid / rigid orthosis to improve their function.   The orthosis will assist in protecting the affected area, provide functional support and facilitate healing. Patient was instructed to progress ambulation weight bearing as tolerated in the device. The patient was educated and fit by a healthcare professional with expert knowledge and specialization in brace application while under the direct supervision of the treating physician. Verbal and written instructions for the use of and application of this item were provided. They were instructed to contact the office immediately should the brace result in increased pain, decreased sensation, increased swelling or worsening of the condition. Ultrasound-guided ulnar nerve Hydro dissection right  Logiq E ultrasound 12 MHz    Patient position supine examination table with the arm positioned overhead. Sterile prep was performed over the posterior medial elbow in the distribution of the ulnar nerve. At the level of the flexor aponeurosis the ulnar nerve was evaluated in short axis using lift technique from distal to proximal.    Topical anesthetic was utilized and with the linear transducer placed in short axis over the nerve at the level of flexor aponeurosis 25-gauge needle was advanced under direct guidance and approximately 0.3 cc 1% lidocaine was injected subcutaneously advancing the needle tip to the lateral aspect of the nerve with careful attention not to violate the nerve. Using exchange of syringe technique the nerve was then hydrodissected circumferentially initially hydrodissected inferiorly and superiorly using a 10-1 mixture of D5W: 1% lidocaine. Patient tolerated this with negligible discomfort a total of 8 cc of injectate was utilized. The ulnar nerve was then evaluated at the level of the cubital tunnel.   Topical anesthetic was utilized 25-gauge needle was advanced using longitudinal technique at approximately 0.5 cc 1% lidocaine was injected subcutaneously advancing the needle to the lateral aspect of the nerve careful attention not to violate the nerve. Using exchange of syringe technique the nerve was then hydrodissected with approximately 7 cc of injectate using a 10-1 mixture of D5W: 1% lidocaine. Post procedure evaluation of the nerve was performed. The nerve was visualized to have been hydrodissected along its length from the flexor aponeurosis to the cubital tunnel    Technically successful ulnar nerve Hydro dissection right elbow  Images stored    Other Outside Imaging and Testing Personally Reviewed:    XR ANKLE RIGHT (MIN 3 VIEWS)    Result Date: 1/12/2023  Radiology exam is complete. No Radiologist dictation. Please follow up with ordering provider. Ulnar nerve Hydro dissection        Assessment   Impression: . Encounter Diagnoses   Name Primary? Chronic pain of right ankle Yes    Os trigonum syndrome     Neuritis of left ulnar nerve     Abnormal electromyogram (EMG)     History of carpal tunnel release of both wrists               Plan:   MRI evaluation right ankle evaluate for stigmata of posterior ankle impingement/os trigonum syndrome evaluate for occult OLT  Post procedure protocol Ace wrap compression about the elbow-continue ulnar nerve glide exercises and Heelbow  Medical pain management as per previous  Clinical follow-up with results of MRI consider ultrasound-guided injection of the os trigonum pending results of MRI  Functional ankle bracing-right ankle     The nature of the finding, probable diagnosis and likely treatment was thoroughly discussed with the patient. The options, risks, complications, alternative treatment as well as some of the differential diagnosis was discussed. The patient was thoroughly informed and all questions were answered. the patient indicated understanding and satisfaction with the discussion.       Orders:        Orders Placed This Encounter   Procedures    XR ANKLE RIGHT (MIN 3 VIEWS)     Standing Status:   Future     Number of Occurrences:   1     Standing Expiration Date:   1/12/2024  Order Specific Question:   Reason for exam:     Answer:   pain    US GUIDED NEEDLE PLACEMENT     Standing Status:   Future     Number of Occurrences:   1     Standing Expiration Date:   1/12/2024     Order Specific Question:   Reason for exam:     Answer:   Ulnar nerve hydro    US EXTREMITY LEFT NON VASC LIMITED     Standing Status:   Future     Number of Occurrences:   1     Standing Expiration Date:   1/12/2024     Order Specific Question:   Reason for exam:     Answer:   dx    MRI ANKLE RIGHT WO CONTRAST     [facility], please contact pt to schedule, [phone]  Meliza will obtain auth and fwd to your facility.  Pt advised to f/u in clinic 2-3 days after MRI for results.     Standing Status:   Future     Standing Expiration Date:   1/12/2024     Order Specific Question:   Reason for exam:     Answer:   r/o os trigonum impingement, OCD talus     Order Specific Question:   What is the sedation requirement?     Answer:   None    DJO Stabilizing Pro Speed Ankle Brace     Patient was prescribed a DJO Stabilizing Pro Speed Ankle Brace. The right ankle will require stabilization / immobilization from this semi-rigid / rigid orthosis to improve their function.  The orthosis will assist in protecting the affected area, provide functional support and facilitate healing.    Patient was instructed to progress ambulation weight bearing as tolerated in the device.     The patient was educated and fit by a healthcare professional with expert knowledge and specialization in brace application while under the direct supervision of the treating physician.  Verbal and written instructions for the use of and application of this item were provided.   They were instructed to contact the office immediately should the brace result in increased pain, decreased sensation, increased swelling or worsening of the condition.           Disclaimer:    \"This note was dictated with voice recognition software. Though review and correction  are routine, we apologize for any errors. \"

## 2023-01-13 RX ORDER — LIDOCAINE HYDROCHLORIDE 10 MG/ML
4 INJECTION, SOLUTION INFILTRATION; PERINEURAL ONCE
Status: SHIPPED | OUTPATIENT
Start: 2023-01-13

## 2023-01-19 ENCOUNTER — OFFICE VISIT (OUTPATIENT)
Dept: ORTHOPEDIC SURGERY | Age: 38
End: 2023-01-19

## 2023-01-19 VITALS — WEIGHT: 270.06 LBS | HEIGHT: 66 IN | BODY MASS INDEX: 43.4 KG/M2

## 2023-01-19 DIAGNOSIS — M25.532 LEFT WRIST PAIN: ICD-10-CM

## 2023-01-19 DIAGNOSIS — S93.401S SPRAIN OF RIGHT ANKLE, UNSPECIFIED LIGAMENT, SEQUELA: ICD-10-CM

## 2023-01-19 DIAGNOSIS — M77.8 FLEXOR CARPI ULNARIS TENDINITIS: ICD-10-CM

## 2023-01-19 DIAGNOSIS — S96.911A SPRAIN AND STRAIN OF RIGHT ANKLE: ICD-10-CM

## 2023-01-19 DIAGNOSIS — S93.401A SPRAIN AND STRAIN OF RIGHT ANKLE: ICD-10-CM

## 2023-01-19 NOTE — PROGRESS NOTES
Chief Complaint:   Chief Complaint   Patient presents with    Ankle Pain     F/U R ankle MRI TR, feels the same as last visit. Elbow Pain     F/U L elbow, feeling a little better and has good and bad days. Wrist Pain     left, pain along volar side of wrist          History of Present Illness:       Patient is a 30 y.o. female returns follow up for the above complaint. The patient was last seen approximately 1 weeksago. The symptoms show no change since the last visit. The patient has had further testing for the problem. MRI completed of the left ankle in the interim    Majority of pain localized to the lateral ankle bandlike distribution. She does not localize pain to the inframalleolar region of the lateral ankle. No mechanical symptoms no subjective instability she continues with functional ankle bracing    With respect to the ulnar nerve hydrodissection performed 1/12/2023 symptoms were substantially improved 24 hours following the procedure but have returned to \"baseline\". Her more recent complaint relates to pain localizing ulnar aspect of the wrist/hypothenar region and is not associated with mechanical symptoms. The patient denies subjective instability about the wrist and denies new onset weakness of the upper extremity. Pain level mild to moderate especially notable with blood pressure to the wrist and hand at this anatomic location    The patient denies a pattern of activity related swelling. Treatment to date: Intermittent use of wrist splint which is mildly effective    There is nono prior history of wrist trauma. There is no prior history of autoimmune disease, inflammatory arthropathy, or crystal arthropathy.       Past Medical History:        Past Medical History:   Diagnosis Date    Acid reflux     Asthma     as child    Back pain     Bipolar 1 disorder ([facility] [address]     Degenerative disc disease, lumbar     Depression     Diabetes mellitus (HCC)     Fibromyalgia     Liver disease     fatty liver    Obesity     OCD (obsessive compulsive disorder)     PCOS (polycystic ovarian syndrome)     Sleep apnea     no longer uses cpap after weight loss        Present Medications:         Current Outpatient Medications   Medication Sig Dispense Refill    gabapentin (NEURONTIN) 300 MG capsule TAKE 1 CAPSULE BY MOUTH IN THE MORNING AND AT BEDTIME FOR 60 DAYS. INTENDED SUPPLY: 30 DAYS 60 capsule 1    celecoxib (CELEBREX) 100 MG capsule TAKE 1 CAPSULE BY MOUTH DAILY AS NEEDED FOR PAIN 30 capsule 1    lurasidone (LATUDA) 20 MG TABS tablet Take 20 mg by mouth at bedtime      potassium chloride (KLOR-CON M) 20 MEQ extended release tablet Take 20 mEq by mouth 2 times daily      rOPINIRole (REQUIP) 0.25 MG tablet Take 0.75 mg by mouth nightly      OZEMPIC, 0.25 OR 0.5 MG/DOSE, 2 MG/1.5ML SOPN Inject into the skin Per package directions      omeprazole (PRILOSEC) 40 MG delayed release capsule Take 40 mg by mouth daily      DULoxetine (CYMBALTA) 60 MG extended release capsule TAKE 1 CAPSULE BY ORAL ROUTE  EVERY DAY      nystatin (MYCOSTATIN) 180832 UNIT/GM powder as needed       lamoTRIgine (LAMICTAL) 25 MG tablet Take 25 mg by mouth daily      medical marijuana Take by mouth as needed.      loratadine (CLARITIN) 10 MG tablet Take 10 mg by mouth daily      Multiple Vitamins-Minerals (BARIATRIC FUSION) CHEW Take 2 tablets by mouth daily       levothyroxine (SYNTHROID) 75 MCG tablet TAKE 1 TABLET BY MOUTH EVERY DAY  5    Cholecalciferol (VITAMIN D PO) Take by mouth daily        No current facility-administered medications for this visit.         Allergies:        Allergies   Allergen Reactions    Latex Itching and Rash    Lactose      \"Extreme\" stomach pain and diarrhea    Adhesive Tape Other (See Comments)     Rips off skin    Keflex [Cephalexin] Nausea And Vomiting     Projectile vomitting           Review of Systems:    Pertinent items are noted in HPI        Vital Signs:    There were no vitals filed for  this visit. General Exam:     Constitutional: Patient is adequately groomed with no evidence of malnutrition  Mental Status: The patient is oriented to time, place and person. The patient's mood and affect are appropriate. Vascular: Examination reveals no swelling or calf tenderness. Peripheral pulses are palpable and 2+. Lymphatics: no lymphadenopathy of the inguinal region or lower extremity     Physical Exam: left ankle      Primary Exam:    Inspection: No deformity atrophy or appreciable effusion      Palpation: No focal tenderness over the peroneal tendon inframalleolar distribution, mild tenderness at the ATFL no tenderness of the      Range of Motion: Full range and symmetric      Strength: Normal without pain      Special Tests: Anterior drawer talar tilt stable      Skin: There are no rashes, ulcerations or lesions. Gait: Nonantalgic     Neurovascular - non focal and intact       Additional Comments:        Additional Examinations:              Primary Exam: Left wrist and hand    Inspection: No deformity atrophy appreciable effusion      Palpation: Negligible tenderness at the pisoform, no focal tenderness of the hook of the hamate      Range of Motion: Full and symmetric at the wrist      Strength: Specifically normal ulnar nerve distribution of the hand, normal at the wrist and digits      Special Tests: Tinel's negative at Guyon's canal      Skin: There are no rashes, ulcerations or lesions. Gait: Nonantalgic      Reflex; intact upper     Additional Comments:        Additional Examinations:           Right Upper Extremity:  Examination of the right wrist does not show any tenderness, deformity or injury. Range of motion is unremarkable. There is no gross instability. There are no rashes, ulcerations or lesions.   Strength and tone are normal.    Office Imaging Results/Procedures PerformedToday:     3 views left wrist  Impression: Carpus is anatomic and the pisotriquetral articulation is normal.  Ulnar neutral variance. No other soft tissue or osseous abnormalities. Distal radius volar tilt is neutral.  No other soft tissue or osseous abnormalities     Office Procedures:     Orders Placed This Encounter   Procedures    XR WRIST LEFT (MIN 3 VIEWS)     Standing Status:   Future     Number of Occurrences:   1     Standing Expiration Date:   2024     Order Specific Question:   Reason for exam:     Answer:   pain    Isamar Timmons Titan Wrist Orthosis Brace     Patient was prescribed a Isamar Timmons Titan Wrist Brace. The left hand will require stabilization / immobilization from this semi-rigid / rigid orthosis to improve their function. The orthosis will assist in protecting the affected area, provide functional support and facilitate healing. The patient was educated and fit by a healthcare professional with expert knowledge and specialization in brace application while under the direct supervision of the physician. Verbal and written instructions for the use of and application of this item were provided. They were instructed to contact the office immediately should the brace result in increased pain, decreased sensation, increased swelling or worsening of the condition. Test Date:  2021     Patient: Nathalie Romero : 1985 Physician: Estuardo Josue DO   Sex: Female ID#:   Ref Phys: Michael Fleming MD       Patient Complaints:  Patient is a 31year-old female who presents with numbness tingling in the right hand Patient had right carpal tunnel surgery  and left in . Patient History / Exam:  PMH: + type 2 diabetes, PCOS + degenerative disk disease. PE:  reflexes trace, + thumb opposition weakness      NCV & EMG Findings:  Evaluation of the left median (APB) motor nerve showed prolonged distal onset latency (4.3 ms).   The left median sensory, the right median sensory, the left ulnar sensory, and the right ulnar sensory nerves showed prolonged distal peak latency (L4.4, R4.1, L4.0, R3.9 ms) and decreased conduction velocity (L32, R34, L35, R36 m/s). All remaining nerves (as indicated in the following tables) were within normal limits. All examined muscles (as indicated in the following table) showed no evidence of electrical instability. Impression:  study is consistent with residual slowing of median sensory distal latencies post carpal tunnel release. Prolonged ulnar sensory responses suggest underlying mild peripheral neuropathy, most likely secondary to diabetes. Thank      Other Outside Imaging and Testing Personally Reviewed:    XR WRIST LEFT (MIN 3 VIEWS)    Result Date: 1/19/2023  Radiology exam is complete. No Radiologist dictation. Please follow up with ordering provider. XR ANKLE RIGHT (MIN 3 VIEWS)    Result Date: 1/12/2023  Radiology exam is complete. No Radiologist dictation. Please follow up with ordering provider. US GUIDED NEEDLE PLACEMENT    Result Date: 1/12/2023  Radiology result is complete; follow up with provider / physician office for radiology results     US EXTREMITY LEFT NON VASC LIMITED    Result Date: 1/12/2023  Radiology result is complete; follow up with provider / physician office for radiology results              Assessment   Impression: . Encounter Diagnoses   Name Primary? Sprain of right ankle, unspecified ligament, sequela     Sprain and strain of right ankle     Left wrist pain     Flexor carpi ulnaris tendinitis         Rule out pisotriquetral mediated pain      Plan:       Wrist splint mobilization for the next 3 weeks and as needed thereafter  Static flexor tendon stretching of the wrist  Continue Celebrex daily, if symptoms show no appreciable improvement recommend MRI wrist to evaluate FCU and pisotriquetral joint  Continue functional ankle bracing and band resistance strengthening exercises provided her today consider formal course of PT right ankle.   Monitor response to ultrasound-guided ulnar nerve Hydro dissection on follow-up         Orders:        Orders Placed This Encounter   Procedures    XR WRIST LEFT (MIN 3 VIEWS)     Standing Status:   Future     Number of Occurrences:   1     Standing Expiration Date:   1/19/2024     Order Specific Question:   Reason for exam:     Answer:   pain    Isamar Timmons Titan Wrist Orthosis Brace     Patient was prescribed a Isamar Timmons Titan Wrist Brace. The left hand will require stabilization / immobilization from this semi-rigid / rigid orthosis to improve their function. The orthosis will assist in protecting the affected area, provide functional support and facilitate healing. The patient was educated and fit by a healthcare professional with expert knowledge and specialization in brace application while under the direct supervision of the physician. Verbal and written instructions for the use of and application of this item were provided. They were instructed to contact the office immediately should the brace result in increased pain, decreased sensation, increased swelling or worsening of the condition. Nataliia Gregorio MD.      Disclaimer: \"This note was dictated with voice recognition software. Though review and correction are routine, we apologize for any errors. \"

## 2023-02-02 ENCOUNTER — TELEPHONE (OUTPATIENT)
Dept: ORTHOPEDIC SURGERY | Age: 38
End: 2023-02-02

## 2023-02-09 ENCOUNTER — HOSPITAL ENCOUNTER (OUTPATIENT)
Age: 38
Discharge: HOME | End: 2023-02-09
Payer: MEDICAID

## 2023-02-09 ENCOUNTER — INITIAL CONSULT (OUTPATIENT)
Dept: NEUROLOGY | Age: 38
End: 2023-02-09
Payer: MEDICAID

## 2023-02-09 VITALS
SYSTOLIC BLOOD PRESSURE: 113 MMHG | HEART RATE: 85 BPM | HEIGHT: 67 IN | BODY MASS INDEX: 43.16 KG/M2 | DIASTOLIC BLOOD PRESSURE: 73 MMHG | OXYGEN SATURATION: 98 % | WEIGHT: 275 LBS

## 2023-02-09 DIAGNOSIS — G37.9 DEMYELINATING DISEASE (HCC): Primary | ICD-10-CM

## 2023-02-09 DIAGNOSIS — R20.0 NUMBNESS: ICD-10-CM

## 2023-02-09 DIAGNOSIS — G37.9 DEMYELINATING DISEASE (HCC): ICD-10-CM

## 2023-02-09 LAB
A/G RATIO: 1.5 (ref 1.1–2.2)
ALBUMIN SERPL-MCNC: 3.9 G/DL (ref 3.4–5)
ALP BLD-CCNC: 67 U/L (ref 40–129)
ALT SERPL-CCNC: 18 U/L (ref 10–40)
ANION GAP SERPL CALCULATED.3IONS-SCNC: 11 MMOL/L (ref 3–16)
AST SERPL-CCNC: 18 U/L (ref 15–37)
BASOPHILS ABSOLUTE: 0.1 K/UL (ref 0–0.2)
BASOPHILS RELATIVE PERCENT: 1 %
BILIRUB SERPL-MCNC: 0.3 MG/DL (ref 0–1)
BUN BLDV-MCNC: 15 MG/DL (ref 7–20)
C-REACTIVE PROTEIN: <3 MG/L (ref 0–5.1)
CALCIUM SERPL-MCNC: 9 MG/DL (ref 8.3–10.6)
CHLORIDE BLD-SCNC: 104 MMOL/L (ref 99–110)
CO2: 26 MMOL/L (ref 21–32)
CREAT SERPL-MCNC: 0.6 MG/DL (ref 0.6–1.1)
EOSINOPHILS ABSOLUTE: 0 K/UL (ref 0–0.6)
EOSINOPHILS RELATIVE PERCENT: 0.8 %
FOLATE: 16.16 NG/ML (ref 4.78–24.2)
GFR SERPL CREATININE-BSD FRML MDRD: >60 ML/MIN/{1.73_M2}
GLUCOSE BLD-MCNC: 78 MG/DL (ref 70–99)
HCT VFR BLD CALC: 40.5 % (ref 36–48)
HEMOGLOBIN: 13 G/DL (ref 12–16)
LYMPHOCYTES ABSOLUTE: 1.6 K/UL (ref 1–5.1)
LYMPHOCYTES RELATIVE PERCENT: 26.8 %
MCH RBC QN AUTO: 30 PG (ref 26–34)
MCHC RBC AUTO-ENTMCNC: 32.1 G/DL (ref 31–36)
MCV RBC AUTO: 93.5 FL (ref 80–100)
MONOCYTES ABSOLUTE: 0.4 K/UL (ref 0–1.3)
MONOCYTES RELATIVE PERCENT: 6.6 %
NEUTROPHILS ABSOLUTE: 3.8 K/UL (ref 1.7–7.7)
NEUTROPHILS RELATIVE PERCENT: 64.8 %
PDW BLD-RTO: 13.4 % (ref 12.4–15.4)
PLATELET # BLD: 270 K/UL (ref 135–450)
PMV BLD AUTO: 9.6 FL (ref 5–10.5)
POTASSIUM SERPL-SCNC: 4.3 MMOL/L (ref 3.5–5.1)
RBC # BLD: 4.33 M/UL (ref 4–5.2)
SEDIMENTATION RATE, ERYTHROCYTE: 40 MM/HR (ref 0–20)
SODIUM BLD-SCNC: 141 MMOL/L (ref 136–145)
TOTAL PROTEIN: 6.5 G/DL (ref 6.4–8.2)
TSH SERPL DL<=0.05 MIU/L-ACNC: 0.97 UIU/ML (ref 0.27–4.2)
VITAMIN B-12: 740 PG/ML (ref 211–911)
WBC # BLD: 5.8 K/UL (ref 4–11)

## 2023-02-09 PROCEDURE — G8484 FLU IMMUNIZE NO ADMIN: HCPCS | Performed by: PSYCHIATRY & NEUROLOGY

## 2023-02-09 PROCEDURE — 86140 C-REACTIVE PROTEIN: CPT

## 2023-02-09 PROCEDURE — 86038 ANTINUCLEAR ANTIBODIES: CPT

## 2023-02-09 PROCEDURE — 36415 COLL VENOUS BLD VENIPUNCTURE: CPT

## 2023-02-09 PROCEDURE — 84155 ASSAY OF PROTEIN SERUM: CPT

## 2023-02-09 PROCEDURE — 82746 ASSAY OF FOLIC ACID SERUM: CPT

## 2023-02-09 PROCEDURE — G8417 CALC BMI ABV UP PARAM F/U: HCPCS | Performed by: PSYCHIATRY & NEUROLOGY

## 2023-02-09 PROCEDURE — 85025 COMPLETE CBC W/AUTO DIFF WBC: CPT

## 2023-02-09 PROCEDURE — 86036 ANCA SCREEN EACH ANTIBODY: CPT

## 2023-02-09 PROCEDURE — 83516 IMMUNOASSAY NONANTIBODY: CPT

## 2023-02-09 PROCEDURE — G8427 DOCREV CUR MEDS BY ELIG CLIN: HCPCS | Performed by: PSYCHIATRY & NEUROLOGY

## 2023-02-09 PROCEDURE — 85652 RBC SED RATE AUTOMATED: CPT

## 2023-02-09 PROCEDURE — 82607 VITAMIN B-12: CPT

## 2023-02-09 PROCEDURE — 80053 COMPREHEN METABOLIC PANEL: CPT

## 2023-02-09 PROCEDURE — 1036F TOBACCO NON-USER: CPT | Performed by: PSYCHIATRY & NEUROLOGY

## 2023-02-09 PROCEDURE — 83036 HEMOGLOBIN GLYCOSYLATED A1C: CPT

## 2023-02-09 PROCEDURE — 84443 ASSAY THYROID STIM HORMONE: CPT

## 2023-02-09 PROCEDURE — 99204 OFFICE O/P NEW MOD 45 MIN: CPT | Performed by: PSYCHIATRY & NEUROLOGY

## 2023-02-09 PROCEDURE — 84165 PROTEIN E-PHORESIS SERUM: CPT

## 2023-02-09 RX ORDER — CELECOXIB 100 MG/1
100 CAPSULE ORAL DAILY PRN
Qty: 30 CAPSULE | Refills: 1 | Status: SHIPPED | OUTPATIENT
Start: 2023-02-09

## 2023-02-09 RX ORDER — TRAZODONE HYDROCHLORIDE 50 MG/1
1 TABLET ORAL NIGHTLY
COMMUNITY
Start: 2023-02-08

## 2023-02-09 RX ORDER — METFORMIN HYDROCHLORIDE 500 MG/1
1 TABLET, EXTENDED RELEASE ORAL DAILY
COMMUNITY
Start: 2023-01-16

## 2023-02-09 RX ORDER — GABAPENTIN 300 MG/1
300 CAPSULE ORAL 2 TIMES DAILY
Qty: 60 CAPSULE | Refills: 1 | Status: SHIPPED | OUTPATIENT
Start: 2023-02-09 | End: 2023-04-10

## 2023-02-09 RX ORDER — BUSPIRONE HYDROCHLORIDE 10 MG/1
1 TABLET ORAL DAILY
COMMUNITY
Start: 2022-10-03

## 2023-02-09 NOTE — PROGRESS NOTES
The patient is a 30y.o. years old female who  was referred by Edwige Tejada MD for consultation regarding possible MS    HPI:  The patient describes multiple symptomatology for the last year and a half. I was able to read at least one page of multiple complaints from this patient today. Symptoms started gradually. Description generalized body ache, muscle cramps, diffuse dysesthesias and numbness, visual changes, headache, cognitive decline and occasional insomnia. No other acute aggravating factors. Could be daily. No family history of multiple sclerosis. No history of optic neuritis of bladder or bowel issues or severe neck or back pain. She was seen by different neurologist in the past and by report had MRI of the brain and C-spine in 2022 which was unremarkable according to report. She denies recent fall or injury. No weakness in the arms or legs. No difficulty with swallowing or speaking or double vision. History of depression with JOHNATHON. On different SSRI. Other review of system was unremarkable. ROS : A 10-14 system review of constitutional, cardiovascular, respiratory, GI, eyes, , ENT, musculoskeletal, endocrine, skin, SHEENT, genitourinary, psychiatric and neurologic systems was obtained and updated today and is unremarkable except as mentioned in my HPI        Exam:   Constitutional:   Vitals:    02/09/23 1153   BP: 113/73   Site: Left Wrist   Position: Sitting   Pulse: 85   SpO2: 98%   Weight: 275 lb (124.7 kg)   Height: 5' 7\" (1.702 m)       General appearance:  Normal development and appear in no acute distress. Mental Status:   Oriented to person, place, problem, and time. Memory: Good immediate recall. Intact remote memory  Normal attention span and concentration. Language: intact naming, repeating and fluency   Good fund of Knowledge. Aware of current events and vocabulary   Cranial Nerves:   II: Visual fields: Full.  Pupils: equal, round, reactive to light  III,IV,VI: Extra Ocular Movements are intact. No nystagmus  V: Facial sensation is intact  VII: Facial strength and movements: intact and symmetric  XII: Tongue movements are normal  Musculoskeletal: 5/5 in all 4 extremities. Tone: Normal tone. Reflexes: Symmetric 2+ in the arms and 2+ in the legs   Coordination: no pronator drift, no dysmetria with FNF and normal REM  Sensation: normal.  Gait/Posture: steady gait with normal posturing and station. Medical decision making:  I personally reviewed and updated social history, past medical history, medications, allergy, surgical history, and family history as documented in the patient's electronic health records. Labs and/o\ neuroimaging and other test results reviewed and discussed with the patient. Reviewed notes from other physicians. Provided patient education regarding risk, benefits and treatment options as well as adherence to medication regimen and side effect from these medications. Reviewed outside records from her different neurologist back from last year. Assessment:     Diagnosis Orders   1. Demyelinating disease ([facility] [address]  Comprehensive Metabolic Panel    CBC with Auto Differential    MRI BRAIN W WO CONTRAST      2. Numbness  Comprehensive Metabolic Panel    CBC with Auto Differential    MRI BRAIN W WO CONTRAST    GABBY    TSH    Vitamin B12 & Folate    Electrophoresis Protein, Serum    GABBY    Anti-Neutrophilic Cytoplasmic Antibody    Anti-Neutrophil Cytoplasmic Ab w Reflex    C-Reactive Protein    Sedimentation rate, automated    Hemoglobin A1C        Multiple symptomatology with dysesthesias, paresthesias and muscle fatigue and cramps of unclear etiology. Less likely multiple sclerosis or neurological but will repeat MRI brain with contrast to exclude possibility of demyelination/MS although seems less likely. We will also get routine blood testing for other reversible causes for underlying autoimmune disorder.   Possible differential could include fibromyalgia, somatoform disorder or other underlying medica causes.     Continue current SSRI  Continue gabapentin  She is on Lamictal for mood stabilization  She will follow-up with me if MRI of the brain showed new lesions, otherwise follow-up with PCP

## 2023-02-10 LAB
ALBUMIN SERPL-MCNC: 3.2 G/DL (ref 3.1–4.9)
ALPHA-1-GLOBULIN: 0.3 G/DL (ref 0.2–0.4)
ALPHA-2-GLOBULIN: 0.9 G/DL (ref 0.4–1.1)
ANTI-NUCLEAR ANTIBODY (ANA): NEGATIVE
BETA GLOBULIN: 1.1 G/DL (ref 0.9–1.6)
ESTIMATED AVERAGE GLUCOSE: 91.1 MG/DL
GAMMA GLOBULIN: 1.1 G/DL (ref 0.6–1.8)
HBA1C MFR BLD: 4.8 %
SPE/IFE INTERPRETATION: NORMAL

## 2023-02-11 ENCOUNTER — HOSPITAL ENCOUNTER (EMERGENCY)
Age: 38
Discharge: HOME | End: 2023-02-11
Attending: EMERGENCY MEDICINE
Payer: MEDICAID

## 2023-02-11 ENCOUNTER — APPOINTMENT (OUTPATIENT)
Dept: CT IMAGING | Age: 38
End: 2023-02-11
Payer: MEDICAID

## 2023-02-11 VITALS
TEMPERATURE: 98.5 F | HEIGHT: 67 IN | HEART RATE: 81 BPM | WEIGHT: 276 LBS | OXYGEN SATURATION: 100 % | SYSTOLIC BLOOD PRESSURE: 111 MMHG | RESPIRATION RATE: 18 BRPM | BODY MASS INDEX: 43.32 KG/M2 | DIASTOLIC BLOOD PRESSURE: 89 MMHG

## 2023-02-11 DIAGNOSIS — N83.202 CYST OF LEFT OVARY: ICD-10-CM

## 2023-02-11 DIAGNOSIS — R19.7 NAUSEA VOMITING AND DIARRHEA: Primary | ICD-10-CM

## 2023-02-11 DIAGNOSIS — R10.84 GENERALIZED ABDOMINAL PAIN: ICD-10-CM

## 2023-02-11 DIAGNOSIS — R11.2 NAUSEA VOMITING AND DIARRHEA: Primary | ICD-10-CM

## 2023-02-11 LAB
A/G RATIO: 1.2 (ref 1.1–2.2)
ALBUMIN SERPL-MCNC: 4.2 G/DL (ref 3.4–5)
ALP BLD-CCNC: 73 U/L (ref 40–129)
ALT SERPL-CCNC: 18 U/L (ref 10–40)
ANION GAP SERPL CALCULATED.3IONS-SCNC: 8 MMOL/L (ref 3–16)
AST SERPL-CCNC: 19 U/L (ref 15–37)
BASOPHILS ABSOLUTE: 0 K/UL (ref 0–0.2)
BASOPHILS RELATIVE PERCENT: 0.6 %
BILIRUB SERPL-MCNC: <0.2 MG/DL (ref 0–1)
BILIRUBIN URINE: NEGATIVE
BLOOD, URINE: NEGATIVE
BUN BLDV-MCNC: 13 MG/DL (ref 7–20)
C DIFF TOXIN/ANTIGEN: NORMAL
CALCIUM SERPL-MCNC: 9.4 MG/DL (ref 8.3–10.6)
CHLORIDE BLD-SCNC: 100 MMOL/L (ref 99–110)
CLARITY: CLEAR
CO2: 28 MMOL/L (ref 21–32)
COLOR: YELLOW
CREAT SERPL-MCNC: 0.7 MG/DL (ref 0.6–1.1)
EOSINOPHILS ABSOLUTE: 0.1 K/UL (ref 0–0.6)
EOSINOPHILS RELATIVE PERCENT: 1.3 %
GFR SERPL CREATININE-BSD FRML MDRD: >60 ML/MIN/{1.73_M2}
GLUCOSE BLD-MCNC: 99 MG/DL (ref 70–99)
GLUCOSE URINE: NEGATIVE MG/DL
HCG QUALITATIVE: NEGATIVE
HCT VFR BLD CALC: 40.5 % (ref 36–48)
HEMOGLOBIN: 13.7 G/DL (ref 12–16)
KETONES, URINE: NEGATIVE MG/DL
LEUKOCYTE ESTERASE, URINE: NEGATIVE
LIPASE: 44 U/L (ref 13–60)
LYMPHOCYTES ABSOLUTE: 1.4 K/UL (ref 1–5.1)
LYMPHOCYTES RELATIVE PERCENT: 25.1 %
MCH RBC QN AUTO: 31.6 PG (ref 26–34)
MCHC RBC AUTO-ENTMCNC: 33.7 G/DL (ref 31–36)
MCV RBC AUTO: 93.8 FL (ref 80–100)
MICROSCOPIC EXAMINATION: NORMAL
MONOCYTES ABSOLUTE: 0.5 K/UL (ref 0–1.3)
MONOCYTES RELATIVE PERCENT: 8.9 %
NEUTROPHILS ABSOLUTE: 3.5 K/UL (ref 1.7–7.7)
NEUTROPHILS RELATIVE PERCENT: 64.1 %
NITRITE, URINE: NEGATIVE
PDW BLD-RTO: 13.3 % (ref 12.4–15.4)
PH UA: 6 (ref 5–8)
PLATELET # BLD: 271 K/UL (ref 135–450)
PMV BLD AUTO: 9.4 FL (ref 5–10.5)
POTASSIUM REFLEX MAGNESIUM: 3.8 MMOL/L (ref 3.5–5.1)
PROTEIN UA: NEGATIVE MG/DL
RBC # BLD: 4.32 M/UL (ref 4–5.2)
SODIUM BLD-SCNC: 136 MMOL/L (ref 136–145)
SPECIFIC GRAVITY UA: 1.02 (ref 1–1.03)
TOTAL PROTEIN: 7.7 G/DL (ref 6.4–8.2)
URINE REFLEX TO CULTURE: NORMAL
URINE TYPE: NORMAL
UROBILINOGEN, URINE: 0.2 E.U./DL
WBC # BLD: 5.4 K/UL (ref 4–11)

## 2023-02-11 PROCEDURE — 84703 CHORIONIC GONADOTROPIN ASSAY: CPT

## 2023-02-11 PROCEDURE — 99285 EMERGENCY DEPT VISIT HI MDM: CPT

## 2023-02-11 PROCEDURE — 2580000003 HC RX 258: Performed by: EMERGENCY MEDICINE

## 2023-02-11 PROCEDURE — 6360000004 HC RX CONTRAST MEDICATION: Performed by: EMERGENCY MEDICINE

## 2023-02-11 PROCEDURE — 87324 CLOSTRIDIUM AG IA: CPT

## 2023-02-11 PROCEDURE — 96374 THER/PROPH/DIAG INJ IV PUSH: CPT

## 2023-02-11 PROCEDURE — 80053 COMPREHEN METABOLIC PANEL: CPT

## 2023-02-11 PROCEDURE — 87449 NOS EACH ORGANISM AG IA: CPT

## 2023-02-11 PROCEDURE — 81003 URINALYSIS AUTO W/O SCOPE: CPT

## 2023-02-11 PROCEDURE — 96375 TX/PRO/DX INJ NEW DRUG ADDON: CPT

## 2023-02-11 PROCEDURE — 83690 ASSAY OF LIPASE: CPT

## 2023-02-11 PROCEDURE — 6360000002 HC RX W HCPCS: Performed by: EMERGENCY MEDICINE

## 2023-02-11 PROCEDURE — 85025 COMPLETE CBC W/AUTO DIFF WBC: CPT

## 2023-02-11 PROCEDURE — 74177 CT ABD & PELVIS W/CONTRAST: CPT

## 2023-02-11 RX ORDER — KETOROLAC TROMETHAMINE 30 MG/ML
15 INJECTION, SOLUTION INTRAMUSCULAR; INTRAVENOUS ONCE
Status: COMPLETED | OUTPATIENT
Start: 2023-02-11 | End: 2023-02-11

## 2023-02-11 RX ORDER — ONDANSETRON 4 MG/1
4 TABLET, FILM COATED ORAL 3 TIMES DAILY PRN
Qty: 15 TABLET | Refills: 0 | Status: SHIPPED | OUTPATIENT
Start: 2023-02-11

## 2023-02-11 RX ORDER — ONDANSETRON 2 MG/ML
4 INJECTION INTRAMUSCULAR; INTRAVENOUS ONCE
Status: COMPLETED | OUTPATIENT
Start: 2023-02-11 | End: 2023-02-11

## 2023-02-11 RX ORDER — 0.9 % SODIUM CHLORIDE 0.9 %
1000 INTRAVENOUS SOLUTION INTRAVENOUS ONCE
Status: COMPLETED | OUTPATIENT
Start: 2023-02-11 | End: 2023-02-11

## 2023-02-11 RX ADMIN — KETOROLAC TROMETHAMINE 15 MG: 30 INJECTION, SOLUTION INTRAMUSCULAR; INTRAVENOUS at 17:53

## 2023-02-11 RX ADMIN — SODIUM CHLORIDE 1000 ML: 9 INJECTION, SOLUTION INTRAVENOUS at 17:53

## 2023-02-11 RX ADMIN — IOPAMIDOL 75 ML: 755 INJECTION, SOLUTION INTRAVENOUS at 19:10

## 2023-02-11 RX ADMIN — ONDANSETRON 4 MG: 2 INJECTION INTRAMUSCULAR; INTRAVENOUS at 17:53

## 2023-02-11 ASSESSMENT — PAIN DESCRIPTION - ORIENTATION: ORIENTATION: RIGHT;LEFT

## 2023-02-11 ASSESSMENT — PAIN SCALES - GENERAL
PAINLEVEL_OUTOF10: 7
PAINLEVEL_OUTOF10: 7
PAINLEVEL_OUTOF10: 4

## 2023-02-11 ASSESSMENT — PAIN DESCRIPTION - LOCATION
LOCATION: ABDOMEN
LOCATION: ABDOMEN

## 2023-02-11 ASSESSMENT — PAIN DESCRIPTION - DESCRIPTORS: DESCRIPTORS: DISCOMFORT

## 2023-02-11 ASSESSMENT — PAIN - FUNCTIONAL ASSESSMENT
PAIN_FUNCTIONAL_ASSESSMENT: 0-10
PAIN_FUNCTIONAL_ASSESSMENT: ACTIVITIES ARE NOT PREVENTED

## 2023-02-11 ASSESSMENT — PAIN DESCRIPTION - FREQUENCY: FREQUENCY: INTERMITTENT

## 2023-02-11 ASSESSMENT — PAIN DESCRIPTION - PAIN TYPE: TYPE: ACUTE PAIN

## 2023-02-11 NOTE — ED PROVIDER NOTES
[address] [facility]  EMERGENCY DEPARTMENT ENCOUNTER      Pt Name: Maribeth Mcdonald  MRN: [id number]  [place] 1985  Date of evaluation: 2/11/2023  Provider: Jeff Holliday MD    CHIEF COMPLAINT       Chief Complaint   Patient presents with    Abdominal Pain     Patient reports bilateral hip pain that moves around to her abdomen x 4 days. She reports she had vomiting on the first day, but that has subsided. She reports she has had diarrhea x 4 days. She saw her PCP yesterday and was checked for a UTI, that was negative. HISTORY OF PRESENT ILLNESS   (Location/Symptom, Timing/Onset, Context/Setting, Quality, Duration, Modifying Factors, Severity)  Note limiting factors. Maribeth Mcdonald is a 30 y.o. female with past medical history of depression, diabetes, asthma, PCOS and prior gastric sleeve here today with abdominal pain. The patient states that for the last 3 to 4 days she has been having abdominal pain. States it is located in the bilateral lateral flanks and radiates into the lower abdomen and around the back. Initially she was having significant emesis the first day but now only nausea. Has been having some loose nonbloody stools. No fevers or chills. No dysuria or hematuria. No respiratory symptoms such as cough or shortness of breath. No runny nose or nasal congestion. HPI    Nursing Notes were reviewed. REVIEW OF SYSTEMS    (2-9 systems for level 4, 10 or more for level 5)     Review of Systems    Please see HPI for pertinent positive and negative review of system findings. A full 10 system ROS was performed and otherwise negative.         PAST MEDICAL HISTORY     Past Medical History:   Diagnosis Date    Acid reflux     Asthma     as child    Back pain     Bipolar 1 disorder ([facility] [address]     Degenerative disc disease, lumbar     Depression     Diabetes mellitus (HCC)     Fibromyalgia     Liver disease     fatty liver    Obesity     OCD (obsessive compulsive disorder)     PCOS (polycystic ovarian syndrome)     Sleep apnea     no longer uses cpap after weight loss         SURGICAL HISTORY       Past Surgical History:   Procedure Laterality Date    ANKLE SURGERY Left     Scar tissue removal    CARPAL TUNNEL RELEASE Right 08/23/2021    RIGHT CARPAL TUNNEL RELEASE performed by Diana Slater MD at [address] Left 09/10/2021    LEFT CARPAL TUNNEL RELEASE performed by Diana Slater MD at [address] [facility], [address] N/A 04/20/2020    LAPAROSCOPIC CHOLECYSTECTOMY performed by Valeriano Bee DO at [address] [facility], [facility] ([facility])      OTHER SURGICAL HISTORY Left 12/10/2014    Excision of Cyst Left Upper Posterior Ankle    OTHER SURGICAL HISTORY Right 12/29/2016    Laparotomy with Right SO    OVARY REMOVAL  12/28/2016    unsure of side    SHOULDER ARTHROSCOPY Left 02/16/2022    VIDEO ARTHROSCOPY LEFT SHOULDER, SUBACROMIAL DECOMPRESSION WITH ACROMIOPLASTY AND DISTAL CLAVICLE RESECTION -SLEEP APNEA performed by Flavio Ram MD at [address] [facility] N/A 11/06/2019    LAPAROSCOPIC SLEEVE GASTRECTOMY - ETHICON performed by Mamadou Montez MD at [address] [facility] Left 07/02/2021    LEFT QUADRICEP PERCUTANEOUS TENOTOMY , TX2 MICROTIP WITH ULTRASOUND performed by Imani Odom MD at [place] [address] N/A 08/23/2019    EGD BIOPSY performed by Mamadou Montez MD at [address] [facility] 03/10/2021    EGD BIOPSY performed by Lennox Portela, MD at [facility], [place]       Previous Medications    BUSPIRONE (BUSPAR) 10 MG TABLET    Take 1 tablet by mouth daily    CELECOXIB (CELEBREX) 100 MG CAPSULE    TAKE 1 CAPSULE BY MOUTH DAILY AS NEEDED FOR PAIN    CHOLECALCIFEROL (VITAMIN D PO)    Take by mouth daily     DULOXETINE (CYMBALTA) 60 MG EXTENDED RELEASE CAPSULE    TAKE 1 CAPSULE BY ORAL ROUTE  EVERY DAY    GABAPENTIN (NEURONTIN) 300 MG CAPSULE    TAKE 1 CAPSULE BY MOUTH IN THE MORNING AND AT BEDTIME FOR 60 DAYS. INTENDED SUPPLY: 30 DAYS    LAMOTRIGINE (LAMICTAL) 25 MG TABLET    Take 25 mg by mouth daily    LEVOTHYROXINE (SYNTHROID) 75 MCG TABLET    TAKE 1 TABLET BY MOUTH EVERY DAY    LORATADINE (CLARITIN) 10 MG TABLET    Take 10 mg by mouth daily    LURASIDONE (LATUDA) 20 MG TABS TABLET    Take 20 mg by mouth at bedtime    MEDICAL MARIJUANA    Take by mouth as needed.     METFORMIN (GLUCOPHAGE-XR) 500 MG EXTENDED RELEASE TABLET    Take 1 tablet by mouth daily    MULTIPLE VITAMINS-MINERALS (BARIATRIC FUSION) CHEW    Take 2 tablets by mouth daily     NYSTATIN (MYCOSTATIN) 785974 UNIT/GM POWDER    as needed     OMEPRAZOLE (PRILOSEC) 40 MG DELAYED RELEASE CAPSULE    Take 40 mg by mouth daily    OZEMPIC, 0.25 OR 0.5 MG/DOSE, 2 MG/1.5ML SOPN    Inject into the skin Per package directions    POTASSIUM CHLORIDE (KLOR-CON M) 20 MEQ EXTENDED RELEASE TABLET    Take 20 mEq by mouth 2 times daily    ROPINIROLE (REQUIP) 0.25 MG TABLET    Take 0.75 mg by mouth nightly    TRAZODONE (DESYREL) 50 MG TABLET    Take 1 tablet by mouth at bedtime       ALLERGIES     Latex, Lactose, Adhesive tape, and Keflex [cephalexin]    FAMILY HISTORY       Family History   Problem Relation Age of Onset    Asthma Mother     Arthritis Mother     Lung Cancer Mother     Hypertension Father     Elevated Lipids Father     Diabetes Father     Alcohol Abuse Father     Asthma Father     Arthritis Father     Diabetes Paternal Grandfather     Arthritis Paternal Grandfather     Diabetes Maternal Grandmother     Arthritis Maternal Grandmother           SOCIAL HISTORY       Social History     Socioeconomic History    Marital status:      Spouse name: Bobbi Falcon    Number of children: 0    Years of education: 16    Highest education level: None   Tobacco Use    Smoking status: Never    Smokeless tobacco: Never   Vaping Use    Vaping Use: Never used   Substance and Sexual Activity    Alcohol use: Not Currently    Drug use: Yes     Types: Marijuana Estee Grayling)     Comment: occ    Sexual activity: Yes     Partners: Male     Social Determinants of Health     Physical Activity: Inactive    Days of Exercise per Week: 0 days    Minutes of Exercise per Session: 0 min   Intimate Partner Violence: Not At Risk    Fear of Current or Ex-Partner: No    Emotionally Abused: No    Physically Abused: No    Sexually Abused: No       SCREENINGS    [place] Coma Scale  Eye Opening: Spontaneous  Best Verbal Response: Oriented  Best Motor Response: Obeys commands  [place] Coma Scale Score: 15          PHYSICAL EXAM    (up to 7 for level 4, 8 or more for level 5)     ED Triage Vitals [02/11/23 1651]   BP Temp Temp Source Heart Rate Resp SpO2 Height Weight   109/77 98.5 °F (36.9 °C) Oral 91 16 97 % 5' 7\" (1.702 m) 276 lb (125.2 kg)       Physical Exam    General appearance:  Cooperative. No acute distress. Skin:  Warm. Dry. Eye:  Extraocular movements intact. Ears, nose, mouth and throat:  Oral mucosa moist,  Neck:  Trachea midline. Heart:  Regular rate and rhythm  Perfusion:  intact  Respiratory:  Respirations nonlabored. Lungs clear to auscultation bilaterally. Abdominal:   Non distended. Obese abdomen with some tenderness in the bilateral upper quadrants slightly worse on the right when compared to the left. No CVA tenderness. Overall soft abdomen however. Neurological:  Alert and oriented x 3.   Moves all extremities spontaneously  Musculoskeletal:   Normal ROM, no deformities          Psychiatric:  Normal mood      DIAGNOSTIC RESULTS       Labs Reviewed   C DIFF TOXIN/ANTIGEN    Narrative:     ORDER#: [id number]                          ORDERED BY: Yuni Drew  SOURCE: Stool                              COLLECTED:  02/11/23 17:15  ANTIBIOTICS AT AUGIE.:                      RECEIVED :  02/11/23 17:19  Collect White vial (sterile container)   CBC WITH AUTO DIFFERENTIAL   COMPREHENSIVE METABOLIC PANEL W/ REFLEX TO MG FOR LOW K   LIPASE   URINALYSIS WITH REFLEX TO CULTURE   HCG, SERUM, QUALITATIVE       Interpretation per the Radiologist below, if obtained/available at the time of this note:    CT ABDOMEN PELVIS W IV CONTRAST Additional Contrast? None   Final Result   No acute pathology in the abdomen and pelvis. Large left adnexal cyst as measured above. RECOMMENDATIONS:   Pelvic ultrasound in 3-6 months is recommended for follow-up evaluation of   left adnexal cyst.             All other labs/imaging were within normal range or not returned as of this dictation. EMERGENCY DEPARTMENT COURSE and DIFFERENTIAL DIAGNOSIS/MDM:   Vitals:    Vitals:    02/11/23 1651   BP: 109/77   Pulse: 91   Resp: 16   Temp: 98.5 °F (36.9 °C)   TempSrc: Oral   SpO2: 97%   Weight: 276 lb (125.2 kg)   Height: 5' 7\" (1.702 m)         Differential Diagnosis: Gastroenteritis, ulcerative disease, pancreatitis, bowel obstruction    Patient presents emergency department today for upper abdominal pain associated with nausea, vomiting and has had some diarrhea. No reports of fevers or chills. Prior history of gastric sleeve. Has also had a previous cholecystectomy. Vital signs stable. Abdomen soft with some minimal right upper quadrant and left upper quadrant tenderness. Laboratory studies were performed showing no acute abnormalities whatsoever. Given her prior history of gastric sleeve nausea and vomiting a CT scan was performed which shows a likely left-sided ovarian cyst but no other acute pathology reported. On repeat evaluation after treatment the patient states her symptoms have improved. Tolerating oral intake. Feels much better. She has never had any pelvic pain or symptoms and my suspicion for pain associated with her ovarian cyst is very low. No concern for torsion at present. Appears more of a GI cause.   Plan to discharge home with Zofran and have her follow-up with her primary care physician that was given strict return precautions. Informed to follow-up with gynecology for repeat imaging for her ovarian cyst    Medications and Route:   Medications   0.9 % sodium chloride bolus (0 mLs IntraVENous Stopped 2/11/23 2000)   ketorolac (TORADOL) injection 15 mg (15 mg IntraVENous Given 2/11/23 1753)   ondansetron (ZOFRAN) injection 4 mg (4 mg IntraVENous Given 2/11/23 1753)   iopamidol (ISOVUE-370) 76 % injection 75 mL (75 mLs IntraVENous Given 2/11/23 1910)       History From: Patient         Chronic Conditions: Noted in HPI    CONSULTS: (Who and What was discussed)  None            Disposition Considerations (Tests not ordered but considered, Shared Decision Making, Pt Expectation of Test or Tx.):     Ugo Cortez M.D., am the primary clinician of record. MDM      CONSULTS     None    Critical Care:   None    REASSESSMENT          PROCEDURE     Unless otherwise noted below, none     Procedures      FINAL IMPRESSION      1. Nausea vomiting and diarrhea    2. Generalized abdominal pain    3. Cyst of left ovary            DISPOSITION/PLAN   DISPOSITION Decision To Discharge 02/11/2023 08:07:42 PM        PATIENT REFERRED TO:  ROSE Quiles NP  [address] [facility] [address] [facility]  [phone]    Schedule an appointment as soon as possible for a visit       DISCHARGE MEDICATIONS:  New Prescriptions    ONDANSETRON (ZOFRAN) 4 MG TABLET    Take 1 tablet by mouth 3 times daily as needed for Nausea or Vomiting     Controlled Substances Monitoring:     RX Monitoring 10/21/2016   Periodic Controlled Substance Monitoring No signs of potential drug abuse or diversion identified.        (Please note that portions of this note were completed with a voice recognition program.  Efforts were made to edit the dictations but occasionally words are mis-transcribed.)    France Manriquez MD (electronically signed)  Attending Emergency Physician            Miranda Guerrier MD  02/11/23 2011

## 2023-02-12 LAB — MISCELLANEOUS LAB TEST ORDER: NORMAL

## 2023-02-17 ENCOUNTER — HOSPITAL ENCOUNTER (OUTPATIENT)
Dept: MRI IMAGING | Age: 38
Discharge: HOME | End: 2023-02-17
Payer: MEDICAID

## 2023-02-17 ENCOUNTER — HOSPITAL ENCOUNTER (OUTPATIENT)
Dept: ULTRASOUND IMAGING | Age: 38
Discharge: HOME | End: 2023-02-17
Payer: MEDICAID

## 2023-02-17 DIAGNOSIS — N83.202 LEFT OVARIAN CYST: ICD-10-CM

## 2023-02-17 DIAGNOSIS — G37.9 DEMYELINATING DISEASE (HCC): ICD-10-CM

## 2023-02-17 DIAGNOSIS — R20.0 NUMBNESS: ICD-10-CM

## 2023-02-17 PROCEDURE — 70553 MRI BRAIN STEM W/O & W/DYE: CPT

## 2023-02-17 PROCEDURE — 76856 US EXAM PELVIC COMPLETE: CPT

## 2023-02-17 PROCEDURE — 76830 TRANSVAGINAL US NON-OB: CPT

## 2023-02-17 PROCEDURE — A9579 GAD-BASE MR CONTRAST NOS,1ML: HCPCS | Performed by: PSYCHIATRY & NEUROLOGY

## 2023-02-17 PROCEDURE — 6360000004 HC RX CONTRAST MEDICATION: Performed by: PSYCHIATRY & NEUROLOGY

## 2023-02-17 RX ADMIN — GADOTERIDOL 20 ML: 279.3 INJECTION, SOLUTION INTRAVENOUS at 13:40

## 2023-03-01 ENCOUNTER — OFFICE VISIT (OUTPATIENT)
Dept: ORTHOPEDIC SURGERY | Age: 38
End: 2023-03-01

## 2023-03-01 ENCOUNTER — HOSPITAL ENCOUNTER (OUTPATIENT)
Age: 38
Discharge: HOME | End: 2023-03-01
Payer: MEDICAID

## 2023-03-01 VITALS — BODY MASS INDEX: 43.32 KG/M2 | WEIGHT: 276 LBS | HEIGHT: 67 IN

## 2023-03-01 DIAGNOSIS — G89.29 ELBOW PAIN, CHRONIC, LEFT: ICD-10-CM

## 2023-03-01 DIAGNOSIS — M67.432 GANGLION CYST OF WRIST, LEFT: ICD-10-CM

## 2023-03-01 DIAGNOSIS — M25.50 ARTHRALGIA, UNSPECIFIED JOINT: ICD-10-CM

## 2023-03-01 DIAGNOSIS — G56.22 NEURITIS OF LEFT ULNAR NERVE: ICD-10-CM

## 2023-03-01 DIAGNOSIS — M25.522 ELBOW PAIN, CHRONIC, LEFT: ICD-10-CM

## 2023-03-01 LAB
C-REACTIVE PROTEIN: <3 MG/L (ref 0–5.1)
RHEUMATOID FACTOR: <10 IU/ML
SEDIMENTATION RATE, ERYTHROCYTE: 23 MM/HR (ref 0–20)

## 2023-03-01 PROCEDURE — 86140 C-REACTIVE PROTEIN: CPT

## 2023-03-01 PROCEDURE — 85652 RBC SED RATE AUTOMATED: CPT

## 2023-03-01 PROCEDURE — 86431 RHEUMATOID FACTOR QUANT: CPT

## 2023-03-01 PROCEDURE — 36415 COLL VENOUS BLD VENIPUNCTURE: CPT

## 2023-03-01 RX ORDER — BUPIVACAINE HYDROCHLORIDE 5 MG/ML
1 INJECTION, SOLUTION PERINEURAL ONCE
Status: COMPLETED | OUTPATIENT
Start: 2023-03-01 | End: 2023-03-01

## 2023-03-01 RX ORDER — LIDOCAINE HYDROCHLORIDE 10 MG/ML
1 INJECTION, SOLUTION INFILTRATION; PERINEURAL ONCE
Status: COMPLETED | OUTPATIENT
Start: 2023-03-01 | End: 2023-03-01

## 2023-03-01 RX ORDER — BETAMETHASONE SODIUM PHOSPHATE AND BETAMETHASONE ACETATE 3; 3 MG/ML; MG/ML
6 INJECTION, SUSPENSION INTRA-ARTICULAR; INTRALESIONAL; INTRAMUSCULAR; SOFT TISSUE ONCE
Status: COMPLETED | OUTPATIENT
Start: 2023-03-01 | End: 2023-03-01

## 2023-03-01 RX ADMIN — BUPIVACAINE HYDROCHLORIDE 5 MG: 5 INJECTION, SOLUTION PERINEURAL at 11:55

## 2023-03-01 RX ADMIN — LIDOCAINE HYDROCHLORIDE 1 ML: 10 INJECTION, SOLUTION INFILTRATION; PERINEURAL at 11:56

## 2023-03-01 RX ADMIN — BETAMETHASONE SODIUM PHOSPHATE AND BETAMETHASONE ACETATE 6 MG: 3; 3 INJECTION, SUSPENSION INTRA-ARTICULAR; INTRALESIONAL; INTRAMUSCULAR; SOFT TISSUE at 11:55

## 2023-03-01 NOTE — PROGRESS NOTES
Chief Complaint:   Chief Complaint   Patient presents with    Wrist Pain     Left wrist f/u MRI TR- Pt states pain comes and goes. 2-3/10 today. Pt is dong the exercises but still having issues. Taking Celebrex as needed. Meds help with pain but not much    Elbow Pain     left, pain persists, has not had much improvement, requesting to proceed with MRI           History of Present Illness:       Patient is a 30 y.o. female returns follow up for the above complaint. The patient was last seen approximately 5 weeksago. The symptoms show no change since the last visit. The patient has had further testing for the problem. Ulnar-sided volar wrist pain remains problematic. MRI completed in the interim as outlined below in detail    Unfortunately medial elbow pain remains problematic status post ultrasound-guided ulnar nerve hydrodissection 1/12/2023    She would like to consider other treatment options the elbow pain    She is concerned that she may have an underlying inflammatory thyropathy. Patient work-up is included GABBY which is referenced below     Past Medical History:        Past Medical History:   Diagnosis Date    Acid reflux     Asthma     as child    Back pain     Bipolar 1 disorder ([address]     Degenerative disc disease, lumbar     Depression     Diabetes mellitus ([address]     Fibromyalgia     Liver disease     fatty liver    Obesity     OCD (obsessive compulsive disorder)     PCOS (polycystic ovarian syndrome)     Sleep apnea     no longer uses cpap after weight loss        Present Medications:         Current Outpatient Medications   Medication Sig Dispense Refill    ondansetron (ZOFRAN) 4 MG tablet Take 1 tablet by mouth 3 times daily as needed for Nausea or Vomiting 15 tablet 0    gabapentin (NEURONTIN) 300 MG capsule TAKE 1 CAPSULE BY MOUTH IN THE MORNING AND AT BEDTIME FOR 60 DAYS.  INTENDED SUPPLY: 30 DAYS 60 capsule 1    celecoxib (CELEBREX) 100 MG capsule TAKE 1 CAPSULE BY MOUTH DAILY AS NEEDED FOR PAIN 30 capsule 1    metFORMIN (GLUCOPHAGE-XR) 500 MG extended release tablet Take 1 tablet by mouth daily      traZODone (DESYREL) 50 MG tablet Take 1 tablet by mouth at bedtime      busPIRone (BUSPAR) 10 MG tablet Take 1 tablet by mouth daily      lurasidone (LATUDA) 20 MG TABS tablet Take 20 mg by mouth at bedtime      potassium chloride (KLOR-CON M) 20 MEQ extended release tablet Take 20 mEq by mouth 2 times daily      rOPINIRole (REQUIP) 0.25 MG tablet Take 0.75 mg by mouth nightly      OZEMPIC, 0.25 OR 0.5 MG/DOSE, 2 MG/1.5ML SOPN Inject into the skin Per package directions      omeprazole (PRILOSEC) 40 MG delayed release capsule Take 40 mg by mouth daily      DULoxetine (CYMBALTA) 60 MG extended release capsule TAKE 1 CAPSULE BY ORAL ROUTE  EVERY DAY      nystatin (MYCOSTATIN) 689830 UNIT/GM powder as needed       lamoTRIgine (LAMICTAL) 25 MG tablet Take 25 mg by mouth daily      medical marijuana Take by mouth as needed. loratadine (CLARITIN) 10 MG tablet Take 10 mg by mouth daily      Multiple Vitamins-Minerals (BARIATRIC FUSION) CHEW Take 2 tablets by mouth daily       levothyroxine (SYNTHROID) 75 MCG tablet TAKE 1 TABLET BY MOUTH EVERY DAY  5    Cholecalciferol (VITAMIN D PO) Take by mouth daily        No current facility-administered medications for this visit. Allergies: Allergies   Allergen Reactions    Latex Itching and Rash    Lactose      \"Extreme\" stomach pain and diarrhea    Adhesive Tape Other (See Comments)     Rips off skin    Keflex [Cephalexin] Nausea And Vomiting     Projectile vomitting           Review of Systems:    Pertinent items are noted in HPI        Vital Signs: There were no vitals filed for this visit.      General Exam:     Constitutional: Patient is adequately groomed with no evidence of malnutrition    Physical Exam: left elbow      Primary Exam:    Inspection: No deformity atrophy appreciable effusion      Palpation: Mild tenderness over the cubital tunnel region      Range of Motion: Full range symmetric      Strength: Normal flexor pronator and extensor supinator      Special Tests: Tinel's minimally positive at the elbow      Skin: There are no rashes, ulcerations or lesions. Gait: Nonantalgic      Neurovascular - non focal and intact       Additional Comments:        Additional Examinations:                    Office Imaging Results/Procedures PerformedToday:       Limited ultrasound evaluation-left wrist  Logiq E ultrasound 13 MHz    Patient position seated wrist was fully supinated and supported. Using linear transducer the patient triquetral articulation was evaluated extensively in long axis and short axis. The FCU tendon was identified and was intact. In long axis projection the distal pisotriquetral articulation was evaluated and a capsular distention was identified consistent with a capsular synovial cyst referenced on MRI which was corroborated. The ulnar artery was identified in long axis and short axis in the anticipated trajectory for intervention was ulnar to the distribution of the artery and there were no penetrating arterial vessels in the anticipated trajectory of the needle approach when this was verified using [place]. No other soft tissue abnormalities were identified    Images stored    Ultrasound-guided injection ganglion cyst/capsule synovial cyst-piso triquetral articulation-left  Logiq E ultrasound 13 MHz    The patient was positioned as above. The linear transducer was placed in long axis over the pisiform and longitudinal approach was anticipated from distal aspect. Sterile prep was performed topical anesthetic was applied. 25-gauge needle was advanced subcutaneously and a small volume of 1% lidocaine was injected advancing the needle within the capsule synovial cyst.  A small aliquot of 1% lidocaine was injected within the cyst, and aspiration was attempted.     Using exchange of syringe technique, approximately 1 cc mixture of 0.25% Marcaine and 1 cc Celestone was injected within the capsule which was visualized to distend.    Patient tolerated this with minimal discomfort.    Technically successful aspiration/injection.    Images stored     Office Procedures:     Orders Placed This Encounter   Procedures    MRI ELBOW LEFT WO CONTRAST     [facility], please contact pt to schedule, [phone]  [facility] will obtain auth and fwd to your facility.  Pt advised to f/u in clinic 2-3 days after MRI for results.     Standing Status:   Future     Standing Expiration Date:   3/1/2024     Order Specific Question:   Reason for exam:     Answer:   r/o OCD capitellum     Order Specific Question:   What is the sedation requirement?     Answer:   None    US GUIDED NEEDLE PLACEMENT     Standing Status:   Future     Number of Occurrences:   1     Standing Expiration Date:   3/1/2024     Order Specific Question:   Reason for exam:     Answer:   cyst asp/CSI    US EXTREMITY LEFT NON VASC LIMITED     Standing Status:   Future     Number of Occurrences:   1     Standing Expiration Date:   3/1/2024     Order Specific Question:   Reason for exam:     Answer:   dx    Sedimentation Rate     Standing Status:   Future     Number of Occurrences:   1     Standing Expiration Date:   3/1/2024    Rheumatoid Factor     Standing Status:   Future     Number of Occurrences:   1     Standing Expiration Date:   3/1/2024    C-Reactive Protein     Standing Status:   Future     Number of Occurrences:   1     Standing Expiration Date:   3/1/2024    ID ASPIRATION&/INJECTION GANGLION CYST ANY LOCATJ           Other Outside Imaging and Testing Personally Reviewed:    MRI Upper Extremity W JT WO Contrast    Result Date: 2/22/2023  [id number] #: [id number] Procedure: MR Left Wrist joint w/o Contrast ; Reason for Exam: Left wrist pain. flexor carpi ulnaris tendinitis. Pisotriquetral joint This document is confidential medical  information. Unauthorized disclosure or use of this information is prohibited by law. If you are not the intended recipient of this document, please advise us by calling immediately [phone]. [facility] [place] Patient Name: Nicole Bowden Case ID: [id number] Patient : 1985 Referring Physician: Katie Gonzalez MD Exam Date: 2023 Exam Description: MR Left Wrist joint w/o Contrast HISTORY:  Dorsal pain. TECHNICAL FACTORS:  Long- and short-axis fat- and water-weighted images were performed. COMPARISON:  None. FINDINGS:  Neutral ulnar variance. TFC intact. Intact intrinsic ligaments. Small cyst or pseudocyst within the lunate adjacent to the scaphoid articulation. Enostosis distal scaphoid. Mild 1st CMC arthropathy. Osteophytes. Flexor and extensor tendon complexes are intact. Median nerve is split as a developmental variant. Small pisotriquetral capsulosynovial cyst. No soft tissue mass. CONCLUSION: 1. Small cortical erosion within the lunate. Mild marrow reaction at the scapholunate interval. 2. Small pisotriquetral capsulosynovial cyst. 3. Mild 1st CMC arthropathy. 4. Capsulitis. Thank you for the opportunity to provide your interpretation. Laury Kevin MD A: EFREN/finesse 2023 3:30 PM T: FINESSE 2023 3:19 PM       Latest Reference Range & Units Most Recent   GABBY Negative  Negative  23 12:54     Test Date:  2021     Patient: Nicole Bowden : 1985 Physician: Lian Leavitt DO   Sex: Female ID#:   Ref Phys: Steve Roberts MD       Patient Complaints:  Patient is a 31year-old female who presents with numbness tingling in the right hand Patient had right carpal tunnel surgery  and left in . Patient History / Exam:  PMH: + type 2 diabetes, PCOS + degenerative disk disease.  PE:  reflexes trace, + thumb opposition weakness      NCV & EMG Findings:  Evaluation of the left median (APB) motor nerve showed prolonged distal onset latency (4.3 ms). The left median sensory, the right median sensory, the left ulnar sensory, and the right ulnar sensory nerves showed prolonged distal peak latency (L4.4, R4.1, L4.0, R3.9 ms) and decreased conduction velocity (L32, R34, L35, R36 m/s). All remaining nerves (as indicated in the following tables) were within normal limits. All examined muscles (as indicated in the following table) showed no evidence of electrical instability. Impression:  study is consistent with residual slowing of median sensory distal latencies post carpal tunnel release. Prolonged ulnar sensory responses suggest underlying mild peripheral neuropathy, most likely secondary to diabetes. Thank you         Test Date:  2021     Patient: Hayden Michaels : 1985 Physician: Jose Vidal DO   Sex: Female ID#:   Ref Phys: April MIRA Kirkpatrick      Patient Complaints:  Patient is a 37year-old female who presents with numbness tingling in the right suzanne body right arm alexus hand. Worse over past months. Patient History / Exam:  PMH: + djd, no neck or arm surgery  + type 2 diabetes. + PCOS. PE: reflexes trace, normal strength      NCV & EMG Findings:  Evaluation of the left median (APB) motor nerve showed prolonged distal onset latency (4.3 ms). The left median sensory nerve showed prolonged distal peak latency (3.7 ms) and decreased conduction velocity (38 m/s). The right median sensory nerve showed prolonged distal peak latency (3.9 ms), reduced amplitude (8 µV), and decreased conduction velocity (36 m/s). All remaining nerves (as indicated in the following tables) were within normal limits. All examined muscles (as indicated in the following table) showed no evidence of electrical instability. Impression:  Study is consistent with mild bilateral carpal tunnel syndrome. no evidence of an acute radiculopathy or other entrapment neuropathy. Thank you. Assessment   Impression: . Encounter Diagnoses   Name Primary? Elbow pain, chronic, left     Arthralgia, unspecified joint     Ganglion cyst of wrist, left     Neuritis of left ulnar nerve         Pisotriquetral capsule synovial cyst      Plan:     MRI evaluation left elbow evaluate for OCD capitellum or high-grade chondropathy  Postinjection protocol-left wrist  Review most recent EMG of the left upper extremity  Serologic work-up for inflammatory arthropathy-rheumatoid factor ESR CRP, GABBY negative referenced above    Approximately 30 minutes was spent related to previewing pertinent medical documentation prior to the patient's visit along with counseling during the patient's visit with respect to treatment options inclusive of alternatives to treatment and the complications and risks related to those treatment options along with expectations of outcome related to those treatments and inclusive of time in the documentation and ordering of testing and treatment after the visit. Orders:        Orders Placed This Encounter   Procedures    MRI ELBOW LEFT WO CONTRAST     [facility], please contact pt to schedule, [address] will obtain auth and fwd to your facility. Pt advised to f/u in clinic 2-3 days after MRI for results. Standing Status:   Future     Standing Expiration Date:   3/1/2024     Order Specific Question:   Reason for exam:     Answer:   r/o OCD capitellum     Order Specific Question:   What is the sedation requirement?      Answer:   None    US GUIDED NEEDLE PLACEMENT     Standing Status:   Future     Number of Occurrences:   1     Standing Expiration Date:   3/1/2024     Order Specific Question:   Reason for exam:     Answer:   cyst asp/CSI    US EXTREMITY LEFT NON VASC LIMITED     Standing Status:   Future     Number of Occurrences:   1     Standing Expiration Date:   3/1/2024     Order Specific Question:   Reason for exam:     Answer:   dx    Sedimentation Rate     Standing Status:   Future Number of Occurrences:   1     Standing Expiration Date:   3/1/2024    Rheumatoid Factor     Standing Status:   Future     Number of Occurrences:   1     Standing Expiration Date:   3/1/2024    C-Reactive Protein     Standing Status:   Future     Number of Occurrences:   1     Standing Expiration Date:   3/1/2024    MS ASPIRATION&/INJECTION GANGLION CYST ANY Moise Smith MD.      Disclaimer: \"This note was dictated with voice recognition software. Though review and correction are routine, we apologize for any errors. \"

## 2023-03-21 ENCOUNTER — OFFICE VISIT (OUTPATIENT)
Dept: ORTHOPEDIC SURGERY | Age: 38
End: 2023-03-21
Payer: MEDICAID

## 2023-03-21 DIAGNOSIS — G56.22 NEURITIS OF LEFT ULNAR NERVE: Primary | ICD-10-CM

## 2023-03-21 DIAGNOSIS — M67.432 GANGLION CYST OF WRIST, LEFT: ICD-10-CM

## 2023-03-21 PROCEDURE — G8427 DOCREV CUR MEDS BY ELIG CLIN: HCPCS | Performed by: INTERNAL MEDICINE

## 2023-03-21 PROCEDURE — G8417 CALC BMI ABV UP PARAM F/U: HCPCS | Performed by: INTERNAL MEDICINE

## 2023-03-21 PROCEDURE — G8484 FLU IMMUNIZE NO ADMIN: HCPCS | Performed by: INTERNAL MEDICINE

## 2023-03-21 PROCEDURE — 1036F TOBACCO NON-USER: CPT | Performed by: INTERNAL MEDICINE

## 2023-03-21 PROCEDURE — 99213 OFFICE O/P EST LOW 20 MIN: CPT | Performed by: INTERNAL MEDICINE

## 2023-03-21 NOTE — PROGRESS NOTES
Chief Complaint:   Chief Complaint   Patient presents with    Elbow Pain     L elbow MRI f/u. Patient is still having quite a bit of elbow pain, still having some wrist pain after the cyst drainage. Doesn't know of anything that helps it. History of Present Illness:       Patient is a 30 y.o. female returns follow up for the above complaint. The patient was last seen approximately 3 weeksago. The symptoms show no change since the last visit. The patient has had further testing for the problem. Status post ultrasound-guided nerve hydrodissection left 1/12/2023    MRI completed in the interim    Elbow discomfort continues to localize to the medial aspect. No mechanical symptoms no subjective instability no pattern of activity related swelling    Pain level 6/10    With respect to the left wrist she has noted at least mild benefit from the [facility] form-triquetral capsular synovial cyst aspiration and injection. She continues to experience discomfort about the hypothenar region of the hand         Past Medical History:        Past Medical History:   Diagnosis Date    Acid reflux     Asthma     as child    Back pain     Bipolar 1 disorder (HCC)     Degenerative disc disease, lumbar     Depression     Diabetes mellitus (HCC)     Fibromyalgia     Liver disease     fatty liver    Obesity     OCD (obsessive compulsive disorder)     PCOS (polycystic ovarian syndrome)     Sleep apnea     no longer uses cpap after weight loss        Present Medications:         Current Outpatient Medications   Medication Sig Dispense Refill    ondansetron (ZOFRAN) 4 MG tablet Take 1 tablet by mouth 3 times daily as needed for Nausea or Vomiting 15 tablet 0    gabapentin (NEURONTIN) 300 MG capsule TAKE 1 CAPSULE BY MOUTH IN THE MORNING AND AT BEDTIME FOR 60 DAYS.  INTENDED SUPPLY: 30 DAYS 60 capsule 1    celecoxib (CELEBREX) 100 MG capsule TAKE 1 CAPSULE BY MOUTH DAILY AS NEEDED FOR PAIN 30 capsule 1    metFORMIN (GLUCOPHAGE-XR) 500

## 2023-04-17 ENCOUNTER — HOSPITAL ENCOUNTER (OUTPATIENT)
Dept: PHYSICAL THERAPY | Age: 38
Setting detail: THERAPIES SERIES
Discharge: HOME | End: 2023-04-17
Payer: MEDICAID

## 2023-04-17 ENCOUNTER — HOSPITAL ENCOUNTER (OUTPATIENT)
Dept: GENERAL RADIOLOGY | Age: 38
Discharge: HOME | End: 2023-04-17
Payer: MEDICAID

## 2023-04-17 ENCOUNTER — HOSPITAL ENCOUNTER (OUTPATIENT)
Age: 38
Discharge: HOME | End: 2023-04-17
Payer: MEDICAID

## 2023-04-17 DIAGNOSIS — M47.816 LUMBAR SPONDYLOSIS: ICD-10-CM

## 2023-04-17 PROCEDURE — 97530 THERAPEUTIC ACTIVITIES: CPT

## 2023-04-17 PROCEDURE — 97161 PT EVAL LOW COMPLEX 20 MIN: CPT

## 2023-04-17 PROCEDURE — 72100 X-RAY EXAM L-S SPINE 2/3 VWS: CPT

## 2023-04-17 PROCEDURE — 72120 X-RAY BEND ONLY L-S SPINE: CPT

## 2023-04-17 NOTE — FLOWSHEET NOTE
BRIGID/UUI with urethral involvement. The patient was also educated on constipation and pelvic floor and it's affect on urination/defecation and pain. Dietary:   Other: General education regarding anatomy and physiology of pelvic floor and supporting structures    Manual Treatments:    PERFECT, myofacial release, tapping to improve contraction      Modalities:  --    Test/Measurements:  see eval             ASSESSMENT:   see eval      Treatment/Activity Tolerance:   [x] Patient tolerated treatment well [] Patient limited by fatique  [] Patient limited by pain [] Patient limited by other medical complications  [] Other:     Goals:      Short Term Goals: To be achieved in: 2 weeks  1. Independent in HEP and progression per patient tolerance, in order to prevent future occurrence of presenting issue. [] Progressing: [] Met: [] Not Met: [] Adjusted  2. Patient will have a 25% decrease in pain to indicate improvement in pelvic floor strength and relaxation, muscle coordination, and/or bladder retraining. [] Progressing: [] Met: [] Not Met: [] Adjusted     Long Term Goals: To be achieved in: 12 weeks  1. Disability index score of 75 or less for the FOTO to assist with reaching prior level of function. [] Progressing: [] Met: [] Not Met: [] Adjusted  2. Patient will have a 50% decrease in pain to indicate improvement in pelvic floor strength and relaxation, muscle coordination, and/or bladder retraining. [] Progressing: [] Met: [] Not Met: [] Adjusted  3. Patient will increase PERF score to 4/5 with 10 second endurance to demonstrate increased strength and control over pelvic floor musculature. [] Progressing: [] Met: [] Not Met: [] Adjusted  4. Patient will report 1 week or greater without urinary incontinence to progress towards completing ADLs and recreational activities without leakage. [] Progressing: [] Met: [] Not Met: [] Adjusted  5.  Patient will return to functional activities including walking and

## 2023-04-17 NOTE — THERAPY EVALUATION
(typically 20 minutes face-to-face)  [] EVAL (MOD) 00405 (typically 30 minutes face-to-face)  [] EVAL (HIGH) 99243 (typically 45 minutes face-to-face)  [] RE-EVAL       PLAN:   Frequency/Duration:  1 days per week for 12 Weeks:  Interventions:  [x]  Therapeutic exercise including: strength training, ROM, and functional mobility for joint, spine, core, and pelvic floor   [x]  NMR activation and proprioception for abdominals, pelvic floor musculature activation and coordination, and posture retraining   [x]  Manual therapy as indicated for spine, ribs, soft tissue, and pelvic floor to include: IASTM with or without dilator, STM, PROM, Gr I-IV mobilizations   [x] Modalities as needed that may include: E-stim, Biofeedback as indicated  [x] Patient education on pelvic floor anatomy and function, bladder and bowel anatomy and function, joint protection, postural re-education, activity modification, progression of HEP. HEP instruction: Written HEP instructions provided and reviewed    GOALS:  Patient stated goal: \"less pain, more activity\"  [] Progressing: [] Met: [] Not Met: [] Adjusted      Therapist goals for Patient:   Short Term Goals: To be achieved in: 2 weeks  1. Independent in HEP and progression per patient tolerance, in order to prevent future occurrence of presenting issue. [] Progressing: [] Met: [] Not Met: [] Adjusted  2. Patient will have a 25% decrease in pain to indicate improvement in pelvic floor strength and relaxation, muscle coordination, and/or bladder retraining. [] Progressing: [] Met: [] Not Met: [] Adjusted    Long Term Goals: To be achieved in: 12 weeks  1. Disability index score of 75 or less for the FOTO to assist with reaching prior level of function. [] Progressing: [] Met: [] Not Met: [] Adjusted  2. Patient will have a 50% decrease in pain to indicate improvement in pelvic floor strength and relaxation, muscle coordination, and/or bladder retraining.   [] Progressing: [] Met: []

## 2023-04-30 SDOH — HEALTH STABILITY: PHYSICAL HEALTH: ON AVERAGE, HOW MANY DAYS PER WEEK DO YOU ENGAGE IN MODERATE TO STRENUOUS EXERCISE (LIKE A BRISK WALK)?: 2 DAYS

## 2023-04-30 SDOH — HEALTH STABILITY: PHYSICAL HEALTH: ON AVERAGE, HOW MANY MINUTES DO YOU ENGAGE IN EXERCISE AT THIS LEVEL?: 30 MIN

## 2023-05-01 ENCOUNTER — OFFICE VISIT (OUTPATIENT)
Dept: ORTHOPEDIC SURGERY | Age: 38
End: 2023-05-01

## 2023-05-01 DIAGNOSIS — G56.22 NEURITIS OF LEFT ULNAR NERVE: ICD-10-CM

## 2023-05-01 DIAGNOSIS — M67.432 GANGLION CYST OF WRIST, LEFT: ICD-10-CM

## 2023-05-01 DIAGNOSIS — M25.532 LEFT WRIST PAIN: ICD-10-CM

## 2023-05-01 DIAGNOSIS — S56.912A FOREARM STRAIN, LEFT, INITIAL ENCOUNTER: ICD-10-CM

## 2023-05-01 NOTE — PROGRESS NOTES
Chief Complaint:   Chief Complaint   Patient presents with    Elbow Pain     F/u L wrist and elbow. Ulnar nerve hydro today. History of Present Illness:       Patient is a 30 y.o. female returns follow up for the above complaint. The patient was last seen approximately 6 weeksago. The symptoms overall stable since the last visit. The patient has had further testing for the problem. Status post ultrasound-guided nerve hydrodissection left 1/12/2023    Ultrasound-guided injection ganglion cyst/capsule synovial cyst-pisotriquetral articulation-left 3/1/2023    She has been experiencing burning quality pain in the extensor forearm and episodically dropping things from her grasp with the left hand. The symptoms do not follow an ulnar nerve distribution into the forearm or hand. Regarding her hand there is pain more so about the thumb CMC than the hypothenar region at the pisiform    Pain levels 5/10    She denies any progressive weakness of the right upper extremity     Past Medical History:        Past Medical History:   Diagnosis Date    Acid reflux     Asthma     as child    Back pain     Bipolar 1 disorder (HCC)     Degenerative disc disease, lumbar     Depression     Diabetes mellitus (HCC)     Fibromyalgia     Liver disease     fatty liver    Obesity     OCD (obsessive compulsive disorder)     PCOS (polycystic ovarian syndrome)     Sleep apnea     no longer uses cpap after weight loss        Present Medications:         Current Outpatient Medications   Medication Sig Dispense Refill    gabapentin (NEURONTIN) 300 MG capsule TAKE 1 CAPSULE BY MOUTH IN THE MORNING AND AT BEDTIME FOR 60 DAYS.  INTENDED SUPPLY: 30 DAYS 60 capsule 1    celecoxib (CELEBREX) 100 MG capsule TAKE 1 CAPSULE BY MOUTH DAILY AS NEEDED FOR PAIN 30 capsule 1    ondansetron (ZOFRAN) 4 MG tablet Take 1 tablet by mouth 3 times daily as needed for Nausea or Vomiting 15 tablet 0    metFORMIN (GLUCOPHAGE-XR) 500 MG extended release

## 2023-05-04 RX ORDER — GABAPENTIN 300 MG/1
300 CAPSULE ORAL 2 TIMES DAILY
Qty: 60 CAPSULE | Refills: 1 | Status: SHIPPED | OUTPATIENT
Start: 2023-05-04 | End: 2023-07-03

## 2023-05-04 RX ORDER — CELECOXIB 100 MG/1
100 CAPSULE ORAL DAILY PRN
Qty: 30 CAPSULE | Refills: 1 | Status: SHIPPED | OUTPATIENT
Start: 2023-05-04

## 2023-05-08 DIAGNOSIS — Z86.69 HX OF PERIPHERAL NEUROPATHY: ICD-10-CM

## 2023-05-08 DIAGNOSIS — M51.27 HERNIATION OF INTERVERTEBRAL DISC BETWEEN L5 AND S1: Primary | ICD-10-CM

## 2023-05-08 DIAGNOSIS — M51.26 HERNIATION OF INTERVERTEBRAL DISC BETWEEN L4 AND L5: ICD-10-CM

## 2023-05-08 DIAGNOSIS — M51.26 LUMBAR DISCOGENIC PAIN SYNDROME: ICD-10-CM

## 2023-05-08 DIAGNOSIS — M47.816 LUMBAR SPONDYLOSIS: ICD-10-CM

## 2023-05-08 DIAGNOSIS — R20.2 PARESTHESIA OF BOTH LOWER EXTREMITIES: ICD-10-CM

## 2023-05-09 ENCOUNTER — HOSPITAL ENCOUNTER (OUTPATIENT)
Dept: PHYSICAL THERAPY | Age: 38
Setting detail: THERAPIES SERIES
Discharge: HOME | End: 2023-05-09

## 2023-05-09 NOTE — PROGRESS NOTES
[facility], [address]  [place], [address]  Phone: [phone]  Fax [phone]      Physical Therapy  Cancellation/No-show Note  Patient Name:  Justin Rees  :  1985   Date:  2023  Cancels to date: 0  No-shows to date: 1    For today's appointment patient:  [] Cancelled  [] Rescheduled appointment  [x] No-show     Reason given by patient:  [] Patient ill  [] Conflicting appointment  [] No transportation    [] Conflict with work  [] No reason given  [] Other:     Comments:      Electronically signed by:  Josue Moreno PT

## 2023-05-23 ENCOUNTER — OFFICE VISIT (OUTPATIENT)
Dept: ORTHOPEDIC SURGERY | Age: 38
End: 2023-05-23
Payer: MEDICAID

## 2023-05-23 VITALS — WEIGHT: 252.5 LBS | BODY MASS INDEX: 39.55 KG/M2

## 2023-05-23 DIAGNOSIS — M67.432 GANGLION CYST OF WRIST, LEFT: Primary | ICD-10-CM

## 2023-05-23 PROCEDURE — 99213 OFFICE O/P EST LOW 20 MIN: CPT | Performed by: INTERNAL MEDICINE

## 2023-05-23 PROCEDURE — G8417 CALC BMI ABV UP PARAM F/U: HCPCS | Performed by: INTERNAL MEDICINE

## 2023-05-23 PROCEDURE — 1036F TOBACCO NON-USER: CPT | Performed by: INTERNAL MEDICINE

## 2023-05-23 PROCEDURE — G8428 CUR MEDS NOT DOCUMENT: HCPCS | Performed by: INTERNAL MEDICINE

## 2023-05-23 RX ORDER — TIRZEPATIDE 5 MG/.5ML
INJECTION, SOLUTION SUBCUTANEOUS
COMMUNITY
Start: 2023-05-02

## 2023-05-23 NOTE — PROGRESS NOTES
fenestration/attempted aspiration of capsule synovial cyst-3/1/2023    Plan:     Recommend continuous wrist splint immobilization for 10 days along with Celebrex 200 mg daily with GI precaution  Active modification avoid closed chain contact pressure to the hypothenar region  Consider repeat ultrasound guided aspiration/injection with steroid  Consider surgical consultation for excision of cyst as needed      I would recommend proceed with conservative measures at this time and if symptoms really surface or worsen a second injection attempt/aspiration injection attempt could be performed under ultrasound. Orders:      No orders of the defined types were placed in this encounter. Kole Yip MD.      Disclaimer: \"This note was dictated with voice recognition software. Though review and correction are routine, we apologize for any errors. \"

## 2023-05-24 ENCOUNTER — OFFICE VISIT (OUTPATIENT)
Dept: ORTHOPEDIC SURGERY | Age: 38
End: 2023-05-24
Payer: MEDICAID

## 2023-05-24 VITALS — WEIGHT: 252 LBS | BODY MASS INDEX: 39.55 KG/M2 | HEIGHT: 67 IN

## 2023-05-24 DIAGNOSIS — M51.36 LUMBAR DEGENERATIVE DISC DISEASE: ICD-10-CM

## 2023-05-24 DIAGNOSIS — M47.812 CERVICAL SPONDYLOSIS: Primary | ICD-10-CM

## 2023-05-24 PROCEDURE — G8417 CALC BMI ABV UP PARAM F/U: HCPCS | Performed by: ORTHOPAEDIC SURGERY

## 2023-05-24 PROCEDURE — 1036F TOBACCO NON-USER: CPT | Performed by: ORTHOPAEDIC SURGERY

## 2023-05-24 PROCEDURE — 99213 OFFICE O/P EST LOW 20 MIN: CPT | Performed by: ORTHOPAEDIC SURGERY

## 2023-05-24 PROCEDURE — G8427 DOCREV CUR MEDS BY ELIG CLIN: HCPCS | Performed by: ORTHOPAEDIC SURGERY

## 2023-05-24 SDOH — HEALTH STABILITY: PHYSICAL HEALTH: ON AVERAGE, HOW MANY MINUTES DO YOU ENGAGE IN EXERCISE AT THIS LEVEL?: 20 MIN

## 2023-05-24 SDOH — HEALTH STABILITY: PHYSICAL HEALTH: ON AVERAGE, HOW MANY DAYS PER WEEK DO YOU ENGAGE IN MODERATE TO STRENUOUS EXERCISE (LIKE A BRISK WALK)?: 2 DAYS

## 2023-05-24 NOTE — PROGRESS NOTES
New Patient: LUMBAR SPINE    Referring Provider:  Osiris Coronel    CHIEF COMPLAINT:    Chief Complaint   Patient presents with    Back Problem     Back pain, numbness tingling bilateral legs       HISTORY OF PRESENT ILLNESS:    Ms. Gilmer Valenzuela  is a pleasant 30 y.o. male kindly referred by Dr. Irma Strickland for the evaluation of neck and back pain. Symptoms began insidiously more than 10 years ago. She rates her pain 7/10. She notes intermittent numbness in her arms and legs. She denies saddle anesthesia, loss of bowel or bladder control and loss of fine motor control.     Current/Past Treatment:   Physical Therapy: yes  Chiropractic:  no   Injection:  yes   Medications: Neurontin    Past Medical History:   Past Medical History:   Diagnosis Date    Acid reflux     Asthma     as child    Back pain     Bipolar 1 disorder ([address]     Degenerative disc disease, lumbar     Depression     Diabetes mellitus (HCC)     Fibromyalgia     Liver disease     fatty liver    Obesity     OCD (obsessive compulsive disorder)     PCOS (polycystic ovarian syndrome)     Sleep apnea     no longer uses cpap after weight loss      Past Surgical History:     Past Surgical History:   Procedure Laterality Date    ANKLE SURGERY Left     Scar tissue removal    CARPAL TUNNEL RELEASE Right 08/23/2021    RIGHT CARPAL TUNNEL RELEASE performed by Darryl Sims MD at [address] Left 09/10/2021    LEFT CARPAL TUNNEL RELEASE performed by Darryl Sims MD at [facility], [address] N/A 04/20/2020    LAPAROSCOPIC CHOLECYSTECTOMY performed by Maia Gonzales DO at [facility], [address] [place] [address] ([address] [place] [address])      OTHER SURGICAL HISTORY Left 12/10/2014    Excision of Cyst Left Upper Posterior Ankle    OTHER SURGICAL HISTORY Right 12/29/2016    Laparotomy with Right SO    OVARY REMOVAL  12/28/2016    unsure of side    SHOULDER ARTHROSCOPY Left

## 2023-06-01 RX ORDER — CELECOXIB 100 MG/1
100 CAPSULE ORAL DAILY PRN
Qty: 30 CAPSULE | Refills: 1 | OUTPATIENT
Start: 2023-06-01

## 2023-06-01 RX ORDER — GABAPENTIN 300 MG/1
300 CAPSULE ORAL 2 TIMES DAILY
Qty: 60 CAPSULE | Refills: 1 | OUTPATIENT
Start: 2023-06-01 | End: 2023-07-31

## 2023-06-01 NOTE — TELEPHONE ENCOUNTER
These medications were just refilled today 6/1/23. Pharmacy will need to send Rx request closer to time that refills are due.

## 2023-06-10 ENCOUNTER — HOSPITAL ENCOUNTER (EMERGENCY)
Age: 38
Discharge: HOME | End: 2023-06-10
Payer: MEDICAID

## 2023-06-10 VITALS
RESPIRATION RATE: 18 BRPM | HEIGHT: 67 IN | BODY MASS INDEX: 39.24 KG/M2 | SYSTOLIC BLOOD PRESSURE: 123 MMHG | HEART RATE: 88 BPM | TEMPERATURE: 97.4 F | OXYGEN SATURATION: 99 % | WEIGHT: 250 LBS | DIASTOLIC BLOOD PRESSURE: 85 MMHG

## 2023-06-10 DIAGNOSIS — R21 RASH AND OTHER NONSPECIFIC SKIN ERUPTION: Primary | ICD-10-CM

## 2023-06-10 LAB
ALBUMIN SERPL-MCNC: 4 G/DL (ref 3.4–5)
ALBUMIN/GLOB SERPL: 1.3 {RATIO} (ref 1.1–2.2)
ALP SERPL-CCNC: 48 U/L (ref 40–129)
ALT SERPL-CCNC: 14 U/L (ref 10–40)
ANION GAP SERPL CALCULATED.3IONS-SCNC: 10 MMOL/L (ref 3–16)
AST SERPL-CCNC: 21 U/L (ref 15–37)
BASOPHILS # BLD: 0.1 K/UL (ref 0–0.2)
BASOPHILS NFR BLD: 1.4 %
BILIRUB SERPL-MCNC: 0.5 MG/DL (ref 0–1)
BUN SERPL-MCNC: 8 MG/DL (ref 7–20)
CALCIUM SERPL-MCNC: 9.3 MG/DL (ref 8.3–10.6)
CHLORIDE SERPL-SCNC: 99 MMOL/L (ref 99–110)
CO2 SERPL-SCNC: 27 MMOL/L (ref 21–32)
CREAT SERPL-MCNC: 0.6 MG/DL (ref 0.6–1.1)
DEPRECATED RDW RBC AUTO: 14.3 % (ref 12.4–15.4)
EOSINOPHIL # BLD: 0.1 K/UL (ref 0–0.6)
EOSINOPHIL NFR BLD: 1.3 %
GFR SERPLBLD CREATININE-BSD FMLA CKD-EPI: >60 ML/MIN/{1.73_M2}
GLUCOSE SERPL-MCNC: 80 MG/DL (ref 70–99)
HCG SERPL QL: NEGATIVE
HCT VFR BLD AUTO: 37.5 % (ref 36–48)
HGB BLD-MCNC: 12.4 G/DL (ref 12–16)
LYMPHOCYTES # BLD: 1.4 K/UL (ref 1–5.1)
LYMPHOCYTES NFR BLD: 27.4 %
MCH RBC QN AUTO: 31.3 PG (ref 26–34)
MCHC RBC AUTO-ENTMCNC: 33.1 G/DL (ref 31–36)
MCV RBC AUTO: 94.5 FL (ref 80–100)
MONOCYTES # BLD: 0.4 K/UL (ref 0–1.3)
MONOCYTES NFR BLD: 7.4 %
NEUTROPHILS # BLD: 3.2 K/UL (ref 1.7–7.7)
NEUTROPHILS NFR BLD: 62.5 %
PLATELET # BLD AUTO: 195 K/UL (ref 135–450)
PMV BLD AUTO: 9.5 FL (ref 5–10.5)
POTASSIUM SERPL-SCNC: 3.6 MMOL/L (ref 3.5–5.1)
PROT SERPL-MCNC: 7.1 G/DL (ref 6.4–8.2)
RBC # BLD AUTO: 3.97 M/UL (ref 4–5.2)
SODIUM SERPL-SCNC: 136 MMOL/L (ref 136–145)
WBC # BLD AUTO: 5.1 K/UL (ref 4–11)

## 2023-06-10 PROCEDURE — 36415 COLL VENOUS BLD VENIPUNCTURE: CPT

## 2023-06-10 PROCEDURE — 86611 BARTONELLA ANTIBODY: CPT

## 2023-06-10 PROCEDURE — 80053 COMPREHEN METABOLIC PANEL: CPT

## 2023-06-10 PROCEDURE — 6370000000 HC RX 637 (ALT 250 FOR IP): Performed by: PHYSICIAN ASSISTANT

## 2023-06-10 PROCEDURE — 84703 CHORIONIC GONADOTROPIN ASSAY: CPT

## 2023-06-10 PROCEDURE — 85025 COMPLETE CBC W/AUTO DIFF WBC: CPT

## 2023-06-10 RX ORDER — CLINDAMYCIN HYDROCHLORIDE 150 MG/1
450 CAPSULE ORAL ONCE
Status: COMPLETED | OUTPATIENT
Start: 2023-06-10 | End: 2023-06-10

## 2023-06-10 RX ORDER — CLINDAMYCIN HYDROCHLORIDE 150 MG/1
450 CAPSULE ORAL 3 TIMES DAILY
Qty: 90 CAPSULE | Refills: 0 | Status: SHIPPED | OUTPATIENT
Start: 2023-06-10 | End: 2023-06-20

## 2023-06-10 RX ORDER — DIPHENHYDRAMINE HCL 25 MG
25 TABLET ORAL ONCE
Status: COMPLETED | OUTPATIENT
Start: 2023-06-10 | End: 2023-06-10

## 2023-06-10 RX ADMIN — DIPHENHYDRAMINE HCL 25 MG: 25 TABLET ORAL at 17:22

## 2023-06-10 RX ADMIN — CLINDAMYCIN HYDROCHLORIDE 450 MG: 150 CAPSULE ORAL at 17:22

## 2023-06-10 ASSESSMENT — ENCOUNTER SYMPTOMS
RESPIRATORY NEGATIVE: 1
VOMITING: 0
NAUSEA: 0
GASTROINTESTINAL NEGATIVE: 1

## 2023-06-10 ASSESSMENT — PAIN - FUNCTIONAL ASSESSMENT
PAIN_FUNCTIONAL_ASSESSMENT: NONE - DENIES PAIN
PAIN_FUNCTIONAL_ASSESSMENT: NONE - DENIES PAIN

## 2023-06-10 NOTE — DISCHARGE INSTRUCTIONS
Please continue with clindamycin as prescribed. These were sent to your pharmacy. The cat scratch antibody test is a send out test we do not get the results back today. We will call if they are abnormal.  If you do not receive a call from us they are negative. You can also check on [employer] for your results. Please follow closely with your primary doctor.   Return to ER if you have vomiting or worsening symptoms

## 2023-06-10 NOTE — ED PROVIDER NOTES
normal limits   COMPREHENSIVE METABOLIC PANEL W/ REFLEX TO MG FOR LOW K   HCG, SERUM, QUALITATIVE   BARTONELLA HENSELAE (CAT SCRATCH) ANTIBODIES IGG & IGM BY IFA       When ordered only abnormal lab results are displayed. All other labs were within normal range or not returned as of this dictation. EKG: When ordered, EKG's are interpreted by the Emergency Department Physician in the absence of a cardiologist.  Please see their note for interpretation of EKG. RADIOLOGY:   Non-plain film images such as CT, Ultrasound and MRI are read by the radiologist. Plain radiographic images are visualized and preliminarily interpreted by the ED Provider with the below findings:      Interpretation per the Radiologist below, if available at the time of this note:    No orders to display     No results found. No results found. PROCEDURES   Unless otherwise noted below, none     Procedures    CRITICAL CARE TIME (.cctime)       PAST MEDICAL HISTORY      has a past medical history of Acid reflux, Asthma, Back pain, Bipolar 1 disorder ([address] Degenerative disc disease, lumbar, Depression, Diabetes mellitus ([address] Fibromyalgia, Liver disease, Obesity, OCD (obsessive compulsive disorder), PCOS (polycystic ovarian syndrome), and Sleep apnea. EMERGENCY DEPARTMENT COURSE and DIFFERENTIAL DIAGNOSIS/MDM:   Vitals:    Vitals:    06/10/23 1649   BP: 123/85   Pulse: 87   Resp: 16   Temp: 97.4 °F (36.3 °C)   TempSrc: Oral   SpO2: 98%   Weight: 250 lb (113.4 kg)   Height: 5' 7\" (1.702 m)       Patient was given the following medications:  Medications   diphenhydrAMINE (BENADRYL) tablet 25 mg (25 mg Oral Given 6/10/23 1722)   clindamycin (CLEOCIN) capsule 450 mg (450 mg Oral Given 6/10/23 1722)             Is this patient to be included in the SEP-1 Core Measure due to severe sepsis or septic shock? No   Exclusion criteria - the patient is NOT to be included for SEP-1 Core Measure due to:   Infection is not suspected    Chronic

## 2023-06-28 ENCOUNTER — OFFICE VISIT (OUTPATIENT)
Dept: ORTHOPEDIC SURGERY | Age: 38
End: 2023-06-28

## 2023-06-28 DIAGNOSIS — M51.36 LUMBAR DEGENERATIVE DISC DISEASE: Primary | ICD-10-CM

## 2023-06-28 DIAGNOSIS — M47.816 LUMBAR SPONDYLOSIS: ICD-10-CM

## 2023-06-28 DIAGNOSIS — S33.9XXA SPRAIN AND STRAIN OF LUMBOSACRAL JOINT/LIGAMENT, INITIAL ENCOUNTER: ICD-10-CM

## 2023-06-28 DIAGNOSIS — S76.112A STRAIN OF LEFT QUADRICEPS TENDON, INITIAL ENCOUNTER: ICD-10-CM

## 2023-06-28 RX ORDER — TIZANIDINE 4 MG/1
4 TABLET ORAL 3 TIMES DAILY PRN
Qty: 30 TABLET | Refills: 1 | Status: SHIPPED | OUTPATIENT
Start: 2023-06-28

## 2023-06-29 RX ORDER — GABAPENTIN 300 MG/1
300 CAPSULE ORAL 2 TIMES DAILY
Qty: 60 CAPSULE | Refills: 1 | Status: SHIPPED | OUTPATIENT
Start: 2023-06-29 | End: 2023-08-28

## 2023-06-29 RX ORDER — CELECOXIB 100 MG/1
100 CAPSULE ORAL DAILY PRN
Qty: 30 CAPSULE | Refills: 1 | Status: SHIPPED | OUTPATIENT
Start: 2023-06-29

## 2023-07-05 ENCOUNTER — PATIENT MESSAGE (OUTPATIENT)
Dept: ORTHOPEDIC SURGERY | Age: 38
End: 2023-07-05

## 2023-07-06 ENCOUNTER — TELEPHONE (OUTPATIENT)
Dept: ORTHOPEDIC SURGERY | Age: 38
End: 2023-07-06

## 2023-07-11 ENCOUNTER — OFFICE VISIT (OUTPATIENT)
Dept: ORTHOPEDIC SURGERY | Age: 38
End: 2023-07-11

## 2023-07-11 VITALS — HEIGHT: 67 IN | BODY MASS INDEX: 39.24 KG/M2 | WEIGHT: 250 LBS

## 2023-07-11 DIAGNOSIS — M25.562 LEFT KNEE PAIN, UNSPECIFIED CHRONICITY: Primary | ICD-10-CM

## 2023-07-11 DIAGNOSIS — M22.2X2 PATELLOFEMORAL SYNDROME, LEFT: ICD-10-CM

## 2023-07-12 NOTE — TELEPHONE ENCOUNTER
Paperwork has been located and was faxed to [address] [place] [address] on 6/30/23 with fax confirmation. Emailed the entirety of this fax to pt as she requested.

## 2023-07-12 NOTE — PROGRESS NOTES
Motion: Full range and symmetric without pain      Strength: Normal with SLR      Special Tests: Negative extensor lag; Lachman test negative, collateral ligament stressing stable, anterior/posterior drawer negative, medial and lateral Sandrine testing negative, patella femoral provacative suggestive      Skin: There are no rashes, ulcerations or lesions. Gait: Nonantalgic      Reflex intact lower     Additional Comments:        Additional Examinations:           Right Lower Extremity: Examination of the right lower extremity does not show any tenderness, deformity or injury. Range of motion is unremarkable. There is no gross instability. There are no rashes, ulcerations or lesions. Strength and tone are normal.   Neurolgic -Light touch sensation and manual muscle testing normal L2-S1. No fasiculations. Pattella tendon and Achilles tendon reflexes +2 bilaterally. Seated SLR negative        Office Imaging Results/Procedures PerformedToday:             Office Procedures:     Orders Placed This Encounter   Procedures    [facility] Physical Therapy [facility]     Referral Priority:   Routine     Referral Type:   Eval and Treat     Referral Reason:   Specialty Services Required     Requested Specialty:   Physical Therapist     Number of Visits Requested:   1    Breg / DJO Hinged Lateral Stabilizer Brace     Patient was prescribed an Hinged Lateral Stabilizer Knee Brace. The left knee will require stabilization / immobilization from this semi-rigid / rigid orthosis to improve their function. The orthosis will assist in protecting the affected area, provide functional support and facilitate healing. The patient was educated and fit by a healthcare professional with expert knowledge and specialization in brace application while under the direct supervision of the treating physician. Verbal and written instructions for the use of and application of this item were provided.    They were

## 2023-07-26 RX ORDER — TIZANIDINE 4 MG/1
4 TABLET ORAL 3 TIMES DAILY PRN
Qty: 30 TABLET | Refills: 1 | Status: SHIPPED | OUTPATIENT
Start: 2023-07-26

## 2023-07-26 NOTE — TELEPHONE ENCOUNTER
LS 7/11. NEXT APPT 8/17  PILLS LEFT:  [facility]. I WILL RELAY THIS REQUEST TO STEPHANY.

## 2023-07-27 ENCOUNTER — HOSPITAL ENCOUNTER (OUTPATIENT)
Dept: PHYSICAL THERAPY | Age: 38
Setting detail: THERAPIES SERIES
Discharge: HOME | End: 2023-07-27
Payer: MEDICAID

## 2023-07-27 PROCEDURE — 97161 PT EVAL LOW COMPLEX 20 MIN: CPT | Performed by: SPECIALIST

## 2023-07-27 PROCEDURE — 97110 THERAPEUTIC EXERCISES: CPT | Performed by: SPECIALIST

## 2023-07-27 PROCEDURE — 97016 VASOPNEUMATIC DEVICE THERAPY: CPT | Performed by: SPECIALIST

## 2023-07-27 PROCEDURE — 97112 NEUROMUSCULAR REEDUCATION: CPT | Performed by: SPECIALIST

## 2023-07-27 NOTE — FLOWSHEET NOTE
[facility] and [address], [place] [address] [facility], [address] [place] [address] Drive  Phone: [phone]   Fax:     [phone]      Physical Therapy Treatment Note/ Progress Report:     Date:  2023    Patient Name:  Ryan Vences    :  1985  MRN: [id number]  Restrictions/Precautions:    Medical/Treatment Diagnosis Information:   M25.562 (ICD-10-CM) - Left knee pain, unspecified chronicity     Insurance/Certification information:   [address] [place] [address]  Physician Information:   Darci Milton MD  Has the plan of care been signed (Y/N):        []  Yes  [x]  No     Date of Patient follow up with Physician: NS    Is this a Progress Report:     []  Yes  [x]  No      If Yes:  Date Range for reporting period:  Beginnin23 ------------ Endin23    Progress report will be due (10 Rx or 30 days whichever is less):      Recertification will be due (POC Duration  / 90 days whichever is less): 10/27/23      Visit # Insurance Allowable Auth Required   In Person 1 30 yr []  Yes     []  No    Tele Health 0  []  Yes     []  No    Total 1       FOTO Score: LEFS 51%     Date assessed:  23      Latex Allergy:  [x]NO      []YES  Preferred Language for Healthcare:   [x]English       []other:    Pain level:  10     SUBJECTIVE:  See eval    OBJECTIVE: See eval  Observation:   Test measurements:      RESTRICTIONS/PRECAUTIONS: none    Exercises/Interventions:   Therapeutic Ex (85971) Sets/sec Reps Notes/CUES HEP   Piriformis stretch knee bent/ straight 20\" 3x  x   Belt hamstring stretch/ IT band 3\" 10x  x   SLR/ add 10\" 10x  x                                                                  Manual Intervention (21365)       MFR medial patella  5 min                                        NMR re-education (58348)   CUES NEEDED    Stand calf stretch 30\" 3x  x   HR/TR  20x  x          bridges  15x  x   clamshells BK 15x  x

## 2023-07-27 NOTE — PLAN OF CARE
and control, within 5lb HHD in LE to allow for proper functional mobility as indicated by patients Functional Deficits. [x] Progressing: [] Met: [] Not Met: [] Adjusted     4. Patient will return to [facility] for  functional activities without increased symptoms or restriction. [x] Progressing: [] Met: [] Not Met: [] Adjusted     5.  Walk/ stand/ stairs with min to no limitations (patient specific functional goal)    [x] Progressing: [] Met: [] Not Met: [] Adjusted      Electronically signed by:  Paola Dumont PT

## 2023-08-01 RX ORDER — CELECOXIB 100 MG/1
CAPSULE ORAL
Qty: 30 CAPSULE | Refills: 1 | Status: SHIPPED | OUTPATIENT
Start: 2023-08-01

## 2023-08-01 RX ORDER — GABAPENTIN 300 MG/1
300 CAPSULE ORAL 2 TIMES DAILY
Qty: 60 CAPSULE | Refills: 1 | Status: SHIPPED | OUTPATIENT
Start: 2023-08-01 | End: 2023-09-30

## 2023-08-02 ENCOUNTER — HOSPITAL ENCOUNTER (OUTPATIENT)
Dept: PHYSICAL THERAPY | Age: 38
Setting detail: THERAPIES SERIES
Discharge: HOME | End: 2023-08-02
Payer: MEDICAID

## 2023-08-02 PROCEDURE — 97016 VASOPNEUMATIC DEVICE THERAPY: CPT | Performed by: SPECIALIST/TECHNOLOGIST

## 2023-08-02 PROCEDURE — 97110 THERAPEUTIC EXERCISES: CPT | Performed by: SPECIALIST/TECHNOLOGIST

## 2023-08-02 PROCEDURE — 97112 NEUROMUSCULAR REEDUCATION: CPT | Performed by: SPECIALIST/TECHNOLOGIST

## 2023-08-02 NOTE — FLOWSHEET NOTE
[address] [facility], [facility] [address]  Phone: [phone]   Fax:     [phone]      Physical Therapy Treatment Note/ Progress Report:     Date:  2023    Patient Name:  Ruth Bethea    :  1985  MRN: [id number]  Restrictions/Precautions:    Medical/Treatment Diagnosis Information:   M25.562 (ICD-10-CM) - Left knee pain, unspecified chronicity     Insurance/Certification information:   [address] [place] [address]  Physician Information:   Al Ring MD  Has the plan of care been signed (Y/N):        []  Yes  [x]  No     Date of Patient follow up with Physician: NS    Is this a Progress Report:     []  Yes  [x]  No      If Yes:  Date Range for reporting period:  Beginnin23 ------------ Endin23    Progress report will be due (10 Rx or 30 days whichever is less):      Recertification will be due (POC Duration  / 90 days whichever is less): 10/27/23      Visit # Insurance Allowable Auth Required   In Person 2 30 yr []  Yes     []  No    Tele Health 0  []  Yes     []  No    Total 2       FOTO Score: LEFS 51%     Date assessed:  23      Latex Allergy:  [x]NO      []YES  Preferred Language for Healthcare:   [x]English       []other:    Pain level:  6/10     SUBJECTIVE:  Pt notes her knee continues to buckle and pop. Overall feels ok today.      OBJECTIVE: See eval  Observation:   Test measurements:      RESTRICTIONS/PRECAUTIONS: none    Exercises/Interventions:   Therapeutic Ex (00564) Sets/sec Reps Notes/CUES HEP   Piriformis stretch knee bent/ straight 20\" 3x  x   Belt hamstring stretch/ IT band 3\" 10x  x   SLR/ add 10\" 10x  x   [facility] ABD/EXT x20 30#     Knee ext x20 20# 90/30    Hamstring curls x20 40#     LSD 6\" x10     SLS 10\" x10 AM                                Manual Intervention (36395)       MFR medial patella  5 min                                        NMR

## 2023-08-09 ENCOUNTER — HOSPITAL ENCOUNTER (OUTPATIENT)
Dept: PHYSICAL THERAPY | Age: 38
Setting detail: THERAPIES SERIES
Discharge: HOME | End: 2023-08-09
Payer: MEDICAID

## 2023-08-09 PROCEDURE — 97110 THERAPEUTIC EXERCISES: CPT | Performed by: SPECIALIST/TECHNOLOGIST

## 2023-08-09 PROCEDURE — 97016 VASOPNEUMATIC DEVICE THERAPY: CPT | Performed by: SPECIALIST/TECHNOLOGIST

## 2023-08-09 PROCEDURE — 97112 NEUROMUSCULAR REEDUCATION: CPT | Performed by: SPECIALIST/TECHNOLOGIST

## 2023-08-09 NOTE — FLOWSHEET NOTE
[facility], [address] [place] [address] Drive  Phone: [phone]   Fax:     [phone]      Physical Therapy Treatment Note/ Progress Report:     Date:  2023    Patient Name:  Jorgito Christensen    :  1985  MRN: [id number]  Restrictions/Precautions:    Medical/Treatment Diagnosis Information:   M25.562 (ICD-10-CM) - Left knee pain, unspecified chronicity     Insurance/Certification information:   [address] [place] [address]  Physician Information:   Michael Jeffers MD  Has the plan of care been signed (Y/N):        []  Yes  [x]  No     Date of Patient follow up with Physician: NS    Is this a Progress Report:     []  Yes  [x]  No      If Yes:  Date Range for reporting period:  Beginnin23 ------------ Endin23    Progress report will be due (10 Rx or 30 days whichever is less): 3/03/83     Recertification will be due (POC Duration  / 90 days whichever is less): 10/27/23      Visit # Insurance Allowable Auth Required   In Person 3 30 yr []  Yes     []  No    Tele Health 0  []  Yes     []  No    Total 3       FOTO Score: LEFS 51%     Date assessed:  23      Latex Allergy:  [x]NO      []YES  Preferred Language for Healthcare:   [x]English       []other:    Pain level:  6/10     SUBJECTIVE:  Pt notes her knee continues to buckle and pop. Overall feels ok today.      OBJECTIVE: See eval  Observation:   Test measurements:      RESTRICTIONS/PRECAUTIONS: none    Exercises/Interventions:   Therapeutic Ex (59972) Sets/sec Reps Notes/CUES HEP   Piriformis stretch knee bent/ straight 20\" 3x  x   Belt hamstring stretch/ IT band 3\" 10x  x   SLR/ add 10\" 10x  x   [facility] ABD/EXT x20 30#     Knee ext x20 20# 90/30    Hamstring curls x20 40#     LSD 6\" x10     SLS 10\" x10 AM                                Manual Intervention (40457)       MFR medial patella  5 min                                        NMR

## 2023-08-16 ENCOUNTER — HOSPITAL ENCOUNTER (OUTPATIENT)
Dept: PHYSICAL THERAPY | Age: 38
Setting detail: THERAPIES SERIES
Discharge: HOME | End: 2023-08-16
Payer: MEDICAID

## 2023-08-16 PROCEDURE — 97112 NEUROMUSCULAR REEDUCATION: CPT | Performed by: SPECIALIST

## 2023-08-16 PROCEDURE — 97016 VASOPNEUMATIC DEVICE THERAPY: CPT | Performed by: SPECIALIST

## 2023-08-16 PROCEDURE — 97110 THERAPEUTIC EXERCISES: CPT | Performed by: SPECIALIST

## 2023-08-16 NOTE — FLOWSHEET NOTE
[facility] and [address] [facility], [facility] [address]  Phone: [phone]   Fax:     [phone]      Physical Therapy Treatment Note/ Progress Report:     Date:  2023    Patient Name:  Judith Long    :  1985  MRN: [id number]  Restrictions/Precautions:    Medical/Treatment Diagnosis Information:   M25.562 (ICD-10-CM) - Left knee pain, unspecified chronicity     Insurance/Certification information:   [address] [place] [address]  Physician Information:   Tonie Booker MD  Has the plan of care been signed (Y/N):        []  Yes  [x]  No     Date of Patient follow up with Physician: NS    Is this a Progress Report:     []  Yes  [x]  No      If Yes:  Date Range for reporting period:  Beginnin23 ------------ Endin23    Progress report will be due (10 Rx or 30 days whichever is less): 86     Recertification will be due (POC Duration  / 90 days whichever is less): 10/27/23      Visit # Insurance Allowable Auth Required   In Person 4 30 yr []  Yes     []  No    Tele Health 0  []  Yes     []  No    Total 4       FOTO Score: LEFS 51%     Date assessed:  23      Latex Allergy:  [x]NO      []YES  Preferred Language for Healthcare:   [x]English       []other:    Pain level:  310     SUBJECTIVE:  Pt notes GI pain    OBJECTIVE: See eval  Observation:   Test measurements:      RESTRICTIONS/PRECAUTIONS: none    Exercises/Interventions:   Therapeutic Ex (29948) Sets/sec Reps Notes/CUES HEP   Piriformis stretch knee bent/ straight 20\" 3x  x   Belt hamstring stretch/ IT band 3\" 10x  x   SLR/ add 10\" 10x  x   [facility] ABD/EXT x20 30#     Knee ext x20 20# 90/30    Hamstring curls x20 40#     LSD 4\" 2x10     SLS 10\" x10 AM    # 30x     bike  5 min                   Manual Intervention (03151)       MFR medial patella  5 min                                        NMR re-education (73260)   CUES NEEDED

## 2023-08-17 ENCOUNTER — OFFICE VISIT (OUTPATIENT)
Dept: ORTHOPEDIC SURGERY | Age: 38
End: 2023-08-17

## 2023-08-17 DIAGNOSIS — M22.3X9 EXCESSIVE LATERAL PRESSURE SYNDROME: ICD-10-CM

## 2023-08-17 DIAGNOSIS — M22.2X2 PATELLOFEMORAL SYNDROME OF BOTH KNEES: ICD-10-CM

## 2023-08-17 DIAGNOSIS — M25.561 PAIN IN BOTH KNEES, UNSPECIFIED CHRONICITY: ICD-10-CM

## 2023-08-17 DIAGNOSIS — M25.562 PAIN IN BOTH KNEES, UNSPECIFIED CHRONICITY: ICD-10-CM

## 2023-08-17 DIAGNOSIS — M22.2X1 PATELLOFEMORAL SYNDROME OF BOTH KNEES: ICD-10-CM

## 2023-08-17 NOTE — PROGRESS NOTES
Chief Complaint:   Chief Complaint   Patient presents with    Knee Pain     F/u B knee. Feeling about the same as last visit, pain surrounding patella on both sides. Experiencing some \"crackling\". Doesn't know of anything that helps the pain. History of Present Illness:       Patient is a 30 y.o. female returns follow up for the above complaint. The patient was last seen approximately 5 weeksago. The symptoms are stable since the last visit with respect to the left knee. However, the right knee has become increasingly problematic unprompted by specific injury or event. She has started physical therapy in the interim for the left knee. Pain localizes to the front side of the knee and  does seems to follow a typical patella femoral provacative pattern. There are not mechanical symptoms that suggest meniscal injury. The patient denies subjective instability about the knee and denies new onset weakness of the lower extremity. She remains off work as she cannot work with the restrictions recommend for her. Pain level 7/10 on the right and 3/10 on the left    The patient denies a pattern of activity related swelling. There is no prior history of knee trauma. There is no prior history of autoimmune disease, inflammatory arthropathy, or crystal arthropathy.             Past Medical History:        Past Medical History:   Diagnosis Date    Acid reflux     Asthma     as child    Back pain     Bipolar 1 disorder ([address])     Degenerative disc disease, lumbar     Depression     Diabetes mellitus (HCC)     Fibromyalgia     Liver disease     fatty liver    Obesity     OCD (obsessive compulsive disorder)     PCOS (polycystic ovarian syndrome)     Sleep apnea     no longer uses cpap after weight loss        Present Medications:         Current Outpatient Medications   Medication Sig Dispense Refill    gabapentin (NEURONTIN) 300 MG capsule TAKE 1 CAPSULE BY MOUTH IN THE MORNING AND AT BEDTIME FOR 60

## 2023-08-23 ENCOUNTER — HOSPITAL ENCOUNTER (OUTPATIENT)
Dept: PHYSICAL THERAPY | Age: 38
Setting detail: THERAPIES SERIES
Discharge: HOME | End: 2023-08-23
Payer: MEDICAID

## 2023-08-23 PROCEDURE — 97112 NEUROMUSCULAR REEDUCATION: CPT | Performed by: SPECIALIST/TECHNOLOGIST

## 2023-08-23 PROCEDURE — 97016 VASOPNEUMATIC DEVICE THERAPY: CPT | Performed by: SPECIALIST/TECHNOLOGIST

## 2023-08-23 PROCEDURE — 97110 THERAPEUTIC EXERCISES: CPT | Performed by: SPECIALIST/TECHNOLOGIST

## 2023-08-23 PROCEDURE — 97140 MANUAL THERAPY 1/> REGIONS: CPT | Performed by: SPECIALIST/TECHNOLOGIST

## 2023-08-23 NOTE — FLOWSHEET NOTE
/ STM / Oscillations-Mobs:  G-I, II, III, IV (PA's, Inf., Post.)  [] (98010) Provided manual therapy to mobilize LE, proximal hip and/or LS spine soft tissue/joints for the purpose of modulating pain, promoting relaxation, increasing ROM, reducing/eliminating soft tissue swelling/inflammation/restriction, improving soft tissue extensibility and allowing for proper ROM for normal function with self-care, mobility, lifting and ambulation. Modalities:     [] GAME READY (VASO)- for significant edema, swelling, pain control. Charges:  Timed Code Treatment Minutes: 38   Total Treatment Minutes:  48   BWC:  TE TIME:  NMR TIME:  MANUAL TIME:  UNTIMED MINUTES:  Medicare Total:         [] EVAL (LOW) 50057 (typically 20 minutes face-to-face)  [] EVAL (MOD) 72079 (typically 30 minutes face-to-face)  [] EVAL (HIGH) 95740 (typically 45 minutes face-to-face)  [] RE-EVAL     [x] TQ(99161) x  2   [] IONTO  [x] NMR (85192) x    1 [x] VASO  [] Manual (10494) x     [] Other:  [] TA x      [] Mech Traction (30908)  [] ES(attended) (00084)      [] ES (un) (67391):    ASSESSMENT:  Added a few machines today without increased sx. Good exercise tolerance. GOALS:      Patient stated goal: stairs walk stand    Therapist goals for Patient:   Short Term Goals: To be achieved in: 2 weeks  1. Independent in HEP and progression per patient tolerance, in order to prevent re-injury. [x] Progressing: [] Met: [] Not Met: [] Adjusted     2. Patient will have a decrease in pain to facilitate improvement in movement, function, and ADLs as indicated by Functional Deficits. [x] Progressing: [] Met: [] Not Met: [] Adjusted     Long Term Goals: To be achieved in: 6 weeks  1. FOTO score will match or exceed predicted score to assist with reaching prior level of function. [x] Progressing: [] Met: [] Not Met: [] Adjusted     2.  Patient will demonstrate increased AROM to WNL to allow for proper joint functioning as indicated by patients

## 2023-08-30 ENCOUNTER — HOSPITAL ENCOUNTER (OUTPATIENT)
Dept: PHYSICAL THERAPY | Age: 38
Setting detail: THERAPIES SERIES
Discharge: HOME | End: 2023-08-30
Payer: MEDICAID

## 2023-08-30 NOTE — FLOWSHEET NOTE
[facility], [facility] [address]  Phone: [phone]   Fax:     [phone]    Physical Therapy  Cancellation/No-show Note  Patient Name:  Tom Jarrett  :  1985   Date:  2023    Cancelled visits to date: 0  No-shows to date: 1    For today's appointment patient:  []  Cancelled  []  Rescheduled appointment  [x]  No-show     Reason given by patient:  []  Patient ill  []  Conflicting appointment  []  No transportation    []  Conflict with work  [x]  No reason given  []  Other:     Comments:      Phone call information:   [x]  Phone call made today to patient at am/pm at the number provided:      []  Patient answered, conversation as follows:    []  Patient did not answer, message left as follows:  []  Phone call not needed - pt contacted us to cancel and provided reason for cancellation.      Electronically signed by:  Osbaldo Bernal, PT, PT

## 2023-09-08 ENCOUNTER — HOSPITAL ENCOUNTER (OUTPATIENT)
Dept: PHYSICAL THERAPY | Age: 38
Setting detail: THERAPIES SERIES
Discharge: HOME | End: 2023-09-08
Payer: MEDICAID

## 2023-09-08 PROCEDURE — 97112 NEUROMUSCULAR REEDUCATION: CPT | Performed by: SPECIALIST/TECHNOLOGIST

## 2023-09-08 PROCEDURE — 97016 VASOPNEUMATIC DEVICE THERAPY: CPT | Performed by: SPECIALIST/TECHNOLOGIST

## 2023-09-08 PROCEDURE — 97110 THERAPEUTIC EXERCISES: CPT | Performed by: SPECIALIST/TECHNOLOGIST

## 2023-09-08 NOTE — FLOWSHEET NOTE
[address] [facility], [address] [place] [address] Drive  Phone: [phone]   Fax:     [phone]      Physical Therapy Treatment Note/ Progress Report:     Date:  2023    Patient Name:  Earl Mills    :  1985  MRN: [id number]  Restrictions/Precautions:    Medical/Treatment Diagnosis Information:   M25.562 (ICD-10-CM) - Left knee pain, unspecified chronicity     Insurance/Certification information:   [address] [place] [address]  Physician Information:   Manny Chavarria MD  Has the plan of care been signed (Y/N):        []  Yes  [x]  No     Date of Patient follow up with Physician: NS    Is this a Progress Report:     []  Yes  [x]  No      If Yes:  Date Range for reporting period:  Beginnin23 ------------ Endin23    Progress report will be due (10 Rx or 30 days whichever is less):      Recertification will be due (POC Duration  / 90 days whichever is less): 10/27/23      Visit # Insurance Allowable Auth Required   In Person 6 30 yr []  Yes     []  No    Tele Health 0  []  Yes     []  No    Total 6       FOTO Score: LEFS 51%     Date assessed:  23 NEXT VISIT     Latex Allergy:  [x]NO      []YES  Preferred Language for Healthcare:   [x]English       []other:    Pain level:  3/10     SUBJECTIVE:  Pt notes she has been feeling a little better.      OBJECTIVE: See eval  Observation:   Test measurements:      RESTRICTIONS/PRECAUTIONS: none    Exercises/Interventions:   Therapeutic Ex (29779) Sets/sec Reps Notes/CUES HEP   Piriformis stretch knee bent/ straight 20\" 3x  x   Belt hamstring stretch/ IT band 3\" 10x  x   SLR/ add 10\" 10x  x   NICO ABD/EXT/FLX  TKE X30  X30 c 3\" hold 35#  75#      Knee ext x30 30# 90/30    Hamstring curls x30 50#     LSD 6\" 3x10     SLS Fwd/Lat 10\" x10 AM    # 30x     bike  6 min     SLR+30\" holds c 10 pumps  x3            Manual Intervention (40421)       MFR

## 2023-09-15 ENCOUNTER — HOSPITAL ENCOUNTER (OUTPATIENT)
Dept: PHYSICAL THERAPY | Age: 38
Setting detail: THERAPIES SERIES
Discharge: HOME | End: 2023-09-15
Payer: MEDICAID

## 2023-09-15 PROCEDURE — 97112 NEUROMUSCULAR REEDUCATION: CPT | Performed by: SPECIALIST/TECHNOLOGIST

## 2023-09-15 PROCEDURE — 97016 VASOPNEUMATIC DEVICE THERAPY: CPT | Performed by: SPECIALIST/TECHNOLOGIST

## 2023-09-15 PROCEDURE — 97110 THERAPEUTIC EXERCISES: CPT | Performed by: SPECIALIST/TECHNOLOGIST

## 2023-09-15 NOTE — PROGRESS NOTES
tissue swelling/inflammation/restriction, improving soft tissue extensibility and allowing for proper ROM for normal function with self-care, mobility, lifting and ambulation. Modalities:     [] GAME READY (VASO)- for significant edema, swelling, pain control. Charges:  Timed Code Treatment Minutes: 38   Total Treatment Minutes:  48   BWC:  TE TIME:  NMR TIME:  MANUAL TIME:  UNTIMED MINUTES:  Medicare Total:         [] EVAL (LOW) 39931 (typically 20 minutes face-to-face)  [] EVAL (MOD) 24998 (typically 30 minutes face-to-face)  [] EVAL (HIGH) 66144 (typically 45 minutes face-to-face)  [] RE-EVAL     [x] BC(32592) x  2   [] IONTO  [x] NMR (08717) x    1 [x] VASO  [] Manual (58604) x     [] Other:  [] TA x      [] Mech Traction (80184)  [] ES(attended) (45085)      [] ES (un) (51606):    ASSESSMENT:  Pt demonstrated good exercise pace and technique. GOALS:      Patient stated goal: stairs walk stand    Therapist goals for Patient:   Short Term Goals: To be achieved in: 2 weeks  1. Independent in HEP and progression per patient tolerance, in order to prevent re-injury. [x] Progressing: [] Met: [] Not Met: [] Adjusted     2. Patient will have a decrease in pain to facilitate improvement in movement, function, and ADLs as indicated by Functional Deficits. [x] Progressing: [] Met: [] Not Met: [] Adjusted     Long Term Goals: To be achieved in: 6 weeks  1. FOTO score will match or exceed predicted score to assist with reaching prior level of function. [x] Progressing: [] Met: [] Not Met: [] Adjusted     2. Patient will demonstrate increased AROM to WNL to allow for proper joint functioning as indicated by patients Functional Deficits. [x] Progressing: [] Met: [] Not Met: [] Adjusted     3. Patient will demonstrate an increase in Strength to good proximal hip strength and control, within 5lb HHD in LE to allow for proper functional mobility as indicated by patients Functional Deficits.    [x] Progressing:

## 2023-09-22 ENCOUNTER — HOSPITAL ENCOUNTER (OUTPATIENT)
Dept: PHYSICAL THERAPY | Age: 38
Setting detail: THERAPIES SERIES
Discharge: HOME | End: 2023-09-22
Payer: MEDICAID

## 2023-09-22 PROCEDURE — 97110 THERAPEUTIC EXERCISES: CPT | Performed by: SPECIALIST/TECHNOLOGIST

## 2023-09-22 PROCEDURE — 97016 VASOPNEUMATIC DEVICE THERAPY: CPT | Performed by: SPECIALIST/TECHNOLOGIST

## 2023-09-22 PROCEDURE — 97112 NEUROMUSCULAR REEDUCATION: CPT | Performed by: SPECIALIST/TECHNOLOGIST

## 2023-09-22 NOTE — FLOWSHEET NOTE
[address] [facility] and [address], [address] [facility] [address] [place] [address], [address] [place] [address] Drive  Phone: [phone]   Fax: [phone]      Physical Therapy Treatment Note/ Progress Report:     Date:  2023    Patient Name:  Gwen Bhatti    :  1985  MRN: [id number]  Restrictions/Precautions:    Medical/Treatment Diagnosis Information:   M25.562 (ICD-10-CM) - Left knee pain, unspecified chronicity     Insurance/Certification information:   [address] [place] [address]  Physician Information:   Presley Orosco MD  Has the plan of care been signed (Y/N):        []  Yes  [x]  No     Date of Patient follow up with Physician: NS    Is this a Progress Report:     []  Yes  [x]  No      If Yes:  Date Range for reporting period:  Beginnin/15/23 ------------ Endin23  Progress report will be due (10 Rx or 30 days whichever is less): 72     Recertification will be due (POC Duration  / 90 days whichever is less): 10/27/23      Visit # Insurance Allowable Auth Required   In Person 8 30 yr []  Yes     []  No    Tele Health 0  []  Yes     []  No    Total 8       FOTO Score: LEFS  41%    Date assessed:   9/15/23    Latex Allergy:  [x]NO      []YES  Preferred Language for Healthcare:   [x]English       []other:    Pain level:  3/10     SUBJECTIVE:  Pt notes she has been doing better.      OBJECTIVE: See eval  Observation:   Test measurements:      RESTRICTIONS/PRECAUTIONS: none    Exercises/Interventions:   Therapeutic Ex (73337) Sets/sec Reps Notes/CUES HEP   Piriformis stretch knee bent/ straight 20\" 3x  x   Belt hamstring stretch/ IT band 3\" 10x  x   SLR/ add 10\" 10x  x   NICO ABD/EXT/FLX  TKE X30  X30 c 3\" hold 35#  75#      Knee ext x30 30# 90/30    Hamstring curls x30 50#     LSD 6\" 3x10     SLS Fwd/Lat 10\" x10 AM    # 30x     bike  6 min     SLR+30\" holds c 10 pumps  x3            Manual Intervention (01257)       MFR medial patella  5 min

## 2023-09-29 ENCOUNTER — APPOINTMENT (OUTPATIENT)
Dept: PHYSICAL THERAPY | Age: 38
End: 2023-09-29
Payer: MEDICAID

## 2023-09-29 NOTE — TELEPHONE ENCOUNTER
Sent [employer] message to pt to see if she needs these meds filled, as they appear to be requested too soon.

## 2023-10-01 ENCOUNTER — PATIENT MESSAGE (OUTPATIENT)
Dept: ORTHOPEDIC SURGERY | Age: 38
End: 2023-10-01

## 2023-10-02 RX ORDER — GABAPENTIN 300 MG/1
300 CAPSULE ORAL 2 TIMES DAILY
Qty: 60 CAPSULE | Refills: 1 | OUTPATIENT
Start: 2023-10-02 | End: 2023-12-01

## 2023-10-02 RX ORDER — CELECOXIB 100 MG/1
CAPSULE ORAL
Qty: 30 CAPSULE | Refills: 1 | OUTPATIENT
Start: 2023-10-02

## 2023-10-02 NOTE — TELEPHONE ENCOUNTER
Pt is inquiring about having permanent restrictions. Please advise, or let me know if pt will need to be sched for an appt to discuss.

## 2023-10-06 ENCOUNTER — HOSPITAL ENCOUNTER (OUTPATIENT)
Dept: PHYSICAL THERAPY | Age: 38
Setting detail: THERAPIES SERIES
Discharge: HOME | End: 2023-10-06
Payer: MEDICAID

## 2023-10-06 PROCEDURE — 97016 VASOPNEUMATIC DEVICE THERAPY: CPT | Performed by: SPECIALIST/TECHNOLOGIST

## 2023-10-06 PROCEDURE — 97110 THERAPEUTIC EXERCISES: CPT | Performed by: SPECIALIST/TECHNOLOGIST

## 2023-10-06 PROCEDURE — 97112 NEUROMUSCULAR REEDUCATION: CPT | Performed by: SPECIALIST/TECHNOLOGIST

## 2023-10-06 NOTE — FLOWSHEET NOTE
Manual Treatments:  PROM / STM / Oscillations-Mobs:  G-I, II, III, IV (PA's, Inf., Post.)  [] (09735) Provided manual therapy to mobilize LE, proximal hip and/or LS spine soft tissue/joints for the purpose of modulating pain, promoting relaxation, increasing ROM, reducing/eliminating soft tissue swelling/inflammation/restriction, improving soft tissue extensibility and allowing for proper ROM for normal function with self-care, mobility, lifting and ambulation. Modalities:     [] GAME READY (VASO)- for significant edema, swelling, pain control. Charges:  Timed Code Treatment Minutes: 38   Total Treatment Minutes:  48   BWC:  TE TIME:  NMR TIME:  MANUAL TIME:  UNTIMED MINUTES:  Medicare Total:         [] EVAL (LOW) 03484 (typically 20 minutes face-to-face)  [] EVAL (MOD) 61201 (typically 30 minutes face-to-face)  [] EVAL (HIGH) 02003 (typically 45 minutes face-to-face)  [] RE-EVAL     [x] GF(87546) x  2   [] IONTO  [x] NMR (68522) x    1 [x] VASO  [] Manual (36279) x     [] Other:  [] TA x      [] Mech Traction (10292)  [] ES(attended) (80561)      [] ES (un) (12095):    ASSESSMENT:  Pt demonstrated good exercise pace and technique. GOALS:      Patient stated goal: stairs walk stand    Therapist goals for Patient:   Short Term Goals: To be achieved in: 2 weeks  1. Independent in HEP and progression per patient tolerance, in order to prevent re-injury. [x] Progressing: [] Met: [] Not Met: [] Adjusted     2. Patient will have a decrease in pain to facilitate improvement in movement, function, and ADLs as indicated by Functional Deficits. [x] Progressing: [] Met: [] Not Met: [] Adjusted     Long Term Goals: To be achieved in: 6 weeks  1. FOTO score will match or exceed predicted score to assist with reaching prior level of function. [x] Progressing: [] Met: [] Not Met: [] Adjusted     2.  Patient will demonstrate increased AROM to WNL to allow for proper joint functioning as indicated by patients

## 2023-10-09 DIAGNOSIS — M47.816 LUMBAR SPONDYLOSIS: Primary | ICD-10-CM

## 2023-10-09 DIAGNOSIS — M54.6 BACK PAIN OF THORACOLUMBAR REGION: ICD-10-CM

## 2023-10-09 DIAGNOSIS — M51.36 LUMBAR DEGENERATIVE DISC DISEASE: ICD-10-CM

## 2023-10-09 DIAGNOSIS — M51.26 HERNIATION OF INTERVERTEBRAL DISC BETWEEN L4 AND L5: ICD-10-CM

## 2023-10-09 DIAGNOSIS — M54.50 BACK PAIN OF THORACOLUMBAR REGION: ICD-10-CM

## 2023-10-09 DIAGNOSIS — M51.27 HERNIATION OF INTERVERTEBRAL DISC BETWEEN L5 AND S1: ICD-10-CM

## 2023-10-09 NOTE — TELEPHONE ENCOUNTER
Faxed completed ADA to [facility] @ [phone]    Faxed completed RFI to [facility] @ [phone]

## 2023-10-10 ENCOUNTER — OFFICE VISIT (OUTPATIENT)
Dept: ORTHOPEDIC SURGERY | Age: 38
End: 2023-10-10

## 2023-10-10 VITALS — BODY MASS INDEX: 39.24 KG/M2 | WEIGHT: 250 LBS | HEIGHT: 67 IN

## 2023-10-10 DIAGNOSIS — M51.34 DDD (DEGENERATIVE DISC DISEASE), THORACIC: ICD-10-CM

## 2023-10-10 DIAGNOSIS — M47.814 THORACIC SPONDYLOSIS: ICD-10-CM

## 2023-10-10 DIAGNOSIS — M54.6 THORACIC BACK PAIN, UNSPECIFIED BACK PAIN LATERALITY, UNSPECIFIED CHRONICITY: ICD-10-CM

## 2023-10-10 DIAGNOSIS — M54.6 DISCOGENIC THORACIC PAIN: ICD-10-CM

## 2023-10-10 DIAGNOSIS — M40.204 KYPHOSIS OF THORACIC REGION, UNSPECIFIED KYPHOSIS TYPE: Primary | ICD-10-CM

## 2023-10-11 NOTE — PROGRESS NOTES
Chief Complaint:   Chief Complaint   Patient presents with    Back Pain     Thoracic pain for last few months, pain is between shoulder blades, last visit with Luciano on 10/2/23, not a surgical candidate at this time          History of Present Illness:       Patient is a 30 y.o. female returns follow up for the above complaint. The patient was last seen approximately 6 weeksago. She has started a pulmonary course of physical therapy for her low back in the interim. New complaint relates to mid thoracic back pain. Symptoms have been ongoing for approximately 2 months initially cycling on and off and now constant unprovoked by specific injury or event. The symptoms of back pain doshow a discogenic provacative pattern and are worsened by standing and improved with sitting. There is not new onset weakness or progressive weakness of the lower extremities that has developed. The patient denies new onset bowel or bladder dysfunction. There is no history of previous spinal trauma. The patient does not have history or orthopaedic lumbar spine surgery. Pain localizes to the mid thoracic region    Pain levels: 6    There is no lower limb pain . Work-up to date has included: X-ray outlined below  Prior treatment has included NSAID-Celebrex. Prior treatment for the lumbar spine has included multilevel lumbar RFA in 2021 outside institution. The patient has no history or autoimmune disease, inflammatory arthropathy or crystal arthropathy.       Past Medical History:        Past Medical History:   Diagnosis Date    Acid reflux     Asthma     as child    Back pain     Bipolar 1 disorder ([address])     Degenerative disc disease, lumbar     Depression     Diabetes mellitus (HCC)     Fibromyalgia     Liver disease     fatty liver    Obesity     OCD (obsessive compulsive disorder)     PCOS (polycystic ovarian syndrome)     Sleep apnea     no longer uses cpap after weight loss        Present Medications:

## 2023-10-12 ENCOUNTER — HOSPITAL ENCOUNTER (OUTPATIENT)
Dept: PHYSICAL THERAPY | Age: 38
Setting detail: THERAPIES SERIES
Discharge: HOME | End: 2023-10-12
Payer: MEDICAID

## 2023-10-12 PROCEDURE — 97110 THERAPEUTIC EXERCISES: CPT

## 2023-10-12 PROCEDURE — 97112 NEUROMUSCULAR REEDUCATION: CPT

## 2023-10-12 PROCEDURE — 97161 PT EVAL LOW COMPLEX 20 MIN: CPT

## 2023-10-12 NOTE — PLAN OF CARE
12  weeks  Disability index score of 10% or less for the Modified Oswestry to assist with return top prior level of function. Status: [] Progressing: [] Met: [] Not Met: [] Adjusted  Improve ROM to WNL to allow for proper joint functioning as indicated by patients functional deficits. Status: [] Progressing: [] Met: [] Not Met: [] Adjusted  Pt to improve strength to show motor control/activation of proximal hip, multifidus, TrA/abdominal, and posterior chain UE/postural muscles to facilitate functional mobility and ADLs. Status: [] Progressing: [] Met: [] Not Met: [] Adjusted  Patient will return to Usual work, housework or activities and Usual recreational activities without increased symptoms or restriction to work towards return to prior level of function. Status: [] Progressing: [] Met: [] Not Met: [] Adjusted  Patient will increase core/lumbopelvic function to return to work with regular duties. Status: [] Progressing: [] Met: [] Not Met: [] Adjusted    TREATMENT PLAN     Frequency/Duration: 2x/week for  12  weeks for the following treatment interventions:    Interventions:  [x] Therapeutic exercise including: strength training, ROM, including postural re-education. [x] NMR activation and proprioception, including postural re-education. [x] Manual therapy as indicated to include: PROM, Gr I-IV mobilizations, and STM  [x] Modalities as needed that may include: Cryotherapy, Electrical Stimulation, Thermal Agents, and Traction  [x] Patient education on joint protection, postural re-education, activity modification, progression of HEP. HEP instruction: Refer to HEP instructions outlined on the flowsheet on 10/12/2023.     Electronically Signed by Awilda Dixon PT  Date: 10/12/2023

## 2023-10-12 NOTE — FLOWSHEET NOTE
[address] [place] [address] and [facility], [address], [address] office: [phone] fax: [phone]      Physical Therapy: TREATMENT/PROGRESS NOTE   Patient: Indiana Chatterjee (62 y.o. female)   Treatment Date: 10/12/2023   :  1985 MRN: [id number]   Visit #: 1   Insurance Allowable Auth Needed   30 []Yes    [x]No    Insurance: Payor: Logan Navarro / Plan: Colleen Gaviria / Product Type: *No Product type* /   Insurance ID: [id number] - (Medicaid Managed)  Secondary Insurance (if applicable):    Treatment Diagnosis: Thoracic spine and lumbar spine pain  No diagnosis found.    Medical Diagnosis:    Lumbar spondylosis [M47.816]  Lumbar degenerative disc disease [M51.36]  Herniation of intervertebral disc between L4 and L5 [M51.26]  Herniation of intervertebral disc between L5 and S1 [M51.27]  Back pain of thoracolumbar region [M54.50, M54.6]   Referring Physician: Rosemary Travis  PCP: ROSE Saunders NP                             Plan of care signed (Y/N):     Date of Patient follow up with Physician:      Progress Report/POC: EVAL today  POC update due: 11/10/2023 (OR 10 visits /OR Eva Olsen, whichever is less)    Latex Allergy:  [x]NO      []YES    Preferred Language for Healthcare:   [x]English       []other:    SUBJECTIVE EXAMINATION     Patient Report/Comments: see eval    OBJECTIVE EXAMINATION     Observation:     Test measurements: see eval     Test used Initial score  10/12/23 10/12/2023   Pain Summary VAS 2-8    Functional questionnaire Modified Oswestry 38%    Other:              RESTRICTIONS/PRECAUTIONS: none    Exercises/Interventions:     Therapeutic Ex (13902)  resistance Sets/time Reps Notes/Cues/Progressions   Open book  2 10    LTR  2 10    TB ext/row  2 15 LATEX FREE RTB   Pec S  10'' 5    Cat cow  2 10                                       Manual Intervention (10882)  TIME

## 2023-10-17 ENCOUNTER — HOSPITAL ENCOUNTER (OUTPATIENT)
Dept: PHYSICAL THERAPY | Age: 38
Setting detail: THERAPIES SERIES
End: 2023-10-17
Payer: MEDICAID

## 2023-10-18 ENCOUNTER — HOSPITAL ENCOUNTER (OUTPATIENT)
Dept: PHYSICAL THERAPY | Age: 38
Setting detail: THERAPIES SERIES
Discharge: HOME | End: 2023-10-18
Payer: MEDICAID

## 2023-10-18 PROCEDURE — 97016 VASOPNEUMATIC DEVICE THERAPY: CPT | Performed by: SPECIALIST/TECHNOLOGIST

## 2023-10-18 PROCEDURE — 97110 THERAPEUTIC EXERCISES: CPT | Performed by: SPECIALIST/TECHNOLOGIST

## 2023-10-18 PROCEDURE — 97112 NEUROMUSCULAR REEDUCATION: CPT | Performed by: SPECIALIST/TECHNOLOGIST

## 2023-10-18 RX ORDER — TIZANIDINE 4 MG/1
4 TABLET ORAL 3 TIMES DAILY PRN
Qty: 30 TABLET | Refills: 1 | Status: SHIPPED | OUTPATIENT
Start: 2023-10-18

## 2023-10-18 NOTE — FLOWSHEET NOTE
[address] [facility] and [address], [place] [address]  Phone: [phone]   Fax: [phone]      Physical Therapy Treatment Note/ Progress Report:     Date:  10/18/2023    Patient Name:  Cecilia Moran    :  1985  MRN: [id number]  Restrictions/Precautions:    Medical/Treatment Diagnosis Information:   M25.562 (ICD-10-CM) - Left knee pain, unspecified chronicity     Insurance/Certification information:   [address] [place] [address]  Physician Information:   Avery Bamberger, MD  Has the plan of care been signed (Y/N):        []  Yes  [x]  No     Date of Patient follow up with Physician: NS    Is this a Progress Report:     []  Yes  [x]  No      If Yes:  Date Range for reporting period:  Beginnin/15/23 ------------ Endin23  Progress report will be due (10 Rx or 30 days whichever is less):      Recertification will be due (POC Duration  / 90 days whichever is less): 10/27/23      Visit # Insurance Allowable Auth Required   In Person 10 30 yr []  Yes     []  No    Tele Health 0  []  Yes     []  No    Total 10       FOTO Score: LEFS  41%    Date assessed:   9/15/23 POC next visit    Latex Allergy:  [x]NO      []YES  Preferred Language for Healthcare:   [x]English       []other:    Pain level:  3/10     SUBJECTIVE:       OBJECTIVE: See eval  Observation:   Test measurements:      RESTRICTIONS/PRECAUTIONS: none    Exercises/Interventions:   Therapeutic Ex (40584) Sets/sec Reps Notes/CUES HEP   Piriformis stretch knee bent/ straight 20\" 3x  x   Belt hamstring stretch/ IT band 3\" 10x  x   SLR/ add 10\" 10x  x   NICO ABD/EXT/FLX  TKE X30  X30 c 3\" hold 35#  75#      Knee ext x30 30# 90/30    Hamstring curls x30 50#     LSD 6\" 3x10     SLS Fwd/Lat 10\" x10 AM    LP  Single leg  110#  70# 30x  30x     bike  6 min     SLR+30\" holds c 10 pumps  x3     BOSU steps Fwd/Lat  X20 ea     Manual Intervention (56223)       MFR

## 2023-10-23 ENCOUNTER — TELEPHONE (OUTPATIENT)
Dept: ORTHOPEDIC SURGERY | Age: 38
End: 2023-10-23

## 2023-10-23 NOTE — TELEPHONE ENCOUNTER
General Question     Subject: NEEDS WORK RESTRICTIONS CLARIFICATION  Patient and /or Facility Request: Aster Miranda  Contact Number: [phone]    [facility]RI[facility] PATIENT.       PLEASE RETURN HIS CALL AT THE ABOVE PHONE #

## 2023-10-24 RX ORDER — CELECOXIB 100 MG/1
CAPSULE ORAL
Qty: 30 CAPSULE | Refills: 1 | Status: SHIPPED | OUTPATIENT
Start: 2023-10-24

## 2023-10-24 RX ORDER — GABAPENTIN 300 MG/1
300 CAPSULE ORAL 2 TIMES DAILY
Qty: 60 CAPSULE | Refills: 1 | Status: SHIPPED | OUTPATIENT
Start: 2023-10-24 | End: 2023-12-23

## 2023-10-27 ENCOUNTER — HOSPITAL ENCOUNTER (OUTPATIENT)
Dept: PHYSICAL THERAPY | Age: 38
Setting detail: THERAPIES SERIES
Discharge: HOME | End: 2023-10-27
Payer: MEDICAID

## 2023-10-27 PROCEDURE — 97112 NEUROMUSCULAR REEDUCATION: CPT

## 2023-10-27 PROCEDURE — 97110 THERAPEUTIC EXERCISES: CPT

## 2023-10-27 PROCEDURE — 20560 NDL INSJ W/O NJX 1 OR 2 MUSC: CPT

## 2023-10-27 PROCEDURE — 97140 MANUAL THERAPY 1/> REGIONS: CPT

## 2023-10-27 NOTE — FLOWSHEET NOTE
standing activities  [phone]) [facility]- Reviewed/Progressed HEP activities related to neuromuscular reeducation of movement, balance, coordination, kinesthetic sense, posture, and/or proprioception for sitting and/or standing activities    (25557) THERAPEUTIC ACTIVITY- use of dynamic activities to improve functional performance. (Ex include squatting, ascending/descending stairs, walking, bending, lifting, catching, throwing, pushing, pulling, jumping.)  Direct, one on one contact, billed in 15-minute increments. (62240) MANUAL THERAPY-  Manual therapy techniques, 1 or more regions, each 15 minutes (Mobilization/manipulation, manual lymphatic drainage, manual traction) for the purpose of modulating pain, promoting relaxation,  increasing ROM, reducing/eliminating soft tissue swelling/inflammation/restriction, improving soft tissue extensibility and allowing for proper ROM for normal function with self care, mobility, lifting and ambulation  (85173) MECHANICAL TRACTION  (24863) ADL- Provided self-care/home management training related to activities of daily living and compensatory training, and/or use of adaptive equipment for improvement with: ADLs and compensatory training, meal preparation, safety procedures and instruction in use of adaptive equipment, including bathing, grooming, dressing, personal hygiene, basic household cleaning and chores. TREATMENT PLAN   Plan: POC Initiated today- see amanda for details    Electronically Signed by Sonal Marcus PT              Date: 10/27/2023     Note: If patient does not return for scheduled/recommended follow up visits, this note will serve as a discharge from care along with the most recent update on progress.

## 2023-11-01 ENCOUNTER — OFFICE VISIT (OUTPATIENT)
Dept: ORTHOPEDIC SURGERY | Age: 38
End: 2023-11-01
Payer: MEDICAID

## 2023-11-01 VITALS — BODY MASS INDEX: 33.92 KG/M2 | WEIGHT: 216.6 LBS

## 2023-11-01 DIAGNOSIS — M51.24 HERNIATION OF INTERVERTEBRAL DISC OF THORACIC REGION: ICD-10-CM

## 2023-11-01 DIAGNOSIS — M40.204 KYPHOSIS OF THORACIC REGION, UNSPECIFIED KYPHOSIS TYPE: ICD-10-CM

## 2023-11-01 DIAGNOSIS — S29.019S THORACIC MYOFASCIAL STRAIN, SEQUELA: Primary | ICD-10-CM

## 2023-11-01 PROCEDURE — 1036F TOBACCO NON-USER: CPT | Performed by: INTERNAL MEDICINE

## 2023-11-01 PROCEDURE — G8484 FLU IMMUNIZE NO ADMIN: HCPCS | Performed by: INTERNAL MEDICINE

## 2023-11-01 PROCEDURE — G8428 CUR MEDS NOT DOCUMENT: HCPCS | Performed by: INTERNAL MEDICINE

## 2023-11-01 PROCEDURE — G8417 CALC BMI ABV UP PARAM F/U: HCPCS | Performed by: INTERNAL MEDICINE

## 2023-11-01 PROCEDURE — 99214 OFFICE O/P EST MOD 30 MIN: CPT | Performed by: INTERNAL MEDICINE

## 2023-11-01 NOTE — PROGRESS NOTES
Chief Complaint:   Chief Complaint   Patient presents with    Back Pain     F/U BACK. Still having pain throughout back and neck. Had thoracic MRI done. History of Present Illness:       Patient is a 41 y.o. female returns follow up for the above complaint. The patient was last seen approximately 1 monthsago. The symptoms are improving since the last visit. The patient has had further testing for the problem. MRI completed in the interim. Back:leg pain 100:0 . Pain thoracic back is burning in quality. The symptoms do not follow a discogenic provocative pattern. Pain levels:6.    The patient denies new onset or progressive weakness of the lower extremities. The patient denies now onset bowel or bladder function. She continues on medical pain management as per previous  inclusive of analgesic-Celebrex, muscle relaxant-Zanaflex, gabapentin                  Past Medical History:        Past Medical History:   Diagnosis Date    Acid reflux     Asthma     as child    Back pain     Bipolar 1 disorder ([address])     Degenerative disc disease, lumbar     Depression     Diabetes mellitus (HCC)     Fibromyalgia     Liver disease     fatty liver    Obesity     OCD (obsessive compulsive disorder)     PCOS (polycystic ovarian syndrome)     Sleep apnea     no longer uses cpap after weight loss        Present Medications:         Current Outpatient Medications   Medication Sig Dispense Refill    gabapentin (NEURONTIN) 300 MG capsule Take 1 capsule by mouth in the morning and at bedtime for 60 days. Intended supply: 30 days 60 capsule 1    celecoxib (CELEBREX) 100 MG capsule TAKE 1 CAPSULE BY MOUTH DAILY AS NEEDED FOR PAIN WITH FOOD 30 capsule 1    tiZANidine (ZANAFLEX) 4 MG tablet TAKE 1 TABLET BY MOUTH 3 TIMES DAILY AS NEEDED (MUSCLE TIGHTNESS/SPASM) 30 tablet 1    MOUNJARO 5 MG/0.5ML SOPN SC injection INJECT 5 MG BY SUBCUTANEOUS ROUTE ONCE WEEKLY.       ondansetron (ZOFRAN) 4 MG tablet Take 1 tablet by

## 2023-11-13 ENCOUNTER — OFFICE VISIT (OUTPATIENT)
Dept: ORTHOPEDIC SURGERY | Age: 38
End: 2023-11-13

## 2023-11-13 VITALS — HEIGHT: 67 IN | BODY MASS INDEX: 33.9 KG/M2 | WEIGHT: 216 LBS

## 2023-11-13 DIAGNOSIS — Z86.39 HISTORY OF DIABETES MELLITUS: ICD-10-CM

## 2023-11-13 DIAGNOSIS — M51.24 HERNIATION OF INTERVERTEBRAL DISC OF THORACIC REGION: ICD-10-CM

## 2023-11-13 DIAGNOSIS — M40.204 KYPHOSIS OF THORACIC REGION, UNSPECIFIED KYPHOSIS TYPE: ICD-10-CM

## 2023-11-13 DIAGNOSIS — S29.019S THORACIC MYOFASCIAL STRAIN, SEQUELA: Primary | ICD-10-CM

## 2023-11-15 NOTE — PROGRESS NOTES
Chief Complaint:   Chief Complaint   Patient presents with    Back Pain     Upper to mid back pain- no change in pain. Was able to make it to PT but has had to cancel appointments due to work. Did have some relief from the visits she was able to attend. Still taking medications which seem to help some. History of Present Illness:       Patient is a 41 y.o. female returns follow up for the above complaint. The patient was last seen approximately 2 weeksago. The symptoms are worsening since the last visit. The patient has had no further testing for the problem. Unfortunate the symptoms have worsened with return to the workplace even with the accommodations provided and she has since resigned from her job at [employer] secondarily. Mid back: leg pain 100:0. Pain in back is stabbing or burning in quality. Pain levels:6.     The patient has started preliminary course of supervised PT. The patient denies new onset or progressive weakness of the lower extremities. The patient denies new onset bowel or bladder dysfunction. She continues on medical pain management as per previous  inclusive of NSAID-Celebrex, muscle relaxant-Zanaflex as needed, Neurontin     Past Medical History:        Past Medical History:   Diagnosis Date    Acid reflux     Asthma     as child    Back pain     Bipolar 1 disorder ([address])     Degenerative disc disease, lumbar     Depression     Diabetes mellitus (HCC)     Fibromyalgia     Liver disease     fatty liver    Obesity     OCD (obsessive compulsive disorder)     PCOS (polycystic ovarian syndrome)     Sleep apnea     no longer uses cpap after weight loss        Present Medications:         Current Outpatient Medications   Medication Sig Dispense Refill    gabapentin (NEURONTIN) 300 MG capsule Take 1 capsule by mouth in the morning and at bedtime for 60 days.  Intended supply: 30 days 60 capsule 1    celecoxib (CELEBREX) 100 MG capsule TAKE 1 CAPSULE BY MOUTH DAILY AS NEEDED FOR

## 2023-11-27 RX ORDER — TIZANIDINE 4 MG/1
TABLET ORAL
Qty: 30 TABLET | Refills: 1 | Status: SHIPPED | OUTPATIENT
Start: 2023-11-27

## 2023-11-29 RX ORDER — GABAPENTIN 300 MG/1
300 CAPSULE ORAL 2 TIMES DAILY
Qty: 60 CAPSULE | Refills: 1 | Status: SHIPPED | OUTPATIENT
Start: 2023-11-29 | End: 2024-01-28

## 2023-11-29 RX ORDER — CELECOXIB 100 MG/1
CAPSULE ORAL
Qty: 30 CAPSULE | Refills: 1 | Status: SHIPPED | OUTPATIENT
Start: 2023-11-29

## 2024-01-15 RX ORDER — GABAPENTIN 300 MG/1
300 CAPSULE ORAL 2 TIMES DAILY
Qty: 60 CAPSULE | Refills: 1 | Status: SHIPPED | OUTPATIENT
Start: 2024-01-15 | End: 2024-03-15

## 2024-01-29 ENCOUNTER — OFFICE VISIT (OUTPATIENT)
Dept: ORTHOPEDIC SURGERY | Age: 39
End: 2024-01-29
Payer: MEDICAID

## 2024-01-29 VITALS — HEIGHT: 67 IN | WEIGHT: 185 LBS | BODY MASS INDEX: 29.03 KG/M2

## 2024-01-29 DIAGNOSIS — M75.22 BICEPS TENDONITIS, LEFT: Primary | ICD-10-CM

## 2024-01-29 DIAGNOSIS — M25.512 LEFT SHOULDER PAIN, UNSPECIFIED CHRONICITY: ICD-10-CM

## 2024-01-29 PROCEDURE — 99213 OFFICE O/P EST LOW 20 MIN: CPT | Performed by: ORTHOPAEDIC SURGERY

## 2024-01-29 PROCEDURE — G8484 FLU IMMUNIZE NO ADMIN: HCPCS | Performed by: ORTHOPAEDIC SURGERY

## 2024-01-29 PROCEDURE — G8427 DOCREV CUR MEDS BY ELIG CLIN: HCPCS | Performed by: ORTHOPAEDIC SURGERY

## 2024-01-29 PROCEDURE — G8417 CALC BMI ABV UP PARAM F/U: HCPCS | Performed by: ORTHOPAEDIC SURGERY

## 2024-01-29 PROCEDURE — 20550 NJX 1 TENDON SHEATH/LIGAMENT: CPT | Performed by: ORTHOPAEDIC SURGERY

## 2024-01-29 PROCEDURE — 1036F TOBACCO NON-USER: CPT | Performed by: ORTHOPAEDIC SURGERY

## 2024-01-29 RX ORDER — TRIAMCINOLONE ACETONIDE 40 MG/ML
40 INJECTION, SUSPENSION INTRA-ARTICULAR; INTRAMUSCULAR ONCE
Status: COMPLETED | OUTPATIENT
Start: 2024-01-29 | End: 2024-01-29

## 2024-01-29 RX ORDER — ROPIVACAINE HYDROCHLORIDE 5 MG/ML
2 INJECTION, SOLUTION EPIDURAL; INFILTRATION; PERINEURAL ONCE
Status: COMPLETED | OUTPATIENT
Start: 2024-01-29 | End: 2024-01-29

## 2024-01-29 RX ADMIN — TRIAMCINOLONE ACETONIDE 40 MG: 40 INJECTION, SUSPENSION INTRA-ARTICULAR; INTRAMUSCULAR at 08:29

## 2024-01-29 RX ADMIN — ROPIVACAINE HYDROCHLORIDE 2 ML: 5 INJECTION, SOLUTION EPIDURAL; INFILTRATION; PERINEURAL at 08:28

## 2024-01-29 NOTE — PROGRESS NOTES
FOLLOW UP ORTHOPAEDIC NOTE    The patient follows up today for reevaluation of left shoulder. The patient states she started to have increased discomfort with her left shoulder with the weather changes.  She denies trauma.  She states more so burning throbbing aching pain in the very front part of the shoulder.  Denies significant pain that she had preoperatively.  She has not done any recent HEP or home therapy.  She denies anti-inflammatory use or ability to do so orally secondary to revision gastric sleeve which she states she is lost roughly 100 pounds on her weight loss journey    Of note patient is status post left shoulder subacromial decompression and acromioplasty with distal clavicle resection on 2022    PE:  AAOx3  RR  Unlabored breathing  Skin warm and moist  Focused physical examination of the left shoulder  Bilateral upper extremity examination specific to subjective symptoms  Exam Left Shoulder  Active Range of Motion (FF/Abd/ER/IR)        170/170/40/L2             Passive Range of Motion (FF/Abd/ER/IR)     same   negative   Neer,    negative Garcia,      5/5  Empty Can,          5/5  ER arm at the side,       5/5   Belly Press,     5/5 Bear Hug,       equivocal O'Briens,      positive   TTP at Biceps Tendon Sheath,     positive Speed, positive Yergeson, none   TTP AC Joint, negative cross arm adduction,                         Skin intact throughout  5/5 D B T G IO EPL  SILT Ax, R, U, M  +2 radial pulse    Pertinent radiographs/imagin view left shoulder 2024: Negative fracture..  No gross acromioclavicular joint arthrosis.  No gross glenohumeral arthrosis.  Type I acromion without gross spur     Diagnosis Orders   1. Biceps tendonitis, left  triamcinolone acetonide (KENALOG-40) injection 40 mg    ROPivacaine (NAROPIN) 0.5% injection 2 mL    [address]10 - [place] INJECT TENDON SHEATH/LIGAMENT    diclofenac sodium (VOLTAREN) 1 % GEL      2. Left shoulder pain, unspecified chronicity  XR SHOULDER

## 2024-03-08 RX ORDER — TIZANIDINE 4 MG/1
TABLET ORAL
Qty: 30 TABLET | Refills: 1 | Status: SHIPPED | OUTPATIENT
Start: 2024-03-08

## 2024-03-08 NOTE — TELEPHONE ENCOUNTER
RX Request: Tizanidine    Last Filled: 11/27/2023 with 1 refill  Refill Due:01/27/2024  Last Seen: 11/13/2023  Follow Up: None

## 2024-04-02 RX ORDER — GABAPENTIN 300 MG/1
300 CAPSULE ORAL 2 TIMES DAILY
Qty: 60 CAPSULE | Refills: 1 | OUTPATIENT
Start: 2024-04-02 | End: 2024-06-01

## 2024-04-04 RX ORDER — GABAPENTIN 300 MG/1
300 CAPSULE ORAL 2 TIMES DAILY
Qty: 60 CAPSULE | Refills: 1 | OUTPATIENT
Start: 2024-04-04 | End: 2024-06-03

## 2024-04-30 DIAGNOSIS — M75.22 BICEPS TENDONITIS, LEFT: ICD-10-CM

## 2024-04-30 RX ORDER — GABAPENTIN 300 MG/1
300 CAPSULE ORAL 2 TIMES DAILY
Qty: 60 CAPSULE | Refills: 1 | OUTPATIENT
Start: 2024-04-30 | End: 2024-06-29

## 2024-05-01 RX ORDER — GABAPENTIN 300 MG/1
300 CAPSULE ORAL 2 TIMES DAILY
Qty: 60 CAPSULE | Refills: 1 | OUTPATIENT
Start: 2024-05-01 | End: 2024-06-30

## 2024-05-01 RX ORDER — TIZANIDINE 4 MG/1
TABLET ORAL
Qty: 30 TABLET | Refills: 1 | OUTPATIENT
Start: 2024-05-01

## 2024-05-16 RX ORDER — GABAPENTIN 300 MG/1
300 CAPSULE ORAL 2 TIMES DAILY
Qty: 60 CAPSULE | Refills: 1 | OUTPATIENT
Start: 2024-05-16 | End: 2024-07-15

## 2024-05-27 ENCOUNTER — APPOINTMENT (OUTPATIENT)
Dept: GENERAL RADIOLOGY | Age: 39
End: 2024-05-27
Payer: MEDICAID

## 2024-05-27 ENCOUNTER — HOSPITAL ENCOUNTER (EMERGENCY)
Age: 39
Discharge: HOME | End: 2024-05-27
Payer: MEDICAID

## 2024-05-27 VITALS
TEMPERATURE: 98.2 F | HEART RATE: 89 BPM | DIASTOLIC BLOOD PRESSURE: 68 MMHG | WEIGHT: 167 LBS | BODY MASS INDEX: 26.16 KG/M2 | SYSTOLIC BLOOD PRESSURE: 110 MMHG | RESPIRATION RATE: 18 BRPM | OXYGEN SATURATION: 100 %

## 2024-05-27 DIAGNOSIS — M77.8 LEFT WRIST TENDONITIS: Primary | ICD-10-CM

## 2024-05-27 PROCEDURE — 99283 EMERGENCY DEPT VISIT LOW MDM: CPT

## 2024-05-27 PROCEDURE — 73110 X-RAY EXAM OF WRIST: CPT

## 2024-05-27 RX ORDER — NAPROXEN 500 MG/1
500 TABLET ORAL 2 TIMES DAILY WITH MEALS
Qty: 14 TABLET | Refills: 0 | Status: SHIPPED | OUTPATIENT
Start: 2024-05-27 | End: 2024-06-03

## 2024-05-27 ASSESSMENT — PAIN - FUNCTIONAL ASSESSMENT
PAIN_FUNCTIONAL_ASSESSMENT: 0-10
PAIN_FUNCTIONAL_ASSESSMENT: 0-10

## 2024-05-27 ASSESSMENT — PAIN SCALES - GENERAL
PAINLEVEL_OUTOF10: 3
PAINLEVEL_OUTOF10: 3

## 2024-05-27 NOTE — DISCHARGE INSTRUCTIONS
Wear Velcro wrist brace as needed.  Rest.  Ice.  Elevate.  Naproxen prescribed as needed for pain.  If symptoms or not resolving recommend further evaluation with your orthopedics.

## 2024-05-27 NOTE — ED PROVIDER NOTES
[facility] ED  EMERGENCY DEPARTMENT ENCOUNTER        Pt Name: La Nena Magallanes  MRN: [id number]  Birthdate 1985  Date of evaluation: 5/27/2024  Provider: Cayla Neumann PA-C  PCP: Ria Layne APRN - NP  Note Started: 7:40 PM EDT 5/27/24      SUKHDEV. I have evaluated this patient.        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 female who presents to the emergency department for evaluation of left wrist pain and injury.  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    Nursing Notes were all reviewed and agreed with or any disagreements were addressed in the HPI.    REVIEW OF SYSTEMS :      Review of Systems    Positives and Pertinent negatives as per HPI.     SURGICAL HISTORY     Past Surgical History:   Procedure Laterality Date    ANKLE SURGERY Left     Scar tissue removal    CARPAL TUNNEL RELEASE Right 08/23/2021    RIGHT CARPAL TUNNEL RELEASE performed by Rk Arana MD at [facility] OR    CARPAL TUNNEL RELEASE Left 09/10/2021    LEFT CARPAL TUNNEL RELEASE performed by Rk Arana MD at [facility] OR    CHOLECYSTECTOMY, LAPAROSCOPIC N/A 04/20/2020    LAPAROSCOPIC CHOLECYSTECTOMY performed by Audie Nunes DO at [facility] OR    DILATATION, ESOPHAGUS      GASTRIC BYPASS SURGERY  12/2023    HYSTERECTOMY (CERVIX STATUS UNKNOWN)      OTHER SURGICAL HISTORY Left 12/10/2014    Excision of Cyst Left Upper Posterior Ankle    OTHER SURGICAL HISTORY Right 12/29/2016    Laparotomy with Right SO    OVARY REMOVAL

## 2024-05-29 DIAGNOSIS — M75.22 BICEPS TENDONITIS, LEFT: ICD-10-CM

## 2024-05-30 RX ORDER — TIZANIDINE 4 MG/1
TABLET ORAL
Qty: 30 TABLET | Refills: 0 | Status: SHIPPED | OUTPATIENT
Start: 2024-05-30

## 2024-05-30 NOTE — TELEPHONE ENCOUNTER
Rx Request: Tizanidine    Last Filled: 3/08/2024 30 days with 1 refill  Refill Due: 5/08/2024  Last Seen: 11/13/2023  Follow Up: 6/06/2024

## 2024-06-05 PROBLEM — M54.6 THORACIC BACK PAIN: Status: ACTIVE | Noted: 2023-08-30

## 2024-06-05 PROBLEM — M47.812 CERVICAL SPONDYLOSIS WITHOUT MYELOPATHY: Status: ACTIVE | Noted: 2021-10-05

## 2024-06-05 PROBLEM — E87.6 HYPOKALEMIA: Status: ACTIVE | Noted: 2021-05-21

## 2024-06-05 PROBLEM — Z90.79 S/P ABDOMINAL HYSTERECTOMY AND LEFT SALPINGO-OOPHORECTOMY: Status: ACTIVE | Noted: 2020-04-29

## 2024-06-05 PROBLEM — F43.10 POST-TRAUMATIC STRESS DISORDER, UNSPECIFIED: Status: ACTIVE | Noted: 2022-02-25

## 2024-06-05 PROBLEM — F42.9 OBSESSIVE-COMPULSIVE DISORDER: Status: ACTIVE | Noted: 2017-08-02

## 2024-06-05 PROBLEM — R19.7 DIARRHEA: Status: ACTIVE | Noted: 2024-06-05

## 2024-06-05 PROBLEM — K62.89 ANORECTAL PAIN: Status: ACTIVE | Noted: 2024-06-05

## 2024-06-05 PROBLEM — E11.9 TYPE 2 DIABETES MELLITUS WITHOUT COMPLICATION, WITHOUT LONG-TERM CURRENT USE OF INSULIN (HCC): Status: ACTIVE | Noted: 2019-05-28

## 2024-06-05 PROBLEM — J32.9 CHRONIC SINUSITIS: Status: ACTIVE | Noted: 2024-06-05

## 2024-06-05 PROBLEM — E11.9 DIABETES MELLITUS (HCC): Status: ACTIVE | Noted: 2024-06-05

## 2024-06-05 PROBLEM — M19.90 ARTHRITIS: Status: ACTIVE | Noted: 2022-02-25

## 2024-06-05 PROBLEM — M25.511 CHRONIC RIGHT SHOULDER PAIN: Status: ACTIVE | Noted: 2024-06-05

## 2024-06-05 PROBLEM — E55.9 VITAMIN D DEFICIENCY: Status: ACTIVE | Noted: 2019-03-14

## 2024-06-05 PROBLEM — M51.26 HERNIATED LUMBAR INTERVERTEBRAL DISC: Status: ACTIVE | Noted: 2021-06-16

## 2024-06-05 PROBLEM — Z97.5 USES INTRAUTERINE DEVICE FOR BIRTH CONTROL: Status: ACTIVE | Noted: 2024-06-05

## 2024-06-05 PROBLEM — K59.00 CONSTIPATION: Status: ACTIVE | Noted: 2024-06-05

## 2024-06-05 PROBLEM — Z99.89 DEPENDENCE ON CONTINUOUS POSITIVE AIRWAY PRESSURE VENTILATION: Status: ACTIVE | Noted: 2024-06-05

## 2024-06-05 PROBLEM — F31.9 BIPOLAR DISORDER WITH DEPRESSION (HCC): Status: ACTIVE | Noted: 2024-06-05

## 2024-06-05 PROBLEM — E03.9 ACQUIRED HYPOTHYROIDISM: Status: ACTIVE | Noted: 2019-03-14

## 2024-06-05 PROBLEM — T83.89XA MENORRHAGIA DUE TO INTRAUTERINE DEVICE (IUD) (HCC): Status: ACTIVE | Noted: 2020-04-29

## 2024-06-05 PROBLEM — L72.3 SEBACEOUS CYST: Status: ACTIVE | Noted: 2024-06-05

## 2024-06-05 PROBLEM — R20.0 NUMBNESS OF LOWER LIMB: Status: ACTIVE | Noted: 2021-07-15

## 2024-06-05 PROBLEM — M47.816 LUMBAR SPONDYLOSIS: Status: ACTIVE | Noted: 2021-06-16

## 2024-06-05 PROBLEM — F31.31 BIPOLAR DISORDER, CURRENT EPISODE DEPRESSED, MILD (HCC): Status: ACTIVE | Noted: 2024-06-05

## 2024-06-05 PROBLEM — M47.814 THORACIC SPONDYLOSIS: Status: ACTIVE | Noted: 2021-06-16

## 2024-06-05 PROBLEM — Z98.84 S/P GASTRIC BYPASS: Status: ACTIVE | Noted: 2023-12-26

## 2024-06-05 PROBLEM — I83.93 VARICOSE VEINS OF BOTH LOWER EXTREMITIES: Status: ACTIVE | Noted: 2022-02-25

## 2024-06-05 PROBLEM — M79.642 LEFT HAND PAIN: Status: ACTIVE | Noted: 2024-06-05

## 2024-06-05 PROBLEM — F31.9 BIPOLAR 1 DISORDER (HCC): Status: ACTIVE | Noted: 2017-08-02

## 2024-06-05 PROBLEM — G89.29 CHRONIC RIGHT SHOULDER PAIN: Status: ACTIVE | Noted: 2024-06-05

## 2024-06-05 PROBLEM — G47.33 OBSTRUCTIVE SLEEP APNEA SYNDROME IN ADULT: Status: ACTIVE | Noted: 2024-06-05

## 2024-06-05 PROBLEM — N92.0 MENORRHAGIA DUE TO INTRAUTERINE DEVICE (IUD) (HCC): Status: ACTIVE | Noted: 2020-04-29

## 2024-06-05 PROBLEM — Z90.721 S/P ABDOMINAL HYSTERECTOMY AND LEFT SALPINGO-OOPHORECTOMY: Status: ACTIVE | Noted: 2020-04-29

## 2024-06-05 PROBLEM — M50.10 CERVICAL DISC DISORDER WITH RADICULOPATHY: Status: ACTIVE | Noted: 2021-11-04

## 2024-06-05 PROBLEM — G89.29 OTHER CHRONIC PAIN: Status: ACTIVE | Noted: 2024-06-05

## 2024-06-05 PROBLEM — F98.8 ADD (ATTENTION DEFICIT DISORDER): Status: ACTIVE | Noted: 2023-10-09

## 2024-06-05 PROBLEM — E07.9 THYROID DISEASE: Status: ACTIVE | Noted: 2022-02-25

## 2024-06-05 PROBLEM — M48.061 SPINAL STENOSIS OF LUMBAR REGION: Status: ACTIVE | Noted: 2021-06-16

## 2024-06-05 PROBLEM — Z98.890 HISTORY OF DECOMPRESSION OF MEDIAN NERVE: Status: ACTIVE | Noted: 2021-09-01

## 2024-06-05 PROBLEM — R10.32 LLQ PAIN: Status: ACTIVE | Noted: 2024-06-05

## 2024-06-05 PROBLEM — Z79.899 OTHER LONG TERM (CURRENT) DRUG THERAPY: Status: ACTIVE | Noted: 2024-01-03

## 2024-06-05 PROBLEM — K62.5 PAINLESS RECTAL BLEEDING: Status: ACTIVE | Noted: 2024-06-05

## 2024-06-05 PROBLEM — M79.609 PAIN IN SOFT TISSUES OF LIMB: Status: ACTIVE | Noted: 2024-06-05

## 2024-06-05 PROBLEM — M51.36 OTHER INTERVERTEBRAL DISC DEGENERATION, LUMBAR REGION: Status: ACTIVE | Noted: 2023-08-30

## 2024-06-05 PROBLEM — R30.0 DYSURIA: Status: ACTIVE | Noted: 2024-06-05

## 2024-06-05 PROBLEM — M19.012 DEGENERATIVE ARTHRITIS OF LEFT SHOULDER REGION: Status: ACTIVE | Noted: 2021-08-01

## 2024-06-05 PROBLEM — M19.011 DEGENERATIVE ARTHRITIS OF RIGHT SHOULDER REGION: Status: ACTIVE | Noted: 2021-07-01

## 2024-06-05 PROBLEM — R63.5 ABNORMAL WEIGHT GAIN: Status: ACTIVE | Noted: 2024-06-05

## 2024-06-05 PROBLEM — R73.9 HYPERGLYCEMIA: Status: ACTIVE | Noted: 2021-04-01

## 2024-06-05 PROBLEM — Z90.710 S/P ABDOMINAL HYSTERECTOMY AND LEFT SALPINGO-OOPHORECTOMY: Status: ACTIVE | Noted: 2020-04-29

## 2024-06-05 PROBLEM — Z87.39 HISTORY OF DEGENERATIVE DISC DISEASE: Status: ACTIVE | Noted: 2021-12-22

## 2024-06-05 PROBLEM — N94.6 DYSMENORRHEA: Status: ACTIVE | Noted: 2024-06-05

## 2024-06-05 PROBLEM — R11.2 NAUSEA WITH VOMITING: Status: ACTIVE | Noted: 2024-06-05

## 2024-06-05 PROBLEM — Z98.84 BARIATRIC SURGERY STATUS: Status: ACTIVE | Noted: 2022-02-25

## 2024-06-05 PROBLEM — M48.04 SPINAL STENOSIS OF THORACIC REGION: Status: ACTIVE | Noted: 2021-06-16

## 2024-06-05 PROBLEM — G62.9 PERIPHERAL NEUROPATHY: Status: ACTIVE | Noted: 2021-12-14

## 2024-06-05 PROBLEM — J45.909 CHILDHOOD ASTHMA: Status: ACTIVE | Noted: 2022-02-25

## 2024-06-05 PROBLEM — M46.1 BILATERAL SACROILIITIS (HCC): Status: ACTIVE | Noted: 2021-06-16

## 2024-06-05 PROBLEM — M51.369 OTHER INTERVERTEBRAL DISC DEGENERATION, LUMBAR REGION: Status: ACTIVE | Noted: 2023-08-30

## 2024-06-05 PROBLEM — N89.8 VAGINAL LESION: Status: ACTIVE | Noted: 2024-06-05

## 2024-06-05 PROBLEM — S46.012A STRAIN OF TENDON OF LEFT ROTATOR CUFF: Status: ACTIVE | Noted: 2024-06-05

## 2024-06-05 RX ORDER — TRAZODONE HYDROCHLORIDE 150 MG/1
150 TABLET ORAL NIGHTLY
COMMUNITY
Start: 2024-05-10

## 2024-06-05 RX ORDER — SOLIFENACIN SUCCINATE 10 MG/1
1 TABLET, FILM COATED ORAL DAILY
COMMUNITY

## 2024-06-05 RX ORDER — BLOOD-GLUCOSE SENSOR
EACH MISCELLANEOUS
COMMUNITY
Start: 2024-04-30

## 2024-06-05 RX ORDER — METHENAMINE, SODIUM PHOSPHATE, MONOBASIC, MONOHYDRATE, PHENYL SALICYLATE, METHYLENE BLUE, AND HYOSCYAMINE SULFATE 118; 40.8; 36; 10; .12 MG/1; MG/1; MG/1; MG/1; MG/1
CAPSULE ORAL
COMMUNITY

## 2024-06-05 RX ORDER — LAMOTRIGINE 100 MG/1
100 TABLET ORAL DAILY
COMMUNITY
Start: 2024-05-10

## 2024-06-05 RX ORDER — BUPROPION HYDROCHLORIDE 150 MG/1
TABLET, EXTENDED RELEASE ORAL
COMMUNITY

## 2024-06-05 RX ORDER — SUMATRIPTAN 100 MG/1
100 TABLET, FILM COATED ORAL
COMMUNITY

## 2024-06-05 RX ORDER — LIRAGLUTIDE 6 MG/ML
INJECTION SUBCUTANEOUS
COMMUNITY

## 2024-06-05 RX ORDER — MIRTAZAPINE 15 MG/1
TABLET, FILM COATED ORAL
COMMUNITY

## 2024-06-05 RX ORDER — CARIPRAZINE 3 MG/1
3 CAPSULE, GELATIN COATED ORAL DAILY
COMMUNITY
Start: 2023-07-31

## 2024-06-05 RX ORDER — HYDROXYZINE HYDROCHLORIDE 10 MG/1
TABLET, FILM COATED ORAL
COMMUNITY

## 2024-06-05 RX ORDER — TIRZEPATIDE 10 MG/.5ML
10 INJECTION, SOLUTION SUBCUTANEOUS WEEKLY
COMMUNITY
Start: 2024-05-03

## 2024-06-06 ENCOUNTER — OFFICE VISIT (OUTPATIENT)
Dept: ORTHOPEDIC SURGERY | Age: 39
End: 2024-06-06

## 2024-06-06 VITALS — BODY MASS INDEX: 26.21 KG/M2 | WEIGHT: 167 LBS | HEIGHT: 67 IN

## 2024-06-06 DIAGNOSIS — G56.22 NEURITIS OF LEFT ULNAR NERVE: Primary | ICD-10-CM

## 2024-06-06 RX ORDER — METHYLPREDNISOLONE 4 MG/1
TABLET ORAL
Qty: 1 KIT | Refills: 0 | Status: SHIPPED | OUTPATIENT
Start: 2024-06-06

## 2024-06-06 NOTE — PROGRESS NOTES
MISC APPLY 1 EACH EVERY 14 DAYS.      lamoTRIgine (LAMICTAL) 100 MG tablet Take 1 tablet by mouth daily      MOUNJARO 10 MG/0.5ML SOPN SC injection Inject 0.5 mLs into the skin once a week      traZODone (DESYREL) 150 MG tablet Take 1 tablet by mouth nightly      buPROPion (WELLBUTRIN SR) 150 MG extended release tablet TAKE 1 TABLET EVERY DAY BY ORAL ROUTE IN THE MORNING FOR 30 DAYS.      hydrOXYzine HCl (ATARAX) 10 MG tablet TAKE 1 TABLET (ORAL) 3 TIMES PER DAY (PRN - ITCHING)      Liraglutide (VICTOZA) 18 MG/3ML SOPN SC injection as directed      Meth-Hyo-M Bl-Na Phos-Ph Sal (URIBEL) 118 MG CAPS TAKE 1 CAPSULE BY MOUTH THREE TIMES A DAY AS NEEDED TAKE UP TO 3 TIMES A DAY AS NEEDED.      mirtazapine (REMERON) 15 MG tablet 1 tablet at bedtime; Once a day; 30 day(s)      solifenacin (VESICARE) 10 MG tablet Take 1 tablet by mouth daily      SUMAtriptan (IMITREX) 100 MG tablet Take 1 tablet by mouth once as needed      DULoxetine HCl 30 MG CSDR 1 capsule; Once a day; 30 day(s)      tiZANidine (ZANAFLEX) 4 MG tablet TAKE 1 TABLET BY MOUTH 3 (THREE) TIMES DAILY AS NEEDED (MUSCLE TIGHTNESS/SPASM) (MONITOR BLOOD PRESSURE) 30 tablet 0    diclofenac sodium (VOLTAREN) 1 % GEL APPLY 4 G TOPICALLY 4 TIMES DAILY 100 g 3    naproxen (NAPROSYN) 500 MG tablet Take 1 tablet by mouth 2 times daily (with meals) for 7 days 14 tablet 0    gabapentin (NEURONTIN) 300 MG capsule Take 1 capsule by mouth in the morning and at bedtime for 60 days. Intended supply: 30 days 60 capsule 1    celecoxib (CELEBREX) 100 MG capsule TAKE 1 CAPSULE BY MOUTH DAILY AS NEEDED FOR PAIN WITH FOOD 30 capsule 1    MOUNJARO 5 MG/0.5ML SOPN SC injection INJECT 5 MG BY SUBCUTANEOUS ROUTE ONCE WEEKLY.      ondansetron (ZOFRAN) 4 MG tablet Take 1 tablet by mouth 3 times daily as needed for Nausea or Vomiting 15 tablet 0    metFORMIN (GLUCOPHAGE-XR) 500 MG extended release tablet Take 1 tablet by mouth daily      traZODone (DESYREL) 50 MG tablet Take 1 tablet by

## 2024-06-25 RX ORDER — TIZANIDINE 4 MG/1
TABLET ORAL
Qty: 30 TABLET | Refills: 0 | Status: SHIPPED | OUTPATIENT
Start: 2024-06-25

## 2024-06-25 NOTE — TELEPHONE ENCOUNTER
Rx Request: Tizanidine    Last Filled: 05/30/2024  Refill Due: 06/29/2024  Last OV: 06/06/2024  Follow Up: None

## 2024-07-09 ENCOUNTER — OFFICE VISIT (OUTPATIENT)
Dept: ORTHOPEDIC SURGERY | Age: 39
End: 2024-07-09
Payer: MEDICAID

## 2024-07-09 VITALS — HEIGHT: 67 IN | WEIGHT: 167 LBS | BODY MASS INDEX: 26.21 KG/M2

## 2024-07-09 DIAGNOSIS — Z98.890 S/P SHOULDER SURGERY: ICD-10-CM

## 2024-07-09 DIAGNOSIS — M75.22 BICEPS TENDINITIS OF LEFT UPPER EXTREMITY: Primary | ICD-10-CM

## 2024-07-09 PROCEDURE — 1036F TOBACCO NON-USER: CPT | Performed by: ORTHOPAEDIC SURGERY

## 2024-07-09 PROCEDURE — 99213 OFFICE O/P EST LOW 20 MIN: CPT | Performed by: ORTHOPAEDIC SURGERY

## 2024-07-09 PROCEDURE — G8427 DOCREV CUR MEDS BY ELIG CLIN: HCPCS | Performed by: ORTHOPAEDIC SURGERY

## 2024-07-09 PROCEDURE — G8417 CALC BMI ABV UP PARAM F/U: HCPCS | Performed by: ORTHOPAEDIC SURGERY

## 2024-07-09 NOTE — PROGRESS NOTES
FOLLOW UP ORTHOPAEDIC NOTE    The patient follows up today for re-evaluation of left shoulder pain. The patient states 50% relief after the corticosteroid injection of the biceps tendon groove area in the left shoulder for roughly 3 weeks.  She states return of symptoms.  She has pain with forward flexing lifting things.     PE:  AAOx3  RR  Unlabored breathing  Skin warm and moist  Focused physical examination of the left shoulder  Bilateral upper extremity examination specific to subjective symptoms  Exam Left Shoulder  Changed examination from prior with still 5/5 rotator cuff testing nontender to palpation previous acromioclavicular joint resection negative Neer negative Garcia positive Speed positive Yergason positive tenderness palpation biceps tendon groove area                     Skin intact throughout  5/5 D B T G IO EPL  SILT Ax, R, U, M  +2 radial pulse     Diagnosis Orders   1. Biceps tendinitis of left upper extremity  MRI SHOULDER LEFT WO CONTRAST      2. S/P shoulder surgery          Assessment and plan: 38 female with continued subjective symptoms of left shoulder with known, correlating diagnosis of recalcitrant left shoulder bicipital tenosynovitis.  -Time of 12 minutes was spent coordinating and discussing the clinical findings and diagnostic imaging results as they pertain to the patient's presenting subjective symptoms.  -I had a pleasant discussion with the patient today.  I reviewed with her that she has recalcitrant bicipital tenosynovitis and she got good relief from the previous corticosteroid injection.  I did review with her treatment options to include continued conservative care however she also wishes for potential discussion of surgical intervention which would be a biceps tenodesis.  -I have recommended and ordered a MRI given patient has failed treatment options to include activity modifications physician directed physical therapy topical Voltaren gel as she is unable to take oral

## 2024-07-15 ENCOUNTER — TELEPHONE (OUTPATIENT)
Dept: ORTHOPEDIC SURGERY | Age: 39
End: 2024-07-15

## 2024-07-15 NOTE — TELEPHONE ENCOUNTER
Called patient to let them know that their MRI has been authorized and that they can call and schedule as soon as possible. Also told them that they can call [phone] and schedule a f/u with Dr. Cardenas once they have MRI scheduled, leaving at least 2-3 days for our office to receive their results.

## 2024-07-23 ENCOUNTER — HOSPITAL ENCOUNTER (OUTPATIENT)
Dept: MRI IMAGING | Age: 39
Discharge: HOME | End: 2024-07-23
Attending: ORTHOPAEDIC SURGERY
Payer: MEDICAID

## 2024-07-23 DIAGNOSIS — M75.22 BICEPS TENDINITIS OF LEFT UPPER EXTREMITY: ICD-10-CM

## 2024-07-23 PROCEDURE — 73221 MRI JOINT UPR EXTREM W/O DYE: CPT

## 2024-07-24 RX ORDER — TIZANIDINE 4 MG/1
TABLET ORAL
Qty: 30 TABLET | Refills: 0 | Status: SHIPPED | OUTPATIENT
Start: 2024-07-24

## 2024-07-24 NOTE — TELEPHONE ENCOUNTER
Rx Request: Tizanidine    Last Filled: 06/25/2024 for 10 days  Refill Due: 07/05/2024  Last OV: 06/06/2024  Follow Up: 07/26/2024

## 2024-07-26 ENCOUNTER — OFFICE VISIT (OUTPATIENT)
Dept: ORTHOPEDIC SURGERY | Age: 39
End: 2024-07-26

## 2024-07-26 VITALS — HEIGHT: 67 IN | WEIGHT: 167.11 LBS | BODY MASS INDEX: 26.23 KG/M2

## 2024-07-26 DIAGNOSIS — M75.22 LEFT BICIPITAL TENOSYNOVITIS: Primary | ICD-10-CM

## 2024-07-26 DIAGNOSIS — M75.52 SUBACROMIAL BURSITIS OF LEFT SHOULDER JOINT: ICD-10-CM

## 2024-07-26 NOTE — PROGRESS NOTES
FOLLOW UP ORTHOPAEDIC NOTE    The patient follows up today for reevaluation of left shoulder. The patient states she received her MRI and is here to discuss the results.  She states continued anterior left shoulder pain.  This affects her ADLs and functional limitations.  She has failed conservative care thus far to include Voltaren topical gel as she is unable to take oral anti-inflammatories per se.  She is also done prior physician directed physical therapy and corticosteroid injection with 50% symptom relief.  She continues to have functional limitations    PE:  AAOx3  RR  Unlabored breathing  Skin warm and moist  Focused physical examination of the left shoulder  Positive Neer, positive Garcia, 5/5 rotator cuff testing throughout, positive tenderness to palpatio biceps tendon groove, positive Speed positive Yergason.  Nontender to palpation acromioclavicular joint, negative cross arm adduction  Remainder of neurovascular exam unchanged    Pertinent radiographs/imaging:  MRI left shoulder 7/23/2024: Pending Rad Read    MY READ: Slight tendinosis supraspinatus infraspinatus subscapularis albeit intact.  Very mild fluid in the biceps tendon groove/sheath.  Positive subacromial edema/bursitis.     Diagnosis Orders   1. Left bicipital tenosynovitis  Breg Sling Shot Shoulder Sling      2. Subacromial bursitis of left shoulder joint  Breg Sling Shot Shoulder Sling        Assessment and plan: 38 female with continued subjective symptoms of left shoulder pain with known, correlating diagnosis of left shoulder bicipital tenosynovitis that is recalcitrant to conservative care and subacromial bursitis.  -Time of 16 minutes was spent coordinating and discussing the clinical findings and diagnostic imaging results as they pertain to the patient's presenting subjective symptoms.  -I had a pleasant discussion with the patient today.  I reviewed with her that currently her clinical examination still correlates to bicipital

## 2024-07-30 ENCOUNTER — PREP FOR PROCEDURE (OUTPATIENT)
Dept: ORTHOPEDIC SURGERY | Age: 39
End: 2024-07-30

## 2024-07-30 ENCOUNTER — PATIENT MESSAGE (OUTPATIENT)
Dept: ORTHOPEDIC SURGERY | Age: 39
End: 2024-07-30

## 2024-07-30 DIAGNOSIS — M75.52 BURSITIS OF LEFT SHOULDER: ICD-10-CM

## 2024-07-30 DIAGNOSIS — M75.22 BICIPITAL TENDINITIS OF LEFT SHOULDER: ICD-10-CM

## 2024-07-30 RX ORDER — TRANEXAMIC ACID 100 MG/ML
1000 INJECTION, SOLUTION INTRAVENOUS ONCE
Status: CANCELLED | OUTPATIENT
Start: 2024-07-30 | End: 2024-07-30

## 2024-07-30 RX ORDER — MELOXICAM 7.5 MG/1
7.5 TABLET ORAL ONCE
Status: CANCELLED | OUTPATIENT
Start: 2024-07-30 | End: 2024-07-30

## 2024-07-30 RX ORDER — ACETAMINOPHEN 325 MG/1
1000 TABLET ORAL ONCE
Status: CANCELLED | OUTPATIENT
Start: 2024-07-30 | End: 2024-07-30

## 2024-07-31 NOTE — TELEPHONE ENCOUNTER
From: La Nena Magallanes  To: Dr. Aleks Cardenas  Sent: 7/30/2024 3:24 PM EDT  Subject: Forgotten questions about post op    I forgot to ask how long till I can go back to work after the surgery, same with driving, and what the restrictions will be obviously no weight on it.

## 2024-08-01 ENCOUNTER — HOSPITAL ENCOUNTER (OUTPATIENT)
Age: 39
Discharge: HOME | End: 2024-08-01
Payer: MEDICAID

## 2024-08-01 LAB
APTT BLD: 27.8 SEC (ref 22.1–36.4)
INR PPP: 1.11 (ref 0.85–1.15)
PROTHROMBIN TIME: 14.6 SEC (ref 11.9–14.9)

## 2024-08-01 PROCEDURE — 83036 HEMOGLOBIN GLYCOSYLATED A1C: CPT

## 2024-08-01 PROCEDURE — 80048 BASIC METABOLIC PNL TOTAL CA: CPT

## 2024-08-01 PROCEDURE — 85610 PROTHROMBIN TIME: CPT

## 2024-08-01 PROCEDURE — 87641 MR-STAPH DNA AMP PROBE: CPT

## 2024-08-01 PROCEDURE — 85025 COMPLETE CBC W/AUTO DIFF WBC: CPT

## 2024-08-01 PROCEDURE — 82040 ASSAY OF SERUM ALBUMIN: CPT

## 2024-08-01 PROCEDURE — 85730 THROMBOPLASTIN TIME PARTIAL: CPT

## 2024-08-02 LAB
ALBUMIN SERPL-MCNC: 4 G/DL (ref 3.4–5)
ANION GAP SERPL CALCULATED.3IONS-SCNC: 13 MMOL/L (ref 3–16)
BASOPHILS # BLD: 0 K/UL (ref 0–0.2)
BASOPHILS NFR BLD: 0.4 %
BUN SERPL-MCNC: 8 MG/DL (ref 7–20)
CALCIUM SERPL-MCNC: 8.9 MG/DL (ref 8.3–10.6)
CHLORIDE SERPL-SCNC: 103 MMOL/L (ref 99–110)
CO2 SERPL-SCNC: 23 MMOL/L (ref 21–32)
CREAT SERPL-MCNC: 0.7 MG/DL (ref 0.6–1.1)
DEPRECATED RDW RBC AUTO: 13.9 % (ref 12.4–15.4)
EOSINOPHIL # BLD: 0 K/UL (ref 0–0.6)
EOSINOPHIL NFR BLD: 0.6 %
EST. AVERAGE GLUCOSE BLD GHB EST-MCNC: 91.1 MG/DL
GFR SERPLBLD CREATININE-BSD FMLA CKD-EPI: >90 ML/MIN/{1.73_M2}
GLUCOSE SERPL-MCNC: 76 MG/DL (ref 70–99)
HBA1C MFR BLD: 4.8 %
HCT VFR BLD AUTO: 36.6 % (ref 36–48)
HGB BLD-MCNC: 12.3 G/DL (ref 12–16)
LYMPHOCYTES # BLD: 1.4 K/UL (ref 1–5.1)
LYMPHOCYTES NFR BLD: 37 %
MCH RBC QN AUTO: 31.7 PG (ref 26–34)
MCHC RBC AUTO-ENTMCNC: 33.7 G/DL (ref 31–36)
MCV RBC AUTO: 94.2 FL (ref 80–100)
MONOCYTES # BLD: 0.5 K/UL (ref 0–1.3)
MONOCYTES NFR BLD: 12 %
MRSA DNA SPEC QL NAA+PROBE: NORMAL
NEUTROPHILS # BLD: 1.9 K/UL (ref 1.7–7.7)
NEUTROPHILS NFR BLD: 50 %
PLATELET # BLD AUTO: 144 K/UL (ref 135–450)
PMV BLD AUTO: 11.2 FL (ref 5–10.5)
POTASSIUM SERPL-SCNC: 4 MMOL/L (ref 3.5–5.1)
RBC # BLD AUTO: 3.89 M/UL (ref 4–5.2)
SODIUM SERPL-SCNC: 139 MMOL/L (ref 136–145)
WBC # BLD AUTO: 3.8 K/UL (ref 4–11)

## 2024-08-02 NOTE — TELEPHONE ENCOUNTER
Spoke with Pt to inform them that she is not a joint replacement patient so there is no need for antibiotics or dental work prior to the surgery. PT was also informed that the WBC and RBC does not affect the surgery. Pt was informed to follow up with PCP for further management. Pt expressed understanding

## 2024-08-06 NOTE — PROGRESS NOTES
La Nena S Slaven    Age 38 y.o.    female    1985    MRN [id number]    8/14/2024  Arrival Time_____________  OR Time____________115 Min     Procedure(s):  VIDEO ARTHROSCOPY LEFT SHOULDER, SUBACROMIAL DECOMPRESSION, OPEN SUBPECTORAL BICEPS TENODESIS NOTE:INTERSCALENE SINGLE SHOT BLOCK                      General    Surgeon(s):  Aleks Cardenas, MD       Phone [phone] (home)     [facility] [place]  Date  Info Source  Home  Cell         Work  _____________________________________________________________________  _____________________________________________________________________  _____________________________________________________________________  _____________________________________________________________________  _____________________________________________________________________    PCP _____________________________ Phone_________________     H&P  ________________  Bringing      Chart              Epic      DOS      Called________  EKG ________________   Bringing      Chart              Epic      DOS      Called________  LABS________________   Bringing     Chart              Epic      DOS      Called________  Cardiac Clearance ______ Bringing      Chart              Epic      DOS      Called________  Pulmonary Clearance____ Bringing      Chart              Epic      DOS      Called________    Cardiologist________________________ Phone___________________________  Pulmonologist_______________________Phone___________________________    ? Advance Directives   ? Shinto concerns / Waiver on Chart            PAT Communications________________  ? Pre-op Instructions Given /Understood          _________________________________  ? Directions to Surgery Center                          _________________________________  ? Transportation Home_______________      __________________________________  ? Crutches/Walker__________________        __________________________________    Orders: Hard copy/ EPIC

## 2024-08-09 ENCOUNTER — ANESTHESIA EVENT (OUTPATIENT)
Dept: OPERATING ROOM | Age: 39
End: 2024-08-09
Payer: MEDICAID

## 2024-08-09 NOTE — PROGRESS NOTES
Date and time of surgery :  8/14/24 at 1035            Arrival Time: 0835      Bring Picture ID and insurance card.  Please wear simple, loose fitting clothing to the hospital. Bring a shirt that is at least 2 sizes too big to wear home  Bring sling day of surgery.  Do not bring valuables (money, credit cards, checkbooks, etc.)   Do not wear any makeup (including  eye makeup) and no nail polish or artificial nails on your fingers or toes.  DO NOT wear any jewelry or piercings on day of surgery.  All body piercing jewelry must be removed.  If you have dentures, they will be removed before going to the OR; we will provide you a container.  If you wear contact lenses or glasses, they will be removed; please bring a case for them.  Shower the evening before or morning of surgery with antibacterial soap.  Nothing to eat or drink after midnight the day before surgery.   You may brush your teeth and gargle the morning of surgery.  DO NOT SWALLOW WATER.   The morning of your surgery take only Prilosec and Levothyroxine with a sip of water.  Aspirin, Ibuprofen, Advil, Naproxen, Vitamin E and other Anti-inflammatory products and supplements should be stopped for 5 -7days before surgery or as directed by your physician.  Last dose of Mounjaro 8/2/24.  Do not smoke or drink any alcoholic beverages 24 hours prior to surgery.  This includes NA Beer. Refrain from the usage of any recreational drugs, including non-prescribed prescription drugs.   You MUST plan for a responsible adult to stay on site while you are here and take you home after your surgery. You will not be allowed to leave alone or drive yourself home. It is strongly suggested someone stay with you the first 24 hrs. Your surgery will be cancelled if you do not have a ride home.  To help prevent infection, change your sheets the night before surgery.   If you  have a Living Will and Durable Power of  for Healthcare, please bring in a copy.  Notify your

## 2024-08-14 ENCOUNTER — PATIENT MESSAGE (OUTPATIENT)
Dept: ORTHOPEDIC SURGERY | Age: 39
End: 2024-08-14

## 2024-08-14 ENCOUNTER — ANESTHESIA (OUTPATIENT)
Dept: OPERATING ROOM | Age: 39
End: 2024-08-14
Payer: MEDICAID

## 2024-08-14 ENCOUNTER — HOSPITAL ENCOUNTER (OUTPATIENT)
Age: 39
Setting detail: OUTPATIENT SURGERY
Discharge: HOME | End: 2024-08-14
Attending: ORTHOPAEDIC SURGERY | Admitting: ORTHOPAEDIC SURGERY
Payer: MEDICAID

## 2024-08-14 VITALS
RESPIRATION RATE: 16 BRPM | SYSTOLIC BLOOD PRESSURE: 113 MMHG | HEART RATE: 86 BPM | BODY MASS INDEX: 24.17 KG/M2 | HEIGHT: 67 IN | WEIGHT: 154 LBS | DIASTOLIC BLOOD PRESSURE: 57 MMHG | TEMPERATURE: 97.4 F | OXYGEN SATURATION: 96 %

## 2024-08-14 DIAGNOSIS — Z47.89 ORTHOPEDIC AFTERCARE: Primary | ICD-10-CM

## 2024-08-14 LAB
GLUCOSE BLD-MCNC: 71 MG/DL (ref 70–99)
GLUCOSE BLD-MCNC: 77 MG/DL (ref 70–99)
PERFORMED ON: NORMAL
PERFORMED ON: NORMAL

## 2024-08-14 PROCEDURE — 2500000003 HC RX 250 WO HCPCS: Performed by: ORTHOPAEDIC SURGERY

## 2024-08-14 PROCEDURE — 2580000003 HC RX 258: Performed by: ANESTHESIOLOGY

## 2024-08-14 PROCEDURE — 3600000014 HC SURGERY LEVEL 4 ADDTL 15MIN: Performed by: ORTHOPAEDIC SURGERY

## 2024-08-14 PROCEDURE — C1776 JOINT DEVICE (IMPLANTABLE): HCPCS | Performed by: ORTHOPAEDIC SURGERY

## 2024-08-14 PROCEDURE — 6360000002 HC RX W HCPCS: Performed by: ORTHOPAEDIC SURGERY

## 2024-08-14 PROCEDURE — 3600000004 HC SURGERY LEVEL 4 BASE: Performed by: ORTHOPAEDIC SURGERY

## 2024-08-14 PROCEDURE — A4217 STERILE WATER/SALINE, 500 ML: HCPCS | Performed by: ORTHOPAEDIC SURGERY

## 2024-08-14 PROCEDURE — 6370000000 HC RX 637 (ALT 250 FOR IP): Performed by: ANESTHESIOLOGY

## 2024-08-14 PROCEDURE — 2580000003 HC RX 258: Performed by: ORTHOPAEDIC SURGERY

## 2024-08-14 PROCEDURE — 2720000010 HC SURG SUPPLY STERILE: Performed by: ORTHOPAEDIC SURGERY

## 2024-08-14 PROCEDURE — 2709999900 HC NON-CHARGEABLE SUPPLY: Performed by: ORTHOPAEDIC SURGERY

## 2024-08-14 PROCEDURE — 6360000002 HC RX W HCPCS: Performed by: NURSE ANESTHETIST, CERTIFIED REGISTERED

## 2024-08-14 PROCEDURE — 7100000000 HC PACU RECOVERY - FIRST 15 MIN: Performed by: ORTHOPAEDIC SURGERY

## 2024-08-14 PROCEDURE — 64415 NJX AA&/STRD BRCH PLXS IMG: CPT | Performed by: ANESTHESIOLOGY

## 2024-08-14 PROCEDURE — 6370000000 HC RX 637 (ALT 250 FOR IP): Performed by: ORTHOPAEDIC SURGERY

## 2024-08-14 PROCEDURE — 2580000003 HC RX 258: Performed by: NURSE ANESTHETIST, CERTIFIED REGISTERED

## 2024-08-14 PROCEDURE — 3700000001 HC ADD 15 MINUTES (ANESTHESIA): Performed by: ORTHOPAEDIC SURGERY

## 2024-08-14 PROCEDURE — 7100000010 HC PHASE II RECOVERY - FIRST 15 MIN: Performed by: ORTHOPAEDIC SURGERY

## 2024-08-14 PROCEDURE — 2500000003 HC RX 250 WO HCPCS: Performed by: NURSE ANESTHETIST, CERTIFIED REGISTERED

## 2024-08-14 PROCEDURE — 7100000011 HC PHASE II RECOVERY - ADDTL 15 MIN: Performed by: ORTHOPAEDIC SURGERY

## 2024-08-14 PROCEDURE — 7100000001 HC PACU RECOVERY - ADDTL 15 MIN: Performed by: ORTHOPAEDIC SURGERY

## 2024-08-14 PROCEDURE — 3700000000 HC ANESTHESIA ATTENDED CARE: Performed by: ORTHOPAEDIC SURGERY

## 2024-08-14 DEVICE — KIT IMPL SYS PROX TENODESIS W/ BICEPSBUTTON INSRT FIBERLOOP: Type: IMPLANTABLE DEVICE | Site: SHOULDER | Status: FUNCTIONAL

## 2024-08-14 RX ORDER — SODIUM CHLORIDE 9 MG/ML
INJECTION, SOLUTION INTRAVENOUS
Status: DISCONTINUED
Start: 2024-08-14 | End: 2024-08-14 | Stop reason: HOSPADM

## 2024-08-14 RX ORDER — MAGNESIUM HYDROXIDE 1200 MG/15ML
LIQUID ORAL CONTINUOUS PRN
Status: COMPLETED | OUTPATIENT
Start: 2024-08-14 | End: 2024-08-14

## 2024-08-14 RX ORDER — FENTANYL CITRATE 50 UG/ML
INJECTION, SOLUTION INTRAMUSCULAR; INTRAVENOUS
Status: COMPLETED | OUTPATIENT
Start: 2024-08-14 | End: 2024-08-14

## 2024-08-14 RX ORDER — HYDROMORPHONE HYDROCHLORIDE 2 MG/ML
INJECTION, SOLUTION INTRAMUSCULAR; INTRAVENOUS; SUBCUTANEOUS PRN
Status: DISCONTINUED | OUTPATIENT
Start: 2024-08-14 | End: 2024-08-14 | Stop reason: SDUPTHER

## 2024-08-14 RX ORDER — SODIUM CHLORIDE 9 MG/ML
INJECTION, SOLUTION INTRAVENOUS PRN
Status: DISCONTINUED | OUTPATIENT
Start: 2024-08-14 | End: 2024-08-14 | Stop reason: HOSPADM

## 2024-08-14 RX ORDER — LIDOCAINE HYDROCHLORIDE 20 MG/ML
INJECTION, SOLUTION INFILTRATION; PERINEURAL PRN
Status: DISCONTINUED | OUTPATIENT
Start: 2024-08-14 | End: 2024-08-14 | Stop reason: SDUPTHER

## 2024-08-14 RX ORDER — ACETAMINOPHEN 500 MG
1000 TABLET ORAL ONCE
Status: COMPLETED | OUTPATIENT
Start: 2024-08-14 | End: 2024-08-14

## 2024-08-14 RX ORDER — LABETALOL HYDROCHLORIDE 5 MG/ML
10 INJECTION, SOLUTION INTRAVENOUS
Status: DISCONTINUED | OUTPATIENT
Start: 2024-08-14 | End: 2024-08-14 | Stop reason: HOSPADM

## 2024-08-14 RX ORDER — TRANEXAMIC ACID 100 MG/ML
1000 INJECTION, SOLUTION INTRAVENOUS ONCE
Status: COMPLETED | OUTPATIENT
Start: 2024-08-14 | End: 2024-08-14

## 2024-08-14 RX ORDER — DEXAMETHASONE SODIUM PHOSPHATE 10 MG/ML
INJECTION, SOLUTION INTRAMUSCULAR; INTRAVENOUS
Status: COMPLETED | OUTPATIENT
Start: 2024-08-14 | End: 2024-08-14

## 2024-08-14 RX ORDER — NALOXONE HYDROCHLORIDE 0.4 MG/ML
INJECTION, SOLUTION INTRAMUSCULAR; INTRAVENOUS; SUBCUTANEOUS PRN
Status: DISCONTINUED | OUTPATIENT
Start: 2024-08-14 | End: 2024-08-14 | Stop reason: HOSPADM

## 2024-08-14 RX ORDER — MEPERIDINE HYDROCHLORIDE 50 MG/ML
12.5 INJECTION INTRAMUSCULAR; INTRAVENOUS; SUBCUTANEOUS EVERY 5 MIN PRN
Status: DISCONTINUED | OUTPATIENT
Start: 2024-08-14 | End: 2024-08-14 | Stop reason: HOSPADM

## 2024-08-14 RX ORDER — ROCURONIUM BROMIDE 10 MG/ML
INJECTION, SOLUTION INTRAVENOUS PRN
Status: DISCONTINUED | OUTPATIENT
Start: 2024-08-14 | End: 2024-08-14 | Stop reason: SDUPTHER

## 2024-08-14 RX ORDER — ONDANSETRON 2 MG/ML
INJECTION INTRAMUSCULAR; INTRAVENOUS PRN
Status: DISCONTINUED | OUTPATIENT
Start: 2024-08-14 | End: 2024-08-14 | Stop reason: SDUPTHER

## 2024-08-14 RX ORDER — TRANEXAMIC ACID 100 MG/ML
1000 INJECTION, SOLUTION INTRAVENOUS ONCE
Status: DISCONTINUED | OUTPATIENT
Start: 2024-08-14 | End: 2024-08-14 | Stop reason: HOSPADM

## 2024-08-14 RX ORDER — SODIUM CHLORIDE 0.9 % (FLUSH) 0.9 %
5-40 SYRINGE (ML) INJECTION PRN
Status: DISCONTINUED | OUTPATIENT
Start: 2024-08-14 | End: 2024-08-14 | Stop reason: HOSPADM

## 2024-08-14 RX ORDER — DIPHENHYDRAMINE HYDROCHLORIDE 50 MG/ML
12.5 INJECTION INTRAMUSCULAR; INTRAVENOUS
Status: DISCONTINUED | OUTPATIENT
Start: 2024-08-14 | End: 2024-08-14 | Stop reason: HOSPADM

## 2024-08-14 RX ORDER — LIDOCAINE HYDROCHLORIDE 10 MG/ML
1 INJECTION, SOLUTION EPIDURAL; INFILTRATION; INTRACAUDAL; PERINEURAL
Status: DISCONTINUED | OUTPATIENT
Start: 2024-08-14 | End: 2024-08-14 | Stop reason: HOSPADM

## 2024-08-14 RX ORDER — CEFAZOLIN SODIUM IN 0.9 % NACL 2 G/100 ML
2000 PLASTIC BAG, INJECTION (ML) INTRAVENOUS
Status: COMPLETED | OUTPATIENT
Start: 2024-08-14 | End: 2024-08-14

## 2024-08-14 RX ORDER — MIDAZOLAM HYDROCHLORIDE 1 MG/ML
INJECTION INTRAMUSCULAR; INTRAVENOUS
Status: COMPLETED | OUTPATIENT
Start: 2024-08-14 | End: 2024-08-14

## 2024-08-14 RX ORDER — LORAZEPAM 2 MG/ML
0.5 INJECTION INTRAMUSCULAR
Status: DISCONTINUED | OUTPATIENT
Start: 2024-08-14 | End: 2024-08-14 | Stop reason: HOSPADM

## 2024-08-14 RX ORDER — SODIUM CHLORIDE 0.9 % (FLUSH) 0.9 %
5-40 SYRINGE (ML) INJECTION EVERY 12 HOURS SCHEDULED
Status: DISCONTINUED | OUTPATIENT
Start: 2024-08-14 | End: 2024-08-14 | Stop reason: HOSPADM

## 2024-08-14 RX ORDER — DEXAMETHASONE SODIUM PHOSPHATE 10 MG/ML
INJECTION INTRAMUSCULAR; INTRAVENOUS PRN
Status: DISCONTINUED | OUTPATIENT
Start: 2024-08-14 | End: 2024-08-14 | Stop reason: SDUPTHER

## 2024-08-14 RX ORDER — ONDANSETRON 2 MG/ML
4 INJECTION INTRAMUSCULAR; INTRAVENOUS
Status: DISCONTINUED | OUTPATIENT
Start: 2024-08-14 | End: 2024-08-14 | Stop reason: HOSPADM

## 2024-08-14 RX ORDER — MELOXICAM 7.5 MG/1
7.5 TABLET ORAL ONCE
Status: COMPLETED | OUTPATIENT
Start: 2024-08-14 | End: 2024-08-14

## 2024-08-14 RX ORDER — MIDAZOLAM HYDROCHLORIDE 1 MG/ML
2 INJECTION INTRAMUSCULAR; INTRAVENOUS
Status: DISCONTINUED | OUTPATIENT
Start: 2024-08-14 | End: 2024-08-14 | Stop reason: HOSPADM

## 2024-08-14 RX ORDER — BUPIVACAINE HYDROCHLORIDE 5 MG/ML
INJECTION, SOLUTION EPIDURAL; INTRACAUDAL
Status: DISCONTINUED | OUTPATIENT
Start: 2024-08-14 | End: 2024-08-14

## 2024-08-14 RX ORDER — BUPIVACAINE HYDROCHLORIDE 2.5 MG/ML
INJECTION, SOLUTION INFILTRATION; PERINEURAL PRN
Status: DISCONTINUED | OUTPATIENT
Start: 2024-08-14 | End: 2024-08-14 | Stop reason: ALTCHOICE

## 2024-08-14 RX ORDER — BUPIVACAINE HYDROCHLORIDE AND EPINEPHRINE 5; 5 MG/ML; UG/ML
INJECTION, SOLUTION EPIDURAL; INTRACAUDAL; PERINEURAL
Status: COMPLETED | OUTPATIENT
Start: 2024-08-14 | End: 2024-08-14

## 2024-08-14 RX ORDER — PROCHLORPERAZINE EDISYLATE 5 MG/ML
5 INJECTION INTRAMUSCULAR; INTRAVENOUS
Status: DISCONTINUED | OUTPATIENT
Start: 2024-08-14 | End: 2024-08-14 | Stop reason: HOSPADM

## 2024-08-14 RX ORDER — OXYCODONE HYDROCHLORIDE 5 MG/1
5 TABLET ORAL EVERY 6 HOURS PRN
Qty: 20 TABLET | Refills: 0 | Status: SHIPPED | OUTPATIENT
Start: 2024-08-14 | End: 2024-08-19

## 2024-08-14 RX ORDER — FENTANYL CITRATE 50 UG/ML
INJECTION, SOLUTION INTRAMUSCULAR; INTRAVENOUS
Status: COMPLETED
Start: 2024-08-14 | End: 2024-08-14

## 2024-08-14 RX ORDER — SODIUM CHLORIDE, SODIUM LACTATE, POTASSIUM CHLORIDE, CALCIUM CHLORIDE 600; 310; 30; 20 MG/100ML; MG/100ML; MG/100ML; MG/100ML
INJECTION, SOLUTION INTRAVENOUS CONTINUOUS
Status: DISCONTINUED | OUTPATIENT
Start: 2024-08-14 | End: 2024-08-14 | Stop reason: HOSPADM

## 2024-08-14 RX ORDER — OXYCODONE HYDROCHLORIDE 5 MG/1
5 TABLET ORAL
Status: DISCONTINUED | OUTPATIENT
Start: 2024-08-14 | End: 2024-08-14 | Stop reason: HOSPADM

## 2024-08-14 RX ORDER — PROPOFOL 10 MG/ML
INJECTION, EMULSION INTRAVENOUS PRN
Status: DISCONTINUED | OUTPATIENT
Start: 2024-08-14 | End: 2024-08-14 | Stop reason: SDUPTHER

## 2024-08-14 RX ADMIN — ONDANSETRON 4 MG: 2 INJECTION INTRAMUSCULAR; INTRAVENOUS at 11:50

## 2024-08-14 RX ADMIN — PROPOFOL 200 MG: 10 INJECTION, EMULSION INTRAVENOUS at 11:26

## 2024-08-14 RX ADMIN — HYDROMORPHONE HYDROCHLORIDE 0.4 MG: 2 INJECTION, SOLUTION INTRAMUSCULAR; INTRAVENOUS; SUBCUTANEOUS at 12:08

## 2024-08-14 RX ADMIN — SODIUM CHLORIDE, POTASSIUM CHLORIDE, SODIUM LACTATE AND CALCIUM CHLORIDE: 600; 310; 30; 20 INJECTION, SOLUTION INTRAVENOUS at 09:16

## 2024-08-14 RX ADMIN — Medication 2 AMPULE: at 08:53

## 2024-08-14 RX ADMIN — Medication 20 MG: at 11:55

## 2024-08-14 RX ADMIN — Medication 2000 MG: at 11:30

## 2024-08-14 RX ADMIN — MELOXICAM 7.5 MG: 7.5 TABLET ORAL at 08:48

## 2024-08-14 RX ADMIN — ROCURONIUM BROMIDE 50 MG: 50 INJECTION, SOLUTION INTRAVENOUS at 11:26

## 2024-08-14 RX ADMIN — ACETAMINOPHEN 1000 MG: 500 TABLET ORAL at 08:49

## 2024-08-14 RX ADMIN — SUGAMMADEX 200 MG: 100 INJECTION, SOLUTION INTRAVENOUS at 12:57

## 2024-08-14 RX ADMIN — BUPIVACAINE HYDROCHLORIDE AND EPINEPHRINE 20 ML: 5; 5 INJECTION, SOLUTION EPIDURAL; INTRACAUDAL; PERINEURAL at 09:25

## 2024-08-14 RX ADMIN — DEXAMETHASONE SODIUM PHOSPHATE 4 MG: 10 INJECTION, SOLUTION INTRAMUSCULAR; INTRAVENOUS at 09:25

## 2024-08-14 RX ADMIN — DEXAMETHASONE SODIUM PHOSPHATE 6 MG: 10 INJECTION INTRAMUSCULAR; INTRAVENOUS at 11:50

## 2024-08-14 RX ADMIN — TRANEXAMIC ACID 1000 MG: 100 INJECTION, SOLUTION INTRAVENOUS at 12:37

## 2024-08-14 RX ADMIN — MIDAZOLAM 2 MG: 1 INJECTION INTRAMUSCULAR; INTRAVENOUS at 09:25

## 2024-08-14 RX ADMIN — LIDOCAINE HYDROCHLORIDE 100 MG: 20 INJECTION, SOLUTION INFILTRATION; PERINEURAL at 11:26

## 2024-08-14 RX ADMIN — FENTANYL CITRATE 100 MCG: 50 INJECTION, SOLUTION INTRAMUSCULAR; INTRAVENOUS at 09:25

## 2024-08-14 RX ADMIN — TRANEXAMIC ACID 1000 MG: 100 INJECTION, SOLUTION INTRAVENOUS at 11:35

## 2024-08-14 RX ADMIN — PHENYLEPHRINE HYDROCHLORIDE 40 MCG/MIN: 10 INJECTION INTRAVENOUS at 11:42

## 2024-08-14 ASSESSMENT — PAIN SCALES - GENERAL
PAINLEVEL_OUTOF10: 0

## 2024-08-14 ASSESSMENT — PAIN - FUNCTIONAL ASSESSMENT: PAIN_FUNCTIONAL_ASSESSMENT: 0-10

## 2024-08-14 NOTE — DISCHARGE INSTRUCTIONS
especially the first 24 hours after surgery.  ? Use only medicines prescribed by your doctor.    ? Do not drink alcohol.  ? If you have a dental device to assist you while at rest, use it at all times for the first 24 hours.  For patients using CPAP machines:  ? Use your CPAP machine during all periods of sleep as usual.  ? Use your CPAP machine during all periods of daytime rest while on pain medicines.  ** Follow up with your primary care doctor for continued care.    IF YOU DO NOT TAKE ALL OF YOUR NARCOTIC PAIN MEDICATION, please dispose of them responsibly. There are drop off boxes in the Emergency Departments 24/7 at both [facility] and [facility]. If these locations are not convenient, other options for discarding them can be found at:  [email or web address]    Hospital or office staff may NOT accept any medications to drop off in the cabinet for you.     PERIPHERAL NERVE BLOCK INSTRUCTIONS     Please remember while having a nerve block you are at an increased risk for BALANCE ISSUES AND FALLS!!  You were given a nerve block today from the anesthesiologist. Most nerve blocks last anywhere from 6-36 hours.  You should start taking your pain medication before the block wears off or when you first begin feeling discomfort. It takes at least 30-60 minutes for a pain pill to take effect. Pain medications should be taken with food.   Consider setting an alarm through the night to help manage your pain level so you do not wake up with too much pain.   Pain medicines can cause more sedation and decrease your breathing so ONLY take as directed. If you have Sleep Apnea, you definitely need to use your C-Pap machine.   What to expect after a nerve block:   Numbness, tingling- arm or leg feels heavy or asleep  Weakness or inability to move or control your arm or leg   Inability to feel temperature changes to your arm or leg  Usually the weakness wears off first, followed by the tingling or heaviness. You may notice

## 2024-08-14 NOTE — PROGRESS NOTES
Patient placed on monitor and oxygen via nasal canula 2L      Block Time Out      Verified   Correct Pt.  Correct   Correct Procedure  Correct Site  Correct Extremity

## 2024-08-14 NOTE — H&P
ORTHOPAEDIC SURGERY INTERVAL H&P    La Nena Magallanes was seen in the preoperative area, where a history and physical examination was reviewed and the patient was examined by me today. There have been no significant clinical changes since the completion of the previous recorded history and physical as well as recent progress notes dated most recent of 7/26/24 with preop clearance note dated from OSH 8/1/24.  The surgical site was confirmed by the patient and me and the surgical site was marked.     The risks, benefits, and alternatives of the proposed procedure(s) have been explained to the patient (or appropriately confirmed guardian) and understanding was verbalized. Please see outpatient notes for details.  All questions were answered and a signed documented consent has been placed in the patient's chart. The patient wishes to proceed.  On call to the OR.      Electronically signed by: Aleks Cardenas MD,8/14/2024,10:49 AM         Aleks Cardenas MD       Orthopaedic Surgery-Sports Medicine      Disclaimer:  This note was dictated with voice recognition software.  Though review and correction are routinely performed, please contact the office/medical records for any errors requiring correction.

## 2024-08-14 NOTE — ANESTHESIA POSTPROCEDURE EVALUATION
Department of Anesthesiology  Postprocedure Note    Patient: La Nena Magallanes  MRN: [id number]  YOB: 1985  Date of evaluation: 8/14/2024    Procedure Summary       Date: 08/14/24 Room / Location: [address]    Anesthesia Start: 1114 Anesthesia Stop: 1319    Procedure: VIDEO ARTHROSCOPY LEFT SHOULDER, SUBACROMIAL DECOMPRESSION, OPEN SUBPECTORAL BICEPS TENODESIS, LEFT ROTATOR CUFF DEBRIDEMENT (Left: Shoulder) Diagnosis:       Bicipital tendinitis of left shoulder      Bursitis of left shoulder      (Bicipital tendinitis of left shoulder [M75.22])      (Bursitis of left shoulder [M75.52])    Surgeons: Aleks Cardenas MD Responsible Provider: Alex Rogers MD    Anesthesia Type: general, regional ASA Status: 2            Anesthesia Type: No value filed.    Liya Phase I: Liya Score: 9    Liya Phase II:      Anesthesia Post Evaluation    Patient location during evaluation: PACU  Patient participation: complete - patient participated  Level of consciousness: awake and alert  Pain score: 0  Airway patency: patent  Nausea & Vomiting: no vomiting and no nausea  Cardiovascular status: hemodynamically stable and blood pressure returned to baseline  Respiratory status: acceptable  Hydration status: euvolemic  Comments: ---------------------------               08/14/24                      1320         ---------------------------   BP:        (!) 111/44       Pulse:         79           Resp:         (!) 7         Temp:   97.4 °F (36.3 °C)   SpO2:          98%         ---------------------------    Multimodal analgesia pain management approach  Pain management: adequate    No notable events documented.

## 2024-08-14 NOTE — ANESTHESIA PRE PROCEDURE
-SLEEP APNEA performed by Aleks Cardenas MD at [facility] OR   • SLEEVE GASTRECTOMY N/A 11/06/2019    LAPAROSCOPIC SLEEVE GASTRECTOMY - ETHICON performed by Samuel Garay MD at [facility] OR   • TENOTOMY Left 07/02/2021    LEFT QUADRICEP PERCUTANEOUS TENOTOMY , TX2 MICROTIP WITH ULTRASOUND performed by Chapin Sarkar MD at [facility] OR   • UPPER GASTROINTESTINAL ENDOSCOPY N/A 08/23/2019    EGD BIOPSY performed by Samuel Garay MD at [facility] ENDOSCOPY   • UPPER GASTROINTESTINAL ENDOSCOPY N/A 03/10/2021    EGD BIOPSY performed by Elio Jefferson MD at [facility] ENDOSCOPY   • WISDOM TOOTH EXTRACTION         Social History:    Social History     Tobacco Use   • Smoking status: Never   • Smokeless tobacco: Never   Substance Use Topics   • Alcohol use: Yes     Comment: 0-1 drinks per month                                Counseling given: Not Answered      Vital Signs (Current):   Vitals:    08/09/24 1019   Weight: 69.9 kg (154 lb)   Height: 1.702 m (5' 7\")                                              BP Readings from Last 3 Encounters:   05/27/24 110/68   06/10/23 123/85   02/11/23 111/89       NPO Status:                                                                                 BMI:   Wt Readings from Last 3 Encounters:   08/09/24 69.9 kg (154 lb)   07/26/24 75.8 kg (167 lb 1.7 oz)   07/22/24 75.8 kg (167 lb)     Body mass index is 24.12 kg/m².    CBC:   Lab Results   Component Value Date/Time    WBC 3.8 08/01/2024 05:07 PM    RBC 3.89 08/01/2024 05:07 PM    HGB 12.3 08/01/2024 05:07 PM    HCT 36.6 08/01/2024 05:07 PM    MCV 94.2 08/01/2024 05:07 PM    RDW 13.9 08/01/2024 05:07 PM     08/01/2024 05:07 PM       CMP:   Lab Results   Component Value Date/Time     08/01/2024 05:06 PM    K 4.0 08/01/2024 05:06 PM    K 3.6 06/10/2023 06:01 PM     08/01/2024 05:06 PM    CO2 23 08/01/2024 05:06 PM    BUN 8 08/01/2024 05:06 PM    CREATININE 0.7 08/01/2024 05:06 PM    GFRAA >60

## 2024-08-14 NOTE — OP NOTE
Operative Note      Patient: La Nena Magallanes  YOB: 1985  MRN: [id number]    Date of Procedure: 8/14/2024    Pre-Op Diagnosis Codes:      * Bicipital tendinitis of left shoulder [M75.22]     * Bursitis of left shoulder [M75.52]    Post-Op Diagnosis: Same       Procedure(s):  VIDEO ARTHROSCOPY LEFT SHOULDER, SUBACROMIAL DECOMPRESSION, OPEN SUBPECTORAL BICEPS TENODESIS, LEFT ROTATOR CUFF DEBRIDEMENT    Surgeon(s):  Aleks Cardenas MD    Assistant:   Fellow: Hosea Doan MD    Anesthesia: General    Estimated Blood Loss (mL): Minimal    Complications: None    Specimens:   * No specimens in log *    Implants:  Implant Name Type Inv. Item Serial No.  Lot No. LRB No. Used Action   KIT IMPL SYS PROX TENODESIS W/ BICEPSBUTTON INSRT FIBERLOOP - [id number]  KIT IMPL SYS PROX TENODESIS W/ BICEPSBUTTON INSRT FIBERLOOP  [id number] Left 1 Implanted         Drains: * No LDAs found *    Findings:  Infection Present At Time Of Surgery (PATOS) (choose all levels that have infection present):  No infection present  Other Findings: Grade 1 softening humeral head/glenoid.  No gross supraspinatus or infraspinatus tear.  Intrasubstance interstitial laminar split tear longitudinally within the anterior aspect of the subscapularis middle 25%.  Remainder of rotator cuff tendon intact.  Positive bicipital tenosynovitis with injection along the biceps tendon intra-articular component.  No gross SLAP tear.  Positive subacromial bursitis.  Positive bicipital tenosynovitis in the extra-articular component in the groove    Detailed Description of Procedure:     OPERATIVE INDICATIONS: Patient is a  38 y.o. female with left shoulder pain and symptoms consistent with subacromial impingement and bicipital tendinitis. Please see outpatient notes for details and clinical history and previous treatment courses as applicable. With further discussion with the patient regarding continued non operative versus

## 2024-08-14 NOTE — ANESTHESIA PROCEDURE NOTES
Peripheral Block    Patient location during procedure: pre-op  Reason for block: post-op pain management and at surgeon's request  Start time: 8/14/2024 9:25 AM  End time: 8/14/2024 9:35 AM  Staffing  Performed: resident/CRNA   Anesthesiologist: Alex Rogers MD  Resident/CRNA: Nasim Velasquez APRN - CRNA  Performed by: Nasim Velasquez APRN - CRNA  Authorized by: Aleks Cardenas MD    Preanesthetic Checklist  Completed: patient identified, IV checked, site marked, risks and benefits discussed, surgical/procedural consents, equipment checked, pre-op evaluation, timeout performed, anesthesia consent given, oxygen available, monitors applied/VS acknowledged, fire risk safety assessment completed and verbalized and blood product R/B/A discussed and consented  Peripheral Block   Patient position: sitting  Prep: ChloraPrep  Provider prep: mask and sterile gloves  Patient monitoring: cardiac monitor, continuous pulse ox, frequent blood pressure checks, IV access, oxygen and responsive to questions  Block type: Brachial plexus  Interscalene  Laterality: left  Injection technique: single-shot  Guidance: ultrasound guided    Needle   Needle type: insulated echogenic nerve stimulator needle   Needle gauge: 21 G  Needle localization: anatomical landmarks and ultrasound guidance  Needle length: 10 cm  Assessment   Injection assessment: negative aspiration for heme, no paresthesia on injection, local visualized surrounding nerve on ultrasound and no intravascular symptoms  Paresthesia pain: none  Slow fractionated injection: yes  Hemodynamics: stable  Outcomes: uncomplicated and patient tolerated procedure well    Medications Administered  dexAMETHasone (DECADRON) (PF) 10 mg/mL injection - Perineural   4 mg - 8/14/2024 9:25:00 AM  fentaNYL (SUBLIMAZE) injection - IntraVENous   100 mcg - 8/14/2024 9:25:00 AM  midazolam (VERSED) injection 2 mg/2mL - IntraVENous   2 mg - 8/14/2024 9:25:00 AM  BUPivacaine 0.5%-EPINEPHrine

## 2024-08-17 ENCOUNTER — PATIENT MESSAGE (OUTPATIENT)
Dept: ORTHOPEDIC SURGERY | Age: 39
End: 2024-08-17

## 2024-08-20 DIAGNOSIS — M75.22 BICEPS TENDONITIS, LEFT: ICD-10-CM

## 2024-08-20 RX ORDER — TIZANIDINE 4 MG/1
TABLET ORAL
Qty: 30 TABLET | Refills: 0 | Status: SHIPPED | OUTPATIENT
Start: 2024-08-20

## 2024-08-20 NOTE — TELEPHONE ENCOUNTER
Rx Request: Tizanidine    Last Filled: 07/24/2024 for 10 days  Refill Due: 08/03/2024  Last OV: 06/06/2024  Follow Up: 08/27/2024

## 2024-08-21 ENCOUNTER — HOSPITAL ENCOUNTER (OUTPATIENT)
Dept: PHYSICAL THERAPY | Age: 39
Setting detail: THERAPIES SERIES
Discharge: HOME | End: 2024-08-21
Payer: MEDICAID

## 2024-08-21 DIAGNOSIS — R53.1 GENERALIZED WEAKNESS: ICD-10-CM

## 2024-08-21 DIAGNOSIS — M25.512 ACUTE PAIN OF LEFT SHOULDER: Primary | ICD-10-CM

## 2024-08-21 PROCEDURE — 97161 PT EVAL LOW COMPLEX 20 MIN: CPT

## 2024-08-21 PROCEDURE — 97110 THERAPEUTIC EXERCISES: CPT

## 2024-08-21 PROCEDURE — 97140 MANUAL THERAPY 1/> REGIONS: CPT

## 2024-08-21 NOTE — PLAN OF CARE
[facility]- Outpatient Rehabilitation and Therapy [address], [place] [address] office: [phone] fax: [phone]     Physical Therapy Initial Evaluation Certification      Dear Aleks Cardenas MD,    We had the pleasure of evaluating the following patient for physical therapy services at [facility] Outpatient Physical Therapy.  A summary of our findings can be found in the initial assessment below.  This includes our plan of care.  If you have any questions or concerns regarding these findings, please do not hesitate to contact me at the office phone number listed above.  Thank you for the referral.     Physician Signature:_______________________________Date:__________________  By signing above (or electronic signature), therapist’s plan is approved by physician       Physical Therapy: TREATMENT/PROGRESS NOTE   Patient: La Nena Magallanes (38 y.o. female)   Examination Date: 2024   :  1985 MRN: [id number]   Visit #: 1   Insurance Allowable Auth Needed   30 []Yes    []No    Insurance: Payor: [employer] [place] MEDICAID / [facility] / Product Type: *No Product type* /   Insurance ID: [id number] - (Medicaid Managed)  Secondary Insurance (if applicable):    Treatment Diagnosis:     ICD-10-CM    1. Acute pain of left shoulder  M25.512       2. Generalized weakness  R53.1          Medical Diagnosis:  Orthopedic aftercare [Z47.89]   Referring Physician: Aleks Cardenas MD  PCP: Ria Layne APRN - NP       Plan of care signed (Y/N):     Date of Patient follow up with Physician:      Progress Report/POC: EVAL today  Progress note due: 2024 (OR 10 visits /OR AUTH LIMITS, whichever is less)  POC update due: 2024                    Medical History:  Comorbidities:  None  Relevant Medical History:                                          Precautions/ Contra-indications:           Latex allergy:

## 2024-08-27 ENCOUNTER — APPOINTMENT (OUTPATIENT)
Dept: GENERAL RADIOLOGY | Age: 39
End: 2024-08-27
Payer: MEDICAID

## 2024-08-27 ENCOUNTER — HOSPITAL ENCOUNTER (EMERGENCY)
Age: 39
Discharge: HOME | End: 2024-08-27
Payer: MEDICAID

## 2024-08-27 ENCOUNTER — OFFICE VISIT (OUTPATIENT)
Dept: ORTHOPEDIC SURGERY | Age: 39
End: 2024-08-27

## 2024-08-27 VITALS
TEMPERATURE: 97.6 F | SYSTOLIC BLOOD PRESSURE: 100 MMHG | RESPIRATION RATE: 14 BRPM | HEART RATE: 95 BPM | DIASTOLIC BLOOD PRESSURE: 64 MMHG | OXYGEN SATURATION: 100 %

## 2024-08-27 VITALS — WEIGHT: 154 LBS | BODY MASS INDEX: 24.17 KG/M2 | HEIGHT: 67 IN

## 2024-08-27 DIAGNOSIS — Z47.89 ORTHOPEDIC AFTERCARE: Primary | ICD-10-CM

## 2024-08-27 DIAGNOSIS — B34.9 VIRAL ILLNESS: Primary | ICD-10-CM

## 2024-08-27 LAB
ALBUMIN SERPL-MCNC: 3.4 G/DL (ref 3.4–5)
ALBUMIN/GLOB SERPL: 1.3 {RATIO} (ref 1.1–2.2)
ALP SERPL-CCNC: 63 U/L (ref 40–129)
ALT SERPL-CCNC: 40 U/L (ref 10–40)
ANION GAP SERPL CALCULATED.3IONS-SCNC: 8 MMOL/L (ref 3–16)
AST SERPL-CCNC: 31 U/L (ref 15–37)
BASOPHILS # BLD: 0 K/UL (ref 0–0.2)
BASOPHILS NFR BLD: 0.4 %
BILIRUB SERPL-MCNC: 0.3 MG/DL (ref 0–1)
BILIRUB UR QL STRIP.AUTO: NEGATIVE
BUN SERPL-MCNC: 7 MG/DL (ref 7–20)
CALCIUM SERPL-MCNC: 8.6 MG/DL (ref 8.3–10.6)
CHLORIDE SERPL-SCNC: 102 MMOL/L (ref 99–110)
CLARITY UR: CLEAR
CO2 SERPL-SCNC: 24 MMOL/L (ref 21–32)
COLOR UR: YELLOW
CREAT SERPL-MCNC: <0.5 MG/DL (ref 0.6–1.1)
CRP SERPL-MCNC: <3 MG/L (ref 0–5.1)
DEPRECATED RDW RBC AUTO: 14.3 % (ref 12.4–15.4)
EOSINOPHIL # BLD: 0.1 K/UL (ref 0–0.6)
EOSINOPHIL NFR BLD: 1.5 %
ERYTHROCYTE [SEDIMENTATION RATE] IN BLOOD BY WESTERGREN METHOD: 9 MM/HR (ref 0–20)
FLUAV RNA RESP QL NAA+PROBE: NOT DETECTED
FLUBV RNA RESP QL NAA+PROBE: NOT DETECTED
GFR SERPLBLD CREATININE-BSD FMLA CKD-EPI: >90 ML/MIN/{1.73_M2}
GLUCOSE SERPL-MCNC: 97 MG/DL (ref 70–99)
GLUCOSE UR STRIP.AUTO-MCNC: NEGATIVE MG/DL
HCT VFR BLD AUTO: 35.9 % (ref 36–48)
HGB BLD-MCNC: 11.9 G/DL (ref 12–16)
HGB UR QL STRIP.AUTO: NEGATIVE
KETONES UR STRIP.AUTO-MCNC: NEGATIVE MG/DL
LACTATE BLDV-SCNC: 1.1 MMOL/L (ref 0.4–1.9)
LEUKOCYTE ESTERASE UR QL STRIP.AUTO: NEGATIVE
LYMPHOCYTES # BLD: 1.4 K/UL (ref 1–5.1)
LYMPHOCYTES NFR BLD: 35.2 %
MCH RBC QN AUTO: 31.6 PG (ref 26–34)
MCHC RBC AUTO-ENTMCNC: 33 G/DL (ref 31–36)
MCV RBC AUTO: 95.8 FL (ref 80–100)
MONOCYTES # BLD: 0.4 K/UL (ref 0–1.3)
MONOCYTES NFR BLD: 8.6 %
NEUTROPHILS # BLD: 2.2 K/UL (ref 1.7–7.7)
NEUTROPHILS NFR BLD: 54.3 %
NITRITE UR QL STRIP.AUTO: NEGATIVE
PH UR STRIP.AUTO: 6 [PH] (ref 5–8)
PLATELET # BLD AUTO: 193 K/UL (ref 135–450)
PMV BLD AUTO: 9.4 FL (ref 5–10.5)
POTASSIUM SERPL-SCNC: 3.6 MMOL/L (ref 3.5–5.1)
PROT SERPL-MCNC: 6 G/DL (ref 6.4–8.2)
PROT UR STRIP.AUTO-MCNC: NEGATIVE MG/DL
RBC # BLD AUTO: 3.75 M/UL (ref 4–5.2)
SARS-COV-2 RNA RESP QL NAA+PROBE: NOT DETECTED
SODIUM SERPL-SCNC: 134 MMOL/L (ref 136–145)
SP GR UR STRIP.AUTO: 1.02 (ref 1–1.03)
UA COMPLETE W REFLEX CULTURE PNL UR: NORMAL
UA DIPSTICK W REFLEX MICRO PNL UR: NORMAL
URN SPEC COLLECT METH UR: NORMAL
UROBILINOGEN UR STRIP-ACNC: 0.2 E.U./DL
WBC # BLD AUTO: 4.1 K/UL (ref 4–11)

## 2024-08-27 PROCEDURE — 99024 POSTOP FOLLOW-UP VISIT: CPT | Performed by: ORTHOPAEDIC SURGERY

## 2024-08-27 PROCEDURE — 73030 X-RAY EXAM OF SHOULDER: CPT

## 2024-08-27 PROCEDURE — 80053 COMPREHEN METABOLIC PANEL: CPT

## 2024-08-27 PROCEDURE — 87636 SARSCOV2 & INF A&B AMP PRB: CPT

## 2024-08-27 PROCEDURE — 86140 C-REACTIVE PROTEIN: CPT

## 2024-08-27 PROCEDURE — 83605 ASSAY OF LACTIC ACID: CPT

## 2024-08-27 PROCEDURE — 85652 RBC SED RATE AUTOMATED: CPT

## 2024-08-27 PROCEDURE — 81003 URINALYSIS AUTO W/O SCOPE: CPT

## 2024-08-27 PROCEDURE — 71046 X-RAY EXAM CHEST 2 VIEWS: CPT

## 2024-08-27 PROCEDURE — 99284 EMERGENCY DEPT VISIT MOD MDM: CPT

## 2024-08-27 PROCEDURE — 85025 COMPLETE CBC W/AUTO DIFF WBC: CPT

## 2024-08-27 ASSESSMENT — ENCOUNTER SYMPTOMS
SINUS PRESSURE: 0
ABDOMINAL PAIN: 0
TROUBLE SWALLOWING: 0
CHEST TIGHTNESS: 0
SINUS PAIN: 0
SHORTNESS OF BREATH: 0

## 2024-08-27 NOTE — PROGRESS NOTES
POST OPERATIVE ORTHOPAEDIC NOTE    DOS: 8/14/2024  PROCEDURES: Left shoulder arthroscopic revision subacromial decompression with rotator cuff debridement and open subpectoral biceps tenodesis    The patient has been recovering. The patient states the pain is 2/10 pain.  The patient has started PT. patient has been nonweightbearing in the sling    Focused pertinent physical examination of the operative extremity:  Wounds C/D/I, well healing surgical incisions  No erythema or drainage  Skin intact throughout  5/5  G IO EPL  SILT Ax, R, U, M -although patient states slight tingling in the fingertips which she had noticed preoperatively with prior history of carpal tunnel related symptoms/surgery having been performed  +2 radial pulse     Diagnosis Orders   1. Orthopedic aftercare          Assessment and plan: The patient is now 13 days status post the above listed procedure and is recovering    .    --Greater than 50% of the time (11/20 minutes) was spent coordinating care and discussing postoperative recovery course  -I had a pleasant discussion with the patient today.  I reviewed with her intraoperative procedures performed as well as arthroscopic photos  -Sutures were removed and Steri-Strips were placed.  Patient was educated to scar tissue massage  -Aspirin 325 mg p.o. daily x 3 weeks in total for DVT/VTE prophylaxis  -OTC Tylenol per bottle as needed discomfort  -Formal physical therapy will continue and follow the biceps tenodesis protocol and while protecting the biceps tendon repair, progressions with range of motion left shoulder AA ROM/P ROM  -Nonweightbearing and sling use for total of 6 weeks  -I did review with the patient that she states that she has started noticing slight wrist tingling/potential recurrence of symptoms with her left hand prior to surgery.  I suspect that with the relative immobilization with regard to her shoulder being in the sling she is having some slight changes with regard to her

## 2024-08-28 ENCOUNTER — HOSPITAL ENCOUNTER (OUTPATIENT)
Dept: PHYSICAL THERAPY | Age: 39
Setting detail: THERAPIES SERIES
Discharge: HOME | End: 2024-08-28
Payer: MEDICAID

## 2024-08-28 PROCEDURE — 97140 MANUAL THERAPY 1/> REGIONS: CPT

## 2024-08-28 PROCEDURE — 97110 THERAPEUTIC EXERCISES: CPT

## 2024-08-28 NOTE — ED PROVIDER NOTES
Grandfather     Arthritis Paternal Grandfather     Diabetes Maternal Grandmother     Arthritis Maternal Grandmother           SOCIAL HISTORY       Social History     Socioeconomic History    Marital status:      Spouse name: Rasheed    Number of children: 0    Years of education: 16    Highest education level: None   Tobacco Use    Smoking status: Never    Smokeless tobacco: Never   Vaping Use    Vaping status: Former    Quit date: 5/1/2024   Substance and Sexual Activity    Alcohol use: Yes     Comment: 0-1 drinks per month    Drug use: Not Currently     Types: Marijuana (Weed)    Sexual activity: Yes     Partners: Male     Social Determinants of Health     Food Insecurity: No Transportation Needs (6/28/2024)    Received from  [facility],  [facility],  [facility],  [facility],  [facility]    Yearly Questionnaire     Do you need any assistance with obtaining housing, meals, medication, transportation or medical equipment?: No   Transportation Needs: No Transportation Needs (6/28/2024)    Received from  [facility],  [facility],  [facility],  [facility],  [facility],  [facility]    Yearly Questionnaire     Do you need any assistance with obtaining housing, meals, medication, transportation or medical equipment?: No   Physical Activity: Insufficiently Active (5/24/2023)    Exercise Vital Sign     Days of Exercise per Week: 2 days     Minutes of Exercise per Session: 20 min    Received from [employer]   Housing Stability: No Transportation Needs (6/28/2024)    Received from  [facility],  [facility],  [facility],  [facility],  [facility],  [employer]    Yearly Questionnaire     Do you need any assistance with obtaining housing, meals, medication, transportation or medical equipment?: No       SCREENINGS    [place] Coma Scale  Eye Opening: Spontaneous  Best Verbal Response: Oriented  Best Motor Response: Obeys commands  [place] Coma Scale Score: 15        PHYSICAL EXAM    (up to 7 for level 4, 8 or  PM (Final result)   Narrative: Referred out by:  [facility]  [address] [place] [address],  [place] [address]   Phone [phone]             Urine Drug Screen  Collected: 9/25/2022  3:37 AM (Final result)              Urine Drug Screen  Collected: 4/29/2021  4:20 PM (Final result)   Narrative: Performed at:  [address],  [place] [address]   Phone [phone]             Drug screen multi urine  Collected: 8/9/2020 12:37 AM (Final result)   Narrative: Performed at:  [address],  [place] [address]   Phone [phone]                 Medication Contract and Consent for Opioid Use Documents Filed        No documents found                    MDM:   This patient was seen and evaluated by myself  Records Reviewed : Outpatient Notes brief review of relevant medical records completed    Patient presents to the emergency department today for concerns of postop infection with reports of intermittent fevers Tmax 100.5.  On exam she is alert and oriented, hemodynamically stable nontoxic in appearance.  I discussed with her plan for evaluation including laboratory studies and imaging and she was in agreement.    Laboratory studies and images were evaluated  CBC negative for leukocytosis does reveal anemia with RBC 3.75, hemoglobin 11.9, hematocrit 35.9.  CMP reveals mild hyponatremia of 134 otherwise grossly negative.  Lactic is negative at 1.1  Sed rate is negative at 9, CRP negative at less than 3  COVID and flu swab negative  X-ray shoulder left interpreted by the radiologist for mild osteoarthritic changes along the glenohumeral joint with no acute bony abnormality.  Chest x-ray interpreted by the radiologist for stable chronic changes with no acute abnormality.  On reevaluation patient remains alert and oriented.  I discussed with her findings of her laboratory study and imaging

## 2024-08-28 NOTE — FLOWSHEET NOTE
Patient:   Short Term Goals: To be achieved in: 2 weeks  1. Independent in HEP and progression per patient tolerance, in order to prevent re-injury.   [] Progressing: [] Met: [] Not Met: [] Adjusted  2. Patient will have a decrease in pain to <0/10 to facilitate improvement in movement, function, and ADLs as indicated by Functional Deficits.  [] Progressing: [] Met: [] Not Met: [] Adjusted    Long Term Goals: To be achieved in: 12 weeks  1. Disability index score of 42% or less for the Quick DASH to assist with reaching prior level of function with activities such as dressing and hygiene ADLs.  [] Progressing: [] Met: [] Not Met: [] Adjusted  2. Patient will demonstrate increased AROM of shoulder to WNL without pain to allow for proper joint functioning to enable patient to wash/style hair.   [] Progressing: [] Met: [] Not Met: [] Adjusted  3. Patient will demonstrate increased Strength of RTC/bicep to at least 4+/5 throughout without pain to allow for proper functional mobility to enable patient to return to normal household chores.   [] Progressing: [] Met: [] Not Met: [] Adjusted  4. Patient will return to driving 1+ hour without increased symptoms or restriction to enable patient to perform grocery shopping and independent life..   [] Progressing: [] Met: [] Not Met: [] Adjusted    Overall Progression Towards Functional goals/ Treatment Progress Update:  [] Patient is progressing as expected towards functional goals listed.    [] Progression is slowed due to complexities/Impairments listed.  [] Progression has been slowed due to co-morbidities.  [x] Plan just implemented, too soon (<30days) to assess goals progression   [] Goals require adjustment due to lack of progress  [] Patient is not progressing as expected and requires additional follow up with physician  [] Other:     TREATMENT PLAN     Frequency/Duration: 1-2x/week for  12  weeks for the following treatment interventions:    Interventions:  [x]

## 2024-08-29 ENCOUNTER — OFFICE VISIT (OUTPATIENT)
Dept: ORTHOPEDIC SURGERY | Age: 39
End: 2024-08-29
Payer: MEDICAID

## 2024-08-29 VITALS — WEIGHT: 154 LBS | BODY MASS INDEX: 24.12 KG/M2

## 2024-08-29 DIAGNOSIS — R20.2 PARESTHESIA OF UPPER AND LOWER EXTREMITIES OF BOTH SIDES: Primary | ICD-10-CM

## 2024-08-29 PROCEDURE — G8420 CALC BMI NORM PARAMETERS: HCPCS | Performed by: INTERNAL MEDICINE

## 2024-08-29 PROCEDURE — 1036F TOBACCO NON-USER: CPT | Performed by: INTERNAL MEDICINE

## 2024-08-29 PROCEDURE — 99214 OFFICE O/P EST MOD 30 MIN: CPT | Performed by: INTERNAL MEDICINE

## 2024-08-29 PROCEDURE — G8428 CUR MEDS NOT DOCUMENT: HCPCS | Performed by: INTERNAL MEDICINE

## 2024-08-29 NOTE — PROGRESS NOTES
deformity or injury.  Range of motion is unremarkable.  There is no gross instability.  There are no rashes, ulcerations or lesions.  Strength and tone are normal.  Neurolgic -Light touch sensation and manual muscle testing normal L2-S1. No fasiculations. Pattella tendon and Achilles tendon reflexes +2 bilaterally.  Seated SLR negative        Office Imaging Results/Procedures PerformedToday:             Office Procedures:   No orders of the defined types were placed in this encounter.          Other Outside Imaging and Testing Personally Reviewed:    XR CHEST (2 VW)    Result Date: 8/27/2024  EXAMINATION: TWO XRAY VIEWS OF THE CHEST 8/27/2024 8:38 pm COMPARISON: Non 06/23/2021 HISTORY: ORDERING SYSTEM PROVIDED HISTORY: fever, r/o infection TECHNOLOGIST PROVIDED HISTORY: Reason for exam:->fever, r/o infection Reason for Exam: fever, tingling to all extremities, post left shoulder surgery a few days ago FINDINGS: The heart is normal.  The pulmonary vessels are normal.  The lungs are mildly hyperinflated.  No consolidation or effusion is seen.  The bones are intact.     Stable chronic changes with no acute abnormality seen     XR SHOULDER LEFT (MIN 2 VIEWS)    Result Date: 8/27/2024  EXAMINATION: 3 XRAY VIEWS OF THE LEFT SHOULDER 8/27/2024 8:38 pm COMPARISON: None. HISTORY: ORDERING SYSTEM PROVIDED HISTORY: surgery x2 wks ago evaluate for effusion/infection TECHNOLOGIST PROVIDED HISTORY: Reason for exam:->surgery x2 wks ago evaluate for effusion/infection Reason for Exam: surgery x2 wks ago evaluate for effusion/infection FINDINGS: There is moderate narrowing of the glenohumeral joint.  No acute fracture or dislocation is seen.  The AC joint is intact.  There is metallic artifact along the proximal humerus     Mild osteoarthritic changes along the glenohumeral joint with no acute bony abnormality Metallic artifact scattered along the proximal humerus.  Recommend removal of the artifact and obtaining follow-up views.    
no

## 2024-09-04 ENCOUNTER — HOSPITAL ENCOUNTER (OUTPATIENT)
Dept: PHYSICAL THERAPY | Age: 39
Setting detail: THERAPIES SERIES
Discharge: HOME | End: 2024-09-04
Payer: MEDICAID

## 2024-09-04 ENCOUNTER — PATIENT MESSAGE (OUTPATIENT)
Dept: ORTHOPEDIC SURGERY | Age: 39
End: 2024-09-04

## 2024-09-04 PROCEDURE — 97110 THERAPEUTIC EXERCISES: CPT | Performed by: SPECIALIST/TECHNOLOGIST

## 2024-09-04 PROCEDURE — 97140 MANUAL THERAPY 1/> REGIONS: CPT | Performed by: SPECIALIST/TECHNOLOGIST

## 2024-09-04 NOTE — TELEPHONE ENCOUNTER
Spoke with Pt. Pt states the dog stepped on her shoulder last night. Pt states that it is better but she is still in Pt. Follow up appt has been scheduled. Pt was recommended to still go to PT appt today. Pt was also provided with time, date and location. Pt expressed understanding

## 2024-09-04 NOTE — FLOWSHEET NOTE
[facility]- Outpatient Rehabilitation and Therapy [address], [place] [address] office: [phone] fax: [phone]     Physical Therapy: TREATMENT/PROGRESS NOTE   Patient: La Nena Magallanes (38 y.o. female)   Examination Date: 2024   :  1985 MRN: [id number]   Visit #: 3   Insurance Allowable Auth Needed   30 []Yes    []No    Insurance: Payor: [employer] [place] MEDICAID / Plan: [employer] [place] MEDICA / Product Type: *No Product type* /   Insurance ID: [id number] - (Medicaid Managed)  Secondary Insurance (if applicable):    Treatment Diagnosis:     ICD-10-CM    1. Acute pain of left shoulder  M25.512       2. Generalized weakness  R53.1          Medical Diagnosis:  Orthopedic aftercare [Z47.89]   Referring Physician: Aleks Cardenas MD  PCP: Ria Layne APRN - NP       Plan of care signed (Y/N):     Date of Patient follow up with Physician:      Progress Report/POC: EVAL today  Progress note due: 2024 (OR 10 visits /OR AUTH LIMITS, whichever is less)  POC update due: 2024                    Medical History:  Comorbidities:  None  Relevant Medical History:                                          Precautions/ Contra-indications:           Latex allergy:  NO  Pacemaker:    NO  Contraindications for Manipulation: None  Date of Surgery: 2024  Other:    Red Flags:  None    Suicide Screening:   The patient did not verbalize a primary behavioral concern, suicidal ideation, suicidal intent, or demonstrate suicidal behaviors.                                 Preferred Language for Healthcare:   [x] English       [] other:    SUBJECTIVE EXAMINATION     Patient stated complaint: Pt reports no direct JACINDA just gradual worsening of shoulder pain. Had surgery last week, biceps tenodesis and RTC/should clean up. Sling 6 weeks, NWB 6 weeks. Trying to avoid pain meds. Pt notes her dog jumped on her last night and her shoulder is

## 2024-09-05 ENCOUNTER — OFFICE VISIT (OUTPATIENT)
Dept: ORTHOPEDIC SURGERY | Age: 39
End: 2024-09-05

## 2024-09-05 VITALS — HEIGHT: 67 IN | BODY MASS INDEX: 24.17 KG/M2 | WEIGHT: 154 LBS

## 2024-09-05 DIAGNOSIS — Z47.89 ORTHOPEDIC AFTERCARE: Primary | ICD-10-CM

## 2024-09-05 PROCEDURE — 99024 POSTOP FOLLOW-UP VISIT: CPT | Performed by: ORTHOPAEDIC SURGERY

## 2024-09-05 NOTE — PROGRESS NOTES
tenodesis protocol  -All questions answered to the patient's satisfaction and the patient expressed understanding and agreement with the above listed treatment plan  -Follow up in 3 weeks time for routine 6-week postop visit  -Thank you for the clinical consultation and allowing me to participate in the patient's care.      Electronically signed by Aleks Cardenas MD on 9/5/24 at 9:12 AM EDT         Aleks Cardenas MD       Orthopaedic Surgery-Sports Medicine      Disclaimer:  This note was dictated with voice recognition software.  Though review and correction are routinely performed, please contact the office/medical records for any errors requiring correction.

## 2024-09-09 ENCOUNTER — PATIENT MESSAGE (OUTPATIENT)
Dept: NEUROLOGY | Age: 39
End: 2024-09-09

## 2024-09-11 ENCOUNTER — HOSPITAL ENCOUNTER (OUTPATIENT)
Dept: PHYSICAL THERAPY | Age: 39
Setting detail: THERAPIES SERIES
Discharge: HOME | End: 2024-09-11
Payer: MEDICAID

## 2024-09-11 PROCEDURE — 97110 THERAPEUTIC EXERCISES: CPT

## 2024-09-11 PROCEDURE — 97140 MANUAL THERAPY 1/> REGIONS: CPT

## 2024-09-18 ENCOUNTER — HOSPITAL ENCOUNTER (OUTPATIENT)
Dept: PHYSICAL THERAPY | Age: 39
Setting detail: THERAPIES SERIES
Discharge: HOME | End: 2024-09-18
Payer: MEDICAID

## 2024-09-18 PROCEDURE — 97140 MANUAL THERAPY 1/> REGIONS: CPT

## 2024-09-18 PROCEDURE — 97110 THERAPEUTIC EXERCISES: CPT

## 2024-09-19 DIAGNOSIS — M75.22 BICEPS TENDONITIS, LEFT: ICD-10-CM

## 2024-09-25 ENCOUNTER — HOSPITAL ENCOUNTER (OUTPATIENT)
Dept: PHYSICAL THERAPY | Age: 39
Setting detail: THERAPIES SERIES
Discharge: HOME | End: 2024-09-25
Payer: MEDICAID

## 2024-09-25 PROCEDURE — 97110 THERAPEUTIC EXERCISES: CPT

## 2024-09-25 PROCEDURE — 97140 MANUAL THERAPY 1/> REGIONS: CPT

## 2024-09-26 ENCOUNTER — OFFICE VISIT (OUTPATIENT)
Dept: ORTHOPEDIC SURGERY | Age: 39
End: 2024-09-26
Payer: MEDICAID

## 2024-09-26 ENCOUNTER — TELEPHONE (OUTPATIENT)
Dept: ORTHOPEDIC SURGERY | Age: 39
End: 2024-09-26

## 2024-09-26 ENCOUNTER — OFFICE VISIT (OUTPATIENT)
Dept: ORTHOPEDIC SURGERY | Age: 39
End: 2024-09-26

## 2024-09-26 VITALS — WEIGHT: 154 LBS | HEIGHT: 67 IN | BODY MASS INDEX: 24.17 KG/M2

## 2024-09-26 DIAGNOSIS — G56.22 NEURITIS OF LEFT ULNAR NERVE: Primary | ICD-10-CM

## 2024-09-26 DIAGNOSIS — Z47.89 ORTHOPEDIC AFTERCARE: Primary | ICD-10-CM

## 2024-09-26 PROCEDURE — 99024 POSTOP FOLLOW-UP VISIT: CPT | Performed by: ORTHOPAEDIC SURGERY

## 2024-09-26 PROCEDURE — G8420 CALC BMI NORM PARAMETERS: HCPCS | Performed by: INTERNAL MEDICINE

## 2024-09-26 PROCEDURE — MISCD121 PROTECTOR HEEL OR ELBOW (RETAIL): Performed by: INTERNAL MEDICINE

## 2024-09-26 PROCEDURE — 99213 OFFICE O/P EST LOW 20 MIN: CPT | Performed by: INTERNAL MEDICINE

## 2024-09-26 PROCEDURE — 1036F TOBACCO NON-USER: CPT | Performed by: INTERNAL MEDICINE

## 2024-09-26 PROCEDURE — G8428 CUR MEDS NOT DOCUMENT: HCPCS | Performed by: INTERNAL MEDICINE

## 2024-09-30 NOTE — PROGRESS NOTES
Impression:  Study is consistent with mild bilateral carpal tunnel syndrome. no evidence of an acute radiculopathy or other entrapment neuropathy. Thank you.  Assessment   Impression: .    Encounter Diagnosis   Name Primary?    Neuritis of left ulnar nerve Yes              Plan:     Consider repeat cell guided hydrodissection of the ulnar nerve at the elbow  Activity modification -cubital tunnel precautions and caution against overuse  Medical pain management: NSAID: TD  Heelbow immobilization and ulnar nerve glide exercises         Orders:        Orders Placed This Encounter   Procedures    [employer] Heel and Elbow Protector $15     Patient was supplied a Heel and Elbow Protector. This retail item was supplied to provide functional support and assist in protecting the affected area.      Verbal and written instructions for the use of and application of this item were provided.  The patient was educated and fit by a healthcare professional with expert knowledge and specialization in brace application.  They were instructed to contact the office immediately should the equipment result in increased pain, decreased sensation, increased swelling or worsening of the condition.         Chapin Sarkar MD.      Disclaimer:    \"This note was dictated with voice recognition software. Though review and correction are routine, we apologize for any errors.\"

## 2024-10-02 ENCOUNTER — HOSPITAL ENCOUNTER (OUTPATIENT)
Dept: PHYSICAL THERAPY | Age: 39
Setting detail: THERAPIES SERIES
End: 2024-10-02
Payer: MEDICAID

## 2024-10-05 ENCOUNTER — APPOINTMENT (OUTPATIENT)
Dept: CT IMAGING | Age: 39
End: 2024-10-05
Payer: MEDICAID

## 2024-10-05 ENCOUNTER — HOSPITAL ENCOUNTER (EMERGENCY)
Age: 39
Discharge: HOME | End: 2024-10-05
Payer: MEDICAID

## 2024-10-05 ENCOUNTER — APPOINTMENT (OUTPATIENT)
Dept: GENERAL RADIOLOGY | Age: 39
End: 2024-10-05
Payer: MEDICAID

## 2024-10-05 VITALS
DIASTOLIC BLOOD PRESSURE: 66 MMHG | OXYGEN SATURATION: 100 % | RESPIRATION RATE: 16 BRPM | WEIGHT: 151 LBS | BODY MASS INDEX: 23.65 KG/M2 | TEMPERATURE: 97.6 F | SYSTOLIC BLOOD PRESSURE: 99 MMHG | HEART RATE: 85 BPM

## 2024-10-05 DIAGNOSIS — R55 SYNCOPE AND COLLAPSE: ICD-10-CM

## 2024-10-05 DIAGNOSIS — S09.90XA CLOSED HEAD INJURY, INITIAL ENCOUNTER: Primary | ICD-10-CM

## 2024-10-05 LAB
ALBUMIN SERPL-MCNC: 3.8 G/DL (ref 3.4–5)
ALBUMIN/GLOB SERPL: 1.3 {RATIO} (ref 1.1–2.2)
ALP SERPL-CCNC: 66 U/L (ref 40–129)
ALT SERPL-CCNC: 28 U/L (ref 10–40)
ANION GAP SERPL CALCULATED.3IONS-SCNC: 10 MMOL/L (ref 3–16)
AST SERPL-CCNC: 32 U/L (ref 15–37)
BASOPHILS # BLD: 0 K/UL (ref 0–0.2)
BASOPHILS NFR BLD: 0.5 %
BILIRUB SERPL-MCNC: 0.7 MG/DL (ref 0–1)
BUN SERPL-MCNC: 9 MG/DL (ref 7–20)
CALCIUM SERPL-MCNC: 9.1 MG/DL (ref 8.3–10.6)
CHLORIDE SERPL-SCNC: 105 MMOL/L (ref 99–110)
CO2 SERPL-SCNC: 26 MMOL/L (ref 21–32)
CREAT SERPL-MCNC: <0.5 MG/DL (ref 0.6–1.1)
DEPRECATED RDW RBC AUTO: 13.8 % (ref 12.4–15.4)
EKG ATRIAL RATE: 80 BPM
EKG DIAGNOSIS: NORMAL
EKG P AXIS: 70 DEGREES
EKG P-R INTERVAL: 132 MS
EKG Q-T INTERVAL: 370 MS
EKG QRS DURATION: 78 MS
EKG QTC CALCULATION (BAZETT): 426 MS
EKG R AXIS: 44 DEGREES
EKG T AXIS: 33 DEGREES
EKG VENTRICULAR RATE: 80 BPM
EOSINOPHIL # BLD: 0 K/UL (ref 0–0.6)
EOSINOPHIL NFR BLD: 1.5 %
GFR SERPLBLD CREATININE-BSD FMLA CKD-EPI: >90 ML/MIN/{1.73_M2}
GLUCOSE SERPL-MCNC: 88 MG/DL (ref 70–99)
HCG SERPL QL: NEGATIVE
HCT VFR BLD AUTO: 38.1 % (ref 36–48)
HGB BLD-MCNC: 12.7 G/DL (ref 12–16)
LYMPHOCYTES # BLD: 0.9 K/UL (ref 1–5.1)
LYMPHOCYTES NFR BLD: 37.9 %
MCH RBC QN AUTO: 32.2 PG (ref 26–34)
MCHC RBC AUTO-ENTMCNC: 33.3 G/DL (ref 31–36)
MCV RBC AUTO: 96.7 FL (ref 80–100)
MONOCYTES # BLD: 0.2 K/UL (ref 0–1.3)
MONOCYTES NFR BLD: 10.6 %
NEUTROPHILS # BLD: 1.2 K/UL (ref 1.7–7.7)
NEUTROPHILS NFR BLD: 49.5 %
PLATELET # BLD AUTO: 172 K/UL (ref 135–450)
PMV BLD AUTO: 9.8 FL (ref 5–10.5)
POTASSIUM SERPL-SCNC: 4.3 MMOL/L (ref 3.5–5.1)
PROT SERPL-MCNC: 6.8 G/DL (ref 6.4–8.2)
RBC # BLD AUTO: 3.94 M/UL (ref 4–5.2)
SODIUM SERPL-SCNC: 141 MMOL/L (ref 136–145)
WBC # BLD AUTO: 2.3 K/UL (ref 4–11)

## 2024-10-05 PROCEDURE — 70491 CT SOFT TISSUE NECK W/DYE: CPT

## 2024-10-05 PROCEDURE — 70450 CT HEAD/BRAIN W/O DYE: CPT

## 2024-10-05 PROCEDURE — 85025 COMPLETE CBC W/AUTO DIFF WBC: CPT

## 2024-10-05 PROCEDURE — 6360000004 HC RX CONTRAST MEDICATION

## 2024-10-05 PROCEDURE — 93005 ELECTROCARDIOGRAM TRACING: CPT

## 2024-10-05 PROCEDURE — 99285 EMERGENCY DEPT VISIT HI MDM: CPT

## 2024-10-05 PROCEDURE — 80053 COMPREHEN METABOLIC PANEL: CPT

## 2024-10-05 PROCEDURE — 71045 X-RAY EXAM CHEST 1 VIEW: CPT

## 2024-10-05 PROCEDURE — 84703 CHORIONIC GONADOTROPIN ASSAY: CPT

## 2024-10-05 PROCEDURE — 93010 ELECTROCARDIOGRAM REPORT: CPT | Performed by: INTERNAL MEDICINE

## 2024-10-05 RX ORDER — IOPAMIDOL 755 MG/ML
75 INJECTION, SOLUTION INTRAVASCULAR
Status: COMPLETED | OUTPATIENT
Start: 2024-10-05 | End: 2024-10-05

## 2024-10-05 RX ADMIN — IOPAMIDOL 75 ML: 755 INJECTION, SOLUTION INTRAVENOUS at 12:58

## 2024-10-05 ASSESSMENT — ENCOUNTER SYMPTOMS
CHEST TIGHTNESS: 0
ABDOMINAL PAIN: 0
COUGH: 0
NAUSEA: 0
SORE THROAT: 0
SHORTNESS OF BREATH: 0
VOMITING: 0
WHEEZING: 0
TROUBLE SWALLOWING: 0

## 2024-10-05 ASSESSMENT — PAIN SCALES - GENERAL: PAINLEVEL_OUTOF10: 6

## 2024-10-05 ASSESSMENT — LIFESTYLE VARIABLES
HOW MANY STANDARD DRINKS CONTAINING ALCOHOL DO YOU HAVE ON A TYPICAL DAY: 1 OR 2
HOW OFTEN DO YOU HAVE A DRINK CONTAINING ALCOHOL: MONTHLY OR LESS

## 2024-10-05 ASSESSMENT — PAIN DESCRIPTION - LOCATION: LOCATION: HEAD

## 2024-10-05 ASSESSMENT — PAIN - FUNCTIONAL ASSESSMENT: PAIN_FUNCTIONAL_ASSESSMENT: 0-10

## 2024-10-05 NOTE — ED PROVIDER NOTES
EKG interpreted by me  The Ekg interpreted by me shows  normal sinus rhythm with a rate of 80  Axis is   Normal  QTc is  normal  Intervals and Durations are unremarkable.      ST Segments: no acute change  No significant change from prior EKG dated 26 sep 2022        Rd Rhodes MD  10/05/24 1746
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
## 2024-10-09 ENCOUNTER — HOSPITAL ENCOUNTER (OUTPATIENT)
Dept: PHYSICAL THERAPY | Age: 39
Setting detail: THERAPIES SERIES
Discharge: HOME | End: 2024-10-09
Payer: MEDICAID

## 2024-10-09 PROCEDURE — 97110 THERAPEUTIC EXERCISES: CPT

## 2024-10-09 PROCEDURE — 97140 MANUAL THERAPY 1/> REGIONS: CPT

## 2024-10-15 NOTE — TELEPHONE ENCOUNTER
Rx Request: Tizanidine    Last Filled : 09/18/2024 for 10 days  Refill Due: 09/28/2024  Last OV: 09/26/2024  Follow Up: 10/16/2024

## 2024-10-16 ENCOUNTER — OFFICE VISIT (OUTPATIENT)
Dept: ORTHOPEDIC SURGERY | Age: 39
End: 2024-10-16

## 2024-10-16 ENCOUNTER — HOSPITAL ENCOUNTER (OUTPATIENT)
Dept: PHYSICAL THERAPY | Age: 39
Setting detail: THERAPIES SERIES
Discharge: HOME | End: 2024-10-16
Payer: MEDICAID

## 2024-10-16 VITALS — WEIGHT: 151 LBS | BODY MASS INDEX: 23.7 KG/M2 | HEIGHT: 67 IN

## 2024-10-16 DIAGNOSIS — G56.22 NEURITIS OF LEFT ULNAR NERVE: Primary | ICD-10-CM

## 2024-10-16 RX ORDER — LIDOCAINE HYDROCHLORIDE 10 MG/ML
6 INJECTION, SOLUTION INFILTRATION; PERINEURAL ONCE
Status: COMPLETED | OUTPATIENT
Start: 2024-10-16 | End: 2024-10-16

## 2024-10-16 RX ADMIN — LIDOCAINE HYDROCHLORIDE 6 ML: 10 INJECTION, SOLUTION INFILTRATION; PERINEURAL at 12:13

## 2024-10-16 NOTE — PROGRESS NOTES
Chief Complaint:   Chief Complaint   Patient presents with    Elbow Pain     left, ulnar nerve hydro today          History of Present Illness:       Patient is a 38 y.o. female returns follow up for the above complaint. The patient was last seen approximately 3 weeksago. The symptoms are improving since the last visit. The patient has had no further testing for the problem.      No new injuries no new events    Symptoms continue to follow ulnar nerve distribution radiating to the hand    She denies any new onset weakness or progressive weakness     Past Medical History:        Past Medical History:   Diagnosis Date    Acid reflux     Asthma     as child    Back pain     Bipolar 1 disorder (HCC)     Degenerative disc disease, lumbar     Depression     Diabetes mellitus (HCC)     Fibromyalgia     Hypothyroidism     Liver disease     fatty liver    Obesity     OCD (obsessive compulsive disorder)     PCOS (polycystic ovarian syndrome)     Sleep apnea     no longer uses cpap after weight loss        Present Medications:         Current Outpatient Medications   Medication Sig Dispense Refill    rizatriptan (MAXALT-MLT) 10 MG disintegrating tablet Take 1 tablet by mouth as needed for Migraine If symptoms persist or return, may repeat dose after 2 hours. Maximum 10mg per dose; 20 mg per 24 hours. 9 tablet 11    propranolol (INDERAL LA) 60 MG extended release capsule Take 1 capsule by mouth daily Maintenance dose: Start after completing propranolol 10mg TID starting dose. 30 capsule 11    propranolol (INDERAL) 10 MG tablet Take 1 tablet by mouth 3 times daily for 7 days Starting dose: Complete propranolol 10mg TID prescription prior to maintenance dose propranolol ER 60mg daily prescription. 21 tablet 0    tiZANidine (ZANAFLEX) 4 MG tablet Take 1 tablet by mouth every 8 hours as needed (muscle tightness/spasm) 40 tablet 0    Ferrous Sulfate (IRON PO) Take by mouth daily      buPROPion (WELLBUTRIN SR) 150 MG extended

## 2024-10-16 NOTE — PROGRESS NOTES
10/16/24 12:10 PM    Dextrose Injection 5%      NDC: 7666-2565-52    Lot Number: [id number]    Body Part: L elbow

## 2024-10-18 ENCOUNTER — OFFICE VISIT (OUTPATIENT)
Age: 39
End: 2024-10-18

## 2024-10-18 ENCOUNTER — HOSPITAL ENCOUNTER (OUTPATIENT)
Age: 39
Discharge: HOME | End: 2024-10-18
Payer: MEDICAID

## 2024-10-18 VITALS
BODY MASS INDEX: 23.7 KG/M2 | WEIGHT: 151 LBS | HEIGHT: 67 IN | DIASTOLIC BLOOD PRESSURE: 53 MMHG | SYSTOLIC BLOOD PRESSURE: 90 MMHG | HEART RATE: 127 BPM | OXYGEN SATURATION: 97 %

## 2024-10-18 DIAGNOSIS — G90.A POSTURAL ORTHOSTATIC TACHYCARDIA SYNDROME: Primary | ICD-10-CM

## 2024-10-18 DIAGNOSIS — G62.9 SMALL FIBER NEUROPATHY: ICD-10-CM

## 2024-10-18 DIAGNOSIS — G43.109 MIGRAINE WITH AURA AND WITHOUT STATUS MIGRAINOSUS, NOT INTRACTABLE: ICD-10-CM

## 2024-10-18 DIAGNOSIS — R41.89 SUBJECTIVE COGNITIVE IMPAIRMENT: ICD-10-CM

## 2024-10-18 PROBLEM — G43.E09 CHRONIC MIGRAINE WITH AURA WITHOUT STATUS MIGRAINOSUS, NOT INTRACTABLE: Status: ACTIVE | Noted: 2024-10-18

## 2024-10-18 LAB
IGA SERPL-MCNC: 179 MG/DL (ref 70–400)
PROT UR-MCNC: 0.02 G/DL
PROT UR-MCNC: 23.3 MG/DL
RPT COMMENT: NORMAL

## 2024-10-18 PROCEDURE — 86235 NUCLEAR ANTIGEN ANTIBODY: CPT

## 2024-10-18 PROCEDURE — 86335 IMMUNFIX E-PHORSIS/URINE/CSF: CPT

## 2024-10-18 PROCEDURE — 82784 ASSAY IGA/IGD/IGG/IGM EACH: CPT

## 2024-10-18 PROCEDURE — 83521 IG LIGHT CHAINS FREE EACH: CPT

## 2024-10-18 PROCEDURE — 84207 ASSAY OF VITAMIN B-6: CPT

## 2024-10-18 PROCEDURE — 83516 IMMUNOASSAY NONANTIBODY: CPT

## 2024-10-18 PROCEDURE — 36415 COLL VENOUS BLD VENIPUNCTURE: CPT

## 2024-10-18 PROCEDURE — 84166 PROTEIN E-PHORESIS/URINE/CSF: CPT

## 2024-10-18 PROCEDURE — 86038 ANTINUCLEAR ANTIBODIES: CPT

## 2024-10-18 PROCEDURE — 84156 ASSAY OF PROTEIN URINE: CPT

## 2024-10-18 PROCEDURE — 82525 ASSAY OF COPPER: CPT

## 2024-10-18 PROCEDURE — 84630 ASSAY OF ZINC: CPT

## 2024-10-18 RX ORDER — PROPRANOLOL HCL 60 MG
60 CAPSULE, EXTENDED RELEASE 24HR ORAL DAILY
Qty: 30 CAPSULE | Refills: 11 | Status: SHIPPED | OUTPATIENT
Start: 2024-10-18 | End: 2025-10-18

## 2024-10-18 RX ORDER — RIZATRIPTAN BENZOATE 10 MG/1
10 TABLET, ORALLY DISINTEGRATING ORAL PRN
Qty: 9 TABLET | Refills: 11 | Status: SHIPPED | OUTPATIENT
Start: 2024-10-18

## 2024-10-18 RX ORDER — PROPRANOLOL HCL 10 MG
10 TABLET ORAL 3 TIMES DAILY
Qty: 21 TABLET | Refills: 0 | Status: SHIPPED | OUTPATIENT
Start: 2024-10-18 | End: 2024-10-25

## 2024-10-18 NOTE — ASSESSMENT & PLAN NOTE
Chronic, not at goal (unstable), changes made today: propranolol trial increase to goal 60mg/d, start bouillon cube TID, start trial compression stocks/abdominal binder, increase fluid intake, regular exercise, medication adherence emphasized, and lifestyle modifications recommended

## 2024-10-18 NOTE — PATIENT INSTRUCTIONS
Consume 1 bouillon cube three times per day  Try using compression stockings  Try using an abdominal binder    Here are some behavioral and dietary changes you can make to improve your headaches:    Gradually reduce and ideally stop consuming caffeine. Caffeine use 3 or more days per week is likely to worsen your headache.    Exercise regularly. Try starting out with 30 minutes of aerobic exercise 3 times per week.    Make time to pursue activities that make you feel relaxed and happy. Try to spend at least 20 minutes outdoors (weather permitting). Consider pursuing artistic activities to explore your creative side. Try doing process-driven activities such as cooking and baking.    Make changes to improve your sleep at night (see below).    Take note of any food or drink items that seem to trigger your headaches within 12-24 hours after consuming the dietary item. Some common (but not universal) dietary triggers are listed below. If you identify a dietary trigger, then you may consider limiting the dietary item for 4 weeks then monitor your headache frequency, intensity, and response for rescue medications by using a headache diary. If there is no change to your headache, then the dietary item alone is unlikely to be a significant trigger for your headache.      Common (but not universal) dietary triggers for headache:    Alcohol, especially red wine   Aspartame sweetener   Beans and other tyramine-containing foods   Caffeine   Cheese   Yogurt   Food containing MSG    Processed meat containing sulfites   Vitamins and herbal supplements containing caffeine    Try keeping a headache diary to track headache frequency, intensity, and response to rescue therapy. There are many smartphone apps that can be used for this purpose or you can use a handwritten journal. Keeping track of the mild headache days per month, severe headache days per month, and rescue medication use days per week will be helpful to monitor response

## 2024-10-18 NOTE — PROGRESS NOTES
La Nena Magallanes (:  1985) is a 38 y.o. female,Established patient, here for evaluation of the following chief complaint(s):  Follow-up (Former [facility] pt, worsening symptoms)      Assessment & Plan   1. Postural orthostatic tachycardia syndrome  Assessment & Plan:   Chronic, not at goal (unstable), changes made today: propranolol trial increase to goal 60mg/d, start bouillon cube TID, start trial compression stocks/abdominal binder, increase fluid intake, regular exercise, medication adherence emphasized, and lifestyle modifications recommended  Orders:  -     propranolol (INDERAL LA) 60 MG extended release capsule; Take 1 capsule by mouth daily Maintenance dose: Start after completing propranolol 10mg TID starting dose., Disp-30 capsule, R-11Normal  -     propranolol (INDERAL) 10 MG tablet; Take 1 tablet by mouth 3 times daily for 7 days Starting dose: Complete propranolol 10mg TID prescription prior to maintenance dose propranolol ER 60mg daily prescription., Disp-21 tablet, R-0Normal  2. Small fiber neuropathy  Assessment & Plan:   Chronic, not at goal (unstable), no intervention today because fatigue is a higher priority symptom than neuropathic pain. She has presumed small fiber neuropathy with autonomic symptoms. Incomplete work up to date. Notable h/o gastric surgery putting her at risk for vitamin deficiency.    Orders:  -     COPPER, SERUM; Future  -     ZINC; Future  -     Anti SSA; Future  -     Anti SSB; Future  -     VITAMIN B6; Future  -     GABBY Reflex to Antibody Cascade; Future  -     IMMUNOFIXATION URINE RANDOM PROFILE; Future  -     KAPPA/LAMBDA LIGHT CHAINS, TOTAL,SERUM; Future  -     Celiac Screen with Reflex (Robbie Castillo); Future  3. Subjective cognitive impairment  Assessment & Plan:   Chronic, at goal (stable), no intervention needed. MMSE normal 30/30. Previous brain MRI was normal.   4. Migraine with aura and without status migrainosus, not intractable  Assessment & Plan:

## 2024-10-18 NOTE — ASSESSMENT & PLAN NOTE
Chronic, not at goal (unstable), no intervention today because fatigue is a higher priority symptom than neuropathic pain. She has presumed small fiber neuropathy with autonomic symptoms. Incomplete work up to date. Notable h/o gastric surgery putting her at risk for vitamin deficiency.

## 2024-10-18 NOTE — ASSESSMENT & PLAN NOTE
Chronic, not at goal (unstable), changes made today: start trial propranolol increase to goal 60mg/d, start Maxalt 10mg MLT prn, medication adherence emphasized, and lifestyle modifications recommended.

## 2024-10-18 NOTE — ASSESSMENT & PLAN NOTE
Chronic, at goal (stable), no intervention needed. MMSE normal 30/30. Previous brain MRI was normal.

## 2024-10-19 LAB
ANA SER QL IA: NEGATIVE
ENA SS-A AB SER IA-ACNC: <0.2 AI (ref 0–0.9)
ENA SS-B AB SER IA-ACNC: <0.2 AI (ref 0–0.9)
TISSUE TRANSGLUTAMINASE IGA: <0.5 U/ML (ref 0–14)

## 2024-10-21 LAB
COPPER SERPL-MCNC: 82.4 UG/DL (ref 80–155)
PROT PATTERN UR ELPH-IMP: NORMAL
VIT B6 SERPL-MCNC: 41.7 NMOL/L (ref 20–125)
ZINC SERPL-MCNC: 74 UG/DL (ref 60–120)

## 2024-10-22 LAB
KAPPA LC FREE SER-MCNC: 25.5 MG/L (ref 2.37–20.73)
KAPPA LC FREE/LAMBDA FREE SER: 1.32 {RATIO} (ref 0.22–1.74)
LAMBDA LC FREE SERPL-MCNC: 19.3 MG/L (ref 4.23–27.69)
RPT COMMENT: ABNORMAL

## 2024-10-23 ENCOUNTER — APPOINTMENT (OUTPATIENT)
Dept: PHYSICAL THERAPY | Age: 39
End: 2024-10-23
Payer: MEDICAID

## 2024-10-24 ENCOUNTER — HOSPITAL ENCOUNTER (OUTPATIENT)
Dept: PHYSICAL THERAPY | Age: 39
Setting detail: THERAPIES SERIES
Discharge: HOME | End: 2024-10-24
Payer: MEDICAID

## 2024-10-24 PROCEDURE — 97140 MANUAL THERAPY 1/> REGIONS: CPT

## 2024-10-24 PROCEDURE — 97110 THERAPEUTIC EXERCISES: CPT

## 2024-10-24 NOTE — PROGRESS NOTES
[facility]- Outpatient Rehabilitation and Therapy [address], [place] [address] office: [phone] fax: [phone]     Physical Therapy: TREATMENT/PROGRESS NOTE   Patient: La Nena Magallanes (38 y.o. female)   Examination Date: 10/24/2024   :  1985 MRN: [id number]   Visit #: 8   Insurance Allowable Auth Needed   30 []Yes    []No    Insurance: Payor: [employer] [place] MEDICAID / Plan: [employer] [place] MEDICA / Product Type: *No Product type* /   Insurance ID: [id number] - (Medicaid Managed)  Secondary Insurance (if applicable):    Treatment Diagnosis:     ICD-10-CM    1. Acute pain of left shoulder  M25.512       2. Generalized weakness  R53.1          Medical Diagnosis:  Orthopedic aftercare [Z47.89]   Referring Physician: Aleks Cardenas MD  PCP: Ria Layne APRN - NP       Plan of care signed (Y/N):     Date of Patient follow up with Physician:      Progress Report/POC: YES, Date Range for this report: 2024 to 2024  Progress note due: 10/24/2024 (OR 10 visits /OR AUTH LIMITS, whichever is less)  POC update due: 2024                    Medical History:  Comorbidities:  None  Relevant Medical History:                                          Precautions/ Contra-indications:           Latex allergy:  NO  Pacemaker:    NO  Contraindications for Manipulation: None  Date of Surgery: 2024  Other:    Preferred Language for Healthcare:   [x] English       [] other:    SUBJECTIVE EXAMINATION     Patient stated complaint: Pt reports continues to gradually use arm more and doing really well.     Sx : 7 weeks 10/9/2024    From eval 2024: Pt reports no direct JACINDA just gradual worsening of shoulder pain. Had surgery last week, biceps tenodesis and RTC/should clean up. Sling 6 weeks, NWB 6 weeks. Trying to avoid pain meds. Pt notes her dog jumped on her last night and her shoulder is more sore today.       OBJECTIVE

## 2024-10-25 ENCOUNTER — APPOINTMENT (OUTPATIENT)
Dept: CT IMAGING | Age: 39
End: 2024-10-25
Payer: MEDICAID

## 2024-10-25 ENCOUNTER — HOSPITAL ENCOUNTER (EMERGENCY)
Age: 39
Discharge: HOME | End: 2024-10-25
Attending: EMERGENCY MEDICINE
Payer: MEDICAID

## 2024-10-25 VITALS
WEIGHT: 153 LBS | RESPIRATION RATE: 16 BRPM | HEIGHT: 67 IN | OXYGEN SATURATION: 98 % | TEMPERATURE: 97.9 F | HEART RATE: 72 BPM | SYSTOLIC BLOOD PRESSURE: 95 MMHG | BODY MASS INDEX: 24.01 KG/M2 | DIASTOLIC BLOOD PRESSURE: 67 MMHG

## 2024-10-25 DIAGNOSIS — R11.2 NAUSEA AND VOMITING, UNSPECIFIED VOMITING TYPE: ICD-10-CM

## 2024-10-25 DIAGNOSIS — R10.13 EPIGASTRIC PAIN: Primary | ICD-10-CM

## 2024-10-25 LAB
ALBUMIN SERPL-MCNC: 3.6 G/DL (ref 3.4–5)
ALBUMIN/GLOB SERPL: 1.3 {RATIO} (ref 1.1–2.2)
ALP SERPL-CCNC: 67 U/L (ref 40–129)
ALT SERPL-CCNC: 20 U/L (ref 10–40)
ANION GAP SERPL CALCULATED.3IONS-SCNC: 8 MMOL/L (ref 3–16)
AST SERPL-CCNC: 31 U/L (ref 15–37)
BASOPHILS # BLD: 0 K/UL (ref 0–0.2)
BASOPHILS NFR BLD: 0.5 %
BILIRUB SERPL-MCNC: 0.4 MG/DL (ref 0–1)
BUN SERPL-MCNC: 7 MG/DL (ref 7–20)
CALCIUM SERPL-MCNC: 8.7 MG/DL (ref 8.3–10.6)
CHLORIDE SERPL-SCNC: 101 MMOL/L (ref 99–110)
CO2 SERPL-SCNC: 29 MMOL/L (ref 21–32)
CREAT SERPL-MCNC: 0.7 MG/DL (ref 0.6–1.1)
DEPRECATED RDW RBC AUTO: 13.1 % (ref 12.4–15.4)
EOSINOPHIL # BLD: 0 K/UL (ref 0–0.6)
EOSINOPHIL NFR BLD: 0.9 %
FLUAV RNA RESP QL NAA+PROBE: NOT DETECTED
FLUBV RNA RESP QL NAA+PROBE: NOT DETECTED
GFR SERPLBLD CREATININE-BSD FMLA CKD-EPI: >90 ML/MIN/{1.73_M2}
GLUCOSE SERPL-MCNC: 93 MG/DL (ref 70–99)
HCG SERPL QL: NEGATIVE
HCT VFR BLD AUTO: 35.4 % (ref 36–48)
HGB BLD-MCNC: 11.9 G/DL (ref 12–16)
LIPASE SERPL-CCNC: 24 U/L (ref 13–60)
LYMPHOCYTES # BLD: 1.6 K/UL (ref 1–5.1)
LYMPHOCYTES NFR BLD: 51.3 %
MCH RBC QN AUTO: 32 PG (ref 26–34)
MCHC RBC AUTO-ENTMCNC: 33.7 G/DL (ref 31–36)
MCV RBC AUTO: 94.9 FL (ref 80–100)
MONOCYTES # BLD: 0.3 K/UL (ref 0–1.3)
MONOCYTES NFR BLD: 8.6 %
NEUTROPHILS # BLD: 1.2 K/UL (ref 1.7–7.7)
NEUTROPHILS NFR BLD: 38.7 %
PLATELET # BLD AUTO: 169 K/UL (ref 135–450)
PMV BLD AUTO: 9.6 FL (ref 5–10.5)
POTASSIUM SERPL-SCNC: 4.5 MMOL/L (ref 3.5–5.1)
PROT SERPL-MCNC: 6.4 G/DL (ref 6.4–8.2)
RBC # BLD AUTO: 3.73 M/UL (ref 4–5.2)
SARS-COV-2 RNA RESP QL NAA+PROBE: NOT DETECTED
SODIUM SERPL-SCNC: 138 MMOL/L (ref 136–145)
WBC # BLD AUTO: 3.2 K/UL (ref 4–11)

## 2024-10-25 PROCEDURE — 6360000004 HC RX CONTRAST MEDICATION: Performed by: EMERGENCY MEDICINE

## 2024-10-25 PROCEDURE — 99285 EMERGENCY DEPT VISIT HI MDM: CPT

## 2024-10-25 PROCEDURE — 74177 CT ABD & PELVIS W/CONTRAST: CPT

## 2024-10-25 PROCEDURE — 83690 ASSAY OF LIPASE: CPT

## 2024-10-25 PROCEDURE — 84703 CHORIONIC GONADOTROPIN ASSAY: CPT

## 2024-10-25 PROCEDURE — 87636 SARSCOV2 & INF A&B AMP PRB: CPT

## 2024-10-25 PROCEDURE — 6360000002 HC RX W HCPCS: Performed by: EMERGENCY MEDICINE

## 2024-10-25 PROCEDURE — 96374 THER/PROPH/DIAG INJ IV PUSH: CPT

## 2024-10-25 PROCEDURE — 36415 COLL VENOUS BLD VENIPUNCTURE: CPT

## 2024-10-25 PROCEDURE — 85025 COMPLETE CBC W/AUTO DIFF WBC: CPT

## 2024-10-25 PROCEDURE — 80053 COMPREHEN METABOLIC PANEL: CPT

## 2024-10-25 PROCEDURE — 96375 TX/PRO/DX INJ NEW DRUG ADDON: CPT

## 2024-10-25 RX ORDER — IOPAMIDOL 755 MG/ML
75 INJECTION, SOLUTION INTRAVASCULAR
Status: COMPLETED | OUTPATIENT
Start: 2024-10-25 | End: 2024-10-25

## 2024-10-25 RX ORDER — ONDANSETRON 2 MG/ML
4 INJECTION INTRAMUSCULAR; INTRAVENOUS ONCE
Status: COMPLETED | OUTPATIENT
Start: 2024-10-25 | End: 2024-10-25

## 2024-10-25 RX ORDER — ONDANSETRON 4 MG/1
4 TABLET, ORALLY DISINTEGRATING ORAL 3 TIMES DAILY PRN
Qty: 10 TABLET | Refills: 0 | Status: SHIPPED | OUTPATIENT
Start: 2024-10-25

## 2024-10-25 RX ORDER — KETOROLAC TROMETHAMINE 15 MG/ML
15 INJECTION, SOLUTION INTRAMUSCULAR; INTRAVENOUS ONCE
Status: COMPLETED | OUTPATIENT
Start: 2024-10-25 | End: 2024-10-25

## 2024-10-25 RX ADMIN — KETOROLAC TROMETHAMINE 15 MG: 15 INJECTION, SOLUTION INTRAMUSCULAR; INTRAVENOUS at 00:44

## 2024-10-25 RX ADMIN — IOPAMIDOL 75 ML: 755 INJECTION, SOLUTION INTRAVENOUS at 01:17

## 2024-10-25 RX ADMIN — ONDANSETRON 4 MG: 2 INJECTION INTRAMUSCULAR; INTRAVENOUS at 00:42

## 2024-10-25 ASSESSMENT — PAIN DESCRIPTION - LOCATION
LOCATION: ABDOMEN

## 2024-10-25 ASSESSMENT — PAIN SCALES - GENERAL
PAINLEVEL_OUTOF10: 7
PAINLEVEL_OUTOF10: 5
PAINLEVEL_OUTOF10: 7
PAINLEVEL_OUTOF10: 3

## 2024-10-25 ASSESSMENT — PAIN DESCRIPTION - DESCRIPTORS
DESCRIPTORS: STABBING
DESCRIPTORS: ACHING
DESCRIPTORS: ACHING

## 2024-10-25 ASSESSMENT — PAIN - FUNCTIONAL ASSESSMENT: PAIN_FUNCTIONAL_ASSESSMENT: 0-10

## 2024-10-25 ASSESSMENT — PAIN DESCRIPTION - PAIN TYPE: TYPE: ACUTE PAIN

## 2024-10-25 ASSESSMENT — PAIN DESCRIPTION - ORIENTATION: ORIENTATION: MID

## 2024-10-25 NOTE — ED PROVIDER NOTES
Chloride 101 99 - 110 mmol/L    CO2 29 21 - 32 mmol/L    Anion Gap 8 3 - 16    Glucose 93 70 - 99 mg/dL    BUN 7 7 - 20 mg/dL    Creatinine 0.7 0.6 - 1.1 mg/dL    Est, Glom Filt Rate >90 >60    Calcium 8.7 8.3 - 10.6 mg/dL    Total Protein 6.4 6.4 - 8.2 g/dL    Albumin 3.6 3.4 - 5.0 g/dL    Albumin/Globulin Ratio 1.3 1.1 - 2.2    Total Bilirubin 0.4 0.0 - 1.0 mg/dL    Alkaline Phosphatase 67 40 - 129 U/L    ALT 20 10 - 40 U/L    AST 31 15 - 37 U/L   Lipase   Result Value Ref Range    Lipase 24.0 13.0 - 60.0 U/L   hCG, serum, qualitative   Result Value Ref Range    Preg, Serum Negative Detects HCG level >10 MIU/mL       Procedures  Procedures    ED Course       Upon reassessment, La Nena Magallanes is resting comfortably.  Nausea has resolved.  She is tolerating p.o. fluids.  Pain is much improved/    Diagnostic studies reviewed.  Patient's labs are reassuring.  Her WBC is low which appears to be her baseline.  CT does not show acute intra-abdominal findings.  She does have some edema of her abdominal wall of unclear etiology.  No recent procedures.  No skin changes.    Repeat exam-abdomen soft nontender.    I discussed the results with patient/family including incidental findings.      Patient feels comfortable going home with supportive treatments, close primary care follow-up, tricked return precautions    Consults/discussion with other professionals: None    Clinical Impression:  1. Epigastric pain    2. Nausea and vomiting, unspecified vomiting type          Disposition / Plan:  Decision To Discharge  Condition: stable  Blood pressure 103/68, pulse 88, temperature 97.8 °F (36.6 °C), resp. rate 16, height 1.702 m (5' 7\"), weight 69.4 kg (153 lb), last menstrual period 04/13/2020, SpO2 100%, not currently breastfeeding.      Patient was given the following medications. I counseled patient how to take these medications.   New Prescriptions    ONDANSETRON (ZOFRAN-ODT) 4 MG DISINTEGRATING TABLET    Take 1 tablet by  mouth 3 times daily as needed for Nausea or Vomiting       Follow Up / Referral (if applicable):  Ria Layne, APRN - NP  [address]  [place] [id number]  [phone]    Schedule an appointment as soon as possible for a visit         I, Aleks Manning am the primary attending of record and contributed the majority of evaluation and treatment of emergent care for this encounter.     This chart was generated in part by using Dragon Dictation system and may contain errors related to that system including errors in grammar, punctuation, and spelling, as well as words and phrases that may be inappropriate. If there are any questions or concerns please feel free to contact the dictating provider for clarification.     Aleks Manning MD   Acute Care [facility]       Aleks Manning MD  10/25/24 0232

## 2024-10-28 ENCOUNTER — INITIAL CONSULT (OUTPATIENT)
Dept: SURGERY | Age: 39
End: 2024-10-28
Payer: MEDICAID

## 2024-10-28 VITALS
BODY MASS INDEX: 24.8 KG/M2 | WEIGHT: 158 LBS | HEIGHT: 67 IN | DIASTOLIC BLOOD PRESSURE: 68 MMHG | SYSTOLIC BLOOD PRESSURE: 122 MMHG

## 2024-10-28 DIAGNOSIS — L72.9 SCALP CYST: Primary | ICD-10-CM

## 2024-10-28 PROCEDURE — 99202 OFFICE O/P NEW SF 15 MIN: CPT | Performed by: SURGERY

## 2024-10-28 PROCEDURE — G8484 FLU IMMUNIZE NO ADMIN: HCPCS | Performed by: SURGERY

## 2024-10-28 PROCEDURE — G8420 CALC BMI NORM PARAMETERS: HCPCS | Performed by: SURGERY

## 2024-10-28 PROCEDURE — G8427 DOCREV CUR MEDS BY ELIG CLIN: HCPCS | Performed by: SURGERY

## 2024-10-28 PROCEDURE — 1036F TOBACCO NON-USER: CPT | Performed by: SURGERY

## 2024-10-29 ENCOUNTER — PREP FOR PROCEDURE (OUTPATIENT)
Dept: SURGERY | Age: 39
End: 2024-10-29

## 2024-10-29 ENCOUNTER — HOSPITAL ENCOUNTER (OUTPATIENT)
Dept: PHYSICAL THERAPY | Age: 39
Setting detail: THERAPIES SERIES
Discharge: HOME | End: 2024-10-29
Payer: MEDICAID

## 2024-10-29 PROCEDURE — 97140 MANUAL THERAPY 1/> REGIONS: CPT

## 2024-10-29 PROCEDURE — 97110 THERAPEUTIC EXERCISES: CPT

## 2024-10-29 NOTE — PROGRESS NOTES
PRE OP INSTRUCTION SHEET                                       3. Aspirin, Ibuprofen, Advil, Naproxen, Vitamin E, fish oil and other Anti-inflammatory products should be stopped for 5 days before surgery or as directed by your physician.   4. Check with your Doctor regarding stopping Plavix, Coumadin, Lovenox, Fragmin or other blood thinners   5. Do not smoke, and do not drink any alcoholic beverages 24 hours prior to surgery.  This includes NA Beer.   6. You may brush your teeth and gargle the morning of surgery.  DO NOT SWALLOW WATER   7. You MUST make arrangements for a responsible adult to take you home after your surgery. You will not be allowed to leave alone or drive yourself home.  It is strongly suggested someone stay with you the first 24 hrs. Your surgery will be cancelled if you do not have a ride home.   8. A parent/legal guardian must accompany a child scheduled for surgery and plan to stay at the hospital until the child is discharged.  Please do not bring other children with you.   9. Please wear simple, loose fitting clothing to the hospital.  Do not bring valuables (money, credit cards, checkbooks, etc.) Do not wear any makeup (including no eye makeup) or nail polish on your fingers or toes.   10. DO NOT wear any jewelry or piercings on day of surgery.  All body piercing jewelry must be removed.   11. If you have dentures,glasses, or contacts they will be removed before going to the OR; we will provide you a container.    12. Please see your family doctor/and cardiologist for a history & physical and/or concerning medications.  Bring any test results/reports from your physician's office. Have history and labs faxed to [phone]    13. Remember to bring Blood Bank bracelet on the day of surgery.   14. If you have a Living Will and Durable Power of  for Healthcare, please bring in a copy.   15. Notify your Surgeon if you develop any illness between now and surgery

## 2024-10-29 NOTE — FLOWSHEET NOTE
to lack of progress  [] Patient is not progressing as expected and requires additional follow up with physician  [] Other:     TREATMENT PLAN     Frequency/Duration: 1-2x/week for  12  weeks for the following treatment interventions:    Interventions:  [x] Therapeutic exercise including: strength training, ROM, including postural re-education.   [x] NMR activation and proprioception, including postural re-education.    [x] Manual therapy as indicated to include: PROM, Gr I-IV mobilizations, and STM  [x] Modalities as needed that may include: Vasoneumatic Compression  [x] Patient education on joint protection, postural re-education, activity modification, progression of HEP.       Plan: POC initiated as per evaluation    Electronically Signed by Toby Schneider PT  Date: 10/29/2024       Note: Portions of this note have been templated and/or copied from initial evaluation, reassessments and prior notes for documentation efficiency.    Note: If patient does not return for scheduled/recommended follow up visits, this note will serve as a discharge from care along with the most recent update on progress.

## 2024-10-30 ENCOUNTER — APPOINTMENT (OUTPATIENT)
Dept: PHYSICAL THERAPY | Age: 39
End: 2024-10-30
Payer: MEDICAID

## 2024-11-04 NOTE — PROGRESS NOTES
[facility]    HPI:  Patient is 39 y.o. year old female seen at request of Ria Layne APRN - NP.  She presents because of lumps located on  scalp .  There has been pain at or near the lesion and a recent increase in size.  There is   previous history of similar.  There has not been any biopsy.      Past Medical History:   Diagnosis Date    Acid reflux     Asthma     as child    Back pain     Bipolar 1 disorder (HCC)     Degenerative disc disease, lumbar     Depression     Diabetes mellitus (HCC)     Fibromyalgia     Hypothyroidism     Liver disease     fatty liver    Obesity     OCD (obsessive compulsive disorder)     PCOS (polycystic ovarian syndrome)     Sleep apnea     no longer uses cpap after weight loss       Past Surgical History:   Procedure Laterality Date    ANKLE SURGERY Left     Scar tissue removal    CARPAL TUNNEL RELEASE Right 08/23/2021    RIGHT CARPAL TUNNEL RELEASE performed by Rk Arana MD at [facility] OR    CARPAL TUNNEL RELEASE Left 09/10/2021    LEFT CARPAL TUNNEL RELEASE performed by Rk Arana MD at [facility] OR    CHOLECYSTECTOMY, LAPAROSCOPIC N/A 04/20/2020    LAPAROSCOPIC CHOLECYSTECTOMY performed by Audie Nunes DO at [facility] OR    DILATATION, ESOPHAGUS      GASTRIC BYPASS SURGERY  12/2023    HYSTERECTOMY (CERVIX STATUS UNKNOWN)      OTHER SURGICAL HISTORY Left 12/10/2014    Excision of Cyst Left Upper Posterior Ankle    OTHER SURGICAL HISTORY Right 12/29/2016    Laparotomy with Right SO    OVARY REMOVAL  12/28/2016    unsure of side    SHOULDER ARTHROSCOPY Left 02/16/2022    VIDEO ARTHROSCOPY LEFT SHOULDER, SUBACROMIAL DECOMPRESSION WITH ACROMIOPLASTY AND DISTAL CLAVICLE RESECTION -SLEEP APNEA performed by Aleks Cardenas MD at [facility] OR    SHOULDER ARTHROSCOPY Left 8/14/2024    VIDEO ARTHROSCOPY LEFT SHOULDER, SUBACROMIAL DECOMPRESSION, OPEN SUBPECTORAL BICEPS TENODESIS, LEFT ROTATOR CUFF DEBRIDEMENT performed by Theresa

## 2024-11-06 ENCOUNTER — HOSPITAL ENCOUNTER (OUTPATIENT)
Age: 39
Setting detail: OUTPATIENT SURGERY
Discharge: HOME | End: 2024-11-06
Attending: SURGERY | Admitting: SURGERY
Payer: MEDICAID

## 2024-11-06 ENCOUNTER — APPOINTMENT (OUTPATIENT)
Dept: PHYSICAL THERAPY | Age: 39
End: 2024-11-06
Payer: MEDICAID

## 2024-11-06 VITALS
HEART RATE: 68 BPM | SYSTOLIC BLOOD PRESSURE: 91 MMHG | OXYGEN SATURATION: 100 % | TEMPERATURE: 96.8 F | DIASTOLIC BLOOD PRESSURE: 56 MMHG | HEIGHT: 67 IN | RESPIRATION RATE: 16 BRPM | WEIGHT: 154 LBS | BODY MASS INDEX: 24.17 KG/M2

## 2024-11-06 DIAGNOSIS — L72.9 SCALP CYST: ICD-10-CM

## 2024-11-06 LAB
GLUCOSE BLD-MCNC: 68 MG/DL (ref 70–99)
PERFORMED ON: ABNORMAL

## 2024-11-06 PROCEDURE — 3600000012 HC SURGERY LEVEL 2 ADDTL 15MIN: Performed by: SURGERY

## 2024-11-06 PROCEDURE — 88304 TISSUE EXAM BY PATHOLOGIST: CPT

## 2024-11-06 PROCEDURE — 11421 EXC H-F-NK-SP B9+MARG 0.6-1: CPT | Performed by: SURGERY

## 2024-11-06 PROCEDURE — 2709999900 HC NON-CHARGEABLE SUPPLY: Performed by: SURGERY

## 2024-11-06 PROCEDURE — 7100000011 HC PHASE II RECOVERY - ADDTL 15 MIN: Performed by: SURGERY

## 2024-11-06 PROCEDURE — 2500000003 HC RX 250 WO HCPCS: Performed by: SURGERY

## 2024-11-06 PROCEDURE — 7100000010 HC PHASE II RECOVERY - FIRST 15 MIN: Performed by: SURGERY

## 2024-11-06 PROCEDURE — 6360000002 HC RX W HCPCS: Performed by: SURGERY

## 2024-11-06 PROCEDURE — 3600000002 HC SURGERY LEVEL 2 BASE: Performed by: SURGERY

## 2024-11-06 ASSESSMENT — PAIN - FUNCTIONAL ASSESSMENT: PAIN_FUNCTIONAL_ASSESSMENT: NONE - DENIES PAIN

## 2024-11-06 NOTE — BRIEF OP NOTE
Brief Postoperative Note      Patient: La Nena Magallanes  YOB: 1985  MRN: [id number]    Date of Procedure: 11/6/2024    Pre-Op Diagnosis Codes:      * Scalp cyst [L72.9] x 2    Post-Op Diagnosis: Same       Procedure(s):  EXCISION SCALP CYST X 2    Surgeon(s):  Nilton Oakes MD    Assistant:  Surgical Assistant: Nilton Reno    Anesthesia: Local    Estimated Blood Loss (mL): Minimal    Complications: None    Specimens:   ID Type Source Tests Collected by Time Destination   A : SCALP CYST X 2 Tissue Tissue SURGICAL PATHOLOGY Nilton Oakes MD 11/6/2024 0839        Implants:  * No implants in log *      Drains: * No LDAs found *    Findings:  Infection Present At Time Of Surgery (PATOS) (choose all levels that have infection present):  No infection present  Other Findings: As above  This procedure was not performed to treat primary cutaneous melanoma through wide local excision    Electronically signed by NILTON OAKES MD on 11/6/2024 at 9:05 AM

## 2024-11-06 NOTE — H&P
[facility] GENERAL SURGERY      The H&P was reviewed, the patient was examined, and no change has occurred in the patient's condition since the H&P was completed. The indications for the procedure were reviewed, and any questions were answered.     I updated the progress note from 10/28/2024 which is the H&P.     Vitals:    11/06/24 0729   BP: (!) 92/57   Pulse: 79   Resp: 18   Temp: 96.9 °F (36.1 °C)   SpO2: 99%

## 2024-11-06 NOTE — PROGRESS NOTES
Blood sugar rechecked and is now 81. Patient denies pain, denies nausea, vital signs stable. Discharge instructions reviewed and given paper copy. Patient states understanding and discharged to home with all belongings - patient ambulatory.

## 2024-11-06 NOTE — DISCHARGE INSTRUCTIONS
[facility]    Som Orozco M.D.   [facility] Office      [facility] Office        [facility]               [address]                [address]  Elie Beal M.D.              [address]           [address]          [place] [address]         [place] [address]  Mani Saldivar M.D                         [phone]        [facility]                   Nilton Oakes M.D.          [facility]       POST-OPERATIVE INSTRUCTIONS FOLLOWING EXCISION OF A MASS    The office will call with biopsy results.  Call the office to arrange suture removal for 2 weeks post op.         You may shower tomorrow.  Wash incisions gently and pat them dry. Do not rub your incisions.       Watch for signs of infection:       Fever over 100.5°     Excessive warmth, or bright redness around your incision    Leakage of bloody or cloudy fluid from you incisions

## 2024-11-11 ENCOUNTER — OFFICE VISIT (OUTPATIENT)
Dept: ORTHOPEDIC SURGERY | Age: 39
End: 2024-11-11

## 2024-11-11 VITALS — WEIGHT: 154 LBS | BODY MASS INDEX: 24.17 KG/M2 | HEIGHT: 67 IN

## 2024-11-11 DIAGNOSIS — Z47.89 ORTHOPEDIC AFTERCARE: Primary | ICD-10-CM

## 2024-11-11 PROCEDURE — 99024 POSTOP FOLLOW-UP VISIT: CPT | Performed by: ORTHOPAEDIC SURGERY

## 2024-11-11 NOTE — OP NOTE
[address] [address]                            OPERATIVE REPORT      PATIENT NAME: ALONZO STONE               : 1985  MED REC NO: [id number]                      ROOM: [facility]  ACCOUNT NO: [id number]                       ADMIT DATE: 2024  PROVIDER: Nilton Oakes MD      DATE OF PROCEDURE:  2024    SURGEON:  Nilton Oakes MD    OPERATION PERFORMED:  Excision of scalp cyst x2.    PREOPERATIVE DIAGNOSIS:  Scalp cyst x2.    POSTOPERATIVE DIAGNOSIS:  Scalp cyst x2.    ANESTHESIA:  Local.    COMPLICATIONS:  None.    ESTIMATED BLOOD LOSS:  Less than 50 mL.    INDICATION FOR THE OPERATION:  A 39-year-old female with 2 enlarging masses on the scalp.  These were causing acute pain and skin sensation disturbance.  I recommended operative excision.  The risks and benefits were explained.  The patient understood them, accepted them, and elected to proceed.    DESCRIPTION OF OPERATION:  The patient was brought to the operating room.  She was prepped and draped in the usual surgical sterile fashion.  Local anesthetic was infiltrated over two scalp cysts on the top right side of the scalp.  Incisions were made overlying the masses.  They were both removed in their entirety.  The excised diameter was 0.75 cm.  This was at both sites.  Both cysts were removed in their entirety.  Hemostasis was obtained. Simple closures were performed with both operative sites by using 3-0 nylon suture.    DISPOSITION:  The patient tolerated the procedures without any acute complications.             NILTON OAKES MD      D:  11/10/2024 18:21:49     T:  2024 00:33:35     [facility]  Job #:  [id number]     Doc#:  [id number]

## 2024-11-14 ENCOUNTER — HOSPITAL ENCOUNTER (OUTPATIENT)
Dept: PHYSICAL THERAPY | Age: 39
Setting detail: THERAPIES SERIES
Discharge: HOME | End: 2024-11-14
Payer: MEDICAID

## 2024-11-14 PROCEDURE — 97110 THERAPEUTIC EXERCISES: CPT

## 2024-11-14 NOTE — FLOWSHEET NOTE
and allowing for proper ROM for normal function with self care, mobility, lifting and ambulation  [] ([id number]) VASOPNEUMATIC      GOALS     Patient stated goal: Pain free full use of shoulder  [x] Progressing: [] Met: [] Not Met: [] Adjusted    Therapist goals for Patient:   Short Term Goals: To be achieved in: 2 weeks  1. Independent in HEP and progression per patient tolerance, in order to prevent re-injury.   [] Progressing: [x] Met: [] Not Met: [] Adjusted  2. Patient will have a decrease in pain to <0/10 to facilitate improvement in movement, function, and ADLs as indicated by Functional Deficits.  [x] Progressing: [] Met: [] Not Met: [] Adjusted    Long Term Goals: To be achieved in: 12 weeks  1. Disability index score of 42% or less for the Quick DASH to assist with reaching prior level of function with activities such as dressing and hygiene ADLs.  [x] Progressing: [] Met: [] Not Met: [] Adjusted  2. Patient will demonstrate increased AROM of shoulder to WNL without pain to allow for proper joint functioning to enable patient to wash/style hair.   [x] Progressing: [] Met: [] Not Met: [] Adjusted  3. Patient will demonstrate increased Strength of RTC/bicep to at least 4+/5 throughout without pain to allow for proper functional mobility to enable patient to return to normal household chores.   [x] Progressing: [] Met: [] Not Met: [] Adjusted  4. Patient will return to driving 1+ hour without increased symptoms or restriction to enable patient to perform grocery shopping and independent life..   [x] Progressing: [] Met: [] Not Met: [] Adjusted    Overall Progression Towards Functional goals/ Treatment Progress Update:  [] Patient is progressing as expected towards functional goals listed.    [] Progression is slowed due to complexities/Impairments listed.  [] Progression has been slowed due to co-morbidities.  [x] Plan just implemented, too soon (<30days) to assess goals progression   [] Goals require adjustment

## 2024-11-19 NOTE — PROGRESS NOTES
This 60 [facility] y.o. right hand dominant unemployed woman is seen in hand surgery consultation for Dr. Andrzej Helm with a chief complaint of residual symptoms following carpal tunnel release surgery of their bilateral hands (right-8/32/21, left-9/10/21). On questioning she has had almost complete relief of her preop nerve compression symptoms (paresthesias, pain, waking form sleep and shaking her hands). She still has some feelings of numbness and weakness, which are continuing to also improve. A recent EMG has shown some residual abnormalities. The pain assessment has been reviewed and is correct. The patient's social history, past medical history, family history, medications, allergies and review of systems, entered 9/8/21, have been reviewed, and dated and are recorded in the chart. The patient is diabetic. On physical examination the patient is Height: 5' 7\" (170.2 cm) tall and weighs Weight: 259 lb (117.5 kg). Respirations ane 18 per minute. The patient is well nourished, is oriented to time and place, demonstrates appropriate mood and affect as well as normal gait and station. There is absent soft tissue swelling in the carpal tunnels. There is bilateral thenar muscle atrophy, R>L. There is minimal residual hypalgesia to sensory testing in the median nerve distribution of the right hand only. Range of motion of the fingers, thumbs, wrists and elbows is full and painless bilaterally. Distal circulation is normal.  All joints are stable. There are no subcutaneous masses or enlarged epitrochlear lymph nodes. I have personally reviewed and interpreted all previous external imaging studies, laboratory tests(glucose,CMP,CBC), diagnostic procedures(EMG) and medical encounters pertinent to this patient's visit today.     Review and independent interpretation of an external EMG study, dated 12/6/21 , preformed by Dr. Xander Daly, a physician outside of this office, is abnormal. There is mild right carpal tunnel syndrome abnormalities as well as diabetic peripheral neuropathy. .  The test results are shared with the patient. Impression: Status post op bilateral carpal tunnel releases with expected improvement in symptoms. The patient is reassured that she is doing well and her improvement should continue indefinitely. The EMG findings are also as expected, since it is rare for the EMG to return to normal even following successful carpal tunnel surgery. Lastly, some of her symptoms may be due to neuropathy, which will not be helped by surgery. Spontaneous, unlabored and symmetrical

## 2024-11-20 ENCOUNTER — OFFICE VISIT (OUTPATIENT)
Dept: SURGERY | Age: 39
End: 2024-11-20

## 2024-11-20 VITALS
WEIGHT: 154 LBS | HEIGHT: 67 IN | SYSTOLIC BLOOD PRESSURE: 100 MMHG | BODY MASS INDEX: 24.17 KG/M2 | DIASTOLIC BLOOD PRESSURE: 60 MMHG

## 2024-11-20 DIAGNOSIS — Z09 SURGICAL FOLLOW-UP CARE: Primary | ICD-10-CM

## 2024-11-20 PROCEDURE — 99024 POSTOP FOLLOW-UP VISIT: CPT | Performed by: SURGERY

## 2024-12-23 ENCOUNTER — OFFICE VISIT (OUTPATIENT)
Dept: ORTHOPEDIC SURGERY | Age: 39
End: 2024-12-23
Payer: MEDICAID

## 2024-12-23 VITALS — HEIGHT: 67 IN | BODY MASS INDEX: 24.17 KG/M2 | WEIGHT: 154 LBS

## 2024-12-23 DIAGNOSIS — M54.50 CHRONIC MIDLINE LOW BACK PAIN WITHOUT SCIATICA: Primary | ICD-10-CM

## 2024-12-23 DIAGNOSIS — G89.29 CHRONIC MIDLINE LOW BACK PAIN WITHOUT SCIATICA: Primary | ICD-10-CM

## 2024-12-23 DIAGNOSIS — Z90.3 HISTORY OF SLEEVE GASTRECTOMY: ICD-10-CM

## 2024-12-23 DIAGNOSIS — M51.26 DISC DISPLACEMENT, LUMBAR: ICD-10-CM

## 2024-12-23 DIAGNOSIS — M47.816 LUMBAR SPONDYLOSIS: ICD-10-CM

## 2024-12-23 PROCEDURE — 1036F TOBACCO NON-USER: CPT | Performed by: INTERNAL MEDICINE

## 2024-12-23 PROCEDURE — G8427 DOCREV CUR MEDS BY ELIG CLIN: HCPCS | Performed by: INTERNAL MEDICINE

## 2024-12-23 PROCEDURE — 99214 OFFICE O/P EST MOD 30 MIN: CPT | Performed by: INTERNAL MEDICINE

## 2024-12-23 PROCEDURE — G8420 CALC BMI NORM PARAMETERS: HCPCS | Performed by: INTERNAL MEDICINE

## 2024-12-23 PROCEDURE — G8484 FLU IMMUNIZE NO ADMIN: HCPCS | Performed by: INTERNAL MEDICINE

## 2024-12-23 RX ORDER — CELECOXIB 100 MG/1
100 CAPSULE ORAL DAILY PRN
Qty: 30 CAPSULE | Refills: 1 | Status: SHIPPED | OUTPATIENT
Start: 2024-12-23

## 2024-12-23 RX ORDER — PANTOPRAZOLE SODIUM 20 MG/1
20 TABLET, DELAYED RELEASE ORAL
Qty: 30 TABLET | Refills: 1 | Status: SHIPPED | OUTPATIENT
Start: 2024-12-23

## 2024-12-23 NOTE — PROGRESS NOTES
Chief Complaint:   Chief Complaint   Patient presents with    Lower Back Pain     Lumbar - Pain has gotten worse. It used to come and go but now when it starts hurting it doesn't stop. Nothing relieves the pain.          History of Present Illness:       Patient is a 39 y.o. female presents with the above complaint. The symptoms began  worsening greater than month ago and started without an injury. The patient describes a burning, stabbing pain that does not radiate.  The symptoms are constant  and are are worsening since the onset.      Past spine intervention:  Lumbar RFA 2021 with subjective benefit    The symptoms of back pain do notshow a discogenic provacative pattern and are worsened by standing and improved with sitting. There is not new onset weakness or progressive weakness of the lower extremities that has developed. The patient denies new onset bowel or bladder dysfunction.  There is no history of previous spinal trauma.    The patient does not have history or orthopaedic lumbar spine surgery.    Pain localizes to the upper lumbar    Pain levels: 4    There is no lower limb pain .     Work-up to date has included: MRI remote past  Prior treatment has included NSAID- , muscle relaxant- , physical therapy.  The patient remains on maintenance medically directed home exercises and for the past 6 weeks and within the past 3 months.  Despite this this patient reports continued pain.    The patient has no history or autoimmune disease, inflammatory arthropathy or crystal arthropathy.      Past Medical History:        Past Medical History:   Diagnosis Date    Acid reflux     Asthma     as child    Back pain     Bipolar 1 disorder (HCC)     Degenerative disc disease, lumbar     Depression     Diabetes mellitus (HCC)     Fibromyalgia     Hypothyroidism     Liver disease     fatty liver    Obesity     OCD (obsessive compulsive disorder)     PCOS (polycystic ovarian syndrome)     Sleep apnea     no longer uses

## 2025-01-28 ENCOUNTER — OFFICE VISIT (OUTPATIENT)
Dept: ORTHOPEDIC SURGERY | Age: 40
End: 2025-01-28

## 2025-01-28 VITALS — HEIGHT: 67 IN | WEIGHT: 150 LBS | BODY MASS INDEX: 23.54 KG/M2

## 2025-01-28 DIAGNOSIS — Z90.3 HISTORY OF SLEEVE GASTRECTOMY: ICD-10-CM

## 2025-01-28 DIAGNOSIS — M25.561 ACUTE PAIN OF RIGHT KNEE: Primary | ICD-10-CM

## 2025-01-28 DIAGNOSIS — M47.816 LUMBAR FACET ARTHROPATHY: ICD-10-CM

## 2025-01-28 DIAGNOSIS — M47.816 LUMBAR SPONDYLOSIS: ICD-10-CM

## 2025-01-28 DIAGNOSIS — M22.41 CHONDROMALACIA OF RIGHT PATELLA: ICD-10-CM

## 2025-01-28 NOTE — PROGRESS NOTES
Chief Complaint:   Chief Complaint   Patient presents with    Lower Back Pain     Lumbar - MRI results today. Her back has been hurting her a lot more lately.    Knee Pain     Right Knee - There is a knot on it. Popping a lot. Does not give out. Painful to straighten after sleeping, feels like something is rubbing together.          History of Present Illness:       Patient is a 39 y.o. female returns follow up for the above complaint. The patient was last seen approximately 1 monthsago. The symptoms show no change since the last visit. The patient has had further testing for the problem.      MRI completed in the interim.      Back:leg pain 100:0 . Pain back is stabbing or burning in quality and localizes to the TL/upper lumbar region.    The symptoms do not follow a discogenic provocative pattern.    Pain levels:4.    The patient denies new onset or progressive weakness of the lower extremities.  The patient denies now onset bowel or bladder function.    She continues on medical pain management as per previous  inclusive of NSAID- , muscle relaxant-       With respect to the right knee, the pain localizes to the front side of the knee and does seem to follow a typical patellofemoral provacative pattern.  There are not mechanical symptoms that suggest meniscal injury.  The patient denies subjective instability about the knee and admits to new onset weakness of the lower extremity.    Pain level 4    The patient denies a pattern of activity related swelling.      Treatment to date: NSAIDS Celebrex and activity modification.     There is no prior history of knee trauma. Workup has included  none    There is no prior history of autoimmune disease, inflammatory arthropathy, or crystal arthropathy.                   Past Medical History:        Past Medical History:   Diagnosis Date    Acid reflux     Asthma     as child    Back pain     Bipolar 1 disorder (HCC)     Degenerative disc disease, lumbar     Depression

## 2025-02-10 ENCOUNTER — TELEPHONE (OUTPATIENT)
Dept: NEUROLOGY | Age: 40
End: 2025-02-10

## 2025-02-10 DIAGNOSIS — G43.109 MIGRAINE WITH AURA AND WITHOUT STATUS MIGRAINOSUS, NOT INTRACTABLE: ICD-10-CM

## 2025-02-10 DIAGNOSIS — G90.A POSTURAL ORTHOSTATIC TACHYCARDIA SYNDROME: ICD-10-CM

## 2025-02-10 RX ORDER — PROPRANOLOL HYDROCHLORIDE 60 MG/1
60 CAPSULE, EXTENDED RELEASE ORAL DAILY
Qty: 30 CAPSULE | Refills: 11 | Status: SHIPPED | OUTPATIENT
Start: 2025-02-10 | End: 2026-02-10

## 2025-02-10 RX ORDER — RIZATRIPTAN BENZOATE 10 MG/1
10 TABLET, ORALLY DISINTEGRATING ORAL PRN
Qty: 9 TABLET | Refills: 11 | Status: SHIPPED | OUTPATIENT
Start: 2025-02-10 | End: 2025-02-11 | Stop reason: ALTCHOICE

## 2025-02-10 NOTE — TELEPHONE ENCOUNTER
Jose from [employer] called about drug interaction between Propanolol and Maxtalt    Jose at [employer] [phone]    Please advise

## 2025-02-10 NOTE — TELEPHONE ENCOUNTER
Pt's pharmacy called regarding a drug interaction between Propanolol and Maxalt.    Jose at [facility] : [phone]

## 2025-02-10 NOTE — TELEPHONE ENCOUNTER
[facility] pharmacy calling stating they sent a fax to office requesting refills however the fax that was received back stated refills were sent 10/18/24. The meds were sent to [facility] pharmacy which has since closed. Medication was supposed to be transferred to [facility] however they do not have refills on file either. Patient would like to fill through [facility] pharmacy. Meds pending to appropriate pharmacy.

## 2025-02-10 NOTE — TELEPHONE ENCOUNTER
Closing encounter as there is an open encounter regarding medications and [facility] pharmacy.

## 2025-02-11 ENCOUNTER — APPOINTMENT (OUTPATIENT)
Dept: CT IMAGING | Age: 40
End: 2025-02-11
Payer: MEDICAID

## 2025-02-11 ENCOUNTER — HOSPITAL ENCOUNTER (EMERGENCY)
Age: 40
Discharge: HOME | End: 2025-02-11
Attending: EMERGENCY MEDICINE
Payer: MEDICAID

## 2025-02-11 VITALS
SYSTOLIC BLOOD PRESSURE: 90 MMHG | RESPIRATION RATE: 16 BRPM | DIASTOLIC BLOOD PRESSURE: 70 MMHG | HEART RATE: 93 BPM | OXYGEN SATURATION: 97 % | TEMPERATURE: 98.1 F

## 2025-02-11 DIAGNOSIS — R10.84 GENERALIZED ABDOMINAL PAIN: Primary | ICD-10-CM

## 2025-02-11 LAB
ALBUMIN SERPL-MCNC: 3.8 G/DL (ref 3.4–5)
ALBUMIN/GLOB SERPL: 1.4 {RATIO} (ref 1.1–2.2)
ALP SERPL-CCNC: 70 U/L (ref 40–129)
ALT SERPL-CCNC: 26 U/L (ref 10–40)
ANION GAP SERPL CALCULATED.3IONS-SCNC: 9 MMOL/L (ref 3–16)
AST SERPL-CCNC: 31 U/L (ref 15–37)
BASOPHILS # BLD: 0 K/UL (ref 0–0.2)
BASOPHILS NFR BLD: 1.3 %
BILIRUB SERPL-MCNC: 0.4 MG/DL (ref 0–1)
BILIRUB UR QL STRIP.AUTO: NEGATIVE
BUN SERPL-MCNC: 8 MG/DL (ref 7–20)
CALCIUM SERPL-MCNC: 8.8 MG/DL (ref 8.3–10.6)
CHLORIDE SERPL-SCNC: 104 MMOL/L (ref 99–110)
CLARITY UR: CLEAR
CO2 SERPL-SCNC: 27 MMOL/L (ref 21–32)
COLOR UR: YELLOW
CREAT SERPL-MCNC: 0.6 MG/DL (ref 0.6–1.1)
DEPRECATED RDW RBC AUTO: 12.7 % (ref 12.4–15.4)
EOSINOPHIL # BLD: 0 K/UL (ref 0–0.6)
EOSINOPHIL NFR BLD: 1.1 %
GFR SERPLBLD CREATININE-BSD FMLA CKD-EPI: >90 ML/MIN/{1.73_M2}
GLUCOSE SERPL-MCNC: 75 MG/DL (ref 70–99)
GLUCOSE UR STRIP.AUTO-MCNC: NEGATIVE MG/DL
HCT VFR BLD AUTO: 38 % (ref 36–48)
HGB BLD-MCNC: 12.9 G/DL (ref 12–16)
HGB UR QL STRIP.AUTO: NEGATIVE
KETONES UR STRIP.AUTO-MCNC: NEGATIVE MG/DL
LEUKOCYTE ESTERASE UR QL STRIP.AUTO: NEGATIVE
LIPASE SERPL-CCNC: 34 U/L (ref 13–60)
LYMPHOCYTES # BLD: 1.3 K/UL (ref 1–5.1)
LYMPHOCYTES NFR BLD: 37.9 %
MCH RBC QN AUTO: 32.5 PG (ref 26–34)
MCHC RBC AUTO-ENTMCNC: 34 G/DL (ref 31–36)
MCV RBC AUTO: 95.5 FL (ref 80–100)
MONOCYTES # BLD: 0.3 K/UL (ref 0–1.3)
MONOCYTES NFR BLD: 9.2 %
NEUTROPHILS # BLD: 1.7 K/UL (ref 1.7–7.7)
NEUTROPHILS NFR BLD: 50.5 %
NITRITE UR QL STRIP.AUTO: NEGATIVE
PH UR STRIP.AUTO: 6 [PH] (ref 5–8)
PLATELET # BLD AUTO: 180 K/UL (ref 135–450)
PMV BLD AUTO: 10.1 FL (ref 5–10.5)
POTASSIUM SERPL-SCNC: 4.1 MMOL/L (ref 3.5–5.1)
PROT SERPL-MCNC: 6.6 G/DL (ref 6.4–8.2)
PROT UR STRIP.AUTO-MCNC: NEGATIVE MG/DL
RBC # BLD AUTO: 3.98 M/UL (ref 4–5.2)
SODIUM SERPL-SCNC: 140 MMOL/L (ref 136–145)
SP GR UR STRIP.AUTO: 1.02 (ref 1–1.03)
UA COMPLETE W REFLEX CULTURE PNL UR: NORMAL
UA DIPSTICK W REFLEX MICRO PNL UR: NORMAL
URN SPEC COLLECT METH UR: NORMAL
UROBILINOGEN UR STRIP-ACNC: 0.2 E.U./DL
WBC # BLD AUTO: 3.3 K/UL (ref 4–11)

## 2025-02-11 PROCEDURE — 96375 TX/PRO/DX INJ NEW DRUG ADDON: CPT

## 2025-02-11 PROCEDURE — 6360000002 HC RX W HCPCS: Performed by: EMERGENCY MEDICINE

## 2025-02-11 PROCEDURE — 80053 COMPREHEN METABOLIC PANEL: CPT

## 2025-02-11 PROCEDURE — 85025 COMPLETE CBC W/AUTO DIFF WBC: CPT

## 2025-02-11 PROCEDURE — 6360000004 HC RX CONTRAST MEDICATION: Performed by: EMERGENCY MEDICINE

## 2025-02-11 PROCEDURE — 6370000000 HC RX 637 (ALT 250 FOR IP): Performed by: EMERGENCY MEDICINE

## 2025-02-11 PROCEDURE — 83690 ASSAY OF LIPASE: CPT

## 2025-02-11 PROCEDURE — 81003 URINALYSIS AUTO W/O SCOPE: CPT

## 2025-02-11 PROCEDURE — 99285 EMERGENCY DEPT VISIT HI MDM: CPT

## 2025-02-11 PROCEDURE — 36415 COLL VENOUS BLD VENIPUNCTURE: CPT

## 2025-02-11 PROCEDURE — 96374 THER/PROPH/DIAG INJ IV PUSH: CPT

## 2025-02-11 PROCEDURE — 74177 CT ABD & PELVIS W/CONTRAST: CPT

## 2025-02-11 RX ORDER — IOPAMIDOL 755 MG/ML
75 INJECTION, SOLUTION INTRAVASCULAR
Status: COMPLETED | OUTPATIENT
Start: 2025-02-11 | End: 2025-02-11

## 2025-02-11 RX ORDER — SUMATRIPTAN 20 MG/1
1 SPRAY NASAL PRN
Qty: 9 EACH | Refills: 11 | Status: SHIPPED | OUTPATIENT
Start: 2025-02-11 | End: 2026-02-11

## 2025-02-11 RX ORDER — DICYCLOMINE HYDROCHLORIDE 10 MG/1
10 CAPSULE ORAL
Qty: 40 CAPSULE | Refills: 0 | Status: SHIPPED | OUTPATIENT
Start: 2025-02-11

## 2025-02-11 RX ORDER — ONDANSETRON 2 MG/ML
4 INJECTION INTRAMUSCULAR; INTRAVENOUS ONCE
Status: COMPLETED | OUTPATIENT
Start: 2025-02-11 | End: 2025-02-11

## 2025-02-11 RX ORDER — FAMOTIDINE 20 MG/1
20 TABLET, FILM COATED ORAL 2 TIMES DAILY
Qty: 60 TABLET | Refills: 0 | Status: SHIPPED | OUTPATIENT
Start: 2025-02-11

## 2025-02-11 RX ORDER — ACETAMINOPHEN 325 MG/1
650 TABLET ORAL ONCE
Status: COMPLETED | OUTPATIENT
Start: 2025-02-11 | End: 2025-02-11

## 2025-02-11 RX ORDER — DICYCLOMINE HYDROCHLORIDE 10 MG/1
10 CAPSULE ORAL ONCE
Status: COMPLETED | OUTPATIENT
Start: 2025-02-11 | End: 2025-02-11

## 2025-02-11 RX ORDER — FENTANYL CITRATE 50 UG/ML
50 INJECTION, SOLUTION INTRAMUSCULAR; INTRAVENOUS ONCE
Status: COMPLETED | OUTPATIENT
Start: 2025-02-11 | End: 2025-02-11

## 2025-02-11 RX ORDER — ONDANSETRON 4 MG/1
4 TABLET, FILM COATED ORAL EVERY 8 HOURS PRN
Qty: 20 TABLET | Refills: 0 | Status: SHIPPED | OUTPATIENT
Start: 2025-02-11

## 2025-02-11 RX ADMIN — ONDANSETRON 4 MG: 2 INJECTION INTRAMUSCULAR; INTRAVENOUS at 01:59

## 2025-02-11 RX ADMIN — IOPAMIDOL 75 ML: 755 INJECTION, SOLUTION INTRAVENOUS at 03:06

## 2025-02-11 RX ADMIN — DICYCLOMINE HYDROCHLORIDE 10 MG: 10 CAPSULE ORAL at 05:29

## 2025-02-11 RX ADMIN — ACETAMINOPHEN 650 MG: 325 TABLET ORAL at 05:29

## 2025-02-11 RX ADMIN — FENTANYL CITRATE 50 MCG: 50 INJECTION, SOLUTION INTRAMUSCULAR; INTRAVENOUS at 02:00

## 2025-02-11 ASSESSMENT — PAIN - FUNCTIONAL ASSESSMENT: PAIN_FUNCTIONAL_ASSESSMENT: 0-10

## 2025-02-11 ASSESSMENT — PAIN DESCRIPTION - PAIN TYPE: TYPE: ACUTE PAIN

## 2025-02-11 ASSESSMENT — PAIN SCALES - GENERAL
PAINLEVEL_OUTOF10: 2
PAINLEVEL_OUTOF10: 7

## 2025-02-11 ASSESSMENT — PAIN DESCRIPTION - LOCATION: LOCATION: ABDOMEN

## 2025-02-11 NOTE — TELEPHONE ENCOUNTER
Pharmacy called again regarding medication Propanolol and Maxtalt.     There is a contraindication and they just need ok to give     Please advise

## 2025-02-11 NOTE — ED PROVIDER NOTES
currently there is no indication.  They demonstrated understanding of when to return to the emergency department for new or worsening symptoms.      I am the Primary Clinician of Record.    FINAL IMPRESSION      1. Generalized abdominal pain          DISPOSITION/PLAN     DISPOSITION Decision To Discharge 02/11/2025 04:59:37 AM   DISPOSITION CONDITION Stable           PATIENT REFERRED TO:  Ria Layne APRN - NP  [id number]  [place] [address]  [phone]    Schedule an appointment as soon as possible for a visit   As needed      DISCHARGE MEDICATIONS:  Discharge Medication List as of 2/11/2025  5:24 AM        START taking these medications    Details   ondansetron (ZOFRAN) 4 MG tablet Take 1 tablet by mouth every 8 hours as needed for Nausea, Disp-20 tablet, R-0Normal      dicyclomine (BENTYL) 10 MG capsule Take 1 capsule by mouth 4 times daily (before meals and nightly), Disp-40 capsule, R-0Normal      famotidine (PEPCID) 20 MG tablet Take 1 tablet by mouth 2 times daily, Disp-60 tablet, R-0Normal             DISCONTINUED MEDICATIONS:  Discharge Medication List as of 2/11/2025  5:24 AM                 (Please note that portions of this note were completed with a voice recognition program.  Efforts were made to edit the dictations but occasionally words are mis-transcribed.)    Binh Baldwin MD (electronically signed)            Binh Baldwin MD  02/11/25 0614

## 2025-02-12 ENCOUNTER — TELEPHONE (OUTPATIENT)
Dept: ADMINISTRATIVE | Age: 40
End: 2025-02-12

## 2025-02-12 ENCOUNTER — HOSPITAL ENCOUNTER (OUTPATIENT)
Dept: INTERVENTIONAL RADIOLOGY/VASCULAR | Age: 40
Discharge: HOME | End: 2025-02-14
Attending: INTERNAL MEDICINE
Payer: MEDICAID

## 2025-02-12 DIAGNOSIS — M47.816 LUMBAR SPONDYLOSIS: ICD-10-CM

## 2025-02-12 DIAGNOSIS — M47.816 LUMBAR FACET ARTHROPATHY: ICD-10-CM

## 2025-02-12 PROCEDURE — 64493 INJ PARAVERT F JNT L/S 1 LEV: CPT

## 2025-02-12 PROCEDURE — 64494 INJ PARAVERT F JNT L/S 2 LEV: CPT

## 2025-02-12 PROCEDURE — 64495 INJ PARAVERT F JNT L/S 3 LEV: CPT

## 2025-02-12 PROCEDURE — 6360000002 HC RX W HCPCS: Performed by: RADIOLOGY

## 2025-02-12 RX ORDER — LIDOCAINE HYDROCHLORIDE 10 MG/ML
INJECTION, SOLUTION INFILTRATION; PERINEURAL PRN
Status: COMPLETED | OUTPATIENT
Start: 2025-02-12 | End: 2025-02-12

## 2025-02-12 RX ORDER — BUPIVACAINE HYDROCHLORIDE 2.5 MG/ML
INJECTION, SOLUTION EPIDURAL; INFILTRATION; INTRACAUDAL PRN
Status: COMPLETED | OUTPATIENT
Start: 2025-02-12 | End: 2025-02-12

## 2025-02-12 RX ADMIN — BUPIVACAINE HYDROCHLORIDE 20 ML: 2.5 INJECTION, SOLUTION EPIDURAL; INFILTRATION; INTRACAUDAL at 12:08

## 2025-02-12 RX ADMIN — LIDOCAINE HYDROCHLORIDE 10 ML: 10 INJECTION, SOLUTION INFILTRATION; PERINEURAL at 12:06

## 2025-02-12 NOTE — TELEPHONE ENCOUNTER
Submitted PA for Sumatriptan Via FAX [facility] STATUS: PENDING    Follow up done daily; if no decision with in three days we will refax.  If another three days goes by with no decision will call the insurance for status.

## 2025-02-17 NOTE — TELEPHONE ENCOUNTER
Called [facility] (spoke to Oralia) to follow up on this PA status. The PA was rejected because  Sumatriptan does not require prior authorization.    Patient has a Paid claim on 2/13/2025.

## 2025-02-27 ENCOUNTER — OFFICE VISIT (OUTPATIENT)
Age: 40
End: 2025-02-27

## 2025-02-27 VITALS
DIASTOLIC BLOOD PRESSURE: 62 MMHG | SYSTOLIC BLOOD PRESSURE: 95 MMHG | WEIGHT: 148 LBS | OXYGEN SATURATION: 98 % | HEART RATE: 79 BPM | BODY MASS INDEX: 23.18 KG/M2

## 2025-02-27 DIAGNOSIS — M79.644 THUMB PAIN, RIGHT: ICD-10-CM

## 2025-02-27 DIAGNOSIS — M25.531 WRIST PAIN, ACUTE, RIGHT: Primary | ICD-10-CM

## 2025-02-27 RX ORDER — KETOROLAC TROMETHAMINE 30 MG/ML
30 INJECTION, SOLUTION INTRAMUSCULAR; INTRAVENOUS ONCE
Status: COMPLETED | OUTPATIENT
Start: 2025-02-27 | End: 2025-02-27

## 2025-02-27 RX ADMIN — KETOROLAC TROMETHAMINE 30 MG: 30 INJECTION, SOLUTION INTRAMUSCULAR; INTRAVENOUS at 15:10

## 2025-02-27 NOTE — PROGRESS NOTES
La Nena Magallanes (: 1985) is a 39 y.o. female, New patient, here for evaluation of the following chief complaint(s):  Hand Pain (right)        ASSESSMENT/PLAN:    ICD-10-CM    1. Wrist pain, acute, right  M25.531 [employer] Thumb Spica, Right     XR WRIST RIGHT (MIN 3 VIEWS)     ketorolac (TORADOL) injection 30 mg     Ute Coon MD, Hand Surgery (Hand, Wrist)[facility]      2. Thumb pain, right  M79.644 [employer] Thumb Spica, Right     XR WRIST RIGHT (MIN 3 VIEWS)     ketorolac (TORADOL) injection 30 mg     Ute Coon MD, Hand Surgery (Hand, Wrist)[facility]          Right Thumb and wrist pain  Toradol injection administered for pain relief  Referral to Ortho Hand provided for further evaluation  Right wrist placed in thumb spica to immobilize- wear for comfort and at night   Xrays were negative per my read  Xray Result (most recent):  XR WRIST RIGHT (MIN 3 VIEWS) 2025    Narrative  EXAMINATION:  3 XRAY VIEWS OF THE RIGHT WRIST    2025 11:19 am    COMPARISON:  None.    HISTORY:  ORDERING SYSTEM PROVIDED HISTORY: Wrist pain, acute, right  TECHNOLOGIST PROVIDED HISTORY:  Reason for exam:->wrist pain and thumb pain    FINDINGS:  No evidence of acute fracture.  Alignment is preserved.  Joint spaces are  maintained.  No focal soft tissue swelling is radiographically apparent.  No  radiopaque foreign body.    Impression  No acute osseous abnormality identified in the right wrist.      Follow RICE (Rest, Ice, Compression, Elevation) for the next week  Use Ibuprofen (Advil, Motrin) every 4-6 hours per box instructions provided you do not have a reason you cannot take NSAIDs  Placed in thumb spica  Discussed PCP follow up for persisting or worsening symptoms, or to return to the clinic if unable to obtain PCP follow up for worsening symptoms.    The patient tolerated their visit well. The patient and/or the family were

## 2025-02-27 NOTE — PATIENT INSTRUCTIONS
Right Thumb and wrist pain  Toradol injection administered for pain relief  Referral to Ortho Hand provided for further evaluation  Right wrist placed in thumb spica to immobilize- wear for comfort and at night   May wear Ace wrap for compression- do not wrap too tightly- frequently check circulation in fingers  Xrays were negative per my read  If official radiologist report shows anything different, we will call you.   Apply ice to the affected area per instructions below  Follow RICE (Rest, Ice, Compression, Elevation) for the next week  Keep elevated as often as possible  Use Ibuprofen (Advil, Motrin) every 4-6 hours per box instructions provided you do not have a reason you cannot take NSAIDs  Use brace at all times, if provided  Follow up with orthopedics ([facility] / [phone]) if needed      La Nena,    Thank you for trusting [facility] with your care. Your decision to come to us means a lot and we are honored to be part of your healthcare journey. We value your trust and hope your experience with us was positive and met your expectations.    We're always looking for ways to improve, and your feedback is incredibly important to us. You will receive a text or email soon asking you how your visit went. for If you could take a moment to share your thoughts, it would mean the world to us. Your input helps us better serve you and others in the community.     Thank you again for choosing us. We're grateful for the opportunity to care for you and your loved ones. We hope to see you again - though we always wish you health and wellness!    Warm regards,    [facility]    VINCENZO Thomas, Messi, Clinic Supervisor, and RT Gabriela

## 2025-03-01 ENCOUNTER — HOSPITAL ENCOUNTER (EMERGENCY)
Age: 40
Discharge: HOME | End: 2025-03-01
Payer: OTHER MISCELLANEOUS

## 2025-03-01 ENCOUNTER — APPOINTMENT (OUTPATIENT)
Dept: CT IMAGING | Age: 40
End: 2025-03-01
Payer: OTHER MISCELLANEOUS

## 2025-03-01 VITALS
HEIGHT: 66 IN | TEMPERATURE: 97.8 F | WEIGHT: 143 LBS | RESPIRATION RATE: 16 BRPM | DIASTOLIC BLOOD PRESSURE: 74 MMHG | OXYGEN SATURATION: 100 % | HEART RATE: 74 BPM | SYSTOLIC BLOOD PRESSURE: 101 MMHG | BODY MASS INDEX: 22.98 KG/M2
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
## 2025-03-01 DIAGNOSIS — S16.1XXA STRAIN OF NECK MUSCLE, INITIAL ENCOUNTER: ICD-10-CM

## 2025-03-01 PROCEDURE — 99284 EMERGENCY DEPT VISIT MOD MDM: CPT

## 2025-03-01 PROCEDURE — 72125 CT NECK SPINE W/O DYE: CPT

## 2025-03-01 PROCEDURE — 70450 CT HEAD/BRAIN W/O DYE: CPT

## 2025-03-01 ASSESSMENT — PAIN SCALES - GENERAL: PAINLEVEL_OUTOF10: 6

## 2025-03-01 ASSESSMENT — LIFESTYLE VARIABLES
HOW OFTEN DO YOU HAVE A DRINK CONTAINING ALCOHOL: NEVER
HOW MANY STANDARD DRINKS CONTAINING ALCOHOL DO YOU HAVE ON A TYPICAL DAY: PATIENT DOES NOT DRINK
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
## 2025-03-09 SDOH — HEALTH STABILITY: PHYSICAL HEALTH: ON AVERAGE, HOW MANY DAYS PER WEEK DO YOU ENGAGE IN MODERATE TO STRENUOUS EXERCISE (LIKE A BRISK WALK)?: 1 DAY

## 2025-03-09 SDOH — HEALTH STABILITY: PHYSICAL HEALTH: ON AVERAGE, HOW MANY MINUTES DO YOU ENGAGE IN EXERCISE AT THIS LEVEL?: 30 MIN

## 2025-03-12 ENCOUNTER — OFFICE VISIT (OUTPATIENT)
Dept: ORTHOPEDIC SURGERY | Age: 40
End: 2025-03-12
Payer: MEDICAID

## 2025-03-12 VITALS — RESPIRATION RATE: 14 BRPM | HEIGHT: 66 IN | WEIGHT: 143 LBS | BODY MASS INDEX: 22.98 KG/M2

## 2025-03-12 DIAGNOSIS — M65.4 DE QUERVAIN'S DISEASE (RADIAL STYLOID TENOSYNOVITIS): Primary | ICD-10-CM

## 2025-03-12 PROCEDURE — 99204 OFFICE O/P NEW MOD 45 MIN: CPT | Performed by: ORTHOPAEDIC SURGERY

## 2025-03-12 PROCEDURE — 1036F TOBACCO NON-USER: CPT | Performed by: ORTHOPAEDIC SURGERY

## 2025-03-12 PROCEDURE — G8420 CALC BMI NORM PARAMETERS: HCPCS | Performed by: ORTHOPAEDIC SURGERY

## 2025-03-12 PROCEDURE — G8427 DOCREV CUR MEDS BY ELIG CLIN: HCPCS | Performed by: ORTHOPAEDIC SURGERY

## 2025-03-12 NOTE — PROGRESS NOTES
This 39 y.o., right hand dominant unemployed woman is seen today with a chief complaint of pain in the  bilateral  wrists.  Symptoms have been present for 2 weeks.  Pain starts at the radial aspect of the wrist and may radiate proximally, towards the elbow or distally, towards the thumb.  There is no pain at rest.  Pain is aggravated by wrist and thumb movement and gripping.  Occasionally, a popping sensation is noted on thumb movement.  The wrist swells at times. Treatment has not been prescribed. The pain has significantly improved recently. There is no history of significant injury. The pain assessment has been reviewed and is correct as stated.    The patient's social history, past medical history, family history, medications, allergies and review of systems, entered 3/12/25, have been reviewed, and dated and are recorded in the chart.    On examination the patient is Height: 167.6 cm (5' 6\") tall and weighs Weight - Scale: 64.9 kg (143 lb).  Respirations are 18 per minute.  The patient is well nourished, is oriented to time and place, demonstrates appropriate mood and affect as well as normal gait and station.  There is minimal soft tissue swelling over the radial styloid of the wrists, R>L.  There is slight tenderness over the 1st dorsal compartment.  There is no tenderness over the base of the thumb metacarpal.  The grind test is negative for CMC arthritis. The Finkelstein test is mildly positive.  Range of motion of the fingers, thumbs and wrists is full bilaterally, with flexion and ulnar deviation being mildly painful bilaterally.  Skin is intact, as is distal circulation and sensation.  Gross muscle strength is normal bilaterally.  Hand and wrist joints are stable.  There are no subcutaneous nodules or enlarged epitrochlear lymph nodes.    I have personally reviewed and interpreted all previous external imaging studies (Xrays), laboratory tests (Urinalysis, Lipase, CMP+Reflex, CBC+Diff, Glucose),

## 2025-03-13 ENCOUNTER — OFFICE VISIT (OUTPATIENT)
Dept: ORTHOPEDIC SURGERY | Age: 40
End: 2025-03-13
Payer: MEDICAID

## 2025-03-13 VITALS — WEIGHT: 143 LBS | BODY MASS INDEX: 22.98 KG/M2 | HEIGHT: 66 IN

## 2025-03-13 DIAGNOSIS — M47.816 LUMBAR SPONDYLOSIS: ICD-10-CM

## 2025-03-13 DIAGNOSIS — M47.816 LUMBAR FACET ARTHROPATHY: Primary | ICD-10-CM

## 2025-03-13 PROCEDURE — G8420 CALC BMI NORM PARAMETERS: HCPCS | Performed by: INTERNAL MEDICINE

## 2025-03-13 PROCEDURE — 99214 OFFICE O/P EST MOD 30 MIN: CPT | Performed by: INTERNAL MEDICINE

## 2025-03-13 PROCEDURE — G8427 DOCREV CUR MEDS BY ELIG CLIN: HCPCS | Performed by: INTERNAL MEDICINE

## 2025-03-13 PROCEDURE — 1036F TOBACCO NON-USER: CPT | Performed by: INTERNAL MEDICINE
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
## 2025-03-21 SDOH — HEALTH STABILITY: PHYSICAL HEALTH: ON AVERAGE, HOW MANY DAYS PER WEEK DO YOU ENGAGE IN MODERATE TO STRENUOUS EXERCISE (LIKE A BRISK WALK)?: 2 DAYS

## 2025-03-21 SDOH — HEALTH STABILITY: PHYSICAL HEALTH: ON AVERAGE, HOW MANY MINUTES DO YOU ENGAGE IN EXERCISE AT THIS LEVEL?: 20 MIN

## 2025-03-24 ENCOUNTER — OFFICE VISIT (OUTPATIENT)
Age: 40
End: 2025-03-24

## 2025-03-24 ENCOUNTER — OFFICE VISIT (OUTPATIENT)
Dept: ORTHOPEDIC SURGERY | Age: 40
End: 2025-03-24
Payer: MEDICAID

## 2025-03-24 VITALS
HEART RATE: 112 BPM | DIASTOLIC BLOOD PRESSURE: 71 MMHG | SYSTOLIC BLOOD PRESSURE: 101 MMHG | TEMPERATURE: 98.3 F | OXYGEN SATURATION: 97 %

## 2025-03-24 VITALS — WEIGHT: 143 LBS | HEIGHT: 66 IN | BODY MASS INDEX: 22.98 KG/M2

## 2025-03-24 DIAGNOSIS — G56.21 CUBITAL TUNNEL SYNDROME, RIGHT: ICD-10-CM

## 2025-03-24 DIAGNOSIS — Z90.3 HISTORY OF SLEEVE GASTRECTOMY: ICD-10-CM

## 2025-03-24 DIAGNOSIS — G56.21 NEURITIS OF RIGHT ULNAR NERVE: ICD-10-CM

## 2025-03-24 DIAGNOSIS — Z98.890 HISTORY OF CARPAL TUNNEL RELEASE OF BOTH WRISTS: ICD-10-CM

## 2025-03-24 DIAGNOSIS — Z72.51 HIGH RISK SEXUAL BEHAVIOR, UNSPECIFIED TYPE: ICD-10-CM

## 2025-03-24 DIAGNOSIS — Z11.3 ENCOUNTER FOR SCREENING FOR INFECTIONS WITH A PREDOMINANTLY SEXUAL MODE OF TRANSMISSION: Primary | ICD-10-CM

## 2025-03-24 DIAGNOSIS — R94.131 ABNORMAL EMG: ICD-10-CM

## 2025-03-24 DIAGNOSIS — G56.22 CUBITAL TUNNEL SYNDROME ON LEFT: Primary | ICD-10-CM

## 2025-03-24 PROCEDURE — G8427 DOCREV CUR MEDS BY ELIG CLIN: HCPCS | Performed by: INTERNAL MEDICINE

## 2025-03-24 PROCEDURE — 1036F TOBACCO NON-USER: CPT | Performed by: INTERNAL MEDICINE

## 2025-03-24 PROCEDURE — 99214 OFFICE O/P EST MOD 30 MIN: CPT | Performed by: INTERNAL MEDICINE

## 2025-03-24 PROCEDURE — G8420 CALC BMI NORM PARAMETERS: HCPCS | Performed by: INTERNAL MEDICINE

## 2025-03-24 RX ORDER — BUSPIRONE HYDROCHLORIDE 15 MG/1
15 TABLET ORAL 3 TIMES DAILY
COMMUNITY
Start: 2025-03-07

## 2025-03-24 RX ORDER — TRAZODONE HYDROCHLORIDE 50 MG/1
50 TABLET ORAL NIGHTLY
COMMUNITY
Start: 2025-01-16

## 2025-03-24 RX ORDER — RISPERIDONE 2 MG/1
TABLET ORAL
COMMUNITY

## 2025-03-24 RX ORDER — NYSTATIN 100000 U/G
CREAM TOPICAL
COMMUNITY
Start: 2025-02-04

## 2025-03-24 RX ORDER — TIRZEPATIDE 7.5 MG/.5ML
INJECTION, SOLUTION SUBCUTANEOUS
COMMUNITY
Start: 2025-03-05

## 2025-03-24 RX ORDER — TIRZEPATIDE 12.5 MG/.5ML
INJECTION, SOLUTION SUBCUTANEOUS
COMMUNITY
Start: 2025-01-24

## 2025-03-24 RX ORDER — LAMOTRIGINE 150 MG/1
TABLET ORAL
COMMUNITY
Start: 2025-03-07

## 2025-03-24 RX ORDER — CHOLECALCIFEROL (VITAMIN D3) 50 MCG
TABLET ORAL
COMMUNITY
Start: 2025-03-17

## 2025-03-24 RX ORDER — KETOCONAZOLE 20 MG/ML
SHAMPOO, SUSPENSION TOPICAL
COMMUNITY
Start: 2025-01-02

## 2025-03-24 NOTE — PROGRESS NOTES
La Nena Magallanes (: 1985) is a 39 y.o. female, Established patient, here for evaluation of the following chief complaint(s):  Sexually Transmitted Diseases (Tired, water/blood filled bumps, isn't sure is its from PCOS has been happening over the last year )      2025    ASSESSMENT/PLAN:    ICD-10-CM    1. Encounter for screening for infections with a predominantly sexual mode of transmission  Z11.3 C.trachomatis N.gonorrhoeae DNA, Urine ([place] Only)     Vaginitis Panel PCR     Herpes Simplex Virus,I/II,IgG     Syphilis Antibody Cascading Reflex     Hepatitis C Antibody     HIV Screen      2. High risk sexual behavior, unspecified type  Z72.51           Screening for screening for infections with a predominantly sexual mode of transmission    Urine sent to rule out Gonorrhea and Chlamydia.  Blood samples obtained in clinic and sent for HIV, HSV, and Syphilis cascade testing.   Patient self swabbed  Will augment treatment plan as necessary pending the test results, once they become available, and will notify the pt of the results and any treatment plan changes.  Discussed importance in notifying previous sexual partners of any positive test results    Discussed PCP follow up for persisting or worsening symptoms, or to return to the clinic if unable to obtain PCP follow up for worsening symptoms.    Discussed PCP follow up for persisting or worsening symptoms, or to return to the clinic if unable to obtain PCP follow up for worsening symptoms.    The patient tolerated their visit well. The patient and/or the family were informed of the results of any tests, a time was given to answer questions, a plan was proposed and they agreed with plan. Reviewed AVS with treatment instructions and answered questions - pt/family expresses understanding and agreement with the discussed treatment plan and AVS instructions.      SUBJECTIVE/OBJECTIVE:  HPI:   39 y.o. female presents for complaint of STI testing

## 2025-03-24 NOTE — PATIENT INSTRUCTIONS
Screening for screening for infections with a predominantly sexual mode of transmission    Urine sent to rule out Gonorrhea, Chlamydia, and Trichomonas.  Blood samples sent for HIV, Herpes, Hepatitis and Syphilis testing  We will notify you of the test results once they become available.  We will also notify you of any treatment plan changes considered necessary based on those results.  If any of the results come back as positive, you should contact all of your current and recent sexual partners so they can get tested and obtain treatment if necessary.    La Nena,    Thank you for trusting [facility] with your care. Your decision to come to us means a lot and we are honored to be part of your healthcare journey. We value your trust and hope your experience with us was positive and met your expectations.    We're always looking for ways to improve, and your feedback is incredibly important to us. You will receive a text or email soon asking you how your visit went. for If you could take a moment to share your thoughts, it would mean the world to us. Your input helps us better serve you and others in the community.     Thank you again for choosing us. We're grateful for the opportunity to care for you and your loved ones. We hope to see you again - though we always wish you health and wellness!    Warm regards,    [facility]    ALFONSO Bejarano and VINCENZO Thomas CMA

## 2025-03-24 NOTE — PROGRESS NOTES
Reviewed:      CONCLUSION:  Left elbow:  1. Increased signal intensity in the ulnar nerve at the level of the cubital tunnel.  Correlate   with clinical symptoms of ulnar neuritis.  2. Preserved elbow articular cartilage. No effusion.  3. Intact elbow ligaments.  4. Intact elbow tendons.    Thank you for the opportunity to provide your interpretation.        Stephanie Richey DO    A: LW 2023 11:45 PM    MRI Upper Extremity W JT WO Contrast      Test Date:  2021     Patient: La Nena Magallanes : 1985 Physician: Boyd Caldwell DO   Sex: Female ID#:   Ref Phys: Rk Arana MD       Patient Complaints:  Patient is a 36 year-old female who presents with numbness tingling in the right hand Patient had right carpal tunnel surgery  and left in .      Patient History / Exam:  PMH: + type 2 diabetes, PCOS + degenerative disk disease. PE:  reflexes trace, + thumb opposition weakness      NCV & EMG Findings:  Evaluation of the left median (APB) motor nerve showed prolonged distal onset latency (4.3 ms).  The left median sensory, the right median sensory, the left ulnar sensory, and the right ulnar sensory nerves showed prolonged distal peak latency (L4.4, R4.1, L4.0, R3.9 ms) and decreased conduction velocity (L32, R34, L35, R36 m/s).  All remaining nerves (as indicated in the following tables) were within normal limits.       All examined muscles (as indicated in the following table) showed no evidence of electrical instability.       Impression:  study is consistent with residual slowing of median sensory distal latencies post carpal tunnel release. Prolonged ulnar sensory responses suggest underlying mild peripheral neuropathy, most likely secondary to diabetes.     Assessment   Impression: .    Encounter Diagnoses   Name Primary?    Neuritis of right ulnar nerve     Cubital tunnel syndrome, right     Abnormal EMG     Cubital tunnel syndrome on left Yes    History of carpal tunnel release of

## 2025-03-25 ENCOUNTER — RESULTS FOLLOW-UP (OUTPATIENT)
Age: 40
End: 2025-03-25

## 2025-03-25 DIAGNOSIS — B37.31 CANDIDAL VAGINITIS: Primary | ICD-10-CM

## 2025-03-25 LAB
BV BACTERIA DNA VAG QL NAA+PROBE: NOT DETECTED
C GLABRATA DNA VAG QL NAA+PROBE: ABNORMAL
C GLABRATA DNA VAG QL NAA+PROBE: NOT DETECTED
C KRUSEI DNA VAG QL NAA+PROBE: NOT DETECTED
C TRACH DNA UR QL NAA+PROBE: NEGATIVE
CANDIDA DNA VAG QL NAA+PROBE: DETECTED
HCV AB SERPL QL IA: NORMAL
HERPES SIMPLEX VIRUS 1 IGG: NEGATIVE
HERPES SIMPLEX VIRUS 2 IGG: NEGATIVE
HIV 1+2 AB+HIV1 P24 AG SERPL QL IA: NORMAL
HIV 2 AB SERPL QL IA: NORMAL
HIV1 AB SERPL QL IA: NORMAL
HIV1 P24 AG SERPL QL IA: NORMAL
N GONORRHOEA DNA UR QL NAA+PROBE: NEGATIVE
REAGIN+T PALLIDUM IGG+IGM SERPL-IMP: NORMAL
T VAGINALIS DNA VAG QL NAA+PROBE: NOT DETECTED

## 2025-03-25 RX ORDER — FLUCONAZOLE 150 MG/1
150 TABLET ORAL
Qty: 2 TABLET | Refills: 0 | Status: SHIPPED | OUTPATIENT
Start: 2025-03-25 | End: 2025-03-31

## 2025-03-25 NOTE — TELEPHONE ENCOUNTER
Pt was called at 3:02pm, pt was notified of current results and prescription sent in, pt was notified we would call back with the rest of the results when we receive them.

## 2025-03-26 ENCOUNTER — OFFICE VISIT (OUTPATIENT)
Dept: ORTHOPEDIC SURGERY | Age: 40
End: 2025-03-26

## 2025-03-26 VITALS — WEIGHT: 143 LBS | HEIGHT: 66 IN | BODY MASS INDEX: 22.98 KG/M2

## 2025-03-26 DIAGNOSIS — G56.21 NEURITIS OF RIGHT ULNAR NERVE: Primary | ICD-10-CM

## 2025-03-26 DIAGNOSIS — G56.21 CUBITAL TUNNEL SYNDROME, RIGHT: ICD-10-CM

## 2025-03-26 RX ORDER — LIDOCAINE HYDROCHLORIDE 10 MG/ML
5 INJECTION, SOLUTION INFILTRATION; PERINEURAL ONCE
Status: COMPLETED | OUTPATIENT
Start: 2025-03-26 | End: 2025-03-26

## 2025-03-26 RX ADMIN — LIDOCAINE HYDROCHLORIDE 5 ML: 10 INJECTION, SOLUTION INFILTRATION; PERINEURAL at 15:48

## 2025-03-26 NOTE — PROGRESS NOTES
3/26/25 9:33 AM    Dextrose Injection 5%      NDC: 2155-6746-14    Lot Number: [id number]    Body Part: R elbow

## 2025-03-26 NOTE — PROGRESS NOTES
Chief Complaint:   Chief Complaint   Patient presents with    Elbow Pain     Right Elbow - Ulnar Nerve hydrodisection today          History of Present Illness:       Patient is a 39 y.o. female returns follow up for the above complaint. The patient was last seen approximately 1 daysago. The symptoms show no change since the last visit. The patient has had no further testing for the problem.      No new injuries no new events she would like to proceed with ulnar nerve hydrodissection at the right elbow as previously discussed    No new injuries no new events    She is scheduled for recall consultation for consideration of elective cubital tunnel release at the left elbow         Past Medical History:        Past Medical History:   Diagnosis Date    Acid reflux     Asthma     as child    Back pain     Bipolar 1 disorder (HCC)     Degenerative disc disease, lumbar     Depression     Diabetes mellitus (HCC)     Fibromyalgia     Hypothyroidism     Liver disease     fatty liver    Obesity     OCD (obsessive compulsive disorder)     PCOS (polycystic ovarian syndrome)     Sleep apnea     no longer uses cpap after weight loss        Present Medications:         Current Outpatient Medications   Medication Sig Dispense Refill    fluconazole (DIFLUCAN) 150 MG tablet Take 1 tablet by mouth every 72 hours for 6 days 2 tablet 0    ketoconazole (NIZORAL) 2 % shampoo USE SHAMPOO 3-4 TIMES A WEEK X1 MONTH THEN USE 1-2X/WEEK THEREAFTER FOR MAINTENANCE - APPLY SHAMPOO TO SCALP 15 MINS BEFORE GETTING IN SHOWE      risperiDONE (RISPERDAL) 2 MG tablet TAKE 1 TABLET BY MOUTH ONCE DAILY FOR 30 DAYS      RISPERIDONE M-TAB PO       MOUNJARO 12.5 MG/0.5ML SOAJ       MOUNJARO 7.5 MG/0.5ML SOAJ       traZODone (DESYREL) 50 MG tablet Take 1 tablet by mouth nightly      nystatin (MYCOSTATIN) 480188 UNIT/GM cream APPLY CREAM TOPICALLY TO AFFECTED AREA TWICE DAILY      lamoTRIgine (LAMICTAL) 150 MG tablet TAKE 1 TABLET BY MOUTH ONCE DAILY IN THE

## 2025-03-27 ENCOUNTER — HOSPITAL ENCOUNTER (OUTPATIENT)
Dept: INTERVENTIONAL RADIOLOGY/VASCULAR | Age: 40
Discharge: HOME | End: 2025-03-29
Attending: INTERNAL MEDICINE
Payer: MEDICAID

## 2025-03-27 DIAGNOSIS — M47.816 LUMBAR SPONDYLOSIS: ICD-10-CM

## 2025-03-27 DIAGNOSIS — M47.816 LUMBAR FACET ARTHROPATHY: ICD-10-CM

## 2025-03-27 PROCEDURE — 2709999900 HC NON-CHARGEABLE SUPPLY

## 2025-03-27 PROCEDURE — 64494 INJ PARAVERT F JNT L/S 2 LEV: CPT

## 2025-03-27 PROCEDURE — 64493 INJ PARAVERT F JNT L/S 1 LEV: CPT

## 2025-03-27 PROCEDURE — 64495 INJ PARAVERT F JNT L/S 3 LEV: CPT

## 2025-03-27 PROCEDURE — 6360000002 HC RX W HCPCS: Performed by: RADIOLOGY

## 2025-03-27 RX ORDER — BUPIVACAINE HYDROCHLORIDE 5 MG/ML
INJECTION, SOLUTION EPIDURAL; INTRACAUDAL; PERINEURAL PRN
Status: COMPLETED | OUTPATIENT
Start: 2025-03-27 | End: 2025-03-27

## 2025-03-27 RX ORDER — LIDOCAINE HYDROCHLORIDE 10 MG/ML
INJECTION, SOLUTION EPIDURAL; INFILTRATION; INTRACAUDAL; PERINEURAL PRN
Status: COMPLETED | OUTPATIENT
Start: 2025-03-27 | End: 2025-03-27

## 2025-03-27 RX ADMIN — LIDOCAINE HYDROCHLORIDE 5 ML: 10 INJECTION, SOLUTION EPIDURAL; INFILTRATION; INTRACAUDAL; PERINEURAL at 10:34

## 2025-03-27 RX ADMIN — BUPIVACAINE HYDROCHLORIDE 15 ML: 5 INJECTION, SOLUTION EPIDURAL; INTRACAUDAL at 10:34

## 2025-03-29 SDOH — HEALTH STABILITY: PHYSICAL HEALTH: ON AVERAGE, HOW MANY DAYS PER WEEK DO YOU ENGAGE IN MODERATE TO STRENUOUS EXERCISE (LIKE A BRISK WALK)?: 2 DAYS

## 2025-03-29 SDOH — HEALTH STABILITY: PHYSICAL HEALTH: ON AVERAGE, HOW MANY MINUTES DO YOU ENGAGE IN EXERCISE AT THIS LEVEL?: 20 MIN

## 2025-03-30 ENCOUNTER — PATIENT MESSAGE (OUTPATIENT)
Age: 40
End: 2025-03-30

## 2025-03-30 DIAGNOSIS — A68.9 RECURRENT FEVER: ICD-10-CM

## 2025-03-30 DIAGNOSIS — R53.83 OTHER FATIGUE: Primary | ICD-10-CM

## 2025-03-30 DIAGNOSIS — R41.89 SUBJECTIVE COGNITIVE IMPAIRMENT: ICD-10-CM

## 2025-03-30 DIAGNOSIS — R13.10 DYSPHAGIA, UNSPECIFIED TYPE: ICD-10-CM

## 2025-04-01 ENCOUNTER — OFFICE VISIT (OUTPATIENT)
Dept: ORTHOPEDIC SURGERY | Age: 40
End: 2025-04-01
Payer: MEDICAID

## 2025-04-01 ENCOUNTER — OFFICE VISIT (OUTPATIENT)
Dept: ORTHOPEDIC SURGERY | Age: 40
End: 2025-04-01

## 2025-04-01 VITALS — BODY MASS INDEX: 22.98 KG/M2 | WEIGHT: 143 LBS | HEIGHT: 66 IN

## 2025-04-01 DIAGNOSIS — Z90.3 HISTORY OF SLEEVE GASTRECTOMY: ICD-10-CM

## 2025-04-01 DIAGNOSIS — M47.816 LUMBAR FACET ARTHROPATHY: Primary | ICD-10-CM

## 2025-04-01 DIAGNOSIS — G56.21 CUBITAL TUNNEL SYNDROME ON RIGHT: Primary | ICD-10-CM

## 2025-04-01 DIAGNOSIS — M47.816 LUMBAR SPONDYLOSIS: ICD-10-CM

## 2025-04-01 DIAGNOSIS — G56.22 CUBITAL TUNNEL SYNDROME ON LEFT: ICD-10-CM

## 2025-04-01 PROCEDURE — G8420 CALC BMI NORM PARAMETERS: HCPCS | Performed by: INTERNAL MEDICINE

## 2025-04-01 PROCEDURE — G8428 CUR MEDS NOT DOCUMENT: HCPCS | Performed by: INTERNAL MEDICINE

## 2025-04-01 PROCEDURE — 1036F TOBACCO NON-USER: CPT | Performed by: INTERNAL MEDICINE

## 2025-04-01 PROCEDURE — 99214 OFFICE O/P EST MOD 30 MIN: CPT | Performed by: INTERNAL MEDICINE

## 2025-04-01 NOTE — PROGRESS NOTES
Hand, Upper Extremity and Reconstructive Surgery                Ute Walsh MD                                             History of Present Illness  39-year-old female presenting with bilateral elbow pain and hand numbness.  Patient reports problems with her elbows over the last several years.  She has been previously evaluated by Dr. Sarkar who has done ulnar nerve hydrodissection on bilateral elbows.  She received about 3 weeks of benefit when this was on the left side and her right side was recently performed.  She reports paresthesias in the small and ring fingers and associated elbow pain.  She has been wearing elbow cushion when she puts her weight on her elbow.  She had an EMG performed 2021.  She had history of bilateral carpal tunnel releases with improvement of her symptoms.    The patient has a diagnosis of type 2 diabetes mellitus, with last A1c of 4.9 on 2/20/2025.  She is hypothyroid on levothyroxine.  Has a history of right forearm and arm fractures as a child which was managed conservatively.     Current Outpatient Medications   Medication Instructions    buPROPion (WELLBUTRIN SR) 150 MG extended release tablet TAKE 1 TABLET EVERY DAY BY ORAL ROUTE IN THE MORNING FOR 30 DAYS.    busPIRone (BUSPAR) 10 MG tablet 1 tablet, 3 TIMES DAILY    busPIRone (BUSPAR) 15 mg, 3 TIMES DAILY    celecoxib (CELEBREX) 100 mg, Oral, DAILY PRN    Cholecalciferol (VITAMIN D PO) DAILY    Continuous Glucose Sensor (FREESTYLE DENI 3 SENSOR) MISC APPLY 1 EACH EVERY 14 DAYS.    diclofenac sodium (VOLTAREN) 4 g, Topical, 4 TIMES DAILY    famotidine (PEPCID) 20 mg, Oral, 2 TIMES DAILY    Ferrous Sulfate (IRON PO) DAILY    ketoconazole (NIZORAL) 2 % shampoo USE SHAMPOO 3-4 TIMES A WEEK X1 MONTH THEN USE 1-2X/WEEK THEREAFTER FOR MAINTENANCE - APPLY SHAMPOO TO SCALP 15 MINS BEFORE GETTING IN SHOWE    lamoTRIgine (LAMICTAL) 150 MG tablet TAKE 1 TABLET BY MOUTH ONCE DAILY IN THE MORNING

## 2025-04-01 NOTE — PROGRESS NOTES
BY MOUTH ONCE DAILY IN THE MORNING FOR DEPRESSION FOR 30 DAYS      vitamin D (CHOLECALCIFEROL) 50 MCG (2000 UT) TABS tablet       busPIRone (BUSPAR) 15 MG tablet Take 15 mg by mouth 3 times daily      famotidine (PEPCID) 20 MG tablet Take 1 tablet by mouth 2 times daily 60 tablet 0    propranolol (INDERAL LA) 60 MG extended release capsule Take 1 capsule by mouth daily Maintenance dose: Start after completing propranolol 10mg TID starting dose. 30 capsule 11    celecoxib (CELEBREX) 100 MG capsule Take 1 capsule by mouth daily as needed for Pain 30 capsule 1    pantoprazole (PROTONIX) 20 MG tablet Take 1 tablet by mouth every morning (before breakfast) While using Celebrex 30 tablet 1    tiZANidine (ZANAFLEX) 4 MG tablet Take 1 tablet by mouth 3 times daily as needed (Muscle tightness/spasm) 40 tablet 1    Ferrous Sulfate (IRON PO) Take by mouth daily      buPROPion (WELLBUTRIN SR) 150 MG extended release tablet TAKE 1 TABLET EVERY DAY BY ORAL ROUTE IN THE MORNING FOR 30 DAYS.      Continuous Glucose Sensor (FREESTYLE DENI 3 SENSOR) MISC APPLY 1 EACH EVERY 14 DAYS.      lamoTRIgine (LAMICTAL) 100 MG tablet Take 1 tablet by mouth daily      MOUNJARO 10 MG/0.5ML SOPN SC injection Inject 0.5 mLs into the skin once a week      traZODone (DESYREL) 150 MG tablet Take 1 tablet by mouth nightly      diclofenac sodium (VOLTAREN) 1 % GEL APPLY 4 G TOPICALLY 4 TIMES DAILY 100 g 3    busPIRone (BUSPAR) 10 MG tablet Take 1 tablet by mouth 3 times daily      potassium chloride (KLOR-CON M) 20 MEQ extended release tablet Take 1 tablet by mouth daily      rOPINIRole (REQUIP) 0.25 MG tablet Take 3 tablets by mouth nightly      nystatin (MYCOSTATIN) 347501 UNIT/GM powder as needed       loratadine (CLARITIN) 10 MG tablet Take 1 tablet by mouth daily      Multiple Vitamins-Minerals (BARIATRIC FUSION) CHEW Take 2 tablets by mouth daily       levothyroxine (SYNTHROID) 75 MCG tablet TAKE 1 TABLET BY MOUTH EVERY DAY  5    Cholecalciferol

## 2025-04-08 NOTE — TELEPHONE ENCOUNTER
I am referring patient to speech therapy and order a modified barium swallow to assess her experience of dysphagia.  There is no evidence of autoimmune disease on her workup for possible small fiber neuropathy.  There is no clear evidence that she has an autoimmune disease causing her neurological symptoms or symptoms otherwise.  Placing referral to rheumatology given her intent to be evaluated for lupus.

## 2025-04-08 NOTE — TELEPHONE ENCOUNTER
Patient aware of referrals and phone numbers provided.    She voiced understanding and had no questions

## 2025-04-09 ENCOUNTER — TELEPHONE (OUTPATIENT)
Dept: ORTHOPEDIC SURGERY | Age: 40
End: 2025-04-09

## 2025-04-09 ENCOUNTER — PATIENT MESSAGE (OUTPATIENT)
Dept: NEUROLOGY | Age: 40
End: 2025-04-09

## 2025-04-09 DIAGNOSIS — A68.9 RECURRENT FEVER: Primary | ICD-10-CM

## 2025-04-09 DIAGNOSIS — R53.83 OTHER FATIGUE: ICD-10-CM

## 2025-04-09 NOTE — TELEPHONE ENCOUNTER
----- Message from Miracle JOHNSON sent at 4/9/2025 10:51 AM EDT -----  Regarding: Specialty Referral Request  Specialty Referral Request    Reason for referral request: Specialty Provider    Specialist/Lab/Test/DME Item patient is requesting (if known): PATIENT SAYS SHE NEED A RFA RADIO FREQUENCY ABLATION REFERRAL TO [facility] FOR LUMBAR    Specialist Phone Number (if applicable):    Additional Information: PATIENT TRIED TO SCHEDULE THIS TEST AND SAYS THEY DID NOT RECEIVE IT. SAYS SHE WAS SUPPOSE TO BE REFERRED BY DR HAMMOND  --------------------------------------------------------------------------------------------------------------------------    Relationship to Patient: Self    Call Back Info: OK to leave message on voicemail  Preferred Call Back Number: Phone [phone]

## 2025-04-10 ENCOUNTER — HOSPITAL ENCOUNTER (OUTPATIENT)
Dept: GENERAL RADIOLOGY | Age: 40
Discharge: HOME | End: 2025-04-10
Attending: STUDENT IN AN ORGANIZED HEALTH CARE EDUCATION/TRAINING PROGRAM
Payer: MEDICAID

## 2025-04-10 ENCOUNTER — HOSPITAL ENCOUNTER (OUTPATIENT)
Dept: SPEECH THERAPY | Age: 40
Setting detail: THERAPIES SERIES
Discharge: HOME | End: 2025-04-10
Attending: STUDENT IN AN ORGANIZED HEALTH CARE EDUCATION/TRAINING PROGRAM

## 2025-04-10 DIAGNOSIS — R13.10 DYSPHAGIA, UNSPECIFIED TYPE: ICD-10-CM

## 2025-04-10 PROCEDURE — 92611 MOTION FLUOROSCOPY/SWALLOW: CPT

## 2025-04-10 PROCEDURE — 74230 X-RAY XM SWLNG FUNCJ C+: CPT

## 2025-04-10 NOTE — PROCEDURES
unit      Signature:  Columba Morgan M.A. SLP  Speech-Language Pathologist  [place] Lic #[id number]  [phone]

## 2025-04-12 ENCOUNTER — HOSPITAL ENCOUNTER (EMERGENCY)
Age: 40
Discharge: HOME | End: 2025-04-12
Payer: MEDICAID

## 2025-04-12 ENCOUNTER — APPOINTMENT (OUTPATIENT)
Dept: GENERAL RADIOLOGY | Age: 40
End: 2025-04-12
Payer: MEDICAID

## 2025-04-12 ENCOUNTER — OFFICE VISIT (OUTPATIENT)
Age: 40
End: 2025-04-12

## 2025-04-12 VITALS
RESPIRATION RATE: 19 BRPM | OXYGEN SATURATION: 100 % | BODY MASS INDEX: 22.92 KG/M2 | HEART RATE: 73 BPM | HEIGHT: 66 IN | DIASTOLIC BLOOD PRESSURE: 62 MMHG | SYSTOLIC BLOOD PRESSURE: 93 MMHG | WEIGHT: 142.6 LBS | TEMPERATURE: 98.4 F

## 2025-04-12 VITALS
TEMPERATURE: 98.2 F | DIASTOLIC BLOOD PRESSURE: 65 MMHG | OXYGEN SATURATION: 95 % | SYSTOLIC BLOOD PRESSURE: 92 MMHG | HEART RATE: 92 BPM

## 2025-04-12 DIAGNOSIS — R07.89 CHEST WALL PAIN: Primary | ICD-10-CM

## 2025-04-12 DIAGNOSIS — R07.9 CHEST PAIN, UNSPECIFIED TYPE: Primary | ICD-10-CM

## 2025-04-12 PROBLEM — D72.819 LEUKOPENIA: Status: ACTIVE | Noted: 2025-04-12

## 2025-04-12 PROBLEM — N92.1 MENORRHAGIA WITH IRREGULAR CYCLE: Status: ACTIVE | Noted: 2020-04-29

## 2025-04-12 LAB
ALBUMIN SERPL-MCNC: 3.9 G/DL (ref 3.4–5)
ALBUMIN/GLOB SERPL: 1.4 {RATIO} (ref 1.1–2.2)
ALP SERPL-CCNC: 75 U/L (ref 40–129)
ALT SERPL-CCNC: 40 U/L (ref 10–40)
ANION GAP SERPL CALCULATED.3IONS-SCNC: 9 MMOL/L (ref 3–16)
AST SERPL-CCNC: 43 U/L (ref 15–37)
BASOPHILS # BLD: 0 K/UL (ref 0–0.2)
BASOPHILS NFR BLD: 0.7 %
BILIRUB SERPL-MCNC: 0.5 MG/DL (ref 0–1)
BUN SERPL-MCNC: 6 MG/DL (ref 7–20)
CALCIUM SERPL-MCNC: 9.1 MG/DL (ref 8.3–10.6)
CHLORIDE SERPL-SCNC: 103 MMOL/L (ref 99–110)
CO2 SERPL-SCNC: 27 MMOL/L (ref 21–32)
CREAT SERPL-MCNC: 0.7 MG/DL (ref 0.6–1.1)
D-DIMER QUANTITATIVE: <0.27 UG/ML FEU (ref 0–0.6)
DEPRECATED RDW RBC AUTO: 13.2 % (ref 12.4–15.4)
EOSINOPHIL # BLD: 0.1 K/UL (ref 0–0.6)
EOSINOPHIL NFR BLD: 2.3 %
GFR SERPLBLD CREATININE-BSD FMLA CKD-EPI: >90 ML/MIN/{1.73_M2}
GLUCOSE SERPL-MCNC: 74 MG/DL (ref 70–99)
HCT VFR BLD AUTO: 37.9 % (ref 36–48)
HGB BLD-MCNC: 12.7 G/DL (ref 12–16)
LYMPHOCYTES # BLD: 1.2 K/UL (ref 1–5.1)
LYMPHOCYTES NFR BLD: 46.7 %
MCH RBC QN AUTO: 32.5 PG (ref 26–34)
MCHC RBC AUTO-ENTMCNC: 33.6 G/DL (ref 31–36)
MCV RBC AUTO: 96.9 FL (ref 80–100)
MONOCYTES # BLD: 0.4 K/UL (ref 0–1.3)
MONOCYTES NFR BLD: 14.7 %
NEUTROPHILS # BLD: 1 K/UL (ref 1.7–7.7)
NEUTROPHILS NFR BLD: 35.6 %
PLATELET # BLD AUTO: 152 K/UL (ref 135–450)
PMV BLD AUTO: 10 FL (ref 5–10.5)
POTASSIUM SERPL-SCNC: 4.4 MMOL/L (ref 3.5–5.1)
PROT SERPL-MCNC: 6.6 G/DL (ref 6.4–8.2)
RBC # BLD AUTO: 3.91 M/UL (ref 4–5.2)
SODIUM SERPL-SCNC: 139 MMOL/L (ref 136–145)
TROPONIN, HIGH SENSITIVITY: <6 NG/L (ref 0–14)
WBC # BLD AUTO: 2.7 K/UL (ref 4–11)

## 2025-04-12 PROCEDURE — 84484 ASSAY OF TROPONIN QUANT: CPT

## 2025-04-12 PROCEDURE — 80053 COMPREHEN METABOLIC PANEL: CPT

## 2025-04-12 PROCEDURE — 93005 ELECTROCARDIOGRAM TRACING: CPT | Performed by: STUDENT IN AN ORGANIZED HEALTH CARE EDUCATION/TRAINING PROGRAM

## 2025-04-12 PROCEDURE — 71045 X-RAY EXAM CHEST 1 VIEW: CPT

## 2025-04-12 PROCEDURE — 85025 COMPLETE CBC W/AUTO DIFF WBC: CPT

## 2025-04-12 PROCEDURE — 85379 FIBRIN DEGRADATION QUANT: CPT

## 2025-04-12 PROCEDURE — 99285 EMERGENCY DEPT VISIT HI MDM: CPT

## 2025-04-12 RX ORDER — METHOCARBAMOL 750 MG/1
750 TABLET, FILM COATED ORAL 4 TIMES DAILY
Qty: 28 TABLET | Refills: 0 | Status: SHIPPED | OUTPATIENT
Start: 2025-04-12 | End: 2025-04-19

## 2025-04-12 ASSESSMENT — ENCOUNTER SYMPTOMS
HEMOPTYSIS: 0
SHORTNESS OF BREATH: 1
SPUTUM PRODUCTION: 0
ORTHOPNEA: 0
VOMITING: 0
NAUSEA: 0
ABDOMINAL PAIN: 0
BACK PAIN: 0
COUGH: 0

## 2025-04-12 ASSESSMENT — PAIN - FUNCTIONAL ASSESSMENT: PAIN_FUNCTIONAL_ASSESSMENT: 0-10

## 2025-04-12 ASSESSMENT — PAIN SCALES - GENERAL: PAINLEVEL_OUTOF10: 5

## 2025-04-12 NOTE — PROGRESS NOTES
La Nena Magallanes (: 1985) is a 39 y.o. female, here for evaluation of the following chief complaint(s): chest discomfort  (Tight/heavy right in center of chest, no wheezing or cough started about 3 days ago, brain fog, fatigue, did have sore throat but not currently, confusion /) and Care Coordination (Chest tightness/heaviness with SOB. Pain is pleuritic in nature. Felt pain in R leg 2-3 days ago, now resolved. No leg swelling. No palpitations.)    La Nena Magallanes is a: Established patient.   Last Urgent Care Visit: 3/24/2025  I have reviewed the patient's medications and basic medical history; see Medication Reconciliation.    ASSESSMENT/PLAN:    ICD-10-CM    1. Chest pain, unspecified type  R07.9 EKG 12 Lead          Ddx: Differential Diagnosis: Acute bronchitis, Musculoskeletal chest pain, Pleurisy, Pulmonary edema, Congestive Heart Failure, Myocardial Infarction, Pulmonary Embolus, Thoracic Dissection, Pericarditis, Pericardial Effusion, Pneumonia, Pneumothorax, Boerhaave's syndrome, Ischemic Bowel, Bowel Obstruction, PUD, GERD, Acute Cholecystitis, Pancreatitis, Hepatitis, Colitis, other  Due to the complexity of patient's concerns, medical risks, and high potential risk for serious complications, I recommend the patient be evaluated in the emergency room today. The risks of not receiving care in the emergency room have been explained to the patient, including morbidity and mortality. The patient verbalized understanding  Patient is AAOx3 with normal mental status and adequate capacity to make medical decisions.There is no medical condition that prevents or inhibits their understanding of the risks/benefits of their decision. Opportunities to ask questions about medical condition were provided  Attempt to enlist the patient's family support to safely transfer the patient to emergency room? YES  Patient agree with plan? YES  Urgent Care Disposition: Emergency room transport via private vehicle. Report

## 2025-04-13 DIAGNOSIS — R13.10 DYSPHAGIA, UNSPECIFIED TYPE: Primary | ICD-10-CM

## 2025-04-13 DIAGNOSIS — K21.9 GASTROESOPHAGEAL REFLUX DISEASE, UNSPECIFIED WHETHER ESOPHAGITIS PRESENT: ICD-10-CM

## 2025-04-13 DIAGNOSIS — R12 HEART BURN: ICD-10-CM

## 2025-04-13 LAB
EKG ATRIAL RATE: 66 BPM
EKG DIAGNOSIS: NORMAL
EKG P AXIS: 36 DEGREES
EKG P-R INTERVAL: 138 MS
EKG Q-T INTERVAL: 388 MS
EKG QRS DURATION: 80 MS
EKG QTC CALCULATION (BAZETT): 406 MS
EKG R AXIS: 42 DEGREES
EKG T AXIS: 26 DEGREES
EKG VENTRICULAR RATE: 66 BPM

## 2025-04-13 PROCEDURE — 93010 ELECTROCARDIOGRAM REPORT: CPT | Performed by: INTERNAL MEDICINE

## 2025-04-13 NOTE — ED PROVIDER NOTES
[facility] EMERGENCY DEPARTMENT  EMERGENCY DEPARTMENT ENCOUNTER        Pt Name: La Nena Magallanes  MRN: [id number]  Birthdate 1985  Date of evaluation: 4/12/2025  Provider: ANAY Burt Jr  PCP: Ria Layne APRN - NP  Note Started: 10:12 PM EDT 4/12/25      SUKHDEV. I have evaluated this patient.        CHIEF COMPLAINT       Chief Complaint   Patient presents with    Chest Pain     Chest pain that started 2 days ago - was seen at urgent care and told possible blood clot in the lungs.  Thinks she is possibly dehydrated.  Also says she has a history of POTS       HISTORY OF PRESENT ILLNESS: 1 or more Elements     History from : Patient    Limitations to history : None    La Nena Magallanes is a 39 y.o. female who presents with plaints of chest discomfort has been going on for a couple of days.  She went to an urgent care and was told that she needed to go to the emergency department to be evaluated for a pulmonary embolism.  States that they were concerned because she had some pain in her right calf a couple days prior to this starting.  She denies any recent surgeries traumas or traveling.  She has never had any blood clots in the past.  She is not on any anticoagulation medications.  States she is following with [facility] for white blood cell count, low red blood cell count, low platelets.  She did have a bone marrow biopsy done recently however, surgery.  States she has not significantly short of breath.  She did have a family member that was sick with similar symptoms however, they are also had a cough that was associated but she is not have a cough.  She has no other complaints at this time.  Review of systems otherwise negative.    Nursing Notes were all reviewed and agreed with or any disagreements were addressed in the HPI.      SURGICAL HISTORY     Past Surgical History:   Procedure Laterality Date    ANKLE SURGERY Left     Scar tissue removal    CARPAL TUNNEL RELEASE Right 08/23/2021

## 2025-04-14 ENCOUNTER — TELEPHONE (OUTPATIENT)
Dept: NEUROLOGY | Age: 40
End: 2025-04-14

## 2025-04-14 NOTE — TELEPHONE ENCOUNTER
----- Message from Dr. Amaury Perea MD sent at 4/13/2025  8:10 PM EDT -----  Please notify patient of referral to Dr. Pedro ALEJANDRO per speech therapy recommendations after swallow study. Minimal signs of dysphagia on study that are within normal limits. Speech therapy recommended referral to GI for symptoms suggestive of gastroesophageal reflux disease.

## 2025-04-14 NOTE — TELEPHONE ENCOUNTER
Left message on voicemail about referral and sent a My Chart message as well, instructed patient to reach out if she has any questions.

## 2025-04-15 ENCOUNTER — OFFICE VISIT (OUTPATIENT)
Dept: ORTHOPEDIC SURGERY | Age: 40
End: 2025-04-15
Payer: MEDICAID

## 2025-04-15 ENCOUNTER — PREP FOR PROCEDURE (OUTPATIENT)
Dept: ORTHOPEDIC SURGERY | Age: 40
End: 2025-04-15

## 2025-04-15 VITALS — WEIGHT: 142 LBS | HEIGHT: 66 IN | BODY MASS INDEX: 22.82 KG/M2

## 2025-04-15 DIAGNOSIS — G56.22 CUBITAL TUNNEL SYNDROME ON LEFT: ICD-10-CM

## 2025-04-15 DIAGNOSIS — M25.522 LEFT ELBOW PAIN: Primary | ICD-10-CM

## 2025-04-15 PROCEDURE — G8420 CALC BMI NORM PARAMETERS: HCPCS | Performed by: STUDENT IN AN ORGANIZED HEALTH CARE EDUCATION/TRAINING PROGRAM

## 2025-04-15 PROCEDURE — 1036F TOBACCO NON-USER: CPT | Performed by: STUDENT IN AN ORGANIZED HEALTH CARE EDUCATION/TRAINING PROGRAM

## 2025-04-15 PROCEDURE — 99214 OFFICE O/P EST MOD 30 MIN: CPT | Performed by: STUDENT IN AN ORGANIZED HEALTH CARE EDUCATION/TRAINING PROGRAM

## 2025-04-15 PROCEDURE — G8427 DOCREV CUR MEDS BY ELIG CLIN: HCPCS | Performed by: STUDENT IN AN ORGANIZED HEALTH CARE EDUCATION/TRAINING PROGRAM

## 2025-04-15 NOTE — PROGRESS NOTES
support improvement of the AI technology. The patient (or guardian, if applicable) and other individuals in attendance at the appointment consented to the use of AI, including the recording.

## 2025-04-19 ENCOUNTER — PATIENT MESSAGE (OUTPATIENT)
Dept: NEUROLOGY | Age: 40
End: 2025-04-19

## 2025-04-22 ENCOUNTER — OFFICE VISIT (OUTPATIENT)
Dept: ORTHOPEDIC SURGERY | Age: 40
End: 2025-04-22
Payer: MEDICAID

## 2025-04-22 VITALS — BODY MASS INDEX: 22.82 KG/M2 | WEIGHT: 142 LBS | HEIGHT: 66 IN

## 2025-04-22 DIAGNOSIS — G89.29 CHRONIC PAIN OF RIGHT KNEE: ICD-10-CM

## 2025-04-22 DIAGNOSIS — M25.561 CHRONIC PAIN OF RIGHT KNEE: ICD-10-CM

## 2025-04-22 DIAGNOSIS — M84.363A STRESS FRACTURE OF RIGHT FIBULA, INITIAL ENCOUNTER: ICD-10-CM

## 2025-04-22 DIAGNOSIS — S86.911A KNEE STRAIN, RIGHT, INITIAL ENCOUNTER: ICD-10-CM

## 2025-04-22 DIAGNOSIS — M79.661 PAIN OF RIGHT CALF: Primary | ICD-10-CM

## 2025-04-22 PROCEDURE — G8427 DOCREV CUR MEDS BY ELIG CLIN: HCPCS | Performed by: INTERNAL MEDICINE

## 2025-04-22 PROCEDURE — 99214 OFFICE O/P EST MOD 30 MIN: CPT | Performed by: INTERNAL MEDICINE

## 2025-04-22 PROCEDURE — 1036F TOBACCO NON-USER: CPT | Performed by: INTERNAL MEDICINE

## 2025-04-22 PROCEDURE — G8420 CALC BMI NORM PARAMETERS: HCPCS | Performed by: INTERNAL MEDICINE

## 2025-04-22 NOTE — PROGRESS NOTES
Chief Complaint:   Chief Complaint   Patient presents with    Knee Pain     Right Knee - She is having burning in both knees but it's worse in the right knee. Pain and tightness in right calf for about 2 weeks. Some popping. Difficult to get up from the floor and knee gives out when she does. Pain is worse at night.          History of Present Illness:       Patient is a 39 y.o. female presents with the above complaint. The symptoms began 2 weeksago and started without an injury.majority of pain localized to the small lateral foreleg region.  The patient describes a tightness pain that does not radiate.  The symptoms are intermittent  and are are worsening since the onset.       Pain localizes to the proximal lateral foreleg and does not seem to follow a typical patellofemoral provacative pattern.  There are not mechanical symptoms that suggest meniscal injury.  The patient denies subjective instability about the knee and denies new onset weakness of the lower extremity.    Pain level 3    The patient denies a pattern of activity related swelling.      Treatment to date: none.     There is no prior history of knee trauma. Workup has included  none    There is no prior history of autoimmune disease, inflammatory arthropathy, or crystal arthropathy.            Past Medical History:        Past Medical History:   Diagnosis Date    Acid reflux     Asthma     as child    Back pain     Bipolar 1 disorder (HCC)     Degenerative disc disease, lumbar     Depression     Diabetes mellitus (HCC)     Fibromyalgia     Hypothyroidism     Liver disease     fatty liver    Obesity     OCD (obsessive compulsive disorder)     PCOS (polycystic ovarian syndrome)     Sleep apnea     no longer uses cpap after weight loss         Past Surgical History:   Procedure Laterality Date    ANKLE SURGERY Left     Scar tissue removal    CARPAL TUNNEL RELEASE Right 08/23/2021    RIGHT CARPAL TUNNEL RELEASE performed by Rk Arana MD at [facility]

## 2025-04-25 ENCOUNTER — ANESTHESIA EVENT (OUTPATIENT)
Dept: OPERATING ROOM | Age: 40
End: 2025-04-25
Payer: MEDICAID

## 2025-04-28 ENCOUNTER — TRANSCRIBE ORDERS (OUTPATIENT)
Dept: ADMINISTRATIVE | Age: 40
End: 2025-04-28

## 2025-04-28 DIAGNOSIS — D72.818 OTHER DECREASED WHITE BLOOD CELL (WBC) COUNT: Primary | ICD-10-CM

## 2025-04-29 ENCOUNTER — HOSPITAL ENCOUNTER (OUTPATIENT)
Dept: ULTRASOUND IMAGING | Age: 40
Discharge: HOME | End: 2025-04-29
Attending: INTERNAL MEDICINE
Payer: MEDICAID

## 2025-04-29 DIAGNOSIS — D72.818 OTHER DECREASED WHITE BLOOD CELL (WBC) COUNT: ICD-10-CM

## 2025-04-29 PROCEDURE — 76705 ECHO EXAM OF ABDOMEN: CPT

## 2025-04-29 NOTE — PROGRESS NOTES
Date and time of surgery :  05/01/2025 0830            Arrival Time:  0630     Bring Picture ID and insurance card.  Please wear simple, loose fitting clothing to the hospital.   Do not bring valuables (money, credit cards, checkbooks, etc.)   Do not wear any makeup (including  eye makeup) and no nail polish or artificial nails on your fingers or toes.  DO NOT wear any jewelry or piercings on day of surgery.  All body piercing jewelry must be removed.  If you have dentures, they will be removed before going to the OR; we will provide you a container.  If you wear contact lenses or glasses, they will be removed; please bring a case for them.  Shower the evening before or morning of surgery with antibacterial soap.  Nothing to eat or drink after midnight the day before surgery.   You may brush your teeth and gargle the morning of surgery.  DO NOT SWALLOW WATER.   Take Levothyroxine and Propanolol morning of surgery.  Last dose of Mounjaro 04/22/2025  Aspirin, Ibuprofen, Advil, Naproxen, Vitamin E and other Anti-inflammatory products and supplements should be stopped for 5 -7days before surgery or as directed by your physician.  Do not smoke or drink any alcoholic beverages 24 hours prior to surgery.  This includes NA Beer. Refrain from the usage of any recreational drugs, including non-prescribed prescription drugs.   You MUST plan for a responsible adult to stay on site while you are here and take you home after your surgery. You will not be allowed to leave alone or drive yourself home. It is strongly suggested someone stay with you the first 24 hrs. Your surgery will be cancelled if you do not have a ride home.  To help prevent infection, change your sheets the night before surgery.   If you  have a Living Will and Durable Power of  for Healthcare, please bring in a copy.  Notify your Surgeon if you develop any illness between now and time of surgery. Cough, cold, fever, sore throat, nausea, vomiting,

## 2025-04-30 ENCOUNTER — TRANSCRIBE ORDERS (OUTPATIENT)
Dept: ADMINISTRATIVE | Age: 40
End: 2025-04-30

## 2025-04-30 DIAGNOSIS — D72.819 LEUKOPENIA, UNSPECIFIED TYPE: Primary | ICD-10-CM

## 2025-04-30 NOTE — ANESTHESIA PRE PROCEDURE
Department of Anesthesiology  Preprocedure Note       Name:  La Nena Magallanes   Age:  39 y.o.  :  1985                                          MRN:  [id number]         Date:  2025      Surgeon: Surgeon(s):  Ute Walsh MD    Procedure: Procedure(s):  LEFT CUBITAL TUNNEL RELEASE WITH POSSIBLE ANTERIOR TRANSPOSITION NOTE: BLOCK    Medications prior to admission:   Prior to Admission medications    Medication Sig Start Date End Date Taking? Authorizing Provider   ketoconazole (NIZORAL) 2 % shampoo USE SHAMPOO 3-4 TIMES A WEEK X1 MONTH THEN USE 1-2X/WEEK THEREAFTER FOR MAINTENANCE - APPLY SHAMPOO TO SCALP 15 MINS BEFORE GETTING IN SHOWE 25   Karmen Ayon MD   risperiDONE (RISPERDAL) 2 MG tablet TAKE 1 TABLET BY MOUTH ONCE DAILY FOR 30 DAYS    Karmen Ayon MD   RISPERIDONE M-TAB PO     Karmen Ayon MD MOUNJARO 12.5 MG/0.5ML SOAJ  25   Provider, Historical, MD   MOUNJARO 7.5 MG/0.5ML SOAJ  3/5/25   Karmen Ayon MD   nystatin (MYCOSTATIN) 875710 UNIT/GM cream APPLY CREAM TOPICALLY TO AFFECTED AREA TWICE DAILY 25   Karmen Ayon MD   lamoTRIgine (LAMICTAL) 150 MG tablet TAKE 1 TABLET BY MOUTH ONCE DAILY IN THE MORNING FOR DEPRESSION FOR 30 DAYS 3/7/25   Karmen Ayon MD   vitamin D (CHOLECALCIFEROL) 50 MCG (2000 UT) TABS tablet  3/17/25   Karmen Ayon MD   busPIRone (BUSPAR) 15 MG tablet Take 15 mg by mouth 3 times daily 3/7/25   Karmen Ayon MD   propranolol (INDERAL LA) 60 MG extended release capsule Take 1 capsule by mouth daily Maintenance dose: Start after completing propranolol 10mg TID starting dose. 2/10/25 2/10/26  Amaury Perea MD   Ferrous Sulfate (IRON PO) Take by mouth daily    Karmen Ayon MD   buPROPion (WELLBUTRIN SR) 150 MG extended release tablet TAKE 1 TABLET EVERY DAY BY ORAL ROUTE IN THE MORNING FOR 30 DAYS.    Karmen Ayon MD   Continuous Glucose Sensor (FREESTYLE DENI 3

## 2025-05-01 ENCOUNTER — HOSPITAL ENCOUNTER (OUTPATIENT)
Age: 40
Setting detail: OUTPATIENT SURGERY
Discharge: HOME | End: 2025-05-01
Attending: STUDENT IN AN ORGANIZED HEALTH CARE EDUCATION/TRAINING PROGRAM | Admitting: STUDENT IN AN ORGANIZED HEALTH CARE EDUCATION/TRAINING PROGRAM
Payer: MEDICAID

## 2025-05-01 ENCOUNTER — ANESTHESIA (OUTPATIENT)
Dept: OPERATING ROOM | Age: 40
End: 2025-05-01
Payer: MEDICAID

## 2025-05-01 VITALS
BODY MASS INDEX: 22.76 KG/M2 | OXYGEN SATURATION: 95 % | DIASTOLIC BLOOD PRESSURE: 65 MMHG | RESPIRATION RATE: 16 BRPM | HEIGHT: 67 IN | HEART RATE: 85 BPM | SYSTOLIC BLOOD PRESSURE: 96 MMHG | WEIGHT: 145 LBS | TEMPERATURE: 97.8 F

## 2025-05-01 DIAGNOSIS — G56.22 CUBITAL TUNNEL SYNDROME ON LEFT: Primary | ICD-10-CM

## 2025-05-01 LAB
GLUCOSE BLD-MCNC: 103 MG/DL (ref 70–99)
GLUCOSE BLD-MCNC: 50 MG/DL (ref 70–99)
GLUCOSE BLD-MCNC: 79 MG/DL (ref 70–99)
PERFORMED ON: ABNORMAL
PERFORMED ON: ABNORMAL
PERFORMED ON: NORMAL

## 2025-05-01 PROCEDURE — 64718 REVISE ULNAR NERVE AT ELBOW: CPT | Performed by: STUDENT IN AN ORGANIZED HEALTH CARE EDUCATION/TRAINING PROGRAM

## 2025-05-01 PROCEDURE — 2580000003 HC RX 258: Performed by: ANESTHESIOLOGY

## 2025-05-01 PROCEDURE — 6360000002 HC RX W HCPCS: Performed by: NURSE ANESTHETIST, CERTIFIED REGISTERED

## 2025-05-01 PROCEDURE — 6360000002 HC RX W HCPCS: Performed by: STUDENT IN AN ORGANIZED HEALTH CARE EDUCATION/TRAINING PROGRAM

## 2025-05-01 PROCEDURE — 2500000003 HC RX 250 WO HCPCS: Performed by: STUDENT IN AN ORGANIZED HEALTH CARE EDUCATION/TRAINING PROGRAM

## 2025-05-01 PROCEDURE — 3600000014 HC SURGERY LEVEL 4 ADDTL 15MIN: Performed by: STUDENT IN AN ORGANIZED HEALTH CARE EDUCATION/TRAINING PROGRAM

## 2025-05-01 PROCEDURE — 3600000004 HC SURGERY LEVEL 4 BASE: Performed by: STUDENT IN AN ORGANIZED HEALTH CARE EDUCATION/TRAINING PROGRAM

## 2025-05-01 PROCEDURE — 3700000000 HC ANESTHESIA ATTENDED CARE: Performed by: STUDENT IN AN ORGANIZED HEALTH CARE EDUCATION/TRAINING PROGRAM

## 2025-05-01 PROCEDURE — 7100000011 HC PHASE II RECOVERY - ADDTL 15 MIN: Performed by: STUDENT IN AN ORGANIZED HEALTH CARE EDUCATION/TRAINING PROGRAM

## 2025-05-01 PROCEDURE — 7100000010 HC PHASE II RECOVERY - FIRST 15 MIN: Performed by: STUDENT IN AN ORGANIZED HEALTH CARE EDUCATION/TRAINING PROGRAM

## 2025-05-01 PROCEDURE — 6370000000 HC RX 637 (ALT 250 FOR IP): Performed by: ANESTHESIOLOGY

## 2025-05-01 PROCEDURE — 64415 NJX AA&/STRD BRCH PLXS IMG: CPT | Performed by: STUDENT IN AN ORGANIZED HEALTH CARE EDUCATION/TRAINING PROGRAM

## 2025-05-01 PROCEDURE — 3700000001 HC ADD 15 MINUTES (ANESTHESIA): Performed by: STUDENT IN AN ORGANIZED HEALTH CARE EDUCATION/TRAINING PROGRAM

## 2025-05-01 PROCEDURE — 2709999900 HC NON-CHARGEABLE SUPPLY: Performed by: STUDENT IN AN ORGANIZED HEALTH CARE EDUCATION/TRAINING PROGRAM

## 2025-05-01 RX ORDER — MEPERIDINE HYDROCHLORIDE 50 MG/ML
12.5 INJECTION INTRAMUSCULAR; INTRAVENOUS; SUBCUTANEOUS EVERY 5 MIN PRN
Refills: 0 | Status: CANCELLED | OUTPATIENT
Start: 2025-05-01

## 2025-05-01 RX ORDER — PHENYLEPHRINE HCL IN 0.9% NACL 1 MG/10 ML
SYRINGE (ML) INTRAVENOUS
Status: DISCONTINUED | OUTPATIENT
Start: 2025-05-01 | End: 2025-05-01 | Stop reason: SDUPTHER

## 2025-05-01 RX ORDER — SODIUM CHLORIDE, SODIUM LACTATE, POTASSIUM CHLORIDE, CALCIUM CHLORIDE 600; 310; 30; 20 MG/100ML; MG/100ML; MG/100ML; MG/100ML
INJECTION, SOLUTION INTRAVENOUS CONTINUOUS
Status: DISCONTINUED | OUTPATIENT
Start: 2025-05-01 | End: 2025-05-01 | Stop reason: HOSPADM

## 2025-05-01 RX ORDER — MIDAZOLAM HYDROCHLORIDE 1 MG/ML
2 INJECTION, SOLUTION INTRAMUSCULAR; INTRAVENOUS
Status: DISCONTINUED | OUTPATIENT
Start: 2025-05-01 | End: 2025-05-01 | Stop reason: HOSPADM

## 2025-05-01 RX ORDER — SODIUM CHLORIDE 9 MG/ML
INJECTION, SOLUTION INTRAVENOUS PRN
Status: CANCELLED | OUTPATIENT
Start: 2025-05-01

## 2025-05-01 RX ORDER — NALOXONE HYDROCHLORIDE 0.4 MG/ML
INJECTION, SOLUTION INTRAMUSCULAR; INTRAVENOUS; SUBCUTANEOUS PRN
Status: CANCELLED | OUTPATIENT
Start: 2025-05-01

## 2025-05-01 RX ORDER — VANCOMYCIN 1 G/200ML
1000 INJECTION, SOLUTION INTRAVENOUS
Status: COMPLETED | OUTPATIENT
Start: 2025-05-01 | End: 2025-05-01

## 2025-05-01 RX ORDER — ONDANSETRON 2 MG/ML
4 INJECTION INTRAMUSCULAR; INTRAVENOUS
Status: CANCELLED | OUTPATIENT
Start: 2025-05-01

## 2025-05-01 RX ORDER — LIDOCAINE HYDROCHLORIDE 10 MG/ML
1 INJECTION, SOLUTION EPIDURAL; INFILTRATION; INTRACAUDAL; PERINEURAL
Status: DISCONTINUED | OUTPATIENT
Start: 2025-05-01 | End: 2025-05-01 | Stop reason: HOSPADM

## 2025-05-01 RX ORDER — DIPHENHYDRAMINE HYDROCHLORIDE 50 MG/ML
12.5 INJECTION, SOLUTION INTRAMUSCULAR; INTRAVENOUS
Status: CANCELLED | OUTPATIENT
Start: 2025-05-01

## 2025-05-01 RX ORDER — PROPOFOL 10 MG/ML
INJECTION, EMULSION INTRAVENOUS
Status: DISCONTINUED | OUTPATIENT
Start: 2025-05-01 | End: 2025-05-01 | Stop reason: SDUPTHER

## 2025-05-01 RX ORDER — HYDROCODONE BITARTRATE AND ACETAMINOPHEN 5; 325 MG/1; MG/1
1 TABLET ORAL EVERY 8 HOURS PRN
Qty: 21 TABLET | Refills: 0 | Status: SHIPPED | OUTPATIENT
Start: 2025-05-01 | End: 2025-05-08

## 2025-05-01 RX ORDER — LIDOCAINE HYDROCHLORIDE 20 MG/ML
INJECTION, SOLUTION INFILTRATION; PERINEURAL
Status: DISCONTINUED | OUTPATIENT
Start: 2025-05-01 | End: 2025-05-01 | Stop reason: SDUPTHER

## 2025-05-01 RX ORDER — SODIUM CHLORIDE 0.9 % (FLUSH) 0.9 %
5-40 SYRINGE (ML) INJECTION PRN
Status: DISCONTINUED | OUTPATIENT
Start: 2025-05-01 | End: 2025-05-01 | Stop reason: HOSPADM

## 2025-05-01 RX ORDER — BUPIVACAINE HYDROCHLORIDE 5 MG/ML
INJECTION, SOLUTION EPIDURAL; INTRACAUDAL; PERINEURAL
Status: COMPLETED | OUTPATIENT
Start: 2025-05-01 | End: 2025-05-01

## 2025-05-01 RX ORDER — SODIUM CHLORIDE 0.9 % (FLUSH) 0.9 %
5-40 SYRINGE (ML) INJECTION PRN
Status: CANCELLED | OUTPATIENT
Start: 2025-05-01

## 2025-05-01 RX ORDER — MIDAZOLAM HYDROCHLORIDE 1 MG/ML
INJECTION, SOLUTION INTRAMUSCULAR; INTRAVENOUS
Status: COMPLETED | OUTPATIENT
Start: 2025-05-01 | End: 2025-05-01

## 2025-05-01 RX ORDER — LABETALOL HYDROCHLORIDE 5 MG/ML
10 INJECTION, SOLUTION INTRAVENOUS
Status: CANCELLED | OUTPATIENT
Start: 2025-05-01

## 2025-05-01 RX ORDER — OXYCODONE HYDROCHLORIDE 5 MG/1
10 TABLET ORAL PRN
Refills: 0 | Status: CANCELLED | OUTPATIENT
Start: 2025-05-01 | End: 2025-05-01

## 2025-05-01 RX ORDER — DROPERIDOL 2.5 MG/ML
0.62 INJECTION, SOLUTION INTRAMUSCULAR; INTRAVENOUS
Status: CANCELLED | OUTPATIENT
Start: 2025-05-01

## 2025-05-01 RX ORDER — SODIUM CHLORIDE 0.9 % (FLUSH) 0.9 %
5-40 SYRINGE (ML) INJECTION EVERY 12 HOURS SCHEDULED
Status: CANCELLED | OUTPATIENT
Start: 2025-05-01

## 2025-05-01 RX ORDER — SODIUM CHLORIDE 9 MG/ML
INJECTION, SOLUTION INTRAVENOUS PRN
Status: DISCONTINUED | OUTPATIENT
Start: 2025-05-01 | End: 2025-05-01 | Stop reason: HOSPADM

## 2025-05-01 RX ORDER — MAGNESIUM HYDROXIDE 1200 MG/15ML
LIQUID ORAL CONTINUOUS PRN
Status: COMPLETED | OUTPATIENT
Start: 2025-05-01 | End: 2025-05-01

## 2025-05-01 RX ORDER — GLYCOPYRROLATE 0.2 MG/ML
INJECTION INTRAMUSCULAR; INTRAVENOUS
Status: DISCONTINUED | OUTPATIENT
Start: 2025-05-01 | End: 2025-05-01 | Stop reason: SDUPTHER

## 2025-05-01 RX ORDER — OXYCODONE HYDROCHLORIDE 5 MG/1
5 TABLET ORAL PRN
Refills: 0 | Status: CANCELLED | OUTPATIENT
Start: 2025-05-01 | End: 2025-05-01

## 2025-05-01 RX ORDER — SODIUM CHLORIDE 0.9 % (FLUSH) 0.9 %
5-40 SYRINGE (ML) INJECTION EVERY 12 HOURS SCHEDULED
Status: DISCONTINUED | OUTPATIENT
Start: 2025-05-01 | End: 2025-05-01 | Stop reason: HOSPADM

## 2025-05-01 RX ADMIN — SODIUM CHLORIDE: 9 INJECTION, SOLUTION INTRAVENOUS at 09:05

## 2025-05-01 RX ADMIN — Medication 200 MCG: at 09:09

## 2025-05-01 RX ADMIN — BUPIVACAINE HYDROCHLORIDE 30 ML: 5 INJECTION, SOLUTION EPIDURAL; INTRACAUDAL; PERINEURAL at 07:55

## 2025-05-01 RX ADMIN — Medication 100 MCG: at 08:50

## 2025-05-01 RX ADMIN — VANCOMYCIN 1000 MG: 1 INJECTION, SOLUTION INTRAVENOUS at 07:25

## 2025-05-01 RX ADMIN — GLYCOPYRROLATE 0.2 MG: 0.2 INJECTION, SOLUTION INTRAMUSCULAR; INTRAVENOUS at 09:11

## 2025-05-01 RX ADMIN — Medication 2 AMPULE: at 07:18

## 2025-05-01 RX ADMIN — PROPOFOL 300 MG: 10 INJECTION, EMULSION INTRAVENOUS at 08:44

## 2025-05-01 RX ADMIN — PROPOFOL 50 MG: 10 INJECTION, EMULSION INTRAVENOUS at 08:37

## 2025-05-01 RX ADMIN — SODIUM CHLORIDE: 9 INJECTION, SOLUTION INTRAVENOUS at 08:33

## 2025-05-01 RX ADMIN — SODIUM CHLORIDE: 9 INJECTION, SOLUTION INTRAVENOUS at 07:24

## 2025-05-01 RX ADMIN — MIDAZOLAM 2 MG: 1 INJECTION INTRAMUSCULAR; INTRAVENOUS at 07:55

## 2025-05-01 RX ADMIN — Medication 150 MCG: at 08:54

## 2025-05-01 RX ADMIN — LIDOCAINE HYDROCHLORIDE 60 MG: 20 INJECTION, SOLUTION INFILTRATION; PERINEURAL at 08:36

## 2025-05-01 RX ADMIN — Medication 150 MCG: at 09:04

## 2025-05-01 ASSESSMENT — PAIN - FUNCTIONAL ASSESSMENT: PAIN_FUNCTIONAL_ASSESSMENT: 0-10

## 2025-05-01 NOTE — H&P
Preoperative H&P Update    The patient's History and Physical in the medical record was reviewed by me today.  I reviewed the HPI, medications, allergies, reason for surgery, diagnosis and treatment plan and there has been no interval change.    The patient was examined by me today. Physical exam findings for this update include:    BP 90/61   Pulse 77   Temp 98 °F (36.7 °C)   Resp 16   Ht 1.689 m (5' 6.5\")   Wt 65.8 kg (145 lb)   LMP 04/13/2020   SpO2 100%   BMI 23.05 kg/m²   Airway is intact  Chest: breathing comfortably  Heart: regular rate  Findings on exam of the body region where surgery is to be performed include: see last office and/or consult note      Electronically signed by Ute Walsh MD on 5/1/2025 at 7:36 AM

## 2025-05-01 NOTE — OP NOTE
OPERATIVE NOTE     Patient Name: La Nena Magallanes   YOB: 1985  MRN:  [id number]    Date of Procedure:  5/1/2025    Surgeon: Ute Walsh MD    PRE-OPERATIVE DIAGNOSIS:  Left Cubital Tunnel Syndrome     POST-OPERATIVE DIAGNOSIS:  Left Cubital Tunnel Syndrome     PROCEDURE:  Left Cubital Tunnel Release    ANESTHESIA: [facility]    OPERATIVE INDICATIONS: The patient is a very pleasant 39 y.o. female who presents with paraesthesias to the small and ring finger, elbow pain and hand weakness. Clinical exam and electrodiagnostic studies have confirmed the diagnosis of cubital tunnel syndrome. The patient has failed conservative management and was interested in pursuing operative management. Risks of surgery were discussed including nerve injury, pain, bleeding, delayed recovery of ulnar nerve sensation, and damage to surrounding structures. After a thorough discussion, the patient decided to pursue surgery.     OPERATIVE PROCEDURE: The patient was seen in the preoperative holding area. The operative procedure confirmed and the operative site was marked with an indelible marker. A block was then performed by the anesthesiology team.  The patient was brought to the operating room and placed supine on the table. A hand table was placed on the patient's side.  A tourniquet was placed on the patient's arm.  Next, the arm was prepped and draped in a sterile fashion.  A timeout was performed which correctly identified the team members, the procedure to be performed as well as the operative site.      The arm was exsanguinated using an Esmarch bandage and then tourniquet was inflated to 250 mmHg.  A curved 5-6 cm incision just posterior to the medial epicondyle was designed and made with a 15 blade. Dissection proceeded through the subcutaneous tissue, with care to protect any identified branches of the MABC nerve.  The ulnar nerve was identified just proximal to the elbow.  Dissection of the nerve

## 2025-05-01 NOTE — DISCHARGE INSTRUCTIONS
ORTHOPAEDICS AND SPORTS MEDICINE           HAND SURGERY - AVA CANALES MD                              POST-OPERATIVE DISCHARGE INSTRUCTIONS    PRESCRIPTIONS:  You have been given a prescription to be taken as directed for post-operative pain control.  Take over-the-counter Colace, 100mg by mouth twice a day while taking narcotic pain medications to help prevent constipation.  You may also take over the counter ibuprofen/aleve and tylenol for pain. Take this as directed on the packaging. Do not exceed 3000 mg tylenol/acetaminophen in 24 hours.     Ibuprofen 600-800 mg (3-4 tablets) by mouth every 6 hours as needed for pain.      OR   Aleve 220 mg (1 tablets) by mouth every 12 hours (twice daily) as needed for pain.      AND   Tylenol 500 mg every 8 hours as needed for pain.    4. Please use your pain medication carefully, as refills are limited and you may not be provided with one.   5. Prescriptions are only filled in person during a clinic visit.   6. As stated above, please  use over the counter pain medicine - it will also be helpful with decreasing your swelling.       ANESTHESIA:  1.After your surgery, post-surgical discomfort or pain is likely. This discomfort can last several days to a few weeks. At certain times of the day your discomfort may be more intense.     2. Did you receive a nerve block? A nerve block can provide pain relief for one hour to two days after your surgery. As long as the nerve block is working, you will experience little or no sensation in the area the surgeon operated on. As the nerve block wears off, you will begin to experience pain or discomfort. It is very important that you begin taking your prescribed pain medication before the nerve block fully wears off. Treating your pain at the first sign of the block wearing off will ensure your pain is better controlled and more tolerable when full-sensation returns. Do not wait until the pain is intolerable, as the medicine will  setting an alarm through the night to help manage your pain level so you do not wake up with too much pain.   Pain medicines can cause more sedation and decrease your breathing so ONLY take as directed. If you have Sleep Apnea, you definitely need to use your C-Pap machine.   What to expect after a nerve block:   Numbness, tingling- arm or leg feels heavy or asleep  Weakness or inability to move or control your arm or leg   Inability to feel temperature changes to your arm or leg  Usually the weakness wears off first, followed by the tingling or heaviness. You may notice more pain at this point and should start taking your pain meds.   If you had a shoulder block, you may experience:  Mild shortness of breath (may be relieved by sitting up in a chair or recliner)  Hoarse voice  Blurry vision  Unequal pupils  Drooping of your face (eye or lip) on the same side as the nerve block  Swelling at the injection site on the side of your neck  These side effects should resolve as the block wears off!   IF you have severe or prolonged shortness of breath-  GO to the nearest Emergency Room!   If you had a nerve block of your arm or leg:  Protect the arm or leg from extreme hot or cold temperatures  Protect the arm or leg from obstructing blood flow by frequent position changes- Make sure fingers/ toes stay pink and warm. Call surgeon with any changes  For leg block, get assistance walking until the block wears off, ie:  crutches, walker, support person   If you continue to feel the effects of the nerve block for longer than 72 hours- call Meliza Wayne at [phone] and ask to speak with the Anesthesiologist on call.

## 2025-05-01 NOTE — ANESTHESIA POSTPROCEDURE EVALUATION
Department of Anesthesiology  Postprocedure Note    Patient: La Nena Magallanes  MRN: [id number]  YOB: 1985  Date of evaluation: 5/1/2025    Procedure Summary       Date: 05/01/25 Room / Location: [address]    Anesthesia Start: 0833 Anesthesia Stop: 0918    Procedure: LEFT CUBITAL TUNNEL RELEASE (Left: Wrist) Diagnosis:       Cubital tunnel syndrome on left      (Cubital tunnel syndrome on left [G56.22])    Surgeons: Ute Walsh MD Responsible Provider: Adrien Ward MD    Anesthesia Type: MAC, regional ASA Status: 3            Anesthesia Type: No value filed.    Liya Phase I: Liya Score: 9    Liya Phase II: Liya Score: 10    Anesthesia Post Evaluation    Comments: Postoperative Anesthesia Note    Name:    La Nena Magallanes  MRN:      [id number]    Patient Vitals in the past 12 hrs:  05/01/25 0948, BP:96/65, Pulse:85, Resp:16, SpO2:95 %  05/01/25 0940, BP:(!) 90/56, Temp:97.8 °F (36.6 °C), Pulse:89, Resp:16, SpO2:94 %  05/01/25 0927, BP:(!) 96/59, Pulse:68, Resp:16, SpO2:96 %  05/01/25 0923, BP:(!) 95/58, Temp:97.4 °F (36.3 °C), Pulse:80, Resp:16, SpO2:95 %  05/01/25 0806, BP:90/63, Pulse:73, Resp:16, SpO2:100 %  05/01/25 0749, BP:90/61, Pulse:73, Resp:16, SpO2:100 %  05/01/25 0700, BP:90/61, Temp:98 °F (36.7 °C), Pulse:77, Resp:16, SpO2:100 %     LABS:    CBC  Lab Results       Component                Value               Date/Time                  WBC                      2.7 (L)             04/12/2025 06:24 PM        HGB                      12.7                04/12/2025 06:24 PM        HCT                      37.9                04/12/2025 06:24 PM        PLT                      152                 04/12/2025 06:24 PM   RENAL  Lab Results       Component                Value               Date/Time                  NA                       139                 04/12/2025 06:24 PM        K                        4.4                 04/12/2025 06:24 PM

## 2025-05-01 NOTE — ANESTHESIA PROCEDURE NOTES
Peripheral Block    Patient location during procedure: pre-op  Reason for block: post-op pain management and at surgeon's request  Start time: 5/1/2025 7:55 AM  End time: 5/1/2025 8:05 AM  Staffing  Performed: anesthesiologist   Anesthesiologist: Adrien Ward MD  Performed by: Adrien Ward MD  Authorized by: Adrien Ward MD    Preanesthetic Checklist  Completed: patient identified, IV checked, site marked, risks and benefits discussed, surgical/procedural consents, equipment checked, pre-op evaluation, timeout performed, anesthesia consent given, oxygen available, monitors applied/VS acknowledged, fire risk safety assessment completed and verbalized and blood product R/B/A discussed and consented  Peripheral Block   Patient position: supine  Prep: ChloraPrep  Provider prep: sterile gloves  Patient monitoring: cardiac monitor, continuous pulse ox, frequent blood pressure checks, IV access, oxygen and responsive to questions  Block type: Brachial plexus  Supraclavicular  Laterality: left  Injection technique: single-shot  Guidance: ultrasound guided  Local infiltration: lidocaine  Infiltration strength: 1 %  Local infiltration: lidocaine  Dose: 3 mL    Needle   Needle type: insulated echogenic nerve stimulator needle   Needle gauge: 21 G  Needle localization: ultrasound guidance  Needle length: 8 cm  Assessment   Injection assessment: negative aspiration for heme, no paresthesia on injection, local visualized surrounding nerve on ultrasound and no intravascular symptoms  Slow fractionated injection: yes  Hemodynamics: stable  Outcomes: uncomplicated and patient tolerated procedure well    Medications Administered  midazolam (VERSED) injection 2 mg/2mL - IntraVENous   2 mg - 5/1/2025 7:55:00 AM  BUPivacaine (MARCAINE) PF injection 0.5% - Perineural   30 mL - 5/1/2025 7:55:00 AM

## 2025-05-13 NOTE — PROGRESS NOTES
La Nena S Slaven    Age 39 y.o.    female    1985    MRN [id number]    5/21/2025  Arrival Time_____________  OR Time____________30 Min     Procedure(s):  ESOPHAGOGASTRODUODENOSCOPY                      Monitor Anesthesia Care    Surgeon(s):  Som Vasquez, MD       Phone [phone] (home)     Initals  Date  Info Source  Home  Cell         Work  _____________________________________________________________________  _____________________________________________________________________  _____________________________________________________________________  _____________________________________________________________________  _____________________________________________________________________    PCP _____________________________ Phone_________________     H&P  ________________  Bringing      Chart              Epic      DOS      Called________  EKG ________________   Bringing      Chart              Epic      DOS      Called________  LABS________________   Bringing     Chart              Epic      DOS      Called________  Cardiac Clearance ______ Bringing      Chart              Epic      DOS      Called________  Pulmonary Clearance____ Bringing      Chart              Epic      DOS      Called________    Cardiologist________________________ Phone___________________________  Pulmonologist_______________________Phone___________________________    ? Advance Directives   ? Scientology concerns / Waiver on Chart            PAT Communications________________  ? Pre-op Instructions Given /Understood          _________________________________  ? Directions to Surgery Center                          _________________________________  ? Transportation Home_______________      __________________________________  ? Crutches/Walker__________________        __________________________________    Orders: Hard copy/ EPIC                 Transcribed/ EPIC              _______Wt.    ________Pharmacy

## 2025-05-14 ENCOUNTER — OFFICE VISIT (OUTPATIENT)
Dept: ORTHOPEDIC SURGERY | Age: 40
End: 2025-05-14

## 2025-05-14 VITALS — BODY MASS INDEX: 22.76 KG/M2 | HEIGHT: 67 IN | WEIGHT: 145 LBS

## 2025-05-14 DIAGNOSIS — G56.21 CUBITAL TUNNEL SYNDROME, RIGHT: Primary | ICD-10-CM

## 2025-05-14 DIAGNOSIS — G56.22 CUBITAL TUNNEL SYNDROME ON LEFT: ICD-10-CM

## 2025-05-14 PROCEDURE — 99024 POSTOP FOLLOW-UP VISIT: CPT | Performed by: STUDENT IN AN ORGANIZED HEALTH CARE EDUCATION/TRAINING PROGRAM

## 2025-05-14 NOTE — PROGRESS NOTES
Date and time of surgery :5/21/25 0800              Arrival Time:  0700     Bring Picture ID and insurance card.  Please wear simple, loose fitting clothing to the hospital.   Do not bring valuables (money, credit cards, checkbooks, etc.)   Do not wear any makeup (including  eye makeup) and no nail polish or artificial nails on your fingers or toes.  DO NOT wear any jewelry or piercings on day of surgery.  All body piercing jewelry must be removed.  If you have dentures, they will be removed before going to the OR; we will provide you a container.  If you wear contact lenses or glasses, they will be removed; please bring a case for them.  Shower the evening before or morning of surgery with antibacterial soap.  Nothing to eat or drink after midnight the day before surgery.   You may brush your teeth and gargle the morning of surgery.  DO NOT SWALLOW WATER.   Take Synthroid, Propanolol morning of procedure  Will hold Monjaro for a least 1 week  Aspirin, Ibuprofen, Advil, Naproxen, Vitamin E and other Anti-inflammatory products and supplements should be stopped for 5 -7days before surgery or as directed by your physician.  Do not smoke or drink any alcoholic beverages 24 hours prior to surgery.  This includes NA Beer. Refrain from the usage of any recreational drugs, including non-prescribed prescription drugs.   You MUST plan for a responsible adult to stay on site while you are here and take you home after your surgery. You will not be allowed to leave alone or drive yourself home. It is strongly suggested someone stay with you the first 24 hrs. Your surgery will be cancelled if you do not have a ride home.  To help prevent infection, change your sheets the night before surgery.   If you  have a Living Will and Durable Power of  for Healthcare, please bring in a copy.  Notify your Surgeon if you develop any illness between now and time of surgery. Cough, cold, fever, sore throat, nausea, vomiting, etc.

## 2025-05-14 NOTE — PROGRESS NOTES
Hand, Upper Extremity and Reconstructive Surgery                Ute Walsh MD                                           Surgery-left cubital tunnel in situ decompression, date of procedure 5/1/2025  History of Present Illness  Update 5/14/2025-she is 2 weeks postop.  Improved sensation reported in the left small and ring finger.  She has some paresthesias around her incision.  Is having similar symptoms on the right side.    History-  39-year-old female presenting with bilateral elbow pain and hand numbness.  Patient reports problems with her elbows over the last several years.  She has been previously evaluated by Dr. Sarkar who has done ulnar nerve hydrodissection on bilateral elbows.  She received about 3 weeks of benefit when this was on the left side and her right side was recently performed.  She reports paresthesias in the small and ring fingers and associated elbow pain.  She has been wearing elbow cushion when she puts her weight on her elbow.  She had an EMG performed 2021.  She had history of bilateral carpal tunnel releases with improvement of her symptoms.The patient has a diagnosis of type 2 diabetes mellitus, with last A1c of 4.9 on 2/20/2025.  She is hypothyroid on levothyroxine.  Has a history of right forearm and arm fractures as a child which was managed conservatively.       PAST SURGICAL HISTORY:  Carpal tunnel release surgery bilaterally  Sleeve gastrectomy  Bypass revision in 2023    SOCIAL HISTORY  Occupations: Delivers groceries for [email or web address]    Current Outpatient Medications   Medication Instructions    buPROPion (WELLBUTRIN SR) 150 MG extended release tablet TAKE 1 TABLET EVERY DAY BY ORAL ROUTE IN THE MORNING FOR 30 DAYS.    busPIRone (BUSPAR) 10 MG tablet 1 tablet, 3 TIMES DAILY    busPIRone (BUSPAR) 15 mg, 3 TIMES DAILY    Continuous Glucose Sensor (FREESTYLE DENI 3 SENSOR) MISC APPLY 1 EACH EVERY 14 DAYS.    diclofenac sodium (VOLTAREN) 4

## 2025-05-15 ENCOUNTER — OFFICE VISIT (OUTPATIENT)
Dept: ORTHOPEDIC SURGERY | Age: 40
End: 2025-05-15

## 2025-05-15 ENCOUNTER — TELEPHONE (OUTPATIENT)
Dept: ORTHOPEDIC SURGERY | Age: 40
End: 2025-05-15

## 2025-05-15 VITALS — BODY MASS INDEX: 23.14 KG/M2 | WEIGHT: 143.96 LBS | HEIGHT: 66 IN

## 2025-05-15 DIAGNOSIS — M84.363A STRESS FRACTURE OF RIGHT FIBULA, INITIAL ENCOUNTER: Primary | ICD-10-CM

## 2025-05-15 NOTE — PROGRESS NOTES
propranolol (INDERAL LA) 60 MG extended release capsule Take 1 capsule by mouth daily Maintenance dose: Start after completing propranolol 10mg TID starting dose. 30 capsule 11    Ferrous Sulfate (IRON PO) Take by mouth daily      buPROPion (WELLBUTRIN SR) 150 MG extended release tablet daily      Continuous Glucose Sensor (FREESTYLE DENI 3 SENSOR) MISC APPLY 1 EACH EVERY 14 DAYS.      lamoTRIgine (LAMICTAL) 100 MG tablet Take 1 tablet by mouth daily      MOUNJARO 10 MG/0.5ML SOPN SC injection Inject 0.5 mLs into the skin once a week      traZODone (DESYREL) 150 MG tablet Take 1 tablet by mouth nightly      diclofenac sodium (VOLTAREN) 1 % GEL APPLY 4 G TOPICALLY 4 TIMES DAILY (Patient taking differently: Apply 4 g topically 4 times daily as needed) 100 g 3    busPIRone (BUSPAR) 10 MG tablet Take 1 tablet by mouth 3 times daily      potassium chloride (KLOR-CON M) 20 MEQ extended release tablet Take 1 tablet by mouth daily      rOPINIRole (REQUIP) 0.25 MG tablet Take 3 tablets by mouth nightly      nystatin (MYCOSTATIN) 575291 UNIT/GM powder as needed       loratadine (CLARITIN) 10 MG tablet Take 1 tablet by mouth daily      Multiple Vitamins-Minerals (BARIATRIC FUSION) CHEW Take 2 tablets by mouth daily       levothyroxine (SYNTHROID) 75 MCG tablet TAKE 1 TABLET BY MOUTH EVERY DAY  5     No current facility-administered medications for this visit.         Allergies:        Allergies   Allergen Reactions    Latex Itching, Other (See Comments) and Rash    Cephalexin Nausea And Vomiting, Nausea Only and Other (See Comments)     Projectile vomitting    Lactose Nausea Only and Other (See Comments)     \"Extreme\" stomach pain and diarrhea    Other reaction(s): GI Upset    Ibuprofen Other (See Comments)    Other/Food      History of bariatric surgery    Tilactase Other (See Comments)    Adhesive Tape Other (See Comments) and Rash     Rips off skin    \"rips off her skin\"    Cephalosporins Nausea And Vomiting

## 2025-05-15 NOTE — TELEPHONE ENCOUNTER
Spoke with patient regarding leg brace. Asked if patient would be able to  from our [place] location. Patient expressed that she would  from that location on 5/16/25 as she would be in the area. Informed patient of the hours and address at that location. Patient expressed understanding.

## 2025-05-16 ENCOUNTER — TELEPHONE (OUTPATIENT)
Dept: ORTHOPEDIC SURGERY | Age: 40
End: 2025-05-16

## 2025-05-16 NOTE — TELEPHONE ENCOUNTER
Talked to patient about timing to  braces. We discussed that she was already coming in around 5pm to  another brace and that it would make more sense to wait until the other brace arrived to  her powersteps.

## 2025-05-20 ENCOUNTER — ANESTHESIA EVENT (OUTPATIENT)
Dept: ENDOSCOPY | Age: 40
End: 2025-05-20
Payer: MEDICAID

## 2025-05-21 ENCOUNTER — ANESTHESIA (OUTPATIENT)
Dept: ENDOSCOPY | Age: 40
End: 2025-05-21
Payer: MEDICAID

## 2025-05-21 ENCOUNTER — HOSPITAL ENCOUNTER (OUTPATIENT)
Age: 40
Setting detail: OUTPATIENT SURGERY
Discharge: HOME | End: 2025-05-21
Attending: INTERNAL MEDICINE | Admitting: INTERNAL MEDICINE
Payer: MEDICAID

## 2025-05-21 VITALS
HEIGHT: 66 IN | TEMPERATURE: 97.8 F | RESPIRATION RATE: 16 BRPM | BODY MASS INDEX: 23.14 KG/M2 | DIASTOLIC BLOOD PRESSURE: 57 MMHG | WEIGHT: 144 LBS | SYSTOLIC BLOOD PRESSURE: 88 MMHG | HEART RATE: 58 BPM | OXYGEN SATURATION: 99 %

## 2025-05-21 DIAGNOSIS — R74.8 ELEVATED LIVER ENZYMES: Primary | ICD-10-CM

## 2025-05-21 DIAGNOSIS — R13.19 ESOPHAGEAL DYSPHAGIA: ICD-10-CM

## 2025-05-21 DIAGNOSIS — R10.13 EPIGASTRIC PAIN: ICD-10-CM

## 2025-05-21 LAB
GLUCOSE BLD-MCNC: 80 MG/DL (ref 70–99)
PERFORMED ON: NORMAL

## 2025-05-21 PROCEDURE — 3700000000 HC ANESTHESIA ATTENDED CARE: Performed by: INTERNAL MEDICINE

## 2025-05-21 PROCEDURE — 88305 TISSUE EXAM BY PATHOLOGIST: CPT

## 2025-05-21 PROCEDURE — 2580000003 HC RX 258: Performed by: ANESTHESIOLOGY

## 2025-05-21 PROCEDURE — 88342 IMHCHEM/IMCYTCHM 1ST ANTB: CPT

## 2025-05-21 PROCEDURE — 7100000011 HC PHASE II RECOVERY - ADDTL 15 MIN: Performed by: INTERNAL MEDICINE

## 2025-05-21 PROCEDURE — 6360000002 HC RX W HCPCS

## 2025-05-21 PROCEDURE — 3700000001 HC ADD 15 MINUTES (ANESTHESIA): Performed by: INTERNAL MEDICINE

## 2025-05-21 PROCEDURE — 3609012400 HC EGD TRANSORAL BIOPSY SINGLE/MULTIPLE: Performed by: INTERNAL MEDICINE

## 2025-05-21 PROCEDURE — 7100000010 HC PHASE II RECOVERY - FIRST 15 MIN: Performed by: INTERNAL MEDICINE

## 2025-05-21 PROCEDURE — 3609017700 HC EGD DILATION GASTRIC/DUODENAL STRICTURE: Performed by: INTERNAL MEDICINE

## 2025-05-21 PROCEDURE — C1726 CATH, BAL DIL, NON-VASCULAR: HCPCS | Performed by: INTERNAL MEDICINE

## 2025-05-21 PROCEDURE — 2709999900 HC NON-CHARGEABLE SUPPLY: Performed by: INTERNAL MEDICINE

## 2025-05-21 RX ORDER — PHENYLEPHRINE HCL IN 0.9% NACL 1 MG/10 ML
SYRINGE (ML) INTRAVENOUS
Status: DISCONTINUED | OUTPATIENT
Start: 2025-05-21 | End: 2025-05-21 | Stop reason: SDUPTHER

## 2025-05-21 RX ORDER — SODIUM CHLORIDE, SODIUM LACTATE, POTASSIUM CHLORIDE, CALCIUM CHLORIDE 600; 310; 30; 20 MG/100ML; MG/100ML; MG/100ML; MG/100ML
INJECTION, SOLUTION INTRAVENOUS CONTINUOUS
Status: DISCONTINUED | OUTPATIENT
Start: 2025-05-21 | End: 2025-05-21 | Stop reason: HOSPADM

## 2025-05-21 RX ORDER — SODIUM CHLORIDE 0.9 % (FLUSH) 0.9 %
5-40 SYRINGE (ML) INJECTION EVERY 12 HOURS SCHEDULED
Status: DISCONTINUED | OUTPATIENT
Start: 2025-05-21 | End: 2025-05-21 | Stop reason: HOSPADM

## 2025-05-21 RX ORDER — DIPHENHYDRAMINE HYDROCHLORIDE 50 MG/ML
12.5 INJECTION, SOLUTION INTRAMUSCULAR; INTRAVENOUS
Status: DISCONTINUED | OUTPATIENT
Start: 2025-05-21 | End: 2025-05-21 | Stop reason: HOSPADM

## 2025-05-21 RX ORDER — SODIUM CHLORIDE 9 MG/ML
INJECTION, SOLUTION INTRAVENOUS PRN
Status: DISCONTINUED | OUTPATIENT
Start: 2025-05-21 | End: 2025-05-21 | Stop reason: HOSPADM

## 2025-05-21 RX ORDER — LABETALOL HYDROCHLORIDE 5 MG/ML
5 INJECTION, SOLUTION INTRAVENOUS EVERY 10 MIN PRN
Status: DISCONTINUED | OUTPATIENT
Start: 2025-05-21 | End: 2025-05-21 | Stop reason: HOSPADM

## 2025-05-21 RX ORDER — SODIUM CHLORIDE 0.9 % (FLUSH) 0.9 %
5-40 SYRINGE (ML) INJECTION PRN
Status: DISCONTINUED | OUTPATIENT
Start: 2025-05-21 | End: 2025-05-21 | Stop reason: HOSPADM

## 2025-05-21 RX ORDER — MEPERIDINE HYDROCHLORIDE 50 MG/ML
12.5 INJECTION INTRAMUSCULAR; INTRAVENOUS; SUBCUTANEOUS EVERY 5 MIN PRN
Status: DISCONTINUED | OUTPATIENT
Start: 2025-05-21 | End: 2025-05-21 | Stop reason: HOSPADM

## 2025-05-21 RX ORDER — PROPOFOL 10 MG/ML
INJECTION, EMULSION INTRAVENOUS
Status: DISCONTINUED | OUTPATIENT
Start: 2025-05-21 | End: 2025-05-21 | Stop reason: SDUPTHER

## 2025-05-21 RX ORDER — LIDOCAINE HYDROCHLORIDE 10 MG/ML
0.3 INJECTION, SOLUTION EPIDURAL; INFILTRATION; INTRACAUDAL; PERINEURAL
Status: DISCONTINUED | OUTPATIENT
Start: 2025-05-21 | End: 2025-05-21 | Stop reason: HOSPADM

## 2025-05-21 RX ORDER — OXYCODONE HYDROCHLORIDE 5 MG/1
5 TABLET ORAL PRN
Status: DISCONTINUED | OUTPATIENT
Start: 2025-05-21 | End: 2025-05-21 | Stop reason: HOSPADM

## 2025-05-21 RX ORDER — NALOXONE HYDROCHLORIDE 0.4 MG/ML
INJECTION, SOLUTION INTRAMUSCULAR; INTRAVENOUS; SUBCUTANEOUS PRN
Status: DISCONTINUED | OUTPATIENT
Start: 2025-05-21 | End: 2025-05-21 | Stop reason: HOSPADM

## 2025-05-21 RX ORDER — ONDANSETRON 2 MG/ML
4 INJECTION INTRAMUSCULAR; INTRAVENOUS
Status: DISCONTINUED | OUTPATIENT
Start: 2025-05-21 | End: 2025-05-21 | Stop reason: HOSPADM

## 2025-05-21 RX ORDER — OXYCODONE HYDROCHLORIDE 5 MG/1
10 TABLET ORAL PRN
Status: DISCONTINUED | OUTPATIENT
Start: 2025-05-21 | End: 2025-05-21 | Stop reason: HOSPADM

## 2025-05-21 RX ORDER — LIDOCAINE HYDROCHLORIDE 20 MG/ML
INJECTION, SOLUTION EPIDURAL; INFILTRATION; INTRACAUDAL; PERINEURAL
Status: DISCONTINUED | OUTPATIENT
Start: 2025-05-21 | End: 2025-05-21 | Stop reason: SDUPTHER

## 2025-05-21 RX ADMIN — PROPOFOL 30 MG: 10 INJECTION, EMULSION INTRAVENOUS at 08:22

## 2025-05-21 RX ADMIN — Medication 100 MCG: at 08:26

## 2025-05-21 RX ADMIN — PROPOFOL 70 MG: 10 INJECTION, EMULSION INTRAVENOUS at 08:18

## 2025-05-21 RX ADMIN — PROPOFOL 150 MCG/KG/MIN: 10 INJECTION, EMULSION INTRAVENOUS at 08:16

## 2025-05-21 RX ADMIN — LIDOCAINE HYDROCHLORIDE 50 MG: 20 INJECTION, SOLUTION EPIDURAL; INFILTRATION; INTRACAUDAL; PERINEURAL at 08:16

## 2025-05-21 RX ADMIN — SODIUM CHLORIDE, SODIUM LACTATE, POTASSIUM CHLORIDE, AND CALCIUM CHLORIDE: .6; .31; .03; .02 INJECTION, SOLUTION INTRAVENOUS at 07:52

## 2025-05-21 RX ADMIN — Medication 100 MCG: at 08:35

## 2025-05-21 ASSESSMENT — PAIN SCALES - GENERAL
PAINLEVEL_OUTOF10: 0

## 2025-05-21 ASSESSMENT — PAIN - FUNCTIONAL ASSESSMENT: PAIN_FUNCTIONAL_ASSESSMENT: 0-10

## 2025-05-21 NOTE — PROCEDURES
Endoscopy Note    Patient: La Nena Magallanes  : 1985      Procedure: Esophagogastroduodenoscopy with biopsies and balloon dilation    Date:  2025     Surgeon:  Som Vasquez MD     Referring Physician:  Ria Layne APRN - NP      History: With esophageal dysphagia points high up in the neck and epigastric pain.  History of sleeve gastrectomy in 2019 and revised to gastric bypass in .  Also F3 fibrosis on recent fibrosis scan with mild elevation of AST.  Baseline mild neutropenia platelet count is 152 WBC 2.7 hemoglobin 12.7.    INDICATION: esophageal dysphagia , epigastric pain      ASA: 3  SEDATION: MAC         Operative Surgeon: Som Vasquez MD  Scope Type: Gastroscope      Preoperative Diagnosis: Epigastric pain, esophageal dysphagia.  Postoperative Diagnosis: Artem-en-Y gastric bypass, marginal ulcer, torturous esophagus otherwise normal    Procedure Performed: EGD    Procedure Details:    With the patient in left lateral position the endoscope was passed through the hypopharynx into the esophagus. The scope as then passed through the esophagus to the second portion of the duodenum. All visualized portions were carefully inspected. The gastric air was suctioned and the scope as removed. Patient tolerated the procedure well. Findings and maneuvers are discussed below.      Complications:  None  Estimated Blood Loss: minimal to none    Post Operative Findings:   Esophagus: The upper and midportion were slightly tortuous but widely patent.  As was the GE junction there is no signs of esophagitis Beltran's esophagus.  At the end of the procedure a CRE balloon was inflated to 16.5 in the distal esophagus taking care to ensure the balloon was not inflated through the GJ anastomosis.  The balloon was able to be pulled back throughout the entirety of the esophagus with only mild resistance relook did not demonstrate any complication or significant mucosal disruption.    Stomach: Small

## 2025-05-21 NOTE — ANESTHESIA POSTPROCEDURE EVALUATION
RENAL  Lab Results       Component                Value               Date/Time                  NA                       139                 04/12/2025 06:24 PM        K                        4.4                 04/12/2025 06:24 PM        CL                       103                 04/12/2025 06:24 PM        CO2                      27                  04/12/2025 06:24 PM        BUN                      6 (L)               04/12/2025 06:24 PM        CREATININE               0.7                 04/12/2025 06:24 PM        GLUCOSE                  74                  04/12/2025 06:24 PM   COAGS  Lab Results       Component                Value               Date/Time                  PROTIME                  14.6                08/01/2024 05:07 PM        INR                      1.11                08/01/2024 05:07 PM        APTT                     27.8                08/01/2024 05:07 PM     Intake & Output:  @24HRIO@    Nausea & Vomiting:  No    Level of Consciousness:  Awake    Pain Assessment:  Adequate analgesia    Anesthesia Complications:  No apparent anesthetic complications    SUMMARY      Vital signs stable  OK to discharge from Stage I post anesthesia care.  Care transferred from Anesthesiology department on discharge from perioperative area     No notable events documented.

## 2025-05-21 NOTE — ANESTHESIA PRE PROCEDURE
CO2 27 04/12/2025 06:24 PM    BUN 6 04/12/2025 06:24 PM    CREATININE 0.7 04/12/2025 06:24 PM    GFRAA >60 09/25/2022 03:31 AM    GFRAA >60 04/07/2011 10:05 PM    AGRATIO 1.4 04/12/2025 06:24 PM    LABGLOM >90 04/12/2025 06:24 PM    LABGLOM >60 06/10/2023 06:01 PM    GLUCOSE 74 04/12/2025 06:24 PM    CALCIUM 9.1 04/12/2025 06:24 PM    BILITOT 0.5 04/12/2025 06:24 PM    ALKPHOS 75 04/12/2025 06:24 PM    AST 43 04/12/2025 06:24 PM    ALT 40 04/12/2025 06:24 PM       POC Tests: No results for input(s): \"POCGLU\", \"POCNA\", \"POCK\", \"POCCL\", \"POCBUN\", \"POCHEMO\", \"POCHCT\" in the last 72 hours.    Coags:   Lab Results   Component Value Date/Time    PROTIME 14.6 08/01/2024 05:07 PM    INR 1.11 08/01/2024 05:07 PM    APTT 27.8 08/01/2024 05:07 PM       HCG (If Applicable):   Lab Results   Component Value Date    PREGTESTUR Negative 04/19/2020    PREGSERUM Negative 10/25/2024        ABGs: No results found for: \"PHART\", \"PO2ART\", \"NPH5VXO\", \"VDP5FQY\", \"BEART\", \"S1DVLSQQ\"     Type & Screen (If Applicable):  Lab Results   Component Value Date    ABORH A POS 04/19/2020    LABANTI NEG 04/19/2020       Drug/Infectious Status (If Applicable):  Lab Results   Component Value Date/Time    HIV Non-Reactive 03/24/2025 01:31 PM       COVID-19 Screening (If Applicable):   Lab Results   Component Value Date/Time    COVID19 NOT DETECTED 10/25/2024 12:27 AM    COVID19 Not Detected 09/30/2020 12:35 AM           Anesthesia Evaluation  Patient summary reviewed and Nursing notes reviewed   no history of anesthetic complications:   Airway: Mallampati: II     Neck ROM: full     Dental:          Pulmonary:Negative Pulmonary ROS and normal exam    (+)     sleep apnea:       asthma:                            Cardiovascular:Negative CV ROS                      Neuro/Psych:   Negative Neuro/Psych ROS  (+) neuromuscular disease:, headaches:, psychiatric history:depression/anxiety             GI/Hepatic/Renal: Neg GI/Hepatic/Renal ROS  (+) GERD: well

## 2025-05-21 NOTE — DISCHARGE INSTRUCTIONS
sign in to your [employer] account. Enter J454 in the Search Health Information box to learn more about \"Upper GI Endoscopy: What to Expect at Home.\"     If you do not have an account, please click on the \"Sign Up Now\" link.  Current as of: May 12, 2017  Content Version: 11.6  © 4527-4892 [employer]. Care instructions adapted under license by [employer]. If you have questions about a medical condition or this instruction, always ask your healthcare professional. [employer] disclaims any warranty or liability for your use of this information.    ANESTHESIA DISCHARGE INSTRUCTIONS    You are under the influence of drugs- do not drink alcohol, drive a car, operate machinery(such as power tools, kitchen appliances, etc), sign legal documents, or make any important decisions for 24 hours (or while on pain medications).   Children should not ride bikes or skate boards or play on gym sets  for 24 hours after surgery.  A responsible adult should be with you for 24 hours.  Rest at home today- increase activity as tolerated.  Progress slowly to a regular diet unless your physician has instructed you otherwise. Drink plenty of water.    CALL YOUR DOCTOR IF YOU:  Have moderate to severe nausea or vomiting AND are unable to hold down fluids or prescribed medications.  Have bright red bloody drainage from your dressing that won't stop oozing.  Do not get relief with your pain medication    NORMAL (POSSIBLE) SIDE EFFECTS FROM ANESTHESIA:     Confusion, temporary memory loss, delayed reaction times in the first 24 hours  Lightheadedness, dizziness, difficulty focusing, blurred vision  Nausea/vomiting can happen  Shivering, feeling cold, sore throat, cough and muscle aches should stop within 24-48 hours  Trouble urinating - call your surgeon if it has been more than 8 hrs  Bruising or soreness at the IV site - call if it remains red, firm or there is drainage             FEMALES OF CHILDBEARING AGE WHO ARE

## 2025-05-21 NOTE — H&P
Currently     Types: Marijuana (Weed)     Comment: not in past 6 months  for PTSD    Sexual activity: Yes     Partners: Male   Other Topics Concern    Not on file   Social History Narrative    Not on file     Social Drivers of Health     Financial Resource Strain: Not on file   Food Insecurity: No Transportation Needs (6/28/2024)    [facility], [facility]    Yearly Questionnaire     Do you need any assistance with obtaining housing, meals, medication, transportation or medical equipment?: No     Assistance needed for:: Not on file   Transportation Needs: No Transportation Needs (6/28/2024)    Received from  [facility], [facility]    Yearly Questionnaire     Do you need any assistance with obtaining housing, meals, medication, transportation or medical equipment?: No     Assistance needed for:: Not on file   Physical Activity: Insufficiently Active (3/29/2025)    Exercise Vital Sign     Days of Exercise per Week: 2 days     Minutes of Exercise per Session: 20 min   Stress: Not on file   Social Connections: Not on file   Intimate Partner Violence: Unknown (1/22/2024)    [facility]    Interpersonal Safety     Feel physically or emotionally unsafe where currently live: Not on file     Harm by anyone: Not on file     Emotionally Harmed: Not on file   Housing Stability: No Transportation Needs (6/28/2024)    [facility] from  [facility], [facility]    Yearly Questionnaire     Do you need any assistance with obtaining housing, meals, medication, transportation or medical equipment?: No     Assistance needed for:: Not on file      Family History:       Problem Relation Age of Onset    Asthma Mother     Arthritis Mother     Lung Cancer Mother     Hypertension Father     Elevated Lipids Father     Diabetes

## 2025-05-29 ENCOUNTER — HOSPITAL ENCOUNTER (OUTPATIENT)
Dept: INTERVENTIONAL RADIOLOGY/VASCULAR | Age: 40
Discharge: HOME | End: 2025-05-31
Attending: INTERNAL MEDICINE
Payer: MEDICAID

## 2025-05-29 DIAGNOSIS — M47.816 FACET ARTHROPATHY, LUMBAR: ICD-10-CM

## 2025-05-29 PROCEDURE — 64635 DESTROY LUMB/SAC FACET JNT: CPT

## 2025-05-29 PROCEDURE — 6360000002 HC RX W HCPCS: Performed by: RADIOLOGY

## 2025-05-29 PROCEDURE — 64636 DESTROY L/S FACET JNT ADDL: CPT

## 2025-05-29 PROCEDURE — 2709999900 HC NON-CHARGEABLE SUPPLY

## 2025-05-29 RX ORDER — LIDOCAINE HYDROCHLORIDE 10 MG/ML
INJECTION, SOLUTION EPIDURAL; INFILTRATION; INTRACAUDAL; PERINEURAL PRN
Status: COMPLETED | OUTPATIENT
Start: 2025-05-29 | End: 2025-05-29

## 2025-05-29 RX ORDER — BUPIVACAINE HYDROCHLORIDE 2.5 MG/ML
INJECTION, SOLUTION EPIDURAL; INFILTRATION; INTRACAUDAL; PERINEURAL PRN
Status: COMPLETED | OUTPATIENT
Start: 2025-05-29 | End: 2025-05-29

## 2025-05-29 RX ADMIN — LIDOCAINE HYDROCHLORIDE 10 ML: 10 INJECTION, SOLUTION EPIDURAL; INFILTRATION; INTRACAUDAL; PERINEURAL at 13:20

## 2025-05-29 RX ADMIN — BUPIVACAINE HYDROCHLORIDE 20 ML: 2.5 INJECTION, SOLUTION EPIDURAL; INFILTRATION; INTRACAUDAL at 13:19

## 2025-05-30 ENCOUNTER — PATIENT MESSAGE (OUTPATIENT)
Dept: ORTHOPEDIC SURGERY | Age: 40
End: 2025-05-30

## 2025-06-09 ENCOUNTER — TELEPHONE (OUTPATIENT)
Dept: NEUROLOGY | Age: 40
End: 2025-06-09

## 2025-06-09 ENCOUNTER — OFFICE VISIT (OUTPATIENT)
Dept: ORTHOPEDIC SURGERY | Age: 40
End: 2025-06-09
Payer: MEDICAID

## 2025-06-09 VITALS — HEIGHT: 66 IN | BODY MASS INDEX: 23.14 KG/M2 | WEIGHT: 143.96 LBS

## 2025-06-09 DIAGNOSIS — M84.363D STRESS FRACTURE OF RIGHT FIBULA WITH ROUTINE HEALING, SUBSEQUENT ENCOUNTER: Primary | ICD-10-CM

## 2025-06-09 DIAGNOSIS — M47.816 LUMBAR SPONDYLOSIS: ICD-10-CM

## 2025-06-09 DIAGNOSIS — M47.816 LUMBAR FACET ARTHROPATHY: ICD-10-CM

## 2025-06-09 DIAGNOSIS — Z90.3 HISTORY OF SLEEVE GASTRECTOMY: ICD-10-CM

## 2025-06-09 PROCEDURE — 1036F TOBACCO NON-USER: CPT | Performed by: INTERNAL MEDICINE

## 2025-06-09 PROCEDURE — 99213 OFFICE O/P EST LOW 20 MIN: CPT | Performed by: INTERNAL MEDICINE

## 2025-06-09 PROCEDURE — G8420 CALC BMI NORM PARAMETERS: HCPCS | Performed by: INTERNAL MEDICINE

## 2025-06-09 PROCEDURE — G8427 DOCREV CUR MEDS BY ELIG CLIN: HCPCS | Performed by: INTERNAL MEDICINE

## 2025-06-09 NOTE — TELEPHONE ENCOUNTER
[facility] rheumatology faxed over office visit notes.     Scanned into media     Please review

## 2025-06-09 NOTE — PROGRESS NOTES
Chief Complaint:   Chief Complaint   Patient presents with    Lower Back Pain     feels about 70% improved since RFA, doing HEP when she remembers, no longer able to take Celebrex due to ulcers found, tizanidine helps, has some pain above where RFA was done, and RFA site still very tender    Leg Pain     right lower leg, about the same overall, pain intermittent, wearing brace with all WB activities,  over mid-lateral leg          History of Present Illness:       Patient is a 39 y.o. female returns follow up for the above complaint. The patient was last seen approximately 2 monthsago. The symptoms of back pain are improving since the last visit. The patient has had no further testing for the problem.      The patient did undergo lumbar RFA in the interim.    Patient reports 70% improvement related to this intervention.    Back:Right leg pain 100:0. Pain in back is aching in quality she has experienced some sensitivity overlying the injection on the right.  She denies constitutional symptoms    In the interim she had a screening EGD which was abnormal for nonbleeding ulcerations.  Celebrex has been discontinued    Pain levels:3.    The patient denies new onset or progressive weakness of the lower extremities.  The patient denies bowel or bladder dysfunction.    No reliance on analgesics    With respect to the right lower extremity the pain over the mid region of mid fibula is stable.  Pain levels 5/10.  She continues with long-leg Aircast immobilization with all weightbearing activity    No new injuries no new events    Active related swelling no mechanical symptoms       Past Medical History:        Past Medical History:   Diagnosis Date    Acid reflux     Asthma     as child    Back pain     Bipolar 1 disorder (HCC)     Degenerative disc disease, lumbar     Depression     Diabetes mellitus (HCC)     Fibromyalgia     Hypothyroidism     Liver disease     fatty liver    Obesity     OCD (obsessive

## 2025-06-13 ENCOUNTER — HOSPITAL ENCOUNTER (OUTPATIENT)
Dept: LAB | Age: 40
Discharge: HOME | End: 2025-06-13
Payer: MEDICAID

## 2025-06-13 DIAGNOSIS — R74.8 ELEVATED LIVER ENZYMES: ICD-10-CM

## 2025-06-13 DIAGNOSIS — R13.10 DYSPHAGIA, UNSPECIFIED TYPE: ICD-10-CM

## 2025-06-13 DIAGNOSIS — R74.8 ELEVATED LIVER ENZYMES: Primary | ICD-10-CM

## 2025-06-13 PROCEDURE — 86803 HEPATITIS C AB TEST: CPT

## 2025-06-13 PROCEDURE — 86704 HEP B CORE ANTIBODY TOTAL: CPT

## 2025-06-13 PROCEDURE — 86706 HEP B SURFACE ANTIBODY: CPT

## 2025-06-13 PROCEDURE — 86701 HIV-1ANTIBODY: CPT

## 2025-06-13 PROCEDURE — 80053 COMPREHEN METABOLIC PANEL: CPT

## 2025-06-13 PROCEDURE — 86708 HEPATITIS A ANTIBODY: CPT

## 2025-06-13 PROCEDURE — 87340 HEPATITIS B SURFACE AG IA: CPT

## 2025-06-13 PROCEDURE — 36415 COLL VENOUS BLD VENIPUNCTURE: CPT

## 2025-06-13 PROCEDURE — 86702 HIV-2 ANTIBODY: CPT

## 2025-06-13 PROCEDURE — 87390 HIV-1 AG IA: CPT

## 2025-06-13 NOTE — TELEPHONE ENCOUNTER
Reviewed rheumatology note. Low suspicion for autoimmune disorder but this is thought to be possible. Work up for autoimmune disease started.

## 2025-06-14 LAB
ALBUMIN SERPL-MCNC: 4 G/DL (ref 3.4–5)
ALBUMIN/GLOB SERPL: 1.6 {RATIO} (ref 1.1–2.2)
ALP SERPL-CCNC: 66 U/L (ref 40–129)
ALT SERPL-CCNC: 41 U/L (ref 10–40)
ANION GAP SERPL CALCULATED.3IONS-SCNC: 10 MMOL/L (ref 3–16)
AST SERPL-CCNC: 42 U/L (ref 15–37)
BILIRUB SERPL-MCNC: 0.5 MG/DL (ref 0–1)
BUN SERPL-MCNC: 6 MG/DL (ref 7–20)
CALCIUM SERPL-MCNC: 9 MG/DL (ref 8.3–10.6)
CHLORIDE SERPL-SCNC: 107 MMOL/L (ref 99–110)
CO2 SERPL-SCNC: 28 MMOL/L (ref 21–32)
CREAT SERPL-MCNC: 0.8 MG/DL (ref 0.6–1.1)
GFR SERPLBLD CREATININE-BSD FMLA CKD-EPI: >90 ML/MIN/{1.73_M2}
GLUCOSE SERPL-MCNC: 77 MG/DL (ref 70–99)
HBV SURFACE AB SERPL IA-ACNC: <3.5 MIU/ML
HBV SURFACE AG SERPL QL IA: NORMAL
HCV AB SERPL QL IA: NORMAL
HIV 1+2 AB+HIV1 P24 AG SERPL QL IA: NORMAL
HIV 2 AB SERPL QL IA: NORMAL
HIV1 AB SERPL QL IA: NORMAL
HIV1 P24 AG SERPL QL IA: NORMAL
POTASSIUM SERPL-SCNC: 4.5 MMOL/L (ref 3.5–5.1)
PROT SERPL-MCNC: 6.5 G/DL (ref 6.4–8.2)
SODIUM SERPL-SCNC: 145 MMOL/L (ref 136–145)

## 2025-06-17 ENCOUNTER — TRANSCRIBE ORDERS (OUTPATIENT)
Dept: ADMINISTRATIVE | Age: 40
End: 2025-06-17

## 2025-06-17 DIAGNOSIS — R74.8 ELEVATED LIVER ENZYMES: Primary | ICD-10-CM

## 2025-06-18 ENCOUNTER — TELEPHONE (OUTPATIENT)
Dept: INTERVENTIONAL RADIOLOGY/VASCULAR | Age: 40
End: 2025-06-18

## 2025-06-18 LAB
HAV AB SER QL IA: POSITIVE
HBV CORE AB SERPL QL IA: NEGATIVE

## 2025-06-18 NOTE — TELEPHONE ENCOUNTER
Called and spoke to Patient about upcoming procedure. Phone number used: [phone]  Procedure: Random Liver biopsy       Pt informed of the following:  Date of procedure: 6/26/25  Arrival time of procedure: 0830  Time of procedure: 0930  Check in at Main Entrance prior to procedure.    Pre Procedure Checklist:   H & P completed within 30 days of scheduled procedure?Yes  If No  Call placed to PCP N/A    Allergies:  Reviewed?Yes  Have you ever had a reaction to Contrast/ IV Dye? No  If Yes, What was reaction?   Lidocaine Allergy?  No  If Yes, What was reaction?     Medications:  On any blood thinners? No.   If yes: N/A  Instructed to hold: N/A     Blood pressure medication?  Yes. If yes, take the morning of procedure.     Sedation:  Sedation Type:IV Sedation    Any issues with sedation in the past? No  If Yes, What was reaction?   Patient will need a  the day of procedure.     Nothing to eat or drink after midnight.        Need SDS: No    Orders:  Pre-procedure orders placed.Yes   Lab ordered: PT/INR and CBC w/Diff AS WELL AS PREG    Post-procedure recovery will be here 2 hours.

## 2025-06-24 ENCOUNTER — HOSPITAL ENCOUNTER (OUTPATIENT)
Dept: GENERAL RADIOLOGY | Age: 40
Discharge: HOME | End: 2025-06-24
Attending: INTERNAL MEDICINE
Payer: MEDICAID

## 2025-06-24 DIAGNOSIS — R13.10 DYSPHAGIA, UNSPECIFIED TYPE: ICD-10-CM

## 2025-06-24 PROCEDURE — 74220 X-RAY XM ESOPHAGUS 1CNTRST: CPT

## 2025-06-26 ENCOUNTER — HOSPITAL ENCOUNTER (OUTPATIENT)
Dept: CT IMAGING | Age: 40
Discharge: HOME | End: 2025-06-26
Payer: MEDICAID

## 2025-06-26 VITALS
RESPIRATION RATE: 14 BRPM | TEMPERATURE: 97.8 F | DIASTOLIC BLOOD PRESSURE: 63 MMHG | SYSTOLIC BLOOD PRESSURE: 96 MMHG | HEART RATE: 91 BPM | OXYGEN SATURATION: 100 %

## 2025-06-26 DIAGNOSIS — R74.8 ELEVATED LIVER ENZYMES: ICD-10-CM

## 2025-06-26 LAB
BASOPHILS # BLD: 0 K/UL (ref 0–0.2)
BASOPHILS NFR BLD: 0.3 %
DEPRECATED RDW RBC AUTO: 12.6 % (ref 12.4–15.4)
EOSINOPHIL # BLD: 0 K/UL (ref 0–0.6)
EOSINOPHIL NFR BLD: 1.6 %
HCG SERPL QL: NEGATIVE
HCT VFR BLD AUTO: 36.3 % (ref 36–48)
HGB BLD-MCNC: 12.4 G/DL (ref 12–16)
INR PPP: 1.03 (ref 0.86–1.14)
LYMPHOCYTES # BLD: 1.1 K/UL (ref 1–5.1)
LYMPHOCYTES NFR BLD: 36.6 %
MCH RBC QN AUTO: 32.3 PG (ref 26–34)
MCHC RBC AUTO-ENTMCNC: 34.1 G/DL (ref 31–36)
MCV RBC AUTO: 94.7 FL (ref 80–100)
MONOCYTES # BLD: 0.3 K/UL (ref 0–1.3)
MONOCYTES NFR BLD: 10.4 %
NEUTROPHILS # BLD: 1.5 K/UL (ref 1.7–7.7)
NEUTROPHILS NFR BLD: 51.1 %
PLATELET # BLD AUTO: 129 K/UL (ref 135–450)
PMV BLD AUTO: 9.8 FL (ref 5–10.5)
PROTHROMBIN TIME: 13.8 SEC (ref 12.1–14.9)
RBC # BLD AUTO: 3.84 M/UL (ref 4–5.2)
WBC # BLD AUTO: 2.9 K/UL (ref 4–11)

## 2025-06-26 PROCEDURE — 6360000002 HC RX W HCPCS: Performed by: STUDENT IN AN ORGANIZED HEALTH CARE EDUCATION/TRAINING PROGRAM

## 2025-06-26 PROCEDURE — 88307 TISSUE EXAM BY PATHOLOGIST: CPT

## 2025-06-26 PROCEDURE — 88313 SPECIAL STAINS GROUP 2: CPT

## 2025-06-26 PROCEDURE — 84703 CHORIONIC GONADOTROPIN ASSAY: CPT

## 2025-06-26 PROCEDURE — 36415 COLL VENOUS BLD VENIPUNCTURE: CPT

## 2025-06-26 PROCEDURE — 2709999900 CT NEEDLE BIOPSY LIVER PERCUTANEOUS

## 2025-06-26 PROCEDURE — 85610 PROTHROMBIN TIME: CPT

## 2025-06-26 PROCEDURE — 85025 COMPLETE CBC W/AUTO DIFF WBC: CPT

## 2025-06-26 RX ORDER — SODIUM CHLORIDE 9 MG/ML
INJECTION, SOLUTION INTRAVENOUS PRN
Status: DISCONTINUED | OUTPATIENT
Start: 2025-06-26 | End: 2025-06-27 | Stop reason: HOSPADM

## 2025-06-26 RX ORDER — ONDANSETRON 2 MG/ML
4 INJECTION INTRAMUSCULAR; INTRAVENOUS EVERY 6 HOURS PRN
Status: DISCONTINUED | OUTPATIENT
Start: 2025-06-26 | End: 2025-06-27 | Stop reason: HOSPADM

## 2025-06-26 RX ORDER — SODIUM CHLORIDE 0.9 % (FLUSH) 0.9 %
5-40 SYRINGE (ML) INJECTION PRN
Status: DISCONTINUED | OUTPATIENT
Start: 2025-06-26 | End: 2025-06-27 | Stop reason: HOSPADM

## 2025-06-26 RX ORDER — HYDROCODONE BITARTRATE AND ACETAMINOPHEN 5; 325 MG/1; MG/1
1 TABLET ORAL EVERY 4 HOURS PRN
Status: DISCONTINUED | OUTPATIENT
Start: 2025-06-26 | End: 2025-06-27 | Stop reason: HOSPADM

## 2025-06-26 RX ORDER — SODIUM CHLORIDE 0.9 % (FLUSH) 0.9 %
5-40 SYRINGE (ML) INJECTION EVERY 12 HOURS SCHEDULED
Status: DISCONTINUED | OUTPATIENT
Start: 2025-06-26 | End: 2025-06-27 | Stop reason: HOSPADM

## 2025-06-26 RX ORDER — ONDANSETRON 2 MG/ML
4 INJECTION INTRAMUSCULAR; INTRAVENOUS EVERY 8 HOURS PRN
Status: DISCONTINUED | OUTPATIENT
Start: 2025-06-26 | End: 2025-06-27 | Stop reason: HOSPADM

## 2025-06-26 RX ORDER — HYDROCODONE BITARTRATE AND ACETAMINOPHEN 5; 325 MG/1; MG/1
2 TABLET ORAL EVERY 4 HOURS PRN
Status: DISCONTINUED | OUTPATIENT
Start: 2025-06-26 | End: 2025-06-27 | Stop reason: HOSPADM

## 2025-06-26 RX ORDER — MIDAZOLAM HYDROCHLORIDE 1 MG/ML
INJECTION, SOLUTION INTRAMUSCULAR; INTRAVENOUS PRN
Status: COMPLETED | OUTPATIENT
Start: 2025-06-26 | End: 2025-06-26

## 2025-06-26 RX ORDER — FENTANYL CITRATE 50 UG/ML
INJECTION, SOLUTION INTRAMUSCULAR; INTRAVENOUS PRN
Status: COMPLETED | OUTPATIENT
Start: 2025-06-26 | End: 2025-06-26

## 2025-06-26 RX ADMIN — FENTANYL CITRATE 25 MCG: 50 INJECTION INTRAMUSCULAR; INTRAVENOUS at 09:57

## 2025-06-26 RX ADMIN — MIDAZOLAM 0.5 MG: 1 INJECTION INTRAMUSCULAR; INTRAVENOUS at 09:48

## 2025-06-26 RX ADMIN — FENTANYL CITRATE 25 MCG: 50 INJECTION INTRAMUSCULAR; INTRAVENOUS at 09:52

## 2025-06-26 RX ADMIN — MIDAZOLAM 0.5 MG: 1 INJECTION INTRAMUSCULAR; INTRAVENOUS at 09:53

## 2025-06-26 ASSESSMENT — PAIN - FUNCTIONAL ASSESSMENT: PAIN_FUNCTIONAL_ASSESSMENT: NONE - DENIES PAIN

## 2025-06-26 NOTE — OR NURSING
Pt arrived for image guided liver biopsy random. Procedure explained including the risk and benefits of the procedure. All questions answered. Pt verbalizes understanding of the of procedure and states no more questions. Consent signed. Vital signs stable, labs, allergies, medications, and code status reviewed. No contraindications noted.     Vitals:    06/26/25 0828   BP: 90/61   Pulse: 72   Resp: 16   Temp: 97.8 °F (36.6 °C)   SpO2: 100%    (vital signs in table format)    Liya Score  2 - Able to move 4 extremities voluntarily on command  2 - BP+/- 20mmHg of normal  2 - Fully awake  2 - Able to maintain oxygen saturation >92% on room air  2 - Able to breathe deeply and cough freely    Allergies  Latex, Cephalexin, Lactose, Ibuprofen, Other, Tilactase, Adhesive tape, and Cephalosporins    Labs  Lab Results   Component Value Date    INR 1.03 06/26/2025    PROTIME 13.8 06/26/2025       Lab Results   Component Value Date    CREATININE 0.8 06/13/2025    BUN 6 (L) 06/13/2025     06/13/2025    K 4.5 06/13/2025     06/13/2025    CO2 28 06/13/2025       Lab Results   Component Value Date    WBC 2.9 (L) 06/26/2025    HGB 12.4 06/26/2025    HCT 36.3 06/26/2025    MCV 94.7 06/26/2025     (L) 06/26/2025

## 2025-06-26 NOTE — PROGRESS NOTES
Pt DC time 1200.    Pt currently on R side, head elevated, sipping pop and eating pretzels.    Denies further needs.

## 2025-06-26 NOTE — PROGRESS NOTES
PIV DCed.     Dressing over liver Bx site remains clean, dry and intact. Vitals WNL. Pt denies any pain or further issues.    DC instructions given. Pt voices understanding.    Pt ambulated to POV with  as  at this time.

## 2025-06-26 NOTE — BRIEF OP NOTE
Interventional Radiology  Brief Postoperative Note    Patient: La Nena Magallanes  YOB: 1985  MRN: [id number]      Pre-operative Diagnosis: Elevated LFTs    Post-operative Diagnosis: Same    Procedure: CT guided liver biopsy    Anesthesia: Local and Moderate Sedation    Surgeons/Assistants: Toby Moy DO    Estimated Blood Loss: less than 50     Complications: None    Infection Present At Time Of Surgery (PATOS) (choose all levels that have infection present):  No infection present    Specimens: Was Obtained: two 18-G cores    Findings:   Successful CT guided random liver biopsy.  Post biopsy CT demonstrates no complication.       Toby Moy DO  6/26/2025, 10:02 AM  Interventional Radiology  [phone] ([id number])

## 2025-06-26 NOTE — PROGRESS NOTES
Report received from Kaela MELGAR. Pt historically hypotensive, BP currently 86/50, pt is non-symptomatic. NS running wide open through PIV currently.    Pt resting, eyes closed, on R. Side. Denies any pain or discomfort. No noticeable distress noted.    Covaderm dressing is over puncture site, it is clean, dry and intact.    Continuing to monitor pt.

## 2025-06-26 NOTE — OR NURSING
Image guided liver biopsy completed by Dr. Moy. Pt tolerated procedure without any signs or symptoms of distress. Vital signs stable. Report given  to Nasim MELGAR. Pt transported back to [facility] in stable condition via stretcher.     Total Biopsy: 2  Received: Versed: 1 mg       Fentanyl: 50 mcg  Bandage to R abd that is clean dry and intact.   Patient to lay on R side for 1 hour.     Vital Signs  Vitals:    06/26/25 1000   BP: (!) 87/54   Pulse: 65   Resp: 14   Temp:    SpO2: 100%    (vital signs in table format)    Post Liya  2 - Able to move 4 extremities voluntarily on command  2 - BP+/- 20mmHg of normal  2 - Fully awake  2 - Able to maintain oxygen saturation >92% on room air  2 - Able to breathe deeply and cough freely

## 2025-06-26 NOTE — PRE SEDATION
needed      lamoTRIgine (LAMICTAL) 150 MG tablet TAKE 1 TABLET BY MOUTH ONCE DAILY IN THE MORNING FOR DEPRESSION FOR 30 DAYS      vitamin D (CHOLECALCIFEROL) 50 MCG (2000 UT) TABS tablet Take 2 tablets by mouth daily      busPIRone (BUSPAR) 15 MG tablet Take 15 mg by mouth 3 times daily      propranolol (INDERAL LA) 60 MG extended release capsule Take 1 capsule by mouth daily Maintenance dose: Start after completing propranolol 10mg TID starting dose. 30 capsule 11    Ferrous Sulfate (IRON PO) Take by mouth daily      buPROPion (WELLBUTRIN SR) 150 MG extended release tablet daily      Continuous Glucose Sensor (FREESTYLE DENI 3 SENSOR) MISC APPLY 1 EACH EVERY 14 DAYS.      traZODone (DESYREL) 150 MG tablet Take 1 tablet by mouth nightly      diclofenac sodium (VOLTAREN) 1 % GEL APPLY 4 G TOPICALLY 4 TIMES DAILY (Patient taking differently: Apply 4 g topically 4 times daily as needed) 100 g 3    potassium chloride (KLOR-CON M) 20 MEQ extended release tablet Take 1 tablet by mouth daily      rOPINIRole (REQUIP) 0.25 MG tablet Take 3 tablets by mouth nightly      nystatin (MYCOSTATIN) 521218 UNIT/GM powder as needed       loratadine (CLARITIN) 10 MG tablet Take 1 tablet by mouth daily      Multiple Vitamins-Minerals (BARIATRIC FUSION) CHEW Take 2 tablets by mouth daily       levothyroxine (SYNTHROID) 75 MCG tablet TAKE 1 TABLET BY MOUTH EVERY DAY  5     Current Facility-Administered Medications   Medication Dose Route Frequency Provider Last Rate Last Admin    sodium chloride flush 0.9 % injection 5-40 mL  5-40 mL IntraVENous 2 times per day Toby Moy,         sodium chloride flush 0.9 % injection 5-40 mL  5-40 mL IntraVENous PRN Toby Moy,         0.9 % sodium chloride infusion   IntraVENous PRN Toby Moy,         ondansetron (ZOFRAN) injection 4 mg  4 mg IntraVENous Q6H PRN Toby Moy DO         Scheduled Meds:    sodium chloride flush  5-40 mL IntraVENous 2 times per day     Home Meds:   Prior

## 2025-07-01 DIAGNOSIS — I50.9 CONGESTIVE HEART FAILURE, UNSPECIFIED HF CHRONICITY, UNSPECIFIED HEART FAILURE TYPE (HCC): Primary | ICD-10-CM

## 2025-07-16 ENCOUNTER — HOSPITAL ENCOUNTER (OUTPATIENT)
Age: 40
Discharge: HOME | End: 2025-07-18
Attending: INTERNAL MEDICINE
Payer: MEDICAID

## 2025-07-16 VITALS
SYSTOLIC BLOOD PRESSURE: 96 MMHG | HEIGHT: 66 IN | DIASTOLIC BLOOD PRESSURE: 63 MMHG | WEIGHT: 143 LBS | BODY MASS INDEX: 22.98 KG/M2

## 2025-07-16 DIAGNOSIS — I50.9 CONGESTIVE HEART FAILURE, UNSPECIFIED HF CHRONICITY, UNSPECIFIED HEART FAILURE TYPE (HCC): ICD-10-CM

## 2025-07-16 LAB
ECHO AO ROOT DIAM: 3.3 CM
ECHO AO ROOT INDEX: 1.91 CM/M2
ECHO AV MEAN GRADIENT: 3 MMHG
ECHO AV MEAN VELOCITY: 0.8 M/S
ECHO AV PEAK GRADIENT: 5 MMHG
ECHO AV PEAK VELOCITY: 1.1 M/S
ECHO AV VELOCITY RATIO: 0.73
ECHO AV VTI: 22.5 CM
ECHO BSA: 1.74 M2
ECHO EST RA PRESSURE: 8 MMHG
ECHO IVC EXP: 0.6 CM
ECHO LA AREA 2C: 20.7 CM2
ECHO LA AREA 4C: 16.1 CM2
ECHO LA MAJOR AXIS: 5.6 CM
ECHO LA MINOR AXIS: 5.6 CM
ECHO LA VOL BP: 47 ML (ref 22–52)
ECHO LA VOL MOD A2C: 62 ML (ref 22–52)
ECHO LA VOL MOD A4C: 36 ML (ref 22–52)
ECHO LA VOL/BSA BIPLANE: 27 ML/M2 (ref 16–34)
ECHO LA VOLUME INDEX MOD A2C: 36 ML/M2 (ref 16–34)
ECHO LA VOLUME INDEX MOD A4C: 21 ML/M2 (ref 16–34)
ECHO LV E' LATERAL VELOCITY: 16.5 CM/S
ECHO LV E' SEPTAL VELOCITY: 11.4 CM/S
ECHO LV EDV A2C: 90 ML
ECHO LV EDV A4C: 68 ML
ECHO LV EDV INDEX A4C: 39 ML/M2
ECHO LV EDV NDEX A2C: 52 ML/M2
ECHO LV EF PHYSICIAN: 55 %
ECHO LV EJECTION FRACTION A2C: 60 %
ECHO LV EJECTION FRACTION A4C: 57 %
ECHO LV EJECTION FRACTION BIPLANE: 55 % (ref 55–100)
ECHO LV ESV A2C: 36 ML
ECHO LV ESV A4C: 29 ML
ECHO LV ESV INDEX A2C: 21 ML/M2
ECHO LV ESV INDEX A4C: 17 ML/M2
ECHO LV FRACTIONAL SHORTENING: 29 % (ref 28–44)
ECHO LV INTERNAL DIMENSION DIASTOLE INDEX: 2.6 CM/M2
ECHO LV INTERNAL DIMENSION DIASTOLIC: 4.5 CM (ref 3.9–5.3)
ECHO LV INTERNAL DIMENSION SYSTOLIC INDEX: 1.85 CM/M2
ECHO LV INTERNAL DIMENSION SYSTOLIC: 3.2 CM
ECHO LV ISOVOLUMETRIC RELAXATION TIME (IVRT): 109 MS
ECHO LV IVSD: 1.1 CM (ref 0.6–0.9)
ECHO LV MASS 2D: 153.3 G (ref 67–162)
ECHO LV MASS INDEX 2D: 88.6 G/M2 (ref 43–95)
ECHO LV POSTERIOR WALL DIASTOLIC: 0.9 CM (ref 0.6–0.9)
ECHO LV RELATIVE WALL THICKNESS RATIO: 0.4
ECHO LVOT AV VTI INDEX: 0.76
ECHO LVOT MEAN GRADIENT: 1 MMHG
ECHO LVOT PEAK GRADIENT: 2 MMHG
ECHO LVOT PEAK VELOCITY: 0.8 M/S
ECHO LVOT VTI: 17 CM
ECHO MV A VELOCITY: 0.46 M/S
ECHO MV E DECELERATION TIME (DT): 234 MS
ECHO MV E VELOCITY: 0.69 M/S
ECHO MV E/A RATIO: 1.5
ECHO MV E/E' LATERAL: 4.18
ECHO MV E/E' RATIO (AVERAGED): 5.12
ECHO MV E/E' SEPTAL: 6.05
ECHO MV LVOT VTI INDEX: 1.51
ECHO MV MAX VELOCITY: 0.9 M/S
ECHO MV MEAN GRADIENT: 1 MMHG
ECHO MV MEAN VELOCITY: 0.6 M/S
ECHO MV PEAK GRADIENT: 3 MMHG
ECHO MV VTI: 25.6 CM
ECHO PV MAX VELOCITY: 0.8 M/S
ECHO PV PEAK GRADIENT: 2 MMHG
ECHO RA AREA 4C: 9.9 CM2
ECHO RA END SYSTOLIC VOLUME APICAL 4 CHAMBER INDEX BSA: 11 ML/M2
ECHO RA VOLUME: 19 ML
ECHO RIGHT VENTRICULAR SYSTOLIC PRESSURE (RVSP): 22 MMHG
ECHO RV BASAL DIMENSION: 2.9 CM
ECHO RV FREE WALL PEAK S': 9.6 CM/S
ECHO RV LONGITUDINAL DIMENSION: 5.2 CM
ECHO RV MID DIMENSION: 2.8 CM
ECHO RV TAPSE: 2.5 CM (ref 1.7–?)
ECHO TV REGURGITANT MAX VELOCITY: 1.89 M/S
ECHO TV REGURGITANT PEAK GRADIENT: 14 MMHG

## 2025-07-16 PROCEDURE — 93306 TTE W/DOPPLER COMPLETE: CPT | Performed by: INTERNAL MEDICINE

## 2025-07-16 PROCEDURE — 93306 TTE W/DOPPLER COMPLETE: CPT

## 2025-08-15 ENCOUNTER — HOSPITAL ENCOUNTER (EMERGENCY)
Age: 40
Discharge: HOME | End: 2025-08-15

## 2025-08-15 VITALS
BODY MASS INDEX: 22.98 KG/M2 | TEMPERATURE: 97.9 F | DIASTOLIC BLOOD PRESSURE: 79 MMHG | HEART RATE: 99 BPM | RESPIRATION RATE: 18 BRPM | OXYGEN SATURATION: 100 % | SYSTOLIC BLOOD PRESSURE: 113 MMHG | HEIGHT: 66 IN | WEIGHT: 143 LBS

## 2025-08-15 DIAGNOSIS — Z48.03 ENCOUNTER FOR CHANGE OR REMOVAL OF DRAINS: Primary | ICD-10-CM

## 2025-08-15 ASSESSMENT — PAIN DESCRIPTION - LOCATION: LOCATION: ABDOMEN

## 2025-08-15 ASSESSMENT — PAIN SCALES - GENERAL: PAINLEVEL_OUTOF10: 5

## 2025-08-15 ASSESSMENT — PAIN - FUNCTIONAL ASSESSMENT: PAIN_FUNCTIONAL_ASSESSMENT: 0-10

## 2025-08-15 ASSESSMENT — PAIN DESCRIPTION - DESCRIPTORS: DESCRIPTORS: DISCOMFORT

## 2025-08-20 ENCOUNTER — OFFICE VISIT (OUTPATIENT)
Dept: ORTHOPEDIC SURGERY | Age: 40
End: 2025-08-20
Payer: MEDICAID

## 2025-08-20 VITALS — HEIGHT: 66 IN | BODY MASS INDEX: 22.98 KG/M2 | WEIGHT: 143 LBS

## 2025-08-20 DIAGNOSIS — G56.21 CUBITAL TUNNEL SYNDROME, RIGHT: Primary | ICD-10-CM

## 2025-08-20 PROCEDURE — 1036F TOBACCO NON-USER: CPT | Performed by: STUDENT IN AN ORGANIZED HEALTH CARE EDUCATION/TRAINING PROGRAM

## 2025-08-20 PROCEDURE — G8427 DOCREV CUR MEDS BY ELIG CLIN: HCPCS | Performed by: STUDENT IN AN ORGANIZED HEALTH CARE EDUCATION/TRAINING PROGRAM

## 2025-08-20 PROCEDURE — 99212 OFFICE O/P EST SF 10 MIN: CPT | Performed by: STUDENT IN AN ORGANIZED HEALTH CARE EDUCATION/TRAINING PROGRAM

## 2025-08-20 PROCEDURE — G8420 CALC BMI NORM PARAMETERS: HCPCS | Performed by: STUDENT IN AN ORGANIZED HEALTH CARE EDUCATION/TRAINING PROGRAM

## (undated) DEVICE — NEEDLE HYPO 18GA L1.5IN PNK POLYPR HUB S STL REG BVL STR

## (undated) DEVICE — SHOULDER STABILIZATION KIT,                                    DISPOSABLE 12 PER BOX

## (undated) DEVICE — SHEET,DRAPE,40X58,STERILE: Brand: MEDLINE

## (undated) DEVICE — TOWEL,OR,DSP,ST,BLUE,STD,8/PK,10PK/CS: Brand: MEDLINE

## (undated) DEVICE — BINDER ABD H12IN FOR 62-74IN WAIST UNIV 4 PNL PREM DSGN E

## (undated) DEVICE — NEEDLE HYPO 25GA L1.5IN BLU POLYPR HUB S STL REG BVL STR

## (undated) DEVICE — DYONICS 4.0 MM ELITE                                    ACROMIOBLASTER STRAIGHT DISPOSABLE                                    BURRS, SAGE GREEN, 10000 MAXIMUM                                    RPM, PACKAGED 6 PER BOX, STERILE

## (undated) DEVICE — MAJOR SET UP PK

## (undated) DEVICE — GOWN SIRUS NONREIN XL W/TWL: Brand: MEDLINE INDUSTRIES, INC.

## (undated) DEVICE — SPONGE,LAP,4"X18",XR,ST,5/PK,40PK/CS: Brand: MEDLINE INDUSTRIES, INC.

## (undated) DEVICE — ADHESIVE SKIN CLSR 0.7ML TOP DERMBND ADV

## (undated) DEVICE — ESOPHAGEAL BALLOON DILATATION CATHETER: Brand: CRE FIXED WIRE

## (undated) DEVICE — Z DISCONTINUED USE 2716304 SUTURE STRATAFIX SPRL SZ 3-0 L12IN ABSRB UD FS-1 L24MM 3/8

## (undated) DEVICE — APPLICATOR PREP 26ML 0.7% IOD POVACRYLEX 74% ISO ALC ST

## (undated) DEVICE — FORCEPS BX L240CM JAW DIA2.8MM L CAP W/ NDL MIC MESH TOOTH

## (undated) DEVICE — GLOVE SURG SZ 8 L12IN FNGR THK79MIL GRN LTX FREE

## (undated) DEVICE — FORCEPS BX L240CM WRK CHN 2.8MM STD CAP W/ NDL MIC MESH

## (undated) DEVICE — SUTURE VCRL SZ 0 L54IN ABSRB UD POLYGLACTIN 910 COAT BRAID J608H

## (undated) DEVICE — BNDG,ELSTC,MATRIX,STRL,3"X5YD,LF,HOOK&LP: Brand: MEDLINE

## (undated) DEVICE — 1810 FOAM BLOCK NEEDLE COUNTER: Brand: DEVON

## (undated) DEVICE — GLOVE ORANGE PI 7   MSG9070

## (undated) DEVICE — SURGICAL SET UP - SURE SET: Brand: MEDLINE INDUSTRIES, INC.

## (undated) DEVICE — DRESSING,GAUZE,XEROFORM,CURAD,1"X8",ST: Brand: CURAD

## (undated) DEVICE — SOLUTION ANTIFOG VIS SYS CLEARIFY LAPSCP

## (undated) DEVICE — PADDING CAST W4INXL4YD ST COT RAYON MICROPLEATED HIGHLY

## (undated) DEVICE — TROCARS: Brand: KII® OPTICAL ACCESS SYSTEM

## (undated) DEVICE — STAPLER SKIN L440MM 32MM LNG 12 FIRING B FRM PWR + GRIPPING

## (undated) DEVICE — CANNULA,OXY,ADULT,SUPERSOFT,W/7'TUB,SC: Brand: MEDLINE INDUSTRIES, INC.

## (undated) DEVICE — APPLICATOR MEDICATED 10.5 CC SOLUTION HI LT ORNG CHLORAPREP

## (undated) DEVICE — BW-412T DISP COMBO CLEANING BRUSH: Brand: SINGLE USE COMBINATION CLEANING BRUSH

## (undated) DEVICE — SUTURE MONOCRYL + SZ 4-0 L18IN ABSRB UD L19MM PS-2 3/8 CIR MCP496G

## (undated) DEVICE — SUTURE ETHBND EXCEL SZ 0 L30IN NONABSORBABLE GRN CT1 L36MM X424H

## (undated) DEVICE — SUTURE ETHLN SZ 4-0 L18IN NONABSORBABLE BLK L19MM PS-2 3/8 1667H

## (undated) DEVICE — UNDERPAD HOSP W30XL36IN WHT SUP ABSRB POLYMER AIR PERM DISP

## (undated) DEVICE — TUBE IRRIG L8IN LNG PT W/ CONN FOR PMP SYS REDEUCE

## (undated) DEVICE — 3M™ WARMING BLANKET, UPPER BODY, 10 PER CASE, 42268: Brand: BAIR HUGGER™

## (undated) DEVICE — STERILE POLYISOPRENE POWDER-FREE SURGICAL GLOVES: Brand: PROTEXIS

## (undated) DEVICE — 40763 BEACH CHAIR HEAD RESTRAINT: Brand: 40763 BEACH CHAIR HEAD RESTRAINT

## (undated) DEVICE — ARM CRADLE: Brand: DEVON

## (undated) DEVICE — SET VLV 3 PC AWS DISPOSABLE GRDIAN SCOPEVALET

## (undated) DEVICE — MOUTHPIECE ENDOSCP L CTRL OPN AND SIDE PORTS DISP

## (undated) DEVICE — SOLUTION IV 1000ML LAC RINGERS PH 6.5 INJ USP VIAFLX PLAS

## (undated) DEVICE — CANNULA NSL 13FT TUBE AD ETCO2 DIV SAMP M

## (undated) DEVICE — TISSUE RETRIEVAL SYSTEM: Brand: INZII RETRIEVAL SYSTEM

## (undated) DEVICE — TROCAR: Brand: KII FIOS FIRST ENTRY

## (undated) DEVICE — SUTURE NONABSORBABLE MONOFILAMENT 3-0 PS-1 18 IN BLK ETHILON 1663H

## (undated) DEVICE — 3M™ STERI-STRIP™ REINFORCED ADHESIVE SKIN CLOSURES, R1547, 1/2 IN X 4 IN (12 MM X 100 MM), 6 STRIPS/ENVELOPE: Brand: 3M™ STERI-STRIP™

## (undated) DEVICE — DISSECTOR LAP DIA5MM BLNT TIP ENDOPATH

## (undated) DEVICE — SPLINT ORTH W3XL12IN LAYERED FBRGLS FOAM PD BRTH BK MOLD

## (undated) DEVICE — GLOVE ORANGE PI 8 1/2   MSG9085

## (undated) DEVICE — SUTURE VCRL SZ 4-0 L18IN ABSRB UD L19MM PS-2 3/8 CIR PRIM J496H

## (undated) DEVICE — RELOAD STPL H1.8-3.8MM REG THCK TISS G 6 ROW GRIPPING SURF

## (undated) DEVICE — SYRINGE MED 50ML LUERLOCK TIP

## (undated) DEVICE — GLOVE SURG SZ 8 L12IN FNGR THK94MIL STD WHT LTX FREE

## (undated) DEVICE — TIP SUCT DIA12FR W STYL CTRL VENT DISPOSABLE FRAZ

## (undated) DEVICE — STANDARD HYPODERMIC NEEDLE,POLYPROPYLENE HUB: Brand: MONOJECT

## (undated) DEVICE — CORD ES L12FT BPLR FRCP

## (undated) DEVICE — BOWL MED L 32OZ PLAS W/ MOLD GRAD EZ OPN PEEL PCH

## (undated) DEVICE — SUTURE VCRL SZ 0 L54IN ABSRB VLT W/O NDL POLYGLACTIN 910 J616H

## (undated) DEVICE — SET ADMIN PRIMING 7ML L30IN 7.35LB 20 GTT 2ND RLER CLMP

## (undated) DEVICE — PACK,UNIVERSAL,SPLIT,II,AURORA: Brand: MEDLINE

## (undated) DEVICE — PASSIVE LAPSCP FLTR W/ 1/4INX24 TBNG AND M LUER LCK FIT - U

## (undated) DEVICE — GOWN,AURORA,NONREINF,RAGLAN,XXL,STERILE: Brand: MEDLINE

## (undated) DEVICE — Z CONVERTED USE 2273164 BANDAGE COMPR W4INXL4 1/2YD E EC SGL LAYERED CLP CLSR ECONO

## (undated) DEVICE — LAPAROSCOPY PK

## (undated) DEVICE — ZIMMER® STERILE DISPOSABLE TOURNIQUET CUFF WITH PLC, DUAL PORT, SINGLE BLADDER, 18 IN. (46 CM)

## (undated) DEVICE — BLADE ES ELASTOMERIC COAT INSUL DURABLE BEND UPTO 90DEG

## (undated) DEVICE — GAUZE,SPONGE,4"X4",8PLY,STRL,LF,10/TRAY: Brand: MEDLINE

## (undated) DEVICE — SYRINGE MED 10ML LUERLOCK TIP W/O SFTY DISP

## (undated) DEVICE — CATHETER IV 14 GA TRPL BVL LUERLOCK TIP RADIOPAQUE ANGIOCATH

## (undated) DEVICE — COVER US PRB W13XL244CM SURGICAL INTRAOPERATIVE W RUBBERBAND

## (undated) DEVICE — JEWISH HOSPITAL TURNOVER KIT: Brand: MEDLINE INDUSTRIES, INC.

## (undated) DEVICE — SURE SET-DOUBLE BASIN-LF: Brand: MEDLINE INDUSTRIES, INC.

## (undated) DEVICE — SET GRAV VENT NVENT CK VLV 3 NDL FREE PRT 10 GTT

## (undated) DEVICE — Z CONVERTED USE 2273163 BANDAGE COMPR W3INXL4.5YD LTWT E EC SGL LAYERED CLP CLSR

## (undated) DEVICE — 3M™ COBAN™ NL STERILE NON-LATEX SELF-ADHERENT WRAP, 2084S, 4 IN X 5 YD (10 CM X 4,5 M), 18 ROLLS/CASE: Brand: 3M™ COBAN™

## (undated) DEVICE — GLOVE ORANGE PI 8   MSG9080

## (undated) DEVICE — Device

## (undated) DEVICE — NEEDLE INSUF L150MM DIA2MM DISP FOR PNEUMOPERI ENDOPATH

## (undated) DEVICE — SWITCH DRAPE TENET 7633

## (undated) DEVICE — LAPAROSCOPIC SCISSORS: Brand: EPIX LAPAROSCOPIC SCISSORS

## (undated) DEVICE — GLOVE SURG SZ 75 L12IN THK75MIL DK GRN LTX FREE

## (undated) DEVICE — SOLUTION IV IRRIG WATER 500ML POUR BRL ST 2F7113

## (undated) DEVICE — SOLUTION IV 500ML 0.9% SOD CHL PH 5 INJ USP VIAFLX PLAS

## (undated) DEVICE — NEEDLE HYPO 22GA L1.5IN BLK POLYPR HUB S STL REG BVL STR

## (undated) DEVICE — CONMED SCOPE SAVER BITE BLOCK, 20X27 MM: Brand: SCOPE SAVER

## (undated) DEVICE — PACK PROCEDURE SURG ARTHSCP CUST

## (undated) DEVICE — ELECTRODE,ECG,STRESS,FOAM,3PK: Brand: MEDLINE

## (undated) DEVICE — RELOAD STPL H35XL60MM BLU REG B FORM NAT ARTC ECHELON

## (undated) DEVICE — 4.5 MM FULL RADIUS STRAIGHT                                    BLADES, POWER/EP-1, YELLOW, PACKAGED                                    6 PER BOX, STERILE: Brand: DYONICS

## (undated) DEVICE — SOLUTION IRRIG 5L LAC R BG

## (undated) DEVICE — CANNULA NSL AD TBNG L7FT PVC STR NONFLARED PRNG O2 DEL W STD

## (undated) DEVICE — TUBING SUCT 12FR MAL ALUM SHFT FN CAP VENT UNIV CONN W/ OBT

## (undated) DEVICE — PLATE ES AD W 9FT CRD 2

## (undated) DEVICE — GLOVE SURG SZ 65 L12IN FNGR THK79MIL GRN LTX FREE

## (undated) DEVICE — COVER LT HNDL BLU PLAS

## (undated) DEVICE — 40583 XL ADVANCED TRENDELENBURG POSITIONING KIT: Brand: 40583 XL ADVANCED TRENDELENBURG POSITIONING KIT

## (undated) DEVICE — GAUZE,SPONGE,4"X4",16PLY,STRL,LF,10/TRAY: Brand: MEDLINE

## (undated) DEVICE — RELOAD STPL H4.1X2MM DIA60MM THCK TISS GRN 6 ROW PWR GST B

## (undated) DEVICE — CHLORAPREP 26ML ORANGE

## (undated) DEVICE — MHCZ MINOR: Brand: MEDLINE INDUSTRIES, INC.

## (undated) DEVICE — DRAPE,LAP,CHOLE,W/TROUGHS,STERILE: Brand: MEDLINE

## (undated) DEVICE — WEREWOLF FLOW 90 COBLATION WAND: Brand: COBLATION

## (undated) DEVICE — DEVICE SUT SHFT L34CM DIA 10MM 2 JAW LD UNIT ENDOSTCH

## (undated) DEVICE — GLOVE SURG SZ 65 L12IN FNGR THK94MIL STD WHT LTX FREE

## (undated) DEVICE — SUTURE MONOCRYL STRATAFIX SPRL SZ 3-0 L12IN ABSRB UD FS-1 L30X30CM SXMP2B410

## (undated) DEVICE — Z DUP USE 2641840 CLIP INT L POLYMER LOK LIG HEM O LOK

## (undated) DEVICE — DRAPE,U/ SHT,SPLIT,PLAS,STERIL: Brand: MEDLINE

## (undated) DEVICE — MATERIAL PD W2XL4YD ST COT CAST SPLNT NONADHESIVE SPEC

## (undated) DEVICE — SYRINGE INFL 60ML DISP ALLIANCE II

## (undated) DEVICE — ENDO CARRY-ON PROCEDURE KIT INCLUDES SUCTION TUBING, LUBRICANT, GAUZE, BIOHAZARD STICKER, TRANSPORT PAD AND INTERCEPT BEDSIDE KIT.: Brand: ENDO CARRY-ON PROCEDURE KIT

## (undated) DEVICE — SOLUTION IRRIG 500ML 0.9% SOD CHLO USP POUR PLAS BTL

## (undated) DEVICE — 1010 S-DRAPE TOWEL DRAPE 10/BX: Brand: STERI-DRAPE™

## (undated) DEVICE — ENDOSCOPIC KIT 2 12 FT OP4 DE2 GWN SYR

## (undated) DEVICE — DEVICE SUT W/ SZ 0 L48IN VLT POLYSRB SUT DISP ES-9 ENDO

## (undated) DEVICE — HYPODERMIC SAFETY NEEDLE: Brand: MAGELLAN

## (undated) DEVICE — STOCKINETTE,IMPERVIOUS,12X48,STERILE: Brand: MEDLINE

## (undated) DEVICE — GOWN,SIRUS,NONRNF,3XL,18/CS: Brand: MEDLINE

## (undated) DEVICE — MEDICINE CUP, GRADUATED, STER: Brand: MEDLINE

## (undated) DEVICE — SYRINGE, LUER LOCK, 10ML: Brand: MEDLINE

## (undated) DEVICE — SOLUTION IV IRRIG 500ML 0.9% SODIUM CHL 2F7123

## (undated) DEVICE — CATHETER IV 20GA L1.25IN PNK FEP SFTY STR HUB RADPQ DISP

## (undated) DEVICE — CONTROL SYRINGE LUER-LOCK TIP: Brand: MONOJECT

## (undated) DEVICE — BNDG,ELSTC,MATRIX,STRL,4"X5YD,LF,HOOK&LP: Brand: MEDLINE

## (undated) DEVICE — TRUE CONTENT TO BE POPULATED AS PART OF REBRANDING: Brand: ARGYLE

## (undated) DEVICE — ELECTROSURGICAL PENCIL ROCKER SWITCH NON COATED BLADE ELECTRODE 10 FT (3 M) CORD HOLSTER: Brand: MEGADYNE

## (undated) DEVICE — SUTURE MONOCRYL SZ 3-0 L27IN ABSRB UD PS-2 3/8 CIR REV CUT NDL MCP427H

## (undated) DEVICE — DRAPE BEACH CHAIR SHOULDER W/ POUCH 172X100 IN

## (undated) DEVICE — ELECTRODE PT RET AD L9FT HI MOIST COND ADH HYDRGEL CORDED

## (undated) DEVICE — ANTI-FOG SOLUTION WITH FOAM PAD: Brand: DEVON

## (undated) DEVICE — LOTION PREP REMV 5OZ IODO CLR TINC OF BENZ DURAPREP

## (undated) DEVICE — SYRINGE, LUER LOCK, 60ML: Brand: MEDLINE

## (undated) DEVICE — 5.5 MM ELITE ACROMIOBLASTER                                    STRAIGHT DISPOSABLE BURRS, BRICK                                    RED, 10000 MAXIMUM RPM, PACKAGED 6                                    PER BOX, STERILE

## (undated) DEVICE — PUMP SUC IRR TBNG L10FT W/ HNDPC ASSEMB STRYKEFLOW 2

## (undated) DEVICE — 3M™ IOBAN™ 2 ANTIMICROBIAL INCISE DRAPE 6650EZ: Brand: IOBAN™ 2

## (undated) DEVICE — SUTURE ETHLN SZ 3-0 L18IN NONABSORBABLE BLK L24MM PS-1 3/8 1663G

## (undated) DEVICE — TUBING PMP L8FT LNG W/ CONN FOR AR-6400 REDEUCE

## (undated) DEVICE — SOLUTION IV IRRIG POUR BRL 0.9% SODIUM CHL 2F7124

## (undated) DEVICE — KIT OR ROOM TURNOVER W/STRAP

## (undated) DEVICE — SHEET,DRAPE,53X77,STERILE: Brand: MEDLINE

## (undated) DEVICE — PAD,ABDOMINAL,8"X10",ST,LF: Brand: MEDLINE

## (undated) DEVICE — NEEDLE SUT PASS FOR ROT CUF LABRAL REP MULTFI SCORPION

## (undated) DEVICE — GOWN SIRUS NONREIN LG W/TWL: Brand: MEDLINE INDUSTRIES, INC.

## (undated) DEVICE — NEEDLE HYPO 18GA L1.5IN PNK POLYPR HUB S STL THN WALL FILL

## (undated) DEVICE — APPLICATOR MEDICATED 26 CC SOLUTION HI LT ORNG CHLORAPREP

## (undated) DEVICE — AIR SHEET,LAT,COMFORT GLIDE, BLEND 40X80: Brand: MEDLINE

## (undated) DEVICE — CATHETER TRAY 16 FR 5 CC FOL ANTIREFLX SAMPLING PRT DOVER

## (undated) DEVICE — CORD ES L10FT MPLR LAP

## (undated) DEVICE — Z INACTIVE USE 2635508 SOLUTION IRRIG 500ML 0.9% SOD CHL USP POUR PLAS BTL

## (undated) DEVICE — SUTURE VICRYL SZ 3-0 L18IN ABSRB UD L26MM SH 1/2 CIR J864D

## (undated) DEVICE — TROCAR: Brand: KII OPTICAL ACCESS SYSTEM

## (undated) DEVICE — Z INACTIVE USE 2735373 APPLICATOR FBR LAIN COT WOOD TIP ECONOMICAL

## (undated) DEVICE — PADDING CAST W4INXL4YD NONSTERILE COT RAYON MICROPLEATED

## (undated) DEVICE — GEL US 20GM NONIRRITATING OVERWRAPPED FILE PCH TRNSMIT

## (undated) DEVICE — SKIN PREP TRAY W/CHG: Brand: MEDLINE INDUSTRIES, INC.

## (undated) DEVICE — GOWN,SIRUS,NON REINFRCD,LARGE,SET IN SL: Brand: MEDLINE

## (undated) DEVICE — 3M™ STERI-DRAPE™ U-DRAPE, LONG 1019: Brand: STERI-DRAPE™

## (undated) DEVICE — PROCEDURE KIT ENDOSCP CUST

## (undated) DEVICE — SUTURE VICRYL + SZ 3-0 L27IN ABSRB UD L26MM SH 1/2 CIR VCP416H

## (undated) DEVICE — CRADLE ANK AND FT ELEV FLAT END POLY FOAM W/ VENT H NEUT

## (undated) DEVICE — SOLUTION IV 500ML 0.9% SOD BOTTLE CHL LTWT DURABLE SHATTERPROOF

## (undated) DEVICE — SYSTEM SMK EVAC LAP TBNG FILTER HSNG BENT STYL PNK SEE CLR

## (undated) DEVICE — GARMENT,MEDLINE,DVT,INT,CALF,MED, GEN2: Brand: MEDLINE

## (undated) DEVICE — SUTURE VCRL SZ 3-0 L18IN ABSRB UD L26MM SH 1/2 CIR J864D

## (undated) DEVICE — LIQUIBAND RAPID ADHESIVE 36/CS 0.8ML: Brand: MEDLINE

## (undated) DEVICE — TX2 PROCEDURE PACK: Brand: TENEX HEALTH TX®

## (undated) DEVICE — PEN: MARKING STD 100/CS: Brand: MEDICAL ACTION INDUSTRIES

## (undated) DEVICE — [HIGH FLOW INSUFFLATOR,  DO NOT USE IF PACKAGE IS DAMAGED,  KEEP DRY,  KEEP AWAY FROM SUNLIGHT,  PROTECT FROM HEAT AND RADIOACTIVE SOURCES.]: Brand: PNEUMOSURE

## (undated) DEVICE — SHEARS ENDOSCP L36CM DIA5MM ULTRASONIC CRV TIP HARM

## (undated) DEVICE — TOWEL,OR,DSP,ST,BLUE,STD,4/PK,20PK/CS: Brand: MEDLINE

## (undated) DEVICE — SOLUTION INJ LR VISIV 1000ML BG

## (undated) DEVICE — 3M™ TEGADERM™ TRANSPARENT FILM DRESSING FRAME STYLE, 1624W, 2-3/8 IN X 2-3/4 IN (6 CM X 7 CM), 100/CT 4CT/CASE: Brand: 3M™ TEGADERM™